# Patient Record
Sex: MALE | Race: WHITE | Employment: OTHER | URBAN - METROPOLITAN AREA
[De-identification: names, ages, dates, MRNs, and addresses within clinical notes are randomized per-mention and may not be internally consistent; named-entity substitution may affect disease eponyms.]

---

## 2019-01-21 ENCOUNTER — HOSPITAL ENCOUNTER (OUTPATIENT)
Age: 59
Discharge: HOME | End: 2019-01-23
Payer: COMMERCIAL

## 2019-01-21 ENCOUNTER — HOSPITAL ENCOUNTER (OUTPATIENT)
Dept: GENERAL RADIOLOGY | Age: 59
Discharge: HOME | End: 2019-01-23
Payer: COMMERCIAL

## 2019-01-21 ENCOUNTER — OFFICE VISIT (OUTPATIENT)
Dept: FAMILY MEDICINE CLINIC | Age: 59
End: 2019-01-21
Payer: COMMERCIAL

## 2019-01-21 VITALS
RESPIRATION RATE: 16 BRPM | OXYGEN SATURATION: 99 % | HEART RATE: 86 BPM | BODY MASS INDEX: 25.71 KG/M2 | SYSTOLIC BLOOD PRESSURE: 124 MMHG | HEIGHT: 73 IN | WEIGHT: 194 LBS | DIASTOLIC BLOOD PRESSURE: 78 MMHG

## 2019-01-21 DIAGNOSIS — D47.1 MYELOPROLIFERATIVE DISORDER (HCC): ICD-10-CM

## 2019-01-21 DIAGNOSIS — Z13.220 SCREENING FOR HYPERLIPIDEMIA: ICD-10-CM

## 2019-01-21 DIAGNOSIS — Z76.89 ENCOUNTER TO ESTABLISH CARE: Primary | ICD-10-CM

## 2019-01-21 DIAGNOSIS — Z13.228 SCREENING FOR METABOLIC DISORDER: ICD-10-CM

## 2019-01-21 DIAGNOSIS — M54.50 CHRONIC BILATERAL LOW BACK PAIN WITHOUT SCIATICA: Primary | ICD-10-CM

## 2019-01-21 DIAGNOSIS — G89.29 CHRONIC MIDLINE LOW BACK PAIN WITHOUT SCIATICA: ICD-10-CM

## 2019-01-21 DIAGNOSIS — M54.50 CHRONIC MIDLINE LOW BACK PAIN WITHOUT SCIATICA: ICD-10-CM

## 2019-01-21 DIAGNOSIS — G89.29 CHRONIC BILATERAL LOW BACK PAIN WITHOUT SCIATICA: Primary | ICD-10-CM

## 2019-01-21 PROBLEM — Z85.6 HISTORY OF LEUKEMIA: Status: ACTIVE | Noted: 2019-01-21

## 2019-01-21 PROBLEM — Z85.6 HISTORY OF LEUKEMIA: Status: RESOLVED | Noted: 2019-01-21 | Resolved: 2019-01-21

## 2019-01-21 PROCEDURE — 72114 X-RAY EXAM L-S SPINE BENDING: CPT

## 2019-01-21 PROCEDURE — 72072 X-RAY EXAM THORAC SPINE 3VWS: CPT

## 2019-01-21 PROCEDURE — 3017F COLORECTAL CA SCREEN DOC REV: CPT | Performed by: NURSE PRACTITIONER

## 2019-01-21 PROCEDURE — G8484 FLU IMMUNIZE NO ADMIN: HCPCS | Performed by: NURSE PRACTITIONER

## 2019-01-21 PROCEDURE — G8427 DOCREV CUR MEDS BY ELIG CLIN: HCPCS | Performed by: NURSE PRACTITIONER

## 2019-01-21 PROCEDURE — 4004F PT TOBACCO SCREEN RCVD TLK: CPT | Performed by: NURSE PRACTITIONER

## 2019-01-21 PROCEDURE — G8419 CALC BMI OUT NRM PARAM NOF/U: HCPCS | Performed by: NURSE PRACTITIONER

## 2019-01-21 PROCEDURE — 99205 OFFICE O/P NEW HI 60 MIN: CPT | Performed by: NURSE PRACTITIONER

## 2019-01-21 RX ORDER — DICLOFENAC SODIUM 75 MG/1
75 TABLET, DELAYED RELEASE ORAL 2 TIMES DAILY WITH MEALS
Qty: 60 TABLET | Refills: 2 | Status: SHIPPED | OUTPATIENT
Start: 2019-01-21 | End: 2019-03-14 | Stop reason: SINTOL

## 2019-01-21 RX ORDER — ALLOPURINOL 300 MG/1
300 TABLET ORAL DAILY
COMMUNITY
End: 2019-02-21 | Stop reason: SDUPTHER

## 2019-01-21 RX ORDER — HYDROXYUREA 500 MG/1
500 CAPSULE ORAL 2 TIMES DAILY
COMMUNITY
End: 2019-02-21 | Stop reason: DRUGHIGH

## 2019-01-21 ASSESSMENT — ENCOUNTER SYMPTOMS
ANAL BLEEDING: 0
COLOR CHANGE: 0
VOMITING: 0
RESPIRATORY NEGATIVE: 1
CONSTIPATION: 0
DIARRHEA: 0
WHEEZING: 0
BLOOD IN STOOL: 0
CHEST TIGHTNESS: 0
ALLERGIC/IMMUNOLOGIC NEGATIVE: 1
BACK PAIN: 1
COUGH: 0
ABDOMINAL PAIN: 0
EYES NEGATIVE: 1
SHORTNESS OF BREATH: 0
GASTROINTESTINAL NEGATIVE: 1

## 2019-01-23 ENCOUNTER — HOSPITAL ENCOUNTER (OUTPATIENT)
Age: 59
Discharge: HOME | End: 2019-01-23
Payer: COMMERCIAL

## 2019-01-23 DIAGNOSIS — Z13.228 SCREENING FOR METABOLIC DISORDER: ICD-10-CM

## 2019-01-23 DIAGNOSIS — Z13.220 SCREENING FOR HYPERLIPIDEMIA: ICD-10-CM

## 2019-01-23 DIAGNOSIS — D47.1 MYELOPROLIFERATIVE DISORDER (HCC): ICD-10-CM

## 2019-01-23 LAB
ABSOLUTE EOS #: 1.04 K/UL (ref 0–0.4)
ABSOLUTE IMMATURE GRANULOCYTE: 0 K/UL (ref 0–0.3)
ABSOLUTE LYMPH #: 4.16 K/UL (ref 1–4.8)
ABSOLUTE MONO #: 1.25 K/UL (ref 0.1–0.8)
ALBUMIN SERPL-MCNC: 4.4 G/DL (ref 3.5–5.2)
ALBUMIN/GLOBULIN RATIO: 1.4 (ref 1–2.5)
ALP BLD-CCNC: 79 U/L (ref 40–129)
ALT SERPL-CCNC: 8 U/L (ref 5–41)
ANION GAP SERPL CALCULATED.3IONS-SCNC: 16 MMOL/L (ref 9–17)
AST SERPL-CCNC: 14 U/L
BASOPHILS # BLD: 0 % (ref 0–2)
BASOPHILS ABSOLUTE: 0 K/UL (ref 0–0.2)
BILIRUB SERPL-MCNC: 0.25 MG/DL (ref 0.3–1.2)
BUN BLDV-MCNC: 13 MG/DL (ref 6–20)
BUN/CREAT BLD: ABNORMAL (ref 9–20)
CALCIUM SERPL-MCNC: 9.6 MG/DL (ref 8.6–10.4)
CHLORIDE BLD-SCNC: 106 MMOL/L (ref 98–107)
CHOLESTEROL/HDL RATIO: 4.6
CHOLESTEROL: 139 MG/DL
CO2: 21 MMOL/L (ref 20–31)
CREAT SERPL-MCNC: 0.83 MG/DL (ref 0.7–1.2)
DIFFERENTIAL TYPE: ABNORMAL
EOSINOPHILS RELATIVE PERCENT: 5 % (ref 1–4)
GFR AFRICAN AMERICAN: >60 ML/MIN
GFR NON-AFRICAN AMERICAN: >60 ML/MIN
GFR SERPL CREATININE-BSD FRML MDRD: ABNORMAL ML/MIN/{1.73_M2}
GFR SERPL CREATININE-BSD FRML MDRD: ABNORMAL ML/MIN/{1.73_M2}
GLUCOSE BLD-MCNC: 96 MG/DL (ref 70–99)
HCT VFR BLD CALC: 53.6 % (ref 40.7–50.3)
HDLC SERPL-MCNC: 30 MG/DL
HEMOGLOBIN: 17.8 G/DL (ref 13–17)
IMMATURE GRANULOCYTES: 0 %
LDL CHOLESTEROL: 82 MG/DL (ref 0–130)
LYMPHOCYTES # BLD: 20 % (ref 24–44)
MCH RBC QN AUTO: 30.5 PG (ref 25.2–33.5)
MCHC RBC AUTO-ENTMCNC: 33.2 G/DL (ref 28.4–34.8)
MCV RBC AUTO: 91.9 FL (ref 82.6–102.9)
MONOCYTES # BLD: 6 % (ref 1–7)
MORPHOLOGY: ABNORMAL
NRBC AUTOMATED: 0 PER 100 WBC
PDW BLD-RTO: 17.2 % (ref 11.8–14.4)
PLATELET # BLD: 732 K/UL (ref 138–453)
PLATELET ESTIMATE: ABNORMAL
PMV BLD AUTO: 9.3 FL (ref 8.1–13.5)
POTASSIUM SERPL-SCNC: 4.3 MMOL/L (ref 3.7–5.3)
RBC # BLD: 5.83 M/UL (ref 4.21–5.77)
RBC # BLD: ABNORMAL 10*6/UL
SEG NEUTROPHILS: 69 % (ref 36–66)
SEGMENTED NEUTROPHILS ABSOLUTE COUNT: 14.35 K/UL (ref 1.8–7.7)
SODIUM BLD-SCNC: 143 MMOL/L (ref 135–144)
TOTAL PROTEIN: 7.5 G/DL (ref 6.4–8.3)
TRIGL SERPL-MCNC: 133 MG/DL
TSH SERPL DL<=0.05 MIU/L-ACNC: 2.05 MIU/L (ref 0.3–5)
VLDLC SERPL CALC-MCNC: ABNORMAL MG/DL (ref 1–30)
WBC # BLD: 20.8 K/UL (ref 3.5–11.3)
WBC # BLD: ABNORMAL 10*3/UL

## 2019-01-23 PROCEDURE — 84443 ASSAY THYROID STIM HORMONE: CPT

## 2019-01-23 PROCEDURE — 36415 COLL VENOUS BLD VENIPUNCTURE: CPT

## 2019-01-23 PROCEDURE — 80061 LIPID PANEL: CPT

## 2019-01-23 PROCEDURE — 85025 COMPLETE CBC W/AUTO DIFF WBC: CPT

## 2019-01-23 PROCEDURE — 80053 COMPREHEN METABOLIC PANEL: CPT

## 2019-01-24 ENCOUNTER — HOSPITAL ENCOUNTER (OUTPATIENT)
Facility: MEDICAL CENTER | Age: 59
End: 2019-01-24
Payer: COMMERCIAL

## 2019-01-29 ENCOUNTER — INITIAL CONSULT (OUTPATIENT)
Dept: ONCOLOGY | Age: 59
End: 2019-01-29
Payer: COMMERCIAL

## 2019-01-29 ENCOUNTER — TELEPHONE (OUTPATIENT)
Dept: ONCOLOGY | Age: 59
End: 2019-01-29

## 2019-01-29 VITALS
TEMPERATURE: 97.7 F | SYSTOLIC BLOOD PRESSURE: 129 MMHG | WEIGHT: 201.9 LBS | DIASTOLIC BLOOD PRESSURE: 95 MMHG | HEART RATE: 86 BPM | HEIGHT: 73 IN | BODY MASS INDEX: 26.76 KG/M2

## 2019-01-29 DIAGNOSIS — D47.1 MYELOPROLIFERATIVE DISORDER (HCC): Primary | ICD-10-CM

## 2019-01-29 PROCEDURE — G8419 CALC BMI OUT NRM PARAM NOF/U: HCPCS | Performed by: INTERNAL MEDICINE

## 2019-01-29 PROCEDURE — G8484 FLU IMMUNIZE NO ADMIN: HCPCS | Performed by: INTERNAL MEDICINE

## 2019-01-29 PROCEDURE — 99245 OFF/OP CONSLTJ NEW/EST HI 55: CPT | Performed by: INTERNAL MEDICINE

## 2019-01-29 PROCEDURE — G8427 DOCREV CUR MEDS BY ELIG CLIN: HCPCS | Performed by: INTERNAL MEDICINE

## 2019-01-29 PROCEDURE — 99211 OFF/OP EST MAY X REQ PHY/QHP: CPT | Performed by: INTERNAL MEDICINE

## 2019-01-29 PROCEDURE — 3017F COLORECTAL CA SCREEN DOC REV: CPT | Performed by: INTERNAL MEDICINE

## 2019-01-29 RX ORDER — ALLOPURINOL 300 MG/1
300 TABLET ORAL DAILY
Qty: 90 TABLET | Refills: 2 | Status: SHIPPED | OUTPATIENT
Start: 2019-01-29 | End: 2019-02-21 | Stop reason: SDUPTHER

## 2019-01-29 RX ORDER — HYDROXYUREA 500 MG/1
500 CAPSULE ORAL 3 TIMES DAILY
Qty: 90 CAPSULE | Refills: 2 | Status: SHIPPED | OUTPATIENT
Start: 2019-01-29 | End: 2019-06-01 | Stop reason: SDUPTHER

## 2019-01-31 ENCOUNTER — TELEPHONE (OUTPATIENT)
Dept: FAMILY MEDICINE CLINIC | Age: 59
End: 2019-01-31

## 2019-01-31 DIAGNOSIS — M54.50 CHRONIC MIDLINE LOW BACK PAIN WITHOUT SCIATICA: Primary | ICD-10-CM

## 2019-01-31 DIAGNOSIS — G89.29 CHRONIC MIDLINE LOW BACK PAIN WITHOUT SCIATICA: Primary | ICD-10-CM

## 2019-02-01 DIAGNOSIS — G89.29 CHRONIC MIDLINE LOW BACK PAIN WITHOUT SCIATICA: Primary | ICD-10-CM

## 2019-02-01 DIAGNOSIS — M54.50 CHRONIC MIDLINE LOW BACK PAIN WITHOUT SCIATICA: Primary | ICD-10-CM

## 2019-02-01 RX ORDER — TRAMADOL HYDROCHLORIDE 50 MG/1
50 TABLET ORAL EVERY 6 HOURS PRN
Qty: 28 TABLET | Refills: 0 | Status: SHIPPED | OUTPATIENT
Start: 2019-02-01 | End: 2019-02-08

## 2019-02-06 ENCOUNTER — HOSPITAL ENCOUNTER (OUTPATIENT)
Dept: INFUSION THERAPY | Facility: MEDICAL CENTER | Age: 59
Discharge: HOME | End: 2019-02-06
Payer: COMMERCIAL

## 2019-02-06 VITALS
TEMPERATURE: 97.9 F | DIASTOLIC BLOOD PRESSURE: 70 MMHG | RESPIRATION RATE: 18 BRPM | SYSTOLIC BLOOD PRESSURE: 113 MMHG | HEART RATE: 70 BPM

## 2019-02-06 DIAGNOSIS — D47.1 MYELOPROLIFERATIVE DISORDER (HCC): ICD-10-CM

## 2019-02-06 PROCEDURE — 99195 PHLEBOTOMY: CPT

## 2019-02-06 ASSESSMENT — PAIN DESCRIPTION - ONSET: ONSET: ON-GOING

## 2019-02-06 ASSESSMENT — PAIN SCALES - GENERAL: PAINLEVEL_OUTOF10: 5

## 2019-02-06 ASSESSMENT — PAIN DESCRIPTION - LOCATION: LOCATION: HAND;FOOT

## 2019-02-06 ASSESSMENT — PAIN DESCRIPTION - FREQUENCY: FREQUENCY: CONTINUOUS

## 2019-02-06 ASSESSMENT — PAIN DESCRIPTION - PROGRESSION: CLINICAL_PROGRESSION: NOT CHANGED

## 2019-02-06 ASSESSMENT — PAIN DESCRIPTION - PAIN TYPE: TYPE: NEUROPATHIC PAIN;CHRONIC PAIN

## 2019-02-06 ASSESSMENT — PAIN DESCRIPTION - ORIENTATION: ORIENTATION: LEFT;RIGHT

## 2019-02-06 NOTE — PROGRESS NOTES
1435:  Pt arrives ambulatory accompanied by his daughter, Coral De Oliveira, for his therapeutic Phlebotomy. Orders reviewed. MD visit note orders from 1/29/19 reviewed. Explained procedure to the patient/pt's daughter and side effects to report right away and post procedure PO replacement fluids encouraged. Pt/pt's daughter verbalized understanding of this. Questions addressed and answered. VS obtained pre-phlebotomy @1435, immediate post-phlebotomy @1500, then every 15 minutes x2 as charted at 1515 and 1530.  0451-6314:  #16G Ultra Thin Walled Needle inserted using tourniquet after left inner forearm site prepped with sterile Chloraprep swabstick with steady blood return but then stopped after ~10 ml obtained. Needle pulled and pressure dressing applied. [id number]:  #16G Ultra Thin Walled Needle inserted using tourniquet without difficulty after left hand site prepped with sterile Chloraprep swabstick with immediate brisk blood return and 500 ml blood obtained steadily over 12 minutes. Needle pulled and pressure dressing applied. Pt tolerated well without complaints. Denied dizziness, lightheadedness, or other complaints. Pt drank 480 ml orange juice and 240 ml ice water and a bag of chips. Appointment calendar given to the patient. Repeat labs on 2/14/19 and RV on 2/21/19.

## 2019-02-14 ENCOUNTER — HOSPITAL ENCOUNTER (OUTPATIENT)
Facility: MEDICAL CENTER | Age: 59
Discharge: HOME | End: 2019-02-14
Payer: COMMERCIAL

## 2019-02-14 ENCOUNTER — HOSPITAL ENCOUNTER (OUTPATIENT)
Dept: PHYSICAL THERAPY | Facility: CLINIC | Age: 59
Setting detail: THERAPIES SERIES
Discharge: HOME | End: 2019-02-14
Payer: COMMERCIAL

## 2019-02-14 DIAGNOSIS — D47.1 MYELOPROLIFERATIVE DISORDER (HCC): ICD-10-CM

## 2019-02-14 LAB
ABSOLUTE EOS #: 0.7 K/UL (ref 0–0.4)
ABSOLUTE IMMATURE GRANULOCYTE: ABNORMAL K/UL (ref 0–0.3)
ABSOLUTE LYMPH #: 2.1 K/UL (ref 1–4.8)
ABSOLUTE MONO #: 0.8 K/UL (ref 0.2–0.8)
BASOPHILS # BLD: 1 % (ref 0–2)
BASOPHILS ABSOLUTE: 0.1 K/UL (ref 0–0.2)
DIFFERENTIAL TYPE: ABNORMAL
EOSINOPHILS RELATIVE PERCENT: 5 % (ref 1–4)
HCT VFR BLD CALC: 46.6 % (ref 41–53)
HEMOGLOBIN: 15.9 G/DL (ref 13.5–17.5)
IMMATURE GRANULOCYTES: ABNORMAL %
LYMPHOCYTES # BLD: 15 % (ref 24–44)
MCH RBC QN AUTO: 31.1 PG (ref 26–34)
MCHC RBC AUTO-ENTMCNC: 34 G/DL (ref 31–37)
MCV RBC AUTO: 91.3 FL (ref 80–100)
MONOCYTES # BLD: 6 % (ref 1–7)
NRBC AUTOMATED: ABNORMAL PER 100 WBC
PDW BLD-RTO: 18.4 % (ref 11.5–14.5)
PLATELET # BLD: 596 K/UL (ref 130–400)
PLATELET ESTIMATE: ABNORMAL
PMV BLD AUTO: 6.5 FL (ref 6–12)
RBC # BLD: 5.1 M/UL (ref 4.5–5.9)
RBC # BLD: ABNORMAL 10*6/UL
SEG NEUTROPHILS: 73 % (ref 36–66)
SEGMENTED NEUTROPHILS ABSOLUTE COUNT: 10.4 K/UL (ref 1.8–7.7)
URIC ACID: 4 MG/DL (ref 3.4–7)
WBC # BLD: 14 K/UL (ref 3.5–11)
WBC # BLD: ABNORMAL 10*3/UL

## 2019-02-14 PROCEDURE — 84550 ASSAY OF BLOOD/URIC ACID: CPT

## 2019-02-14 PROCEDURE — 36415 COLL VENOUS BLD VENIPUNCTURE: CPT

## 2019-02-14 PROCEDURE — 85025 COMPLETE CBC W/AUTO DIFF WBC: CPT

## 2019-02-14 PROCEDURE — 97162 PT EVAL MOD COMPLEX 30 MIN: CPT

## 2019-02-15 ENCOUNTER — HOSPITAL ENCOUNTER (OUTPATIENT)
Facility: MEDICAL CENTER | Age: 59
End: 2019-02-15
Payer: COMMERCIAL

## 2019-02-20 ENCOUNTER — HOSPITAL ENCOUNTER (OUTPATIENT)
Dept: PHYSICAL THERAPY | Facility: CLINIC | Age: 59
Setting detail: THERAPIES SERIES
Discharge: HOME | End: 2019-02-20
Payer: COMMERCIAL

## 2019-02-20 PROCEDURE — 97110 THERAPEUTIC EXERCISES: CPT

## 2019-02-21 ENCOUNTER — OFFICE VISIT (OUTPATIENT)
Dept: ONCOLOGY | Age: 59
End: 2019-02-21
Payer: COMMERCIAL

## 2019-02-21 ENCOUNTER — TELEPHONE (OUTPATIENT)
Dept: ONCOLOGY | Age: 59
End: 2019-02-21

## 2019-02-21 ENCOUNTER — HOSPITAL ENCOUNTER (OUTPATIENT)
Dept: PHYSICAL THERAPY | Facility: CLINIC | Age: 59
Setting detail: THERAPIES SERIES
Discharge: HOME | End: 2019-02-21
Payer: COMMERCIAL

## 2019-02-21 VITALS
TEMPERATURE: 97.7 F | DIASTOLIC BLOOD PRESSURE: 89 MMHG | WEIGHT: 203.5 LBS | SYSTOLIC BLOOD PRESSURE: 135 MMHG | HEART RATE: 77 BPM | BODY MASS INDEX: 26.85 KG/M2

## 2019-02-21 DIAGNOSIS — H53.8 BLURRED VISION: ICD-10-CM

## 2019-02-21 DIAGNOSIS — G89.29 CHRONIC INTRACTABLE HEADACHE, UNSPECIFIED HEADACHE TYPE: ICD-10-CM

## 2019-02-21 DIAGNOSIS — R51.9 CHRONIC INTRACTABLE HEADACHE, UNSPECIFIED HEADACHE TYPE: ICD-10-CM

## 2019-02-21 DIAGNOSIS — D47.1 MYELOPROLIFERATIVE DISORDER (HCC): Primary | ICD-10-CM

## 2019-02-21 PROCEDURE — G8427 DOCREV CUR MEDS BY ELIG CLIN: HCPCS | Performed by: INTERNAL MEDICINE

## 2019-02-21 PROCEDURE — 99212 OFFICE O/P EST SF 10 MIN: CPT | Performed by: INTERNAL MEDICINE

## 2019-02-21 PROCEDURE — G8484 FLU IMMUNIZE NO ADMIN: HCPCS | Performed by: INTERNAL MEDICINE

## 2019-02-21 PROCEDURE — G8419 CALC BMI OUT NRM PARAM NOF/U: HCPCS | Performed by: INTERNAL MEDICINE

## 2019-02-21 PROCEDURE — 97110 THERAPEUTIC EXERCISES: CPT

## 2019-02-21 PROCEDURE — 99214 OFFICE O/P EST MOD 30 MIN: CPT | Performed by: INTERNAL MEDICINE

## 2019-02-21 PROCEDURE — 3017F COLORECTAL CA SCREEN DOC REV: CPT | Performed by: INTERNAL MEDICINE

## 2019-02-21 PROCEDURE — 4004F PT TOBACCO SCREEN RCVD TLK: CPT | Performed by: INTERNAL MEDICINE

## 2019-02-21 RX ORDER — HYDROCODONE BITARTRATE AND ACETAMINOPHEN 5; 325 MG/1; MG/1
1 TABLET ORAL EVERY 6 HOURS PRN
Qty: 100 TABLET | Refills: 0 | Status: SHIPPED | OUTPATIENT
Start: 2019-02-21 | End: 2019-03-23

## 2019-02-21 RX ORDER — ALLOPURINOL 300 MG/1
300 TABLET ORAL DAILY
Qty: 90 TABLET | Refills: 5 | Status: SHIPPED | OUTPATIENT
Start: 2019-02-21 | End: 2019-04-12 | Stop reason: ALTCHOICE

## 2019-02-26 ENCOUNTER — TELEPHONE (OUTPATIENT)
Dept: ONCOLOGY | Age: 59
End: 2019-02-26

## 2019-02-27 ENCOUNTER — HOSPITAL ENCOUNTER (OUTPATIENT)
Dept: PHYSICAL THERAPY | Facility: CLINIC | Age: 59
Setting detail: THERAPIES SERIES
Discharge: HOME | End: 2019-02-27
Payer: COMMERCIAL

## 2019-02-27 PROCEDURE — 97110 THERAPEUTIC EXERCISES: CPT

## 2019-02-28 ENCOUNTER — HOSPITAL ENCOUNTER (OUTPATIENT)
Dept: MRI IMAGING | Age: 59
Discharge: HOME | End: 2019-03-02
Payer: COMMERCIAL

## 2019-02-28 DIAGNOSIS — H53.8 BLURRED VISION: ICD-10-CM

## 2019-02-28 DIAGNOSIS — D47.1 MYELOPROLIFERATIVE DISORDER (HCC): ICD-10-CM

## 2019-02-28 DIAGNOSIS — R51.9 CHRONIC INTRACTABLE HEADACHE, UNSPECIFIED HEADACHE TYPE: ICD-10-CM

## 2019-02-28 DIAGNOSIS — G89.29 CHRONIC INTRACTABLE HEADACHE, UNSPECIFIED HEADACHE TYPE: ICD-10-CM

## 2019-02-28 PROCEDURE — A9579 GAD-BASE MR CONTRAST NOS,1ML: HCPCS | Performed by: INTERNAL MEDICINE

## 2019-02-28 PROCEDURE — 70553 MRI BRAIN STEM W/O & W/DYE: CPT

## 2019-02-28 PROCEDURE — 6360000004 HC RX CONTRAST MEDICATION: Performed by: INTERNAL MEDICINE

## 2019-02-28 RX ADMIN — GADOTERIDOL 20 ML: 279.3 INJECTION, SOLUTION INTRAVENOUS at 08:29

## 2019-03-01 ENCOUNTER — HOSPITAL ENCOUNTER (OUTPATIENT)
Dept: PHYSICAL THERAPY | Facility: CLINIC | Age: 59
Setting detail: THERAPIES SERIES
Discharge: HOME | End: 2019-03-01
Payer: COMMERCIAL

## 2019-03-01 PROCEDURE — 97140 MANUAL THERAPY 1/> REGIONS: CPT

## 2019-03-01 PROCEDURE — 97110 THERAPEUTIC EXERCISES: CPT

## 2019-03-06 ENCOUNTER — HOSPITAL ENCOUNTER (OUTPATIENT)
Dept: PHYSICAL THERAPY | Facility: CLINIC | Age: 59
Setting detail: THERAPIES SERIES
Discharge: HOME | End: 2019-03-06
Payer: COMMERCIAL

## 2019-03-06 ENCOUNTER — OFFICE VISIT (OUTPATIENT)
Dept: NEUROLOGY | Age: 59
End: 2019-03-06
Payer: COMMERCIAL

## 2019-03-06 VITALS
HEIGHT: 73 IN | SYSTOLIC BLOOD PRESSURE: 135 MMHG | HEART RATE: 66 BPM | WEIGHT: 209 LBS | DIASTOLIC BLOOD PRESSURE: 86 MMHG | BODY MASS INDEX: 27.7 KG/M2

## 2019-03-06 DIAGNOSIS — R51.9 CHRONIC DAILY HEADACHE: Primary | ICD-10-CM

## 2019-03-06 DIAGNOSIS — R42 VERTIGO: ICD-10-CM

## 2019-03-06 PROCEDURE — G8419 CALC BMI OUT NRM PARAM NOF/U: HCPCS | Performed by: PSYCHIATRY & NEUROLOGY

## 2019-03-06 PROCEDURE — 3017F COLORECTAL CA SCREEN DOC REV: CPT | Performed by: PSYCHIATRY & NEUROLOGY

## 2019-03-06 PROCEDURE — 97110 THERAPEUTIC EXERCISES: CPT

## 2019-03-06 PROCEDURE — G8427 DOCREV CUR MEDS BY ELIG CLIN: HCPCS | Performed by: PSYCHIATRY & NEUROLOGY

## 2019-03-06 PROCEDURE — 97140 MANUAL THERAPY 1/> REGIONS: CPT

## 2019-03-06 PROCEDURE — 99244 OFF/OP CNSLTJ NEW/EST MOD 40: CPT | Performed by: PSYCHIATRY & NEUROLOGY

## 2019-03-06 PROCEDURE — G8484 FLU IMMUNIZE NO ADMIN: HCPCS | Performed by: PSYCHIATRY & NEUROLOGY

## 2019-03-06 RX ORDER — AMITRIPTYLINE HYDROCHLORIDE 25 MG/1
TABLET, FILM COATED ORAL
Qty: 120 TABLET | Refills: 1 | Status: ON HOLD | OUTPATIENT
Start: 2019-03-06 | End: 2019-08-12

## 2019-03-08 ENCOUNTER — HOSPITAL ENCOUNTER (OUTPATIENT)
Dept: PHYSICAL THERAPY | Facility: CLINIC | Age: 59
Setting detail: THERAPIES SERIES
Discharge: HOME | End: 2019-03-08
Payer: COMMERCIAL

## 2019-03-08 PROCEDURE — 97140 MANUAL THERAPY 1/> REGIONS: CPT

## 2019-03-08 PROCEDURE — 97110 THERAPEUTIC EXERCISES: CPT

## 2019-03-13 ENCOUNTER — HOSPITAL ENCOUNTER (OUTPATIENT)
Dept: PHYSICAL THERAPY | Facility: CLINIC | Age: 59
Setting detail: THERAPIES SERIES
Discharge: HOME | End: 2019-03-13
Payer: COMMERCIAL

## 2019-03-13 PROCEDURE — 97140 MANUAL THERAPY 1/> REGIONS: CPT

## 2019-03-14 ENCOUNTER — OFFICE VISIT (OUTPATIENT)
Dept: FAMILY MEDICINE CLINIC | Age: 59
End: 2019-03-14
Payer: COMMERCIAL

## 2019-03-14 VITALS
DIASTOLIC BLOOD PRESSURE: 80 MMHG | HEART RATE: 80 BPM | BODY MASS INDEX: 27.31 KG/M2 | WEIGHT: 207 LBS | SYSTOLIC BLOOD PRESSURE: 122 MMHG

## 2019-03-14 DIAGNOSIS — M54.42 CHRONIC LEFT-SIDED LOW BACK PAIN WITH LEFT-SIDED SCIATICA: Primary | ICD-10-CM

## 2019-03-14 DIAGNOSIS — K59.00 CONSTIPATION, UNSPECIFIED CONSTIPATION TYPE: ICD-10-CM

## 2019-03-14 DIAGNOSIS — F32.A DEPRESSION, UNSPECIFIED DEPRESSION TYPE: ICD-10-CM

## 2019-03-14 DIAGNOSIS — G47.00 INSOMNIA, UNSPECIFIED TYPE: ICD-10-CM

## 2019-03-14 DIAGNOSIS — G89.29 CHRONIC LEFT-SIDED LOW BACK PAIN WITH LEFT-SIDED SCIATICA: Primary | ICD-10-CM

## 2019-03-14 DIAGNOSIS — G62.9 NEUROPATHY: ICD-10-CM

## 2019-03-14 PROCEDURE — G8427 DOCREV CUR MEDS BY ELIG CLIN: HCPCS | Performed by: NURSE PRACTITIONER

## 2019-03-14 PROCEDURE — G8484 FLU IMMUNIZE NO ADMIN: HCPCS | Performed by: NURSE PRACTITIONER

## 2019-03-14 PROCEDURE — 96160 PT-FOCUSED HLTH RISK ASSMT: CPT | Performed by: NURSE PRACTITIONER

## 2019-03-14 PROCEDURE — G8419 CALC BMI OUT NRM PARAM NOF/U: HCPCS | Performed by: NURSE PRACTITIONER

## 2019-03-14 PROCEDURE — 3017F COLORECTAL CA SCREEN DOC REV: CPT | Performed by: NURSE PRACTITIONER

## 2019-03-14 PROCEDURE — 4004F PT TOBACCO SCREEN RCVD TLK: CPT | Performed by: NURSE PRACTITIONER

## 2019-03-14 PROCEDURE — 99215 OFFICE O/P EST HI 40 MIN: CPT | Performed by: NURSE PRACTITIONER

## 2019-03-14 RX ORDER — POLYETHYLENE GLYCOL 3350 17 G/17G
17 POWDER, FOR SOLUTION ORAL DAILY
Qty: 1530 G | Refills: 3 | Status: SHIPPED | OUTPATIENT
Start: 2019-03-14 | End: 2019-04-13

## 2019-03-14 RX ORDER — DOCUSATE SODIUM 100 MG/1
100 CAPSULE, LIQUID FILLED ORAL 2 TIMES DAILY PRN
Qty: 60 CAPSULE | Refills: 3 | Status: SHIPPED | OUTPATIENT
Start: 2019-03-14 | End: 2019-06-10 | Stop reason: SDUPTHER

## 2019-03-14 RX ORDER — GABAPENTIN 300 MG/1
CAPSULE ORAL
Qty: 90 CAPSULE | Refills: 0 | Status: SHIPPED | OUTPATIENT
Start: 2019-03-14 | End: 2019-04-12 | Stop reason: SDUPTHER

## 2019-03-14 ASSESSMENT — PATIENT HEALTH QUESTIONNAIRE - PHQ9
SUM OF ALL RESPONSES TO PHQ QUESTIONS 1-9: 14
SUM OF ALL RESPONSES TO PHQ9 QUESTIONS 1 & 2: 6
2. FEELING DOWN, DEPRESSED OR HOPELESS: 3
6. FEELING BAD ABOUT YOURSELF - OR THAT YOU ARE A FAILURE OR HAVE LET YOURSELF OR YOUR FAMILY DOWN: 0
7. TROUBLE CONCENTRATING ON THINGS, SUCH AS READING THE NEWSPAPER OR WATCHING TELEVISION: 1
9. THOUGHTS THAT YOU WOULD BE BETTER OFF DEAD, OR OF HURTING YOURSELF: 0
5. POOR APPETITE OR OVEREATING: 3
1. LITTLE INTEREST OR PLEASURE IN DOING THINGS: 3
SUM OF ALL RESPONSES TO PHQ QUESTIONS 1-9: 14
3. TROUBLE FALLING OR STAYING ASLEEP: 2
8. MOVING OR SPEAKING SO SLOWLY THAT OTHER PEOPLE COULD HAVE NOTICED. OR THE OPPOSITE, BEING SO FIGETY OR RESTLESS THAT YOU HAVE BEEN MOVING AROUND A LOT MORE THAN USUAL: 0
10. IF YOU CHECKED OFF ANY PROBLEMS, HOW DIFFICULT HAVE THESE PROBLEMS MADE IT FOR YOU TO DO YOUR WORK, TAKE CARE OF THINGS AT HOME, OR GET ALONG WITH OTHER PEOPLE: 1
4. FEELING TIRED OR HAVING LITTLE ENERGY: 2

## 2019-03-14 ASSESSMENT — ENCOUNTER SYMPTOMS
BACK PAIN: 1
ALLERGIC/IMMUNOLOGIC NEGATIVE: 1
RESPIRATORY NEGATIVE: 1
COLOR CHANGE: 0
GASTROINTESTINAL NEGATIVE: 1
EYES NEGATIVE: 1

## 2019-03-15 ENCOUNTER — HOSPITAL ENCOUNTER (OUTPATIENT)
Dept: PHYSICAL THERAPY | Facility: CLINIC | Age: 59
Setting detail: THERAPIES SERIES
Discharge: HOME | End: 2019-03-15
Payer: COMMERCIAL

## 2019-03-20 ENCOUNTER — HOSPITAL ENCOUNTER (OUTPATIENT)
Dept: PHYSICAL THERAPY | Facility: CLINIC | Age: 59
Setting detail: THERAPIES SERIES
Discharge: HOME | End: 2019-03-20
Payer: COMMERCIAL

## 2019-03-20 ENCOUNTER — TELEPHONE (OUTPATIENT)
Dept: FAMILY MEDICINE CLINIC | Age: 59
End: 2019-03-20

## 2019-03-20 PROCEDURE — 97140 MANUAL THERAPY 1/> REGIONS: CPT

## 2019-03-20 NOTE — TELEPHONE ENCOUNTER
Patients daughter is calling to say that patient wants to get a referral for Kevin Chicas kind of shrink that will prescribe medicine for him\" she says you told him you would refer him somewhere else

## 2019-03-21 ENCOUNTER — TELEPHONE (OUTPATIENT)
Dept: ONCOLOGY | Age: 59
End: 2019-03-21

## 2019-03-21 ENCOUNTER — HOSPITAL ENCOUNTER (OUTPATIENT)
Facility: MEDICAL CENTER | Age: 59
Discharge: HOME | End: 2019-03-21
Payer: COMMERCIAL

## 2019-03-21 ENCOUNTER — OFFICE VISIT (OUTPATIENT)
Dept: ONCOLOGY | Age: 59
End: 2019-03-21
Payer: COMMERCIAL

## 2019-03-21 VITALS
RESPIRATION RATE: 19 BRPM | TEMPERATURE: 98 F | BODY MASS INDEX: 27.57 KG/M2 | DIASTOLIC BLOOD PRESSURE: 92 MMHG | HEART RATE: 93 BPM | WEIGHT: 209 LBS | SYSTOLIC BLOOD PRESSURE: 125 MMHG

## 2019-03-21 DIAGNOSIS — G89.29 CHRONIC IDIOPATHIC ANAL PAIN: ICD-10-CM

## 2019-03-21 DIAGNOSIS — D47.1 MYELOPROLIFERATIVE DISORDER (HCC): ICD-10-CM

## 2019-03-21 DIAGNOSIS — D47.1 MYELOPROLIFERATIVE DISORDER (HCC): Primary | ICD-10-CM

## 2019-03-21 DIAGNOSIS — D47.1 CHRONIC MYELOSIS (HCC): ICD-10-CM

## 2019-03-21 DIAGNOSIS — K62.89 CHRONIC IDIOPATHIC ANAL PAIN: ICD-10-CM

## 2019-03-21 DIAGNOSIS — G47.00 INSOMNIA, UNSPECIFIED TYPE: ICD-10-CM

## 2019-03-21 DIAGNOSIS — F32.A DEPRESSION, UNSPECIFIED DEPRESSION TYPE: Primary | ICD-10-CM

## 2019-03-21 DIAGNOSIS — M54.42 ACUTE BACK PAIN WITH SCIATICA, LEFT: Primary | ICD-10-CM

## 2019-03-21 LAB
ABSOLUTE EOS #: 0.3 K/UL (ref 0–0.4)
ABSOLUTE IMMATURE GRANULOCYTE: ABNORMAL K/UL (ref 0–0.3)
ABSOLUTE LYMPH #: 2.4 K/UL (ref 1–4.8)
ABSOLUTE MONO #: 0.4 K/UL (ref 0.2–0.8)
BASOPHILS # BLD: 1 % (ref 0–2)
BASOPHILS ABSOLUTE: 0.1 K/UL (ref 0–0.2)
DIFFERENTIAL TYPE: ABNORMAL
EOSINOPHILS RELATIVE PERCENT: 3 % (ref 1–4)
HCT VFR BLD CALC: 47.1 % (ref 41–53)
HEMOGLOBIN: 16.2 G/DL (ref 13.5–17.5)
IMMATURE GRANULOCYTES: ABNORMAL %
LYMPHOCYTES # BLD: 20 % (ref 24–44)
MCH RBC QN AUTO: 31.9 PG (ref 26–34)
MCHC RBC AUTO-ENTMCNC: 34.4 G/DL (ref 31–37)
MCV RBC AUTO: 92.7 FL (ref 80–100)
MONOCYTES # BLD: 3 % (ref 1–7)
NRBC AUTOMATED: ABNORMAL PER 100 WBC
PDW BLD-RTO: 17.2 % (ref 11.5–14.5)
PLATELET # BLD: 558 K/UL (ref 130–400)
PLATELET ESTIMATE: ABNORMAL
PMV BLD AUTO: 6.6 FL (ref 6–12)
RBC # BLD: 5.08 M/UL (ref 4.5–5.9)
RBC # BLD: ABNORMAL 10*6/UL
SEG NEUTROPHILS: 73 % (ref 36–66)
SEGMENTED NEUTROPHILS ABSOLUTE COUNT: 8.8 K/UL (ref 1.8–7.7)
URIC ACID: 5.7 MG/DL (ref 3.4–7)
WBC # BLD: 12 K/UL (ref 3.5–11)
WBC # BLD: ABNORMAL 10*3/UL

## 2019-03-21 PROCEDURE — 4004F PT TOBACCO SCREEN RCVD TLK: CPT | Performed by: INTERNAL MEDICINE

## 2019-03-21 PROCEDURE — G8419 CALC BMI OUT NRM PARAM NOF/U: HCPCS | Performed by: INTERNAL MEDICINE

## 2019-03-21 PROCEDURE — 99211 OFF/OP EST MAY X REQ PHY/QHP: CPT

## 2019-03-21 PROCEDURE — 36415 COLL VENOUS BLD VENIPUNCTURE: CPT

## 2019-03-21 PROCEDURE — G8427 DOCREV CUR MEDS BY ELIG CLIN: HCPCS | Performed by: INTERNAL MEDICINE

## 2019-03-21 PROCEDURE — 85025 COMPLETE CBC W/AUTO DIFF WBC: CPT

## 2019-03-21 PROCEDURE — G8484 FLU IMMUNIZE NO ADMIN: HCPCS | Performed by: INTERNAL MEDICINE

## 2019-03-21 PROCEDURE — 99214 OFFICE O/P EST MOD 30 MIN: CPT | Performed by: INTERNAL MEDICINE

## 2019-03-21 PROCEDURE — 84550 ASSAY OF BLOOD/URIC ACID: CPT

## 2019-03-21 PROCEDURE — 3017F COLORECTAL CA SCREEN DOC REV: CPT | Performed by: INTERNAL MEDICINE

## 2019-03-21 NOTE — TELEPHONE ENCOUNTER
Referral placed for [facility] - please have patient check with his insurance first regarding who they cover

## 2019-03-22 ENCOUNTER — HOSPITAL ENCOUNTER (OUTPATIENT)
Dept: PHYSICAL THERAPY | Facility: CLINIC | Age: 59
Setting detail: THERAPIES SERIES
Discharge: HOME | End: 2019-03-22
Payer: COMMERCIAL

## 2019-03-22 PROCEDURE — 97110 THERAPEUTIC EXERCISES: CPT

## 2019-03-22 PROCEDURE — 97140 MANUAL THERAPY 1/> REGIONS: CPT

## 2019-04-03 ENCOUNTER — HOSPITAL ENCOUNTER (OUTPATIENT)
Dept: MRI IMAGING | Age: 59
Discharge: HOME | End: 2019-04-05
Payer: COMMERCIAL

## 2019-04-03 ENCOUNTER — TELEPHONE (OUTPATIENT)
Dept: FAMILY MEDICINE CLINIC | Age: 59
End: 2019-04-03

## 2019-04-03 ENCOUNTER — HOSPITAL ENCOUNTER (OUTPATIENT)
Age: 59
Discharge: HOME | End: 2019-04-03
Payer: COMMERCIAL

## 2019-04-03 ENCOUNTER — TELEPHONE (OUTPATIENT)
Dept: ONCOLOGY | Age: 59
End: 2019-04-03

## 2019-04-03 ENCOUNTER — HOSPITAL ENCOUNTER (OUTPATIENT)
Dept: PHYSICAL THERAPY | Facility: CLINIC | Age: 59
Setting detail: THERAPIES SERIES
Discharge: HOME | End: 2019-04-03
Payer: COMMERCIAL

## 2019-04-03 DIAGNOSIS — G89.29 CHRONIC IDIOPATHIC ANAL PAIN: ICD-10-CM

## 2019-04-03 DIAGNOSIS — G89.29 CHRONIC LEFT-SIDED LOW BACK PAIN WITH LEFT-SIDED SCIATICA: ICD-10-CM

## 2019-04-03 DIAGNOSIS — M54.42 ACUTE BACK PAIN WITH SCIATICA, LEFT: ICD-10-CM

## 2019-04-03 DIAGNOSIS — D47.1 CHRONIC MYELOSIS (HCC): ICD-10-CM

## 2019-04-03 DIAGNOSIS — K62.89 CHRONIC IDIOPATHIC ANAL PAIN: ICD-10-CM

## 2019-04-03 DIAGNOSIS — G62.9 NEUROPATHY: ICD-10-CM

## 2019-04-03 DIAGNOSIS — M54.42 CHRONIC LEFT-SIDED LOW BACK PAIN WITH LEFT-SIDED SCIATICA: ICD-10-CM

## 2019-04-03 LAB
ABSOLUTE EOS #: 0.12 K/UL (ref 0–0.44)
ABSOLUTE IMMATURE GRANULOCYTE: 0.04 K/UL (ref 0–0.3)
ABSOLUTE LYMPH #: 1.68 K/UL (ref 1.1–3.7)
ABSOLUTE MONO #: 0.65 K/UL (ref 0.1–1.2)
ALBUMIN SERPL-MCNC: 4.6 G/DL (ref 3.5–5.2)
ALBUMIN/GLOBULIN RATIO: 1.4 (ref 1–2.5)
ALP BLD-CCNC: 112 U/L (ref 40–129)
ALT SERPL-CCNC: 6 U/L (ref 5–41)
ANION GAP SERPL CALCULATED.3IONS-SCNC: 13 MMOL/L (ref 9–17)
AST SERPL-CCNC: 11 U/L
BASOPHILS # BLD: 2 % (ref 0–2)
BASOPHILS ABSOLUTE: 0.21 K/UL (ref 0–0.2)
BILIRUB SERPL-MCNC: 0.59 MG/DL (ref 0.3–1.2)
BUN BLDV-MCNC: 10 MG/DL (ref 6–20)
BUN/CREAT BLD: NORMAL (ref 9–20)
CALCIUM SERPL-MCNC: 9.3 MG/DL (ref 8.6–10.4)
CHLORIDE BLD-SCNC: 103 MMOL/L (ref 98–107)
CHOLESTEROL, FASTING: 147 MG/DL
CHOLESTEROL/HDL RATIO: 4.7
CO2: 26 MMOL/L (ref 20–31)
CREAT SERPL-MCNC: 0.79 MG/DL (ref 0.7–1.2)
DIFFERENTIAL TYPE: ABNORMAL
EOSINOPHILS RELATIVE PERCENT: 1 % (ref 1–4)
ESTIMATED AVERAGE GLUCOSE: 88 MG/DL
FOLATE: 7.5 NG/ML
GFR AFRICAN AMERICAN: >60 ML/MIN
GFR NON-AFRICAN AMERICAN: >60 ML/MIN
GFR SERPL CREATININE-BSD FRML MDRD: NORMAL ML/MIN/{1.73_M2}
GFR SERPL CREATININE-BSD FRML MDRD: NORMAL ML/MIN/{1.73_M2}
GLUCOSE BLD-MCNC: 93 MG/DL (ref 70–99)
HBA1C MFR BLD: 4.7 % (ref 4–6)
HCT VFR BLD CALC: 46 % (ref 40.7–50.3)
HDLC SERPL-MCNC: 31 MG/DL
HEMOGLOBIN: 15.9 G/DL (ref 13–17)
IMMATURE GRANULOCYTES: 0 %
LDL CHOLESTEROL: 97 MG/DL (ref 0–130)
LYMPHOCYTES # BLD: 19 % (ref 24–43)
MCH RBC QN AUTO: 32.6 PG (ref 25.2–33.5)
MCHC RBC AUTO-ENTMCNC: 34.6 G/DL (ref 28.4–34.8)
MCV RBC AUTO: 94.3 FL (ref 82.6–102.9)
MONOCYTES # BLD: 7 % (ref 3–12)
NRBC AUTOMATED: 0 PER 100 WBC
PDW BLD-RTO: 17.2 % (ref 11.8–14.4)
PLATELET # BLD: 220 K/UL (ref 138–453)
PLATELET ESTIMATE: ABNORMAL
PMV BLD AUTO: 9.9 FL (ref 8.1–13.5)
POTASSIUM SERPL-SCNC: 4.2 MMOL/L (ref 3.7–5.3)
RBC # BLD: 4.88 M/UL (ref 4.21–5.77)
RBC # BLD: ABNORMAL 10*6/UL
SEG NEUTROPHILS: 71 % (ref 36–65)
SEGMENTED NEUTROPHILS ABSOLUTE COUNT: 6.25 K/UL (ref 1.5–8.1)
SODIUM BLD-SCNC: 142 MMOL/L (ref 135–144)
T3 TOTAL: 97 NG/DL (ref 80–200)
THYROXINE, FREE: 0.89 NG/DL (ref 0.93–1.7)
TOTAL PROTEIN: 7.8 G/DL (ref 6.4–8.3)
TRIGLYCERIDE, FASTING: 95 MG/DL
TSH SERPL DL<=0.05 MIU/L-ACNC: 1.24 MIU/L (ref 0.3–5)
VITAMIN B-12: 754 PG/ML (ref 232–1245)
VITAMIN D 25-HYDROXY: 26.9 NG/ML (ref 30–100)
VLDLC SERPL CALC-MCNC: ABNORMAL MG/DL (ref 1–30)
WBC # BLD: 9 K/UL (ref 3.5–11.3)
WBC # BLD: ABNORMAL 10*3/UL

## 2019-04-03 PROCEDURE — 72158 MRI LUMBAR SPINE W/O & W/DYE: CPT

## 2019-04-03 PROCEDURE — 84480 ASSAY TRIIODOTHYRONINE (T3): CPT

## 2019-04-03 PROCEDURE — 82306 VITAMIN D 25 HYDROXY: CPT

## 2019-04-03 PROCEDURE — 97140 MANUAL THERAPY 1/> REGIONS: CPT

## 2019-04-03 PROCEDURE — 36415 COLL VENOUS BLD VENIPUNCTURE: CPT

## 2019-04-03 PROCEDURE — 82746 ASSAY OF FOLIC ACID SERUM: CPT

## 2019-04-03 PROCEDURE — 84425 ASSAY OF VITAMIN B-1: CPT

## 2019-04-03 PROCEDURE — 82607 VITAMIN B-12: CPT

## 2019-04-03 PROCEDURE — 80053 COMPREHEN METABOLIC PANEL: CPT

## 2019-04-03 PROCEDURE — 80061 LIPID PANEL: CPT

## 2019-04-03 PROCEDURE — 83036 HEMOGLOBIN GLYCOSYLATED A1C: CPT

## 2019-04-03 PROCEDURE — 85025 COMPLETE CBC W/AUTO DIFF WBC: CPT

## 2019-04-03 PROCEDURE — 84443 ASSAY THYROID STIM HORMONE: CPT

## 2019-04-03 PROCEDURE — 6360000004 HC RX CONTRAST MEDICATION: Performed by: NURSE PRACTITIONER

## 2019-04-03 PROCEDURE — A9579 GAD-BASE MR CONTRAST NOS,1ML: HCPCS | Performed by: NURSE PRACTITIONER

## 2019-04-03 PROCEDURE — 72157 MRI CHEST SPINE W/O & W/DYE: CPT

## 2019-04-03 PROCEDURE — 84439 ASSAY OF FREE THYROXINE: CPT

## 2019-04-03 RX ADMIN — GADOTERIDOL 20 ML: 279.3 INJECTION, SOLUTION INTRAVENOUS at 14:31

## 2019-04-03 NOTE — TELEPHONE ENCOUNTER
Daughter called left message he Dad needs a parking placard, reviewed chart. Made out letter for parking placard. Dr. Booker Carr needs to review and sign. Spoke with daughter stated letter has been made out, however MD needs to review and sign letter. MD is not in office until Thursday. She verified understanding.

## 2019-04-03 NOTE — DISCHARGE SUMMARY
[] [facility]) [facility] &  Therapy  [address]  [phone]  F: [phone] [x] [address]  [place] [address]   [address]  P: [phone]  F: [phone] [] [place]  [address]  P: [phone]  F: [phone] [] [address]  [facility]   [address]   [place]: [phone]  F: [phone]     Physical Therapy Discharge Note    Date: 4/3/2019      Patient: Jared Teran  : 1960  MRN: [id number]    Physician: Freya NICHOLS CNP  (Patient's PCP)                                  Insurance: [id number] [place] medicaid 30 visits  Diagnosis: chronic LBP without sciatica   Onset Date: 10/1/18                           Next Dr. Zoltan Cardenas:   Visit number: 10/12               Cancels/No Shows:           Date of initial visit: 19                Date of final visit: 4/3/19      Subjective:  Pain:  [x] Yes  [] No          Location: low back      Pain Rating: (0-10 scale) 5/10  Pain altered Tx:  [x] No  [] Yes  Action:   Comments: pt reports he had so much pain last week he couldn't walk; pt still with pain L lower back/ilium and that he has to be very careful with any movement or it can be aggravated. Pt with occasional L posterior thigh, not often present; pt feels manual therapy feels good but does not last; reports working on home exercise program but not consistently. Objective:  Test Measurements: FF: 45 degrees;   Hip rotation R ER 50/ IR 15; L ER 40; IR 10; lower abdominals 4-/5; spine extensors 4/5    Assessment: IMPROVED HIP FLEXIBILITY; LE symptoms rarely present                          [x] No change in patients pain level minimal change with spine flexibility in standing; pt did not tolerate dry needling well; overall, he tightens up, I feel he is anxious with this treatment so we weren't able to get as many needles along nerve pathways as we would have liked          STG: (to be met in 6 treatments)  1. ? Pain: below 4/10 baseline and below 8/10 max with less frequent,intense LE symptoms. -Met 1/10 baseline to 8/10 max  2. ? ROM: tolerate flexibility ex to HS, hip rotators, calves to decrease stress to lumbar spine-Met  3. ? Strength: tolerate basic stability ex program to address weakness of core, hips, spine-Met  4. ? Function: modify activities and posture to decrease stress to spine-Met  5. Independent with Home Exercise Program -ongoing, pt reports compliance at home, however still requires cuing for proper technique in clinic     LTG: (to be met in 12 treatments)   1. Pain below 3/10 and minimal to 0 LE symptoms: NOT MET 4/3/19  2. Improve strength to at least 4+/5 throughout for improved core strength to support spine: NOT MET 4/3/19  3. Have at least 65 degree HS flexibility and 15 degree hip IR to decrease tension/stress placed on lumbar spine:MET FOR L not quite met for R LE 4/3/19  4. Function improved at least 30% to have improved quality of life  5. Good spine/pelvis alignment: NOT FULLY MET 4/3/19     Patient goals: Funmilayo Fernandez if it helps\"      Treatment to Date:  [x] Therapeutic Exercise    [] Modalities:  [x] Therapeutic Activity    [] Ultrasound  [] Electrical Stimulation  [] Gait Training     [] Massage       [] Lumbar/Cervical Traction  [] Neuromuscular Re-education [] Cold/hotpack [] Iontophoresis: 4 mg/mL  [x] Instruction in Home Exercise Program                     Dexamethasone Sodium  [x] Manual Therapy             Phosphate 40-80 mAmin  [] Aquatic Therapy                   [] Vasocompression/    [] Other:             Game Ready    Discharge Status:     [] Pt recovered from conditions. Treatment goals were met. [] Pt received maximum benefit. No further therapy indicated at this time. [x] Pt to continue exercise/home instructions independently.     [] Therapy interrupted due to:    [] Pt has 2 or more no shows/cancels, is discontinued per our policy. [] Pt has completed prescribed number of treatment sessions. [x] Other: pending MRI results        Electronically signed by Elisa Bates PT on 4/3/2019 at 12:33 PM      If you have any questions or concerns, please don't hesitate to call.   Thank you for your referral.

## 2019-04-03 NOTE — FLOWSHEET NOTE
[] [facility]) [facility] &  Therapy  [address]  [phone]  F: [phone] [x] [address]  [address]   [address]  P: [phone]  F: [phone] [] [address]  Therapy  [address]  P: [phone]  F: [phone] [] [address]  [place]   [address]  [place]: [phone]  F: [phone]     Physical Therapy Daily Treatment Note     Date:  4/3/2019  Patient Name:  Christofer Shields    :  1960  MRN: [id number]  Physician: Enrique NICHOLS CNP  (Patient's PCP)     Insurance: [id number] [facility] medicaid 30 visits  Diagnosis: chronic LBP without sciatica   Onset Date: 10/1/18   Next Dr. Luc Tsai:   Visit number: 10/12  Cancels/No Shows: 1/0     Subjective:     Pain:  [x] Yes  [] No Location: low back Pain Rating: (0-10 scale) 5/10  Pain altered Tx:  [x] No  [] Yes  Action:   Comments: pt reports he had so much pain last week he couldn't walk; pt still with pain L lower back/ilium and that he has to be very careful with any movement or it can be aggravated. Pt with occasional L posterior thigh, not often     Objective: FF: 45 degrees; Hip rotation R ER 50/ IR 15; L ER 40; IR 10; lower abdominals 4-/5; spine extensors 4/5  Modalities:   Precautions:myeloproliferative disorder-leukemia; previous cervical fusion  Exercises:   Bolded completed today  Exercise Reps/ Time Weight/ Level Comments   Nustep 6' L1 Added          Nerve Glides      Sciatic Nerve Glide 10x     Ulnar Nerve Glide   These were completed seated though picture shows in supine.    Radial Nerve Glide     These were completed seated though picture shows in supine.             Seated      Shoulder Rolls 15x     Upper trap stretch 30\"x2 ea           Supine         LTR 10x5\" ea       HS stretch 30\"x2 ea     Butterfly Stretch 30\"x2     Piriformis Stretch 30\"x2     Upper trunk rotation 30\"x3  Arms Extended    SKTC 5\"x10 ea  Added 2/21   DKTC 30\"x2  Added 2/21   SLR 10x ea  Added 2/21   Bridges 10x  Added 2/21   PPT 10x3\"  Added 2/21   PPT with marches 5ea  Added 2/21         Sidelying      Clams 10x ea  Added 2/21                Standing      Door Way Stretch 30\"x2     Calf stretch 30x2 ea   At wall         Other: MFR to low back and manual stretching to legs to improve flexibility  MET: +LFF; ?stork; L long in sitting; L elevated pub; L anterior rotation; L slight long in supine; corrected L elevated pub; L downslip; L on L sacral torsion    Listening: forward R low  Not done today:Dry needling: consent obtained and skin prepared with alcohol; standard procedure followed; 7 total: 3 2\" B L3,L5; 2 3\" B superior cluneals with nTp elicited L side; pt with guarding through much of the treatment;   Per Christophe Underwood    3/20/19: Specific instructions for next treatment:  MASSAGE OR MFR IF patient is agreeable;  Dr agreed to DN. ,Manual Stretching, anything for flexibility,        Treatment Charges: Mins Units   []  Modalities: HP     []  Ther Exercise     [x]  Manual Therapy 25 2   []  Ther Activities     []  Aquatics     []  Vasocompression     []  Other     Total Treatment time 25 2       Assessment: [x] Progressing toward goals: IMPROVED HIP FLEXIBILITY; LE symptoms rarely present    [x] No change in patients pain level minimal change with spine flexibility in standing; pt did not tolerate dry needling well; overall, he tightens up, I feel he is anxious with this treatment so we weren't able to get as many needles along nerve pathways as we would have liked    [] Other:    STG: (to be met in 6 treatments)  1. ? Pain: below 4/10 baseline and below 8/10 max with less frequent,intense LE symptoms. -Met 1/10 baseline to 8/10 max  2. ? ROM: tolerate flexibility ex to HS, hip rotators, calves to decrease stress to lumbar spine-Met  3. ? Strength: tolerate basic stability ex program to address weakness of core, hips, spine-Met  4. ? Function: modify activities and posture to decrease stress to spine-Met  5. Independent with Home Exercise Program -ongoing, pt reports compliance at home, however still requires cuing for proper technique in clinic     LTG: (to be met in 12 treatments)   1. Pain below 3/10 and minimal to 0 LE symptoms: NOT MET 4/3/19  2. Improve strength to at least 4+/5 throughout for improved core strength to support spine: NOT MET 4/3/19  3. Have at least 65 degree HS flexibility and 15 degree hip IR to decrease tension/stress placed on lumbar spine:MET FOR L not quite met for R LE 4/3/19  4. Function improved at least 30% to have improved quality of life  5. Good spine/pelvis alignment: NOT FULLY MET 4/3/19     Patient goals: Elsworth Lieu if it helps\"        Pt. Education:  [] Yes  [] No  [x] Reviewed Prior HEP/Ed  Method of Education: [x] Verbal   [] Demo  [] Written   Comprehension of Education:  [x] Verbalizes understanding. [] Demonstrates understanding. [] Needs review. [] Demonstrates/verbalizes HEP/Ed previously given. Plan: [] Continue per plan of care.    [x] Other: place pt on hold, pending MRI      Time In: 11:05 AM          Time Out: 11:35 AM    Electronically signed by:  Bee Koenig PT,

## 2019-04-04 NOTE — TELEPHONE ENCOUNTER
md reviewed and signed letter. Daughter notified.   Will  tomorrow while dad is a [facility] for PT.

## 2019-04-05 ENCOUNTER — HOSPITAL ENCOUNTER (OUTPATIENT)
Dept: PHYSICAL THERAPY | Facility: CLINIC | Age: 59
Setting detail: THERAPIES SERIES
Discharge: HOME | End: 2019-04-05
Payer: COMMERCIAL

## 2019-04-05 NOTE — FLOWSHEET NOTE
[] [address]  [address]    [phone]  F: [phone]   [x] [address]  [facility]   [address]  P: [phone]  F: [phone]  [] [place]  [address]  P: [phone]  F: [phone]   [] [address]  [facility] [address]   [place]: [phone]  F: [phone]  [] [facility]  [address]  [place]: [phone]   F: [phone]     Physical Therapy Cancel/No Show note    Date: 2019  Patient: Brenda Guerra  : 1960  MRN: [id number]    Cancels/No Shows to date:     For today's appointment patient:    [x]  Cancelled    [] Rescheduled appointment    [] No-show     Reason given by patient:    []  Patient ill    []  Conflicting appointment    [] No transportation      [] Conflict with work    [] No reason given    [] Weather related    [x] Other: pending MRI results      Comments:        [x] Next appointment was confirmed. Spoke with pts daughter regarding furture PT appts vs discharge from PT. Per Daughter, pt needs to cont with PT in order to be seen by pain management, despite the MRI findings, per insurance. They would like pt to cont at least 1x/week until PT has been completed for 3 months.      Electronically signed by: Xavier Garza PTA

## 2019-04-07 LAB — VITAMIN B1 WHOLE BLOOD: 129 NMOL/L (ref 70–180)

## 2019-04-10 ENCOUNTER — HOSPITAL ENCOUNTER (OUTPATIENT)
Dept: PHYSICAL THERAPY | Facility: CLINIC | Age: 59
Setting detail: THERAPIES SERIES
Discharge: HOME | End: 2019-04-10
Payer: COMMERCIAL

## 2019-04-10 PROCEDURE — 97110 THERAPEUTIC EXERCISES: CPT

## 2019-04-10 PROCEDURE — 97140 MANUAL THERAPY 1/> REGIONS: CPT

## 2019-04-10 NOTE — FLOWSHEET NOTE
[] [facility]) [facility] &  Therapy  [address]  [phone]  F: [phone] [x] [address]  [place] [address]   [address]  P: [phone]  F: [phone] [] [address]  Therapy  [address]  P: [phone]  F: [phone] [] [address]  [facility]   [address]  [place]: [phone]  F: [phone]     Physical Therapy Daily Treatment Note     Date:  4/10/2019  Patient Name:  Yanick Guido    :  1960  MRN: [id number]  Physician: Hemal NICHOLS CNP  (Patient's PCP)     Insurance: [id number] [facility] medicaid 30 visits  Diagnosis: chronic LBP without sciatica   Onset Date: 10/1/18   Next Dr. Valencia Course:   Visit number:   Cancels/No Shows: 2/0     Subjective:     Pain:  [x] Yes  [] No Location: low back Pain Rating: (0-10 scale) 0/10  Pain altered Tx:  [x] No  [] Yes  Action:   Comments: pt reports several days of improved symptoms; pt to come to therapy weekly. Per MRI, mild neural foraminal narrowning. Objective: FF: 45 degrees; Hip rotation R ER 50/ IR 15; L ER 40; IR 10; lower abdominals 4-/5; spine extensors 4/5  Modalities:   Precautions:myeloproliferative disorder-leukemia; previous cervical fusion  Exercises:   Bolded completed today  Exercise Reps/ Time Weight/ Level Comments   Nustep 10' L3 Added          Nerve Glides      Sciatic Nerve Glide 10x     Ulnar Nerve Glide   These were completed seated though picture shows in supine.    Radial Nerve Glide     These were completed seated though picture shows in supine.             Seated      Shoulder Rolls 15x     Upper trap stretch 30\"x2 ea           Supine         LTR 10x5\" ea       HS stretch cr     Butterfly Stretch 30\"x2     Piriformis Stretch cr     Upper trunk rotation 30\"x3  Arms Extended    SKTC 5\"x10 ea  Added  DKTC 30\"x2  Added 2/21   SLR 10x ea  Added 2/21   Bridges 10x 5 sec Progressed 4/10/19   PPT 10x3\"  Added 2/21   PPT with marches 10  Up one at a time, lower together; progressed 4/10/19         Sidelying      Hip abduction 10 A Started 4/10/19   Clams 10x ea  Added 2/21          prone      Quad stretch CR x Started 4/10/19   Hip stretch into IR CR x Started 4/10/19         Standing      Door Way Stretch 30\"x2     Calf stretch 30x2 ea   At wall         Other: MFR to low back and manual stretching to legs to improve flexibility    Not today:MET: +LFF; ?stork; L long in sitting; L elevated pub; L anterior rotation; L slight long in supine; corrected L elevated pub; L downslip; L on L sacral torsion    Not today: Listening: forward R low  Not done today:Dry needling: consent obtained and skin prepared with alcohol; standard procedure followed; 7 total: 3 2\" B L3,L5; 2 3\" B superior cluneals with nTp elicited L side; pt with guarding through much of the treatment;   Per Derrek Mis    3/20/19: Specific instructions for next treatment: if pain remains reduced continue working on core and stability exercises, flexibility and conditioning.         Treatment Charges: Mins Units   []  Modalities: HP     [x]  Ther Exercise 20 1   [x]  Manual Therapy 15 1   []  Ther Activities     []  Aquatics     []  Vasocompression     []  Other     Total Treatment time 35 2       Assessment: [x] Progressing toward goals: overall pain is better managed and he is able to progress with exercises    [] No change   [] Other:    STG: (to be met in 6 treatments)  1. ? Pain: below 4/10 baseline and below 8/10 max with less frequent,intense LE symptoms. -Met 1/10 baseline to 8/10 max  2. ? ROM: tolerate flexibility ex to HS, hip rotators, calves to decrease stress to lumbar spine-Met  3. ? Strength: tolerate basic stability ex program to address weakness of core, hips, spine-Met  4. ? Function: modify activities and posture to decrease stress to spine-Met  5. Independent with Home Exercise Program -ongoing, pt reports compliance at home, however still requires cuing for proper technique in clinic     LTG: (to be met in 12 treatments)   1. Pain below 3/10 and minimal to 0 LE symptoms: NOT MET 4/3/19  2. Improve strength to at least 4+/5 throughout for improved core strength to support spine: NOT MET 4/3/19  3. Have at least 65 degree HS flexibility and 15 degree hip IR to decrease tension/stress placed on lumbar spine:MET FOR L not quite met for R LE 4/3/19  4. Function improved at least 30% to have improved quality of life  5. Good spine/pelvis alignment: NOT FULLY MET 4/3/19     Patient goals: Vanessa Boyd if it helps\"        Pt. Education:  [x] Yes  [] No  [x] Reviewed Prior HEP/Ed  Method of Education: [x] Verbal   [x] Demo exercise progression [] Written   Comprehension of Education:  [x] Verbalizes understanding. [x] Demonstrates understanding. [x] Needs review. [] Demonstrates/verbalizes HEP/Ed previously given. Plan: [x] Continue per plan of care. weekly   [] Other:       Time In: 12:05 PM          Time Out: 12:55 PM    Electronically signed by:  Bebo Lagunas, PT,

## 2019-04-12 ENCOUNTER — OFFICE VISIT (OUTPATIENT)
Dept: FAMILY MEDICINE CLINIC | Age: 59
End: 2019-04-12
Payer: COMMERCIAL

## 2019-04-12 ENCOUNTER — APPOINTMENT (OUTPATIENT)
Dept: PHYSICAL THERAPY | Facility: CLINIC | Age: 59
End: 2019-04-12
Payer: COMMERCIAL

## 2019-04-12 VITALS
BODY MASS INDEX: 26.25 KG/M2 | SYSTOLIC BLOOD PRESSURE: 110 MMHG | DIASTOLIC BLOOD PRESSURE: 70 MMHG | WEIGHT: 199 LBS | HEART RATE: 76 BPM | OXYGEN SATURATION: 98 % | RESPIRATION RATE: 18 BRPM

## 2019-04-12 DIAGNOSIS — Z09 FOLLOW UP: ICD-10-CM

## 2019-04-12 DIAGNOSIS — M47.26 OSTEOARTHRITIS OF SPINE WITH RADICULOPATHY, LUMBAR REGION: Primary | ICD-10-CM

## 2019-04-12 PROCEDURE — 4004F PT TOBACCO SCREEN RCVD TLK: CPT | Performed by: NURSE PRACTITIONER

## 2019-04-12 PROCEDURE — 3017F COLORECTAL CA SCREEN DOC REV: CPT | Performed by: NURSE PRACTITIONER

## 2019-04-12 PROCEDURE — 99214 OFFICE O/P EST MOD 30 MIN: CPT | Performed by: NURSE PRACTITIONER

## 2019-04-12 PROCEDURE — G8419 CALC BMI OUT NRM PARAM NOF/U: HCPCS | Performed by: NURSE PRACTITIONER

## 2019-04-12 PROCEDURE — G8427 DOCREV CUR MEDS BY ELIG CLIN: HCPCS | Performed by: NURSE PRACTITIONER

## 2019-04-12 RX ORDER — GABAPENTIN 300 MG/1
600 CAPSULE ORAL 3 TIMES DAILY
Qty: 180 CAPSULE | Refills: 5 | Status: SHIPPED | OUTPATIENT
Start: 2019-04-12 | End: 2019-09-26

## 2019-04-12 ASSESSMENT — ENCOUNTER SYMPTOMS
BACK PAIN: 1
COLOR CHANGE: 0
GASTROINTESTINAL NEGATIVE: 1
EYES NEGATIVE: 1
RESPIRATORY NEGATIVE: 1
ALLERGIC/IMMUNOLOGIC NEGATIVE: 1

## 2019-04-12 NOTE — PATIENT INSTRUCTIONS
Call in 4 weeks with update on symptoms     [address] [facility] [address], DO  [address], Buddy Sorensen  [address].  [address] [place] [address], [address] [place] [address], [address] [place] [address]  [phone]

## 2019-04-12 NOTE — PROGRESS NOTES
[facility]  [address] [facility]  Dept: [phone]    Visit Information    Have you changed or started any medications since your last visit including any over-the-counter medicines, vitamins, or herbal medicines? no   Are you having any side effects from any of your medications? -  no  Have you stopped taking any of your medications? Is so, why? -  no    Have you seen any other physician or provider since your last visit? Yes - Records Obtained  Have you had any other diagnostic tests since your last visit? Yes - Records Obtained  Have you been seen in the emergency room and/or had an admission to a hospital since we last saw you? No  Have you had your routine dental cleaning in the past 6 months? no    Have you activated your [employer] account? If not, what are your barriers? No:      Patient Care Team:  SIMONE Mcpherson CNP as PCP - General (Family Medicine)    Medical History Review  Past Medical, Family, and Social History reviewed and does contribute to the patient presenting condition    Health Maintenance   Topic Date Due    Hepatitis C screen  1960    Pneumococcal 0-64 years Vaccine (1 of 1 - PPSV23) 09/23/1967    HIV screen  09/23/1976    DTaP/Tdap/Td vaccine (1 - Tdap) 09/23/1980    Shingles Vaccine (1 of 2) 09/23/2011    Colon cancer screen colonoscopy  09/23/2011    Low dose CT lung screening  09/23/2016    Flu vaccine (Season Ended) 09/01/2019    Lipid screen  04/03/2024           Ranjan Lomas is a 63 y.o. male who presents today for his medical conditions/complaintsas noted below.       Ranjan Lomas is here today c/o Discuss Labs and Discuss Medications      Past Medical History:   Diagnosis Date    Cancer ([address]     myeloproliferative    Myeloproliferative disorder ([facility] [address]       Past Surgical History:   Procedure Laterality Date    APPENDECTOMY      CARPAL TUNNEL RELEASE      bilateral     CERVICAL FUSION      s/p trampoline accident    TONSILLECTOMY         Family History   Problem Relation Age of Onset   Jeffrey Sequeira Breast Cancer Mother         s/p surgical complications    Diabetes Mother     Heart Failure Father     Other Brother         \"bad back\"       Social History     Tobacco Use    Smoking status: Current Every Day Smoker     Packs/day: 1.00     Years: 40.00     Pack years: 40.00     Types: Cigarettes    Smokeless tobacco: Never Used    Tobacco comment: less than 1 ppd    Substance Use Topics    Alcohol use: No      Current Outpatient Medications   Medication Sig Dispense Refill    gabapentin (NEURONTIN) 300 MG capsule Take 2 capsules by mouth 3 times daily for 30 days. 180 capsule 5    docusate sodium (COLACE) 100 MG capsule Take 1 capsule by mouth 2 times daily as needed for Constipation 60 capsule 3    polyethylene glycol (GLYCOLAX) powder Take 17 g by mouth daily 1530 g 3    amitriptyline (ELAVIL) 25 MG tablet Take 1 tab (25 mg) at bedtime for 1 week, then 2 tabs (50 mg) for 1 week, then 3 tabs (75 mg) for 1 week, then 4 tabs (100 mg) thereafter 120 tablet 1    hydroxyurea (HYDREA) 500 MG chemo capsule Take 1 capsule by mouth 3 times daily 90 capsule 2    aspirin 81 MG tablet Take 81 mg by mouth daily       No current facility-administered medications for this visit.       No Known Allergies      HPI:     HPI    Presents today for follow-up MRI / chronic LBP   Results reviewed and discussed + arthritis , multilevel neural foraminal narrowing and board disc bulges   He notes there has been some improvement in his back pain   Tolerating gabapentin with some relief   Declines any side effects from medication   Still c/o intermittent L sided sciatica  Continues PT but hasn't noticed much relief  Feels more sore afterwards   Pain management told him 3 months of PT until he can see them historically   Has trailed Voltaren and Norco without relief   There are no red flag symptoms or concerning findings on MRI in relation to spinal cord compression or myeloproliferative disorder     Health Maintenance:      Subjective:     Review of Systems   Constitutional: Negative. Negative for appetite change, fatigue, fever and unexpected weight change. HENT: Negative. Eyes: Negative. Respiratory: Negative. Cardiovascular: Negative. Gastrointestinal: Negative. Endocrine: Negative. Genitourinary: Negative. Musculoskeletal: Positive for arthralgias, back pain and gait problem. Negative for joint swelling. Skin: Negative. Negative for color change, pallor, rash and wound. Allergic/Immunologic: Negative. Neurological: Positive for weakness and numbness. Hematological: Negative. Psychiatric/Behavioral: Negative. Objective:     Vitals:    04/12/19 0946   BP: 110/70   Pulse: 76   Resp: 18   SpO2: 98%       Body mass index is 26.25 kg/m². Physical Exam   Constitutional: He is oriented to person, place, and time. He appears well-developed and well-nourished. Non-toxic appearance. He does not have a sickly appearance. He does not appear ill. No distress. HENT:   Head: Normocephalic and atraumatic. Right Ear: External ear normal.   Left Ear: External ear normal.   Nose: Nose normal.   Mouth/Throat: Oropharynx is clear and moist.   Eyes: Pupils are equal, round, and reactive to light. Conjunctivae are normal. Right eye exhibits no discharge. Left eye exhibits no discharge. No scleral icterus. Neck: Normal range of motion. Neck supple. No tracheal deviation present. Cardiovascular: Normal rate, regular rhythm, normal heart sounds and intact distal pulses. Exam reveals no gallop and no friction rub. No murmur heard. Pulmonary/Chest: Effort normal and breath sounds normal. No accessory muscle usage or stridor. No tachypnea. No respiratory distress. He has no decreased breath sounds. He has no wheezes. He has no rhonchi. He has no rales. Abdominal: Soft.  Bowel sounds are normal. He exhibits no

## 2019-04-17 ENCOUNTER — HOSPITAL ENCOUNTER (OUTPATIENT)
Dept: PHYSICAL THERAPY | Facility: CLINIC | Age: 59
Setting detail: THERAPIES SERIES
Discharge: HOME | End: 2019-04-17
Payer: COMMERCIAL

## 2019-04-17 PROCEDURE — 97110 THERAPEUTIC EXERCISES: CPT

## 2019-04-17 PROCEDURE — 97140 MANUAL THERAPY 1/> REGIONS: CPT

## 2019-04-17 NOTE — PROGRESS NOTES
compliance at home, however still requires cuing for proper technique in clinic     LTG: (to be met in 12 treatments)   1. Pain below 3/10 and minimal to 0 LE symptoms: NOT MET 4/3/19  2. Improve strength to at least 4+/5 throughout for improved core strength to support spine: NOT MET 4/3/19  3. Have at least 65 degree HS flexibility and 15 degree hip IR to decrease tension/stress placed on lumbar spine:MET FOR L not quite met for R LE 4/3/19  4. Function improved at least 30% to have improved quality of life  5. Good spine/pelvis alignment: NOT FULLY MET 4/3/19     Patient goals: Smooth Girard if it helps\"        Treatment to Date:  [x] Therapeutic Exercise    [] Modalities:  [] Therapeutic Activity    [] Ultrasound  [] Electrical Stimulation  [] Gait Training     [] Massage       [] Lumbar/Cervical Traction  [] Neuromuscular Re-education [x] Cold/hotpack [] Iontophoresis: 4 mg/mL  [x] Instruction in Home Exercise Program                     Dexamethasone Sodium  [x] Manual Therapy             Phosphate 40-80 mAmin  [] Aquatic Therapy                   [] Vasocompression/   [] Other:  [x] Dry Needling                                           Game Ready        Patient Status:     [] Continue per initial plan of care. [x] Additional visits necessary. [] Other:     Requested Frequency/Duration: 1 times per week for 6 treatments. Electronically signed by Dory Pace PT on 4/17/2019 at 7:27 AM      If you have any questions or concerns, please don't hesitate to call. Thank you for your referral.    Physician Signature:________________________________Date:__________________  By signing above or cosigning this note, I have reviewed this plan of care and certify a need for medically necessary rehabilitation services.      *PLEASE SIGN ABOVE AND FAX BACK ALL PAGES*

## 2019-04-17 NOTE — FLOWSHEET NOTE
Stretch cr     Upper trunk rotation 30\"x3  Arms Extended    SKTC 5\"x10 ea  Added 2/21   DKTC 30\"x2  Added 2/21   SLR 12x ea  Progressed 4/17/19   Bridges 10x 5 sec Progressed 4/10/19   PPT 10x3\"  Added 2/21   PPT with double leg lift 12  Progressed 4/17/19         Sidelying      Hip abduction 12ea A Progressed 4/17//19   Clams 12x ea A Progressed 4/17/19         prone      Quad stretch CR x Started 4/10/19   Hip stretch into IR CR x Started 4/10/19   Hip stretch into ER CR x Started 4/17/19   Alt leg lift 10ea A Started 4/17/19   Alt arm lift 10ea A Started 4/17/19   Prone propping 1 min A Started 4/17/19         Standing      Door Way Stretch 30\"x2     Calf stretch 30x2 ea   At wall         Other: MFR to low back and manual stretching to legs to improve flexibility    Not today:MET: +LFF; ?stork; L long in sitting; L elevated pub; L anterior rotation; L slight long in supine; corrected L elevated pub; L downslip; L on L sacral torsion    Not today: Listening: forward R low  Not done today:Dry needling: consent obtained and skin prepared with alcohol; standard procedure followed; 7 total: 3 2\" B L3,L5; 2 3\" B superior cluneals with nTp elicited L side; pt with guarding through much of the treatment;   Per Robert Maxwell    3/20/19: Specific instructions for next treatment: if pain remains reduced continue working on core and stability exercises, flexibility and conditioning.         Treatment Charges: Mins Units   [x]  Modalities: HP 12 0   [x]  Ther Exercise 30 2   [x]  Manual Therapy 15 1   []  Ther Activities     []  Aquatics     []  Vasocompression     []  Other     Total Treatment time 45 3       Assessment: [x] Progressing toward goals: at times pain flares up and ex modified; weakness/decreased endurance noted with prone progression exercises;    [] No change   [] Other:    STG: (to be met in 6 treatments)  1. ? Pain: below 4/10 baseline and below 8/10 max with less frequent,intense LE symptoms. -Met 1/10 baseline to 8/10 max  2. ? ROM: tolerate flexibility ex to HS, hip rotators, calves to decrease stress to lumbar spine-Met  3. ? Strength: tolerate basic stability ex program to address weakness of core, hips, spine-Met  4. ? Function: modify activities and posture to decrease stress to spine-Met  5. Independent with Home Exercise Program -ongoing, pt reports compliance at home, however still requires cuing for proper technique in clinic     LTG: (to be met in 12 treatments)   1. Pain below 3/10 and minimal to 0 LE symptoms: NOT MET 4/3/19  2. Improve strength to at least 4+/5 throughout for improved core strength to support spine: NOT MET 4/3/19  3. Have at least 65 degree HS flexibility and 15 degree hip IR to decrease tension/stress placed on lumbar spine:MET FOR L not quite met for R LE 4/3/19  4. Function improved at least 30% to have improved quality of life  5. Good spine/pelvis alignment: NOT FULLY MET 4/3/19     Patient goals: Bly Handy if it helps\"        Pt. Education:  [x] Yes  [] No  [x] Reviewed Prior HEP/Ed  Method of Education: [x] Verbal   [x] Demo exercise progression [] Written   Comprehension of Education:  [x] Verbalizes understanding. [x] Demonstrates understanding. [x] Needs review. [] Demonstrates/verbalizes HEP/Ed previously given. Plan: [x] Continue per plan of care. weekly   [x] Other: send note to continue therapy       Time In: 7:00 aM          Time Out: 8:10 AM    Electronically signed by:  Karina Chand, PT,

## 2019-04-24 ENCOUNTER — HOSPITAL ENCOUNTER (OUTPATIENT)
Age: 59
Discharge: HOME | End: 2019-04-26
Payer: COMMERCIAL

## 2019-04-24 ENCOUNTER — TELEPHONE (OUTPATIENT)
Dept: NEUROLOGY | Age: 59
End: 2019-04-24

## 2019-04-24 ENCOUNTER — OFFICE VISIT (OUTPATIENT)
Dept: FAMILY MEDICINE CLINIC | Age: 59
End: 2019-04-24
Payer: COMMERCIAL

## 2019-04-24 ENCOUNTER — HOSPITAL ENCOUNTER (OUTPATIENT)
Dept: GENERAL RADIOLOGY | Age: 59
Discharge: HOME | End: 2019-04-26
Payer: COMMERCIAL

## 2019-04-24 VITALS
SYSTOLIC BLOOD PRESSURE: 112 MMHG | BODY MASS INDEX: 26.07 KG/M2 | TEMPERATURE: 98.2 F | RESPIRATION RATE: 18 BRPM | DIASTOLIC BLOOD PRESSURE: 80 MMHG | OXYGEN SATURATION: 98 % | WEIGHT: 197.6 LBS | HEART RATE: 89 BPM

## 2019-04-24 DIAGNOSIS — R05.9 COUGH: ICD-10-CM

## 2019-04-24 LAB
INFLUENZA A ANTIBODY: NORMAL
INFLUENZA B ANTIBODY: NORMAL
S PYO AG THROAT QL: NORMAL

## 2019-04-24 PROCEDURE — 71046 X-RAY EXAM CHEST 2 VIEWS: CPT

## 2019-04-24 PROCEDURE — 3017F COLORECTAL CA SCREEN DOC REV: CPT | Performed by: NURSE PRACTITIONER

## 2019-04-24 PROCEDURE — G8427 DOCREV CUR MEDS BY ELIG CLIN: HCPCS | Performed by: NURSE PRACTITIONER

## 2019-04-24 PROCEDURE — 87804 INFLUENZA ASSAY W/OPTIC: CPT | Performed by: NURSE PRACTITIONER

## 2019-04-24 PROCEDURE — 99214 OFFICE O/P EST MOD 30 MIN: CPT | Performed by: NURSE PRACTITIONER

## 2019-04-24 PROCEDURE — 4004F PT TOBACCO SCREEN RCVD TLK: CPT | Performed by: NURSE PRACTITIONER

## 2019-04-24 PROCEDURE — G8419 CALC BMI OUT NRM PARAM NOF/U: HCPCS | Performed by: NURSE PRACTITIONER

## 2019-04-24 PROCEDURE — 87880 STREP A ASSAY W/OPTIC: CPT | Performed by: NURSE PRACTITIONER

## 2019-04-24 RX ORDER — AZITHROMYCIN 250 MG/1
TABLET, FILM COATED ORAL
Qty: 1 PACKET | Refills: 0 | Status: SHIPPED | OUTPATIENT
Start: 2019-04-24 | End: 2019-05-03 | Stop reason: ALTCHOICE

## 2019-04-24 RX ORDER — PROMETHAZINE HYDROCHLORIDE AND CODEINE PHOSPHATE 6.25; 1 MG/5ML; MG/5ML
5 SYRUP ORAL 4 TIMES DAILY PRN
Qty: 118 ML | Refills: 0 | Status: SHIPPED | OUTPATIENT
Start: 2019-04-24 | End: 2019-05-08 | Stop reason: SDUPTHER

## 2019-04-24 ASSESSMENT — ENCOUNTER SYMPTOMS
SHORTNESS OF BREATH: 1
CHOKING: 0
DIARRHEA: 0
VOMITING: 0
NAUSEA: 0
SINUS PRESSURE: 1
FACIAL SWELLING: 0
HEMOPTYSIS: 0
COUGH: 1
STRIDOR: 0
WHEEZING: 0
GASTROINTESTINAL NEGATIVE: 1
EYES NEGATIVE: 1
CHEST TIGHTNESS: 0
SORE THROAT: 1
COLOR CHANGE: 0
RHINORRHEA: 1
ALLERGIC/IMMUNOLOGIC NEGATIVE: 1

## 2019-04-24 NOTE — TELEPHONE ENCOUNTER
Patient's daughter stepped into the office to ask for a hard copy of Danny's ENT referral. We informed her that we cannot provide the referral copy due to HIPAA compliance. She was visibly frustrated but appreciated that we offered to send the referral again to Dr. Jessi Fernandez. Will follow up with Dr. Jevon Richardson office.

## 2019-04-24 NOTE — PROGRESS NOTES
Current Discharge Medication List  
  
START taking these medications Dose & Instructions Dispensing Information Comments Morning Noon Evening Bedtime  
 ondansetron 4 mg disintegrating tablet Commonly known as:  ZOFRAN ODT Your last dose was: Your next dose is:    
   
   
 Dose:  4 mg Take 1 Tab by mouth every eight (8) hours as needed for Nausea. Quantity:  30 Tab Refills:  0  
     
   
   
   
  
 oxyCODONE IR 5 mg immediate release tablet Commonly known as:  Farideh Kaylen Your last dose was: Your next dose is:    
   
   
 Dose:  5-10 mg Take 1-2 Tabs by mouth every three (3) hours as needed. Max Daily Amount: 80 mg.  
 Quantity:  90 Tab Refills:  0 CONTINUE these medications which have CHANGED Dose & Instructions Dispensing Information Comments Morning Noon Evening Bedtime HYDROcodone bitartrate 30 mg ER tablet Commonly known as:  HYSINGLA ER What changed:  how much to take Your last dose was: Your next dose is:    
   
   
 Dose:  60 mg Take 2 Tabs by mouth daily. Max Daily Amount: 60 mg. *DO NOT CRUSH,CHEW, OR DISSOLVE TABLET* Quantity:  14 Tab Refills:  0 CONTINUE these medications which have NOT CHANGED Dose & Instructions Dispensing Information Comments Morning Noon Evening Bedtime  
 aspirin delayed-release 81 mg tablet Your last dose was: Your next dose is:    
   
   
 Dose:  81 mg Take 81 mg by mouth daily. Pt states she frequently does not take this medicine Refills:  0  
     
   
   
   
  
 atenolol 50 mg tablet Commonly known as:  TENORMIN Your last dose was: Your next dose is:    
   
   
 Dose:  50 mg Take 50 mg by mouth daily. Indications: VENTRICULAR RATE CONTROL IN ATRIAL FIBRILLATION Refills:  0  
     
   
   
   
  
 atorvastatin 40 mg tablet Commonly known as:  LIPITOR [facility]  [address] [facility]  Dept: [phone]    Visit Information    Have you changed or started any medications since your last visit including any over-the-counter medicines, vitamins, or herbal medicines? no   Are you having any side effects from any of your medications? -  no  Have you stopped taking any of your medications? Is so, why? -  no    Have you seen any other physician or provider since your last visit? No  Have you had any other diagnostic tests since your last visit? No  Have you been seen in the emergency room and/or had an admission to a hospital since we last saw you? No  Have you had your routine dental cleaning in the past 6 months? no    Have you activated your [employer] account? If not, what are your barriers? No:      Patient Care Team:  SIMONE De La Torre CNP as PCP - General (Family Medicine)    Medical History Review  Past Medical, Family, and Social History reviewed and does not contribute to the patient presenting condition    Health Maintenance   Topic Date Due    Hepatitis C screen  1960    Pneumococcal 0-64 years Vaccine (1 of 1 - PPSV23) 09/23/1967    HIV screen  09/23/1976    DTaP/Tdap/Td vaccine (1 - Tdap) 09/23/1980    Shingles Vaccine (1 of 2) 09/23/2011    Colon cancer screen colonoscopy  09/23/2011    Low dose CT lung screening  09/23/2016    Flu vaccine (Season Ended) 09/01/2019    Lipid screen  04/03/2024           Kassandra Marcelino is a 63 y.o. male who presents today for his medical conditions/complaintsas noted below.       Kassandra Marcelino is here today c/o Sinus Problem (chest ambrocio, cry cough, ha, runny nose, fatigue) and Lymphadenopathy      Past Medical History:   Diagnosis Date    Cancer ([address]     myeloproliferative    My[address]oprol[address] disorder ([facility] [address]       Past Surgical History:   Procedure Laterality Date    APPENDECTOMY      CARPAL TUNNEL RELEASE      bilateral     CERVICAL FUSION      s/p Your last dose was: Your next dose is:    
   
   
 Dose:  40 mg Take 40 mg by mouth daily. Refills:  0  
     
   
   
   
  
 b complex vitamins tablet Your last dose was: Your next dose is:    
   
   
 Dose:  1 Tab Take 1 Tab by mouth daily. Refills:  0  
     
   
   
   
  
 coenzyme q10 10 mg Cap Your last dose was: Your next dose is:    
   
   
 Dose:  1 Cap Take 1 Cap by mouth daily. Refills:  0  
     
   
   
   
  
 * COUMADIN PO Your last dose was: Your next dose is:    
   
   
 Dose:  6 mg Take 6 mg by mouth every Monday, Wednesday, Friday. Refills:  0  
     
   
   
   
  
 * warfarin 5 mg tablet Commonly known as:  COUMADIN Your last dose was: Your next dose is:    
   
   
 Dose:  5 mg Take 5 mg by mouth every Tuesday, Thursday, Saturday & Sunday. Refills:  0 DIGITEK 0.125 mg tablet Generic drug:  digoxin Your last dose was: Your next dose is:    
   
   
 Dose:  0.125 mg Take 0.125 mg by mouth daily. Refills:  0  
     
   
   
   
  
 dilTIAZem  mg XR capsule Commonly known as:  DILACOR XR Your last dose was: Your next dose is: Take  by mouth two (2) times a day. Refills:  0 EPINEPHrine 0.3 mg/0.3 mL injection Commonly known as:  Ezio Hill Your last dose was: Your next dose is:    
   
   
 Dose:  0.3 mg  
0.3 mg by IntraMUSCular route once as needed. Refills:  0  
     
   
   
   
  
 fenofibrate micronized 200 mg capsule Commonly known as:  LOFIBRA Your last dose was: Your next dose is: Take  by mouth nightly. Refills:  0  
     
   
   
   
  
 metFORMIN 500 mg tablet Commonly known as:  GLUCOPHAGE Your last dose was: Your next dose is:    
   
   
 Dose:  1000 mg Take 1,000 mg by mouth two (2) times daily (with meals). Refills:  0  
     
   
   
   
  
 mupirocin 2 % ointment Commonly known as:  TenVeeqo Your last dose was: Your next dose is:    
   
   
 Apply pea size amount inside edge of each nostril with Q tip. Squeeze nostrils together to spread medication. Use twice daily x5 days Quantity:  22 g Refills:  0  
     
   
   
   
  
 nitroglycerin 0.4 mg SL tablet Commonly known as:  NITROSTAT Your last dose was: Your next dose is:    
   
   
 by SubLINGual route every five (5) minutes as needed for Chest Pain. Refills:  0  
     
   
   
   
  
 * Notice: This list has 2 medication(s) that are the same as other medications prescribed for you. Read the directions carefully, and ask your doctor or other care provider to review them with you. STOP taking these medications HYDROcodone-acetaminophen  mg tablet Commonly known as:  NORCO  
   
  
 oxyCODONE-acetaminophen  mg per tablet Commonly known as:  PERCOCET 10 PERCOCET  mg per tablet Generic drug:  oxyCODONE-acetaminophen Where to Get Your Medications Information on where to get these meds will be given to you by the nurse or doctor. ! Ask your nurse or doctor about these medications HYDROcodone bitartrate 30 mg ER tablet  
 ondansetron 4 mg disintegrating tablet  
 oxyCODONE IR 5 mg immediate release tablet fever (101 deg. - 3 nights ago). Negative for appetite change, chills and weight loss. HENT: Positive for rhinorrhea (L sided ), sinus pressure (off and on ) and sore throat. Negative for congestion, ear discharge, ear pain, facial swelling and postnasal drip. Trouble swallowing: intermittent. Eyes: Negative. Respiratory: Positive for cough and shortness of breath. Negative for hemoptysis, choking, chest tightness, wheezing and stridor. Cardiovascular: Positive for chest pain (w/ coughing/deep breath). Gastrointestinal: Negative. Negative for diarrhea, nausea and vomiting. Endocrine: Negative. Genitourinary: Negative. Skin: Negative. Negative for color change, pallor, rash and wound. Allergic/Immunologic: Negative. Neurological: Negative. Negative for headaches. Hematological: Positive for adenopathy. Objective:     Vitals:    04/24/19 1111   BP: 112/80   Pulse: 89   Resp: 18   Temp: 98.2 °F (36.8 °C)   SpO2: 98%       Body mass index is 26.07 kg/m². Physical Exam   Constitutional: He is oriented to person, place, and time. He appears well-developed and well-nourished. No distress. HENT:   Head: Normocephalic and atraumatic. Right Ear: External ear normal.   Left Ear: External ear normal.   Nose: Nose normal.   Mouth/Throat: No uvula swelling. Posterior oropharyngeal erythema present. No oropharyngeal exudate, posterior oropharyngeal edema or tonsillar abscesses. No tonsillar exudate. Cerumen impaction    Eyes: Pupils are equal, round, and reactive to light. Conjunctivae are normal. Right eye exhibits no discharge. Left eye exhibits no discharge. No scleral icterus. Neck: Normal range of motion. Neck supple. No tracheal deviation present. Cardiovascular: Normal rate, regular rhythm, normal heart sounds and intact distal pulses. Exam reveals no gallop and no friction rub. No murmur heard. Pulmonary/Chest: Effort normal. No accessory muscle usage or stridor.  No tachypnea. No respiratory distress. He has decreased breath sounds. He has no wheezes. He has no rhonchi. He has rales (slight crackle) in the right upper field. Abdominal: Soft. Musculoskeletal: Normal range of motion. He exhibits no edema, tenderness or deformity. Lymphadenopathy:     He has cervical adenopathy. Neurological: He is alert and oriented to person, place, and time. He has normal strength. He displays no atrophy and no tremor. No cranial nerve deficit or sensory deficit. He displays no seizure activity. Coordination and gait normal. GCS eye subscore is 4. GCS verbal subscore is 5. GCS motor subscore is 6. Skin: Skin is warm, dry and intact. No rash noted. He is not diaphoretic. No erythema. No pallor. Psychiatric: He has a normal mood and affect. His speech is normal and behavior is normal. Judgment and thought content normal. Cognition and memory are normal.         Assessment:         1. Cough        Plan:     1. Cough    - POCT Influenza A/B  - XR CHEST STANDARD (2 VW); Future  - POCT rapid strep A  - azithromycin (ZITHROMAX) 250 MG tablet; 2 tablets now then 1 daily until gone. Dispense: 1 packet; Refill: 0  - promethazine-codeine (PHENERGAN WITH CODEINE) 6.25-10 MG/5ML syrup; Take 5 mLs by mouth 4 times daily as needed for Cough for up to 7 days. Dispense: 118 mL; Refill: 0    CAP? , stat chest x-ray , will call with results   Cough suppressant side effects discussed including no driving  Call if worsened or questions/concerns       Discussed use, benefit, and side effects of prescribed medications. All patient questions answered. Pt voiced understanding. Reviewed health maintenance. Instructed to continue current medications, diet and exercise. Patient agreedwith treatment plan. Follow up as directed.      Electronically signed by SIMONE De La Torre CNP on 4/24/2019

## 2019-05-02 ENCOUNTER — OFFICE VISIT (OUTPATIENT)
Dept: NEUROSURGERY | Age: 59
End: 2019-05-02
Payer: COMMERCIAL

## 2019-05-02 ENCOUNTER — TELEPHONE (OUTPATIENT)
Dept: ONCOLOGY | Age: 59
End: 2019-05-02

## 2019-05-02 VITALS
DIASTOLIC BLOOD PRESSURE: 69 MMHG | HEART RATE: 71 BPM | BODY MASS INDEX: 26.25 KG/M2 | SYSTOLIC BLOOD PRESSURE: 104 MMHG | WEIGHT: 199 LBS

## 2019-05-02 DIAGNOSIS — M54.16 LUMBAR RADICULOPATHY: Primary | ICD-10-CM

## 2019-05-02 DIAGNOSIS — M47.816 LUMBAR SPONDYLOSIS: ICD-10-CM

## 2019-05-02 PROCEDURE — G8419 CALC BMI OUT NRM PARAM NOF/U: HCPCS | Performed by: PHYSICIAN ASSISTANT

## 2019-05-02 PROCEDURE — 99242 OFF/OP CONSLTJ NEW/EST SF 20: CPT | Performed by: PHYSICIAN ASSISTANT

## 2019-05-02 PROCEDURE — G8427 DOCREV CUR MEDS BY ELIG CLIN: HCPCS | Performed by: PHYSICIAN ASSISTANT

## 2019-05-02 NOTE — PROGRESS NOTES
[address]  [address] [id number]  Dept: [phone]    Patient:  Namrata Shepherd  YOB: 1960  Date: 5/2/2019   PRIMARY CARE PHYSICIAN: SIMONE Khan CNP  REFERRED BY: Prakash Perez     Chief Complaint   Patient presents with    New Patient        HPI:     Namrata Shepherd is a 63 y.o. male on whom a neurosurgical consultation has been requested by SIMONE Khan CNP for worsening low back pain and neuropathic symptoms in the left lower extremity x 6 months. Patient reports episodic exacerbations of chronic low back pain for over 10 years. He has had been managed in the past with conservative measures. History of cervical spine surgery many years ago after injury on a trampoline. No previous lumbar spine surgery. He has history of myeloproliferative disorder and has been concerned that his back pain was somehow related to that. Current symptoms started about 6 months ago without inciting trauma or other event. Patient has tried multiple conservative treatment measures without success. Oral analgesics, muscle relaxers, topical heat and cold, and recent completion of 12 weeks of physical therapy minimally and temporarily effective. Pain is midline lumbar and radiates over the left buttock into posterior thigh and proximal calf. . Quality is aching, burning, and stabbing. Severity is 7-9/10. Exacerbation is with walking, standing, prolonged position, or other activity. Pain is improved minimally with oral analgesics and PT. Pain is associated with numbness and tingling. Patient denies bowel or bladder incontinence, weakness, saddle anesthesia, or gait instability. Patient has referral to pain management and an upcoming appointment for evaluation. He recently started taking gabapentin per his PCP.     History:     Past Medical History:   Diagnosis Date    Cancer ([address]     myeloproliferative    frequency and flank pain. Musculoskeletal: Negative for myalgias and joint swelling. Skin: Negative for rash and wound. Allergic/Immunologic: Negative for environmental allergies and food allergies. Hematological: Negative for adenopathy. Does not bruise/bleed easily. Psychiatric/Behavioral: Negative for self-injury. The patient is not nervous/anxious. Physical Exam:      /69 (Site: Right Upper Arm, Position: Sitting, Cuff Size: Medium Adult)   Pulse 71   Wt 199 lb (90.3 kg)   BMI 26.25 kg/m²   Estimated body mass index is 26.25 kg/m² as calculated from the following:    Height as of 3/6/19: 6' 1\" (1.854 m). Weight as of this encounter: 199 lb (90.3 kg). General:  Thomas Ansari is a 63y.o. year old male who appears his stated age. HEENT: Normocephalic atraumatic. Neck supple. Chest: Clear to auscultation bilaterally. Regular rate and rhythm. Abdomen: Soft nontender nondistended. Normoactive bowel sounds. Neurological Exam  Alert and oriented x 3. CN II-XII intact. Motor examination:   Strength in right upper extremity at 5/5 deltoid, triceps, biceps, wrist ext/flex, and . Strength in left upper extremity at 5/5 deltoid, triceps, biceps, wrist ext/flex, and . There is tenderness to palpation over lumbar spine and para spinal musculature. SLT + left  Strength testing of the left lower extremity causing muscle spasm and discomfort. Strength right lower extremity at 5/5 iliopsoas, quadriceps, hamstring, tibialis anterior, gastrocnemius, and EHL. Strength left lower extremity at 5/5 iliopsoas, quadriceps, hamstring, tibialis anterior, gastrocnemius, and EHL. Sensory: Grossly intact. Reflexes: +2/4 in bilateral upper extremities. +2/4 in bilateral patellar and achilles. Hoffmans negative, no clonus.    Gait: Antalgic stiff gait    Studies Review:     Reviewed MRI Type:  Film and Report    Images:    MRI lumbar 4/3/19  Narrative   EXAMINATION:   MRI OF THE LUMBAR TECHNIQUE:   Multiplanar multisequence MRI of the thoracic spine was performed without and   with the administration of intravenous contrast.       COMPARISON:   None       HISTORY:   ORDERING SYSTEM PROVIDED HISTORY: Acute back pain with sciatica, left   TECHNOLOGIST PROVIDED HISTORY:   Ordering Physician Provided Reason for Exam: Hx of chronic back pain with   left sided sciatica to left calf.  Occasional left arm numbness and left foot   numbness.   Hx of myelosis.  No hx of thoracic or lumbar surgery.       FINDINGS:   BONES/ALIGNMENT: Vertebral heights and alignment are normal.  There is focal   mild bone marrow edema in the left articular pillars at T5 and T6 which may   be related to facet arthropathy.  No other abnormal area of bone marrow   signal is identified.       SPINAL CORD: No abnormal cord signal is seen.       SOFT TISSUES:  No abnormal enhancement of the thoracic spine. No paraspinal   mass identified.       DEGENERATIVE CHANGES: There are small disc osteophyte complexes in the lower   thoracic spine which do not compress the spinal cord.  There is facet   arthropathy throughout the lower thoracic spine.           Impression   Lower thoracic facet arthropathy.           Assessment and Plan:     Darrius Claudio was seen today for new patient. Diagnoses and all orders for this visit:    Lumbar radiculopathy  -     Gaetano Troy MD, Physical Medicine and Rehabilitation, [place]    Lumbar spondylosis  -     Gaetano Troy MD, Physical Medicine and Rehabilitation, [place]      Plan:   Mr. Morales Luevano is a 63 y.o. male on whom a neurosurgical consultation has been requested for exacerbation of low back and left leg pain persistent over past 6 months despite application of conservative treatment measures, including physical therapy, since February. MRI lumbar and thoracic spine were completed 4/3/2019. MRI lumbar spine shows multilevel degenerative changes.   At L4-5 and L5-S1 there is a mild broad-based annular disc bulge resulting in mild bilateral neural foraminal narrowing. There is no central canal stenosis. MRI thoracic spine reveals small disc osteophyte complexes in the lower thoracic spine which do not compress the spinal cord. There is facet arthropathy throughout the lower thoracic spine. On physical exam patient is neurologically intact. Patient denies bowel or bladder incontinence, weakness, saddle anesthesia, or gait instability. Recommend continued conservative treatment measures including: oral analgesics, muscle relaxers, topical heat or cold application, physical therapy, and if indicated, pain management interventions. Will place referral to physical medicine and rehabilitation for evaluation and input regarding focused physical therapy that may benefit this patient. Patient has referral to pain management in place and an upcoming appointment with Dr Shannon Moncada. Diagnosis and plan discussed with the patient and all questions answered. Followup: Return in about 2 months (around 7/2/2019) for  Re asses s/p PMR, pain management intervention. Prescriptions Ordered:  No orders of the defined types were placed in this encounter. Orders Placed:  Orders Placed This Encounter   Procedures   Clarisa Gonzalez MD, Physical Medicine and Rehabilitation, [place]     Referral Priority:   Routine     Referral Type:   Eval and Treat     Referral Reason:   Specialty Services Required     Referred to Provider:   Jaymie Beach MD     Requested Specialty:   Physical Medicine and Rehab     Number of Visits Requested:   1       Electronically signed by GABY Canales on 6/10/2019 at 5:11 PM    Please note that this chart was generated using voice recognition Dragon dictation software. Although every effort was made to ensure the accuracy of this automated transcription, some errors in transcription may have occurred.

## 2019-05-03 ENCOUNTER — INITIAL CONSULT (OUTPATIENT)
Dept: PAIN MANAGEMENT | Age: 59
End: 2019-05-03
Payer: COMMERCIAL

## 2019-05-03 VITALS
HEIGHT: 73 IN | DIASTOLIC BLOOD PRESSURE: 78 MMHG | HEART RATE: 81 BPM | BODY MASS INDEX: 26.37 KG/M2 | SYSTOLIC BLOOD PRESSURE: 108 MMHG | WEIGHT: 199 LBS | OXYGEN SATURATION: 98 %

## 2019-05-03 DIAGNOSIS — Z98.1 HX OF FUSION OF CERVICAL SPINE: ICD-10-CM

## 2019-05-03 DIAGNOSIS — M47.816 SPONDYLOSIS OF LUMBAR REGION WITHOUT MYELOPATHY OR RADICULOPATHY: ICD-10-CM

## 2019-05-03 DIAGNOSIS — M54.16 LEFT LUMBAR RADICULITIS: Primary | ICD-10-CM

## 2019-05-03 DIAGNOSIS — R20.0 LEFT LEG NUMBNESS: ICD-10-CM

## 2019-05-03 DIAGNOSIS — R20.0 LEFT ARM NUMBNESS: ICD-10-CM

## 2019-05-03 PROCEDURE — 99244 OFF/OP CNSLTJ NEW/EST MOD 40: CPT | Performed by: ANESTHESIOLOGY

## 2019-05-03 PROCEDURE — G8427 DOCREV CUR MEDS BY ELIG CLIN: HCPCS | Performed by: ANESTHESIOLOGY

## 2019-05-03 PROCEDURE — G8419 CALC BMI OUT NRM PARAM NOF/U: HCPCS | Performed by: ANESTHESIOLOGY

## 2019-05-03 RX ORDER — BACLOFEN 10 MG/1
10 TABLET ORAL 3 TIMES DAILY
Qty: 90 TABLET | Refills: 0 | Status: SHIPPED | OUTPATIENT
Start: 2019-05-03 | End: 2019-06-02

## 2019-05-03 ASSESSMENT — ENCOUNTER SYMPTOMS
WHEEZING: 0
BLOOD IN STOOL: 0
SHORTNESS OF BREATH: 0
VOICE CHANGE: 0
STRIDOR: 0
SINUS PRESSURE: 0
DIARRHEA: 0
BACK PAIN: 1
COUGH: 1
CHEST TIGHTNESS: 0
ANAL BLEEDING: 0
APNEA: 0
RHINORRHEA: 1
SORE THROAT: 1
ABDOMINAL PAIN: 0
ALLERGIC/IMMUNOLOGIC NEGATIVE: 1
CHOKING: 0
FACIAL SWELLING: 0
SINUS PAIN: 0
EYES NEGATIVE: 1
RECTAL PAIN: 0
VOMITING: 0
TROUBLE SWALLOWING: 0
ABDOMINAL DISTENTION: 0
CONSTIPATION: 1
NAUSEA: 0

## 2019-05-03 NOTE — LETTER
[facility] [address]   [place] [address]  Phone: [phone]  Fax: [phone]    Keila Pepper MD        May 3, 2019     Tiff Clark, APRN - [facility]  [address] [facility],[address] [address]    Patient: Joel Mittal  MR Number: [id number]  YOB: 1960  Date of Visit: 5/3/2019    Dear Dr. Tiff Clark: Thank you for the request for consultation for Joel Mittal to me for the evaluation of low back and left leg pain. Below are the relevant portions of my assessment and plan of care. Assessment:       64-year-old man seen today for evaluation off severe low back and left leg pain    Past history significant for cervical spine injury when he fell from a trampoline and underwent cervical spine surgery    Patient reported history of chronic low back pain located in the lumbar area. He reports his pain as an aching nagging pain sensation. Back pain has been there for more than 10 years. Symptoms have slowly worsened over years. He states that he had a severe flareup of pain is aching throbbing sharp shooting back pain about 6 months ago without any apparent injury. This pain is located on the left side predominantly in the lumbosacral area with radiation down left leg over gluteal region and posterior thigh and into the calf with occasional extension into the foot. Associated symptoms include left leg numbness and tingling. Patient was referred for physical therapy, he attended 12 sessions of therapy without any significant improvement in symptoms. Had a MRI of thoracic and lumbar spine  Recently he was seen by the nurse practitioner at [facility] [place]  He was not recommended for any surgery and is referred to pain clinic for consideration of injections.     No previous lumbar spine injection history  No previous lumbar spine surgical history Currently patient's taking Neurontin 600 mg 3 times a day along with Elavil 75 mg  He is tried anti-inflammatory medications and Tylenol also    Pain is constant severe, average intensity between 6-9/10 depending upon the activity level, aggravates with standing and lifting and bending twisting turning, nothing seems to alleviate the pain, pain character is aching throbbing sharp shooting and burning. No previous EMG nerve testing for legs    Physical Therapy Progress Note     Date: 2019                                          Patient: Jaclyn Wells              : 1960                  MRN: [id number]                     Physician: Brien NICHOLS CNP                               Insurance: [facility] medicaid 30 visits  Diagnosis: chronic LBP without sciatica   Onset Date: 10/1/18                           Next Dr. Fernando Patient:   Visit number:                Cancels/No Shows: 2/0      EXAMINATION: MRI OF THE THORACIC SPINE WITHOUT AND WITH CONTRAST  4/3/2019 12:40 pm     Impression Lower thoracic facet arthropathy. EXAMINATION: MRI OF THE LUMBAR SPINE WITHOUT AND WITH CONTRAST  4/3/2019 12:40 pm     L1-L2: There is no significant disc protrusion, spinal canal stenosis or neural foraminal narrowing. L2-L3: There is no significant disc protrusion, spinal canal stenosis. Mild bilateral neural foraminal narrowing. Mild broad-based annular bulge. L3-L4: There is no significant disc protrusion, spinal canal stenosis. Mild bilateral neural foraminal narrowing. Mild broad-based annular bulge. Mild broad-based annular bulge. L4-L5: There is no significant disc protrusion, spinal canal stenosis. Mild bilateral neural foraminal narrowing. L5-S1: There is no significant disc protrusion, spinal canal stenosis. Mild bilateral neural foraminal narrowing. 1. Left lumbar radiculitis    2. Spondylosis of lumbar region without myelopathy or radiculopathy    3.  Hx of fusion of cervical spine 4. Left arm numbness    5. Left leg numbness            Plan:     MRI thoracic and lumbar spine is a 90  Reports are reviewed  Images were reviewed independently  Images were shown to the patient  Pertinent findings include mild disc bulges left-sided at L4-L5 and L5-S1 level  No significant nerve root impingement or displacement, mildly narrowing of the neural foramina    Clinical presentation is highly suggestive of lumbar disc disease and left lumbar radiculitis  His symptoms have severe and markedly disabling he has failed physical therapy and medication management  I do not think he will make any progress now from any further physical therapy  Imaging did not show any clear nerve root impingement  Therefore I would like to obtain a cervical spine MRI to rule out any cervical spine pathology responsible for the symptoms    Once cervical spine MRI and rule out any cervical spine pathology, I will advised for lumbar transforaminal left-sided L4 and L5 and S1 epidural steroid injection    We'll also order a back brace      Orders Placed This Encounter   Procedures    MRI Cervical Spine W WO Contrast     Standing Status:   Future     Standing Expiration Date:   5/3/2020    RI INJECT ANES/STEROID FORAMEN LUMBAR/SACRAL W IMG GUIDE ,1 LEVEL     Left L5 AND S1 AMISH     Standing Status:   Future     Standing Expiration Date:   8/3/2019     Orders Placed This Encounter   Medications    Elastic Bandages & Supports (LUMBAR BACK BRACE/SUPPORT PAD) MISC     Si Units by Does not apply route daily PNEUMATIC OFFLOADING  BACK BRACE     Dispense:  1 each     Refill:  0    baclofen (LIORESAL) 10 MG tablet     Sig: Take 1 tablet by mouth 3 times daily Slowly increase form 1 tab a day to a target dose of tid     Dispense:  90 tablet     Refill:  0     Consultation note sent to the referring physician    If you have questions, please do not hesitate to call me. I look forward to following Maykel Chavarria along with you.     Sincerely, Debbie Villagomez MD

## 2019-05-03 NOTE — PROGRESS NOTES
turning, nothing seems to alleviate the pain, pain character is aching throbbing sharp shooting and burning. No previous EMG nerve testing for legs      Pain score today  6  1. Location: back pain  2. Radiation: left leg  3. Character: nagging, numb, aching, throbbing, shooting, sharp, tender  5. Duration: years worse with in the past 6 months  6. Onset: years worse with in the past 6 months     7. Did an injury cause pain: no  8. Aggravating factors: different movements  9. Alleviating factors: nothing  10. Associated symptoms (numbness / tingling / weakness):  yes  -Where at: left leg and left foot  -Down into finger tips or toes (specify which finger or toes): none  -constant or intermitting:  intermiting  11. Red Flags: (weight loss / chills / loss of bladder or bowel control):none    Previous management history  1. Previous diagnostic workup: (Imaging/EMG)   CT, MRI, or Xray: MRI  What part of the body: back  [facility]  What year or specific date: 4/3/2019  EMG:  no  EXAMINATION: MRI OF THE THORACIC SPINE WITHOUT AND WITH CONTRAST  4/3/2019 12:40 pm     Impression Lower thoracic facet arthropathy. EXAMINATION: MRI OF THE LUMBAR SPINE WITHOUT AND WITH CONTRAST  4/3/2019 12:40 pm     L1-L2: There is no significant disc protrusion, spinal canal stenosis or neural foraminal narrowing. L2-L3: There is no significant disc protrusion, spinal canal stenosis. Mild bilateral neural foraminal narrowing. Mild broad-based annular bulge. L3-L4: There is no significant disc protrusion, spinal canal stenosis. Mild bilateral neural foraminal narrowing. Mild broad-based annular bulge. Mild broad-based annular bulge. L4-L5: There is no significant disc protrusion, spinal canal stenosis. Mild bilateral neural foraminal narrowing. L5-S1: There is no significant disc protrusion, spinal canal stenosis. Mild bilateral neural foraminal narrowing.      2. Previous non interventional treatments tried:  chiropractor or physical therapy: PT  What part of the body: back  What facility was it done [facility]: [facility]  How long ago was it last tried: 2019-2019  Did it work: Yes and No  Did they complete it:Yes  Physical Therapy Progress Note     Date: 2019                                          Patient: Hipolito Huston              : 1960                  MRN: [id number]                     Physician: Ilene NICHOLS CNP                               Insurance: [facility] medicaid 30 visits  Diagnosis: chronic LBP without sciatica   Onset Date: 10/1/18                           Next Dr. Rosario Matters:   Visit number:                Cancels/No Shows: 2/0     3. Previous Medications tried  NSAID's: yes  Neurontin: yes  Lyrica: no  Trycyclic antidepressant (Ellavil / Pamelor ): yes  Cymbalta: yes  Opioids (Ultram / Vicodin / Percocet / Morphine / Dilaudid / Oramorph/ Fentanyl etc.):norco   Last Pain medication taken (name of med and date): nothing    4. Previous Interventional pain procedures tried:  What kind of injection: na  Who did the injection: na  did the injection help: na  Last time injection was done:N/A    5. Previous surgeries for pain  What part of the body did they have the surgery: na  What physician did the surgery:na  What Facility did they have the surgery done: na  Date of Surgery:N/A    Social History:  Marital status:   Employment History:   Working  No  Full time Or Part time:   Disability  Yes   Legal Issues related to pain complaint: No     Pain Disability Index score : 43    Informant: patient and relative(s)    Review of Systems   Constitutional: Negative. HENT: Positive for postnasal drip, rhinorrhea and sore throat. Negative for congestion, dental problem, drooling, ear discharge, ear pain, facial swelling, hearing loss, mouth sores, nosebleeds, sinus pressure, sinus pain, sneezing, tinnitus, trouble swallowing and voice change.     Eyes: Negative. Respiratory: Positive for cough. Negative for apnea, choking, chest tightness, shortness of breath, wheezing and stridor. Cardiovascular: Negative. Gastrointestinal: Positive for constipation. Negative for abdominal distention, abdominal pain, anal bleeding, blood in stool, diarrhea, nausea, rectal pain and vomiting. Endocrine: Negative. Genitourinary: Positive for difficulty urinating and urgency. Negative for decreased urine volume, discharge, dysuria, enuresis, flank pain, frequency, genital sores, hematuria, penile pain, penile swelling, scrotal swelling and testicular pain. Musculoskeletal: Positive for back pain and myalgias. Negative for arthralgias, gait problem, joint swelling, neck pain and neck stiffness. Skin: Negative. Allergic/Immunologic: Negative. Neurological: Positive for dizziness, weakness, light-headedness, numbness and headaches. Negative for tremors, seizures, syncope, facial asymmetry and speech difficulty. Hematological: Negative for adenopathy. Bruises/bleeds easily. Psychiatric/Behavioral: Negative. Past Medical History:   Diagnosis Date    [facility]     myeloproliferative    [facility]      Past Surgical History:   Procedure Laterality Date    APPENDECTOMY      CARPAL TUNNEL RELEASE      bilateral     CERVICAL FUSION      s/p trampoline accident    TONSILLECTOMY       No Known Allergies  Current Outpatient Medications   Medication Sig Dispense Refill    Elastic Bandages & Supports (LUMBAR BACK BRACE/SUPPORT PAD) MISC 1 Units by Does not apply route daily PNEUMATIC OFFLOADING  BACK BRACE 1 each 0    baclofen (LIORESAL) 10 MG tablet Take 1 tablet by mouth 3 times daily Slowly increase form 1 tab a day to a target dose of tid 90 tablet 0    gabapentin (NEURONTIN) 300 MG capsule Take 2 capsules by mouth 3 times daily for 30 days.  180 capsule 5    amitriptyline (ELAVIL) 25 MG tablet Take 1 tab (25 mg) at bedtime for 1 week, then 2 tabs (50 mg) for 1 week, then 3 tabs (75 mg) for 1 week, then 4 tabs (100 mg) thereafter 120 tablet 1    hydroxyurea (HYDREA) 500 MG chemo capsule Take 1 capsule by mouth 3 times daily 90 capsule 2    aspirin 81 MG tablet Take 81 mg by mouth daily       No current facility-administered medications for this visit. Social History     Socioeconomic History    Marital status:       Spouse name: None    Number of children: None    Years of education: None    Highest education level: None   Occupational History    None   Social Needs    Financial resource strain: None    Food insecurity:     Worry: None     Inability: None    Transportation needs:     Medical: None     Non-medical: None   Tobacco Use    Smoking status: Current Every Day Smoker     Packs/day: 1.00     Years: 40.00     Pack years: 40.00     Types: Cigarettes    Smokeless tobacco: Never Used    Tobacco comment: less than 1 ppd    Substance and Sexual Activity    Alcohol use: No    Drug use: Yes     Comment: used to smoke occasional marijuana     Sexual activity: None   Lifestyle    Physical activity:     Days per week: None     Minutes per session: None    Stress: None   Relationships    Social connections:     Talks on phone: None     Gets together: None     Attends [employer] witness service: None     Active member of club or organization: None     Attends meetings of clubs or organizations: None     Relationship status: None    Intimate partner violence:     Fear of current or ex partner: None     Emotionally abused: None     Physically abused: None     Forced sexual activity: None   Other Topics Concern    None   Social History Narrative    None       Family History   Problem Relation Age of Onset    Breast Cancer Mother         s/p surgical complications    Diabetes Mother     Heart Failure Father     Other Brother         \"bad back\"         Objective:   Physical Exam   Constitutional: He is oriented to person, place, and time. He appears well-developed and well-nourished. No distress. HENT:   Head: Normocephalic and atraumatic. Eyes: Pupils are equal, round, and reactive to light. Conjunctivae and EOM are normal. Right eye exhibits no discharge. Left eye exhibits no discharge. Neck: Normal range of motion. Neck supple. Cardiovascular: Normal rate, regular rhythm and normal heart sounds. Pulmonary/Chest: Effort normal and breath sounds normal. No stridor. No respiratory distress. Musculoskeletal:        Lumbar back: He exhibits decreased range of motion, tenderness, pain and spasm. Left upper leg: He exhibits tenderness. Legs:  No apparent his spine deformity and inspection  Active range of motion is limited and associated with pain particularly in lumbar spine flexion  Gait is slow  Positive tenderness to palpation over lumbar paraspinal area  Facet loading maneuver is positive  Straight leg raise is positive on left side   Neurological: He is alert and oriented to person, place, and time. He has normal strength. He displays no atrophy, no tremor and normal reflexes. No cranial nerve deficit or sensory deficit. He exhibits normal muscle tone. He displays no seizure activity. Coordination and gait normal.   Reflex Scores:       Patellar reflexes are 2+ on the right side and 2+ on the left side. Skin: Skin is warm and dry. Psychiatric: He has a normal mood and affect. His behavior is normal. Thought content normal.   Nursing note and vitals reviewed. Assessment:        64-year-old man seen today for evaluation off severe low back and left leg pain    Past history significant for cervical spine injury when he fell from a trampoline and underwent cervical spine surgery    Patient reported history of chronic low back pain located in the lumbar area. He reports his pain as an aching nagging pain sensation. Back pain has been there for more than 10 years.   Symptoms have slowly worsened LUMBAR SPINE WITHOUT AND WITH CONTRAST  4/3/2019 12:40 pm     L1-L2: There is no significant disc protrusion, spinal canal stenosis or neural foraminal narrowing. L2-L3: There is no significant disc protrusion, spinal canal stenosis. Mild bilateral neural foraminal narrowing. Mild broad-based annular bulge. L3-L4: There is no significant disc protrusion, spinal canal stenosis. Mild bilateral neural foraminal narrowing. Mild broad-based annular bulge. Mild broad-based annular bulge. L4-L5: There is no significant disc protrusion, spinal canal stenosis. Mild bilateral neural foraminal narrowing. L5-S1: There is no significant disc protrusion, spinal canal stenosis. Mild bilateral neural foraminal narrowing. 1. Left lumbar radiculitis    2. Spondylosis of lumbar region without myelopathy or radiculopathy    3. Hx of fusion of cervical spine    4. Left arm numbness    5.  Left leg numbness            Plan:      MRI thoracic and lumbar spine is a 90  Reports are reviewed  Images were reviewed independently  Images were shown to the patient  Pertinent findings include mild disc bulges left-sided at L4-L5 and L5-S1 level  No significant nerve root impingement or displacement, mildly narrowing of the neural foramina    Clinical presentation is highly suggestive of lumbar disc disease and left lumbar radiculitis  His symptoms have severe and markedly disabling he has failed physical therapy and medication management  I do not think he will make any progress now from any further physical therapy  Imaging did not show any clear nerve root impingement  Therefore I would like to obtain a cervical spine MRI to rule out any cervical spine pathology responsible for the symptoms    Once cervical spine MRI and rule out any cervical spine pathology, I will advised for lumbar transforaminal left-sided L4 and L5 and S1 epidural steroid injection    We'll also order a back brace      Orders Placed This Encounter

## 2019-05-03 NOTE — COMMUNICATION BODY
Assessment:       67-year-old man seen today for evaluation off severe low back and left leg pain    Past history significant for cervical spine injury when he fell from a trampoline and underwent cervical spine surgery    Patient reported history of chronic low back pain located in the lumbar area. He reports his pain as an aching nagging pain sensation. Back pain has been there for more than 10 years. Symptoms have slowly worsened over years. He states that he had a severe flareup of pain is aching throbbing sharp shooting back pain about 6 months ago without any apparent injury. This pain is located on the left side predominantly in the lumbosacral area with radiation down left leg over gluteal region and posterior thigh and into the calf with occasional extension into the foot. Associated symptoms include left leg numbness and tingling. Patient was referred for physical therapy, he attended 12 sessions of therapy without any significant improvement in symptoms. Had a MRI of thoracic and lumbar spine  Recently he was seen by the nurse practitioner at neurosurgery clinic [place]  He was not recommended for any surgery and is referred to pain clinic for consideration of injections. No previous lumbar spine injection history  No previous lumbar spine surgical history  Currently patient's taking Neurontin 600 mg 3 times a day along with Elavil 75 mg  He is tried anti-inflammatory medications and Tylenol also    Pain is constant severe, average intensity between 6-9/10 depending upon the activity level, aggravates with standing and lifting and bending twisting turning, nothing seems to alleviate the pain, pain character is aching throbbing sharp shooting and burning.   No previous EMG nerve testing for legs    Physical Therapy Progress Note     Date: 2019                                          Patient: Hipolito Huston              : 1960                  MRN: [id number] Physician: Chela NICHOLS CNP                               Insurance: [facility] medicaid 30 visits  Diagnosis: chronic LBP without sciatica   Onset Date: 10/1/18                           Next Dr. Lozoya Linh:   Visit number: 12/12               Cancels/No Shows: 2/0      EXAMINATION: MRI OF THE THORACIC SPINE WITHOUT AND WITH CONTRAST  4/3/2019 12:40 pm     Impression Lower thoracic facet arthropathy. EXAMINATION: MRI OF THE LUMBAR SPINE WITHOUT AND WITH CONTRAST  4/3/2019 12:40 pm     L1-L2: There is no significant disc protrusion, spinal canal stenosis or neural foraminal narrowing. L2-L3: There is no significant disc protrusion, spinal canal stenosis. Mild bilateral neural foraminal narrowing. Mild broad-based annular bulge. L3-L4: There is no significant disc protrusion, spinal canal stenosis. Mild bilateral neural foraminal narrowing. Mild broad-based annular bulge. Mild broad-based annular bulge. L4-L5: There is no significant disc protrusion, spinal canal stenosis. Mild bilateral neural foraminal narrowing. L5-S1: There is no significant disc protrusion, spinal canal stenosis. Mild bilateral neural foraminal narrowing. 1. Left lumbar radiculitis    2. Spondylosis of lumbar region without myelopathy or radiculopathy    3. Hx of fusion of cervical spine    4. Left arm numbness    5.  Left leg numbness            Plan:     MRI thoracic and lumbar spine is a 90  Reports are reviewed  Images were reviewed independently  Images were shown to the patient  Pertinent findings include mild disc bulges left-sided at L4-L5 and L5-S1 level  No significant nerve root impingement or displacement, mildly narrowing of the neural foramina    Clinical presentation is highly suggestive of lumbar disc disease and left lumbar radiculitis  His symptoms have severe and markedly disabling he has failed physical therapy and medication management  I do not think he will make any progress now from any further physical therapy  Imaging did not show any clear nerve root impingement  Therefore I would like to obtain a cervical spine MRI to rule out any cervical spine pathology responsible for the symptoms    Once cervical spine MRI and rule out any cervical spine pathology, I will advised for lumbar transforaminal left-sided L4 and L5 and S1 epidural steroid injection    We'll also order a back brace      Orders Placed This Encounter   Procedures    MRI Cervical Spine W WO Contrast     Standing Status:   Future     Standing Expiration Date:   5/3/2020    CO INJECT ANES/STEROID FORAMEN LUMBAR/SACRAL W IMG GUIDE ,1 LEVEL     Left L5 AND S1 AMISH     Standing Status:   Future     Standing Expiration Date:   8/3/2019     Orders Placed This Encounter   Medications    Elastic Bandages & Supports (LUMBAR BACK BRACE/SUPPORT PAD) MISC     Si Units by Does not apply route daily PNEUMATIC OFFLOADING  BACK BRACE     Dispense:  1 each     Refill:  0    baclofen (LIORESAL) 10 MG tablet     Sig: Take 1 tablet by mouth 3 times daily Slowly increase form 1 tab a day to a target dose of tid     Dispense:  90 tablet     Refill:  0     Consultation note sent to the referring physician

## 2019-05-08 ENCOUNTER — OFFICE VISIT (OUTPATIENT)
Dept: FAMILY MEDICINE CLINIC | Age: 59
End: 2019-05-08
Payer: COMMERCIAL

## 2019-05-08 VITALS
BODY MASS INDEX: 26.65 KG/M2 | TEMPERATURE: 97.9 F | OXYGEN SATURATION: 99 % | DIASTOLIC BLOOD PRESSURE: 60 MMHG | RESPIRATION RATE: 18 BRPM | SYSTOLIC BLOOD PRESSURE: 92 MMHG | WEIGHT: 202 LBS | HEART RATE: 82 BPM

## 2019-05-08 DIAGNOSIS — J20.9 BRONCHITIS WITH BRONCHOSPASM: ICD-10-CM

## 2019-05-08 DIAGNOSIS — J01.90 ACUTE BACTERIAL SINUSITIS: Primary | ICD-10-CM

## 2019-05-08 DIAGNOSIS — B96.89 ACUTE BACTERIAL SINUSITIS: Primary | ICD-10-CM

## 2019-05-08 PROCEDURE — 3017F COLORECTAL CA SCREEN DOC REV: CPT | Performed by: NURSE PRACTITIONER

## 2019-05-08 PROCEDURE — 4004F PT TOBACCO SCREEN RCVD TLK: CPT | Performed by: NURSE PRACTITIONER

## 2019-05-08 PROCEDURE — G8427 DOCREV CUR MEDS BY ELIG CLIN: HCPCS | Performed by: NURSE PRACTITIONER

## 2019-05-08 PROCEDURE — G8419 CALC BMI OUT NRM PARAM NOF/U: HCPCS | Performed by: NURSE PRACTITIONER

## 2019-05-08 PROCEDURE — 99214 OFFICE O/P EST MOD 30 MIN: CPT | Performed by: NURSE PRACTITIONER

## 2019-05-08 RX ORDER — PROMETHAZINE HYDROCHLORIDE AND CODEINE PHOSPHATE 6.25; 1 MG/5ML; MG/5ML
5 SYRUP ORAL 4 TIMES DAILY PRN
Qty: 180 ML | Refills: 0 | Status: SHIPPED | OUTPATIENT
Start: 2019-05-08 | End: 2019-05-15

## 2019-05-08 RX ORDER — DOXYCYCLINE 100 MG/1
100 CAPSULE ORAL 2 TIMES DAILY WITH MEALS
Qty: 20 CAPSULE | Refills: 0 | Status: ON HOLD | OUTPATIENT
Start: 2019-05-08 | End: 2019-08-12

## 2019-05-08 RX ORDER — ALBUTEROL SULFATE 90 UG/1
2 AEROSOL, METERED RESPIRATORY (INHALATION) EVERY 4 HOURS PRN
Qty: 1 INHALER | Refills: 0 | Status: SHIPPED | OUTPATIENT
Start: 2019-05-08 | End: 2019-09-26

## 2019-05-08 RX ORDER — PREDNISONE 20 MG/1
40 TABLET ORAL DAILY
Qty: 10 TABLET | Refills: 0 | Status: SHIPPED | OUTPATIENT
Start: 2019-05-08 | End: 2019-05-13

## 2019-05-08 ASSESSMENT — ENCOUNTER SYMPTOMS
COLOR CHANGE: 0
SHORTNESS OF BREATH: 1
ALLERGIC/IMMUNOLOGIC NEGATIVE: 1
COUGH: 1
WHEEZING: 0
GASTROINTESTINAL NEGATIVE: 1
CHEST TIGHTNESS: 1
CHOKING: 0
VOMITING: 0
NAUSEA: 0
SINUS PAIN: 1
TROUBLE SWALLOWING: 0
SINUS PRESSURE: 1
STRIDOR: 0
SORE THROAT: 0
EYES NEGATIVE: 1
FACIAL SWELLING: 0
DIARRHEA: 0

## 2019-05-08 NOTE — PROGRESS NOTES
[facility]  [address] [facility]  Dept: [phone]    Visit Information    Have you changed or started any medications since your last visit including any over-the-counter medicines, vitamins, or herbal medicines? no   Are you having any side effects from any of your medications? -  no  Have you stopped taking any of your medications? Is so, why? -  no    Have you seen any other physician or provider since your last visit? No  Have you had any other diagnostic tests since your last visit? No  Have you been seen in the emergency room and/or had an admission to a hospital since we last saw you? No  Have you had your routine dental cleaning in the past 6 months? no    Have you activated your [employer] account? If not, what are your barriers? No:      Patient Care Team:  SIMONE Soni CNP as PCP - General (Family Medicine)    Medical History Review  Past Medical, Family, and Social History reviewed and does contribute to the patient presenting condition    Health Maintenance   Topic Date Due    Hepatitis C screen  1960    Pneumococcal 0-64 years Vaccine (1 of 1 - PPSV23) 09/23/1967    HIV screen  09/23/1976    DTaP/Tdap/Td vaccine (1 - Tdap) 09/23/1980    Shingles Vaccine (1 of 2) 09/23/2011    Colon cancer screen colonoscopy  09/23/2011    Low dose CT lung screening  09/23/2016    Flu vaccine (Season Ended) 09/01/2019    Lipid screen  04/03/2024           Demetrio Paulino is a 63 y.o. male who presents today for his medical conditions/complaintsas noted below.       Demetrio Paulino is here today c/o Sinus Problem      Past Medical History:   Diagnosis Date    Cancer ([address]     myeloproliferative    Myeloproliferative disorder [facility])       Past Surgical History:   Procedure Laterality Date    APPENDECTOMY      CARPAL TUNNEL RELEASE      bilateral     CERVICAL FUSION      s/p trampoline accident    TONSILLECTOMY         Family History   Problem Relation Age of Onset   Tarik Rosales Breast Cancer Mother         s/p surgical complications    Diabetes Mother     Heart Failure Father     Other Brother         \"bad back\"       Social History     Tobacco Use    Smoking status: Current Every Day Smoker     Packs/day: 1.00     Years: 40.00     Pack years: 40.00     Types: Cigarettes    Smokeless tobacco: Never Used    Tobacco comment: less than 1 ppd    Substance Use Topics    Alcohol use: No      Current Outpatient Medications   Medication Sig Dispense Refill    Elastic Bandages & Supports (LUMBAR BACK BRACE/SUPPORT PAD) MISC 1 Units by Does not apply route daily PNEUMATIC OFFLOADING  BACK BRACE 1 each 0    baclofen (LIORESAL) 10 MG tablet Take 1 tablet by mouth 3 times daily Slowly increase form 1 tab a day to a target dose of tid 90 tablet 0    gabapentin (NEURONTIN) 300 MG capsule Take 2 capsules by mouth 3 times daily for 30 days. 180 capsule 5    amitriptyline (ELAVIL) 25 MG tablet Take 1 tab (25 mg) at bedtime for 1 week, then 2 tabs (50 mg) for 1 week, then 3 tabs (75 mg) for 1 week, then 4 tabs (100 mg) thereafter 120 tablet 1    hydroxyurea (HYDREA) 500 MG chemo capsule Take 1 capsule by mouth 3 times daily 90 capsule 2    aspirin 81 MG tablet Take 81 mg by mouth daily       No current facility-administered medications for this visit. No Known Allergies      HPI:     Cough   This is a recurrent problem. The current episode started 1 to 4 weeks ago (3-4 weeks ). The problem has been unchanged. The cough is productive of sputum and productive of purulent sputum. Associated symptoms include chest pain (w/ coughing), headaches (off and on ), myalgias, nasal congestion and shortness of breath. Pertinent negatives include no chills, ear congestion, ear pain, fever (last fever 1 week ago was 101 deg. - never reoccurred), postnasal drip, rash, sore throat (RESOLVED) or wheezing. Nothing aggravates the symptoms.  Treatments tried: Zithromax, cough syrup      Patient feels unchanged from RX Zithromax 2 weeks ago  C/o cough with bronchospasm , difficulty sleeping @ night, cough syrup helped but he ran out   Short winded with activities , previous chest x-ray neg   Patient is current smoker with presumptive COPD based on chest x-ray     Health Maintenance:      Subjective:     Review of Systems   Constitutional: Positive for appetite change (dec.) and fatigue. Negative for chills and fever (last fever 1 week ago was 101 deg. - never reoccurred). HENT: Positive for congestion (green), sinus pressure (R sided) and sinus pain. Negative for ear discharge, ear pain, facial swelling, postnasal drip, sore throat (RESOLVED) and trouble swallowing. Eyes: Negative. Respiratory: Positive for cough (w/ broncho spasms), chest tightness and shortness of breath. Negative for choking, wheezing and stridor. Cardiovascular: Positive for chest pain (w/ coughing). Gastrointestinal: Negative. Negative for diarrhea, nausea and vomiting. Endocrine: Negative. Genitourinary: Negative. Musculoskeletal: Positive for myalgias. Skin: Negative. Negative for color change, pallor, rash and wound. Allergic/Immunologic: Negative. Neurological: Positive for headaches (off and on ). Objective:     Vitals:    05/08/19 0914   BP: 92/60   Pulse: 82   Resp: 18   Temp: 97.9 °F (36.6 °C)   SpO2: 99%       Body mass index is 26.65 kg/m². Physical Exam   Constitutional: He is oriented to person, place, and time. He appears well-developed and well-nourished. Non-toxic appearance. He appears ill. No distress. HENT:   Head: Normocephalic and atraumatic. Right Ear: External ear normal.   Left Ear: External ear normal.   Nose: Mucosal edema and rhinorrhea present. Right sinus exhibits no maxillary sinus tenderness and no frontal sinus tenderness. Left sinus exhibits no maxillary sinus tenderness and no frontal sinus tenderness.    Mouth/Throat: Oropharynx is clear and moist and mucous membranes are normal. No uvula swelling. No oropharyngeal exudate, posterior oropharyngeal edema, posterior oropharyngeal erythema or tonsillar abscesses. No tonsillar exudate. Cerumen bilaterally    Eyes: Pupils are equal, round, and reactive to light. Conjunctivae are normal. Right eye exhibits no discharge. Left eye exhibits no discharge. No scleral icterus. Neck: Normal range of motion. Neck supple. No tracheal deviation present. Cardiovascular: Normal rate, regular rhythm, normal heart sounds and intact distal pulses. Exam reveals no gallop and no friction rub. No murmur heard. Pulmonary/Chest: Effort normal. No accessory muscle usage or stridor. No tachypnea. No respiratory distress. He has decreased breath sounds. He has no wheezes. He has no rhonchi. He has no rales. Freq. Coughing spells w/ bronchospasm    Abdominal: Soft. Musculoskeletal: Normal range of motion. He exhibits no edema, tenderness or deformity. Lymphadenopathy:     He has no cervical adenopathy. Neurological: He is alert and oriented to person, place, and time. He has normal strength. He displays no atrophy and no tremor. He displays no seizure activity. Gait normal. GCS eye subscore is 4. GCS verbal subscore is 5. GCS motor subscore is 6. Skin: Skin is warm, dry and intact. No rash noted. He is not diaphoretic. No erythema. No pallor. Psychiatric: He has a normal mood and affect. His speech is normal and behavior is normal. Judgment and thought content normal. Cognition and memory are normal.         Assessment:         1. Acute bacterial sinusitis    2. Bronchitis with bronchospasm        Plan:     1. Acute bacterial sinusitis    - doxycycline monohydrate (MONODOX) 100 MG capsule; Take 1 capsule by mouth 2 times daily (with meals) Avoid calcium, mtv's and dairy 2 hours before and after  Dispense: 20 capsule; Refill: 0    2. Bronchitis with bronchospasm    - predniSONE (DELTASONE) 20 MG tablet;  Take 2 tablets by mouth daily for 5 days  Dispense: 10 tablet; Refill: 0  - albuterol sulfate  (90 Base) MCG/ACT inhaler; Inhale 2 puffs into the lungs every 4 hours as needed for Wheezing or Shortness of Breath  Dispense: 1 Inhaler; Refill: 0  - promethazine-codeine (PHENERGAN WITH CODEINE) 6.25-10 MG/5ML syrup; Take 5 mLs by mouth 4 times daily as needed for Cough for up to 7 days. Dispense: 180 mL; Refill: 0    Medication side effects discussed  No driving on cough syrup  Rescue inhaler PRN  Finish second abx course and steroids  Call if sx fail to resolve  There is some presumptive COPD based on smoking hx and hyperinflation on chest x-ray     Discussed use, benefit, and side effects of prescribed medications. All patient questions answered. Pt voiced understanding. Reviewed health maintenance. Instructed to continue current medications, diet and exercise. Patient agreedwith treatment plan. Follow up as directed.      Electronically signed by SIMONE Morrow CNP on 5/8/2019

## 2019-05-15 ENCOUNTER — APPOINTMENT (OUTPATIENT)
Dept: GENERAL RADIOLOGY | Age: 59
End: 2019-05-15
Attending: ANESTHESIOLOGY
Payer: COMMERCIAL

## 2019-05-15 ENCOUNTER — HOSPITAL ENCOUNTER (OUTPATIENT)
Age: 59
Setting detail: OUTPATIENT SURGERY
Discharge: HOME | End: 2019-05-15
Attending: ANESTHESIOLOGY | Admitting: ANESTHESIOLOGY
Payer: COMMERCIAL

## 2019-05-15 VITALS
RESPIRATION RATE: 17 BRPM | SYSTOLIC BLOOD PRESSURE: 125 MMHG | HEART RATE: 73 BPM | DIASTOLIC BLOOD PRESSURE: 87 MMHG | WEIGHT: 199 LBS | BODY MASS INDEX: 26.37 KG/M2 | TEMPERATURE: 97.5 F | OXYGEN SATURATION: 99 % | HEIGHT: 73 IN

## 2019-05-15 PROBLEM — M54.16 LEFT LUMBAR RADICULITIS: Chronic | Status: ACTIVE | Noted: 2019-05-03

## 2019-05-15 PROCEDURE — 7100000011 HC PHASE II RECOVERY - ADDTL 15 MIN: Performed by: ANESTHESIOLOGY

## 2019-05-15 PROCEDURE — 7100000010 HC PHASE II RECOVERY - FIRST 15 MIN: Performed by: ANESTHESIOLOGY

## 2019-05-15 PROCEDURE — 2709999900 HC NON-CHARGEABLE SUPPLY: Performed by: ANESTHESIOLOGY

## 2019-05-15 PROCEDURE — 6360000004 HC RX CONTRAST MEDICATION: Performed by: ANESTHESIOLOGY

## 2019-05-15 PROCEDURE — 64483 NJX AA&/STRD TFRM EPI L/S 1: CPT | Performed by: ANESTHESIOLOGY

## 2019-05-15 PROCEDURE — 2500000003 HC RX 250 WO HCPCS: Performed by: ANESTHESIOLOGY

## 2019-05-15 PROCEDURE — 3209999900 FLUORO FOR SURGICAL PROCEDURES

## 2019-05-15 PROCEDURE — 99152 MOD SED SAME PHYS/QHP 5/>YRS: CPT | Performed by: ANESTHESIOLOGY

## 2019-05-15 PROCEDURE — 64484 NJX AA&/STRD TFRM EPI L/S EA: CPT | Performed by: ANESTHESIOLOGY

## 2019-05-15 PROCEDURE — 3600000051 HC PAIN LEVEL 1 ADDL 15 MIN: Performed by: ANESTHESIOLOGY

## 2019-05-15 PROCEDURE — 6360000002 HC RX W HCPCS: Performed by: ANESTHESIOLOGY

## 2019-05-15 PROCEDURE — 3600000050 HC PAIN LEVEL 1 BASE: Performed by: ANESTHESIOLOGY

## 2019-05-15 RX ORDER — LIDOCAINE HYDROCHLORIDE 10 MG/ML
INJECTION, SOLUTION INFILTRATION; PERINEURAL PRN
Status: DISCONTINUED | OUTPATIENT
Start: 2019-05-15 | End: 2019-05-15 | Stop reason: ALTCHOICE

## 2019-05-15 RX ORDER — SODIUM CHLORIDE 0.9 % (FLUSH) 0.9 %
10 SYRINGE (ML) INJECTION PRN
Status: DISCONTINUED | OUTPATIENT
Start: 2019-05-15 | End: 2019-05-15 | Stop reason: SDUPTHER

## 2019-05-15 RX ORDER — SODIUM CHLORIDE 0.9 % (FLUSH) 0.9 %
10 SYRINGE (ML) INJECTION PRN
Status: DISCONTINUED | OUTPATIENT
Start: 2019-05-15 | End: 2019-05-15 | Stop reason: HOSPADM

## 2019-05-15 RX ORDER — FENTANYL CITRATE 50 UG/ML
INJECTION, SOLUTION INTRAMUSCULAR; INTRAVENOUS PRN
Status: DISCONTINUED | OUTPATIENT
Start: 2019-05-15 | End: 2019-05-15 | Stop reason: ALTCHOICE

## 2019-05-15 RX ORDER — MIDAZOLAM HYDROCHLORIDE 1 MG/ML
INJECTION INTRAMUSCULAR; INTRAVENOUS PRN
Status: DISCONTINUED | OUTPATIENT
Start: 2019-05-15 | End: 2019-05-15 | Stop reason: ALTCHOICE

## 2019-05-15 RX ORDER — SODIUM CHLORIDE 0.9 % (FLUSH) 0.9 %
10 SYRINGE (ML) INJECTION EVERY 12 HOURS SCHEDULED
Status: DISCONTINUED | OUTPATIENT
Start: 2019-05-15 | End: 2019-05-15 | Stop reason: SDUPTHER

## 2019-05-15 RX ORDER — DEXAMETHASONE SODIUM PHOSPHATE 10 MG/ML
INJECTION INTRAMUSCULAR; INTRAVENOUS PRN
Status: DISCONTINUED | OUTPATIENT
Start: 2019-05-15 | End: 2019-05-15 | Stop reason: ALTCHOICE

## 2019-05-15 RX ORDER — SODIUM CHLORIDE 0.9 % (FLUSH) 0.9 %
10 SYRINGE (ML) INJECTION EVERY 12 HOURS SCHEDULED
Status: DISCONTINUED | OUTPATIENT
Start: 2019-05-15 | End: 2019-05-15 | Stop reason: HOSPADM

## 2019-05-15 ASSESSMENT — PAIN SCALES - GENERAL
PAINLEVEL_OUTOF10: 0

## 2019-05-15 ASSESSMENT — PAIN DESCRIPTION - DESCRIPTORS: DESCRIPTORS: ACHING;SHARP

## 2019-05-15 ASSESSMENT — PAIN - FUNCTIONAL ASSESSMENT: PAIN_FUNCTIONAL_ASSESSMENT: 0-10

## 2019-05-15 NOTE — H&P
History and Physical Update    Pt Name: Kellie Dawkins  MRN: [id number]  YOB: 1960  Date of evaluation: 5/15/2019      [x] I have reviewed the Pain Management Note by Dr Ish Humphreys dated 5/3/19 in epic which meets the criteria for an Interval History and Physical note and is attached below. [x] I have examined  Kellie Dawkins  There are no changes to the patient who is scheduled for a Epidrual steroid injection left L5 S1 by Dr Ish Humphreys for spondylosis lumbar region w/o myelopathy or radiculopathy. The patient denies health changes, fever, chills, productive cough, SOB, chest pain, open sores or wounds. Vital signs: BP (!) 144/78   Pulse 75   Temp 98.1 °F (36.7 °C) (Oral)   Resp 16   SpO2 100%     Allergies:  Patient has no known allergies. Medications:    Prior to Admission medications    Medication Sig Start Date End Date Taking? Authorizing Provider   doxycycline monohydrate (MONODOX) 100 MG capsule Take 1 capsule by mouth 2 times daily (with meals) Avoid calcium, mtv's and dairy 2 hours before and after 5/8/19   SIMONE Garcia CNP   albuterol sulfate  (90 Base) MCG/ACT inhaler Inhale 2 puffs into the lungs every 4 hours as needed for Wheezing or Shortness of Breath 5/8/19   SIMONE Garcia CNP   promethazine-codeine (PHENERGAN WITH CODEINE) 6.25-10 MG/5ML syrup Take 5 mLs by mouth 4 times daily as needed for Cough for up to 7 days. 5/8/19 5/15/19  SIMONE Garcia CNP   Elastic Bandages & Supports (LUMBAR BACK BRACE/SUPPORT PAD) MISC 1 Units by Does not apply route daily PNEUMATIC OFFLOADING  BACK BRACE 5/3/19 5/2/20  Ish Humphreys MD   baclofen (LIORESAL) 10 MG tablet Take 1 tablet by mouth 3 times daily Slowly increase form 1 tab a day to a target dose of tid 5/3/19 6/2/19  Ish Humphreys MD   gabapentin (NEURONTIN) 300 MG capsule Take 2 capsules by mouth 3 times daily for 30 days.  4/12/19 5/12/19  SIMONE Garcia CNP   amitriptyline reported history of chronic low back pain located in the lumbar area. He reports his pain as an aching nagging pain sensation. Back pain has been there for more than 10 years. Symptoms have slowly worsened over years. He states that he had a severe flareup of pain is aching throbbing sharp shooting back pain about 6 months ago without any apparent injury. This pain is located on the left side predominantly in the lumbosacral area with radiation down left leg over gluteal region and posterior thigh and into the calf with occasional extension into the foot. Associated symptoms include left leg numbness and tingling. Patient was referred for physical therapy, he attended 12 sessions of therapy without any significant improvement in symptoms. Had a MRI of thoracic and lumbar spine  Recently he was seen by the nurse practitioner at neurosurgery clinic [place]  He was not recommended for any surgery and is referred to pain clinic for consideration of injections. No previous lumbar spine injection history  No previous lumbar spine surgical history  Currently patient's taking Neurontin 600 mg 3 times a day along with Elavil 75 mg  He is tried anti-inflammatory medications and Tylenol also     Pain is constant severe, average intensity between 6-9/10 depending upon the activity level, aggravates with standing and lifting and bending twisting turning, nothing seems to alleviate the pain, pain character is aching throbbing sharp shooting and burning. No previous EMG nerve testing for legs        Pain score today  6  1. Location: back pain  2. Radiation: left leg  3. Character: nagging, numb, aching, throbbing, shooting, sharp, tender  5. Duration: years worse with in the past 6 months  6. Onset: years worse with in the past 6 months      7. Did an injury cause pain: no  8. Aggravating factors: different movements  9. Alleviating factors: nothing  10.  Associated symptoms (numbness / tingling / weakness):  yes  -Where at: left leg and left foot  -Down into finger tips or toes (specify which finger or toes): none  -constant or intermitting:  intermiting  11. Red Flags: (weight loss / chills / loss of bladder or bowel control):none     Previous management history  1. Previous diagnostic workup: (Imaging/EMG)   CT, MRI, or Xray: MRI  What part of the body: back  What facility [facility] they [facility]  What year or specific date: 4/3/2019  EMG:  no  EXAMINATION: MRI OF THE THORACIC SPINE WITHOUT AND WITH CONTRAST  4/3/2019 12:40 pm     Impression Lower thoracic facet arthropathy. EXAMINATION: MRI OF THE LUMBAR SPINE WITHOUT AND WITH CONTRAST  4/3/2019 12:40 pm     L1-L2: There is no significant disc protrusion, spinal canal stenosis or neural foraminal narrowing. L2-L3: There is no significant disc protrusion, spinal canal stenosis. Mild bilateral neural foraminal narrowing. Mild broad-based annular bulge. L3-L4: There is no significant disc protrusion, spinal canal stenosis. Mild bilateral neural foraminal narrowing. Mild broad-based annular bulge. Mild broad-based annular bulge. L4-L5: There is no significant disc protrusion, spinal canal stenosis. Mild bilateral neural foraminal narrowing. L5-S1: There is no significant disc protrusion, spinal canal stenosis. Mild bilateral neural foraminal narrowing.       2. Previous non interventional treatments tried:  chiropractor or physical therapy: PT  What part of the body: back  What facility was it done at: [facility]  How long ago was it last tried: 2019-2019  Did it work: Yes and No  Did they complete it:Yes  Physical Therapy Progress Note     Date: 2019                                          Patient: Kassandra Marcelino              : 1960                  MRN: [id number]                     Physician: Patricia NICHOLS CNP                               [facility]  Diagnosis: chronic penile swelling, scrotal swelling and testicular pain. Musculoskeletal: Positive for back pain and myalgias. Negative for arthralgias, gait problem, joint swelling, neck pain and neck stiffness. Skin: Negative. Allergic/Immunologic: Negative. Neurological: Positive for dizziness, weakness, light-headedness, numbness and headaches. Negative for tremors, seizures, syncope, facial asymmetry and speech difficulty. Hematological: Negative for adenopathy. Bruises/bleeds easily. Psychiatric/Behavioral: Negative. Past Medical History        Past Medical History:   Diagnosis Date    Cancer ([facility] [address]       myeloproliferative    Myeloproliferative disorder ([facility] [address]           Past Surgical History         Past Surgical History:   Procedure Laterality Date    APPENDECTOMY        CARPAL TUNNEL RELEASE         bilateral     CERVICAL FUSION         s/p trampoline accident    TONSILLECTOMY             No Known Allergies  Current Facility-Administered Medications          Current Outpatient Medications   Medication Sig Dispense Refill    Elastic Bandages & Supports (LUMBAR BACK BRACE/SUPPORT PAD) MISC 1 Units by Does not apply route daily PNEUMATIC OFFLOADING  BACK BRACE 1 each 0    baclofen (LIORESAL) 10 MG tablet Take 1 tablet by mouth 3 times daily Slowly increase form 1 tab a day to a target dose of tid 90 tablet 0    gabapentin (NEURONTIN) 300 MG capsule Take 2 capsules by mouth 3 times daily for 30 days. 180 capsule 5    amitriptyline (ELAVIL) 25 MG tablet Take 1 tab (25 mg) at bedtime for 1 week, then 2 tabs (50 mg) for 1 week, then 3 tabs (75 mg) for 1 week, then 4 tabs (100 mg) thereafter 120 tablet 1    hydroxyurea (HYDREA) 500 MG chemo capsule Take 1 capsule by mouth 3 times daily 90 capsule 2    aspirin 81 MG tablet Take 81 mg by mouth daily          No current facility-administered medications for this visit.              Social History   Social History            Socioeconomic History    Marital status:        Spouse name: None    Number of children: None    Years of education: None    Highest education level: None   Occupational History    None   Social Needs    Financial resource strain: None    Food insecurity:       Worry: None       Inability: None    Transportation needs:       Medical: None       Non-medical: None   Tobacco Use    Smoking status: Current Every Day Smoker       Packs/day: 1.00       Years: 40.00       Pack years: 40.00       Types: Cigarettes    Smokeless tobacco: Never Used    Tobacco comment: less than 1 ppd    Substance and Sexual Activity    Alcohol use: No    Drug use: Yes       Comment: used to smoke occasional marijuana     Sexual activity: None   Lifestyle    Physical activity:       Days per week: None       Minutes per session: None    Stress: None   Relationships    Social connections:       Talks on phone: None       Gets together: None       Attends [employer] service: None       Active member of club or organization: None       Attends meetings of clubs or organizations: None       Relationship status: None    Intimate partner violence:       Fear of current or ex partner: None       Emotionally abused: None       Physically abused: None       Forced sexual activity: None   Other Topics Concern    None   Social History Narrative    None            Family History         Family History   Problem Relation Age of Onset    Breast Cancer Mother           s/p surgical complications    Diabetes Mother      Heart Failure Father      Other Brother           \"bad back\"               Objective:   Physical Exam   Constitutional: He is oriented to person, place, and time. He appears well-developed and well-nourished. No distress. HENT:   Head: Normocephalic and atraumatic. Eyes: Pupils are equal, round, and reactive to light. Conjunctivae and EOM are normal. Right eye exhibits no discharge. Left eye exhibits no discharge.    Neck: Normal range of into the calf with occasional extension into the foot. Associated symptoms include left leg numbness and tingling. Patient was referred for physical therapy, he attended 12 sessions of therapy without any significant improvement in symptoms. Had a MRI of thoracic and lumbar spine  Recently he was seen by the nurse practitioner at neurosurgery clinic [facility]  He was not recommended for any surgery and is referred to pain clinic for consideration of injections. No previous lumbar spine injection history  No previous lumbar spine surgical history  Currently patient's taking Neurontin 600 mg 3 times a day along with Elavil 75 mg  He is tried anti-inflammatory medications and Tylenol also     Pain is constant severe, average intensity between 6-9/10 depending upon the activity level, aggravates with standing and lifting and bending twisting turning, nothing seems to alleviate the pain, pain character is aching throbbing sharp shooting and burning. No previous EMG nerve testing for legs     Physical Therapy Progress Note     Date: 2019                                          Patient: May Delarosa              : 1960                  MRN: [id number]                     Physician: Bahman NICHOLS CNP                               Insurance: [facility] medicaid 30 visits  Diagnosis: chronic LBP without sciatica   Onset Date: 10/1/18                           Next Dr. aCrol Martinez:   Visit number:                Cancels/No Shows: 2/0      EXAMINATION: MRI OF THE THORACIC SPINE WITHOUT AND WITH CONTRAST  4/3/2019 12:40 pm     Impression Lower thoracic facet arthropathy. EXAMINATION: MRI OF THE LUMBAR SPINE WITHOUT AND WITH CONTRAST  4/3/2019 12:40 pm     L1-L2: There is no significant disc protrusion, spinal canal stenosis or neural foraminal narrowing. L2-L3: There is no significant disc protrusion, spinal canal stenosis. Mild bilateral neural foraminal narrowing.   Mild broad-based annular bulge. L3-L4: There is no significant disc protrusion, spinal canal stenosis. Mild bilateral neural foraminal narrowing. Mild broad-based annular bulge. Mild broad-based annular bulge. L4-L5: There is no significant disc protrusion, spinal canal stenosis. Mild bilateral neural foraminal narrowing. L5-S1: There is no significant disc protrusion, spinal canal stenosis. Mild bilateral neural foraminal narrowing. 1. Left lumbar radiculitis    2. Spondylosis of lumbar region without myelopathy or radiculopathy    3. Hx of fusion of cervical spine    4. Left arm numbness    5.  Left leg numbness                        Plan:   MRI thoracic and lumbar spine is a 90  Reports are reviewed  Images were reviewed independently  Images were shown to the patient  Pertinent findings include mild disc bulges left-sided at L4-L5 and L5-S1 level  No significant nerve root impingement or displacement, mildly narrowing of the neural foramina     Clinical presentation is highly suggestive of lumbar disc disease and left lumbar radiculitis  His symptoms have severe and markedly disabling he has failed physical therapy and medication management  I do not think he will make any progress now from any further physical therapy  Imaging did not show any clear nerve root impingement  Therefore I would like to obtain a cervical spine MRI to rule out any cervical spine pathology responsible for the symptoms     Once cervical spine MRI and rule out any cervical spine pathology, I will advised for lumbar transforaminal left-sided L4 and L5 and S1 epidural steroid injection     We'll also order a back brace              Orders Placed This Encounter   Procedures    MRI Cervical Spine W WO Contrast       Standing Status:   Future       Standing Expiration Date:   5/3/2020    NH INJECT ANES/STEROID FORAMEN LUMBAR/SACRAL W IMG GUIDE ,1 LEVEL       Left L5 AND S1 AMISH       Standing Status:   Future       Standing Expiration Date:   8/3/2019      Encounter Medications         Orders Placed This Encounter   Medications    Elastic Bandages & Supports (LUMBAR BACK BRACE/SUPPORT PAD) MISC       Si Units by Does not apply route daily PNEUMATIC OFFLOADING  BACK BRACE       Dispense:  1 each       Refill:  0    baclofen (LIORESAL) 10 MG tablet       Sig: Take 1 tablet by mouth 3 times daily Slowly increase form 1 tab a day to a target dose of tid       Dispense:  90 tablet       Refill:  0         Consultation note sent to the referring physician                      Reggie Key MD                ·   Initial consult on 5/3/2019   ·     ·   Revision History   ·     ·   Detailed Report   ·     Progress Notes Info     Author Note Status Last Update User   Reggie Key MD Signed Reggie Key MD   Last Update Date/Time: 5/3/2019 12:12 PM   Chart Review Routing History     Routing history could not be found for this note. This is because the note has never been routed or because communication record creation was suppressed.

## 2019-05-16 ENCOUNTER — OFFICE VISIT (OUTPATIENT)
Dept: ONCOLOGY | Age: 59
End: 2019-05-16
Payer: COMMERCIAL

## 2019-05-16 ENCOUNTER — TELEPHONE (OUTPATIENT)
Dept: PAIN MANAGEMENT | Age: 59
End: 2019-05-16

## 2019-05-16 ENCOUNTER — TELEPHONE (OUTPATIENT)
Dept: ONCOLOGY | Age: 59
End: 2019-05-16

## 2019-05-16 ENCOUNTER — HOSPITAL ENCOUNTER (OUTPATIENT)
Age: 59
Discharge: HOME | End: 2019-05-16
Payer: COMMERCIAL

## 2019-05-16 VITALS
RESPIRATION RATE: 18 BRPM | WEIGHT: 198 LBS | HEART RATE: 75 BPM | SYSTOLIC BLOOD PRESSURE: 133 MMHG | TEMPERATURE: 97.8 F | BODY MASS INDEX: 26.12 KG/M2 | DIASTOLIC BLOOD PRESSURE: 81 MMHG

## 2019-05-16 DIAGNOSIS — D47.1 MYELOPROLIFERATIVE DISORDER (HCC): Primary | ICD-10-CM

## 2019-05-16 LAB
ABSOLUTE EOS #: 0.28 K/UL (ref 0–0.44)
ABSOLUTE IMMATURE GRANULOCYTE: 0.14 K/UL (ref 0–0.3)
ABSOLUTE LYMPH #: 2.54 K/UL (ref 1.1–3.7)
ABSOLUTE MONO #: 0.99 K/UL (ref 0.1–1.2)
BASOPHILS # BLD: 1 % (ref 0–2)
BASOPHILS ABSOLUTE: 0.14 K/UL (ref 0–0.2)
DIFFERENTIAL TYPE: ABNORMAL
EOSINOPHILS RELATIVE PERCENT: 2 % (ref 1–4)
HCT VFR BLD CALC: 39.3 % (ref 40.7–50.3)
HEMOGLOBIN: 13.3 G/DL (ref 13–17)
IMMATURE GRANULOCYTES: 1 %
LYMPHOCYTES # BLD: 18 % (ref 24–43)
MCH RBC QN AUTO: 35.4 PG (ref 25.2–33.5)
MCHC RBC AUTO-ENTMCNC: 33.8 G/DL (ref 28–38)
MCV RBC AUTO: 104.5 FL (ref 82.6–102.9)
MONOCYTES # BLD: 7 % (ref 3–12)
MORPHOLOGY: ABNORMAL
MORPHOLOGY: ABNORMAL
NRBC AUTOMATED: ABNORMAL PER 100 WBC
PDW BLD-RTO: ABNORMAL % (ref 11.8–14.4)
PLATELET # BLD: 324 K/UL (ref 138–453)
PLATELET ESTIMATE: ABNORMAL
PMV BLD AUTO: 9.3 FL (ref 8.1–13.5)
RBC # BLD: 3.76 M/UL (ref 4.21–5.77)
RBC # BLD: ABNORMAL 10*6/UL
SEG NEUTROPHILS: 71 % (ref 36–65)
SEGMENTED NEUTROPHILS ABSOLUTE COUNT: 10.01 K/UL (ref 1.5–8.1)
URIC ACID: 6.2 MG/DL (ref 3.4–7)
WBC # BLD: 14.1 K/UL (ref 3.5–11.3)
WBC # BLD: ABNORMAL 10*3/UL

## 2019-05-16 PROCEDURE — G8419 CALC BMI OUT NRM PARAM NOF/U: HCPCS | Performed by: INTERNAL MEDICINE

## 2019-05-16 PROCEDURE — 99211 OFF/OP EST MAY X REQ PHY/QHP: CPT

## 2019-05-16 PROCEDURE — 99214 OFFICE O/P EST MOD 30 MIN: CPT | Performed by: INTERNAL MEDICINE

## 2019-05-16 PROCEDURE — 36415 COLL VENOUS BLD VENIPUNCTURE: CPT

## 2019-05-16 PROCEDURE — G8427 DOCREV CUR MEDS BY ELIG CLIN: HCPCS | Performed by: INTERNAL MEDICINE

## 2019-05-16 PROCEDURE — 99211 OFF/OP EST MAY X REQ PHY/QHP: CPT | Performed by: INTERNAL MEDICINE

## 2019-05-16 PROCEDURE — 3017F COLORECTAL CA SCREEN DOC REV: CPT | Performed by: INTERNAL MEDICINE

## 2019-05-16 PROCEDURE — 85025 COMPLETE CBC W/AUTO DIFF WBC: CPT

## 2019-05-16 PROCEDURE — 4004F PT TOBACCO SCREEN RCVD TLK: CPT | Performed by: INTERNAL MEDICINE

## 2019-05-16 PROCEDURE — 84550 ASSAY OF BLOOD/URIC ACID: CPT

## 2019-05-16 RX ORDER — GABAPENTIN 300 MG/1
300 CAPSULE ORAL 3 TIMES DAILY
COMMUNITY
End: 2019-06-14

## 2019-05-17 ENCOUNTER — HOSPITAL ENCOUNTER (OUTPATIENT)
Dept: MRI IMAGING | Age: 59
Discharge: HOME | End: 2019-05-19
Payer: COMMERCIAL

## 2019-05-17 DIAGNOSIS — Z98.1 HX OF FUSION OF CERVICAL SPINE: ICD-10-CM

## 2019-05-17 DIAGNOSIS — R20.0 LEFT LEG NUMBNESS: ICD-10-CM

## 2019-05-17 DIAGNOSIS — R20.0 LEFT ARM NUMBNESS: ICD-10-CM

## 2019-05-17 PROCEDURE — 6360000004 HC RX CONTRAST MEDICATION: Performed by: ANESTHESIOLOGY

## 2019-05-17 PROCEDURE — A9579 GAD-BASE MR CONTRAST NOS,1ML: HCPCS | Performed by: ANESTHESIOLOGY

## 2019-05-17 PROCEDURE — 72156 MRI NECK SPINE W/O & W/DYE: CPT

## 2019-05-17 RX ADMIN — GADOTERIDOL 19 ML: 279.3 INJECTION, SOLUTION INTRAVENOUS at 08:12

## 2019-05-23 ENCOUNTER — TELEPHONE (OUTPATIENT)
Dept: PAIN MANAGEMENT | Age: 59
End: 2019-05-23

## 2019-05-23 DIAGNOSIS — M48.02 CERVICAL SPINAL STENOSIS: Primary | ICD-10-CM

## 2019-05-23 NOTE — TELEPHONE ENCOUNTER
Pt called because he had MRI on 5/16 and would like results. Pt had procedure on 5/15 getting relief for only one day. 2nd inj was cancelled because of the nurses strike. He has been taking Baclofen Tid and it is not doing anything for the pain. Pain is changing from the whole lower back to focusing on the right side of the lower back and his neck. It seems more intense. Pt needs to know what can be done. Next appt is not until 6/13.    Please advice

## 2019-05-23 NOTE — TELEPHONE ENCOUNTER
MRI cervical spine have shown multiple level stenosis  I think patient can benefit from surgical evaluation  Referral for neurosurgery is ordered  We will await surgical evaluation before any further injection

## 2019-05-26 NOTE — PROGRESS NOTES
includes the following prescription(s): gabapentin, doxycycline monohydrate, albuterol sulfate hfa, lumbar back brace/support pad, baclofen, amitriptyline, hydroxyurea, aspirin, and gabapentin. ALLERGIES:  has No Known Allergies. FAMILY HISTORY: Negative for any hematological or oncological conditions. SOCIAL HISTORY:  reports that he has been smoking cigarettes. He has a 40.00 pack-year smoking history. He has never used smokeless tobacco. He reports that he has current or past drug history. Drug: Marijuana. He reports that he does not drink alcohol. REVIEW OF SYSTEMS:     · General: No weakness or fatigue. No unanticipated weight loss or decreased appetite. No fever or chills. · Eyes: No blurred vision, eye pain or double vision. · Ears: No hearing problems or drainage. No tinnitus. · Throat: No sore throat, problems with swallowing or dysphagia. · Respiratory: No cough, sputum or hemoptysis. No shortness of breath. No pleuritic chest pain. · Cardiovascular: No chest pain, orthopnea or PND. No lower extremity edema. No palpitation. · Gastrointestinal: No problems with swallowing. No abdominal pain or bloating. No nausea or vomiting. No diarrhea or constipation. No GI bleeding. · Genitourinary: No dysuria, hematuria, frequency or urgency. · Musculoskeletal: No muscle aches or pains. No limitation of movement. No back pain. No gait disturbance, No joint complaints. · Dermatologic: No skin rashes or pruritus. No skin lesions or discolorations. · Psychiatric: No depression, anxiety, or stress or signs of schizophrenia. No change in mood or affect. · Hematologic: No history of bleeding tendency. No bruises or ecchymosis. No history of clotting problems. · Infectious disease: No fever, chills or frequent infections. · Endocrine: No problems with obesity. No polydipsia or polyuria. No temperature intolerance. · Neurologic: + headaches No dizziness. No weakness.  + numbness of the extremities. No changes in balance, coordination,  memory, mentation, behavior. · Allergic/Immunologic: No nasal congestion or hives. No repeated infections. PHYSICAL EXAM:  The patient is not in acute distress. Vital signs: Blood pressure 133/81, pulse 75, temperature 97.8 °F (36.6 °C), temperature source Oral, resp. rate 18, weight 198 lb (89.8 kg). HEENT:  Eyes are normal. Ears, nose and throat are normal.  Neck: Supple. No lymph node enlargement. No thyroid enlargement. Trachea is centrally located. Chest:  Clear to auscultation. No wheezes or crepitations. Heart: Regular sinus rhythm. Abdomen: Soft, nontender. No hepatosplenomegaly. No masses. Extremities:  With no edema. Lymph Nodes:  No cervical, axillary or inguinal lymph node enlargement. Neurologic:  Conscious and oriented. No focal neurological deficits. Psychosocial: No depression, anxiety or stress. Skin: No rashes, bruises or ecchymoses. Review of Diagnostic data:   Lab Results   Component Value Date    WBC 14.1 (H) 05/16/2019    HGB 13.3 05/16/2019    HCT 39.3 (L) 05/16/2019    .5 (H) 05/16/2019     05/16/2019       Chemistry        Component Value Date/Time     04/03/2019 0831    K 4.2 04/03/2019 0831     04/03/2019 0831    CO2 26 04/03/2019 0831    BUN 10 04/03/2019 0831    CREATININE 0.79 04/03/2019 0831        Component Value Date/Time    CALCIUM 9.3 04/03/2019 0831    ALKPHOS 112 04/03/2019 0831    AST 11 04/03/2019 0831    ALT 6 04/03/2019 0831    BILITOT 0.59 04/03/2019 0831        IMPRESSION:   Myeloproliferative disorder, NOS, positive VINICIO 2 gene mutation. Negative for CML. Chronic headaches  Long-standing history of Episodes of blurred vision and occasional diplopia    PLAN: records and labs from outside facility were reviewed and explained to patient. I explained to the patient the nature of this problem with myeloproliferative disorder and management plan.  Based on Bone marrow and cytogenetics no CML was seen. Diagnosis is made with myeloproliferative disorder NOS. Due to his symptoms we did phlebotomy and we increased the hydroxyurea dose. Labs were reviewed and discussed. Labs showed significant improvement. He is still symptomatic. He has headache and numbness. I believe these are not related. He has chronic symptoms. Today he also complains of long-standing history of episodes of diplopia or blurred vision. Nothing at the present time. He will have follow up with neurology for further management. He has good control on his labs. Continue Hydrea. Will discontinue Allopurinol. RV 6 months with CBC every 3 months. I will see him in about 6 weeks with repeated lab tests. We will continue Hydrea  2 times daily. Continue Allopurinol. Continue ASA.

## 2019-06-05 RX ORDER — HYDROXYUREA 500 MG/1
500 CAPSULE ORAL 2 TIMES DAILY
Qty: 60 CAPSULE | Refills: 2 | Status: SHIPPED | OUTPATIENT
Start: 2019-06-05 | End: 2019-09-24 | Stop reason: SDUPTHER

## 2019-06-14 ENCOUNTER — OFFICE VISIT (OUTPATIENT)
Dept: PAIN MANAGEMENT | Age: 59
End: 2019-06-14
Payer: COMMERCIAL

## 2019-06-14 VITALS
BODY MASS INDEX: 25.58 KG/M2 | OXYGEN SATURATION: 99 % | WEIGHT: 193 LBS | HEIGHT: 73 IN | HEART RATE: 78 BPM | SYSTOLIC BLOOD PRESSURE: 162 MMHG | DIASTOLIC BLOOD PRESSURE: 92 MMHG

## 2019-06-14 DIAGNOSIS — R29.2 HYPERREFLEXIA: ICD-10-CM

## 2019-06-14 DIAGNOSIS — Z92.241 S/P EPIDURAL STEROID INJECTION: ICD-10-CM

## 2019-06-14 DIAGNOSIS — M51.36 DDD (DEGENERATIVE DISC DISEASE), LUMBAR: ICD-10-CM

## 2019-06-14 DIAGNOSIS — M79.605 LEFT LEG PAIN: ICD-10-CM

## 2019-06-14 DIAGNOSIS — R93.89 ABNORMAL MRI: ICD-10-CM

## 2019-06-14 DIAGNOSIS — M48.02 CERVICAL STENOSIS OF SPINE: Primary | ICD-10-CM

## 2019-06-14 DIAGNOSIS — R20.0 LEFT ARM NUMBNESS: ICD-10-CM

## 2019-06-14 PROCEDURE — G8419 CALC BMI OUT NRM PARAM NOF/U: HCPCS | Performed by: ANESTHESIOLOGY

## 2019-06-14 PROCEDURE — 99214 OFFICE O/P EST MOD 30 MIN: CPT | Performed by: ANESTHESIOLOGY

## 2019-06-14 PROCEDURE — 3017F COLORECTAL CA SCREEN DOC REV: CPT | Performed by: ANESTHESIOLOGY

## 2019-06-14 PROCEDURE — 4004F PT TOBACCO SCREEN RCVD TLK: CPT | Performed by: ANESTHESIOLOGY

## 2019-06-14 PROCEDURE — G8427 DOCREV CUR MEDS BY ELIG CLIN: HCPCS | Performed by: ANESTHESIOLOGY

## 2019-06-14 RX ORDER — TIZANIDINE 4 MG/1
4 TABLET ORAL NIGHTLY
Qty: 30 TABLET | Refills: 0 | Status: SHIPPED | OUTPATIENT
Start: 2019-06-14 | End: 2019-08-23 | Stop reason: SDUPTHER

## 2019-06-14 RX ORDER — MELOXICAM 15 MG/1
15 TABLET ORAL DAILY
Qty: 30 TABLET | Refills: 0 | Status: SHIPPED | OUTPATIENT
Start: 2019-06-14 | End: 2019-08-23 | Stop reason: SDUPTHER

## 2019-06-14 ASSESSMENT — ENCOUNTER SYMPTOMS
BACK PAIN: 1
RESPIRATORY NEGATIVE: 1

## 2019-06-14 NOTE — PROGRESS NOTES
Number of children: None    Years of education: None    Highest education level: None   Occupational History    None   Social Needs    Financial resource strain: None    Food insecurity:     Worry: None     Inability: None    Transportation needs:     Medical: None     Non-medical: None   Tobacco Use    Smoking status: Current Every Day Smoker     Packs/day: 1.00     Years: 40.00     Pack years: 40.00     Types: Cigarettes    Smokeless tobacco: Never Used    Tobacco comment: less than 1 ppd    Substance and Sexual Activity    Alcohol use: No    Drug use: Yes     Types: Marijuana    Sexual activity: None   Lifestyle    Physical activity:     Days per week: None     Minutes per session: None    Stress: None   Relationships    Social connections:     Talks on phone: None     Gets together: None     Attends Christian service: None     Active member of club or organization: None     Attends meetings of clubs or organizations: None     Relationship status: None    Intimate partner violence:     Fear of current or ex partner: None     Emotionally abused: None     Physically abused: None     Forced sexual activity: None   Other Topics Concern    None   Social History Narrative    None     Family History   Problem Relation Age of Onset    Breast Cancer Mother         s/p surgical complications    Diabetes Mother     Heart Failure Father     Other Brother         \"bad back\"     No Known Allergies  Patient has no known allergies.    Vitals:    06/14/19 1028   BP: (!) 162/92   Pulse: 78   SpO2: 99%     Current Outpatient Medications   Medication Sig Dispense Refill    tiZANidine (ZANAFLEX) 4 MG tablet Take 1 tablet by mouth nightly 30 tablet 0    meloxicam (MOBIC) 15 MG tablet Take 1 tablet by mouth daily 30 tablet 0    STOOL SOFTENER 100 MG capsule TAKE ONE CAPSULE BY MOUTH TWICE A DAY AS NEEDED FOR CONSTIPATION 60 capsule 5    hydroxyurea (HYDREA) 500 MG chemo capsule Take 1 capsule by mouth 2 times daily 60 capsule 2    doxycycline monohydrate (MONODOX) 100 MG capsule Take 1 capsule by mouth 2 times daily (with meals) Avoid calcium, mtv's and dairy 2 hours before and after 20 capsule 0    albuterol sulfate  (90 Base) MCG/ACT inhaler Inhale 2 puffs into the lungs every 4 hours as needed for Wheezing or Shortness of Breath 1 Inhaler 0    Elastic Bandages & Supports (LUMBAR BACK BRACE/SUPPORT PAD) MISC 1 Units by Does not apply route daily PNEUMATIC OFFLOADING  BACK BRACE 1 each 0    gabapentin (NEURONTIN) 300 MG capsule Take 2 capsules by mouth 3 times daily for 30 days. 180 capsule 5    amitriptyline (ELAVIL) 25 MG tablet Take 1 tab (25 mg) at bedtime for 1 week, then 2 tabs (50 mg) for 1 week, then 3 tabs (75 mg) for 1 week, then 4 tabs (100 mg) thereafter 120 tablet 1    aspirin 81 MG tablet Take 81 mg by mouth daily       No current facility-administered medications for this visit. Review of Systems   Constitutional: Positive for activity change. Negative for appetite change, chills, diaphoresis, fatigue, fever and unexpected weight change. Respiratory: Negative. Musculoskeletal: Positive for back pain, neck pain and neck stiffness. Negative for arthralgias, gait problem and joint swelling. Neurological: Negative. Objective:  General Appearance:  Well-appearing, in no acute distress, uncomfortable and in pain. Vital signs: (most recent): Blood pressure (!) 162/92, pulse 78, height 6' 1\" (1.854 m), weight 193 lb (87.5 kg), SpO2 99 %. Vital signs are normal.  No fever. HEENT: Normal HEENT exam.    Lungs:  Normal effort and normal respiratory rate. Breath sounds clear to auscultation. He is not in respiratory distress. Heart: Normal rate. Regular rhythm. Chest: Symmetric chest wall expansion. No chest wall tenderness. Abdomen: Abdomen is soft and non-distended. Extremities: Normal range of motion. There is no deformity.     Neurological: Patient is alert and

## 2019-06-17 ENCOUNTER — TELEPHONE (OUTPATIENT)
Dept: NEUROSURGERY | Age: 59
End: 2019-06-17

## 2019-06-17 NOTE — TELEPHONE ENCOUNTER
Pt calling about results of MRI cervical 5/17/19. Pt concerned results may be urgent after seeing pain management 6/14. He is scheduled for follow up with Dr Chantelle Moon, 7/3/19 (no openings until then currently). Does pt need a sooner appt, or ok to wait until 7/3?      Last visit with Leatha Rich, 5/2/19

## 2019-06-19 ENCOUNTER — OFFICE VISIT (OUTPATIENT)
Dept: NEUROSURGERY | Age: 59
End: 2019-06-19
Payer: COMMERCIAL

## 2019-06-19 VITALS
SYSTOLIC BLOOD PRESSURE: 158 MMHG | WEIGHT: 203 LBS | HEART RATE: 81 BPM | DIASTOLIC BLOOD PRESSURE: 103 MMHG | BODY MASS INDEX: 26.78 KG/M2

## 2019-06-19 DIAGNOSIS — M47.22 CERVICAL SPONDYLOSIS WITH RADICULOPATHY: ICD-10-CM

## 2019-06-19 DIAGNOSIS — M47.816 LUMBAR SPONDYLOSIS: Primary | ICD-10-CM

## 2019-06-19 PROCEDURE — 3017F COLORECTAL CA SCREEN DOC REV: CPT | Performed by: NEUROLOGICAL SURGERY

## 2019-06-19 PROCEDURE — G8427 DOCREV CUR MEDS BY ELIG CLIN: HCPCS | Performed by: NEUROLOGICAL SURGERY

## 2019-06-19 PROCEDURE — 99213 OFFICE O/P EST LOW 20 MIN: CPT | Performed by: NEUROLOGICAL SURGERY

## 2019-06-19 PROCEDURE — 4004F PT TOBACCO SCREEN RCVD TLK: CPT | Performed by: NEUROLOGICAL SURGERY

## 2019-06-19 PROCEDURE — G8419 CALC BMI OUT NRM PARAM NOF/U: HCPCS | Performed by: NEUROLOGICAL SURGERY

## 2019-06-19 NOTE — PROGRESS NOTES
[address],  [address]  [address] [place] [address]  Dept: [phone]    Patient:  Anita Chan  YOB: 1960  Date: 6/19/19    The patient is a 63 y.o. male who presents today for consult of the following problems:     Chief Complaint   Patient presents with    Numbness     sorness cervical stenosis    Neck Pain    Back Pain             HPI:     Anita Chan is a 63 y.o. male on whom neurosurgical consultation was requested by SIMONE Nguyen CNP for management of cervical stenosis with myelopathy. The patient was seen by pain management for axial lumbar symptoms and in the work-up did reveal a cervical stenosis. The patient has had progressive symptoms including dropping things trouble with buttoning writing feeding putting on jewelry. He notes significant difficulty with dexterity and usage of his hands right greater than left. Denies any radicular lower extremity symptoms. .      History:     Past Medical History:   Diagnosis Date    Cancer ([facility] [address]     myeloproliferative    Myeloproliferative disorder ([facility] [address]      Past Surgical History:   Procedure Laterality Date    APPENDECTOMY      CARPAL TUNNEL RELEASE      bilateral     CERVICAL FUSION      s/p trampoline accident    EPIDURAL STEROID INJECTION Left 5/15/2019    EPIDURAL STEROID INJECTION LEFT L5S1 performed by Dany Fan MD at [address]  05/15/2019    EPIDURAL STEROID INJECTION LEFT L5S1 (Left Back)    TONSILLECTOMY       Family History   Problem Relation Age of Onset   [place] Breast Cancer Mother         s/p surgical complications    Diabetes Mother     Heart Failure Father     Other Brother         \"bad back\"     Current Outpatient Medications on File Prior to Visit   Medication Sig Dispense Refill    tiZANidine (ZANAFLEX) 4 MG tablet Take 1 tablet by mouth nightly 30 tablet 0    STOOL SOFTENER 100 MG capsule TAKE ONE CAPSULE BY MOUTH TWICE A DAY AS NEEDED FOR CONSTIPATION 60 capsule 5    hydroxyurea (HYDREA) 500 MG chemo capsule Take 1 capsule by mouth 2 times daily 60 capsule 2    doxycycline monohydrate (MONODOX) 100 MG capsule Take 1 capsule by mouth 2 times daily (with meals) Avoid calcium, mtv's and dairy 2 hours before and after 20 capsule 0    albuterol sulfate  (90 Base) MCG/ACT inhaler Inhale 2 puffs into the lungs every 4 hours as needed for Wheezing or Shortness of Breath 1 Inhaler 0    Elastic Bandages & Supports (LUMBAR BACK BRACE/SUPPORT PAD) MISC 1 Units by Does not apply route daily PNEUMATIC OFFLOADING  BACK BRACE 1 each 0    amitriptyline (ELAVIL) 25 MG tablet Take 1 tab (25 mg) at bedtime for 1 week, then 2 tabs (50 mg) for 1 week, then 3 tabs (75 mg) for 1 week, then 4 tabs (100 mg) thereafter 120 tablet 1    aspirin 81 MG tablet Take 81 mg by mouth daily      meloxicam (MOBIC) 15 MG tablet Take 1 tablet by mouth daily 30 tablet 0    gabapentin (NEURONTIN) 300 MG capsule Take 2 capsules by mouth 3 times daily for 30 days. 180 capsule 5     No current facility-administered medications on file prior to visit. Social History     Tobacco Use    Smoking status: Current Every Day Smoker     Packs/day: 1.00     Years: 40.00     Pack years: 40.00     Types: Cigarettes    Smokeless tobacco: Never Used    Tobacco comment: less than 1 ppd    Substance Use Topics    Alcohol use: No    Drug use: Yes     Types: Marijuana       No Known Allergies    Review of Systems  Constitutional: Negative for activity change and appetite change. HENT: Negative for ear pain and facial swelling. Eyes: Negative for discharge and itching. Respiratory: Negative for choking and chest tightness. Cardiovascular: Negative for chest pain and leg swelling. Gastrointestinal: Negative for nausea and abdominal pain. Endocrine: Negative for cold intolerance and heat intolerance.    Genitourinary: Negative for frequency and flank pain. Musculoskeletal: Negative for myalgias and joint swelling. Skin: Negative for rash and wound. Allergic/Immunologic: Negative for environmental allergies and food allergies. Hematological: Negative for adenopathy. Does not bruise/bleed easily. Psychiatric/Behavioral: Negative for self-injury. The patient is not nervous/anxious. Physical Exam:      BP (!) 158/103 (Site: Right Upper Arm, Position: Sitting, Cuff Size: Medium Adult)   Pulse 81   Wt 203 lb (92.1 kg)   BMI 26.78 kg/m²   Estimated body mass index is 26.78 kg/m² as calculated from the following:    Height as of 6/14/19: 6' 1\" (1.854 m). Weight as of this encounter: 203 lb (92.1 kg). General:  Yumiko Rush is a 63y.o. year old male who appears his stated age. HEENT: Normocephalic atraumatic. Neck supple. Chest: regular rate; pulses equal  Abdomen: Soft nontender nondistended. Normoactive bowel sounds. Ext: DP and PT pulses 2+, good cap refill  Neuro    Mentation  Appropriate affect  Registration intact  Orientation intact  3 item recall intact  Judgement intact to situation    Cranial Nerves:   Pupils equal and reactive to light  Extraocular motion intact  Face and shrug symmetric  Tongue midline  No dysarthria  v1-3 sensation symmetric, masseter tone symmetric  Hearing symmetric and intact to finger rub    Sensation:   Intact    Motor  L deltoid 5/5; R deltoid 5/5  L biceps 5/5; R biceps 5/5  L triceps 5/5; R triceps 5/5  L wrist extension 5/5; R wrist extension 5/5  L intrinsics 5/5; R intrinsics 5/5     L iliopsoas 5/5 , R iliopsoas 5/5  L quadriceps 5/5; R quadriceps 5/5  L Dorsiflexion 5/5; R dorsiflexion 5/5  L Plantarflexion 5/5; R plantarflexion 5/5  L EHL 5/5; R EHL 5/5    Reflexes  Hyperreflexia 3 out of 4 bilateral biceps triceps brachioradialis and patellas.   Positive Hanks's bilateral    hoffmans L: neg  hoffmans R: neg  Clonus L: neg  Clonus R: neg  Babinski L: up  Babinski R; up    Studies Review:     MRI cervical spine showing significant stenosis at C3-4 with some disease at 4 5    Assessment and Plan:      1. Lumbar spondylosis    2. Cervical spondylosis with radiculopathy          Plan: Patient has significant central stenosis at C3-4 with cord distortion and signal change. Considering the progressive myelopathic symptoms I do believe that this warrants anterior decompression fusion at C3-4. I explained the patient that there is no role for treatment of the pain in the axial neck or back. This is meant to decompress his cord considering he is having neuropathic symptoms consistent with myelopathy. Risks include hoarseness dysphasia spinal cord injury nerve injury bleeding stroke. Patient comprehends the procedure risks and would like to move forward    Followup: No follow-ups on file. Prescriptions Ordered:  No orders of the defined types were placed in this encounter. Orders Placed:  No orders of the defined types were placed in this encounter. Electronically signed by True Valdez DO on 6/19/2019 at 10:24 AM    Please note that this chart was generated using voice recognition Dragon dictation software. Although every effort was made to ensure the accuracy of this automated transcription, some errors in transcription may have occurred.

## 2019-06-28 NOTE — SEDATION DOCUMENTATION
SEDATION NOTE:    ASA CLASSIFICATION  2  MP   CLASSIFICATION  3    · Moderate intravenous conscious sedation was supervised by Dr. Scott Martinez  . The patient was independently monitored by a Registered Nurse assigned to the Procedure Room  . Monitoring included automated blood pressure, continuous EKG, Capnography and continuous pulse oximetry. . The detailed Conscious Record is permanently stored in the [place] [id number].      The following is the conscious sedation record;  Start Time:  1156  End times:  1205  Duration:  9 minutes  MEDS GIVEN 2 MG VERSED  MCG FENTANYL

## 2019-07-09 ENCOUNTER — TELEPHONE (OUTPATIENT)
Dept: NEUROSURGERY | Age: 59
End: 2019-07-09

## 2019-07-09 DIAGNOSIS — M48.02 CERVICAL STENOSIS OF SPINAL CANAL: Primary | ICD-10-CM

## 2019-07-15 ENCOUNTER — TELEPHONE (OUTPATIENT)
Dept: NEUROSURGERY | Age: 59
End: 2019-07-15

## 2019-07-15 DIAGNOSIS — M48.02 CERVICAL STENOSIS OF SPINE: Primary | ICD-10-CM

## 2019-07-15 RX ORDER — LIDOCAINE 50 MG/G
1 PATCH TOPICAL DAILY
Qty: 30 PATCH | Refills: 0 | Status: ON HOLD | OUTPATIENT
Start: 2019-07-15 | End: 2019-08-12

## 2019-07-16 ENCOUNTER — TELEPHONE (OUTPATIENT)
Dept: FAMILY MEDICINE CLINIC | Age: 59
End: 2019-07-16

## 2019-07-16 ENCOUNTER — TELEPHONE (OUTPATIENT)
Dept: NEUROSURGERY | Age: 59
End: 2019-07-16

## 2019-07-16 NOTE — TELEPHONE ENCOUNTER
Pt's daughter called the office apologizing for [place] . She stated they could keep the 9/5/19 surgery date if still available. Pt is scheduled for 9/5/19 at 7:30 a.m.

## 2019-07-16 NOTE — TELEPHONE ENCOUNTER
Patient received a phone call from his neurosurgeon stating that they have to post pone his surgery until September. Patient asked to see another surgeon because he cant handle the neck pain, they are transferring his records to another surgeon. He is wanting to know if you can give him any pain medication to help him until surgery. He is being very rude and demanding on the phone because he is so \"pissed off\" that he cant get any pain medication because of the   \"Heroin addicts\" that are ruining this for him because he is \"not an addict\" . ......... Priscila Peaks

## 2019-07-18 ENCOUNTER — TELEPHONE (OUTPATIENT)
Dept: PAIN MANAGEMENT | Age: 59
End: 2019-07-18

## 2019-07-19 ENCOUNTER — TELEPHONE (OUTPATIENT)
Dept: ONCOLOGY | Age: 59
End: 2019-07-19

## 2019-08-02 ENCOUNTER — HOSPITAL ENCOUNTER (EMERGENCY)
Age: 59
Discharge: HOME | End: 2019-08-02
Attending: EMERGENCY MEDICINE
Payer: COMMERCIAL

## 2019-08-02 VITALS
WEIGHT: 210 LBS | OXYGEN SATURATION: 97 % | BODY MASS INDEX: 27.83 KG/M2 | HEART RATE: 77 BPM | RESPIRATION RATE: 13 BRPM | SYSTOLIC BLOOD PRESSURE: 117 MMHG | TEMPERATURE: 97.9 F | DIASTOLIC BLOOD PRESSURE: 84 MMHG | HEIGHT: 73 IN

## 2019-08-02 DIAGNOSIS — R41.0 CONFUSION: Primary | ICD-10-CM

## 2019-08-02 DIAGNOSIS — R53.1 GENERAL WEAKNESS: ICD-10-CM

## 2019-08-02 LAB
-: ABNORMAL
ABSOLUTE EOS #: 0.19 K/UL (ref 0–0.44)
ABSOLUTE IMMATURE GRANULOCYTE: 0.12 K/UL (ref 0–0.3)
ABSOLUTE LYMPH #: 3.23 K/UL (ref 1.1–3.7)
ABSOLUTE MONO #: 0.73 K/UL (ref 0.1–1.2)
ACETAMINOPHEN LEVEL: <10 UG/ML (ref 10–30)
ALBUMIN SERPL-MCNC: 4.6 G/DL (ref 3.5–5.2)
ALBUMIN/GLOBULIN RATIO: ABNORMAL (ref 1–2.5)
ALP BLD-CCNC: 75 U/L (ref 40–129)
ALT SERPL-CCNC: 8 U/L (ref 5–41)
AMORPHOUS: ABNORMAL
AMPHETAMINE SCREEN URINE: NEGATIVE
ANION GAP SERPL CALCULATED.3IONS-SCNC: 15 MMOL/L (ref 9–17)
AST SERPL-CCNC: 18 U/L
BACTERIA: ABNORMAL
BARBITURATE SCREEN URINE: NEGATIVE
BASOPHILS # BLD: 2 % (ref 0–2)
BASOPHILS ABSOLUTE: 0.2 K/UL (ref 0–0.2)
BENZODIAZEPINE SCREEN, URINE: NEGATIVE
BILIRUB SERPL-MCNC: 0.26 MG/DL (ref 0.3–1.2)
BILIRUBIN DIRECT: 0.09 MG/DL
BILIRUBIN URINE: NEGATIVE
BILIRUBIN, INDIRECT: 0.17 MG/DL (ref 0–1)
BUN BLDV-MCNC: 12 MG/DL (ref 6–20)
BUN/CREAT BLD: 14 (ref 9–20)
BUPRENORPHINE URINE: ABNORMAL
CALCIUM SERPL-MCNC: 9.1 MG/DL (ref 8.6–10.4)
CANNABINOID SCREEN URINE: POSITIVE
CASTS UA: ABNORMAL /LPF
CHLORIDE BLD-SCNC: 98 MMOL/L (ref 98–107)
CO2: 24 MMOL/L (ref 20–31)
COCAINE METABOLITE, URINE: NEGATIVE
COLOR: YELLOW
COMMENT UA: ABNORMAL
CREAT SERPL-MCNC: 0.83 MG/DL (ref 0.7–1.2)
CRYSTALS, UA: ABNORMAL /HPF
DIFFERENTIAL TYPE: ABNORMAL
EOSINOPHILS RELATIVE PERCENT: 2 % (ref 1–4)
EPITHELIAL CELLS UA: ABNORMAL /HPF (ref 0–5)
ETHANOL PERCENT: 0.25 %
ETHANOL: 254 MG/DL
GFR AFRICAN AMERICAN: >60 ML/MIN
GFR NON-AFRICAN AMERICAN: >60 ML/MIN
GFR SERPL CREATININE-BSD FRML MDRD: NORMAL ML/MIN/{1.73_M2}
GFR SERPL CREATININE-BSD FRML MDRD: NORMAL ML/MIN/{1.73_M2}
GLOBULIN: ABNORMAL G/DL (ref 1.5–3.8)
GLUCOSE BLD-MCNC: 96 MG/DL (ref 70–99)
GLUCOSE URINE: NEGATIVE
HCT VFR BLD CALC: 43.6 % (ref 40.7–50.3)
HEMOGLOBIN: 15.3 G/DL (ref 13–17)
IMMATURE GRANULOCYTES: 1 %
KETONES, URINE: NEGATIVE
LEUKOCYTE ESTERASE, URINE: NEGATIVE
LYMPHOCYTES # BLD: 33 % (ref 24–43)
MAGNESIUM: 2.3 MG/DL (ref 1.6–2.6)
MCH RBC QN AUTO: 35.5 PG (ref 25.2–33.5)
MCHC RBC AUTO-ENTMCNC: 35.1 G/DL (ref 28.4–34.8)
MCV RBC AUTO: 101.2 FL (ref 82.6–102.9)
MDMA URINE: ABNORMAL
METHADONE SCREEN, URINE: NEGATIVE
METHAMPHETAMINE, URINE: ABNORMAL
MONOCYTES # BLD: 8 % (ref 3–12)
MUCUS: ABNORMAL
NITRITE, URINE: NEGATIVE
NRBC AUTOMATED: 0 PER 100 WBC
OPIATES, URINE: NEGATIVE
OTHER OBSERVATIONS UA: ABNORMAL
OXYCODONE SCREEN URINE: NEGATIVE
PDW BLD-RTO: 14.6 % (ref 11.8–14.4)
PH UA: 5.5 (ref 5–8)
PHENCYCLIDINE, URINE: NEGATIVE
PLATELET # BLD: 220 K/UL (ref 138–453)
PLATELET ESTIMATE: ABNORMAL
PMV BLD AUTO: 9.3 FL (ref 8.1–13.5)
POTASSIUM SERPL-SCNC: 4.1 MMOL/L (ref 3.7–5.3)
PROPOXYPHENE, URINE: ABNORMAL
PROTEIN UA: NEGATIVE
RBC # BLD: 4.31 M/UL (ref 4.21–5.77)
RBC # BLD: ABNORMAL 10*6/UL
RBC UA: ABNORMAL /HPF (ref 0–2)
RENAL EPITHELIAL, UA: ABNORMAL /HPF
SALICYLATE LEVEL: <1 MG/DL (ref 3–10)
SEG NEUTROPHILS: 54 % (ref 36–65)
SEGMENTED NEUTROPHILS ABSOLUTE COUNT: 5.31 K/UL (ref 1.5–8.1)
SODIUM BLD-SCNC: 137 MMOL/L (ref 135–144)
SPECIFIC GRAVITY UA: 1.01 (ref 1–1.03)
TEST INFORMATION: ABNORMAL
TOTAL PROTEIN: 7.6 G/DL (ref 6.4–8.3)
TOXIC TRICYCLIC SC,BLOOD: NEGATIVE
TRICHOMONAS: ABNORMAL
TRICYCLIC ANTIDEPRESSANTS, UR: ABNORMAL
TURBIDITY: CLEAR
URINE HGB: ABNORMAL
UROBILINOGEN, URINE: NORMAL
WBC # BLD: 9.8 K/UL (ref 3.5–11.3)
WBC # BLD: ABNORMAL 10*3/UL
WBC UA: ABNORMAL /HPF (ref 0–5)
YEAST: ABNORMAL

## 2019-08-02 PROCEDURE — 6360000002 HC RX W HCPCS: Performed by: NURSE PRACTITIONER

## 2019-08-02 PROCEDURE — 99284 EMERGENCY DEPT VISIT MOD MDM: CPT

## 2019-08-02 PROCEDURE — 2580000003 HC RX 258: Performed by: NURSE PRACTITIONER

## 2019-08-02 PROCEDURE — 80076 HEPATIC FUNCTION PANEL: CPT

## 2019-08-02 PROCEDURE — 36415 COLL VENOUS BLD VENIPUNCTURE: CPT

## 2019-08-02 PROCEDURE — 81001 URINALYSIS AUTO W/SCOPE: CPT

## 2019-08-02 PROCEDURE — 85025 COMPLETE CBC W/AUTO DIFF WBC: CPT

## 2019-08-02 PROCEDURE — 80048 BASIC METABOLIC PNL TOTAL CA: CPT

## 2019-08-02 PROCEDURE — 80307 DRUG TEST PRSMV CHEM ANLYZR: CPT

## 2019-08-02 PROCEDURE — 6370000000 HC RX 637 (ALT 250 FOR IP): Performed by: NURSE PRACTITIONER

## 2019-08-02 PROCEDURE — G0480 DRUG TEST DEF 1-7 CLASSES: HCPCS

## 2019-08-02 PROCEDURE — 96365 THER/PROPH/DIAG IV INF INIT: CPT

## 2019-08-02 PROCEDURE — 83735 ASSAY OF MAGNESIUM: CPT

## 2019-08-02 RX ORDER — 0.9 % SODIUM CHLORIDE 0.9 %
1000 INTRAVENOUS SOLUTION INTRAVENOUS ONCE
Status: COMPLETED | OUTPATIENT
Start: 2019-08-02 | End: 2019-08-02

## 2019-08-02 RX ORDER — FOLIC ACID 1 MG/1
1 TABLET ORAL DAILY
Status: DISCONTINUED | OUTPATIENT
Start: 2019-08-02 | End: 2019-08-03 | Stop reason: HOSPADM

## 2019-08-02 RX ADMIN — FOLIC ACID 1 MG: 1 TABLET ORAL at 20:38

## 2019-08-02 RX ADMIN — SODIUM CHLORIDE 1000 ML: 9 INJECTION, SOLUTION INTRAVENOUS at 20:38

## 2019-08-02 RX ADMIN — THIAMINE HYDROCHLORIDE 100 MG: 100 INJECTION, SOLUTION INTRAMUSCULAR; INTRAVENOUS at 21:25

## 2019-08-03 ASSESSMENT — ENCOUNTER SYMPTOMS
COUGH: 0
SORE THROAT: 0
SINUS PRESSURE: 0
ABDOMINAL PAIN: 0
WHEEZING: 0
SHORTNESS OF BREATH: 0
VOMITING: 0
NAUSEA: 0
DIARRHEA: 0
COLOR CHANGE: 0
RHINORRHEA: 0
CONSTIPATION: 0

## 2019-08-05 ENCOUNTER — TELEPHONE (OUTPATIENT)
Dept: FAMILY MEDICINE CLINIC | Age: 59
End: 2019-08-05

## 2019-08-08 ENCOUNTER — HOSPITAL ENCOUNTER (OUTPATIENT)
Facility: MEDICAL CENTER | Age: 59
End: 2019-08-08
Payer: COMMERCIAL

## 2019-08-09 ENCOUNTER — TELEPHONE (OUTPATIENT)
Dept: FAMILY MEDICINE CLINIC | Age: 59
End: 2019-08-09

## 2019-08-11 ENCOUNTER — HOSPITAL ENCOUNTER (INPATIENT)
Age: 59
LOS: 1 days | Discharge: PSYCHIATRIC OR SUBSTANCE USE FACILITY | DRG: 812 | End: 2019-08-12
Attending: EMERGENCY MEDICINE | Admitting: INTERNAL MEDICINE
Payer: COMMERCIAL

## 2019-08-11 DIAGNOSIS — T50.902A INTENTIONAL DRUG OVERDOSE, INITIAL ENCOUNTER (HCC): Primary | ICD-10-CM

## 2019-08-11 PROBLEM — T50.901A POLYSUBSTANCE OVERDOSE: Status: ACTIVE | Noted: 2019-08-11

## 2019-08-11 LAB
ABSOLUTE EOS #: 0.12 K/UL (ref 0–0.44)
ABSOLUTE IMMATURE GRANULOCYTE: 0.13 K/UL (ref 0–0.3)
ABSOLUTE LYMPH #: 2.48 K/UL (ref 1.1–3.7)
ABSOLUTE MONO #: 0.77 K/UL (ref 0.1–1.2)
ACETAMINOPHEN LEVEL: <5 UG/ML (ref 10–30)
ALBUMIN SERPL-MCNC: 4.1 G/DL (ref 3.5–5.2)
ALBUMIN/GLOBULIN RATIO: 1.4 (ref 1–2.5)
ALP BLD-CCNC: 76 U/L (ref 40–129)
ALT SERPL-CCNC: 9 U/L (ref 5–41)
ANION GAP SERPL CALCULATED.3IONS-SCNC: 15 MMOL/L (ref 9–17)
AST SERPL-CCNC: 22 U/L
BASOPHILS # BLD: 2 % (ref 0–2)
BASOPHILS ABSOLUTE: 0.19 K/UL (ref 0–0.2)
BILIRUB SERPL-MCNC: 0.42 MG/DL (ref 0.3–1.2)
BUN BLDV-MCNC: 16 MG/DL (ref 6–20)
BUN/CREAT BLD: ABNORMAL (ref 9–20)
CALCIUM SERPL-MCNC: 8.5 MG/DL (ref 8.6–10.4)
CHLORIDE BLD-SCNC: 102 MMOL/L (ref 98–107)
CO2: 18 MMOL/L (ref 20–31)
CREAT SERPL-MCNC: 1.07 MG/DL (ref 0.7–1.2)
DIFFERENTIAL TYPE: ABNORMAL
EOSINOPHILS RELATIVE PERCENT: 1 % (ref 1–4)
ETHANOL PERCENT: 0.06 %
ETHANOL: 61 MG/DL
GFR AFRICAN AMERICAN: >60 ML/MIN
GFR NON-AFRICAN AMERICAN: >60 ML/MIN
GFR SERPL CREATININE-BSD FRML MDRD: ABNORMAL ML/MIN/{1.73_M2}
GFR SERPL CREATININE-BSD FRML MDRD: ABNORMAL ML/MIN/{1.73_M2}
GLUCOSE BLD-MCNC: 93 MG/DL (ref 70–99)
HCT VFR BLD CALC: 39.5 % (ref 40.7–50.3)
HEMOGLOBIN: 13.2 G/DL (ref 13–17)
IMMATURE GRANULOCYTES: 1 %
LYMPHOCYTES # BLD: 24 % (ref 24–43)
MCH RBC QN AUTO: 34.6 PG (ref 25.2–33.5)
MCHC RBC AUTO-ENTMCNC: 33.4 G/DL (ref 28.4–34.8)
MCV RBC AUTO: 103.7 FL (ref 82.6–102.9)
MONOCYTES # BLD: 7 % (ref 3–12)
NRBC AUTOMATED: 0 PER 100 WBC
PDW BLD-RTO: 14.9 % (ref 11.8–14.4)
PLATELET # BLD: 188 K/UL (ref 138–453)
PLATELET ESTIMATE: ABNORMAL
PMV BLD AUTO: 9.7 FL (ref 8.1–13.5)
POTASSIUM SERPL-SCNC: 4.3 MMOL/L (ref 3.7–5.3)
RBC # BLD: 3.81 M/UL (ref 4.21–5.77)
RBC # BLD: ABNORMAL 10*6/UL
SALICYLATE LEVEL: <1 MG/DL (ref 3–10)
SEG NEUTROPHILS: 65 % (ref 36–65)
SEGMENTED NEUTROPHILS ABSOLUTE COUNT: 6.8 K/UL (ref 1.5–8.1)
SODIUM BLD-SCNC: 135 MMOL/L (ref 135–144)
TOTAL PROTEIN: 7.1 G/DL (ref 6.4–8.3)
TOXIC TRICYCLIC SC,BLOOD: NEGATIVE
TROPONIN INTERP: NORMAL
TROPONIN INTERP: NORMAL
TROPONIN T: NORMAL NG/ML
TROPONIN T: NORMAL NG/ML
TROPONIN, HIGH SENSITIVITY: 12 NG/L (ref 0–22)
TROPONIN, HIGH SENSITIVITY: 14 NG/L (ref 0–22)
WBC # BLD: 10.5 K/UL (ref 3.5–11.3)
WBC # BLD: ABNORMAL 10*3/UL

## 2019-08-11 PROCEDURE — 2580000003 HC RX 258: Performed by: STUDENT IN AN ORGANIZED HEALTH CARE EDUCATION/TRAINING PROGRAM

## 2019-08-11 PROCEDURE — 96365 THER/PROPH/DIAG IV INF INIT: CPT

## 2019-08-11 PROCEDURE — 93005 ELECTROCARDIOGRAM TRACING: CPT | Performed by: STUDENT IN AN ORGANIZED HEALTH CARE EDUCATION/TRAINING PROGRAM

## 2019-08-11 PROCEDURE — G0378 HOSPITAL OBSERVATION PER HR: HCPCS

## 2019-08-11 PROCEDURE — 6360000002 HC RX W HCPCS: Performed by: STUDENT IN AN ORGANIZED HEALTH CARE EDUCATION/TRAINING PROGRAM

## 2019-08-11 PROCEDURE — G0480 DRUG TEST DEF 1-7 CLASSES: HCPCS

## 2019-08-11 PROCEDURE — 85025 COMPLETE CBC W/AUTO DIFF WBC: CPT

## 2019-08-11 PROCEDURE — 2580000003 HC RX 258: Performed by: INTERNAL MEDICINE

## 2019-08-11 PROCEDURE — 6370000000 HC RX 637 (ALT 250 FOR IP): Performed by: INTERNAL MEDICINE

## 2019-08-11 PROCEDURE — 80307 DRUG TEST PRSMV CHEM ANLYZR: CPT

## 2019-08-11 PROCEDURE — 84484 ASSAY OF TROPONIN QUANT: CPT

## 2019-08-11 PROCEDURE — 80053 COMPREHEN METABOLIC PANEL: CPT

## 2019-08-11 PROCEDURE — 99291 CRITICAL CARE FIRST HOUR: CPT

## 2019-08-11 RX ORDER — SODIUM CHLORIDE 0.9 % (FLUSH) 0.9 %
10 SYRINGE (ML) INJECTION PRN
Status: DISCONTINUED | OUTPATIENT
Start: 2019-08-11 | End: 2019-08-12 | Stop reason: HOSPADM

## 2019-08-11 RX ORDER — ALBUTEROL SULFATE 90 UG/1
2 AEROSOL, METERED RESPIRATORY (INHALATION) EVERY 4 HOURS PRN
Status: DISCONTINUED | OUTPATIENT
Start: 2019-08-11 | End: 2019-08-12 | Stop reason: HOSPADM

## 2019-08-11 RX ORDER — ONDANSETRON 2 MG/ML
4 INJECTION INTRAMUSCULAR; INTRAVENOUS EVERY 6 HOURS PRN
Status: DISCONTINUED | OUTPATIENT
Start: 2019-08-11 | End: 2019-08-12 | Stop reason: HOSPADM

## 2019-08-11 RX ORDER — MELOXICAM 7.5 MG/1
15 TABLET ORAL DAILY
Status: DISCONTINUED | OUTPATIENT
Start: 2019-08-11 | End: 2019-08-12 | Stop reason: HOSPADM

## 2019-08-11 RX ORDER — ASPIRIN 81 MG/1
81 TABLET, CHEWABLE ORAL DAILY
Status: DISCONTINUED | OUTPATIENT
Start: 2019-08-11 | End: 2019-08-12 | Stop reason: HOSPADM

## 2019-08-11 RX ORDER — MAGNESIUM SULFATE 1 G/100ML
1 INJECTION INTRAVENOUS ONCE
Status: COMPLETED | OUTPATIENT
Start: 2019-08-11 | End: 2019-08-11

## 2019-08-11 RX ORDER — DOCUSATE SODIUM 100 MG/1
100 CAPSULE, LIQUID FILLED ORAL DAILY
Status: DISCONTINUED | OUTPATIENT
Start: 2019-08-11 | End: 2019-08-12 | Stop reason: HOSPADM

## 2019-08-11 RX ORDER — LIDOCAINE 4 G/G
1 PATCH TOPICAL DAILY
Status: DISCONTINUED | OUTPATIENT
Start: 2019-08-11 | End: 2019-08-12 | Stop reason: HOSPADM

## 2019-08-11 RX ORDER — ACETAMINOPHEN 325 MG/1
650 TABLET ORAL EVERY 4 HOURS PRN
Status: DISCONTINUED | OUTPATIENT
Start: 2019-08-11 | End: 2019-08-12 | Stop reason: HOSPADM

## 2019-08-11 RX ORDER — HYDROXYUREA 500 MG/1
500 CAPSULE ORAL 2 TIMES DAILY
Status: DISCONTINUED | OUTPATIENT
Start: 2019-08-11 | End: 2019-08-12 | Stop reason: HOSPADM

## 2019-08-11 RX ORDER — NICOTINE 21 MG/24HR
1 PATCH, TRANSDERMAL 24 HOURS TRANSDERMAL DAILY PRN
Status: DISCONTINUED | OUTPATIENT
Start: 2019-08-11 | End: 2019-08-12 | Stop reason: HOSPADM

## 2019-08-11 RX ORDER — SODIUM CHLORIDE 0.9 % (FLUSH) 0.9 %
10 SYRINGE (ML) INJECTION EVERY 12 HOURS SCHEDULED
Status: DISCONTINUED | OUTPATIENT
Start: 2019-08-11 | End: 2019-08-12 | Stop reason: HOSPADM

## 2019-08-11 RX ORDER — 0.9 % SODIUM CHLORIDE 0.9 %
1000 INTRAVENOUS SOLUTION INTRAVENOUS ONCE
Status: COMPLETED | OUTPATIENT
Start: 2019-08-11 | End: 2019-08-11

## 2019-08-11 RX ADMIN — HYDROXYUREA 500 MG: 500 CAPSULE ORAL at 23:00

## 2019-08-11 RX ADMIN — DOCUSATE SODIUM 100 MG: 100 CAPSULE, LIQUID FILLED ORAL at 23:00

## 2019-08-11 RX ADMIN — MAGNESIUM SULFATE HEPTAHYDRATE 1 G: 1 INJECTION, SOLUTION INTRAVENOUS at 16:59

## 2019-08-11 RX ADMIN — MELOXICAM 15 MG: 7.5 TABLET ORAL at 23:06

## 2019-08-11 RX ADMIN — ASPIRIN 81 MG 81 MG: 81 TABLET ORAL at 23:00

## 2019-08-11 RX ADMIN — SODIUM CHLORIDE 1000 ML: 9 INJECTION, SOLUTION INTRAVENOUS at 18:07

## 2019-08-11 RX ADMIN — SODIUM CHLORIDE, PRESERVATIVE FREE 10 ML: 5 INJECTION INTRAVENOUS at 23:09

## 2019-08-11 ASSESSMENT — PAIN DESCRIPTION - DESCRIPTORS
DESCRIPTORS: THROBBING

## 2019-08-11 ASSESSMENT — PAIN DESCRIPTION - PAIN TYPE
TYPE: CHRONIC PAIN

## 2019-08-11 ASSESSMENT — PAIN DESCRIPTION - ONSET
ONSET: ON-GOING

## 2019-08-11 ASSESSMENT — PAIN DESCRIPTION - LOCATION
LOCATION: NECK

## 2019-08-11 ASSESSMENT — PAIN SCALES - GENERAL
PAINLEVEL_OUTOF10: 8
PAINLEVEL_OUTOF10: 0

## 2019-08-11 ASSESSMENT — PAIN DESCRIPTION - FREQUENCY
FREQUENCY: INTERMITTENT
FREQUENCY: INTERMITTENT
FREQUENCY: CONTINUOUS

## 2019-08-11 NOTE — ED PROVIDER NOTES
3/6/19  Yes Romero Shay MD   aspirin 81 MG tablet Take 81 mg by mouth daily   Yes Historical Provider, MD   lidocaine (LIDODERM) 5 % Place 1 patch onto the skin daily 12 hours on, 12 hours off. 7/15/19 8/14/19  Tae Gates DO   meloxicam (MOBIC) 15 MG tablet Take 1 tablet by mouth daily 6/14/19 7/14/19  Angelo Stockton MD   doxycycline monohydrate (MONODOX) 100 MG capsule Take 1 capsule by mouth 2 times daily (with meals) Avoid calcium, mtv's and dairy 2 hours before and after 5/8/19   Marylee Roe, APRN - CNP   Elastic Bandages & Supports (LUMBAR BACK BRACE/SUPPORT PAD) MISC 1 Units by Does not apply route daily PNEUMATIC OFFLOADING  BACK BRACE 5/3/19 5/2/20  Angelo Stockton MD       REVIEW OF SYSTEMS    (2-9 systems for level 4, 10 or more for level 5)      Review of Systems   Constitutional: Positive for fatigue. Negative for chills and fever. HENT: Negative for congestion, rhinorrhea and sore throat. Eyes: Negative for visual disturbance. Respiratory: Negative for cough and shortness of breath. Cardiovascular: Negative for chest pain. Gastrointestinal: Negative for abdominal pain, constipation, diarrhea, nausea and vomiting. Genitourinary: Negative for dysuria, frequency and hematuria. Musculoskeletal: Negative for back pain and neck pain. Skin: Negative for rash and wound. Neurological: Negative for weakness, numbness and headaches. Psychiatric/Behavioral: Positive for suicidal ideas. PHYSICAL EXAM   (up to 7 for level 4, 8 or more for level 5)      INITIAL VITALS:   /69   Pulse 54   Temp 97.3 °F (36.3 °C) (Oral)   Resp 17   Ht 6' 1\" (1.854 m)   Wt 203 lb 1.6 oz (92.1 kg)   SpO2 97%   BMI 26.80 kg/m²     Physical Exam   Constitutional: He is oriented to person, place, and time. He appears well-developed and well-nourished. Drowsy   HENT:   Head: Normocephalic and atraumatic. Eyes: Pupils are equal, round, and reactive to light.  EOM are normal.   Neck: aspirin chewable tablet 81 mg    hydroxyurea (HYDREA) chemo capsule 500 mg    lidocaine 4 % external patch 1 patch    meloxicam (MOBIC) tablet 15 mg    docusate sodium (COLACE) capsule 100 mg    sodium chloride flush 0.9 % injection 10 mL    sodium chloride flush 0.9 % injection 10 mL    magnesium hydroxide (MILK OF MAGNESIA) 400 MG/5ML suspension 30 mL    ondansetron (ZOFRAN) injection 4 mg    nicotine (NICODERM CQ) 21 MG/24HR 1 patch    enoxaparin (LOVENOX) injection 40 mg     Hold if concern for overdose on any type of anticoagulant medication.     acetaminophen (TYLENOL) tablet 650 mg     DO NOT ADMINISTER IF PATIENT OVERDOSED ON ACETAMINOPHEN/TYLENOL OR PRODUCTS THAT MAY CONTAIN ACETAMINOPHEN/TYLENOL    0.9 % sodium chloride bolus       DDX: Malingering, overdose, coingestion, alcohol intoxication    DIAGNOSTIC RESULTS / EMERGENCY DEPARTMENT COURSE / MDM     LABS:  Results for orders placed or performed during the hospital encounter of 08/11/19   CBC Auto Differential   Result Value Ref Range    WBC 10.5 3.5 - 11.3 k/uL    RBC 3.81 (L) 4.21 - 5.77 m/uL    Hemoglobin 13.2 13.0 - 17.0 g/dL    Hematocrit 39.5 (L) 40.7 - 50.3 %    .7 (H) 82.6 - 102.9 fL    MCH 34.6 (H) 25.2 - 33.5 pg    MCHC 33.4 28.4 - 34.8 g/dL    RDW 14.9 (H) 11.8 - 14.4 %    Platelets 490 667 - 690 k/uL    MPV 9.7 8.1 - 13.5 fL    NRBC Automated 0.0 0.0 per 100 WBC    Differential Type NOT REPORTED     Seg Neutrophils 65 36 - 65 %    Lymphocytes 24 24 - 43 %    Monocytes 7 3 - 12 %    Eosinophils % 1 1 - 4 %    Basophils 2 0 - 2 %    Immature Granulocytes 1 (H) 0 %    Segs Absolute 6.80 1.50 - 8.10 k/uL    Absolute Lymph # 2.48 1.10 - 3.70 k/uL    Absolute Mono # 0.77 0.10 - 1.20 k/uL    Absolute Eos # 0.12 0.00 - 0.44 k/uL    Basophils # 0.19 0.00 - 0.20 k/uL    Absolute Immature Granulocyte 0.13 0.00 - 0.30 k/uL    WBC Morphology NOT REPORTED     RBC Morphology ANISOCYTOSIS PRESENT     Platelet Estimate NOT REPORTED signs or Co-signs this chart in the absence of a cardiologist.    EMERGENCY DEPARTMENT COURSE:  Patient alert and oriented but drowsy. Blood pressure stable, bradycardic. Patient was given 1 g magnesium for QT prolongation. Discussed with poison control-recommended extended observation period, repeat EKG prior to clearance. Lab work obtained in the emergency department is grossly within normal limits, TCA, salicylate, and Tylenol levels within normal limits. Serial high-sensitivity troponins within normal limits. Ethanol mildly elevated. Blood counts, kidney function, and hepatic function within normal limits. Patient was monitored throughout emergency department stay, remained stable despite bradycardia. Patient was discussed with and admitted to hospitalist service for further work-up and management due to clonidine ingestion and concerns for development of worsening bradycardia and hypotension. PROCEDURES:  None    CONSULTS:  IP CONSULT TO INTERNAL MEDICINE  IP CONSULT TO SOCIAL WORK  IP CONSULT TO PSYCHIATRY    CRITICAL CARE:  None    FINAL IMPRESSION      1. Intentional drug overdose, initial encounter [facility])          [address] / [address] Admitted 08/11/2019 05:36:53 PM      PATIENT REFERRED TO:  Yee Najera, APRN - CNP  [address] [facility]            DISCHARGE MEDICATIONS:  Current Discharge Medication List          Myesha Chapin D.O.   Emergency Medicine Resident    (Please note that portions ofthis note were completed with a voice recognition program.  Efforts were made to edit the dictations but occasionally words are mis-transcribed.)      Myesha Chapin MD  08/12/19 2416

## 2019-08-11 NOTE — ED NOTES
REport called to floor, bed changed and awaiting cleaning. Fluids infusing. Pt given box lunch, resting comfortably in bed in NAD w/ rr even and unalbored. Sitter @ bedside. Will continue to monitor.      Ryan Perez RN  08/11/19 2872

## 2019-08-12 ENCOUNTER — HOSPITAL ENCOUNTER (INPATIENT)
Age: 59
LOS: 8 days | Discharge: HOME | DRG: 751 | End: 2019-08-20
Attending: PSYCHIATRY & NEUROLOGY | Admitting: PSYCHIATRY & NEUROLOGY
Payer: COMMERCIAL

## 2019-08-12 VITALS
BODY MASS INDEX: 26.92 KG/M2 | RESPIRATION RATE: 20 BRPM | HEART RATE: 56 BPM | TEMPERATURE: 97.9 F | WEIGHT: 203.1 LBS | HEIGHT: 73 IN | OXYGEN SATURATION: 99 % | DIASTOLIC BLOOD PRESSURE: 59 MMHG | SYSTOLIC BLOOD PRESSURE: 93 MMHG

## 2019-08-12 PROBLEM — F33.2 SEVERE RECURRENT MAJOR DEPRESSION WITHOUT PSYCHOTIC FEATURES (HCC): Status: ACTIVE | Noted: 2019-08-12

## 2019-08-12 PROBLEM — T50.902A INTENTIONAL DRUG OVERDOSE (HCC): Status: ACTIVE | Noted: 2019-08-11

## 2019-08-12 PROBLEM — F33.2 MAJOR DEPRESSIVE DISORDER, RECURRENT SEVERE WITHOUT PSYCHOTIC FEATURES (HCC): Status: ACTIVE | Noted: 2019-08-12

## 2019-08-12 PROBLEM — F10.10 ALCOHOL ABUSE: Status: ACTIVE | Noted: 2019-08-12

## 2019-08-12 LAB
ALBUMIN SERPL-MCNC: 3.5 G/DL (ref 3.5–5.2)
ALBUMIN/GLOBULIN RATIO: 1.4 (ref 1–2.5)
ALP BLD-CCNC: 78 U/L (ref 40–129)
ALT SERPL-CCNC: 8 U/L (ref 5–41)
AMPHETAMINE SCREEN URINE: NEGATIVE
ANION GAP SERPL CALCULATED.3IONS-SCNC: 11 MMOL/L (ref 9–17)
AST SERPL-CCNC: 19 U/L
BARBITURATE SCREEN URINE: NEGATIVE
BENZODIAZEPINE SCREEN, URINE: NEGATIVE
BILIRUB SERPL-MCNC: 0.71 MG/DL (ref 0.3–1.2)
BUN BLDV-MCNC: 14 MG/DL (ref 6–20)
BUN/CREAT BLD: ABNORMAL (ref 9–20)
BUPRENORPHINE URINE: ABNORMAL
CALCIUM SERPL-MCNC: 8.2 MG/DL (ref 8.6–10.4)
CANNABINOID SCREEN URINE: NEGATIVE
CHLORIDE BLD-SCNC: 103 MMOL/L (ref 98–107)
CO2: 23 MMOL/L (ref 20–31)
COCAINE METABOLITE, URINE: POSITIVE
CREAT SERPL-MCNC: 0.91 MG/DL (ref 0.7–1.2)
EKG ATRIAL RATE: 49 BPM
EKG ATRIAL RATE: 50 BPM
EKG P AXIS: 45 DEGREES
EKG P AXIS: 49 DEGREES
EKG P-R INTERVAL: 152 MS
EKG P-R INTERVAL: 152 MS
EKG Q-T INTERVAL: 536 MS
EKG Q-T INTERVAL: 542 MS
EKG QRS DURATION: 84 MS
EKG QRS DURATION: 86 MS
EKG QTC CALCULATION (BAZETT): 484 MS
EKG QTC CALCULATION (BAZETT): 494 MS
EKG R AXIS: 41 DEGREES
EKG R AXIS: 53 DEGREES
EKG T AXIS: 35 DEGREES
EKG T AXIS: 45 DEGREES
EKG VENTRICULAR RATE: 49 BPM
EKG VENTRICULAR RATE: 50 BPM
GFR AFRICAN AMERICAN: >60 ML/MIN
GFR NON-AFRICAN AMERICAN: >60 ML/MIN
GFR SERPL CREATININE-BSD FRML MDRD: ABNORMAL ML/MIN/{1.73_M2}
GFR SERPL CREATININE-BSD FRML MDRD: ABNORMAL ML/MIN/{1.73_M2}
GLUCOSE BLD-MCNC: 94 MG/DL (ref 70–99)
HCT VFR BLD CALC: 36.3 % (ref 40.7–50.3)
HEMOGLOBIN: 12.1 G/DL (ref 13–17)
MAGNESIUM: 2.4 MG/DL (ref 1.6–2.6)
MCH RBC QN AUTO: 34.8 PG (ref 25.2–33.5)
MCHC RBC AUTO-ENTMCNC: 33.3 G/DL (ref 28.4–34.8)
MCV RBC AUTO: 104.3 FL (ref 82.6–102.9)
MDMA URINE: ABNORMAL
METHADONE SCREEN, URINE: NEGATIVE
METHAMPHETAMINE, URINE: ABNORMAL
NRBC AUTOMATED: 0 PER 100 WBC
OPIATES, URINE: NEGATIVE
OXYCODONE SCREEN URINE: NEGATIVE
PDW BLD-RTO: 14.7 % (ref 11.8–14.4)
PHENCYCLIDINE, URINE: NEGATIVE
PLATELET # BLD: 156 K/UL (ref 138–453)
PMV BLD AUTO: 9.6 FL (ref 8.1–13.5)
POTASSIUM SERPL-SCNC: 3.9 MMOL/L (ref 3.7–5.3)
PROPOXYPHENE, URINE: ABNORMAL
RBC # BLD: 3.48 M/UL (ref 4.21–5.77)
SODIUM BLD-SCNC: 137 MMOL/L (ref 135–144)
TEST INFORMATION: ABNORMAL
TOTAL PROTEIN: 6 G/DL (ref 6.4–8.3)
TRICYCLIC ANTIDEPRESSANTS, UR: ABNORMAL
WBC # BLD: 8.1 K/UL (ref 3.5–11.3)

## 2019-08-12 PROCEDURE — 1240000000 HC EMOTIONAL WELLNESS R&B

## 2019-08-12 PROCEDURE — 80307 DRUG TEST PRSMV CHEM ANLYZR: CPT

## 2019-08-12 PROCEDURE — 1200000000 HC SEMI PRIVATE

## 2019-08-12 PROCEDURE — 99222 1ST HOSP IP/OBS MODERATE 55: CPT | Performed by: INTERNAL MEDICINE

## 2019-08-12 PROCEDURE — 93010 ELECTROCARDIOGRAM REPORT: CPT | Performed by: INTERNAL MEDICINE

## 2019-08-12 PROCEDURE — 80053 COMPREHEN METABOLIC PANEL: CPT

## 2019-08-12 PROCEDURE — 90792 PSYCH DIAG EVAL W/MED SRVCS: CPT | Performed by: PSYCHIATRY & NEUROLOGY

## 2019-08-12 PROCEDURE — G0378 HOSPITAL OBSERVATION PER HR: HCPCS

## 2019-08-12 PROCEDURE — 83735 ASSAY OF MAGNESIUM: CPT

## 2019-08-12 PROCEDURE — 36415 COLL VENOUS BLD VENIPUNCTURE: CPT

## 2019-08-12 PROCEDURE — 6360000002 HC RX W HCPCS: Performed by: INTERNAL MEDICINE

## 2019-08-12 PROCEDURE — 6370000000 HC RX 637 (ALT 250 FOR IP): Performed by: INTERNAL MEDICINE

## 2019-08-12 PROCEDURE — 2580000003 HC RX 258: Performed by: INTERNAL MEDICINE

## 2019-08-12 PROCEDURE — 93005 ELECTROCARDIOGRAM TRACING: CPT | Performed by: NURSE PRACTITIONER

## 2019-08-12 PROCEDURE — 85027 COMPLETE CBC AUTOMATED: CPT

## 2019-08-12 RX ORDER — SODIUM CHLORIDE 0.9 % (FLUSH) 0.9 %
10 SYRINGE (ML) INJECTION PRN
Status: CANCELLED | OUTPATIENT
Start: 2019-08-12

## 2019-08-12 RX ORDER — ACETAMINOPHEN 325 MG/1
650 TABLET ORAL EVERY 4 HOURS PRN
Status: CANCELLED | OUTPATIENT
Start: 2019-08-12

## 2019-08-12 RX ORDER — SODIUM CHLORIDE 0.9 % (FLUSH) 0.9 %
10 SYRINGE (ML) INJECTION EVERY 12 HOURS SCHEDULED
Status: CANCELLED | OUTPATIENT
Start: 2019-08-12

## 2019-08-12 RX ORDER — ACETAMINOPHEN 325 MG/1
650 TABLET ORAL EVERY 4 HOURS PRN
Status: DISCONTINUED | OUTPATIENT
Start: 2019-08-12 | End: 2019-08-13 | Stop reason: SDUPTHER

## 2019-08-12 RX ORDER — DOCUSATE SODIUM 100 MG/1
100 CAPSULE, LIQUID FILLED ORAL DAILY
Status: CANCELLED | OUTPATIENT
Start: 2019-08-13

## 2019-08-12 RX ORDER — ALBUTEROL SULFATE 90 UG/1
2 AEROSOL, METERED RESPIRATORY (INHALATION) EVERY 4 HOURS PRN
Status: CANCELLED | OUTPATIENT
Start: 2019-08-12

## 2019-08-12 RX ORDER — BENZTROPINE MESYLATE 1 MG/ML
2 INJECTION INTRAMUSCULAR; INTRAVENOUS 2 TIMES DAILY PRN
Status: DISCONTINUED | OUTPATIENT
Start: 2019-08-12 | End: 2019-08-20 | Stop reason: HOSPADM

## 2019-08-12 RX ORDER — TRAZODONE HYDROCHLORIDE 50 MG/1
50 TABLET ORAL NIGHTLY PRN
Status: CANCELLED | OUTPATIENT
Start: 2019-08-12

## 2019-08-12 RX ORDER — DULOXETIN HYDROCHLORIDE 60 MG/1
60 CAPSULE, DELAYED RELEASE ORAL 2 TIMES DAILY
COMMUNITY
End: 2020-01-10 | Stop reason: ALTCHOICE

## 2019-08-12 RX ORDER — HYDROXYUREA 500 MG/1
500 CAPSULE ORAL 2 TIMES DAILY
Status: CANCELLED | OUTPATIENT
Start: 2019-08-12

## 2019-08-12 RX ORDER — MELOXICAM 7.5 MG/1
15 TABLET ORAL DAILY
Status: CANCELLED | OUTPATIENT
Start: 2019-08-13

## 2019-08-12 RX ORDER — MAGNESIUM HYDROXIDE/ALUMINUM HYDROXICE/SIMETHICONE 120; 1200; 1200 MG/30ML; MG/30ML; MG/30ML
30 SUSPENSION ORAL EVERY 6 HOURS PRN
Status: DISCONTINUED | OUTPATIENT
Start: 2019-08-12 | End: 2019-08-20 | Stop reason: HOSPADM

## 2019-08-12 RX ORDER — PRAZOSIN HYDROCHLORIDE 2 MG/1
2 CAPSULE ORAL NIGHTLY
Status: ON HOLD | COMMUNITY
End: 2019-08-19 | Stop reason: HOSPADM

## 2019-08-12 RX ORDER — MAGNESIUM HYDROXIDE/ALUMINUM HYDROXICE/SIMETHICONE 120; 1200; 1200 MG/30ML; MG/30ML; MG/30ML
30 SUSPENSION ORAL EVERY 6 HOURS PRN
Status: CANCELLED | OUTPATIENT
Start: 2019-08-12

## 2019-08-12 RX ORDER — CHOLECALCIFEROL (VITAMIN D3) 125 MCG
5 CAPSULE ORAL NIGHTLY PRN
COMMUNITY
End: 2019-08-22 | Stop reason: ALTCHOICE

## 2019-08-12 RX ORDER — ASPIRIN 81 MG/1
81 TABLET, CHEWABLE ORAL DAILY
Status: CANCELLED | OUTPATIENT
Start: 2019-08-13

## 2019-08-12 RX ORDER — ONDANSETRON 2 MG/ML
4 INJECTION INTRAMUSCULAR; INTRAVENOUS EVERY 6 HOURS PRN
Status: CANCELLED | OUTPATIENT
Start: 2019-08-12

## 2019-08-12 RX ORDER — NICOTINE 21 MG/24HR
1 PATCH, TRANSDERMAL 24 HOURS TRANSDERMAL DAILY PRN
Status: CANCELLED | OUTPATIENT
Start: 2019-08-12

## 2019-08-12 RX ORDER — HYDROXYZINE HYDROCHLORIDE 25 MG/1
25 TABLET, FILM COATED ORAL 3 TIMES DAILY PRN
Status: ON HOLD | COMMUNITY
End: 2019-08-19 | Stop reason: SDUPTHER

## 2019-08-12 RX ORDER — TRAZODONE HYDROCHLORIDE 50 MG/1
50 TABLET ORAL NIGHTLY PRN
Status: DISCONTINUED | OUTPATIENT
Start: 2019-08-12 | End: 2019-08-13

## 2019-08-12 RX ORDER — ALPRAZOLAM 0.25 MG/1
0.25 TABLET ORAL DAILY PRN
Status: ON HOLD | COMMUNITY
End: 2019-08-13

## 2019-08-12 RX ORDER — BENZTROPINE MESYLATE 1 MG/ML
2 INJECTION INTRAMUSCULAR; INTRAVENOUS 2 TIMES DAILY PRN
Status: CANCELLED | OUTPATIENT
Start: 2019-08-12

## 2019-08-12 RX ORDER — LIDOCAINE 4 G/G
1 PATCH TOPICAL DAILY
Status: CANCELLED | OUTPATIENT
Start: 2019-08-13

## 2019-08-12 RX ADMIN — MELOXICAM 15 MG: 7.5 TABLET ORAL at 09:36

## 2019-08-12 RX ADMIN — SODIUM CHLORIDE, PRESERVATIVE FREE 10 ML: 5 INJECTION INTRAVENOUS at 09:39

## 2019-08-12 RX ADMIN — ACETAMINOPHEN 650 MG: 325 TABLET ORAL at 16:05

## 2019-08-12 RX ADMIN — ASPIRIN 81 MG 81 MG: 81 TABLET ORAL at 09:36

## 2019-08-12 RX ADMIN — ENOXAPARIN SODIUM 40 MG: 40 INJECTION SUBCUTANEOUS at 09:36

## 2019-08-12 RX ADMIN — HYDROXYUREA 500 MG: 500 CAPSULE ORAL at 09:37

## 2019-08-12 ASSESSMENT — ENCOUNTER SYMPTOMS
ABDOMINAL PAIN: 0
CONSTIPATION: 0
SHORTNESS OF BREATH: 0
RHINORRHEA: 0
VOMITING: 0
BACK PAIN: 0
NAUSEA: 0
COUGH: 0
SORE THROAT: 0
DIARRHEA: 0

## 2019-08-12 ASSESSMENT — SLEEP AND FATIGUE QUESTIONNAIRES
SLEEP PATTERN: DISTURBED/INTERRUPTED SLEEP;RESTLESSNESS
AVERAGE NUMBER OF SLEEP HOURS: 3
DO YOU HAVE DIFFICULTY SLEEPING: YES
DIFFICULTY STAYING ASLEEP: YES
DIFFICULTY FALLING ASLEEP: NO
DO YOU USE A SLEEP AID: NO
DIFFICULTY ARISING: NO
RESTFUL SLEEP: NO

## 2019-08-12 ASSESSMENT — PAIN SCALES - GENERAL
PAINLEVEL_OUTOF10: 4
PAINLEVEL_OUTOF10: 0
PAINLEVEL_OUTOF10: 6

## 2019-08-12 ASSESSMENT — PATIENT HEALTH QUESTIONNAIRE - PHQ9: SUM OF ALL RESPONSES TO PHQ QUESTIONS 1-9: 15

## 2019-08-12 ASSESSMENT — LIFESTYLE VARIABLES: HISTORY_ALCOHOL_USE: YES

## 2019-08-12 NOTE — CARE COORDINATION
Case Management Initial Discharge Plan  Annette Hahn,             Met with:patient to discuss discharge plans. Information verified: address, contacts, phone number, , insurance Yes  PCP: SIMONE Victoria CNP  Date of last visit: unsure     Insurance Provider: Joseph     Discharge Planning    Living Arrangements:  Alone   Support Systems:  Children, Family Members    Home has one stories  Few  stairs to climb to get into front door, no stairs to climb to reach second floor  Location of bedroom/bathroom in home , main     Patient able to perform ADL's:Independent    Current Services (outpatient & in home) sh[facility]lee   DME equipment: shaylee  DME provider: shaylee    Pharmacy: [facility]    Potential Assistance Purchasing Medications:  No  Does patient want to participate in local refill/ meds to beds program?  No    Potential Assistance Needed:  N/A    Patient agreeable to home care: No  [place] of choice provided:  n/a    Prior SNF/Rehab Placement and Facility: na  Agreeable to SNF/Rehab: No  [place] of choice provided: n/a   Evaluation: yes    Expected Discharge date:  19  Patient expects to be discharged to: Follow Up Appointment: Best Day/ Time:      Transportation provider: unsure   Transportation arrangements needed for discharge: Yes    Readmission Risk              Risk of Unplanned Readmission:        8             Does patient have a readmission risk score greater than 14?: No  If yes, follow-up appointment must be made within 7 days of discharge. Discharge Plan: Pt states he was living with daughter, but they had a falling out, he is not sure where he will go. Psych has seen and recommends psych admit, he states he thought he would go to alcohol rehab, I told him psych first, he states agreeable at this point. SW consult for alcohol rehab and housing issues.             Electronically signed by Antonino Hartman RN on 19 at 12:20 PM

## 2019-08-12 NOTE — H&P
40.00 pack-year smoking history. He has never used smokeless tobacco.  Alcohol:      reports that he drinks about 14.0 standard drinks of alcohol per week. Drug Use:  reports that he has current or past drug history. Drug: Marijuana. Family History:     Family History   Problem Relation Age of Onset   [place] Breast Cancer Mother         s/p surgical complications    Diabetes Mother     Heart Failure Father     Other Brother         \"bad back\"       Review of Systems:     Positive and Negative as described in HPI. CONSTITUTIONAL:  negative for fevers, chills, sweats, fatigue, weight loss  HEENT:  negative for vision, hearing changes, runny nose, throat pain  RESPIRATORY:  negative for shortness of breath, cough, congestion, wheezing. CARDIOVASCULAR:  negative for chest pain, palpitations  GASTROINTESTINAL:  negative for nausea, vomiting, diarrhea, constipation, change in bowel habits, abdominal pain   GENITOURINARY:  negative for difficulty of urination, burning with urination, frequency   INTEGUMENT:  negative for rash, skin lesions, easy bruising   HEMATOLOGIC/LYMPHATIC:  negative for swelling/edema   ALLERGIC/IMMUNOLOGIC:  negative for urticaria , itching  ENDOCRINE:  negative increase in drinking, increase in urination, hot or cold intolerance  MUSCULOSKELETAL:  negative joint pains, muscle aches, swelling of joints  NEUROLOGICAL:  negative for headaches, dizziness, lightheadedness, numbness, pain, tingling extremities  BEHAVIOR/PSYCH: Positive for depression, anxiety    Physical Exam:   BP (!) 93/59   Pulse 56   Temp 97.9 °F (36.6 °C) (Oral)   Resp 14   Ht 6' 1\" (1.854 m)   Wt 203 lb 1.6 oz (92.1 kg)   SpO2 99%   BMI 26.80 kg/m²   Temp (24hrs), Av.6 °F (36.4 °C), Min:97 °F (36.1 °C), Max:98.1 °F (36.7 °C)    No results for input(s): POCGLU in the last 72 hours.     Intake/Output Summary (Last 24 hours) at 2019 1230  Last data filed at 2019 0640  Gross per 24 hour   Intake 1510 ml NOT REPORTED     Seg Neutrophils 65 36 - 65 %    Lymphocytes 24 24 - 43 %    Monocytes 7 3 - 12 %    Eosinophils % 1 1 - 4 %    Basophils 2 0 - 2 %    Immature Granulocytes 1 (H) 0 %    Segs Absolute 6.80 1.50 - 8.10 k/uL    Absolute Lymph # 2.48 1.10 - 3.70 k/uL    Absolute Mono # 0.77 0.10 - 1.20 k/uL    Absolute Eos # 0.12 0.00 - 0.44 k/uL    Basophils # 0.19 0.00 - 0.20 k/uL    Absolute Immature Granulocyte 0.13 0.00 - 0.30 k/uL    WBC Morphology NOT REPORTED     RBC Morphology ANISOCYTOSIS PRESENT     Platelet Estimate NOT REPORTED    Troponin    Collection Time: 08/11/19  4:13 PM   Result Value Ref Range    Troponin, High Sensitivity 14 0 - 22 ng/L    Troponin T NOT REPORTED <0.03 ng/mL    Troponin Interp NOT REPORTED    TOX SCR, BLD, ED    Collection Time: 08/11/19  4:13 PM   Result Value Ref Range    Ethanol 61 (H) <10 mg/dL    Ethanol percent 0.061 (H) <8.006 %    Salicylate Lvl <1 (L) 3 - 10 mg/dL    Acetaminophen Level <5 (L) 10 - 30 ug/mL    Toxic Tricyclic Sc,Blood NEGATIVE NEGATIVE   Comprehensive Metabolic Panel    Collection Time: 08/11/19  4:13 PM   Result Value Ref Range    Glucose 93 70 - 99 mg/dL    BUN 16 6 - 20 mg/dL    CREATININE 1.07 0.70 - 1.20 mg/dL    Bun/Cre Ratio NOT REPORTED 9 - 20    Calcium 8.5 (L) 8.6 - 10.4 mg/dL    Sodium 135 135 - 144 mmol/L    Potassium 4.3 3.7 - 5.3 mmol/L    Chloride 102 98 - 107 mmol/L    CO2 18 (L) 20 - 31 mmol/L    Anion Gap 15 9 - 17 mmol/L    Alkaline Phosphatase 76 40 - 129 U/L    ALT 9 5 - 41 U/L    AST 22 <40 U/L    Total Bilirubin 0.42 0.3 - 1.2 mg/dL    Total Protein 7.1 6.4 - 8.3 g/dL    Alb 4.1 3.5 - 5.2 g/dL    Albumin/Globulin Ratio 1.4 1.0 - 2.5    GFR Non-African American >60 >60 mL/min    GFR African American >60 >60 mL/min    GFR Comment          GFR Staging NOT REPORTED    Troponin    Collection Time: 08/11/19  5:32 PM   Result Value Ref Range    Troponin, High Sensitivity 12 0 - 22 ng/L    Troponin T NOT REPORTED <0.03 ng/mL    Troponin Opiates, Urine NEGATIVE NEGATIVE    Phencyclidine, Urine NEGATIVE NEGATIVE    Propoxyphene, Urine NOT REPORTED NEGATIVE    Cannabinoid Scrn, Ur NEGATIVE NEGATIVE    Oxycodone Screen, Ur NEGATIVE NEGATIVE    Methamphetamine, Urine NOT REPORTED NEGATIVE    Tricyclic Antidepressants, Urine NOT REPORTED NEGATIVE    MDMA, Urine NOT REPORTED NEGATIVE    Buprenorphine Urine NOT REPORTED NEGATIVE    Test Information       Assay provides medical screening only. The absence of expected drug(s) and/or metabolite(s) may indicate diluted or adulterated urine, limitations of testing or timing of collection. Imaging/Diagnostics:  No results found. Assessment :      Hospital Problems           Last Modified POA    * (Principal) Intentional drug overdose ([facility] 8/12/2019 Yes    Myeloproliferative disorder ([facility] 8/12/2019 Yes    Cervical stenosis of spine 8/12/2019 Yes    Alcohol abuse 8/12/2019 Yes          Plan:     Patient status Admit as inpatient in the  Progressive Unit/Step down    1. Admit inpatient  2. Suicide precautions  3. Psychiatry evaluation  4. Alcohol cessation, monitor for signs and symptoms withdrawal  5. General diet  6. GI and DVT prophylaxis  7. Medically stable for discharge pending psychiatric recommendations, suspected he would benefit from inpatient treatment  8. Outpatient follow-up with neurosurgery and oncology as routinely scheduled    Consultations:   IP CONSULT TO INTERNAL MEDICINE  IP CONSULT TO SOCIAL WORK  IP CONSULT TO PSYCHIATRY  IP CONSULT TO SOCIAL WORK     Patient is admitted as inpatient status because of co-morbidities listed above, severity of signs and symptoms as outlined, requirement for current medical therapies and most importantly because of direct risk to patient if care not provided in a hospital setting.     Jamal Miner,   8/12/2019  12:30 PM    Copy sent to Dr. Emiliano Hagan, APRN - CNP

## 2019-08-12 NOTE — VIRTUAL HEALTH
Consult to Psychiatry  Consult performed by: Army Dave MD  Consult ordered by: Bubba Zapata DO  Reason for consult: intentional overdose  Assessment/Recommendations:   Assessment:  1. Dysthymia with recent adjustment disorder, s/p intentional OD on xanax, clonidine, and trazodone  2. Alcohol Dependence - recent relapse    Recommendations:  1. After patient is deemed medically stable by attending physician, he should be admitted to a behavioral health unit of his choice and/or which will accept his insurance;  2. He is cooperative with this plan now, but will need to be pink-slipped if becomes uncooperative;  3. Alcohol and benzodiazepine withdrawal protocol necessary. Thanks for your consult and full note to follow. Patient Location:  [place] [address]    Provider Location (City/State): [place]    This virtual visit was conducted via interactive/real-time audio/video. Department of Psychiatry  Consult Service  Attending Physician Psychiatric Assessment      Thank you very much for allowing us to participate in the care of this patient. Reason for Consult - as above    History obtained from:  patient, electronic medical record, nurse    HISTORY OF PRESENT ILLNESS:          The patient is a 63 y.o. male with significant past medical history of cervical stenosis, myelodysplastic disorder, who is admitted medically for treatment of intentional overdose on trazodone, clonidine, and xanax. He states his problems started years ago: his wife  of aneurysm in 2017. They had been alcoholics and had been sober for 5-6 yrs when she  and he became hopeless and started drinking again, got very sick, went to ER after a while to find out he had blood CA - treatable not curable - chemo pill bid to keep blood cell counts even keel - \"they help. \" He will have taken them one yr this Oct for meds - other health issue which is urgent is neck disks impinging on spinal cord and needs surgery in September, c/o debilitating pain. He was living in [facility] and moved to [place] w/ daughter and her son - Awilda Morocho said all would be fine if I didnt drink, but I did\" - relapsed 3 wks ago - daughter found \"the 43 proof cheap stuff\" and she kicked him out (called the police maybe Friday - his memory is poor - spent 2 days in a motel ), threw out all clothes, out the window and his chemo and psych meds - all he had was his med planner and he overdosed: \"Yes, I did take meds on purpose, thought about suicide, maybe I will wake up and maybe I wont. \" He has now learned his daughter got eviction notice against him (he was on the lease) and he knows that and he cant go back there. He is worried about his financial situation and now homelessness.      Current Outpatient Psychiatric Medications:  from [facility] - Xanax and clonidine, trazodone - had been helping until he relapsed    Medications:    Current Facility-Administered Medications: albuterol sulfate  (90 Base) MCG/ACT inhaler 2 puff, 2 puff, Inhalation, Q4H PRN  aspirin chewable tablet 81 mg, 81 mg, Oral, Daily  hydroxyurea (HYDREA) chemo capsule 500 mg, 500 mg, Oral, BID  lidocaine 4 % external patch 1 patch, 1 patch, Topical, Daily  meloxicam (MOBIC) tablet 15 mg, 15 mg, Oral, Daily  docusate sodium (COLACE) capsule 100 mg, 100 mg, Oral, Daily  sodium chloride flush 0.9 % injection 10 mL, 10 mL, Intravenous, 2 times per day  sodium chloride flush 0.9 % injection 10 mL, 10 mL, Intravenous, PRN  magnesium hydroxide (MILK OF MAGNESIA) 400 MG/5ML suspension 30 mL, 30 mL, Oral, Daily PRN  ondansetron (ZOFRAN) injection 4 mg, 4 mg, Intravenous, Q6H PRN  nicotine (NICODERM CQ) 21 MG/24HR 1 patch, 1 patch, Transdermal, Daily PRN  enoxaparin (LOVENOX) injection 40 mg, 40 mg, Subcutaneous, Daily  acetaminophen (TYLENOL) tablet 650 mg, 650 mg, Oral, Q4H PRN       PAST PSYCHIATRIC HISTORY: significant for years of alcohol use, with left-sided sciatica M54.42, G89.29    Insomnia G47.00    Osteoarthritis of spine with radiculopathy, lumbar region M47.26    Spondylosis of lumbar region without myelopathy or radiculopathy M47.816    Left lumbar radiculitis M54.16    Hx of fusion of cervical spine Z98.1    Cervical stenosis of spine M48.02    Left arm numbness R20.0    Polysubstance overdose T50.901A       PLAN As Above      Thank you very much for allowing us to participate in the care of this patient. Time spent > 60 min. Physicians Signature:  Electronically signed by Nathaly Peter MD on 8/12/2019 at 11:15 AM.    Dragon voice recognition software used in portions of this document.

## 2019-08-12 NOTE — PROGRESS NOTES
Smoking Cessation - topics covered   []  Health Risks  []  Benefits of Quitting   []  Smoking Cessation  []  Patient has no history of tobacco use  []  Patient is former smoker. []  No need for tobacco cessation education. []  Booklet given  []  Patient verbalizes understanding. []  Patient denies need for tobacco cessation education. []  Unable to meet with patient today. Will follow up as able. Admitting diagnosis precludes optimal reception of tobacco cessation education. Will defer at this time.   Eric Moya  9:29 AM

## 2019-08-13 LAB — ACETAMINOPHEN LEVEL: <5 UG/ML (ref 10–30)

## 2019-08-13 PROCEDURE — 80307 DRUG TEST PRSMV CHEM ANLYZR: CPT

## 2019-08-13 PROCEDURE — 6370000000 HC RX 637 (ALT 250 FOR IP): Performed by: NURSE PRACTITIONER

## 2019-08-13 PROCEDURE — 6370000000 HC RX 637 (ALT 250 FOR IP): Performed by: PSYCHIATRY & NEUROLOGY

## 2019-08-13 PROCEDURE — 36415 COLL VENOUS BLD VENIPUNCTURE: CPT

## 2019-08-13 PROCEDURE — 1240000000 HC EMOTIONAL WELLNESS R&B

## 2019-08-13 PROCEDURE — 6370000000 HC RX 637 (ALT 250 FOR IP): Performed by: INTERNAL MEDICINE

## 2019-08-13 PROCEDURE — 90792 PSYCH DIAG EVAL W/MED SRVCS: CPT | Performed by: NURSE PRACTITIONER

## 2019-08-13 RX ORDER — MELOXICAM 7.5 MG/1
15 TABLET ORAL DAILY
Status: DISCONTINUED | OUTPATIENT
Start: 2019-08-13 | End: 2019-08-20 | Stop reason: HOSPADM

## 2019-08-13 RX ORDER — ONDANSETRON 2 MG/ML
4 INJECTION INTRAMUSCULAR; INTRAVENOUS EVERY 6 HOURS PRN
Status: DISCONTINUED | OUTPATIENT
Start: 2019-08-13 | End: 2019-08-13

## 2019-08-13 RX ORDER — ALBUTEROL SULFATE 90 UG/1
2 AEROSOL, METERED RESPIRATORY (INHALATION) EVERY 4 HOURS PRN
Status: DISCONTINUED | OUTPATIENT
Start: 2019-08-13 | End: 2019-08-20 | Stop reason: HOSPADM

## 2019-08-13 RX ORDER — LIDOCAINE 4 G/G
1 PATCH TOPICAL DAILY
Status: DISCONTINUED | OUTPATIENT
Start: 2019-08-13 | End: 2019-08-13

## 2019-08-13 RX ORDER — HYDROXYZINE HYDROCHLORIDE 25 MG/1
25 TABLET, FILM COATED ORAL 3 TIMES DAILY PRN
Status: DISCONTINUED | OUTPATIENT
Start: 2019-08-13 | End: 2019-08-20 | Stop reason: HOSPADM

## 2019-08-13 RX ORDER — MELOXICAM 7.5 MG/1
15 TABLET ORAL DAILY
Status: DISCONTINUED | OUTPATIENT
Start: 2019-08-13 | End: 2019-08-13 | Stop reason: SDUPTHER

## 2019-08-13 RX ORDER — PRAZOSIN HYDROCHLORIDE 1 MG/1
2 CAPSULE ORAL NIGHTLY
Status: DISCONTINUED | OUTPATIENT
Start: 2019-08-13 | End: 2019-08-13

## 2019-08-13 RX ORDER — DOCUSATE SODIUM 100 MG/1
100 CAPSULE, LIQUID FILLED ORAL 2 TIMES DAILY
Status: DISCONTINUED | OUTPATIENT
Start: 2019-08-13 | End: 2019-08-13 | Stop reason: SDUPTHER

## 2019-08-13 RX ORDER — ASPIRIN 81 MG/1
81 TABLET, CHEWABLE ORAL DAILY
Status: DISCONTINUED | OUTPATIENT
Start: 2019-08-13 | End: 2019-08-20 | Stop reason: HOSPADM

## 2019-08-13 RX ORDER — NICOTINE 21 MG/24HR
1 PATCH, TRANSDERMAL 24 HOURS TRANSDERMAL DAILY PRN
Status: DISCONTINUED | OUTPATIENT
Start: 2019-08-13 | End: 2019-08-13

## 2019-08-13 RX ORDER — ACETAMINOPHEN 325 MG/1
650 TABLET ORAL EVERY 4 HOURS PRN
Status: DISCONTINUED | OUTPATIENT
Start: 2019-08-13 | End: 2019-08-20 | Stop reason: HOSPADM

## 2019-08-13 RX ORDER — ONDANSETRON 4 MG/1
4 TABLET, FILM COATED ORAL EVERY 4 HOURS PRN
Status: DISCONTINUED | OUTPATIENT
Start: 2019-08-13 | End: 2019-08-20 | Stop reason: HOSPADM

## 2019-08-13 RX ORDER — DULOXETIN HYDROCHLORIDE 60 MG/1
60 CAPSULE, DELAYED RELEASE ORAL 2 TIMES DAILY
Status: DISCONTINUED | OUTPATIENT
Start: 2019-08-13 | End: 2019-08-20 | Stop reason: HOSPADM

## 2019-08-13 RX ORDER — ALBUTEROL SULFATE 90 UG/1
2 AEROSOL, METERED RESPIRATORY (INHALATION) EVERY 6 HOURS PRN
Status: DISCONTINUED | OUTPATIENT
Start: 2019-08-13 | End: 2019-08-13

## 2019-08-13 RX ORDER — SODIUM CHLORIDE 0.9 % (FLUSH) 0.9 %
10 SYRINGE (ML) INJECTION EVERY 12 HOURS SCHEDULED
Status: DISCONTINUED | OUTPATIENT
Start: 2019-08-13 | End: 2019-08-13

## 2019-08-13 RX ORDER — HYDROXYUREA 500 MG/1
500 CAPSULE ORAL 2 TIMES DAILY
Status: DISCONTINUED | OUTPATIENT
Start: 2019-08-13 | End: 2019-08-20 | Stop reason: HOSPADM

## 2019-08-13 RX ORDER — GABAPENTIN 300 MG/1
600 CAPSULE ORAL 3 TIMES DAILY
Status: DISCONTINUED | OUTPATIENT
Start: 2019-08-13 | End: 2019-08-20 | Stop reason: HOSPADM

## 2019-08-13 RX ORDER — ASPIRIN 81 MG/1
81 TABLET, CHEWABLE ORAL DAILY
Status: DISCONTINUED | OUTPATIENT
Start: 2019-08-13 | End: 2019-08-13

## 2019-08-13 RX ORDER — ARIPIPRAZOLE 5 MG/1
5 TABLET ORAL DAILY
Status: DISCONTINUED | OUTPATIENT
Start: 2019-08-13 | End: 2019-08-16

## 2019-08-13 RX ORDER — DOCUSATE SODIUM 100 MG/1
100 CAPSULE, LIQUID FILLED ORAL 2 TIMES DAILY
Status: DISCONTINUED | OUTPATIENT
Start: 2019-08-13 | End: 2019-08-20 | Stop reason: HOSPADM

## 2019-08-13 RX ORDER — DOCUSATE SODIUM 100 MG/1
100 CAPSULE, LIQUID FILLED ORAL DAILY
Status: DISCONTINUED | OUTPATIENT
Start: 2019-08-13 | End: 2019-08-13

## 2019-08-13 RX ORDER — HYDROXYUREA 500 MG/1
500 CAPSULE ORAL 2 TIMES DAILY
Status: DISCONTINUED | OUTPATIENT
Start: 2019-08-13 | End: 2019-08-13

## 2019-08-13 RX ORDER — ACETAMINOPHEN 325 MG/1
650 TABLET ORAL EVERY 4 HOURS PRN
Status: DISCONTINUED | OUTPATIENT
Start: 2019-08-13 | End: 2019-08-13

## 2019-08-13 RX ORDER — ALBUTEROL SULFATE 90 UG/1
2 AEROSOL, METERED RESPIRATORY (INHALATION) EVERY 4 HOURS PRN
Status: DISCONTINUED | OUTPATIENT
Start: 2019-08-13 | End: 2019-08-13

## 2019-08-13 RX ORDER — SODIUM CHLORIDE 0.9 % (FLUSH) 0.9 %
10 SYRINGE (ML) INJECTION PRN
Status: DISCONTINUED | OUTPATIENT
Start: 2019-08-13 | End: 2019-08-13

## 2019-08-13 RX ORDER — MELOXICAM 7.5 MG/1
15 TABLET ORAL DAILY
Status: DISCONTINUED | OUTPATIENT
Start: 2019-08-13 | End: 2019-08-13

## 2019-08-13 RX ORDER — ONDANSETRON 2 MG/ML
4 INJECTION INTRAMUSCULAR; INTRAVENOUS EVERY 4 HOURS PRN
Status: DISCONTINUED | OUTPATIENT
Start: 2019-08-13 | End: 2019-08-20 | Stop reason: HOSPADM

## 2019-08-13 RX ADMIN — ASPIRIN 81 MG 81 MG: 81 TABLET ORAL at 12:34

## 2019-08-13 RX ADMIN — GABAPENTIN 600 MG: 300 CAPSULE ORAL at 09:53

## 2019-08-13 RX ADMIN — GABAPENTIN 600 MG: 300 CAPSULE ORAL at 15:01

## 2019-08-13 RX ADMIN — DOCUSATE SODIUM 100 MG: 100 CAPSULE, LIQUID FILLED ORAL at 09:53

## 2019-08-13 RX ADMIN — HYDROXYUREA 500 MG: 500 CAPSULE ORAL at 12:35

## 2019-08-13 RX ADMIN — NICOTINE POLACRILEX 2 MG: 2 GUM, CHEWING BUCCAL at 16:09

## 2019-08-13 RX ADMIN — HYDROXYZINE HYDROCHLORIDE 25 MG: 25 TABLET, FILM COATED ORAL at 16:09

## 2019-08-13 RX ADMIN — DOCUSATE SODIUM 100 MG: 100 CAPSULE, LIQUID FILLED ORAL at 21:27

## 2019-08-13 RX ADMIN — MELOXICAM 15 MG: 7.5 TABLET ORAL at 12:34

## 2019-08-13 RX ADMIN — Medication 1 MG: at 21:27

## 2019-08-13 RX ADMIN — ARIPIPRAZOLE 5 MG: 5 TABLET ORAL at 16:09

## 2019-08-13 RX ADMIN — ASPIRIN 81 MG 81 MG: 81 TABLET ORAL at 10:38

## 2019-08-13 RX ADMIN — NICOTINE POLACRILEX 2 MG: 2 GUM, CHEWING BUCCAL at 09:46

## 2019-08-13 RX ADMIN — DOCUSATE SODIUM 100 MG: 100 CAPSULE, LIQUID FILLED ORAL at 12:43

## 2019-08-13 RX ADMIN — VITAMIN D, TAB 1000IU (100/BT) 1000 UNITS: 25 TAB at 11:43

## 2019-08-13 RX ADMIN — ACETAMINOPHEN 650 MG: 325 TABLET, FILM COATED ORAL at 21:26

## 2019-08-13 RX ADMIN — MELOXICAM 15 MG: 7.5 TABLET ORAL at 10:38

## 2019-08-13 RX ADMIN — HYDROXYUREA 500 MG: 500 CAPSULE ORAL at 21:26

## 2019-08-13 RX ADMIN — DULOXETINE HYDROCHLORIDE 60 MG: 60 CAPSULE, DELAYED RELEASE ORAL at 09:53

## 2019-08-13 RX ADMIN — GABAPENTIN 600 MG: 300 CAPSULE ORAL at 21:27

## 2019-08-13 RX ADMIN — DULOXETINE HYDROCHLORIDE 60 MG: 60 CAPSULE, DELAYED RELEASE ORAL at 21:27

## 2019-08-13 ASSESSMENT — PAIN DESCRIPTION - LOCATION
LOCATION: NECK
LOCATION: NECK

## 2019-08-13 ASSESSMENT — PAIN DESCRIPTION - PAIN TYPE
TYPE: ACUTE PAIN
TYPE: ACUTE PAIN

## 2019-08-13 ASSESSMENT — PAIN SCALES - GENERAL
PAINLEVEL_OUTOF10: 3
PAINLEVEL_OUTOF10: 4
PAINLEVEL_OUTOF10: 6
PAINLEVEL_OUTOF10: 3
PAINLEVEL_OUTOF10: 4

## 2019-08-13 ASSESSMENT — LIFESTYLE VARIABLES: HISTORY_ALCOHOL_USE: YES

## 2019-08-13 ASSESSMENT — PAIN DESCRIPTION - DESCRIPTORS: DESCRIPTORS: ACHING;DISCOMFORT;HEADACHE

## 2019-08-13 ASSESSMENT — PAIN DESCRIPTION - FREQUENCY: FREQUENCY: INTERMITTENT

## 2019-08-13 ASSESSMENT — PAIN DESCRIPTION - DIRECTION: RADIATING_TOWARDS: HEAD

## 2019-08-13 ASSESSMENT — PAIN DESCRIPTION - ORIENTATION: ORIENTATION: POSTERIOR

## 2019-08-13 ASSESSMENT — PAIN - FUNCTIONAL ASSESSMENT: PAIN_FUNCTIONAL_ASSESSMENT: ACTIVITIES ARE NOT PREVENTED

## 2019-08-13 ASSESSMENT — PAIN DESCRIPTION - ONSET: ONSET: ON-GOING

## 2019-08-13 NOTE — H&P
nourished, conscious, alert. Does not appear to be distress or pain at this time. SKIN:  Warm, dry, no cyanosis or jaundice. HEAD:  Normocephalic, atraumatic, no swelling or tenderness. EYES:  Pupils equal, reactive to light, Conjunctiva is clear, EOMs intact cody. eyelids WNL. EARS:  No discharge, no marked hearing loss. NOSE:  No rhinorrhea, epistaxis or septal deformity. THROAT:  Not congested. No ulceration bleeding or discharge. NECK:  No stiffness, trachea central.  No palpable masses or L.N.      CHEST:  Symmetrical and equal on expansion. HEART:  Regular rate and rhythm. S1 > S2, No audible murmurs or gallops. LUNGS:  Equal on expansion, normal breath sounds. No adventitious sounds. ABDOMEN:  Obese. Soft on palpation. No localized tenderness. No guarding or rigidity. No palpable organomegaly. LYMPHATICS:  No palpable cervical Lymphadenopathy. LOCOMOTOR, BACK AND SPINE:  No tenderness or deformities. EXTREMITIES:  Symmetrical, no pretibial edema. Buds sign negative. No discoloration or ulcerations. NEUROLOGIC:  The patient is conscious, alert, oriented,Cranial nerve II-XII intact, taste and smell were not examined. No apparent focal sensory or motor deficits. Muscle strength equal Cody. No facial droop, tongue protrudes centrally, no slurring of the speech. PROVISIONAL DIAGNOSES:      Active Problems:    Major depressive disorder, recurrent severe without psychotic features ([address]    Severe recurrent major depression without psychotic features ([address]  Resolved Problems:    * No resolved hospital problems.  *      LUDIWN JIMENEZ, APRN - CNP on 8/13/2019 at 5:06 PM        DIAGNOSTIC RESULTS   Labs:  CBC:   Recent Labs     08/11/19  1613 08/12/19  0656   WBC 10.5 8.1   HGB 13.2 12.1*    156     BMP:    Recent Labs     08/11/19  1613 08/12/19  0656    137   K 4.3 3.9    103   CO2 18* 23   BUN 16 14   CREATININE 1.07 0.91

## 2019-08-13 NOTE — PLAN OF CARE
[facility]  Initial Interdisciplinary Treatment Plan NO      Original treatment plan Date & Time: 8/13/2019   0807    Admission Type:  Admission Type: Voluntary    Reason for admission:   Reason for Admission: suicide attempt by overdosing on xanax, clonidine and trazodone.  Patient recently relapsed on alcohol and was kicked out of daughter's house due to relapse    Estimated Length of Stay:  5-7days  Estimated Discharge Date: to be determined by physician    PATIENT STRENGTHS:  Patient Strengths:Strengths: Communication, Motivated  Patient Strengths and Limitations:Limitations: Difficult relationships / poor social skills, Inappropriate/potentially harmful leisure interests(problems with daughter)  Addictive Behavior: Addictive Behavior  In the past 3 months, have you felt or has someone told you that you have a problem with:  : Other(Comments)(drinking)  Do you have a history of Chemical Use?: No  Do you have a history of Alcohol Use?: Yes  Do you have a history of Street Drug Abuse?: Yes  Histroy of Prescripton Drug Abuse?: Yes(recent overdose)  Medical Problems:  Past Medical History:   Diagnosis Date    Cancer ([facility]     myeloproliferative    Myeloproliferative disorder ([facility]      Status EXAM:Status and Exam  Normal: No  Facial Expression: Flat  Affect: Appropriate  Level of Consciousness: Alert  Mood:Normal: No  Mood: Depressed, Anxious, Helpless  Motor Activity:Normal: Yes  Interview Behavior: Cooperative  Preception: [place] to Person, [place] to Time, [place] to Place, [place] to Situation  Attention:Normal: Yes  Thought Processes: Circumstantial  Thought Content:Normal: Yes  Hallucinations: None  Delusions: No  Memory:Normal: Yes  Insight and Judgment: No  Insight and Judgment: Poor Judgment, Poor Insight  Present Suicidal Ideation: No  Present Homicidal Ideation: No    EDUCATION:   Learner Progress Toward Treatment Goals: reviewed group plans and strategies for care    Method:group therapy,

## 2019-08-13 NOTE — BH NOTE
Psychiatric Admission Note         Rogerio He is a 63 y.o. male who was admitted from [place].  The patient was admitted on a voluntary basis. He was taken to [facility] after overdosing on Xanax because he needed trazodone. Patient was initially seen on telepsych for consultation. [facility] consultation note, the patient reported his \"problems started years ago. \"  The patient's wife recently passed away in January 2017. They were both alcoholics and had been sober for 5-6 years when she passed. At that time he did become hopeless and relapsed. He became very ill shortly thereafter. He was diagnosed with a form of cancer which was treatable but not curable. He has been in treatment for his cancer for approximately 1 year in October. He also has multiple cervical disks impinging on his spinal cord and required surgery in September leaving him in \"debilitating pain. \"  He was also recently kicked out of his living arrangement. He had been living with his daughter and her son. Approximately 3 weeks ago the patient again relapsed and was thrown out. At that time the patient stated he did overdose. By review of the consultation the patient reported \"yes I did take the medications on purpose. I thought about suicide. Maybe I will wake up or maybe I will not. \"  Patient also had additional worries regarding financial situations and his homelessness. Since arrival to our facility the patient has been calm and cooperative and pleasant. He has been medication compliant. He stated to nursing staff that after the suicide attempt \"I figured if I did not wake up it was okay. If I did wake up it was okay. \"  He expressed to nursing realizing that he should continue living for his children and grandchildren. He did report to nursing staff for poor appetite as well as poor sleep patterns. Today the patient is seen in the assessment room.   He is calm NEGATIVE    Barbiturate Screen, Ur NEGATIVE NEGATIVE    Benzodiazepine Screen, Urine NEGATIVE NEGATIVE    Cocaine Metabolite, Urine POSITIVE (A) NEGATIVE    Methadone Screen, Urine NEGATIVE NEGATIVE    Opiates, Urine NEGATIVE NEGATIVE    Phencyclidine, Urine NEGATIVE NEGATIVE    Propoxyphene, Urine NOT REPORTED NEGATIVE    Cannabinoid Scrn, Ur NEGATIVE NEGATIVE    Oxycodone Screen, Ur NEGATIVE NEGATIVE    Methamphetamine, Urine NOT REPORTED NEGATIVE    Tricyclic Antidepressants, Urine NOT REPORTED NEGATIVE    MDMA, Urine NOT REPORTED NEGATIVE    Buprenorphine Urine NOT REPORTED NEGATIVE    Test Information       Assay provides medical screening only. The absence of expected drug(s) and/or metabolite(s) may indicate diluted or adulterated urine, limitations of testing or timing of collection. Acetaminophen Level    Collection Time: 08/13/19 10:11 AM   Result Value Ref Range    Acetaminophen Level <5 (L) 10 - 30 ug/mL         Diagnostic Impression  Active Problems:    Major depressive disorder, recurrent severe without psychotic features ([address]    Severe recurrent major depression without psychotic features ([address]  Resolved Problems:    * No resolved hospital problems. *          Medications   DULoxetine  60 mg Oral BID    gabapentin  600 mg Oral TID    prazosin  2 mg Oral Nightly    vitamin D  1,000 Units Oral Daily    docusate sodium  100 mg Oral BID    meloxicam  15 mg Oral Daily    hydroxyurea  500 mg Oral BID    aspirin  81 mg Oral Daily     nicotine polacrilex, hydrOXYzine, melatonin ER, acetaminophen, magnesium hydroxide, ondansetron **OR** ondansetron, albuterol sulfate HFA, aluminum & magnesium hydroxide-simethicone, benztropine mesylate, traZODone    Treatment Plan:     Admit to inpatient psychiatric treatment   Supportive therapy with medication management. Reviewed risks and benefits as well as potential side effects with patient.   Discussed addition of Abilify for depressive symptoms and

## 2019-08-13 NOTE — CARE COORDINATION
BHI Biopsychosocial Assessment    Current Level of Psychosocial Functioning     Independent: xx  Dependent:    Minimal Assist:      Psychosocial High Risk Factors (check all that apply)    Unable to obtain meds:    Chronic illness/pain: x   Substance abuse: x  Lack of Family Support: x  Financial stress: x  Isolation: x  Inadequate Community Resources: x  Suicide attempt(s): x  Not taking medications:     Victim of crime:    Developmental Delay:    Unable to manage personal needs:    Age 72 or older:    Homeless: x  No transportation:    Readmission within 30 days:    Unemployment:    Traumatic Event: x    Psychiatric Advanced Directives: None reported    Family to Involve in Treatment: None reported    Sexual Orientation: DINA    Patient Strengths: Pt is linked to outpatient provider at [facility]    Patient Barriers: Alcohol use, family conflict    Opiate Education: Pt denies opiate use    CMHC/mental health history: Pt is linked with [place] [facility]    Plan of Care   medication management, group/individual therapies, family meetings, psycho -education, treatment team meetings to assist with stabilization    Initial Discharge Plan: Pt to stabilize with medication, inpatient treatment for alcohol use      Clinical Summary:      Pt is a 63year old  male who presented to the ED with attempted OD on prescribed medications. Pt states he was sober from alcohol use 6-7 months since moving in with daughter, she evicted him after she found him drinking . Pt states that he is medication compliant at this time, but states his sister flushed his meds down the toilet while also stating he overdosed on his meds. Pt is linked to [facility] for psychiatry, would like referred for counseling and case management. Pt states that he does have a form of legal income and receives SSI. Pt states that he does have a Hx of AoD use (last alcohol use 8/11/19).  Pt states that he does have abuse issues from his past. Pt states that he does have MI in his family. Pt states that he has [employer]. Pt states that he has graduated from . Pt states that he is currently not having AH, VH, SI and HI.  Pt states that he has not attempted suicide in the past.

## 2019-08-13 NOTE — BH NOTE
`[facility]  Admission Note     Admission Type:   Admission Type: Voluntary    Reason for admission:  Reason for Admission: suicide attempt by overdosing on xanax, clonidine and trazodone.  Patient recently relapsed on alcohol and was kicked out of daughter's house due to relapse    PATIENT STRENGTHS:  Strengths: Communication, Motivated    Patient Strengths and Limitations:  Limitations: Difficult relationships / poor social skills, Inappropriate/potentially harmful leisure interests(problems with daughter)    Addictive Behavior:   Addictive Behavior  In the past 3 months, have you felt or has someone told you that you have a problem with:  : Other(Comments)(drinking)  Do you have a history of Chemical Use?: No  Do you have a history of Alcohol Use?: Yes  Do you have a history of Street Drug Abuse?: Yes  Histroy of Prescripton Drug Abuse?: Yes(recent overdose)    Medical Problems:   Past Medical History:   Diagnosis Date    Cancer ([facility] [address]     myeloproliferative    Myeloproliferative disorder ([facility] [address]        Status EXAM:  Status and Exam  Normal: No  Facial Expression: Flat  Affect: Appropriate  Level of Consciousness: Alert  Mood:Normal: No  Mood: Depressed, Anxious, Helpless  Motor Activity:Normal: Yes  Interview Behavior: Cooperative  Preception: [place] to Person, Lylia Klippel to Time, [place] to Place, [place] to Situation  Attention:Normal: Yes  Thought Processes: Circumstantial  Thought Content:Normal: Yes  Hallucinations: None  Delusions: No  Memory:Normal: Yes  Insight and Judgment: No  Insight and Judgment: Poor Judgment, Poor Insight  Present Suicidal Ideation: No  Present Homicidal Ideation: No    Tobacco Screening:  Practical Counseling, on admission, rohit X, if applicable and completed (first 3 are required if patient doesn't refuse):            ( )  Recognizing danger situations (included triggers and roadblocks)                    ( )  Coping skills (new ways to manage stress, exercise,

## 2019-08-13 NOTE — PROGRESS NOTES
Nutrition Assessment (Low Risk)    Type and Reason for Visit: Positive Nutrition Screen(Pressure ulcer non-healing wound (on top of head_)    Nutrition Recommendations: Continue General diet as ordered. Nutrition Assessment:  Patient assessed for nutritional risk. Deemed to be at low risk at this time. Will continue to monitor for changes in status. Patient assessed for nutritional risk. Noted w/ scabs on top of head ( per patient had for many years) and marks on bilateral arms (not considered nutritionally compromised) - no wounds noted per MD note. Reviewed weight history and noted weight fluctuation from 193-209 lbs over 7 mo, no significant wt loss noted. Full nutrition assessment not needed at this time. Deemed to be at low risk at this time. Will continue to monitor for changes in nutritional status.       Malnutrition Assessment:  · Malnutrition Status: No malnutrition    Nutrition Risk Level   Risk Level: Low    Nutrition Diagnosis:   · Problem: No nutrition diagnosis at this time    Nutrition Intervention:  Food and/or Delivery: Continue current diet  Nutrition Education/Counseling/Coordination of Care:  Continued Inpatient Monitoring, Education Not Indicated      Carlos Bolton RD, LD  Office phone [phone]

## 2019-08-13 NOTE — BH NOTE
Writer contacted on call NP, Kyree Flroes. Daniel Buenrostro stated since patient had a recent overdose, she would hold medications until patient was seen by psychiatrist in the morning.

## 2019-08-14 ENCOUNTER — TELEPHONE (OUTPATIENT)
Dept: ONCOLOGY | Age: 59
End: 2019-08-14

## 2019-08-14 PROCEDURE — 6370000000 HC RX 637 (ALT 250 FOR IP): Performed by: NURSE PRACTITIONER

## 2019-08-14 PROCEDURE — 99232 SBSQ HOSP IP/OBS MODERATE 35: CPT | Performed by: NURSE PRACTITIONER

## 2019-08-14 PROCEDURE — 6370000000 HC RX 637 (ALT 250 FOR IP): Performed by: PSYCHIATRY & NEUROLOGY

## 2019-08-14 PROCEDURE — 99254 IP/OBS CNSLTJ NEW/EST MOD 60: CPT | Performed by: INTERNAL MEDICINE

## 2019-08-14 PROCEDURE — 1240000000 HC EMOTIONAL WELLNESS R&B

## 2019-08-14 RX ORDER — NICOTINE 21 MG/24HR
1 PATCH, TRANSDERMAL 24 HOURS TRANSDERMAL DAILY
Status: DISCONTINUED | OUTPATIENT
Start: 2019-08-14 | End: 2019-08-20 | Stop reason: HOSPADM

## 2019-08-14 RX ORDER — TAMSULOSIN HYDROCHLORIDE 0.4 MG/1
0.4 CAPSULE ORAL NIGHTLY
Status: DISCONTINUED | OUTPATIENT
Start: 2019-08-14 | End: 2019-08-20 | Stop reason: HOSPADM

## 2019-08-14 RX ADMIN — MELOXICAM 15 MG: 7.5 TABLET ORAL at 08:41

## 2019-08-14 RX ADMIN — DOCUSATE SODIUM 100 MG: 100 CAPSULE, LIQUID FILLED ORAL at 08:41

## 2019-08-14 RX ADMIN — NICOTINE POLACRILEX 2 MG: 2 GUM, CHEWING BUCCAL at 08:52

## 2019-08-14 RX ADMIN — GABAPENTIN 600 MG: 300 CAPSULE ORAL at 14:33

## 2019-08-14 RX ADMIN — ACETAMINOPHEN 650 MG: 325 TABLET, FILM COATED ORAL at 12:16

## 2019-08-14 RX ADMIN — DOCUSATE SODIUM 100 MG: 100 CAPSULE, LIQUID FILLED ORAL at 21:23

## 2019-08-14 RX ADMIN — ASPIRIN 81 MG 81 MG: 81 TABLET ORAL at 08:41

## 2019-08-14 RX ADMIN — HYDROXYUREA 500 MG: 500 CAPSULE ORAL at 21:23

## 2019-08-14 RX ADMIN — ARIPIPRAZOLE 5 MG: 5 TABLET ORAL at 08:41

## 2019-08-14 RX ADMIN — GABAPENTIN 600 MG: 300 CAPSULE ORAL at 21:23

## 2019-08-14 RX ADMIN — HYDROXYUREA 500 MG: 500 CAPSULE ORAL at 08:42

## 2019-08-14 RX ADMIN — DULOXETINE HYDROCHLORIDE 60 MG: 60 CAPSULE, DELAYED RELEASE ORAL at 21:23

## 2019-08-14 RX ADMIN — VITAMIN D, TAB 1000IU (100/BT) 1000 UNITS: 25 TAB at 08:41

## 2019-08-14 RX ADMIN — GABAPENTIN 600 MG: 300 CAPSULE ORAL at 08:41

## 2019-08-14 RX ADMIN — DULOXETINE HYDROCHLORIDE 60 MG: 60 CAPSULE, DELAYED RELEASE ORAL at 08:41

## 2019-08-14 RX ADMIN — HYDROXYZINE HYDROCHLORIDE 25 MG: 25 TABLET, FILM COATED ORAL at 12:16

## 2019-08-14 RX ADMIN — TAMSULOSIN HYDROCHLORIDE 0.4 MG: 0.4 CAPSULE ORAL at 21:22

## 2019-08-14 ASSESSMENT — PAIN SCALES - GENERAL
PAINLEVEL_OUTOF10: 2
PAINLEVEL_OUTOF10: 4
PAINLEVEL_OUTOF10: 0
PAINLEVEL_OUTOF10: 4

## 2019-08-14 ASSESSMENT — PAIN DESCRIPTION - PAIN TYPE: TYPE: ACUTE PAIN

## 2019-08-14 ASSESSMENT — PAIN DESCRIPTION - LOCATION: LOCATION: NECK

## 2019-08-14 NOTE — CARE COORDINATION
but is no longer welcome due to his alcohol use. Pt reports he has been living with her since January 2019 and was told by daughter he was not allowed to drink alcohol while living there. Pt reports he relapsed in July 2019, daughter became aware in August and kicked him out. Pt reports mental health history of depression and anxiety. Pt reports he attempted to intentionally overdose on various medications on 8/10/19. Pt reports being linked to [address] [place] [address] for [facility] services, and has been about 1 month since last appointment. Pt reports [employer] and income of SSI $851/month. Pt reports goals as, \"get sober again and be healthy\". PT reports he has blood cancer that he takes chemo pills for daily. Pt reports he has herniated disks in neck has surgery scheduled for 9/5/19. Principle DX upon Admission: F33.2 Major depressive disorder, recurrent severe without psychotic features. Per [facility] ED note, 8/11/19, \"Chief Complaint  Patient presents with  [facility]"    Pt presented: Ox4, Pt was cooperative and alert during assessment. Homicidal Ideation: denies    Suicidal Ideation: denies    DOC: alcohol. Pt reports first use at 13years old. Pt reports last of substance as 8/10/19. Route/ Amount/ Frequency: Pt reports for past month he has drank 1 pint of \"watered down\" vodka/ day. Withdrawal Symptoms: denies    UA/ ANDREWS Results: UA 8/12/19 Positive for Cocaine. Previous AOD/Dual Tx: DINA    Longest duration of sobriety: [id number], relapsed when wife suddenly passed away. Issues related to substance use: poor physical and mental health maintenance, strained relationships with 3 children, homelessness. Stage of Change: contemplation    LOC Recommended:  3.5 Inpatient followed by 3.1 Recovery Housing while engaging in 2.1 Intensive Outpatient. Relapse Prevention Plan: AOD and Dual TX levels of care were explained and discussed with pt. Pt reports he is most interested in recovery housing.

## 2019-08-14 NOTE — PLAN OF CARE
Problem: Altered Mood, Depressive Behavior:  Goal: Able to verbalize and/or display a decrease in depressive symptoms  Description  Able to verbalize and/or display a decrease in depressive symptoms  Outcome: Ongoing  1:1 with pt x ten minutes. Pt encouraged to attend unit programming and interact with peers and staff. Pt also encouraged to tend to hygiene and ADLs. Pt encouraged to discuss feelings with staff and feedback and reassurance provided. Problem: Altered Mood, Depressive Behavior:  Goal: Ability to disclose and discuss suicidal ideas will improve  Description  Ability to disclose and discuss suicidal ideas will improve  8/14/2019 1126 by Dalila Singer LPN  Outcome: Ongoing   Pt denies thoughts of self harm and is agreeable to seeking out staff should thoughts of self harm arise. Safe environment maintained. 15 minute checks for safety continued per unit policy. Will continue to monitor for safety and provide support and reassurance as needed.

## 2019-08-14 NOTE — CONSULTS
tablet 1 mg  1 mg Oral Nightly PRN Chio Sports, APRN - CNP   1 mg at 08/13/19 2127    vitamin D (CHOLECALCIFEROL) tablet 1,000 Units  1,000 Units Oral Daily Chio Sports, APRN - CNP   1,000 Units at 08/14/19 0841    acetaminophen (TYLENOL) tablet 650 mg  650 mg Oral Q4H PRN Hcio Sports, APRN - CNP   650 mg at 08/14/19 1216    magnesium hydroxide (MILK OF MAGNESIA) 400 MG/5ML suspension 30 mL  30 mL Oral Daily PRN Chio Sports, APRN - CNP        ondansetron [facility]) injection 4 mg  4 mg Intramuscular Q4H PRN Chio Sports, APRN - CNP        Or    ondansetron [facility]) tablet 4 mg  4 mg Oral Q4H PRN Chio Sports, APRN - CNP        docusate sodium (COLACE) capsule 100 mg  100 mg Oral BID Chio Sports, APRN - CNP   100 mg at 08/14/19 0841    meloxicam (MOBIC) tablet 15 mg  15 mg Oral Daily Chio Sports, APRN - CNP   15 mg at 08/14/19 0841    hydroxyurea (HYDREA) chemo capsule 500 mg  500 mg Oral BID Chio Sports, APRN - CNP   500 mg at 08/14/19 4235    aspirin chewable tablet 81 mg  81 mg Oral Daily Chio Sports, APRN - CNP   81 mg at 08/14/19 0841    albuterol sulfate  (90 Base) MCG/ACT inhaler 2 puff  2 puff Inhalation Q4H PRN Chio Sports, APRN - CNP        ARIPiprazole (ABILIFY) tablet 5 mg  5 mg Oral Daily Chio Sports, APRN - CNP   5 mg at 08/14/19 0841    aluminum & magnesium hydroxide-simethicone (MAALOX) 200-200-20 MG/5ML suspension 30 mL  30 mL Oral Q6H PRN Chio Sports, APRN - CNP        benztropine mesylate (COGENTIN) injection 2 mg  2 mg Intramuscular BID PRN Chio Sports, APRN - CNP           Allergies:  Patient has no known allergies. Social History:   reports that he has been smoking cigarettes. He started smoking about 40 years ago. He has a 40.00 pack-year smoking history. He has never used smokeless tobacco. He reports that he drinks about 14.0 standard drinks of alcohol per week. He reports that he has current or past drug history. Drug: Marijuana. nontender, nondistended, no masses or organomegaly   Neurological - alert, oriented, normal speech, no focal findings or movement disorder noted   Musculoskeletal - no joint tenderness, deformity or swelling   Extremities - peripheral pulses normal, no pedal edema, no clubbing or cyanosis   Skin - normal coloration and turgor, no rashes, no suspicious skin lesions noted ,      DATA:      Labs:     CBC:   Recent Labs     08/12/19  0656   WBC 8.1   HGB 12.1*   HCT 36.3*        BMP:   Recent Labs     08/12/19  0656      K 3.9   CO2 23   BUN 14   CREATININE 0.91   LABGLOM >60   GLUCOSE 94     PT/INR: No results for input(s): PROTIME, INR in the last 72 hours. APTT:No results for input(s): APTT in the last 72 hours. LIVER PROFILE:  Recent Labs     08/12/19  0656   AST 19   ALT 8   LABALBU 3.5       Ct Cervical Spine Wo Contrast    Result Date: 7/26/2019  STUDY: Cervical spine CT without contrast CLINICAL HISTORY: Fall, cervical neck pain, trauma, fracture COMPARISON:None TECHNIQUE: CT cervical spine was performed utilizing thin section CT imaging without contrast. Coronal and sagittal reformatted images were obtained and reviewed. Automated exposure control was utilized. FINDINGS: The cervical spine is visualized from skull base through T2.  There is degenerative disc disease most pronounced at C5-C6 with facet hypertrophic changes.  There is no prevertebral soft tissue swelling or vertebral body height loss.  The lateral masses of C1 are well aligned with C2.  Bilateral carotid calcifications are seen.  The visualized lung apices demonstrate biapical pleural thickening and scarring with emphysematous change.  Please note, CT is insensitive for the evaluation of the spinal canal and spinal canal contents. Please note, ligamentous injury is not well evaluated on a neutral position CT. If concern for ligamentous injury consider flex-ex radiographs or MRI.  IMPRESSION: 1. Multilevel degenerative disc disease

## 2019-08-14 NOTE — PROGRESS NOTES
SIMONE Montenegro CNP   81 mg at 08/14/19 0841    albuterol sulfate  (90 Base) MCG/ACT inhaler 2 puff  2 puff Inhalation Q4H PRN SIMONE Snider CNP        ARIPiprazole (ABILIFY) tablet 5 mg  5 mg Oral Daily SIMONE Snider CNP   5 mg at 08/14/19 0841    aluminum & magnesium hydroxide-simethicone (MAALOX) 200-200-20 MG/5ML suspension 30 mL  30 mL Oral Q6H PRN SIMONE Snider CNP        benztropine mesylate (COGENTIN) injection 2 mg  2 mg Intramuscular BID PRN SIMONE Snider CNP             nicotine  1 patch Transdermal Daily    tamsulosin  0.4 mg Oral Nightly    DULoxetine  60 mg Oral BID    gabapentin  600 mg Oral TID    vitamin D  1,000 Units Oral Daily    docusate sodium  100 mg Oral BID    meloxicam  15 mg Oral Daily    hydroxyurea  500 mg Oral BID    aspirin  81 mg Oral Daily    ARIPiprazole  5 mg Oral Daily       ASSESSMENT  Major depressive disorder, recurrent severe without psychotic features ([facility] [address]     Patient's Response to Treatment: slow    PLAN  · Continue inpatient psychiatric treatment  · Supportive therapy with medication management. Reviewed risks and benefits as well as potential side effects with patient. Will start Flomax 0.4 mg at at bedtime. · Therapeutic activities and groups  · Follow up at [facility] [place] [facility] after symptoms stabilized       Estimated length of stay is 5-7 days. Electronically signed by SIMONE Snider CNP on 8/14/2019 at 1:58 PM.    Dragon voice recognition software used in portions of this document.

## 2019-08-15 PROCEDURE — 1240000000 HC EMOTIONAL WELLNESS R&B

## 2019-08-15 PROCEDURE — 99232 SBSQ HOSP IP/OBS MODERATE 35: CPT | Performed by: PSYCHIATRY & NEUROLOGY

## 2019-08-15 PROCEDURE — 6370000000 HC RX 637 (ALT 250 FOR IP): Performed by: NURSE PRACTITIONER

## 2019-08-15 PROCEDURE — 6370000000 HC RX 637 (ALT 250 FOR IP): Performed by: PSYCHIATRY & NEUROLOGY

## 2019-08-15 PROCEDURE — 99222 1ST HOSP IP/OBS MODERATE 55: CPT | Performed by: INTERNAL MEDICINE

## 2019-08-15 RX ORDER — QUETIAPINE FUMARATE 100 MG/1
100 TABLET, FILM COATED ORAL NIGHTLY
Status: DISCONTINUED | OUTPATIENT
Start: 2019-08-15 | End: 2019-08-16

## 2019-08-15 RX ORDER — CHLORDIAZEPOXIDE HYDROCHLORIDE 10 MG/1
10 CAPSULE, GELATIN COATED ORAL EVERY 6 HOURS PRN
Status: DISCONTINUED | OUTPATIENT
Start: 2019-08-15 | End: 2019-08-20 | Stop reason: HOSPADM

## 2019-08-15 RX ADMIN — HYDROXYUREA 500 MG: 500 CAPSULE ORAL at 08:21

## 2019-08-15 RX ADMIN — MELOXICAM 15 MG: 7.5 TABLET ORAL at 08:21

## 2019-08-15 RX ADMIN — HYDROXYZINE HYDROCHLORIDE 25 MG: 25 TABLET, FILM COATED ORAL at 21:06

## 2019-08-15 RX ADMIN — DOCUSATE SODIUM 100 MG: 100 CAPSULE, LIQUID FILLED ORAL at 08:22

## 2019-08-15 RX ADMIN — GABAPENTIN 600 MG: 300 CAPSULE ORAL at 14:25

## 2019-08-15 RX ADMIN — DULOXETINE HYDROCHLORIDE 60 MG: 60 CAPSULE, DELAYED RELEASE ORAL at 08:21

## 2019-08-15 RX ADMIN — CHLORDIAZEPOXIDE HYDROCHLORIDE 10 MG: 10 CAPSULE ORAL at 19:48

## 2019-08-15 RX ADMIN — ASPIRIN 81 MG 81 MG: 81 TABLET ORAL at 08:22

## 2019-08-15 RX ADMIN — ACETAMINOPHEN 650 MG: 325 TABLET, FILM COATED ORAL at 07:09

## 2019-08-15 RX ADMIN — DULOXETINE HYDROCHLORIDE 60 MG: 60 CAPSULE, DELAYED RELEASE ORAL at 21:06

## 2019-08-15 RX ADMIN — QUETIAPINE FUMARATE 100 MG: 100 TABLET ORAL at 21:05

## 2019-08-15 RX ADMIN — GABAPENTIN 600 MG: 300 CAPSULE ORAL at 08:21

## 2019-08-15 RX ADMIN — VITAMIN D, TAB 1000IU (100/BT) 1000 UNITS: 25 TAB at 08:22

## 2019-08-15 RX ADMIN — ARIPIPRAZOLE 5 MG: 5 TABLET ORAL at 08:21

## 2019-08-15 RX ADMIN — GABAPENTIN 600 MG: 300 CAPSULE ORAL at 21:05

## 2019-08-15 RX ADMIN — TAMSULOSIN HYDROCHLORIDE 0.4 MG: 0.4 CAPSULE ORAL at 21:05

## 2019-08-15 RX ADMIN — HYDROXYUREA 500 MG: 500 CAPSULE ORAL at 21:05

## 2019-08-15 ASSESSMENT — PAIN DESCRIPTION - LOCATION: LOCATION: HEAD;NECK

## 2019-08-15 ASSESSMENT — PAIN DESCRIPTION - DESCRIPTORS: DESCRIPTORS: ACHING;DISCOMFORT;HEADACHE

## 2019-08-15 ASSESSMENT — PAIN DESCRIPTION - PAIN TYPE: TYPE: ACUTE PAIN

## 2019-08-15 ASSESSMENT — PAIN DESCRIPTION - FREQUENCY: FREQUENCY: INTERMITTENT

## 2019-08-15 ASSESSMENT — PAIN DESCRIPTION - ORIENTATION: ORIENTATION: POSTERIOR

## 2019-08-15 ASSESSMENT — PAIN SCALES - GENERAL
PAINLEVEL_OUTOF10: 5
PAINLEVEL_OUTOF10: 6

## 2019-08-15 ASSESSMENT — PAIN DESCRIPTION - DIRECTION: RADIATING_TOWARDS: HEAD

## 2019-08-15 ASSESSMENT — PAIN - FUNCTIONAL ASSESSMENT: PAIN_FUNCTIONAL_ASSESSMENT: PREVENTS OR INTERFERES SOME ACTIVE ACTIVITIES AND ADLS

## 2019-08-15 ASSESSMENT — PAIN DESCRIPTION - ONSET: ONSET: ON-GOING

## 2019-08-16 ENCOUNTER — APPOINTMENT (OUTPATIENT)
Dept: GENERAL RADIOLOGY | Age: 59
DRG: 751 | End: 2019-08-16
Attending: PSYCHIATRY & NEUROLOGY
Payer: COMMERCIAL

## 2019-08-16 PROCEDURE — 6370000000 HC RX 637 (ALT 250 FOR IP): Performed by: NURSE PRACTITIONER

## 2019-08-16 PROCEDURE — 1240000000 HC EMOTIONAL WELLNESS R&B

## 2019-08-16 PROCEDURE — 99232 SBSQ HOSP IP/OBS MODERATE 35: CPT | Performed by: PSYCHIATRY & NEUROLOGY

## 2019-08-16 PROCEDURE — 72040 X-RAY EXAM NECK SPINE 2-3 VW: CPT

## 2019-08-16 PROCEDURE — 6370000000 HC RX 637 (ALT 250 FOR IP): Performed by: PSYCHIATRY & NEUROLOGY

## 2019-08-16 RX ORDER — QUETIAPINE FUMARATE 200 MG/1
200 TABLET, FILM COATED ORAL NIGHTLY
Status: DISCONTINUED | OUTPATIENT
Start: 2019-08-16 | End: 2019-08-20 | Stop reason: HOSPADM

## 2019-08-16 RX ORDER — QUETIAPINE FUMARATE 25 MG/1
25 TABLET, FILM COATED ORAL DAILY
Status: DISCONTINUED | OUTPATIENT
Start: 2019-08-17 | End: 2019-08-17

## 2019-08-16 RX ADMIN — GABAPENTIN 600 MG: 300 CAPSULE ORAL at 14:05

## 2019-08-16 RX ADMIN — DULOXETINE HYDROCHLORIDE 60 MG: 60 CAPSULE, DELAYED RELEASE ORAL at 09:30

## 2019-08-16 RX ADMIN — DOCUSATE SODIUM 100 MG: 100 CAPSULE, LIQUID FILLED ORAL at 21:06

## 2019-08-16 RX ADMIN — ARIPIPRAZOLE 5 MG: 5 TABLET ORAL at 09:30

## 2019-08-16 RX ADMIN — QUETIAPINE FUMARATE 200 MG: 200 TABLET ORAL at 21:06

## 2019-08-16 RX ADMIN — GABAPENTIN 600 MG: 300 CAPSULE ORAL at 09:30

## 2019-08-16 RX ADMIN — CHLORDIAZEPOXIDE HYDROCHLORIDE 10 MG: 10 CAPSULE ORAL at 05:54

## 2019-08-16 RX ADMIN — ASPIRIN 81 MG 81 MG: 81 TABLET ORAL at 09:30

## 2019-08-16 RX ADMIN — DOCUSATE SODIUM 100 MG: 100 CAPSULE, LIQUID FILLED ORAL at 09:30

## 2019-08-16 RX ADMIN — HYDROXYZINE HYDROCHLORIDE 25 MG: 25 TABLET, FILM COATED ORAL at 09:38

## 2019-08-16 RX ADMIN — VITAMIN D, TAB 1000IU (100/BT) 1000 UNITS: 25 TAB at 09:30

## 2019-08-16 RX ADMIN — DULOXETINE HYDROCHLORIDE 60 MG: 60 CAPSULE, DELAYED RELEASE ORAL at 21:06

## 2019-08-16 RX ADMIN — GABAPENTIN 600 MG: 300 CAPSULE ORAL at 21:06

## 2019-08-16 RX ADMIN — HYDROXYZINE HYDROCHLORIDE 25 MG: 25 TABLET, FILM COATED ORAL at 19:47

## 2019-08-16 RX ADMIN — HYDROXYUREA 500 MG: 500 CAPSULE ORAL at 21:06

## 2019-08-16 RX ADMIN — HYDROXYUREA 500 MG: 500 CAPSULE ORAL at 09:30

## 2019-08-16 RX ADMIN — TAMSULOSIN HYDROCHLORIDE 0.4 MG: 0.4 CAPSULE ORAL at 21:06

## 2019-08-16 RX ADMIN — ACETAMINOPHEN 650 MG: 325 TABLET, FILM COATED ORAL at 19:47

## 2019-08-16 RX ADMIN — MELOXICAM 15 MG: 7.5 TABLET ORAL at 09:30

## 2019-08-16 ASSESSMENT — PAIN SCALES - GENERAL
PAINLEVEL_OUTOF10: 5
PAINLEVEL_OUTOF10: 6

## 2019-08-16 NOTE — PLAN OF CARE
Problem: Falls - Risk of:  Goal: Will remain free from falls  Description  Will remain free from falls  Outcome: Ongoing  Patient exhibits a steady gait and remains free from falls. Problem: Altered Mood, Depressive Behavior:  Goal: Able to verbalize and/or display a decrease in depressive symptoms  Description  Able to verbalize and/or display a decrease in depressive symptoms  Outcome: Ongoing  Patient reports decrease in depression in spite of learning that his cancer may have spread to his lymph nodes. Problem: Altered Mood, Depressive Behavior:  Goal: Ability to disclose and discuss suicidal ideas will improve  Description  Ability to disclose and discuss suicidal ideas will improve  Outcome: Ongoing  Patient currently denies SI, HI and hallucinations. Problem: Tobacco Use:  Goal: Inpatient tobacco use cessation counseling participation  Description  Inpatient tobacco use cessation counseling participation  Outcome: Ongoing  Patient given tobacco quitline number 70541930144 at this time, refusing to call at this time, states \" I just dont want to quit now\"- patient given information as to the dangers of long term tobacco use. Continue to reinforce the importance of tobacco cessation. Problem: Suicide risk  Goal: Provide patient with safe environment  Description  Provide patient with safe environment  Outcome: Ongoing  Safety plan reviewed with patient, agrees to approach staff when feeling upset. 15 minute and random checks maintained for safety. No violent or escalating behaviors noted during this shift. Patient is currently calm, controlled and medication-compliant. Social with staff and peers. Problem: Pain:  Goal: Pain level will decrease  Description  Pain level will decrease  Outcome: Ongoing  Patient denies pain at this time.

## 2019-08-16 NOTE — PROGRESS NOTES
CQ) 21 MG/24HR 1 patch, 1 patch, Transdermal, Daily  tamsulosin (FLOMAX) capsule 0.4 mg, 0.4 mg, Oral, Nightly  DULoxetine (CYMBALTA) extended release capsule 60 mg, 60 mg, Oral, BID  gabapentin (NEURONTIN) capsule 600 mg, 600 mg, Oral, TID  hydrOXYzine (ATARAX) tablet 25 mg, 25 mg, Oral, TID PRN  melatonin ER tablet 1 mg, 1 mg, Oral, Nightly PRN  vitamin D (CHOLECALCIFEROL) tablet 1,000 Units, 1,000 Units, Oral, Daily  acetaminophen (TYLENOL) tablet 650 mg, 650 mg, Oral, Q4H PRN  magnesium hydroxide (MILK OF MAGNESIA) 400 MG/5ML suspension 30 mL, 30 mL, Oral, Daily PRN  ondansetron (ZOFRAN) injection 4 mg, 4 mg, Intramuscular, Q4H PRN **OR** ondansetron (ZOFRAN) tablet 4 mg, 4 mg, Oral, Q4H PRN  docusate sodium (COLACE) capsule 100 mg, 100 mg, Oral, BID  meloxicam (MOBIC) tablet 15 mg, 15 mg, Oral, Daily  hydroxyurea (HYDREA) chemo capsule 500 mg, 500 mg, Oral, BID  aspirin chewable tablet 81 mg, 81 mg, Oral, Daily  albuterol sulfate  (90 Base) MCG/ACT inhaler 2 puff, 2 puff, Inhalation, Q4H PRN  aluminum & magnesium hydroxide-simethicone (MAALOX) 200-200-20 MG/5ML suspension 30 mL, 30 mL, Oral, Q6H PRN  benztropine mesylate (COGENTIN) injection 2 mg, 2 mg, Intramuscular, BID PRN    No results found for this or any previous visit (from the past 72 hour(s)). ASSESSMENT     Principal Problem:    Major depressive disorder, recurrent severe without psychotic features ([address]  Active Problems:    MPN (myeloproliferative neoplasm) (HCC)    Severe recurrent major depression without psychotic features ([address]    Anemia    Macrocytosis  Resolved Problems:    * No resolved hospital problems. *      PLAN    · Detoxification from possible alcohol withdrawal.  · We will stop Abilify and increase Seroquel. · Continue Cymbalta. · Continue unit milieu and group psychotherapy.     Electronically Signed by Filemon Ybarra MD , 8/16/2019 4:28 PM

## 2019-08-16 NOTE — PLAN OF CARE
Problem: Falls - Risk of:  Goal: Will remain free from falls  Description  Will remain free from falls  8/16/2019 1446 by LEI Martinez  Outcome: Ongoing   Pt remains free of falls and verbalizes understanding of individual fall risks. Pt wearing non skid footwear and encouraged to seek out staff for any assistance needed. Problem: Falls - Risk of:  Goal: Absence of physical injury  Description  Absence of physical injury  Outcome: Ongoing  He denies any history of recent or remote injury that might have caused or be related to these current symptoms. Problem: Altered Mood, Depressive Behavior:  Goal: Ability to disclose and discuss suicidal ideas will improve  Description  Ability to disclose and discuss suicidal ideas will improve  8/16/2019 1446 by LEI Martinez  Outcome: Ongoing   Pt denies thoughts of self harm and is agreeable to seeking out staff should thoughts of self harm arise. Safe environment maintained. 15 minute checks for safety continued per unit policy. Will continue to monitor for safety and provide support and reassurance as needed.

## 2019-08-16 NOTE — PROGRESS NOTES
capsule 0.4 mg, 0.4 mg, Oral, Nightly  DULoxetine (CYMBALTA) extended release capsule 60 mg, 60 mg, Oral, BID  gabapentin (NEURONTIN) capsule 600 mg, 600 mg, Oral, TID  hydrOXYzine (ATARAX) tablet 25 mg, 25 mg, Oral, TID PRN  melatonin ER tablet 1 mg, 1 mg, Oral, Nightly PRN  vitamin D (CHOLECALCIFEROL) tablet 1,000 Units, 1,000 Units, Oral, Daily  acetaminophen (TYLENOL) tablet 650 mg, 650 mg, Oral, Q4H PRN  magnesium hydroxide (MILK OF MAGNESIA) 400 MG/5ML suspension 30 mL, 30 mL, Oral, Daily PRN  ondansetron (ZOFRAN) injection 4 mg, 4 mg, Intramuscular, Q4H PRN **OR** ondansetron (ZOFRAN) tablet 4 mg, 4 mg, Oral, Q4H PRN  docusate sodium (COLACE) capsule 100 mg, 100 mg, Oral, BID  meloxicam (MOBIC) tablet 15 mg, 15 mg, Oral, Daily  hydroxyurea (HYDREA) chemo capsule 500 mg, 500 mg, Oral, BID  aspirin chewable tablet 81 mg, 81 mg, Oral, Daily  albuterol sulfate  (90 Base) MCG/ACT inhaler 2 puff, 2 puff, Inhalation, Q4H PRN  ARIPiprazole (ABILIFY) tablet 5 mg, 5 mg, Oral, Daily  aluminum & magnesium hydroxide-simethicone (MAALOX) 200-200-20 MG/5ML suspension 30 mL, 30 mL, Oral, Q6H PRN  benztropine mesylate (COGENTIN) injection 2 mg, 2 mg, Intramuscular, BID PRN    Recent Results (from the past 72 hour(s))   Acetaminophen Level    Collection Time: 08/13/19 10:11 AM   Result Value Ref Range    Acetaminophen Level <5 (L) 10 - 30 ug/mL       ASSESSMENT     Principal Problem:    Major depressive disorder, recurrent severe without psychotic features ([address]  Active Problems:    MPN (myeloproliferative neoplasm) (HCC)    Severe recurrent major depression without psychotic features ([address]    Anemia    Macrocytosis  Resolved Problems:    * No resolved hospital problems. *      PLAN    · Detoxification from possible alcohol withdrawal.  · Start Seroquel 100 mg at bedtime and consider stopping Abilify. · Continue Cymbalta. · Continue unit milieu and group psychotherapy.     Electronically Signed by Naldo Rdz, MD , 8/15/2019 10:51 PM

## 2019-08-17 PROCEDURE — 1240000000 HC EMOTIONAL WELLNESS R&B

## 2019-08-17 PROCEDURE — 6370000000 HC RX 637 (ALT 250 FOR IP): Performed by: NURSE PRACTITIONER

## 2019-08-17 PROCEDURE — 6370000000 HC RX 637 (ALT 250 FOR IP): Performed by: PSYCHIATRY & NEUROLOGY

## 2019-08-17 RX ORDER — QUETIAPINE FUMARATE 50 MG/1
50 TABLET, FILM COATED ORAL DAILY
Status: DISCONTINUED | OUTPATIENT
Start: 2019-08-18 | End: 2019-08-20 | Stop reason: HOSPADM

## 2019-08-17 RX ORDER — QUETIAPINE FUMARATE 25 MG/1
25 TABLET, FILM COATED ORAL DAILY PRN
Status: DISCONTINUED | OUTPATIENT
Start: 2019-08-17 | End: 2019-08-20 | Stop reason: HOSPADM

## 2019-08-17 RX ADMIN — DULOXETINE HYDROCHLORIDE 60 MG: 60 CAPSULE, DELAYED RELEASE ORAL at 08:32

## 2019-08-17 RX ADMIN — GABAPENTIN 600 MG: 300 CAPSULE ORAL at 08:32

## 2019-08-17 RX ADMIN — HYDROXYUREA 500 MG: 500 CAPSULE ORAL at 20:53

## 2019-08-17 RX ADMIN — MELOXICAM 15 MG: 7.5 TABLET ORAL at 08:32

## 2019-08-17 RX ADMIN — HYDROXYUREA 500 MG: 500 CAPSULE ORAL at 08:33

## 2019-08-17 RX ADMIN — HYDROXYZINE HYDROCHLORIDE 25 MG: 25 TABLET, FILM COATED ORAL at 14:24

## 2019-08-17 RX ADMIN — TAMSULOSIN HYDROCHLORIDE 0.4 MG: 0.4 CAPSULE ORAL at 20:53

## 2019-08-17 RX ADMIN — ACETAMINOPHEN 650 MG: 325 TABLET, FILM COATED ORAL at 14:24

## 2019-08-17 RX ADMIN — HYDROXYZINE HYDROCHLORIDE 25 MG: 25 TABLET, FILM COATED ORAL at 08:33

## 2019-08-17 RX ADMIN — DOCUSATE SODIUM 100 MG: 100 CAPSULE, LIQUID FILLED ORAL at 20:53

## 2019-08-17 RX ADMIN — DULOXETINE HYDROCHLORIDE 60 MG: 60 CAPSULE, DELAYED RELEASE ORAL at 20:53

## 2019-08-17 RX ADMIN — QUETIAPINE FUMARATE 200 MG: 200 TABLET ORAL at 20:53

## 2019-08-17 RX ADMIN — ASPIRIN 81 MG 81 MG: 81 TABLET ORAL at 08:33

## 2019-08-17 RX ADMIN — VITAMIN D, TAB 1000IU (100/BT) 1000 UNITS: 25 TAB at 08:33

## 2019-08-17 RX ADMIN — HYDROXYZINE HYDROCHLORIDE 25 MG: 25 TABLET, FILM COATED ORAL at 20:53

## 2019-08-17 RX ADMIN — DOCUSATE SODIUM 100 MG: 100 CAPSULE, LIQUID FILLED ORAL at 08:32

## 2019-08-17 RX ADMIN — ACETAMINOPHEN 650 MG: 325 TABLET, FILM COATED ORAL at 20:54

## 2019-08-17 RX ADMIN — GABAPENTIN 600 MG: 300 CAPSULE ORAL at 14:24

## 2019-08-17 RX ADMIN — QUETIAPINE FUMARATE 25 MG: 25 TABLET ORAL at 08:33

## 2019-08-17 RX ADMIN — GABAPENTIN 600 MG: 300 CAPSULE ORAL at 20:52

## 2019-08-17 ASSESSMENT — PAIN SCALES - GENERAL
PAINLEVEL_OUTOF10: 4
PAINLEVEL_OUTOF10: 4
PAINLEVEL_OUTOF10: 0
PAINLEVEL_OUTOF10: 3
PAINLEVEL_OUTOF10: 0

## 2019-08-17 NOTE — BH NOTE
patient refused to attend nursing group at 1600 after encouragement from staff.   1:1 talk time provided as alternative to group session

## 2019-08-18 PROCEDURE — 1240000000 HC EMOTIONAL WELLNESS R&B

## 2019-08-18 PROCEDURE — 6370000000 HC RX 637 (ALT 250 FOR IP): Performed by: NURSE PRACTITIONER

## 2019-08-18 PROCEDURE — 6370000000 HC RX 637 (ALT 250 FOR IP): Performed by: PSYCHIATRY & NEUROLOGY

## 2019-08-18 RX ADMIN — HYDROXYUREA 500 MG: 500 CAPSULE ORAL at 20:56

## 2019-08-18 RX ADMIN — GABAPENTIN 600 MG: 300 CAPSULE ORAL at 08:40

## 2019-08-18 RX ADMIN — DULOXETINE HYDROCHLORIDE 60 MG: 60 CAPSULE, DELAYED RELEASE ORAL at 20:56

## 2019-08-18 RX ADMIN — DOCUSATE SODIUM 100 MG: 100 CAPSULE, LIQUID FILLED ORAL at 20:55

## 2019-08-18 RX ADMIN — GABAPENTIN 600 MG: 300 CAPSULE ORAL at 14:30

## 2019-08-18 RX ADMIN — DULOXETINE HYDROCHLORIDE 60 MG: 60 CAPSULE, DELAYED RELEASE ORAL at 08:40

## 2019-08-18 RX ADMIN — HYDROXYUREA 500 MG: 500 CAPSULE ORAL at 08:40

## 2019-08-18 RX ADMIN — GABAPENTIN 600 MG: 300 CAPSULE ORAL at 20:55

## 2019-08-18 RX ADMIN — QUETIAPINE FUMARATE 25 MG: 25 TABLET ORAL at 14:53

## 2019-08-18 RX ADMIN — ASPIRIN 81 MG 81 MG: 81 TABLET ORAL at 08:40

## 2019-08-18 RX ADMIN — HYDROXYZINE HYDROCHLORIDE 25 MG: 25 TABLET, FILM COATED ORAL at 14:53

## 2019-08-18 RX ADMIN — HYDROXYZINE HYDROCHLORIDE 25 MG: 25 TABLET, FILM COATED ORAL at 08:46

## 2019-08-18 RX ADMIN — TAMSULOSIN HYDROCHLORIDE 0.4 MG: 0.4 CAPSULE ORAL at 20:56

## 2019-08-18 RX ADMIN — QUETIAPINE FUMARATE 50 MG: 50 TABLET ORAL at 08:40

## 2019-08-18 RX ADMIN — MELOXICAM 15 MG: 7.5 TABLET ORAL at 08:40

## 2019-08-18 RX ADMIN — HYDROXYZINE HYDROCHLORIDE 25 MG: 25 TABLET, FILM COATED ORAL at 20:55

## 2019-08-18 RX ADMIN — QUETIAPINE FUMARATE 200 MG: 200 TABLET ORAL at 20:55

## 2019-08-18 RX ADMIN — VITAMIN D, TAB 1000IU (100/BT) 1000 UNITS: 25 TAB at 08:40

## 2019-08-18 RX ADMIN — DOCUSATE SODIUM 100 MG: 100 CAPSULE, LIQUID FILLED ORAL at 08:40

## 2019-08-18 ASSESSMENT — PAIN SCALES - GENERAL: PAINLEVEL_OUTOF10: 5

## 2019-08-18 NOTE — PLAN OF CARE
Problem: Altered Mood, Depressive Behavior:  Goal: Able to verbalize and/or display a decrease in depressive symptoms  Description  Able to verbalize and/or display a decrease in depressive symptoms  Outcome: Ongoing   Patient denies thoughts of self harm. Denies hallucinations. Reports depression and anxiety and that it is improving. Social on unit. 15 minute checks maintained for safety.

## 2019-08-19 PROCEDURE — 6370000000 HC RX 637 (ALT 250 FOR IP): Performed by: NURSE PRACTITIONER

## 2019-08-19 PROCEDURE — 6370000000 HC RX 637 (ALT 250 FOR IP): Performed by: PSYCHIATRY & NEUROLOGY

## 2019-08-19 PROCEDURE — 1240000000 HC EMOTIONAL WELLNESS R&B

## 2019-08-19 PROCEDURE — 99232 SBSQ HOSP IP/OBS MODERATE 35: CPT | Performed by: PSYCHIATRY & NEUROLOGY

## 2019-08-19 RX ORDER — QUETIAPINE FUMARATE 200 MG/1
200 TABLET, FILM COATED ORAL NIGHTLY
Qty: 30 TABLET | Refills: 0 | Status: SHIPPED | OUTPATIENT
Start: 2019-08-19 | End: 2020-11-03

## 2019-08-19 RX ORDER — HYDROXYZINE HYDROCHLORIDE 25 MG/1
25 TABLET, FILM COATED ORAL 3 TIMES DAILY PRN
Qty: 30 TABLET | Refills: 0 | Status: ON HOLD | OUTPATIENT
Start: 2019-08-19 | End: 2019-09-05 | Stop reason: ALTCHOICE

## 2019-08-19 RX ORDER — QUETIAPINE FUMARATE 50 MG/1
50 TABLET, FILM COATED ORAL 2 TIMES DAILY
Qty: 60 TABLET | Refills: 0 | Status: SHIPPED | OUTPATIENT
Start: 2019-08-19 | End: 2020-02-26 | Stop reason: SDUPTHER

## 2019-08-19 RX ORDER — NICOTINE 21 MG/24HR
1 PATCH, TRANSDERMAL 24 HOURS TRANSDERMAL DAILY
Qty: 30 PATCH | Refills: 0 | Status: SHIPPED | OUTPATIENT
Start: 2019-08-20 | End: 2019-08-22

## 2019-08-19 RX ORDER — TAMSULOSIN HYDROCHLORIDE 0.4 MG/1
0.4 CAPSULE ORAL NIGHTLY
Qty: 30 CAPSULE | Refills: 0 | Status: SHIPPED | OUTPATIENT
Start: 2019-08-19 | End: 2020-11-03

## 2019-08-19 RX ADMIN — DULOXETINE HYDROCHLORIDE 60 MG: 60 CAPSULE, DELAYED RELEASE ORAL at 08:07

## 2019-08-19 RX ADMIN — HYDROXYZINE HYDROCHLORIDE 25 MG: 25 TABLET, FILM COATED ORAL at 20:54

## 2019-08-19 RX ADMIN — DOCUSATE SODIUM 100 MG: 100 CAPSULE, LIQUID FILLED ORAL at 08:07

## 2019-08-19 RX ADMIN — HYDROXYZINE HYDROCHLORIDE 25 MG: 25 TABLET, FILM COATED ORAL at 14:14

## 2019-08-19 RX ADMIN — GABAPENTIN 600 MG: 300 CAPSULE ORAL at 20:54

## 2019-08-19 RX ADMIN — QUETIAPINE FUMARATE 25 MG: 25 TABLET ORAL at 14:14

## 2019-08-19 RX ADMIN — ASPIRIN 81 MG 81 MG: 81 TABLET ORAL at 08:07

## 2019-08-19 RX ADMIN — ACETAMINOPHEN 650 MG: 325 TABLET, FILM COATED ORAL at 17:33

## 2019-08-19 RX ADMIN — HYDROXYUREA 500 MG: 500 CAPSULE ORAL at 09:30

## 2019-08-19 RX ADMIN — QUETIAPINE FUMARATE 200 MG: 200 TABLET ORAL at 20:54

## 2019-08-19 RX ADMIN — QUETIAPINE FUMARATE 50 MG: 50 TABLET ORAL at 08:07

## 2019-08-19 RX ADMIN — GABAPENTIN 600 MG: 300 CAPSULE ORAL at 14:14

## 2019-08-19 RX ADMIN — HYDROXYZINE HYDROCHLORIDE 25 MG: 25 TABLET, FILM COATED ORAL at 08:11

## 2019-08-19 RX ADMIN — TAMSULOSIN HYDROCHLORIDE 0.4 MG: 0.4 CAPSULE ORAL at 20:54

## 2019-08-19 RX ADMIN — DOCUSATE SODIUM 100 MG: 100 CAPSULE, LIQUID FILLED ORAL at 20:54

## 2019-08-19 RX ADMIN — MELOXICAM 15 MG: 7.5 TABLET ORAL at 08:07

## 2019-08-19 RX ADMIN — HYDROXYUREA 500 MG: 500 CAPSULE ORAL at 20:54

## 2019-08-19 RX ADMIN — DULOXETINE HYDROCHLORIDE 60 MG: 60 CAPSULE, DELAYED RELEASE ORAL at 20:54

## 2019-08-19 RX ADMIN — GABAPENTIN 600 MG: 300 CAPSULE ORAL at 08:07

## 2019-08-19 ASSESSMENT — PAIN SCALES - GENERAL
PAINLEVEL_OUTOF10: 5
PAINLEVEL_OUTOF10: 0
PAINLEVEL_OUTOF10: 3
PAINLEVEL_OUTOF10: 0

## 2019-08-19 NOTE — CARE COORDINATION
AOD Treatment & D/C planning:      PT reports he spoke with Patrick Siddiqi,  from [facility], who stated that he is accepted there and can come Tuesday 8/20/19. PT reports Patrick Siddiqi or other CM can pick him up at time of d/c. PT reports his pre-op appointment for neck surgery is 8/22/19 at 7:30am. Pt reports he will follow up with [employer] [facility] for psych meds. [address], [address] [place] [address]   Patrick Siddiqi  [phone]      This writer spoke with Patrick Siddiqi who stated,  -PT is accepted and can pick him up at [facility] 8/20/19.  -pt must have meds.

## 2019-08-20 VITALS
BODY MASS INDEX: 27.43 KG/M2 | HEART RATE: 85 BPM | SYSTOLIC BLOOD PRESSURE: 158 MMHG | DIASTOLIC BLOOD PRESSURE: 81 MMHG | RESPIRATION RATE: 14 BRPM | WEIGHT: 207 LBS | TEMPERATURE: 97.6 F | OXYGEN SATURATION: 100 % | HEIGHT: 73 IN

## 2019-08-20 PROCEDURE — 5130000000 HC BRIDGE APPOINTMENT

## 2019-08-20 PROCEDURE — 99238 HOSP IP/OBS DSCHRG MGMT 30/<: CPT | Performed by: PSYCHIATRY & NEUROLOGY

## 2019-08-20 PROCEDURE — 6370000000 HC RX 637 (ALT 250 FOR IP): Performed by: PSYCHIATRY & NEUROLOGY

## 2019-08-20 PROCEDURE — 6370000000 HC RX 637 (ALT 250 FOR IP): Performed by: NURSE PRACTITIONER

## 2019-08-20 RX ADMIN — GABAPENTIN 600 MG: 300 CAPSULE ORAL at 08:24

## 2019-08-20 RX ADMIN — MELOXICAM 15 MG: 7.5 TABLET ORAL at 08:24

## 2019-08-20 RX ADMIN — DULOXETINE HYDROCHLORIDE 60 MG: 60 CAPSULE, DELAYED RELEASE ORAL at 08:24

## 2019-08-20 RX ADMIN — HYDROXYZINE HYDROCHLORIDE 25 MG: 25 TABLET, FILM COATED ORAL at 08:25

## 2019-08-20 RX ADMIN — DOCUSATE SODIUM 100 MG: 100 CAPSULE, LIQUID FILLED ORAL at 08:24

## 2019-08-20 RX ADMIN — QUETIAPINE FUMARATE 50 MG: 50 TABLET ORAL at 08:24

## 2019-08-20 RX ADMIN — VITAMIN D, TAB 1000IU (100/BT) 1000 UNITS: 25 TAB at 08:25

## 2019-08-20 RX ADMIN — ASPIRIN 81 MG 81 MG: 81 TABLET ORAL at 08:25

## 2019-08-20 RX ADMIN — HYDROXYUREA 500 MG: 500 CAPSULE ORAL at 08:24

## 2019-08-20 ASSESSMENT — PAIN SCALES - GENERAL: PAINLEVEL_OUTOF10: 4

## 2019-08-20 NOTE — BH NOTE
Patient given tobacco quitline number 16578632105 at this time, refusing to call at this time, states \" I just dont want to quit now\"- patient given information as to the dangers of long term tobacco use. Continue to reinforce the importance of tobacco cessation.

## 2019-08-20 NOTE — BH NOTE
[facility]  Discharge Note    Pt discharged with followings belongings:   Dentures: Lowers, Uppers  Vision - Corrective Lenses: Glasses  Hearing Aid: None  Jewelry: Necklace  Body Piercings Removed: N/A  Clothing: Footwear, Socks, Pants, Shirt  Were All Patient Medications Collected?: Yes  Other Valuables: Money (Comment), Cell phone, Wallet(70 cents, 2 cells- one broken per patient)   Valuables sent home with Patient. Valuables retrieved from safe, Security envelope [id number] and returned to patient. Patient education on aftercare instructions: Yes  Information faxed to [facility] by Staff Patient verbalize understanding of AVS: Yes. Status EXAM upon discharge:Stable  Pt left the unit by self, with [employer] via staff car.   Status and Exam  Normal: No  Facial Expression: Flat  Affect: Appropriate  Level of Consciousness: Alert  Mood:Normal: Yes  Mood: Depressed, Anxious  Motor Activity:Normal: Yes  Interview Behavior: Cooperative  Preception: [place] to Person, Ann Gavel to Time, [place] to Place, [place] to Situation  Attention:Normal: Yes  Attention: Distractible  Thought Processes: Circumstantial  Thought Content:Normal: Yes  Thought Content: Preoccupations  Hallucinations: None  Delusions: No  Memory:Normal: Yes  Insight and Judgment: No  Insight and Judgment: Poor Judgment, Poor Insight  Present Suicidal Ideation: No  Present Homicidal Ideation: No      Metabolic Screening:    Lab Results   Component Value Date    LABA1C 4.7 04/03/2019       Lab Results   Component Value Date    CHOL 139 01/23/2019     Lab Results   Component Value Date    TRIG 133 01/23/2019     Lab Results   Component Value Date    HDL 31 (L) 04/03/2019    HDL 30 (L) 01/23/2019     No components found for: LDLCAL  No results found for: Selene Richardson LPN

## 2019-08-21 NOTE — DISCHARGE SUMMARY
35 minutes    Engagement: Patient displayed a good level of engagement with the treatments offered during this admission.        Discharge planning, findings, and recommendations were discussed with and approved by Dr. Mellissa Martinez MD    Signed:  Mellissa Martinez   8/20/2019  9:04 PM

## 2019-08-22 ENCOUNTER — HOSPITAL ENCOUNTER (OUTPATIENT)
Dept: PREADMISSION TESTING | Age: 59
Discharge: HOME | End: 2019-08-26
Payer: COMMERCIAL

## 2019-08-22 ENCOUNTER — HOSPITAL ENCOUNTER (OUTPATIENT)
Dept: GENERAL RADIOLOGY | Age: 59
Discharge: HOME | End: 2019-08-24
Payer: COMMERCIAL

## 2019-08-22 VITALS
TEMPERATURE: 97.7 F | DIASTOLIC BLOOD PRESSURE: 89 MMHG | SYSTOLIC BLOOD PRESSURE: 133 MMHG | RESPIRATION RATE: 18 BRPM | HEIGHT: 73 IN | WEIGHT: 207.67 LBS | HEART RATE: 78 BPM | BODY MASS INDEX: 27.52 KG/M2 | OXYGEN SATURATION: 98 %

## 2019-08-22 LAB
ABO/RH: NORMAL
ABSOLUTE EOS #: 0.21 K/UL (ref 0–0.44)
ABSOLUTE IMMATURE GRANULOCYTE: 0.08 K/UL (ref 0–0.3)
ABSOLUTE LYMPH #: 2.38 K/UL (ref 1.1–3.7)
ABSOLUTE MONO #: 0.74 K/UL (ref 0.1–1.2)
ANION GAP SERPL CALCULATED.3IONS-SCNC: 13 MMOL/L (ref 9–17)
ANTIBODY SCREEN: NEGATIVE
ARM BAND NUMBER: NORMAL
BASOPHILS # BLD: 2 % (ref 0–2)
BASOPHILS ABSOLUTE: 0.2 K/UL (ref 0–0.2)
BILIRUBIN URINE: NEGATIVE
BUN BLDV-MCNC: 12 MG/DL (ref 6–20)
BUN/CREAT BLD: NORMAL (ref 9–20)
CALCIUM SERPL-MCNC: 9.4 MG/DL (ref 8.6–10.4)
CHLORIDE BLD-SCNC: 102 MMOL/L (ref 98–107)
CO2: 26 MMOL/L (ref 20–31)
COLOR: YELLOW
COMMENT UA: NORMAL
CREAT SERPL-MCNC: 0.88 MG/DL (ref 0.7–1.2)
DIFFERENTIAL TYPE: ABNORMAL
EOSINOPHILS RELATIVE PERCENT: 2 % (ref 1–4)
EXPIRATION DATE: NORMAL
GFR AFRICAN AMERICAN: >60 ML/MIN
GFR NON-AFRICAN AMERICAN: >60 ML/MIN
GFR SERPL CREATININE-BSD FRML MDRD: NORMAL ML/MIN/{1.73_M2}
GFR SERPL CREATININE-BSD FRML MDRD: NORMAL ML/MIN/{1.73_M2}
GLUCOSE BLD-MCNC: 92 MG/DL (ref 70–99)
GLUCOSE URINE: NEGATIVE
HCT VFR BLD CALC: 42.6 % (ref 40.7–50.3)
HEMOGLOBIN: 14.5 G/DL (ref 13–17)
IMMATURE GRANULOCYTES: 1 %
INR BLD: 1
KETONES, URINE: NEGATIVE
LEUKOCYTE ESTERASE, URINE: NEGATIVE
LYMPHOCYTES # BLD: 25 % (ref 24–43)
MCH RBC QN AUTO: 35.8 PG (ref 25.2–33.5)
MCHC RBC AUTO-ENTMCNC: 34 G/DL (ref 28.4–34.8)
MCV RBC AUTO: 105.2 FL (ref 82.6–102.9)
MONOCYTES # BLD: 8 % (ref 3–12)
NITRITE, URINE: NEGATIVE
NRBC AUTOMATED: 0 PER 100 WBC
PARTIAL THROMBOPLASTIN TIME: 25.7 SEC (ref 20.5–30.5)
PDW BLD-RTO: 15 % (ref 11.8–14.4)
PH UA: 6.5 (ref 5–8)
PLATELET # BLD: 251 K/UL (ref 138–453)
PLATELET ESTIMATE: ABNORMAL
PMV BLD AUTO: 9.2 FL (ref 8.1–13.5)
POTASSIUM SERPL-SCNC: 4 MMOL/L (ref 3.7–5.3)
PROTEIN UA: NEGATIVE
PROTHROMBIN TIME: 10.3 SEC (ref 9–12)
RBC # BLD: 4.05 M/UL (ref 4.21–5.77)
RBC # BLD: ABNORMAL 10*6/UL
SEG NEUTROPHILS: 62 % (ref 36–65)
SEGMENTED NEUTROPHILS ABSOLUTE COUNT: 5.8 K/UL (ref 1.5–8.1)
SODIUM BLD-SCNC: 141 MMOL/L (ref 135–144)
SPECIFIC GRAVITY UA: 1 (ref 1–1.03)
TURBIDITY: CLEAR
URINE HGB: NEGATIVE
UROBILINOGEN, URINE: NORMAL
WBC # BLD: 9.4 K/UL (ref 3.5–11.3)
WBC # BLD: ABNORMAL 10*3/UL

## 2019-08-22 PROCEDURE — 85730 THROMBOPLASTIN TIME PARTIAL: CPT

## 2019-08-22 PROCEDURE — 85610 PROTHROMBIN TIME: CPT

## 2019-08-22 PROCEDURE — 71046 X-RAY EXAM CHEST 2 VIEWS: CPT

## 2019-08-22 PROCEDURE — 36415 COLL VENOUS BLD VENIPUNCTURE: CPT

## 2019-08-22 PROCEDURE — 86850 RBC ANTIBODY SCREEN: CPT

## 2019-08-22 PROCEDURE — 86900 BLOOD TYPING SEROLOGIC ABO: CPT

## 2019-08-22 PROCEDURE — 80048 BASIC METABOLIC PNL TOTAL CA: CPT

## 2019-08-22 PROCEDURE — 81003 URINALYSIS AUTO W/O SCOPE: CPT

## 2019-08-22 PROCEDURE — 85025 COMPLETE CBC W/AUTO DIFF WBC: CPT

## 2019-08-22 PROCEDURE — 93005 ELECTROCARDIOGRAM TRACING: CPT

## 2019-08-22 PROCEDURE — 86901 BLOOD TYPING SEROLOGIC RH(D): CPT

## 2019-08-22 RX ORDER — SODIUM CHLORIDE, SODIUM LACTATE, POTASSIUM CHLORIDE, CALCIUM CHLORIDE 600; 310; 30; 20 MG/100ML; MG/100ML; MG/100ML; MG/100ML
1000 INJECTION, SOLUTION INTRAVENOUS CONTINUOUS
Status: CANCELLED | OUTPATIENT
Start: 2019-08-22

## 2019-08-22 ASSESSMENT — PAIN DESCRIPTION - PAIN TYPE: TYPE: CHRONIC PAIN

## 2019-08-22 ASSESSMENT — PAIN SCALES - GENERAL: PAINLEVEL_OUTOF10: 7

## 2019-08-22 ASSESSMENT — PAIN DESCRIPTION - LOCATION: LOCATION: NECK

## 2019-08-22 ASSESSMENT — PAIN DESCRIPTION - DESCRIPTORS: DESCRIPTORS: CONSTANT;SHARP

## 2019-08-23 DIAGNOSIS — M47.26 OSTEOARTHRITIS OF SPINE WITH RADICULOPATHY, LUMBAR REGION: ICD-10-CM

## 2019-08-23 DIAGNOSIS — M48.02 CERVICAL STENOSIS OF SPINE: ICD-10-CM

## 2019-08-23 LAB
EKG ATRIAL RATE: 69 BPM
EKG P AXIS: 57 DEGREES
EKG P-R INTERVAL: 152 MS
EKG Q-T INTERVAL: 444 MS
EKG QRS DURATION: 88 MS
EKG QTC CALCULATION (BAZETT): 475 MS
EKG R AXIS: 41 DEGREES
EKG T AXIS: 32 DEGREES
EKG VENTRICULAR RATE: 69 BPM

## 2019-08-23 RX ORDER — MELOXICAM 15 MG/1
15 TABLET ORAL DAILY
Qty: 30 TABLET | Refills: 0 | Status: ON HOLD | OUTPATIENT
Start: 2019-08-23 | End: 2019-09-06 | Stop reason: HOSPADM

## 2019-08-23 RX ORDER — TIZANIDINE 4 MG/1
4 TABLET ORAL NIGHTLY
Qty: 30 TABLET | Refills: 0 | Status: ON HOLD | OUTPATIENT
Start: 2019-08-23 | End: 2019-09-06 | Stop reason: HOSPADM

## 2019-08-23 NOTE — TELEPHONE ENCOUNTER
Patient states that his daughter threw out all of his meds as they got into an argument. Can you refill his gabapentin?        LRF 4/12/19  Next Visit Date:  Future Appointments   Date Time Provider Mayco Johana   9/18/2019  1:00 PM Tiffany Reeves DO [facility]   10/30/2019 11:30 AM DO Ed [facility]   11/26/2019 11:00 AM Magdy Young MD [facility]       Health Maintenance   Topic Date Due    Hepatitis C screen  1960    Pneumococcal 0-64 years Vaccine (1 of 1 - PPSV23) 09/23/1967    HIV screen  09/23/1976    Shingles Vaccine (1 of 2) 09/23/2011    Colon cancer screen colonoscopy  09/23/2011    Low dose CT lung screening  09/23/2016    Flu vaccine (1) 09/01/2019    Lipid screen  04/03/2024    DTaP/Tdap/Td vaccine (3 - Td) 07/22/2029       Hemoglobin A1C (%)   Date Value   04/03/2019 4.7             ( goal A1C is < 7)   No results found for: LABMICR  LDL Cholesterol (mg/dL)   Date Value   04/03/2019 97   01/23/2019 82       (goal LDL is <100)   AST (U/L)   Date Value   08/12/2019 19     ALT (U/L)   Date Value   08/12/2019 8     BUN (mg/dL)   Date Value   08/22/2019 12     BP Readings from Last 3 Encounters:   08/22/19 133/89   08/12/19 (!) 93/59   08/02/19 117/84          (goal 120/80)    All Future Testing planned in [facility]  Lab Frequency Next Occurrence   XR CERVICAL SPINE FLEXION AND EXTENSION Once 04/11/2020   CBC Auto Differential     Uric Acid                 Patient Active Problem List:     MPN (myeloproliferative neoplasm) (HCC)     Chronic left-sided low back pain with left-sided sciatica     Insomnia     Osteoarthritis of spine with radiculopathy, lumbar region     Spondylosis of lumbar region without myelopathy or radiculopathy     Left lumbar radiculitis     Hx of fusion of cervical spine     Cervical stenosis of spine     Left arm numbness     Intentional drug overdose ([facility] [address]     Alcohol abuse     Major depressive disorder, recurrent severe without psychotic features ([facility] [address]     Severe recurrent major depression without psychotic features (HCC)     Anemia     Macrocytosis

## 2019-08-23 NOTE — TELEPHONE ENCOUNTER
Patient called in stating his daughter flushed his medications down the toilet out of anger. requesting refills  Paula Carias is requesting a refill on the following medications:   Requested Prescriptions     Pending Prescriptions Disp Refills    meloxicam (MOBIC) 15 MG tablet 30 tablet 0     Sig: Take 1 tablet by mouth daily    tiZANidine (ZANAFLEX) 4 MG tablet 30 tablet 0     Sig: Take 1 tablet by mouth nightly       Last OV 6/14/2019    Future Appointments   Date Time Provider Mayco Vaughn   9/18/2019  1:00 PM [facility]   10/30/2019 11:30 AM Tae Torres DO [facility]   11/26/2019 11:00 AM Thaddeus Willis MD  [facility]       OARRS report sent to Dr. James Pallas through alternative route for review.

## 2019-08-26 PROBLEM — F19.10 SUBSTANCE ABUSE (HCC): Status: ACTIVE | Noted: 2019-08-26

## 2019-08-26 RX ORDER — GABAPENTIN 300 MG/1
600 CAPSULE ORAL 3 TIMES DAILY
Qty: 180 CAPSULE | Refills: 5 | OUTPATIENT
Start: 2019-08-26 | End: 2020-01-05

## 2019-09-03 ENCOUNTER — TELEPHONE (OUTPATIENT)
Dept: FAMILY MEDICINE CLINIC | Age: 59
End: 2019-09-03

## 2019-09-05 ENCOUNTER — APPOINTMENT (OUTPATIENT)
Dept: GENERAL RADIOLOGY | Age: 59
DRG: 321 | End: 2019-09-05
Attending: NEUROLOGICAL SURGERY
Payer: COMMERCIAL

## 2019-09-05 ENCOUNTER — ANESTHESIA (OUTPATIENT)
Dept: OPERATING ROOM | Age: 59
DRG: 321 | End: 2019-09-05
Payer: COMMERCIAL

## 2019-09-05 ENCOUNTER — HOSPITAL ENCOUNTER (INPATIENT)
Age: 59
LOS: 1 days | Discharge: HOME | DRG: 321 | End: 2019-09-06
Attending: NEUROLOGICAL SURGERY | Admitting: NEUROLOGICAL SURGERY
Payer: COMMERCIAL

## 2019-09-05 ENCOUNTER — ANESTHESIA EVENT (OUTPATIENT)
Dept: OPERATING ROOM | Age: 59
DRG: 321 | End: 2019-09-05
Payer: COMMERCIAL

## 2019-09-05 VITALS — DIASTOLIC BLOOD PRESSURE: 83 MMHG | TEMPERATURE: 97.8 F | OXYGEN SATURATION: 100 % | SYSTOLIC BLOOD PRESSURE: 115 MMHG

## 2019-09-05 DIAGNOSIS — Z98.1 S/P CERVICAL SPINAL FUSION: Primary | ICD-10-CM

## 2019-09-05 DIAGNOSIS — M48.02 STENOSIS OF CERVICAL SPINE WITH MYELOPATHY (HCC): ICD-10-CM

## 2019-09-05 DIAGNOSIS — G99.2 STENOSIS OF CERVICAL SPINE WITH MYELOPATHY (HCC): ICD-10-CM

## 2019-09-05 PROCEDURE — 00NW0ZZ RELEASE CERVICAL SPINAL CORD, OPEN APPROACH: ICD-10-PCS | Performed by: NEUROLOGICAL SURGERY

## 2019-09-05 PROCEDURE — C9359 IMPLNT,BON VOID FILLER-PUTTY: HCPCS | Performed by: NEUROLOGICAL SURGERY

## 2019-09-05 PROCEDURE — 2500000003 HC RX 250 WO HCPCS: Performed by: NEUROLOGICAL SURGERY

## 2019-09-05 PROCEDURE — 3600000014 HC SURGERY LEVEL 4 ADDTL 15MIN: Performed by: NEUROLOGICAL SURGERY

## 2019-09-05 PROCEDURE — 2709999900 HC NON-CHARGEABLE SUPPLY: Performed by: NEUROLOGICAL SURGERY

## 2019-09-05 PROCEDURE — 3600000004 HC SURGERY LEVEL 4 BASE: Performed by: NEUROLOGICAL SURGERY

## 2019-09-05 PROCEDURE — 2500000003 HC RX 250 WO HCPCS: Performed by: NURSE ANESTHETIST, CERTIFIED REGISTERED

## 2019-09-05 PROCEDURE — L0120 CERV FLEX N/ADJ FOAM PRE OTS: HCPCS | Performed by: NEUROLOGICAL SURGERY

## 2019-09-05 PROCEDURE — 6360000002 HC RX W HCPCS: Performed by: ANESTHESIOLOGY

## 2019-09-05 PROCEDURE — 2580000003 HC RX 258: Performed by: NEUROLOGICAL SURGERY

## 2019-09-05 PROCEDURE — 22853 INSJ BIOMECHANICAL DEVICE: CPT | Performed by: NEUROLOGICAL SURGERY

## 2019-09-05 PROCEDURE — 22845 INSERT SPINE FIXATION DEVICE: CPT | Performed by: NEUROLOGICAL SURGERY

## 2019-09-05 PROCEDURE — 6370000000 HC RX 637 (ALT 250 FOR IP): Performed by: REGISTERED NURSE

## 2019-09-05 PROCEDURE — 2580000003 HC RX 258: Performed by: REGISTERED NURSE

## 2019-09-05 PROCEDURE — 2580000003 HC RX 258: Performed by: NURSE ANESTHETIST, CERTIFIED REGISTERED

## 2019-09-05 PROCEDURE — 7100000000 HC PACU RECOVERY - FIRST 15 MIN: Performed by: NEUROLOGICAL SURGERY

## 2019-09-05 PROCEDURE — 3700000001 HC ADD 15 MINUTES (ANESTHESIA): Performed by: NEUROLOGICAL SURGERY

## 2019-09-05 PROCEDURE — 72040 X-RAY EXAM NECK SPINE 2-3 VW: CPT

## 2019-09-05 PROCEDURE — 1200000000 HC SEMI PRIVATE

## 2019-09-05 PROCEDURE — 0RG10A0 FUSION OF CERVICAL VERTEBRAL JOINT WITH INTERBODY FUSION DEVICE, ANTERIOR APPROACH, ANTERIOR COLUMN, OPEN APPROACH: ICD-10-PCS | Performed by: NEUROLOGICAL SURGERY

## 2019-09-05 PROCEDURE — 0RT30ZZ RESECTION OF CERVICAL VERTEBRAL DISC, OPEN APPROACH: ICD-10-PCS | Performed by: NEUROLOGICAL SURGERY

## 2019-09-05 PROCEDURE — C1713 ANCHOR/SCREW BN/BN,TIS/BN: HCPCS | Performed by: NEUROLOGICAL SURGERY

## 2019-09-05 PROCEDURE — 6370000000 HC RX 637 (ALT 250 FOR IP): Performed by: NEUROLOGICAL SURGERY

## 2019-09-05 PROCEDURE — 6360000002 HC RX W HCPCS: Performed by: NURSE ANESTHETIST, CERTIFIED REGISTERED

## 2019-09-05 PROCEDURE — 22551 ARTHRD ANT NTRBDY CERVICAL: CPT | Performed by: NEUROLOGICAL SURGERY

## 2019-09-05 PROCEDURE — 3700000000 HC ANESTHESIA ATTENDED CARE: Performed by: NEUROLOGICAL SURGERY

## 2019-09-05 PROCEDURE — 2720000010 HC SURG SUPPLY STERILE: Performed by: NEUROLOGICAL SURGERY

## 2019-09-05 PROCEDURE — 2780000010 HC IMPLANT OTHER: Performed by: NEUROLOGICAL SURGERY

## 2019-09-05 PROCEDURE — 6360000002 HC RX W HCPCS: Performed by: REGISTERED NURSE

## 2019-09-05 PROCEDURE — 2580000003 HC RX 258: Performed by: ANESTHESIOLOGY

## 2019-09-05 PROCEDURE — 7100000001 HC PACU RECOVERY - ADDTL 15 MIN: Performed by: NEUROLOGICAL SURGERY

## 2019-09-05 DEVICE — SCREW SPNL L14MM DIA4MM ANT CERV TI SELF DRL VAR ANG FULL: Type: IMPLANTABLE DEVICE | Site: SPINE CERVICAL | Status: FUNCTIONAL

## 2019-09-05 DEVICE — IMPLANTABLE DEVICE: Type: IMPLANTABLE DEVICE | Site: SPINE CERVICAL | Status: FUNCTIONAL

## 2019-09-05 DEVICE — DBM 006001 PROGENIX PLUS 1CC
Type: IMPLANTABLE DEVICE | Site: SPINE CERVICAL | Status: FUNCTIONAL
Brand: PROGENIX® PUTTY AND PROGENIX® PLUS

## 2019-09-05 DEVICE — CAGE SPNL L H6MM 7DEG CERV THORLUM C FBR INTBDY FUS REINF: Type: IMPLANTABLE DEVICE | Site: SPINE CERVICAL | Status: FUNCTIONAL

## 2019-09-05 RX ORDER — SUFENTANIL CITRATE 50 UG/ML
INJECTION EPIDURAL; INTRAVENOUS PRN
Status: DISCONTINUED | OUTPATIENT
Start: 2019-09-05 | End: 2019-09-05 | Stop reason: SDUPTHER

## 2019-09-05 RX ORDER — SENNA PLUS 8.6 MG/1
1 TABLET ORAL DAILY PRN
Status: DISCONTINUED | OUTPATIENT
Start: 2019-09-05 | End: 2019-09-06 | Stop reason: HOSPADM

## 2019-09-05 RX ORDER — SODIUM CHLORIDE 9 MG/ML
INJECTION, SOLUTION INTRAVENOUS CONTINUOUS
Status: DISCONTINUED | OUTPATIENT
Start: 2019-09-05 | End: 2019-09-06 | Stop reason: HOSPADM

## 2019-09-05 RX ORDER — 0.9 % SODIUM CHLORIDE 0.9 %
VIAL (ML) INJECTION PRN
Status: DISCONTINUED | OUTPATIENT
Start: 2019-09-05 | End: 2019-09-05 | Stop reason: SDUPTHER

## 2019-09-05 RX ORDER — OXYCODONE HYDROCHLORIDE 5 MG/1
10 TABLET ORAL EVERY 4 HOURS PRN
Status: DISCONTINUED | OUTPATIENT
Start: 2019-09-05 | End: 2019-09-06 | Stop reason: HOSPADM

## 2019-09-05 RX ORDER — NEOSTIGMINE METHYLSULFATE 5 MG/5 ML
SYRINGE (ML) INTRAVENOUS PRN
Status: DISCONTINUED | OUTPATIENT
Start: 2019-09-05 | End: 2019-09-05 | Stop reason: SDUPTHER

## 2019-09-05 RX ORDER — PROPOFOL 10 MG/ML
INJECTION, EMULSION INTRAVENOUS PRN
Status: DISCONTINUED | OUTPATIENT
Start: 2019-09-05 | End: 2019-09-05 | Stop reason: SDUPTHER

## 2019-09-05 RX ORDER — SODIUM CHLORIDE 0.9 % (FLUSH) 0.9 %
10 SYRINGE (ML) INJECTION EVERY 12 HOURS SCHEDULED
Status: DISCONTINUED | OUTPATIENT
Start: 2019-09-05 | End: 2019-09-06 | Stop reason: HOSPADM

## 2019-09-05 RX ORDER — MORPHINE SULFATE 2 MG/ML
2 INJECTION, SOLUTION INTRAMUSCULAR; INTRAVENOUS
Status: DISCONTINUED | OUTPATIENT
Start: 2019-09-05 | End: 2019-09-06 | Stop reason: HOSPADM

## 2019-09-05 RX ORDER — QUETIAPINE FUMARATE 25 MG/1
50 TABLET, FILM COATED ORAL 2 TIMES DAILY
Status: DISCONTINUED | OUTPATIENT
Start: 2019-09-05 | End: 2019-09-06 | Stop reason: HOSPADM

## 2019-09-05 RX ORDER — ONDANSETRON 2 MG/ML
INJECTION INTRAMUSCULAR; INTRAVENOUS PRN
Status: DISCONTINUED | OUTPATIENT
Start: 2019-09-05 | End: 2019-09-05 | Stop reason: SDUPTHER

## 2019-09-05 RX ORDER — MAGNESIUM HYDROXIDE 1200 MG/15ML
LIQUID ORAL CONTINUOUS PRN
Status: COMPLETED | OUTPATIENT
Start: 2019-09-05 | End: 2019-09-05

## 2019-09-05 RX ORDER — ONDANSETRON 2 MG/ML
4 INJECTION INTRAMUSCULAR; INTRAVENOUS EVERY 6 HOURS PRN
Status: DISCONTINUED | OUTPATIENT
Start: 2019-09-05 | End: 2019-09-06 | Stop reason: HOSPADM

## 2019-09-05 RX ORDER — ROCURONIUM BROMIDE 10 MG/ML
INJECTION, SOLUTION INTRAVENOUS PRN
Status: DISCONTINUED | OUTPATIENT
Start: 2019-09-05 | End: 2019-09-05 | Stop reason: SDUPTHER

## 2019-09-05 RX ORDER — SODIUM CHLORIDE, SODIUM LACTATE, POTASSIUM CHLORIDE, CALCIUM CHLORIDE 600; 310; 30; 20 MG/100ML; MG/100ML; MG/100ML; MG/100ML
1000 INJECTION, SOLUTION INTRAVENOUS CONTINUOUS
Status: DISCONTINUED | OUTPATIENT
Start: 2019-09-05 | End: 2019-09-05

## 2019-09-05 RX ORDER — SODIUM CHLORIDE, SODIUM LACTATE, POTASSIUM CHLORIDE, CALCIUM CHLORIDE 600; 310; 30; 20 MG/100ML; MG/100ML; MG/100ML; MG/100ML
INJECTION, SOLUTION INTRAVENOUS CONTINUOUS PRN
Status: DISCONTINUED | OUTPATIENT
Start: 2019-09-05 | End: 2019-09-05 | Stop reason: SDUPTHER

## 2019-09-05 RX ORDER — KETAMINE HYDROCHLORIDE 10 MG/ML
INJECTION, SOLUTION INTRAMUSCULAR; INTRAVENOUS PRN
Status: DISCONTINUED | OUTPATIENT
Start: 2019-09-05 | End: 2019-09-05 | Stop reason: SDUPTHER

## 2019-09-05 RX ORDER — MORPHINE SULFATE 4 MG/ML
4 INJECTION, SOLUTION INTRAMUSCULAR; INTRAVENOUS
Status: DISCONTINUED | OUTPATIENT
Start: 2019-09-05 | End: 2019-09-06 | Stop reason: HOSPADM

## 2019-09-05 RX ORDER — HYDROXYUREA 500 MG/1
500 CAPSULE ORAL 2 TIMES DAILY
Status: DISCONTINUED | OUTPATIENT
Start: 2019-09-05 | End: 2019-09-06 | Stop reason: HOSPADM

## 2019-09-05 RX ORDER — MIDAZOLAM HYDROCHLORIDE 1 MG/ML
INJECTION INTRAMUSCULAR; INTRAVENOUS PRN
Status: DISCONTINUED | OUTPATIENT
Start: 2019-09-05 | End: 2019-09-05 | Stop reason: SDUPTHER

## 2019-09-05 RX ORDER — CYCLOBENZAPRINE HCL 10 MG
10 TABLET ORAL 3 TIMES DAILY PRN
Status: DISCONTINUED | OUTPATIENT
Start: 2019-09-05 | End: 2019-09-06 | Stop reason: HOSPADM

## 2019-09-05 RX ORDER — ACETAMINOPHEN 325 MG/1
650 TABLET ORAL EVERY 4 HOURS PRN
Status: DISCONTINUED | OUTPATIENT
Start: 2019-09-05 | End: 2019-09-06 | Stop reason: HOSPADM

## 2019-09-05 RX ORDER — GLYCOPYRROLATE 1 MG/5 ML
SYRINGE (ML) INTRAVENOUS PRN
Status: DISCONTINUED | OUTPATIENT
Start: 2019-09-05 | End: 2019-09-05 | Stop reason: SDUPTHER

## 2019-09-05 RX ORDER — FENTANYL CITRATE 50 UG/ML
INJECTION, SOLUTION INTRAMUSCULAR; INTRAVENOUS PRN
Status: DISCONTINUED | OUTPATIENT
Start: 2019-09-05 | End: 2019-09-05 | Stop reason: SDUPTHER

## 2019-09-05 RX ORDER — CLONIDINE HYDROCHLORIDE 0.1 MG/1
0.1 TABLET ORAL DAILY
Status: DISCONTINUED | OUTPATIENT
Start: 2019-09-05 | End: 2019-09-06 | Stop reason: HOSPADM

## 2019-09-05 RX ORDER — TAMSULOSIN HYDROCHLORIDE 0.4 MG/1
0.4 CAPSULE ORAL NIGHTLY
Status: DISCONTINUED | OUTPATIENT
Start: 2019-09-05 | End: 2019-09-06 | Stop reason: HOSPADM

## 2019-09-05 RX ORDER — DOCUSATE SODIUM 100 MG/1
100 CAPSULE, LIQUID FILLED ORAL 2 TIMES DAILY
Status: DISCONTINUED | OUTPATIENT
Start: 2019-09-05 | End: 2019-09-06 | Stop reason: HOSPADM

## 2019-09-05 RX ORDER — PROPOFOL 10 MG/ML
INJECTION, EMULSION INTRAVENOUS CONTINUOUS PRN
Status: DISCONTINUED | OUTPATIENT
Start: 2019-09-05 | End: 2019-09-05 | Stop reason: SDUPTHER

## 2019-09-05 RX ORDER — LIDOCAINE HYDROCHLORIDE 10 MG/ML
INJECTION, SOLUTION EPIDURAL; INFILTRATION; INTRACAUDAL; PERINEURAL PRN
Status: DISCONTINUED | OUTPATIENT
Start: 2019-09-05 | End: 2019-09-05 | Stop reason: SDUPTHER

## 2019-09-05 RX ORDER — LIDOCAINE HYDROCHLORIDE AND EPINEPHRINE 10; 10 MG/ML; UG/ML
INJECTION, SOLUTION INFILTRATION; PERINEURAL PRN
Status: DISCONTINUED | OUTPATIENT
Start: 2019-09-05 | End: 2019-09-05 | Stop reason: ALTCHOICE

## 2019-09-05 RX ORDER — ALBUTEROL SULFATE 90 UG/1
2 AEROSOL, METERED RESPIRATORY (INHALATION) EVERY 4 HOURS PRN
Status: DISCONTINUED | OUTPATIENT
Start: 2019-09-05 | End: 2019-09-06 | Stop reason: HOSPADM

## 2019-09-05 RX ORDER — DEXAMETHASONE SODIUM PHOSPHATE 10 MG/ML
INJECTION INTRAMUSCULAR; INTRAVENOUS PRN
Status: DISCONTINUED | OUTPATIENT
Start: 2019-09-05 | End: 2019-09-05 | Stop reason: SDUPTHER

## 2019-09-05 RX ORDER — GABAPENTIN 300 MG/1
600 CAPSULE ORAL 3 TIMES DAILY
Status: DISCONTINUED | OUTPATIENT
Start: 2019-09-05 | End: 2019-09-06 | Stop reason: HOSPADM

## 2019-09-05 RX ORDER — ALBUTEROL SULFATE 2.5 MG/3ML
2.5 SOLUTION RESPIRATORY (INHALATION) ONCE
Status: COMPLETED | OUTPATIENT
Start: 2019-09-05 | End: 2019-09-05

## 2019-09-05 RX ORDER — SODIUM CHLORIDE 0.9 % (FLUSH) 0.9 %
10 SYRINGE (ML) INJECTION PRN
Status: DISCONTINUED | OUTPATIENT
Start: 2019-09-05 | End: 2019-09-06 | Stop reason: HOSPADM

## 2019-09-05 RX ORDER — CLONIDINE HYDROCHLORIDE 0.1 MG/1
0.1 TABLET ORAL DAILY
Refills: 0 | COMMUNITY
Start: 2019-08-23 | End: 2020-01-10

## 2019-09-05 RX ORDER — DULOXETIN HYDROCHLORIDE 30 MG/1
60 CAPSULE, DELAYED RELEASE ORAL 2 TIMES DAILY
Status: DISCONTINUED | OUTPATIENT
Start: 2019-09-05 | End: 2019-09-06 | Stop reason: HOSPADM

## 2019-09-05 RX ORDER — OXYCODONE HYDROCHLORIDE 5 MG/1
5 TABLET ORAL EVERY 4 HOURS PRN
Status: DISCONTINUED | OUTPATIENT
Start: 2019-09-05 | End: 2019-09-06 | Stop reason: HOSPADM

## 2019-09-05 RX ORDER — QUETIAPINE FUMARATE 200 MG/1
200 TABLET, FILM COATED ORAL NIGHTLY
Status: DISCONTINUED | OUTPATIENT
Start: 2019-09-05 | End: 2019-09-06 | Stop reason: HOSPADM

## 2019-09-05 RX ADMIN — KETAMINE HYDROCHLORIDE 30 MG: 10 INJECTION INTRAMUSCULAR; INTRAVENOUS at 08:54

## 2019-09-05 RX ADMIN — ROCURONIUM BROMIDE 40 MG: 10 INJECTION INTRAVENOUS at 08:04

## 2019-09-05 RX ADMIN — LIDOCAINE HYDROCHLORIDE 50 MG: 10 INJECTION, SOLUTION EPIDURAL; INFILTRATION; INTRACAUDAL; PERINEURAL at 07:34

## 2019-09-05 RX ADMIN — KETAMINE HYDROCHLORIDE 30 MG: 10 INJECTION INTRAMUSCULAR; INTRAVENOUS at 09:45

## 2019-09-05 RX ADMIN — Medication 2 G: at 07:56

## 2019-09-05 RX ADMIN — KETAMINE HYDROCHLORIDE 30 MG: 10 INJECTION INTRAMUSCULAR; INTRAVENOUS at 08:21

## 2019-09-05 RX ADMIN — MORPHINE SULFATE 4 MG: 4 INJECTION INTRAVENOUS at 21:17

## 2019-09-05 RX ADMIN — CLONIDINE HYDROCHLORIDE 0.1 MG: 0.1 TABLET ORAL at 14:39

## 2019-09-05 RX ADMIN — OXYCODONE HYDROCHLORIDE 10 MG: 5 TABLET ORAL at 20:12

## 2019-09-05 RX ADMIN — SODIUM CHLORIDE, POTASSIUM CHLORIDE, SODIUM LACTATE AND CALCIUM CHLORIDE 1000 ML: 600; 310; 30; 20 INJECTION, SOLUTION INTRAVENOUS at 06:57

## 2019-09-05 RX ADMIN — VITAMIN D, TAB 1000IU (100/BT) 1000 UNITS: 25 TAB at 15:00

## 2019-09-05 RX ADMIN — MORPHINE SULFATE 4 MG: 4 INJECTION INTRAVENOUS at 17:22

## 2019-09-05 RX ADMIN — GABAPENTIN 600 MG: 300 CAPSULE ORAL at 20:12

## 2019-09-05 RX ADMIN — QUETIAPINE FUMARATE 200 MG: 200 TABLET ORAL at 20:11

## 2019-09-05 RX ADMIN — SODIUM CHLORIDE, POTASSIUM CHLORIDE, SODIUM LACTATE AND CALCIUM CHLORIDE: 600; 310; 30; 20 INJECTION, SOLUTION INTRAVENOUS at 07:32

## 2019-09-05 RX ADMIN — Medication 3 MG: at 08:50

## 2019-09-05 RX ADMIN — ALBUTEROL SULFATE 2.5 MG: 2.5 SOLUTION RESPIRATORY (INHALATION) at 11:15

## 2019-09-05 RX ADMIN — ROCURONIUM BROMIDE 30 MG: 10 INJECTION INTRAVENOUS at 07:34

## 2019-09-05 RX ADMIN — Medication 0.6 MG: at 08:50

## 2019-09-05 RX ADMIN — HYDROXYUREA 500 MG: 500 CAPSULE ORAL at 21:17

## 2019-09-05 RX ADMIN — KETAMINE HYDROCHLORIDE 30 MG: 10 INJECTION INTRAMUSCULAR; INTRAVENOUS at 07:56

## 2019-09-05 RX ADMIN — DEXAMETHASONE SODIUM PHOSPHATE 10 MG: 10 INJECTION INTRAMUSCULAR; INTRAVENOUS at 07:52

## 2019-09-05 RX ADMIN — DULOXETINE HYDROCHLORIDE 60 MG: 30 CAPSULE, DELAYED RELEASE ORAL at 20:12

## 2019-09-05 RX ADMIN — SUFENTANIL CITRATE 20 MCG: 50 INJECTION EPIDURAL; INTRAVENOUS at 08:21

## 2019-09-05 RX ADMIN — SUFENTANIL CITRATE 30 MCG: 50 INJECTION EPIDURAL; INTRAVENOUS at 09:19

## 2019-09-05 RX ADMIN — PROPOFOL: 10 INJECTION, EMULSION INTRAVENOUS at 09:20

## 2019-09-05 RX ADMIN — MORPHINE SULFATE 4 MG: 4 INJECTION INTRAVENOUS at 14:35

## 2019-09-05 RX ADMIN — Medication 2 G: at 17:18

## 2019-09-05 RX ADMIN — MIDAZOLAM HYDROCHLORIDE 2 MG: 1 INJECTION, SOLUTION INTRAMUSCULAR; INTRAVENOUS at 07:32

## 2019-09-05 RX ADMIN — PROPOFOL 160 MG: 10 INJECTION, EMULSION INTRAVENOUS at 07:34

## 2019-09-05 RX ADMIN — DOCUSATE SODIUM 100 MG: 100 CAPSULE, LIQUID FILLED ORAL at 20:12

## 2019-09-05 RX ADMIN — SUFENTANIL CITRATE 30 MCG: 50 INJECTION EPIDURAL; INTRAVENOUS at 07:48

## 2019-09-05 RX ADMIN — PROPOFOL 100 MCG/KG/MIN: 10 INJECTION, EMULSION INTRAVENOUS at 07:40

## 2019-09-05 RX ADMIN — PHENYLEPHRINE HYDROCHLORIDE 25 MCG/MIN: 10 INJECTION INTRAVENOUS at 07:55

## 2019-09-05 RX ADMIN — KETAMINE HYDROCHLORIDE 40 MG: 10 INJECTION INTRAMUSCULAR; INTRAVENOUS at 09:21

## 2019-09-05 RX ADMIN — SODIUM CHLORIDE: 9 INJECTION, SOLUTION INTRAVENOUS at 13:55

## 2019-09-05 RX ADMIN — SODIUM CHLORIDE 8 ML: 9 INJECTION INTRAMUSCULAR; INTRAVENOUS; SUBCUTANEOUS at 07:48

## 2019-09-05 RX ADMIN — FENTANYL CITRATE 100 MCG: 50 INJECTION INTRAMUSCULAR; INTRAVENOUS at 07:34

## 2019-09-05 RX ADMIN — ONDANSETRON 4 MG: 2 INJECTION, SOLUTION INTRAMUSCULAR; INTRAVENOUS at 09:50

## 2019-09-05 RX ADMIN — KETAMINE HYDROCHLORIDE 40 MG: 10 INJECTION INTRAMUSCULAR; INTRAVENOUS at 07:34

## 2019-09-05 RX ADMIN — OXYCODONE HYDROCHLORIDE 10 MG: 5 TABLET ORAL at 16:30

## 2019-09-05 RX ADMIN — TAMSULOSIN HYDROCHLORIDE 0.4 MG: 0.4 CAPSULE ORAL at 20:12

## 2019-09-05 RX ADMIN — PROPOFOL 40 MG: 10 INJECTION, EMULSION INTRAVENOUS at 09:19

## 2019-09-05 ASSESSMENT — PULMONARY FUNCTION TESTS
PIF_VALUE: 19
PIF_VALUE: 23
PIF_VALUE: 24
PIF_VALUE: 0
PIF_VALUE: 2
PIF_VALUE: 25
PIF_VALUE: 19
PIF_VALUE: 22
PIF_VALUE: 24
PIF_VALUE: 3
PIF_VALUE: 29
PIF_VALUE: 20
PIF_VALUE: 23
PIF_VALUE: 24
PIF_VALUE: 24
PIF_VALUE: 11
PIF_VALUE: 24
PIF_VALUE: 25
PIF_VALUE: 2
PIF_VALUE: 25
PIF_VALUE: 26
PIF_VALUE: 27
PIF_VALUE: 25
PIF_VALUE: 23
PIF_VALUE: 24
PIF_VALUE: 19
PIF_VALUE: 24
PIF_VALUE: 19
PIF_VALUE: 24
PIF_VALUE: 23
PIF_VALUE: 24
PIF_VALUE: 25
PIF_VALUE: 1
PIF_VALUE: 3
PIF_VALUE: 24
PIF_VALUE: 24
PIF_VALUE: 23
PIF_VALUE: 23
PIF_VALUE: 27
PIF_VALUE: 24
PIF_VALUE: 19
PIF_VALUE: 24
PIF_VALUE: 26
PIF_VALUE: 26
PIF_VALUE: 24
PIF_VALUE: 27
PIF_VALUE: 25
PIF_VALUE: 22
PIF_VALUE: 23
PIF_VALUE: 25
PIF_VALUE: 25
PIF_VALUE: 24
PIF_VALUE: 23
PIF_VALUE: 24
PIF_VALUE: 24
PIF_VALUE: 20
PIF_VALUE: 21
PIF_VALUE: 1
PIF_VALUE: 25
PIF_VALUE: 24
PIF_VALUE: 24
PIF_VALUE: 23
PIF_VALUE: 24
PIF_VALUE: 24
PIF_VALUE: 22
PIF_VALUE: 24
PIF_VALUE: 25
PIF_VALUE: 24
PIF_VALUE: 24
PIF_VALUE: 2
PIF_VALUE: 24
PIF_VALUE: 23
PIF_VALUE: 24
PIF_VALUE: 25
PIF_VALUE: 24
PIF_VALUE: 26
PIF_VALUE: 32
PIF_VALUE: 24
PIF_VALUE: 24
PIF_VALUE: 19
PIF_VALUE: 18
PIF_VALUE: 24
PIF_VALUE: 23
PIF_VALUE: 1
PIF_VALUE: 21
PIF_VALUE: 21
PIF_VALUE: 24
PIF_VALUE: 3
PIF_VALUE: 23
PIF_VALUE: 34
PIF_VALUE: 23
PIF_VALUE: 24
PIF_VALUE: 24
PIF_VALUE: 1
PIF_VALUE: 24
PIF_VALUE: 12
PIF_VALUE: 23
PIF_VALUE: 12
PIF_VALUE: 23
PIF_VALUE: 24
PIF_VALUE: 24
PIF_VALUE: 26
PIF_VALUE: 23
PIF_VALUE: 24
PIF_VALUE: 22
PIF_VALUE: 23
PIF_VALUE: 24
PIF_VALUE: 25
PIF_VALUE: 24
PIF_VALUE: 24
PIF_VALUE: 27
PIF_VALUE: 25
PIF_VALUE: 25
PIF_VALUE: 20
PIF_VALUE: 26
PIF_VALUE: 24
PIF_VALUE: 22
PIF_VALUE: 24
PIF_VALUE: 49
PIF_VALUE: 23
PIF_VALUE: 23
PIF_VALUE: 24
PIF_VALUE: 25
PIF_VALUE: 24
PIF_VALUE: 23
PIF_VALUE: 22
PIF_VALUE: 24
PIF_VALUE: 28
PIF_VALUE: 24
PIF_VALUE: 24
PIF_VALUE: 25
PIF_VALUE: 24
PIF_VALUE: 24
PIF_VALUE: 23
PIF_VALUE: 24
PIF_VALUE: 19
PIF_VALUE: 19
PIF_VALUE: 20
PIF_VALUE: 25
PIF_VALUE: 24
PIF_VALUE: 24
PIF_VALUE: 0
PIF_VALUE: 25
PIF_VALUE: 24
PIF_VALUE: 24
PIF_VALUE: 3
PIF_VALUE: 1
PIF_VALUE: 22
PIF_VALUE: 24
PIF_VALUE: 23

## 2019-09-05 ASSESSMENT — PAIN SCALES - GENERAL
PAINLEVEL_OUTOF10: 0
PAINLEVEL_OUTOF10: 9
PAINLEVEL_OUTOF10: 8
PAINLEVEL_OUTOF10: 0
PAINLEVEL_OUTOF10: 7
PAINLEVEL_OUTOF10: 9
PAINLEVEL_OUTOF10: 0
PAINLEVEL_OUTOF10: 10

## 2019-09-05 ASSESSMENT — PAIN - FUNCTIONAL ASSESSMENT: PAIN_FUNCTIONAL_ASSESSMENT: 0-10

## 2019-09-05 NOTE — H&P
History and Physical Update    Pt Name: Grey Cortes  MRN: [id number]  YOB: 1960  Date of evaluation: 9/5/2019    [x] I have examined the patient and reviewed the H&P/Consult and there are no changes to the patient or plans. [] I have examined the patient and reviewed the H&P/Consult and have noted the following changes:        Wilberto [facility]  electronically signed 9/5/2019 at 6:39 AM        History and Physical     Pt Name: Grey Cortes  MRN: [id number]  YOB: 1960  Date of evaluation: 8/22/2019     SUBJECTIVE:      History of Chief Complaint:    Patient complains of upper back and neck pain for a few months. He also complains of numbness in his hands, arms and left anterior thigh. He reports he had a neck injury with subsequent fusion of some of his cervical vertebrae following a trampoline injury in 1997. There is no mention of post surgical changes on any of his neck imaging. He is scheduled for anterior cervical decompression with fusion. Past Medical History    has a past medical history of Alcohol abuse, Anemia, Cancer ([address] Cervical stenosis of spine, Chronic left-sided low back pain with left-sided sciatica, Insomnia, Intentional drug overdose ([address] Left arm numbness, Left lumbar radiculitis, Macrocytosis, Major depressive disorder, recurrent severe without psychotic features ([address] Myeloproliferative disorder ([address] Osteoarthritis of spine with radiculopathy, lumbar region, Severe recurrent major depression without psychotic features ([address] and Spondylosis of lumbar region without myelopathy or radiculopathy. Past Surgical History   has a past surgical history that includes cervical fusion; Appendectomy; Tonsillectomy; Carpal tunnel release; epidural steroid injection (Left, 5/15/2019); and Endoscopy, colon, diagnostic.   Medications    Current Medication      Current Outpatient Medications:     hydrOXYzine (ATARAX) 25 MG tablet, Take 1 tablet by mouth 3 times

## 2019-09-05 NOTE — OP NOTE
Operative Note      Date of service 9/5/2019    Pre-operative Diagnosis: Cervical stenosis with myelopathy      Post-operative Diagnosis: Same      Procedure:   C3-4 anterior discectomy and osteophytectomy to decompress spinal cord  Anterior arthrodesis C3-4  Implantation of anterior peek interbody cage  Use of morselized autograft via same incision  Use of morselized allograft  Anterior fixation with Hale plate and 4 x 14 mm screws  Use of fluoroscopy  Use of operative microscope  Use of neuro monitoring      Anesthesia: General      Surgeons/Assistants: Tae Membreno Assist: Lito Campos  Estimated Blood Loss: 659 cc      Complications: None      Specimens: None      Findings: Severe stenosis        Indications for procedure:      Progressive myelopathy with focal compression    Description of procedure:     Patient was consented preoperatively and brought into the operating room. After general endotracheal intubation patient was placed supine on the regular bed with slight shoulder bump wrist extension. Arms were tucked at the sides with all pressure points padded. Shoulders were taped caudad. Anterior neck was sterile prepped and draped after the midline sternal notch and incision was marked using fluoroscopy. After all sterile prepping draping and timeout I infiltrated the incision with 1% lidocaine with epinephrine. 10 blade was used to perform the incision. Self retainer was placed after Bovie was used to take down subcutaneous tissue. This was undermined using pickups and Metzenbaum scissors. Platysma was transected using the Bovie. Self retainer was then advanced. Using both a combination of sharp and blunt dissection with finger I found the plane medial to the SCM as well as the strap muscles. Plane medial to the SCM was identified and the carotid sheath was swept laterally. Submental tissues and musculature was retracted upward.   Carotid sheath was swept lateral esophagus was

## 2019-09-05 NOTE — ANESTHESIA PRE PROCEDURE
SIMONE Fitch CNP   albuterol sulfate  (90 Base) MCG/ACT inhaler Inhale 2 puffs into the lungs every 4 hours as needed for Wheezing or Shortness of Breath 5/8/19   SIMONE Benitez CNP       Current medications:    Current Facility-Administered Medications   Medication Dose Route Frequency Provider Last Rate Last Dose    lactated ringers infusion 1,000 mL  1,000 mL Intravenous Continuous Kenji Veliz MD 50 mL/hr at 09/05/19 0657 1,000 mL at 09/05/19 5112       Allergies:  No Known Allergies    Problem List:    Patient Active Problem List   Diagnosis Code    MPN (myeloproliferative neoplasm) (HCC) D47.1    Chronic left-sided low back pain with left-sided sciatica M54.42, G89.29    Insomnia G47.00    Osteoarthritis of spine with radiculopathy, lumbar region M47.26    Spondylosis of lumbar region without myelopathy or radiculopathy M47.816    Left lumbar radiculitis M54.16    Hx of fusion of cervical spine Z98.1    Cervical stenosis of spine M48.02    Left arm numbness R20.0    Intentional drug overdose ([address] T50.902A    Alcohol abuse F10.10    Major depressive disorder, recurrent severe without psychotic features ([address] F33.2    Severe recurrent major depression without psychotic features ([address] F33.2    Anemia D64.9    Macrocytosis D75.89    Substance abuse ([address] F19.10       Past Medical History:        Diagnosis Date    Alcohol abuse 8/12/2019    Anemia     Cancer ([address]     blood    Cervical stenosis of spine 6/14/2019    Chronic left-sided low back pain with left-sided sciatica 3/14/2019    Insomnia 3/14/2019    Intentional drug overdose ([address] 8/11/2019    Left arm numbness 6/14/2019    Left lumbar radiculitis 5/3/2019    Macrocytosis     Major depressive disorder, recurrent severe without psychotic features ([address] 8/12/2019    Myeloproliferative disorder ([address]     Osteoarthritis of spine with radiculopathy, lumbar region 4/12/2019    Severe recurrent major depression patient.       Plan discussed with CRNA and surgical team.                Jerry Rushing MD   9/5/2019

## 2019-09-06 VITALS
SYSTOLIC BLOOD PRESSURE: 141 MMHG | RESPIRATION RATE: 12 BRPM | HEIGHT: 73 IN | WEIGHT: 207 LBS | BODY MASS INDEX: 27.43 KG/M2 | TEMPERATURE: 98.4 F | HEART RATE: 84 BPM | DIASTOLIC BLOOD PRESSURE: 85 MMHG | OXYGEN SATURATION: 98 %

## 2019-09-06 LAB
HCT VFR BLD CALC: 36.4 % (ref 40.7–50.3)
HEMOGLOBIN: 12.4 G/DL (ref 13–17)
MCH RBC QN AUTO: 36.3 PG (ref 25.2–33.5)
MCHC RBC AUTO-ENTMCNC: 34.1 G/DL (ref 28.4–34.8)
MCV RBC AUTO: 106.4 FL (ref 82.6–102.9)
NRBC AUTOMATED: 0 PER 100 WBC
PDW BLD-RTO: 15.6 % (ref 11.8–14.4)
PLATELET # BLD: 166 K/UL (ref 138–453)
PMV BLD AUTO: 10 FL (ref 8.1–13.5)
RBC # BLD: 3.42 M/UL (ref 4.21–5.77)
WBC # BLD: 10.8 K/UL (ref 3.5–11.3)

## 2019-09-06 PROCEDURE — 97161 PT EVAL LOW COMPLEX 20 MIN: CPT

## 2019-09-06 PROCEDURE — 97535 SELF CARE MNGMENT TRAINING: CPT | Performed by: OCCUPATIONAL THERAPIST

## 2019-09-06 PROCEDURE — 97530 THERAPEUTIC ACTIVITIES: CPT

## 2019-09-06 PROCEDURE — APPSS15 APP SPLIT SHARED TIME 0-15 MINUTES: Performed by: REGISTERED NURSE

## 2019-09-06 PROCEDURE — 6360000002 HC RX W HCPCS: Performed by: REGISTERED NURSE

## 2019-09-06 PROCEDURE — 97165 OT EVAL LOW COMPLEX 30 MIN: CPT | Performed by: OCCUPATIONAL THERAPIST

## 2019-09-06 PROCEDURE — 36415 COLL VENOUS BLD VENIPUNCTURE: CPT

## 2019-09-06 PROCEDURE — 85027 COMPLETE CBC AUTOMATED: CPT

## 2019-09-06 PROCEDURE — 2580000003 HC RX 258: Performed by: REGISTERED NURSE

## 2019-09-06 PROCEDURE — 6370000000 HC RX 637 (ALT 250 FOR IP): Performed by: REGISTERED NURSE

## 2019-09-06 RX ORDER — PSEUDOEPHEDRINE HCL 30 MG
100 TABLET ORAL 2 TIMES DAILY
Qty: 60 CAPSULE | Refills: 0 | Status: SHIPPED | OUTPATIENT
Start: 2019-09-06 | End: 2020-11-03

## 2019-09-06 RX ORDER — OXYCODONE HYDROCHLORIDE 5 MG/1
TABLET ORAL
Qty: 56 TABLET | Refills: 0 | Status: SHIPPED | OUTPATIENT
Start: 2019-09-06 | End: 2019-09-13

## 2019-09-06 RX ORDER — CYCLOBENZAPRINE HCL 10 MG
10 TABLET ORAL 3 TIMES DAILY PRN
Qty: 30 TABLET | Refills: 0 | Status: SHIPPED | OUTPATIENT
Start: 2019-09-06 | End: 2019-09-16

## 2019-09-06 RX ADMIN — Medication 10 ML: at 08:07

## 2019-09-06 RX ADMIN — VITAMIN D, TAB 1000IU (100/BT) 1000 UNITS: 25 TAB at 08:07

## 2019-09-06 RX ADMIN — MORPHINE SULFATE 2 MG: 2 INJECTION, SOLUTION INTRAMUSCULAR; INTRAVENOUS at 08:37

## 2019-09-06 RX ADMIN — CLONIDINE HYDROCHLORIDE 0.1 MG: 0.1 TABLET ORAL at 08:07

## 2019-09-06 RX ADMIN — GABAPENTIN 600 MG: 300 CAPSULE ORAL at 08:08

## 2019-09-06 RX ADMIN — Medication 2 G: at 08:08

## 2019-09-06 RX ADMIN — DOCUSATE SODIUM 100 MG: 100 CAPSULE, LIQUID FILLED ORAL at 08:07

## 2019-09-06 RX ADMIN — ENOXAPARIN SODIUM 40 MG: 40 INJECTION SUBCUTANEOUS at 08:08

## 2019-09-06 RX ADMIN — HYDROXYUREA 500 MG: 500 CAPSULE ORAL at 08:08

## 2019-09-06 RX ADMIN — Medication 2 G: at 01:03

## 2019-09-06 RX ADMIN — QUETIAPINE FUMARATE 50 MG: 25 TABLET ORAL at 08:08

## 2019-09-06 RX ADMIN — DULOXETINE HYDROCHLORIDE 60 MG: 30 CAPSULE, DELAYED RELEASE ORAL at 08:07

## 2019-09-06 RX ADMIN — OXYCODONE HYDROCHLORIDE 10 MG: 5 TABLET ORAL at 11:53

## 2019-09-06 ASSESSMENT — PAIN SCALES - GENERAL
PAINLEVEL_OUTOF10: 5
PAINLEVEL_OUTOF10: 7
PAINLEVEL_OUTOF10: 5
PAINLEVEL_OUTOF10: 4

## 2019-09-06 ASSESSMENT — PAIN DESCRIPTION - LOCATION
LOCATION: NECK
LOCATION: NECK

## 2019-09-06 ASSESSMENT — PAIN DESCRIPTION - DESCRIPTORS: DESCRIPTORS: DISCOMFORT

## 2019-09-06 ASSESSMENT — PAIN DESCRIPTION - PAIN TYPE
TYPE: SURGICAL PAIN
TYPE: SURGICAL PAIN

## 2019-09-06 NOTE — PROGRESS NOTES
Smoking Cessation - topics covered   []  Health Risks  []  Benefits of Quitting   []  Smoking Cessation  []  Patient has no history of tobacco use  []  Patient is former smoker. []  No need for tobacco cessation education. []  Booklet given  [x]  Patient verbalizes understanding. [x]  Patient denies need for tobacco cessation education. []  Unable to meet with patient today. Will follow up as able.   KAREN LANDEROS  1:48 PM

## 2019-09-06 NOTE — PROGRESS NOTES
Physical Therapy    Facility/Department: [facility] NEUR[facility]  Initial Assessment    NAME: Annette Hahn  : 1960  MRN: [id number]    Date of Service: 2019  S/P C3-4 anterior discectomy and osteophytectomy (19)  Discharge Recommendations:    No further therapy required at discharge. PT Equipment Recommendations  Equipment Needed: No    Assessment   Assessment: Pt ambulated 300ft w/ no AD, ascend/ descend 9 stairs CGA/ SBA grossly with all mobility. Assist provided for safety. Pt demonstrates independence with functional mobility and verbalizes no concerns in regards to functional mobility upon discharge. D/C skilled PT this date. Prognosis: Fair  Decision Making: Low Complexity  PT Education: Goals;PT Role;Plan of Care  REQUIRES PT FOLLOW UP: No  Activity Tolerance  Activity Tolerance: Patient Tolerated treatment well       Patient Diagnosis(es): The primary encounter diagnosis was S/P cervical spinal fusion. 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 has a past medical history of Alcohol abuse, Anemia, Cancer ([address] Cervical stenosis of spine, Chronic left-sided low back pain with left-sided sciatica, Insomnia, Intentional drug overdose ([address] Left arm numbness, Left lumbar radiculitis, Macrocytosis, Major depressive disorder, recurrent severe without psychotic features ([address] Myeloproliferative disorder ([address] Osteoarthritis of spine with radiculopathy, lumbar region, Severe recurrent major depression without psychotic features ([address] and Spondylosis of lumbar region without myelopathy or radiculopathy. has a past surgical history that includes cervical fusion; Appendectomy; Tonsillectomy; Carpal tunnel release; epidural steroid injection (Left, 5/15/2019); Endoscopy, colon, diagnostic; and cervical fusion (2019).     Restrictions  Restrictions/Precautions  Restrictions/Precautions: Surgical Protocols, General Precautions  Required Joint Mobility  Spine: WFL  ROM RLE: WFL  ROM LLE: WFL  ROM RUE: See OT assessment- PT/ OT coeval  ROM LUE: See OT assessment- PT/ OT coeval   Strength RLE  Strength RLE: WFL  Strength LLE  Strength LLE: WFL  Strength RUE  Comment: See OT assessment- PT/ OT coeval  Strength LUE  Comment: See OT assessment- PT/ OT coeval   Motor Control  Gross Motor?: WFL  Sensation  Overall Sensation Status: WFL  Bed mobility  Comment: Pt seated EOB upon writer's arrival, retired in bedside chair upon writer's exit   Transfers  Sit to Stand: Stand by assistance  Stand to sit: Stand by assistance  Comment: SBA provided for safety   Ambulation  Ambulation?: Yes  Ambulation 1  Surface: level tile  Device: No Device  Assistance: Stand by assistance  Gait Deviations: Slow Christy  Distance: 300ft  Comments: No LOB noted throughout. Pt reports slight increased in neck pain with ambulation. Stairs/Curb  Stairs?: Yes  Stairs  # Steps : 9  Stairs Height: 6\"  Rails: Right ascending  Device: No Device  Assistance: Contact guard assistance(CGA provided for safety )     Balance  Posture: Good  Sitting - Static: Good  Sitting - Dynamic: Good  Standing - Static: Good;-  Standing - Dynamic: Good;-  Comments: Standing balance assessed w/ no AD        Plan   Plan  Plan Comment: D/C skilled PT  Safety Devices  Type of devices: Nurse notified, Left in bed, Call light within reach, Gait belt(Left seated EOB.  RN ok'd and aware)  Restraints  Initially in place: No             AM-PAC Score     AM-PAC Inpatient Mobility without Stair Climbing Raw Score : 20 (09/06/19 1112)  AM-PAC Inpatient without Stair Climbing T-Scale Score : 60.57 (09/06/19 1112)  Mobility Inpatient CMS 0-100% Score: 0 (09/06/19 1112)  Mobility Inpatient without Stair CMS G-Code Modifier : CH (09/06/19 1112)         Therapy Time   Individual Concurrent Group Co-treatment   Time In 1004         Time Out 1023         Minutes 19         Timed Code Treatment Minutes: 8 Minutes Tomas Palomares, PT

## 2019-09-09 ENCOUNTER — TELEPHONE (OUTPATIENT)
Dept: FAMILY MEDICINE CLINIC | Age: 59
End: 2019-09-09

## 2019-09-12 ENCOUNTER — TELEPHONE (OUTPATIENT)
Dept: NEUROLOGY | Age: 59
End: 2019-09-12

## 2019-09-13 NOTE — TELEPHONE ENCOUNTER
Patient called back wondering about his request informed him that we had attempted to contact him this morning to come in this afternoon. I told him that I would send a message to see if you would be able to see him next week?

## 2019-09-16 NOTE — TELEPHONE ENCOUNTER
Attempted to call to schedule appt  Phone number on file is currently a non-working number as well as daughters number which is on his HIPAA. Will attempt to reach out to patient again.

## 2019-09-18 ENCOUNTER — OFFICE VISIT (OUTPATIENT)
Dept: NEUROSURGERY | Age: 59
End: 2019-09-18

## 2019-09-18 VITALS
RESPIRATION RATE: 16 BRPM | BODY MASS INDEX: 25.28 KG/M2 | WEIGHT: 191.6 LBS | SYSTOLIC BLOOD PRESSURE: 125 MMHG | HEART RATE: 96 BPM | DIASTOLIC BLOOD PRESSURE: 82 MMHG

## 2019-09-18 DIAGNOSIS — M48.02 STENOSIS OF CERVICAL SPINE WITH MYELOPATHY (HCC): Primary | ICD-10-CM

## 2019-09-18 DIAGNOSIS — G99.2 STENOSIS OF CERVICAL SPINE WITH MYELOPATHY (HCC): Primary | ICD-10-CM

## 2019-09-18 DIAGNOSIS — R13.10 DYSPHAGIA, UNSPECIFIED TYPE: ICD-10-CM

## 2019-09-18 PROCEDURE — 99024 POSTOP FOLLOW-UP VISIT: CPT | Performed by: NEUROLOGICAL SURGERY

## 2019-09-18 RX ORDER — CYCLOBENZAPRINE HCL 10 MG
10 TABLET ORAL 3 TIMES DAILY PRN
Qty: 21 TABLET | Refills: 0 | Status: SHIPPED | OUTPATIENT
Start: 2019-09-18 | End: 2019-09-25

## 2019-09-18 RX ORDER — OXYCODONE HYDROCHLORIDE 5 MG/1
5 TABLET ORAL EVERY 6 HOURS PRN
Qty: 56 TABLET | Refills: 0 | Status: SHIPPED | OUTPATIENT
Start: 2019-09-18 | End: 2019-09-25

## 2019-09-18 NOTE — PROGRESS NOTES
Significant neck pain on right side  hoaresness  Poor PO intake & difficulty swallowing and states hasnt really eaten in couple days  Resolved UE numbness. 5/5 strength.  Sensation intact    S/p acdf C3/4  Swallow study  6wk followup wth xrays  Collar when up   Refill medication

## 2019-09-24 DIAGNOSIS — D47.1 MYELOPROLIFERATIVE DISORDER (HCC): Primary | ICD-10-CM

## 2019-09-24 RX ORDER — HYDROXYUREA 500 MG/1
CAPSULE ORAL
Qty: 60 CAPSULE | Refills: 3 | Status: SHIPPED | OUTPATIENT
Start: 2019-09-24 | End: 2020-01-13 | Stop reason: SDUPTHER

## 2019-09-26 ENCOUNTER — OFFICE VISIT (OUTPATIENT)
Dept: FAMILY MEDICINE CLINIC | Age: 59
End: 2019-09-26
Payer: COMMERCIAL

## 2019-09-26 VITALS
DIASTOLIC BLOOD PRESSURE: 80 MMHG | WEIGHT: 195 LBS | HEART RATE: 94 BPM | OXYGEN SATURATION: 95 % | SYSTOLIC BLOOD PRESSURE: 120 MMHG | BODY MASS INDEX: 25.73 KG/M2 | TEMPERATURE: 98 F

## 2019-09-26 DIAGNOSIS — J40 BRONCHITIS: Primary | ICD-10-CM

## 2019-09-26 PROCEDURE — G8427 DOCREV CUR MEDS BY ELIG CLIN: HCPCS | Performed by: NURSE PRACTITIONER

## 2019-09-26 PROCEDURE — 1111F DSCHRG MED/CURRENT MED MERGE: CPT | Performed by: NURSE PRACTITIONER

## 2019-09-26 PROCEDURE — 99214 OFFICE O/P EST MOD 30 MIN: CPT | Performed by: NURSE PRACTITIONER

## 2019-09-26 PROCEDURE — 3017F COLORECTAL CA SCREEN DOC REV: CPT | Performed by: NURSE PRACTITIONER

## 2019-09-26 PROCEDURE — G8419 CALC BMI OUT NRM PARAM NOF/U: HCPCS | Performed by: NURSE PRACTITIONER

## 2019-09-26 PROCEDURE — 4004F PT TOBACCO SCREEN RCVD TLK: CPT | Performed by: NURSE PRACTITIONER

## 2019-09-26 RX ORDER — ALBUTEROL SULFATE 90 UG/1
2 AEROSOL, METERED RESPIRATORY (INHALATION) EVERY 4 HOURS PRN
Qty: 1 INHALER | Refills: 0 | Status: SHIPPED | OUTPATIENT
Start: 2019-09-26 | End: 2019-11-11 | Stop reason: SDUPTHER

## 2019-09-26 RX ORDER — GUAIFENESIN 600 MG/1
600 TABLET, EXTENDED RELEASE ORAL 2 TIMES DAILY
Qty: 30 TABLET | Refills: 0 | Status: SHIPPED | OUTPATIENT
Start: 2019-09-26 | End: 2019-11-11 | Stop reason: SDUPTHER

## 2019-09-26 RX ORDER — BENZONATATE 100 MG/1
100 CAPSULE ORAL 3 TIMES DAILY PRN
Qty: 30 CAPSULE | Refills: 0 | Status: SHIPPED | OUTPATIENT
Start: 2019-09-26 | End: 2019-11-11 | Stop reason: SDUPTHER

## 2019-09-26 RX ORDER — PREDNISONE 20 MG/1
40 TABLET ORAL DAILY
Qty: 10 TABLET | Refills: 0 | Status: SHIPPED | OUTPATIENT
Start: 2019-09-26 | End: 2019-10-01

## 2019-09-26 ASSESSMENT — ENCOUNTER SYMPTOMS
GASTROINTESTINAL NEGATIVE: 1
SORE THROAT: 1
SHORTNESS OF BREATH: 1
CHOKING: 0
STRIDOR: 0
VOMITING: 0
SINUS PAIN: 0
NAUSEA: 0
ALLERGIC/IMMUNOLOGIC NEGATIVE: 1
CHEST TIGHTNESS: 1
WHEEZING: 0
COLOR CHANGE: 0
RHINORRHEA: 0
DIARRHEA: 0
TROUBLE SWALLOWING: 1
EYES NEGATIVE: 1
COUGH: 1
SINUS PRESSURE: 0

## 2019-09-26 NOTE — PROGRESS NOTES
[facility]  [address] [facility]  Dept: [phone]          Phi Hood is a 63 y.o. male who presents today for his medical conditions/complaintsas noted below.       Phi Hood is here today c/o Check-Up (bronchitis x2 day )      Past Medical History:   Diagnosis Date    Alcohol abuse 8/12/2019    Anemia     Cancer ([address]     blood    Cervical stenosis of spine 6/14/2019    Chronic left-sided low back pain with left-sided sciatica 3/14/2019    Insomnia 3/14/2019    Intentional drug overdose ([address] 8/11/2019    Left arm numbness 6/14/2019    Left lumbar radiculitis 5/3/2019    Macrocytosis     Major depressive disorder, recurrent severe without psychotic features ([address] 8/12/2019    Myeloproliferative disorder ([address]     Osteoarthritis of spine with radiculopathy, lumbar region 4/12/2019    Severe recurrent major depression without psychotic features ([address] 8/12/2019    Spondylosis of lumbar region without myelopathy or radiculopathy 5/3/2019      Past Surgical History:   Procedure Laterality Date    APPENDECTOMY      CARPAL TUNNEL RELEASE      bilateral     CERVICAL FUSION      s/p trampoline accident    CERVICAL FUSION  09/05/2019     ANTERIOR CERVICAL DECOMPRESSION FUSION C3-4     CERVICAL FUSION N/A 9/5/2019    ANTERIOR CERVICAL DECOMPRESSION FUSION C3-4 performed by Noel Loo DO at [facility], COLON, DIAGNOSTIC      EPIDURAL STEROID INJECTION Left 5/15/2019    EPIDURAL STEROID INJECTION LEFT L5S1 performed by Kevin June MD at [place] [address]         Family History   Problem Relation Age of Onset    Breast Cancer Mother         s/p surgical complications    Diabetes Mother     Heart Failure Father     Other Brother         \"bad back\"       Social History     Tobacco Use    Smoking status: Current Every Day Smoker     Packs/day: 1.00     Years: 40.00     Pack years: 40.00     Types: Cigarettes     Start include no chest pain (only w/ coughing), congestion (burning in nose ), diarrhea, dysuria, ear pain (Fullness sensation in L ear ), headaches, nausea, rash, rhinorrhea, sinus pain, vomiting or wheezing. Treatments tried: DayQuil  The treatment provided no relief. Health Maintenance:      Subjective:     Review of Systems   Constitutional: Positive for appetite change, chills and diaphoresis. Negative for fever (NOT MEASURED). HENT: Positive for sore throat and trouble swallowing. Negative for congestion (burning in nose ), ear discharge, ear pain (Fullness sensation in L ear ), rhinorrhea, sinus pressure and sinus pain. Eyes: Negative. Respiratory: Positive for cough (productive), chest tightness and shortness of breath (exertion). Negative for choking, wheezing and stridor. Cardiovascular: Negative. Negative for chest pain (only w/ coughing). Gastrointestinal: Negative. Negative for diarrhea, nausea and vomiting. Endocrine: Negative. Genitourinary: Negative. Negative for dysuria. Musculoskeletal: Negative. Skin: Negative. Negative for color change, pallor, rash and wound. Allergic/Immunologic: Negative. Neurological: Negative. Negative for headaches. Objective:     Vitals:    09/26/19 1145   BP: 120/80   Pulse: 94   Temp: 98 °F (36.7 °C)   SpO2: 95%       Body mass index is 25.73 kg/m². Physical Exam   Constitutional: He is oriented to person, place, and time. He appears well-developed and well-nourished. Non-toxic appearance. He does not have a sickly appearance. No distress. HENT:   Head: Normocephalic and atraumatic. Right Ear: External ear and ear canal normal. Tympanic membrane is not injected and not erythematous. Left Ear: External ear and ear canal normal. Tympanic membrane is not injected and not erythematous. Nose: Nose normal.   Mouth/Throat: Oropharynx is clear and moist. No uvula swelling.  No oropharyngeal exudate, posterior oropharyngeal edema, posterior oropharyngeal erythema (mild) or tonsillar abscesses. No tonsillar exudate. Cerumen bilateral canals   Post nasal drip noted   Eyes: Pupils are equal, round, and reactive to light. Conjunctivae are normal. Right eye exhibits no discharge. Left eye exhibits no discharge. No scleral icterus. Neck: Normal range of motion. Neck supple. No tracheal deviation present. Cardiovascular: Normal rate, regular rhythm, normal heart sounds and intact distal pulses. Exam reveals no gallop and no friction rub. No murmur heard. Pulmonary/Chest: Effort normal. No accessory muscle usage or stridor. No tachypnea. No respiratory distress. He has decreased breath sounds. He has wheezes (improved s/p cough) in the left upper field and the left lower field. He has no rhonchi. He has no rales. Abdominal: Soft. Musculoskeletal: Normal range of motion. He exhibits no edema, tenderness or deformity. Neurological: He is alert and oriented to person, place, and time. He has normal strength. Gait normal. GCS eye subscore is 4. GCS verbal subscore is 5. GCS motor subscore is 6. Skin: Skin is warm, dry and intact. No rash noted. He is not diaphoretic. No erythema. No pallor. Psychiatric: He has a normal mood and affect. His speech is normal and behavior is normal. Judgment and thought content normal. Cognition and memory are normal.         Assessment:         1. Bronchitis        Plan:     1. Bronchitis    - predniSONE (DELTASONE) 20 MG tablet; Take 2 tablets by mouth daily for 5 days  Dispense: 10 tablet; Refill: 0  - albuterol sulfate  (90 Base) MCG/ACT inhaler; Inhale 2 puffs into the lungs every 4 hours as needed for Wheezing  Dispense: 1 Inhaler; Refill: 0  - benzonatate (TESSALON) 100 MG capsule; Take 1 capsule by mouth 3 times daily as needed for Cough  Dispense: 30 capsule; Refill: 0  - guaiFENesin (MUCINEX) 600 MG extended release tablet;  Take 1 tablet by mouth 2 times daily for 15 days Dispense: 30 tablet; Refill: 0    Medications as prescribed  Side effects discussed  Bronchitis typically viral  Advised call if worsened or fails to resolve     Discussed use, benefit, and side effects of prescribed medications. All patient questions answered. Pt voiced understanding. Reviewed health maintenance. Instructed to continue current medications, diet and exercise. Patient agreedwith treatment plan. Follow up as directed.      Electronically signed by SIMONE Love CNP on 9/26/2019

## 2019-09-30 NOTE — DISCHARGE SUMMARY
discharge, Caridad Worthy was tolerating a No diet orders on file, having bowel movements,ambulating on He own accord and had adequate analgesia on oral pain medications, and had no signs of symptoms of complications. He is medically stable to be discharged home. PHYSICAL EXAMINATION        Discharge Vitals:  height is 6' 1\" (1.854 m) and weight is 207 lb (93.9 kg). His oral temperature is 98.4 °F (36.9 °C). His blood pressure is 141/85 (abnormal) and his pulse is 84. His respiration is 12 and oxygen saturation is 98%. PE:   AOx3   Motor   L deltoid 5/5; R deltoid 5/5  L biceps 5/5; R biceps 5/5  L triceps 5/5; R triceps 5/5  L intrinsics 5/5; R intrinsics 5/5      L iliopsoas 5/5 , R iliopsoas 5/5  L quadriceps 5/5; R quadriceps 5/5  L Dorsiflexion 5/5; R dorsiflexion 5/5  L Plantarflexion 5/5; R plantarflexion 5/5    Sensation intact    Incision anterior cervical site open to air        LABS     No results for input(s): WBC, HGB, HCT, PLT, NA, K, CL, CO2, BUN, CREATININE in the last 72 hours. DISCHARGE INSTRUCTIONS     Discharge Medications:        Medication List      CHANGE how you take these medications    * STOOL SOFTENER 100 MG capsule  Generic drug:  docusate sodium  TAKE ONE CAPSULE BY MOUTH TWICE A DAY AS NEEDED FOR CONSTIPATION  What changed:  Another medication with the same name was added. Make sure you understand how and when to take each. * docusate 100 MG Caps  Commonly known as:  COLACE, DULCOLAX  Take 100 mg by mouth 2 times daily  What changed: You were already taking a medication with the same name, and this prescription was added. Make sure you understand how and when to take each. * This list has 2 medication(s) that are the same as other medications prescribed for you. Read the directions carefully, and ask your doctor or other care provider to review them with you.             CONTINUE taking these medications    aspirin 81 MG tablet  Take 1 tablet by mouth daily cloNIDine 0.1 MG tablet  Commonly known as:  CATAPRES     DULoxetine 60 MG extended release capsule  Commonly known as:  CYMBALTA     * QUEtiapine 200 MG tablet  Commonly known as:  SEROQUEL  Take 1 tablet by mouth nightly     * QUEtiapine 50 MG tablet  Commonly known as:  SEROQUEL  Take 1 tablet by mouth 2 times daily     tamsulosin 0.4 MG capsule  Commonly known as:  FLOMAX  Take 1 capsule by mouth nightly         * This list has 2 medication(s) that are the same as other medications prescribed for you. Read the directions carefully, and ask your doctor or other care provider to review them with you. STOP taking these medications    hydrOXYzine 25 MG tablet  Commonly known as:  ATARAX     meloxicam 15 MG tablet  Commonly known as:  MOBIC     tiZANidine 4 MG tablet  Commonly known as:  Leah Mckennak your doctor about these medications    cyclobenzaprine 10 MG tablet  Commonly known as:  FLEXERIL  Take 1 tablet by mouth 3 times daily as needed for Muscle spasms  Ask about: Should I take this medication?     oxyCODONE 5 MG immediate release tablet  Commonly known as:  [place]  May take 1 tablet by mouth every 6 hours as needed for Pain. May also take 2 tablets every 6 hours as needed for Pain. Do all this for 7 days.   Ask about: Should I take this medication?     vitamin D 1000 units Caps           Where to Get Your Medications      These medications were sent to [facility] [address] [facility], [address] [place] [address], [facility] [address]    Phone:  [phone]   · aspirin 81 MG tablet  · docusate 100 MG Caps     You can get these medications from any pharmacy    Bring a paper prescription for each of these medications  · cyclobenzaprine 10 MG tablet  · oxyCODONE 5 MG immediate release tablet       Diet: No diet orders on file diet as tolerated  Activity: Provided in AVS  Wound Care: Daily and as needed  Follow-up:  in the [facility] as shown in AVS   Time Spent for discharge: 30 minutes    ARTURO SALEH  9/30/2019, 10:01 AM

## 2019-10-08 DIAGNOSIS — G99.2 STENOSIS OF CERVICAL SPINE WITH MYELOPATHY (HCC): Primary | ICD-10-CM

## 2019-10-08 DIAGNOSIS — M48.02 STENOSIS OF CERVICAL SPINE WITH MYELOPATHY (HCC): ICD-10-CM

## 2019-10-08 DIAGNOSIS — M48.02 STENOSIS OF CERVICAL SPINE WITH MYELOPATHY (HCC): Primary | ICD-10-CM

## 2019-10-08 DIAGNOSIS — G99.2 STENOSIS OF CERVICAL SPINE WITH MYELOPATHY (HCC): ICD-10-CM

## 2019-10-08 RX ORDER — CYCLOBENZAPRINE HCL 10 MG
10 TABLET ORAL 3 TIMES DAILY PRN
Qty: 21 TABLET | Refills: 0 | OUTPATIENT
Start: 2019-10-08 | End: 2019-10-15

## 2019-10-08 RX ORDER — OXYCODONE HYDROCHLORIDE 5 MG/1
5 TABLET ORAL EVERY 6 HOURS PRN
Qty: 56 TABLET | Refills: 0 | OUTPATIENT
Start: 2019-10-08 | End: 2019-10-15

## 2019-10-08 RX ORDER — CYCLOBENZAPRINE HCL 10 MG
10 TABLET ORAL 3 TIMES DAILY PRN
Qty: 21 TABLET | Refills: 0 | Status: SHIPPED | OUTPATIENT
Start: 2019-10-08 | End: 2019-10-18

## 2019-10-08 RX ORDER — OXYCODONE HYDROCHLORIDE 5 MG/1
5 TABLET ORAL EVERY 8 HOURS PRN
Qty: 21 TABLET | Refills: 0 | Status: SHIPPED | OUTPATIENT
Start: 2019-10-08 | End: 2019-11-11 | Stop reason: SDUPTHER

## 2019-10-09 ENCOUNTER — HOSPITAL ENCOUNTER (OUTPATIENT)
Dept: GENERAL RADIOLOGY | Age: 59
Discharge: HOME | End: 2019-10-11
Payer: COMMERCIAL

## 2019-10-09 DIAGNOSIS — R13.10 DYSPHAGIA, UNSPECIFIED TYPE: ICD-10-CM

## 2019-10-09 PROCEDURE — 74230 X-RAY XM SWLNG FUNCJ C+: CPT

## 2019-10-09 PROCEDURE — 92611 MOTION FLUOROSCOPY/SWALLOW: CPT

## 2019-10-25 ENCOUNTER — HOSPITAL ENCOUNTER (OUTPATIENT)
Dept: GENERAL RADIOLOGY | Age: 59
Discharge: HOME | End: 2019-10-27
Payer: COMMERCIAL

## 2019-10-25 ENCOUNTER — HOSPITAL ENCOUNTER (OUTPATIENT)
Age: 59
Discharge: HOME | End: 2019-10-27
Payer: COMMERCIAL

## 2019-10-25 DIAGNOSIS — M48.02 STENOSIS OF CERVICAL SPINE WITH MYELOPATHY (HCC): ICD-10-CM

## 2019-10-25 DIAGNOSIS — G99.2 STENOSIS OF CERVICAL SPINE WITH MYELOPATHY (HCC): ICD-10-CM

## 2019-10-25 PROCEDURE — 72040 X-RAY EXAM NECK SPINE 2-3 VW: CPT

## 2019-10-30 ENCOUNTER — OFFICE VISIT (OUTPATIENT)
Dept: NEUROSURGERY | Age: 59
End: 2019-10-30

## 2019-10-30 VITALS — DIASTOLIC BLOOD PRESSURE: 81 MMHG | RESPIRATION RATE: 16 BRPM | SYSTOLIC BLOOD PRESSURE: 119 MMHG | HEART RATE: 93 BPM

## 2019-10-30 DIAGNOSIS — M47.22 CERVICAL SPONDYLOSIS WITH RADICULOPATHY: ICD-10-CM

## 2019-10-30 DIAGNOSIS — M48.02 STENOSIS OF CERVICAL SPINE WITH MYELOPATHY (HCC): Primary | ICD-10-CM

## 2019-10-30 DIAGNOSIS — G99.2 STENOSIS OF CERVICAL SPINE WITH MYELOPATHY (HCC): Primary | ICD-10-CM

## 2019-10-30 PROCEDURE — 99024 POSTOP FOLLOW-UP VISIT: CPT | Performed by: NEUROLOGICAL SURGERY

## 2019-10-30 RX ORDER — CYCLOBENZAPRINE HCL 10 MG
10 TABLET ORAL 3 TIMES DAILY PRN
Qty: 21 TABLET | Refills: 0 | Status: SHIPPED | OUTPATIENT
Start: 2019-10-30 | End: 2019-11-06

## 2019-10-30 RX ORDER — OXYCODONE HYDROCHLORIDE AND ACETAMINOPHEN 5; 325 MG/1; MG/1
1 TABLET ORAL 2 TIMES DAILY PRN
Qty: 14 TABLET | Refills: 0 | Status: SHIPPED | OUTPATIENT
Start: 2019-10-30 | End: 2019-11-06

## 2019-11-08 ENCOUNTER — TELEPHONE (OUTPATIENT)
Dept: FAMILY MEDICINE CLINIC | Age: 59
End: 2019-11-08

## 2019-11-11 DIAGNOSIS — M48.02 STENOSIS OF CERVICAL SPINE WITH MYELOPATHY (HCC): ICD-10-CM

## 2019-11-11 DIAGNOSIS — G99.2 STENOSIS OF CERVICAL SPINE WITH MYELOPATHY (HCC): ICD-10-CM

## 2019-11-11 RX ORDER — OXYCODONE HYDROCHLORIDE 5 MG/1
5 TABLET ORAL DAILY PRN
Qty: 7 TABLET | Refills: 0 | Status: SHIPPED | OUTPATIENT
Start: 2019-11-11 | End: 2019-11-18

## 2019-11-19 ENCOUNTER — HOSPITAL ENCOUNTER (EMERGENCY)
Age: 59
Discharge: HOME | End: 2019-11-20
Attending: EMERGENCY MEDICINE
Payer: COMMERCIAL

## 2019-11-19 VITALS
HEART RATE: 99 BPM | TEMPERATURE: 98 F | WEIGHT: 200 LBS | RESPIRATION RATE: 16 BRPM | OXYGEN SATURATION: 96 % | DIASTOLIC BLOOD PRESSURE: 96 MMHG | BODY MASS INDEX: 27.09 KG/M2 | SYSTOLIC BLOOD PRESSURE: 127 MMHG | HEIGHT: 72 IN

## 2019-11-19 DIAGNOSIS — F10.920 ACUTE ALCOHOLIC INTOXICATION WITHOUT COMPLICATION (HCC): Primary | ICD-10-CM

## 2019-11-19 LAB
ABSOLUTE EOS #: 0.27 K/UL (ref 0–0.44)
ABSOLUTE IMMATURE GRANULOCYTE: 0.17 K/UL (ref 0–0.3)
ABSOLUTE LYMPH #: 2.02 K/UL (ref 1.1–3.7)
ABSOLUTE MONO #: 0.67 K/UL (ref 0.1–1.2)
ALBUMIN SERPL-MCNC: 4.4 G/DL (ref 3.5–5.2)
ALBUMIN/GLOBULIN RATIO: 1.3 (ref 1–2.5)
ALP BLD-CCNC: 87 U/L (ref 40–129)
ALT SERPL-CCNC: 39 U/L (ref 5–41)
ANION GAP SERPL CALCULATED.3IONS-SCNC: 15 MMOL/L (ref 9–17)
AST SERPL-CCNC: 44 U/L
BASOPHILS # BLD: 2 % (ref 0–2)
BASOPHILS ABSOLUTE: 0.23 K/UL (ref 0–0.2)
BILIRUB SERPL-MCNC: 0.2 MG/DL (ref 0.3–1.2)
BUN BLDV-MCNC: 5 MG/DL (ref 6–20)
BUN/CREAT BLD: ABNORMAL (ref 9–20)
CALCIUM SERPL-MCNC: 9 MG/DL (ref 8.6–10.4)
CHLORIDE BLD-SCNC: 101 MMOL/L (ref 98–107)
CO2: 23 MMOL/L (ref 20–31)
CREAT SERPL-MCNC: 0.64 MG/DL (ref 0.7–1.2)
DIFFERENTIAL TYPE: ABNORMAL
EOSINOPHILS RELATIVE PERCENT: 2 % (ref 1–4)
ETHANOL PERCENT: 0.28 %
ETHANOL: 278 MG/DL
GFR AFRICAN AMERICAN: >60 ML/MIN
GFR NON-AFRICAN AMERICAN: >60 ML/MIN
GFR SERPL CREATININE-BSD FRML MDRD: ABNORMAL ML/MIN/{1.73_M2}
GFR SERPL CREATININE-BSD FRML MDRD: ABNORMAL ML/MIN/{1.73_M2}
GLUCOSE BLD-MCNC: 92 MG/DL (ref 70–99)
HCT VFR BLD CALC: 46.9 % (ref 40.7–50.3)
HEMOGLOBIN: 16.3 G/DL (ref 13–17)
IMMATURE GRANULOCYTES: 2 %
LYMPHOCYTES # BLD: 18 % (ref 24–43)
MCH RBC QN AUTO: 35.5 PG (ref 25.2–33.5)
MCHC RBC AUTO-ENTMCNC: 34.8 G/DL (ref 28.4–34.8)
MCV RBC AUTO: 102.2 FL (ref 82.6–102.9)
MONOCYTES # BLD: 6 % (ref 3–12)
NRBC AUTOMATED: 0 PER 100 WBC
PDW BLD-RTO: 13 % (ref 11.8–14.4)
PLATELET # BLD: 231 K/UL (ref 138–453)
PLATELET ESTIMATE: ABNORMAL
PMV BLD AUTO: 9.7 FL (ref 8.1–13.5)
POTASSIUM SERPL-SCNC: 4.2 MMOL/L (ref 3.7–5.3)
RBC # BLD: 4.59 M/UL (ref 4.21–5.77)
RBC # BLD: ABNORMAL 10*6/UL
SEG NEUTROPHILS: 70 % (ref 36–65)
SEGMENTED NEUTROPHILS ABSOLUTE COUNT: 8.05 K/UL (ref 1.5–8.1)
SODIUM BLD-SCNC: 139 MMOL/L (ref 135–144)
TOTAL PROTEIN: 7.7 G/DL (ref 6.4–8.3)
WBC # BLD: 11.4 K/UL (ref 3.5–11.3)
WBC # BLD: ABNORMAL 10*3/UL

## 2019-11-19 PROCEDURE — 6360000002 HC RX W HCPCS: Performed by: STUDENT IN AN ORGANIZED HEALTH CARE EDUCATION/TRAINING PROGRAM

## 2019-11-19 PROCEDURE — 99284 EMERGENCY DEPT VISIT MOD MDM: CPT

## 2019-11-19 PROCEDURE — 85025 COMPLETE CBC W/AUTO DIFF WBC: CPT

## 2019-11-19 PROCEDURE — 96374 THER/PROPH/DIAG INJ IV PUSH: CPT

## 2019-11-19 PROCEDURE — 80053 COMPREHEN METABOLIC PANEL: CPT

## 2019-11-19 PROCEDURE — G0480 DRUG TEST DEF 1-7 CLASSES: HCPCS

## 2019-11-19 PROCEDURE — 96375 TX/PRO/DX INJ NEW DRUG ADDON: CPT

## 2019-11-19 RX ORDER — ONDANSETRON 2 MG/ML
4 INJECTION INTRAMUSCULAR; INTRAVENOUS ONCE
Status: COMPLETED | OUTPATIENT
Start: 2019-11-19 | End: 2019-11-19

## 2019-11-19 RX ORDER — KETOROLAC TROMETHAMINE 30 MG/ML
30 INJECTION, SOLUTION INTRAMUSCULAR; INTRAVENOUS ONCE
Status: COMPLETED | OUTPATIENT
Start: 2019-11-19 | End: 2019-11-19

## 2019-11-19 RX ADMIN — KETOROLAC TROMETHAMINE 30 MG: 30 INJECTION, SOLUTION INTRAMUSCULAR at 17:59

## 2019-11-19 RX ADMIN — ONDANSETRON 4 MG: 2 INJECTION INTRAMUSCULAR; INTRAVENOUS at 17:59

## 2019-11-19 ASSESSMENT — ENCOUNTER SYMPTOMS
SHORTNESS OF BREATH: 0
COUGH: 0
NAUSEA: 1
ABDOMINAL PAIN: 0
DIARRHEA: 0
VOMITING: 0
SORE THROAT: 0
PHOTOPHOBIA: 0

## 2019-11-19 ASSESSMENT — PAIN SCALES - GENERAL: PAINLEVEL_OUTOF10: 4

## 2019-11-20 ENCOUNTER — TELEPHONE (OUTPATIENT)
Dept: FAMILY MEDICINE CLINIC | Age: 59
End: 2019-11-20

## 2019-11-22 ENCOUNTER — HOSPITAL ENCOUNTER (OUTPATIENT)
Facility: MEDICAL CENTER | Age: 59
End: 2019-11-22
Payer: COMMERCIAL

## 2019-11-26 ENCOUNTER — OFFICE VISIT (OUTPATIENT)
Dept: ONCOLOGY | Age: 59
End: 2019-11-26
Payer: COMMERCIAL

## 2019-11-26 ENCOUNTER — HOSPITAL ENCOUNTER (OUTPATIENT)
Facility: MEDICAL CENTER | Age: 59
Discharge: HOME | End: 2019-11-26
Payer: COMMERCIAL

## 2019-11-26 ENCOUNTER — TELEPHONE (OUTPATIENT)
Dept: ONCOLOGY | Age: 59
End: 2019-11-26

## 2019-11-26 VITALS
HEART RATE: 81 BPM | TEMPERATURE: 98.9 F | BODY MASS INDEX: 26.45 KG/M2 | WEIGHT: 195 LBS | RESPIRATION RATE: 15 BRPM | DIASTOLIC BLOOD PRESSURE: 95 MMHG | SYSTOLIC BLOOD PRESSURE: 146 MMHG

## 2019-11-26 DIAGNOSIS — D75.89 MACROCYTOSIS: ICD-10-CM

## 2019-11-26 DIAGNOSIS — F10.10 ALCOHOL ABUSE: ICD-10-CM

## 2019-11-26 DIAGNOSIS — D47.1 MYELOPROLIFERATIVE DISORDER (HCC): Primary | ICD-10-CM

## 2019-11-26 DIAGNOSIS — D47.1 MYELOPROLIFERATIVE DISORDER (HCC): ICD-10-CM

## 2019-11-26 LAB
ABSOLUTE EOS #: 0.11 K/UL (ref 0–0.44)
ABSOLUTE IMMATURE GRANULOCYTE: 0.06 K/UL (ref 0–0.3)
ABSOLUTE LYMPH #: 2.06 K/UL (ref 1.1–3.7)
ABSOLUTE MONO #: 0.68 K/UL (ref 0.1–1.2)
BASOPHILS # BLD: 2 % (ref 0–2)
BASOPHILS ABSOLUTE: 0.15 K/UL (ref 0–0.2)
DIFFERENTIAL TYPE: ABNORMAL
EOSINOPHILS RELATIVE PERCENT: 2 % (ref 1–4)
HCT VFR BLD CALC: 47.1 % (ref 40.7–50.3)
HEMOGLOBIN: 15.4 G/DL (ref 13–17)
IMMATURE GRANULOCYTES: 1 %
LYMPHOCYTES # BLD: 28 % (ref 24–43)
MCH RBC QN AUTO: 34.6 PG (ref 25.2–33.5)
MCHC RBC AUTO-ENTMCNC: 32.7 G/DL (ref 28.4–34.8)
MCV RBC AUTO: 105.8 FL (ref 82.6–102.9)
MONOCYTES # BLD: 9 % (ref 3–12)
NRBC AUTOMATED: 0 PER 100 WBC
PDW BLD-RTO: 12.9 % (ref 11.8–14.4)
PLATELET # BLD: 242 K/UL (ref 138–453)
PLATELET ESTIMATE: ABNORMAL
PMV BLD AUTO: 9.6 FL (ref 8.1–13.5)
RBC # BLD: 4.45 M/UL (ref 4.21–5.77)
RBC # BLD: ABNORMAL 10*6/UL
SEG NEUTROPHILS: 58 % (ref 36–65)
SEGMENTED NEUTROPHILS ABSOLUTE COUNT: 4.24 K/UL (ref 1.5–8.1)
WBC # BLD: 7.3 K/UL (ref 3.5–11.3)
WBC # BLD: ABNORMAL 10*3/UL

## 2019-11-26 PROCEDURE — G8482 FLU IMMUNIZE ORDER/ADMIN: HCPCS | Performed by: INTERNAL MEDICINE

## 2019-11-26 PROCEDURE — 4004F PT TOBACCO SCREEN RCVD TLK: CPT | Performed by: INTERNAL MEDICINE

## 2019-11-26 PROCEDURE — G8427 DOCREV CUR MEDS BY ELIG CLIN: HCPCS | Performed by: INTERNAL MEDICINE

## 2019-11-26 PROCEDURE — 99211 OFF/OP EST MAY X REQ PHY/QHP: CPT

## 2019-11-26 PROCEDURE — 3017F COLORECTAL CA SCREEN DOC REV: CPT | Performed by: INTERNAL MEDICINE

## 2019-11-26 PROCEDURE — 36415 COLL VENOUS BLD VENIPUNCTURE: CPT

## 2019-11-26 PROCEDURE — 85025 COMPLETE CBC W/AUTO DIFF WBC: CPT

## 2019-11-26 PROCEDURE — 99214 OFFICE O/P EST MOD 30 MIN: CPT | Performed by: INTERNAL MEDICINE

## 2019-11-26 PROCEDURE — G8419 CALC BMI OUT NRM PARAM NOF/U: HCPCS | Performed by: INTERNAL MEDICINE

## 2019-12-10 ENCOUNTER — HOSPITAL ENCOUNTER (EMERGENCY)
Age: 59
Discharge: HOME | End: 2019-12-10
Attending: EMERGENCY MEDICINE
Payer: COMMERCIAL

## 2019-12-10 ENCOUNTER — APPOINTMENT (OUTPATIENT)
Dept: GENERAL RADIOLOGY | Age: 59
End: 2019-12-10
Payer: COMMERCIAL

## 2019-12-10 VITALS
TEMPERATURE: 97.8 F | HEART RATE: 81 BPM | SYSTOLIC BLOOD PRESSURE: 151 MMHG | RESPIRATION RATE: 16 BRPM | OXYGEN SATURATION: 98 % | DIASTOLIC BLOOD PRESSURE: 95 MMHG

## 2019-12-10 DIAGNOSIS — R07.81 RIB PAIN ON LEFT SIDE: Primary | ICD-10-CM

## 2019-12-10 PROCEDURE — 6370000000 HC RX 637 (ALT 250 FOR IP): Performed by: EMERGENCY MEDICINE

## 2019-12-10 PROCEDURE — 99283 EMERGENCY DEPT VISIT LOW MDM: CPT

## 2019-12-10 PROCEDURE — 71046 X-RAY EXAM CHEST 2 VIEWS: CPT

## 2019-12-10 PROCEDURE — 94664 DEMO&/EVAL PT USE INHALER: CPT

## 2019-12-10 RX ORDER — LIDOCAINE 4 G/G
1 PATCH TOPICAL DAILY
Qty: 6 PATCH | Refills: 0 | Status: SHIPPED | OUTPATIENT
Start: 2019-12-10 | End: 2019-12-16

## 2019-12-10 RX ORDER — IBUPROFEN 800 MG/1
800 TABLET ORAL EVERY 8 HOURS PRN
Qty: 20 TABLET | Refills: 0 | Status: SHIPPED | OUTPATIENT
Start: 2019-12-10 | End: 2020-01-10 | Stop reason: ALTCHOICE

## 2019-12-10 RX ORDER — IBUPROFEN 800 MG/1
800 TABLET ORAL ONCE
Status: COMPLETED | OUTPATIENT
Start: 2019-12-10 | End: 2019-12-10

## 2019-12-10 RX ORDER — ACETAMINOPHEN 325 MG/1
650 TABLET ORAL EVERY 6 HOURS PRN
Qty: 30 TABLET | Refills: 0 | Status: SHIPPED | OUTPATIENT
Start: 2019-12-10 | End: 2020-01-10 | Stop reason: ALTCHOICE

## 2019-12-10 RX ADMIN — IBUPROFEN 800 MG: 800 TABLET, FILM COATED ORAL at 13:34

## 2019-12-10 ASSESSMENT — ENCOUNTER SYMPTOMS
EYE PAIN: 0
COUGH: 0
RHINORRHEA: 0
EYE DISCHARGE: 0
VOMITING: 0
WHEEZING: 0
EYE ITCHING: 0
NAUSEA: 0
BACK PAIN: 0
SHORTNESS OF BREATH: 0
SORE THROAT: 0

## 2019-12-10 ASSESSMENT — PAIN DESCRIPTION - DESCRIPTORS: DESCRIPTORS: ACHING;CONSTANT

## 2019-12-10 ASSESSMENT — PAIN DESCRIPTION - ORIENTATION: ORIENTATION: LEFT

## 2019-12-10 ASSESSMENT — PAIN SCALES - GENERAL
PAINLEVEL_OUTOF10: 6
PAINLEVEL_OUTOF10: 9

## 2019-12-10 ASSESSMENT — PAIN DESCRIPTION - LOCATION: LOCATION: RIB CAGE

## 2019-12-10 ASSESSMENT — PAIN DESCRIPTION - FREQUENCY: FREQUENCY: CONTINUOUS

## 2019-12-10 ASSESSMENT — PAIN DESCRIPTION - PAIN TYPE: TYPE: ACUTE PAIN

## 2019-12-10 ASSESSMENT — PAIN DESCRIPTION - PROGRESSION: CLINICAL_PROGRESSION: NOT CHANGED

## 2019-12-18 ENCOUNTER — TELEPHONE (OUTPATIENT)
Dept: FAMILY MEDICINE CLINIC | Age: 59
End: 2019-12-18

## 2020-01-10 ENCOUNTER — OFFICE VISIT (OUTPATIENT)
Dept: FAMILY MEDICINE CLINIC | Age: 60
End: 2020-01-10
Payer: COMMERCIAL

## 2020-01-10 VITALS
WEIGHT: 199 LBS | HEART RATE: 85 BPM | SYSTOLIC BLOOD PRESSURE: 122 MMHG | BODY MASS INDEX: 26.99 KG/M2 | DIASTOLIC BLOOD PRESSURE: 91 MMHG | OXYGEN SATURATION: 98 %

## 2020-01-10 PROCEDURE — 4004F PT TOBACCO SCREEN RCVD TLK: CPT | Performed by: NURSE PRACTITIONER

## 2020-01-10 PROCEDURE — G8427 DOCREV CUR MEDS BY ELIG CLIN: HCPCS | Performed by: NURSE PRACTITIONER

## 2020-01-10 PROCEDURE — 3017F COLORECTAL CA SCREEN DOC REV: CPT | Performed by: NURSE PRACTITIONER

## 2020-01-10 PROCEDURE — 99213 OFFICE O/P EST LOW 20 MIN: CPT | Performed by: NURSE PRACTITIONER

## 2020-01-10 PROCEDURE — G8419 CALC BMI OUT NRM PARAM NOF/U: HCPCS | Performed by: NURSE PRACTITIONER

## 2020-01-10 PROCEDURE — G8482 FLU IMMUNIZE ORDER/ADMIN: HCPCS | Performed by: NURSE PRACTITIONER

## 2020-01-10 RX ORDER — ADHESIVE BANDAGE 3/4"
BANDAGE TOPICAL
Qty: 1 EACH | Refills: 0 | Status: SHIPPED | OUTPATIENT
Start: 2020-01-10 | End: 2020-11-03

## 2020-01-10 RX ORDER — IBUPROFEN 800 MG/1
800 TABLET ORAL EVERY 8 HOURS PRN
Qty: 90 TABLET | Refills: 2 | Status: SHIPPED | OUTPATIENT
Start: 2020-01-10 | End: 2020-07-09

## 2020-01-10 ASSESSMENT — ENCOUNTER SYMPTOMS
ALLERGIC/IMMUNOLOGIC NEGATIVE: 1
EYES NEGATIVE: 1
CHOKING: 0
STRIDOR: 0
RESPIRATORY NEGATIVE: 1
CHEST TIGHTNESS: 0
COUGH: 0
GASTROINTESTINAL NEGATIVE: 1
SHORTNESS OF BREATH: 0
COLOR CHANGE: 0

## 2020-01-10 NOTE — PATIENT INSTRUCTIONS
easy to get the recommended amount of fruits and vegetables each day. ? Try yogurt topped with fruit and nuts for a snack or healthy dessert. ? Add lettuce, tomato, cucumber, and onion to sandwiches. ? Combine a ready-made pizza crust with low-fat mozzarella cheese and lots of vegetable toppings. Try using tomatoes, squash, spinach, broccoli, carrots, cauliflower, and onions. ? Have a variety of cut-up vegetables with a low-fat dip as an appetizer instead of chips and dip. ? Sprinkle sunflower seeds or chopped almonds over salads. Or try adding chopped walnuts or almonds to cooked vegetables. ? Try some vegetarian meals using beans and peas. Add garbanzo or kidney beans to salads. Make burritos and tacos with mashed carey beans or black beans. Where can you learn more? Go to https://Media Redefined[employer]ke[employer]. org and sign in to your [employer] account. Enter E390 in the Gotta'go Personal Care Device box to learn more about \"DASH Diet: Care Instructions. \"     If you do not have an account, please click on the \"Sign Up Now\" link. Current as of: April 9, 2019  Content Version: 12.3  © 3216-3182 Healthwise, Incorporated. Care instructions adapted under license by [facility] ([facility]). If you have questions about a medical condition or this instruction, always ask your healthcare professional. [place] [address] any warranty or liability for your use of this information.

## 2020-01-10 NOTE — PROGRESS NOTES
rales.   Abdominal:      Palpations: Abdomen is soft. Musculoskeletal: Normal range of motion. General: No tenderness or deformity. Skin:     General: Skin is warm and dry. Coloration: Skin is not pale. Findings: No erythema or rash. Neurological:      Mental Status: He is alert and oriented to person, place, and time. GCS: GCS eye subscore is 4. GCS verbal subscore is 5. GCS motor subscore is 6. Psychiatric:         Speech: Speech normal.         Behavior: Behavior normal.         Thought Content: Thought content normal.         Judgment: Judgment normal.           Assessment:         1. Elevated blood pressure reading        Plan:     1. Elevated blood pressure reading    - Blood Pressure Monitoring (BLOOD PRESSURE CUFF) MISC; 1 blood pressure cuff per insurance coverage  Dispense: 1 each; Refill: 0    Mildly elevated diastolic in office  Otherwise examination benign  Advised to start monitoring BP routinely at home  Advised to decrease pepsi intake  Lifestyle modifications discussed  Follow-up in 6 weeks to review log and cuff  Reinforced no current imminent danger     Weight reduction - can decrease blood pressure by 5-20 points per 10 kg of weight loss   DASH Diet - consume diet rich in fruits, vegetables, and low-fat dairy products with a reduced content of saturated and total fat  Dietary sodium restriction - Reduce dietary sodium intake to no more than 100meq/day (2.4 g sodium)  Physical activity - Engage in regular aerobic physical activity routinely (150min/wk or 30 minutes most days) such as brisk walking, swimming, running, cycling  Moderate consumption of alcohol - limit to more than 2 drinks/day in most men and 1 drink/day in women   Smoking cessation     Credit - UpToDate     Discussed use, benefit, and side effects of prescribed medications. All patient questions answered. Pt voiced understanding. Reviewed health maintenance. Instructed to continue current medications,

## 2020-01-13 NOTE — TELEPHONE ENCOUNTER
MESSAGE FROM FIDEL, REQUESTING REFILL ON HYDREA   LAST FILLED SEPT 2019  LAST MD VISIT 11-26-19  SENT TO MD

## 2020-01-15 RX ORDER — HYDROXYUREA 500 MG/1
CAPSULE ORAL
Qty: 60 CAPSULE | Refills: 3 | Status: SHIPPED | OUTPATIENT
Start: 2020-01-15 | End: 2021-01-19 | Stop reason: SDUPTHER

## 2020-02-26 ENCOUNTER — CARE COORDINATION (OUTPATIENT)
Dept: FAMILY MEDICINE CLINIC | Age: 60
End: 2020-02-26

## 2020-02-26 ENCOUNTER — HOSPITAL ENCOUNTER (OUTPATIENT)
Facility: MEDICAL CENTER | Age: 60
End: 2020-02-26
Payer: COMMERCIAL

## 2020-02-26 SDOH — SOCIAL STABILITY: SOCIAL NETWORK: HOW OFTEN DO YOU ATTEND CHURCH OR RELIGIOUS SERVICES?: 1 TO 4 TIMES PER YEAR

## 2020-02-26 SDOH — HEALTH STABILITY: PHYSICAL HEALTH: ON AVERAGE, HOW MANY MINUTES DO YOU ENGAGE IN EXERCISE AT THIS LEVEL?: 10 MIN

## 2020-02-26 SDOH — ECONOMIC STABILITY: FOOD INSECURITY: WITHIN THE PAST 12 MONTHS, YOU WORRIED THAT YOUR FOOD WOULD RUN OUT BEFORE YOU GOT MONEY TO BUY MORE.: NEVER TRUE

## 2020-02-26 SDOH — SOCIAL STABILITY: SOCIAL NETWORK
IN A TYPICAL WEEK, HOW MANY TIMES DO YOU TALK ON THE PHONE WITH FAMILY, FRIENDS, OR NEIGHBORS?: MORE THAN THREE TIMES A WEEK

## 2020-02-26 SDOH — ECONOMIC STABILITY: TRANSPORTATION INSECURITY
IN THE PAST 12 MONTHS, HAS LACK OF TRANSPORTATION KEPT YOU FROM MEETINGS, WORK, OR FROM GETTING THINGS NEEDED FOR DAILY LIVING?: NO

## 2020-02-26 SDOH — HEALTH STABILITY: MENTAL HEALTH
STRESS IS WHEN SOMEONE FEELS TENSE, NERVOUS, ANXIOUS, OR CAN'T SLEEP AT NIGHT BECAUSE THEIR MIND IS TROUBLED. HOW STRESSED ARE YOU?: TO SOME EXTENT

## 2020-02-26 SDOH — ECONOMIC STABILITY: INCOME INSECURITY: HOW HARD IS IT FOR YOU TO PAY FOR THE VERY BASICS LIKE FOOD, HOUSING, MEDICAL CARE, AND HEATING?: NOT VERY HARD

## 2020-02-26 SDOH — ECONOMIC STABILITY: FOOD INSECURITY: WITHIN THE PAST 12 MONTHS, THE FOOD YOU BOUGHT JUST DIDN'T LAST AND YOU DIDN'T HAVE MONEY TO GET MORE.: NEVER TRUE

## 2020-02-26 SDOH — HEALTH STABILITY: PHYSICAL HEALTH: ON AVERAGE, HOW MANY DAYS PER WEEK DO YOU ENGAGE IN MODERATE TO STRENUOUS EXERCISE (LIKE A BRISK WALK)?: 3 DAYS

## 2020-02-26 SDOH — SOCIAL STABILITY: SOCIAL NETWORK: ARE YOU MARRIED, WIDOWED, DIVORCED, SEPARATED, NEVER MARRIED, OR LIVING WITH A PARTNER?: WIDOWED

## 2020-02-26 SDOH — SOCIAL STABILITY: SOCIAL NETWORK
DO YOU BELONG TO ANY CLUBS OR ORGANIZATIONS SUCH AS CHURCH GROUPS UNIONS, FRATERNAL OR ATHLETIC GROUPS, OR SCHOOL GROUPS?: YES

## 2020-02-26 SDOH — SOCIAL STABILITY: SOCIAL NETWORK: HOW OFTEN DO YOU GET TOGETHER WITH FRIENDS OR RELATIVES?: MORE THAN THREE TIMES A WEEK

## 2020-02-26 SDOH — ECONOMIC STABILITY: TRANSPORTATION INSECURITY
IN THE PAST 12 MONTHS, HAS THE LACK OF TRANSPORTATION KEPT YOU FROM MEDICAL APPOINTMENTS OR FROM GETTING MEDICATIONS?: NO

## 2020-02-26 SDOH — SOCIAL STABILITY: SOCIAL NETWORK: HOW OFTEN DO YOU ATTENT MEETINGS OF THE CLUB OR ORGANIZATION YOU BELONG TO?: 1 TO 4 TIMES PER YEAR

## 2020-03-06 ENCOUNTER — TELEPHONE (OUTPATIENT)
Dept: ONCOLOGY | Age: 60
End: 2020-03-06

## 2020-03-06 NOTE — PROGRESS NOTES
Anesthesia Focused Assessment    STOP-BANG Sleep Apnea Questionnaire    SNORE loudly (heard through closed doors)? No  TIRED, fatigued, sleepy during daytime? No  OBSERVED stopping breathing during sleep? No  High blood PRESSURE being treated? No    BMI over 35? No  AGE over 48? Yes  NECK circumference over 16\"? No  GENDER (male)? Yes             Total 2  High risk 5-8  Intermediate risk 3-4  Low risk 0-2    Obstructive Sleep Apnea: no  If YES, machine used: no     Type 1 DM:   no  T2DM:  no    Coronary Artery Disease:  no  Hypertension:  no    Active smoker:  1 ppd for about 40 years  Drinks Alcohol:  2 weeks sober    Dentition: Full upper and lower dentures    Defib / AICD / Pacemaker: no      Renal Failure/dialysis:  no    Patient was evaluated in PAT & anesthesia guidelines were applied. NPO guidelines, medication instructions and scheduled arrival time were reviewed with patient.     Hx of anesthesia complications:  no  Family hx of anesthesia complications:  no                                                                                                                     Anesthesia contacted:   no  Medical or cardiac clearance ordered: hossein JACKSON, IRENE-C  8/22/19  1:39 PM
(4) walks frequently

## 2020-03-06 NOTE — TELEPHONE ENCOUNTER
RECEIVED PHONE MESSAGE FROM FIDEL STATING, \" I NEED HELP WITH MY MED AND BLOOD TEST\"  ATTEMPT TO RETURN CALL AND RECEIVED . [phone]. EXPLAINED PHONES ARE ON UNTIL 1600 AND WILL ATTEMPT TO CONTACT HIM AGAIN.

## 2020-03-13 ENCOUNTER — CARE COORDINATION (OUTPATIENT)
Dept: FAMILY MEDICINE CLINIC | Age: 60
End: 2020-03-13

## 2020-03-13 NOTE — CARE COORDINATION
Ambulatory Care Coordination Note  3/13/2020  CM Risk Score: 12  Charlson 10 Year Mortality Risk Score: 4%     ACC: Lulu Dumont, RN    Summary Note: ACM spoke briefly with Mayito, other than chronic back pain, no new concerns, continues with oncology follow up and treatments. Declines to schedule OV appointment with PCP at this time due to current virus concerns, and current health issues. BP control, has not picked up BP cuff as of yet. ACM Plan:    ACM will follow up with Ivone Morrissey next week for updates with well-being and chronic back pain. Oncology treatments, recent BP concerns. Care Coordination Interventions    Program Enrollment:  Complex Care  Referral from Primary Care Provider:  No  Suggested Interventions and Community Resources  Zone Management Tools:   (Comment: No chronic conditions. )  Other Services or Interventions:  Oncology navigator         Goals Addressed                 This Visit's Progress     Conditions and Symptoms   Improving     I will schedule office visits, as directed by my provider. I will keep my appointment or reschedule if I have to cancel. I will notify my provider of any barriers to my plan of care. I will notify my provider of any symptoms that indicate a worsening of my condition. Barriers: overwhelmed by complexity of regimen  Plan for overcoming my barriers: Willing to work with ACM and PCP for BP control, and well-being. Confidence: 7/10  Anticipated Goal Completion Date: 6/2020              Prior to Admission medications    Medication Sig Start Date End Date Taking?  Authorizing Provider   hydroxyurea (HYDREA) 500 MG chemo capsule take 1 capsule by mouth twice a day 1/15/20   Mian Franks MD   ibuprofen (ADVIL;MOTRIN) 800 MG tablet Take 1 tablet by mouth every 8 hours as needed for Pain 1/10/20   SIMONE Hawkins - CNP   Blood Pressure Monitoring (BLOOD PRESSURE CUFF) MISC 1 blood pressure cuff per insurance coverage 1/10/20   Andressa LYMAN SIMONE Zhou CNP   albuterol sulfate  (90 Base) MCG/ACT inhaler inhale 2 puffs by mouth and INTO THE LUNGS every 4 hours if needed for wheezing 11/11/19   SIMONE Obregon CNP   docusate sodium (COLACE, DULCOLAX) 100 MG CAPS Take 100 mg by mouth 2 times daily 9/6/19   SIMONE Loredo CNP   QUEtiapine (SEROQUEL) 200 MG tablet Take 1 tablet by mouth nightly  Patient taking differently: Take 400 mg by mouth nightly  8/19/19   Gaurav Hastings MD   tamsulosin (FLOMAX) 0.4 MG capsule Take 1 capsule by mouth nightly 8/19/19   Gaurav Hastings MD       No future appointments.    and   General Assessment    Do you have any symptoms that are causing concern?:  Yes  Progression since Onset:  Unchanged  Reported Symptoms:  Pain (Comment: Chronic Back Pain)

## 2020-03-27 ENCOUNTER — CARE COORDINATION (OUTPATIENT)
Dept: FAMILY MEDICINE CLINIC | Age: 60
End: 2020-03-27

## 2020-04-03 ENCOUNTER — CARE COORDINATION (OUTPATIENT)
Dept: FAMILY MEDICINE CLINIC | Age: 60
End: 2020-04-03

## 2020-04-03 NOTE — CARE COORDINATION
- CNP   docusate sodium (COLACE, DULCOLAX) 100 MG CAPS Take 100 mg by mouth 2 times daily 9/6/19   SIMONE Miller - CNP   QUEtiapine (SEROQUEL) 200 MG tablet Take 1 tablet by mouth nightly  Patient taking differently: Take 400 mg by mouth nightly  8/19/19   Virginia Michelle MD   tamsulosin (FLOMAX) 0.4 MG capsule Take 1 capsule by mouth nightly 8/19/19   Virginia Michelle MD       No future appointments.    and   General Assessment    Do you have any symptoms that are causing concern?:  No

## 2020-04-17 ENCOUNTER — CARE COORDINATION (OUTPATIENT)
Dept: FAMILY MEDICINE CLINIC | Age: 60
End: 2020-04-17

## 2020-05-01 ENCOUNTER — CARE COORDINATION (OUTPATIENT)
Dept: FAMILY MEDICINE CLINIC | Age: 60
End: 2020-05-01

## 2020-07-09 NOTE — TELEPHONE ENCOUNTER
Letter sent to patient requesting appointment. Next Visit Date:  No future appointments.     Health Maintenance   Topic Date Due    Hepatitis C screen  1960    HIV screen  09/23/1976    Shingles Vaccine (1 of 2) 09/23/2011    Colon cancer screen colonoscopy  09/23/2011    Low dose CT lung screening  09/23/2016    Flu vaccine (1) 09/01/2020    Lipid screen  04/03/2024    DTaP/Tdap/Td vaccine (3 - Td) 07/22/2029    Pneumococcal 0-64 years Vaccine  Completed    Hepatitis A vaccine  Aged Out    Hepatitis B vaccine  Aged Out    Hib vaccine  Aged Out    Meningococcal (ACWY) vaccine  Aged Out       Hemoglobin A1C (%)   Date Value   04/03/2019 4.7             ( goal A1C is < 7)   No results found for: LABMICR  LDL Cholesterol (mg/dL)   Date Value   04/03/2019 97   01/23/2019 82       (goal LDL is <100)   AST (U/L)   Date Value   11/19/2019 44 (H)     ALT (U/L)   Date Value   11/19/2019 39     BUN (mg/dL)   Date Value   11/19/2019 5 (L)     BP Readings from Last 3 Encounters:   01/10/20 (!) 122/91   12/10/19 (!) 151/95   11/26/19 (!) 146/95          (goal 120/80)    All Future Testing planned in [facility]  Lab Frequency Next Occurrence   XR CERVICAL SPINE FLEXION AND EXTENSION Once 07/09/2020   XR CERVICAL SPINE (2-3 VIEWS) Once 10/30/2020   CBC Auto Differential q 4 weeks    Uric Acid q 4 weeks                Patient Active Problem List:     MPN (myeloproliferative neoplasm) ([address]     Chronic left-sided low back pain with left-sided sciatica     Insomnia     Osteoarthritis of spine with radiculopathy, lumbar region     Spondylosis of lumbar region without myelopathy or radiculopathy     Left lumbar radiculitis     Hx of fusion of cervical spine     Cervical stenosis of spine     Left arm numbness     Intentional drug overdose ([address]     Alcohol abuse     Major depressive disorder, recurrent severe without psychotic features ([address]     Severe recurrent major depression without psychotic features ([address] Anemia     Macrocytosis     Substance abuse ([facility]     Stenosis of cervical spine with myelopathy ([facility] [facility]

## 2020-07-09 NOTE — LETTER
[facility]  [address] [facility]  [address] [facility]  [address]    7/9/2020    [address] [facility] [address] [facility] [address]        Dear Parvez Correa :    In addition to helping you feel better when you are sick, we are interested in preventing illness and injury in the first place. In the spirit of maintaining your good health, your record indicates that you are due for an appointment. Please call [phone] to schedule. I look forward to seeing you soon.     Sincerely,           Kirstie Gutierrez CNP/mb

## 2020-07-10 RX ORDER — IBUPROFEN 800 MG/1
TABLET ORAL
Qty: 90 TABLET | Refills: 0 | Status: SHIPPED | OUTPATIENT
Start: 2020-07-10 | End: 2020-11-03

## 2020-09-01 ENCOUNTER — OFFICE VISIT (OUTPATIENT)
Dept: ONCOLOGY | Age: 60
End: 2020-09-01
Payer: COMMERCIAL

## 2020-09-01 ENCOUNTER — HOSPITAL ENCOUNTER (OUTPATIENT)
Facility: MEDICAL CENTER | Age: 60
Discharge: HOME | End: 2020-09-01
Payer: COMMERCIAL

## 2020-09-01 ENCOUNTER — TELEPHONE (OUTPATIENT)
Dept: ONCOLOGY | Age: 60
End: 2020-09-01

## 2020-09-01 VITALS
SYSTOLIC BLOOD PRESSURE: 146 MMHG | DIASTOLIC BLOOD PRESSURE: 90 MMHG | TEMPERATURE: 97.4 F | WEIGHT: 194.7 LBS | HEART RATE: 90 BPM | BODY MASS INDEX: 26.41 KG/M2

## 2020-09-01 DIAGNOSIS — D47.1 MYELOPROLIFERATIVE DISORDER (HCC): ICD-10-CM

## 2020-09-01 LAB
ABSOLUTE EOS #: 0.09 K/UL (ref 0–0.44)
ABSOLUTE IMMATURE GRANULOCYTE: 0.11 K/UL (ref 0–0.3)
ABSOLUTE LYMPH #: 2.75 K/UL (ref 1.1–3.7)
ABSOLUTE MONO #: 0.83 K/UL (ref 0.1–1.2)
BASOPHILS # BLD: 2 % (ref 0–2)
BASOPHILS ABSOLUTE: 0.14 K/UL (ref 0–0.2)
DIFFERENTIAL TYPE: ABNORMAL
EOSINOPHILS RELATIVE PERCENT: 1 % (ref 1–4)
HCT VFR BLD CALC: 38.5 % (ref 40.7–50.3)
HEMOGLOBIN: 13.1 G/DL (ref 13–17)
IMMATURE GRANULOCYTES: 1 %
LYMPHOCYTES # BLD: 31 % (ref 24–43)
MCH RBC QN AUTO: 36.6 PG (ref 25.2–33.5)
MCHC RBC AUTO-ENTMCNC: 34 G/DL (ref 28.4–34.8)
MCV RBC AUTO: 107.5 FL (ref 82.6–102.9)
MONOCYTES # BLD: 9 % (ref 3–12)
NRBC AUTOMATED: 0 PER 100 WBC
PDW BLD-RTO: 17.7 % (ref 11.8–14.4)
PLATELET # BLD: 126 K/UL (ref 138–453)
PLATELET ESTIMATE: ABNORMAL
PMV BLD AUTO: 9.9 FL (ref 8.1–13.5)
RBC # BLD: 3.58 M/UL (ref 4.21–5.77)
RBC # BLD: ABNORMAL 10*6/UL
SEG NEUTROPHILS: 56 % (ref 36–65)
SEGMENTED NEUTROPHILS ABSOLUTE COUNT: 4.99 K/UL (ref 1.5–8.1)
WBC # BLD: 8.9 K/UL (ref 3.5–11.3)
WBC # BLD: ABNORMAL 10*3/UL

## 2020-09-01 PROCEDURE — G8419 CALC BMI OUT NRM PARAM NOF/U: HCPCS | Performed by: INTERNAL MEDICINE

## 2020-09-01 PROCEDURE — 99212 OFFICE O/P EST SF 10 MIN: CPT

## 2020-09-01 PROCEDURE — 3017F COLORECTAL CA SCREEN DOC REV: CPT | Performed by: INTERNAL MEDICINE

## 2020-09-01 PROCEDURE — G8427 DOCREV CUR MEDS BY ELIG CLIN: HCPCS | Performed by: INTERNAL MEDICINE

## 2020-09-01 PROCEDURE — 4004F PT TOBACCO SCREEN RCVD TLK: CPT | Performed by: INTERNAL MEDICINE

## 2020-09-01 PROCEDURE — 36415 COLL VENOUS BLD VENIPUNCTURE: CPT

## 2020-09-01 PROCEDURE — 99215 OFFICE O/P EST HI 40 MIN: CPT | Performed by: INTERNAL MEDICINE

## 2020-09-01 PROCEDURE — 99211 OFF/OP EST MAY X REQ PHY/QHP: CPT | Performed by: INTERNAL MEDICINE

## 2020-09-01 PROCEDURE — 85025 COMPLETE CBC W/AUTO DIFF WBC: CPT

## 2020-09-01 NOTE — PROGRESS NOTES
trauma. PAST MEDICAL HISTORY: has a past medical history of Alcohol abuse, Anemia, Cancer ([address] Cervical stenosis of spine, Chronic left-sided low back pain with left-sided sciatica, Insomnia, Intentional drug overdose ([address] Left arm numbness, Left lumbar radiculitis, Macrocytosis, Major depressive disorder, recurrent severe without psychotic features ([address] Myeloproliferative disorder ([address] Osteoarthritis of spine with radiculopathy, lumbar region, Severe recurrent major depression without psychotic features ([address] and Spondylosis of lumbar region without myelopathy or radiculopathy. PAST SURGICAL HISTORY: has a past surgical history that includes cervical fusion; Appendectomy; Tonsillectomy; Carpal tunnel release; epidural steroid injection (Left, 5/15/2019); Endoscopy, colon, diagnostic; cervical fusion (09/05/2019); and cervical fusion (N/A, 9/5/2019). CURRENT MEDICATIONS:  has a current medication list which includes the following prescription(s): hydroxyurea, ibuprofen, blood pressure cuff, albuterol sulfate hfa, docusate, quetiapine, and tamsulosin. ALLERGIES:  has No Known Allergies. FAMILY HISTORY: Negative for any hematological or oncological conditions. SOCIAL HISTORY:  reports that he has been smoking cigarettes. He started smoking about 41 years ago. He has a 40.00 pack-year smoking history. He has never used smokeless tobacco. He reports previous alcohol use of about 14.0 standard drinks of alcohol per week. He reports previous drug use. Drug: Marijuana. REVIEW OF SYSTEMS:     · General: No weakness or fatigue. No unanticipated weight loss or decreased appetite. No fever or chills. · Eyes: No blurred vision, eye pain or double vision. · Ears: No hearing problems or drainage. No tinnitus. · Throat: No sore throat, problems with swallowing or dysphagia. · Respiratory: Positive for cough. No sputum or hemoptysis. No shortness of breath. Positive for pleuritic chest pain. · Cardiovascular: No chest pain, orthopnea or PND. No lower extremity edema. No palpitation. · Gastrointestinal: No problems with swallowing. No abdominal pain or bloating. No nausea or vomiting. No diarrhea or constipation. No GI bleeding. · Genitourinary: No dysuria, hematuria, frequency or urgency. · Musculoskeletal: No muscle aches or pains. No limitation of movement. No back pain. No gait disturbance, No joint complaints. · Dermatologic: No skin rashes or pruritus. Skin lesion on the dorsum of the left hand   · psychiatric: No depression, anxiety, or stress or signs of schizophrenia. No change in mood or affect. · Hematologic: No history of bleeding tendency. No bruises or ecchymosis. No history of clotting problems. · Infectious disease: No fever, chills or frequent infections. · Endocrine: No problems with obesity. No polydipsia or polyuria. No temperature intolerance. · Neurologic: + headaches No dizziness. No weakness. + numbness of the extremities. No changes in balance, coordination,  memory, mentation, behavior. · Allergic/Immunologic: No nasal congestion or hives. No repeated infections. PHYSICAL EXAM:  The patient is not in acute distress. Vital signs: Blood pressure (!) 146/90, pulse 90, temperature 97.4 °F (36.3 °C), temperature source Oral, weight 194 lb 11.2 oz (88.3 kg). HEENT:  Eyes are normal. Ears, nose and throat are normal.  Neck: Supple. No lymph node enlargement. No thyroid enlargement. Trachea is centrally located. Chest:  Clear to auscultation. No wheezes or crepitations. Heart: Regular sinus rhythm. Abdomen: Soft, nontender. No hepatosplenomegaly. No masses. Extremities:  With no edema. Lymph Nodes:  No cervical, axillary or inguinal lymph node enlargement. Neurologic:  Conscious and oriented. No focal neurological deficits. Psychosocial: No depression, anxiety or stress. Skin: No rashes, bruises or ecchymoses.   Skin lesion of the dorsum of the

## 2020-09-01 NOTE — TELEPHONE ENCOUNTER
Laura Gutiérrez MD VISIT  DR Moses Cheema IN TO SEE PATIENT  ORDERS RECEIVED  CT SOON  RV 6 MONTHS   CBC EVERY 3 MONTHS  REFER TO DERMATOLOGY FOR LEFT ARM SKIN LESION  CONTINUE HYDREA BID  CT CHEST W/CONTRAST SCHEDULED BHANU/XIN FOR 09/08/20 @12:30PM  ARRIVE BY 12PM [address]  CBC 12/1,3/2  MD VISIT 03/02/21 @4PM  DERMATOLOGY OFFICE CLOSED FOR THE DAY, WILL CALL TOMORROW  AVS PRINTED AND GIVEN TO PATIENT WITH INSTRUCTIONS  PATIENT DISCHARGED AMBULATORY

## 2020-09-02 ENCOUNTER — TELEPHONE (OUTPATIENT)
Dept: ONCOLOGY | Age: 60
End: 2020-09-02

## 2020-11-03 ENCOUNTER — HOSPITAL ENCOUNTER (OUTPATIENT)
Age: 60
Setting detail: SPECIMEN
Discharge: HOME | End: 2020-11-03
Payer: COMMERCIAL

## 2020-11-03 ENCOUNTER — OFFICE VISIT (OUTPATIENT)
Dept: DERMATOLOGY | Age: 60
End: 2020-11-03
Payer: COMMERCIAL

## 2020-11-03 VITALS
SYSTOLIC BLOOD PRESSURE: 116 MMHG | HEART RATE: 87 BPM | WEIGHT: 211 LBS | BODY MASS INDEX: 27.96 KG/M2 | HEIGHT: 73 IN | DIASTOLIC BLOOD PRESSURE: 75 MMHG | OXYGEN SATURATION: 94 % | TEMPERATURE: 98.2 F

## 2020-11-03 PROCEDURE — G8427 DOCREV CUR MEDS BY ELIG CLIN: HCPCS | Performed by: DERMATOLOGY

## 2020-11-03 PROCEDURE — 11103 TANGNTL BX SKIN EA SEP/ADDL: CPT | Performed by: DERMATOLOGY

## 2020-11-03 PROCEDURE — 3017F COLORECTAL CA SCREEN DOC REV: CPT | Performed by: DERMATOLOGY

## 2020-11-03 PROCEDURE — G8419 CALC BMI OUT NRM PARAM NOF/U: HCPCS | Performed by: DERMATOLOGY

## 2020-11-03 PROCEDURE — 99202 OFFICE O/P NEW SF 15 MIN: CPT | Performed by: DERMATOLOGY

## 2020-11-03 PROCEDURE — 4004F PT TOBACCO SCREEN RCVD TLK: CPT | Performed by: DERMATOLOGY

## 2020-11-03 PROCEDURE — 11102 TANGNTL BX SKIN SINGLE LES: CPT | Performed by: DERMATOLOGY

## 2020-11-03 PROCEDURE — G8484 FLU IMMUNIZE NO ADMIN: HCPCS | Performed by: DERMATOLOGY

## 2020-11-03 RX ORDER — LIDOCAINE HYDROCHLORIDE AND EPINEPHRINE 10; 10 MG/ML; UG/ML
2 INJECTION, SOLUTION INFILTRATION; PERINEURAL ONCE
Status: COMPLETED | OUTPATIENT
Start: 2020-11-03 | End: 2020-11-03

## 2020-11-03 RX ADMIN — LIDOCAINE HYDROCHLORIDE AND EPINEPHRINE 2 ML: 10; 10 INJECTION, SOLUTION INFILTRATION; PERINEURAL at 15:23

## 2020-11-03 NOTE — PATIENT INSTRUCTIONS
BIOPSY WOUND CARE    A biopsy is where a small piece of skin tissue is removed and examined by a pathologist.  When a biopsy is done, there is a small wound site that requires proper care to prevent infection and scarring. Some biopsies require sutures and their removal.    How to Care for Biopsy Wound    A.  Leave band-aid or dressing on for 24 hours. B. Wash two times a day with soap and water. C.  Let the wound air dry, then apply Vaseline ointment and cover with a Band-Aid       unless otherwise instructed by your provider. D. If there is slight discomfort, you may give acetaminophen or ibuprofen. When To Call the Doctor    Call the Dermatology Clinic or your doctor if any of the following occur:    A. Redness and swelling  B. Tenderness and warm to touch  C.  Drainage from wound  D. Fever    Biopsy Results    Biopsy results are usually available in 1-2 weeks. We provide biopsy results in letters for begin results or we will call for any concerning results. If you have not heard from our staff please call the office within 2 weeks. Please call our office with any concerns at [phone].

## 2020-11-03 NOTE — PROGRESS NOTES
Dermatology Patient Note  [facility] [address]  [address]  Dept: [phone]  Dept Fax: [phone]: 11/3/2020   REFERRING PROVIDER: Camilo Gilmore MD      Filemon Cuadra is a 62 y.o. male  who presents today in the office for:    New Patient (skin lesion on lt hand and a few on the scalp that are concerning. Just wants to make sure they are ok. )      HISTORY OF PRESENT ILLNESS:  62 y.o. male with myeloproliferative disorder presenting for lesions  Location: left hand, scalp, ear   Duration: months (hand and ear), years (scalp)  Symptoms: the one on the ear is painful, the one on hand bleeds  Course: growing  Exacerbating factors: had a laceration to his ear and has had pain there since then, cannot lie on that side  Prior treatments: none      CURRENT MEDICATIONS:   Current Outpatient Medications   Medication Sig Dispense Refill    hydroxyurea (HYDREA) 500 MG chemo capsule take 1 capsule by mouth twice a day 60 capsule 3     No current facility-administered medications for this visit. ALLERGIES:   No Known Allergies    SOCIAL HISTORY:  Social History     Tobacco Use    Smoking status: Current Every Day Smoker     Packs/day: 1.00     Years: 40.00     Pack years: 40.00     Types: Cigarettes     Start date: 9/23/1978    Smokeless tobacco: Never Used   Substance Use Topics    Alcohol use: Not Currently     Alcohol/week: 14.0 standard drinks     Types: 14 Cans of beer per week     Comment: heavy alcohol user/ states drinks a pint of vodka occassionaly       REVIEW OF SYSTEMS:  Review of Systems  Skin: Denies any new changing, growing orbleeding lesions or rashes except as described in the HPI   Constitutional: Denies fevers, chills, and malaise.     PHYSICAL EXAM:   /75 (Site: Right Upper Arm, Position: Sitting, Cuff Size: Medium Adult)   Pulse 87   Temp 98.2 °F (36.8 °C)   Ht 6' 1\" (1.854 m)   Wt 211 lb (95.7 kg)   SpO2 94%   BMI 27.84 kg/m²     General Exam:  General Appearance: No acute distress, Well nourished     Neuro: Alert and oriented to person, place and time  Psych: Normal affect   Lymph Node: Not performed    Cutaneous Exam: Performed as documented in clinic note below. Waist-up skin, which includes the head/face,neck, both arms, chest, back, abdomen, digits and/or nails, was examined. Pertinent Physical Exam Findings:  Physical Exam  Crusted tan to brown stuck-on papules on face, scalp, trunk  Left wrist with erythematous crusted plaque  Frontal scalp with exophytic linear brown plaque  Posterior scalp with irregular brown macule  Right ear with erythema and scale, with slight anatomic distortion of antihelix    Photo surveillance performed: Yes    Medical Necessity of Exam Performed:   Distribution of patient concerns    Additional Diagnostic Testing performed during exam: Not performed ,  Not performed    ASSESSMENT:   Diagnosis Orders   1. Neoplasm of uncertain behavior of skin  Surgical Pathology    KS TANGENTIAL BIOPSY SKIN SINGLE LESION    KS TANGENTIAL BIOPSY SKIN EA SEP/ADDITIONAL LESION   2. Seborrheic keratoses         Plan of Action is as Follows:  Assessment   1. Neoplasm of uncertain behavior of skin x 4  Left wrist: r/o SCC  Frontal scalp: nevus sebaceous vs. ISK vs. Other  Vertex scalp: nevus r/o atypia vs. SK  Right antihelix: CNH vs. AK vs. SCC vs. Other  Shave Biopsy x 4: The procedure and its risks were explained including but not limited to pain, bleeding, infection, permanent scar, permanent pigment alteration and need for an additional procedure. Consent to proceed with the procedure was obtained from the patient or the parent. After cleaning with alcohol the lesions were anesthetized with 1% lidocaine with epinephrine and removed with a dermablade. Hemostasis was achieved with aluminum chloride and Vaseline and a bandage were applied.  - Surgical Pathology;  Future  - KS TANGENTIAL BIOPSY SKIN SINGLE LESION  - NM TANGENTIAL BIOPSY SKIN EA SEP/ADDITIONAL LESION    2. Seborrheic keratoses  reassurance and education    RTC pending path            Patient Instructions   BIOPSY WOUND CARE    A biopsy is where a small piece of skin tissue is removed and examined by a pathologist.  When a biopsy is done, there is a small wound site that requires proper care to prevent infection and scarring. Some biopsies require sutures and their removal.    How to Care for Biopsy Wound    A.  Leave band-aid or dressing on for 24 hours. B. Wash two times a day with soap and water. C.  Let the wound air dry, then apply Vaseline ointment and cover with a Band-Aid       unless otherwise instructed by your provider. D. If there is slight discomfort, you may give acetaminophen or ibuprofen. When To Call the Doctor    Call the Dermatology Clinic or your doctor if any of the following occur:    A. Redness and swelling  B. Tenderness and warm to touch  C.  Drainage from wound  D. Fever    Biopsy Results    Biopsy results are usually available in 1-2 weeks. We provide biopsy results in letters for begin results or we will call for any concerning results. If you have not heard from our staff please call the office within 2 weeks. Please call our office with any concerns at [phone]. Follow-up: No follow-ups on file. This note was created with the assistance of a speech-recognition program.  Although the intention is to generate a document that actually reflects the content of the visit, no guarantees can be provided that every mistake has been identified and corrected byediting.     Electronically signed by Sidney Fields MD on 11/3/20 at 11:21 AM EST

## 2020-11-09 LAB — DERMATOLOGY PATHOLOGY REPORT: NORMAL

## 2020-11-10 ENCOUNTER — TELEPHONE (OUTPATIENT)
Dept: DERMATOLOGY | Age: 60
End: 2020-11-10

## 2020-11-20 ENCOUNTER — HOSPITAL ENCOUNTER (OUTPATIENT)
Facility: MEDICAL CENTER | Age: 60
End: 2020-11-20
Payer: COMMERCIAL

## 2021-01-19 DIAGNOSIS — D47.1 MYELOPROLIFERATIVE DISORDER (HCC): ICD-10-CM

## 2021-01-19 DIAGNOSIS — D75.89 MACROCYTOSIS: Primary | ICD-10-CM

## 2021-01-19 NOTE — TELEPHONE ENCOUNTER
RECEIVED PHONE REQUEST FROM [facility] FOR HYDREA REFILL    LAST WRITTEN #[phone] + 3 01/15/2020. UPCOMING MD EXAM 03/02/21    NOTE AT LAST MD EXAM PLAN TO CONTINUE AT SAME DOSE.     PEND TO MD

## 2021-01-21 RX ORDER — HYDROXYUREA 500 MG/1
CAPSULE ORAL
Qty: 60 CAPSULE | Refills: 3 | Status: SHIPPED | OUTPATIENT
Start: 2021-01-21 | End: 2021-06-23

## 2021-03-02 ENCOUNTER — TELEPHONE (OUTPATIENT)
Dept: ONCOLOGY | Age: 61
End: 2021-03-02

## 2021-03-02 ENCOUNTER — OFFICE VISIT (OUTPATIENT)
Dept: ONCOLOGY | Age: 61
End: 2021-03-02
Payer: MEDICARE

## 2021-03-02 ENCOUNTER — HOSPITAL ENCOUNTER (OUTPATIENT)
Facility: MEDICAL CENTER | Age: 61
Discharge: HOME | End: 2021-03-02
Payer: MEDICARE

## 2021-03-02 VITALS
SYSTOLIC BLOOD PRESSURE: 155 MMHG | HEART RATE: 91 BPM | TEMPERATURE: 97.6 F | WEIGHT: 219.4 LBS | DIASTOLIC BLOOD PRESSURE: 102 MMHG | BODY MASS INDEX: 28.95 KG/M2 | RESPIRATION RATE: 20 BRPM

## 2021-03-02 DIAGNOSIS — D47.1 MYELOPROLIFERATIVE DISORDER (HCC): ICD-10-CM

## 2021-03-02 DIAGNOSIS — D47.1 MPN (MYELOPROLIFERATIVE NEOPLASM) (HCC): Primary | ICD-10-CM

## 2021-03-02 LAB
ABSOLUTE EOS #: 0.18 K/UL (ref 0–0.44)
ABSOLUTE IMMATURE GRANULOCYTE: 0.25 K/UL (ref 0–0.3)
ABSOLUTE LYMPH #: 1.64 K/UL (ref 1.1–3.7)
ABSOLUTE MONO #: 1.11 K/UL (ref 0.1–1.2)
BASOPHILS # BLD: 2 % (ref 0–2)
BASOPHILS ABSOLUTE: 0.18 K/UL (ref 0–0.2)
DIFFERENTIAL TYPE: ABNORMAL
EOSINOPHILS RELATIVE PERCENT: 2 % (ref 1–4)
HCT VFR BLD CALC: 48.3 % (ref 40.7–50.3)
HEMOGLOBIN: 15.9 G/DL (ref 13–17)
IMMATURE GRANULOCYTES: 3 %
LYMPHOCYTES # BLD: 20 % (ref 24–43)
MCH RBC QN AUTO: 31 PG (ref 25.2–33.5)
MCHC RBC AUTO-ENTMCNC: 32.9 G/DL (ref 28.4–34.8)
MCV RBC AUTO: 94.2 FL (ref 82.6–102.9)
MONOCYTES # BLD: 13 % (ref 3–12)
NRBC AUTOMATED: 0 PER 100 WBC
PDW BLD-RTO: 17.8 % (ref 11.8–14.4)
PLATELET # BLD: 305 K/UL (ref 138–453)
PLATELET ESTIMATE: ABNORMAL
PMV BLD AUTO: 10 FL (ref 8.1–13.5)
RBC # BLD: 5.13 M/UL (ref 4.21–5.77)
RBC # BLD: ABNORMAL 10*6/UL
SEG NEUTROPHILS: 60 % (ref 36–65)
SEGMENTED NEUTROPHILS ABSOLUTE COUNT: 5.04 K/UL (ref 1.5–8.1)
WBC # BLD: 8.4 K/UL (ref 3.5–11.3)
WBC # BLD: ABNORMAL 10*3/UL

## 2021-03-02 PROCEDURE — 4004F PT TOBACCO SCREEN RCVD TLK: CPT | Performed by: INTERNAL MEDICINE

## 2021-03-02 PROCEDURE — 99211 OFF/OP EST MAY X REQ PHY/QHP: CPT | Performed by: INTERNAL MEDICINE

## 2021-03-02 PROCEDURE — G8419 CALC BMI OUT NRM PARAM NOF/U: HCPCS | Performed by: INTERNAL MEDICINE

## 2021-03-02 PROCEDURE — G8484 FLU IMMUNIZE NO ADMIN: HCPCS | Performed by: INTERNAL MEDICINE

## 2021-03-02 PROCEDURE — 85025 COMPLETE CBC W/AUTO DIFF WBC: CPT

## 2021-03-02 PROCEDURE — 99214 OFFICE O/P EST MOD 30 MIN: CPT | Performed by: INTERNAL MEDICINE

## 2021-03-02 PROCEDURE — G8427 DOCREV CUR MEDS BY ELIG CLIN: HCPCS | Performed by: INTERNAL MEDICINE

## 2021-03-02 PROCEDURE — 3017F COLORECTAL CA SCREEN DOC REV: CPT | Performed by: INTERNAL MEDICINE

## 2021-03-02 PROCEDURE — 36415 COLL VENOUS BLD VENIPUNCTURE: CPT

## 2021-03-02 RX ORDER — QUETIAPINE FUMARATE 400 MG/1
400 TABLET, FILM COATED ORAL NIGHTLY
Status: ON HOLD | COMMUNITY
Start: 2021-02-19 | End: 2022-07-25 | Stop reason: HOSPADM

## 2021-03-02 RX ORDER — QUETIAPINE FUMARATE 100 MG/1
100 TABLET, FILM COATED ORAL 3 TIMES DAILY PRN
Status: ON HOLD | COMMUNITY
Start: 2021-02-19 | End: 2021-07-17 | Stop reason: HOSPADM

## 2021-03-02 NOTE — TELEPHONE ENCOUNTER
[facility] ARRIVES AMBULATORY FOR MD VISIT  DR Meredith Beaulieu IN TO SEE PATIRICHIE  ORDERS RECEIVED  RV 6 MONTHS  CBC EVERY 3 MONTHS  CONTINUE HYDREA BID  LABS CDP 6/3/21 & 9/2/21  MD VISIT 9/2/21 @11:15AM  AVS PRINTED AND GIVEN TO PATIENT WITH INSTRUCTIONS  PATIENT DISCHARGED AMBULATORY

## 2021-03-13 NOTE — PROGRESS NOTES
_           Chief Complaint   Patient presents with    Follow-up    Discuss Labs    Other     throat issues / voice charges     Shortness of Breath     DIAGNOSIS:       Myeloproliferative disorder, NOS, positive VINICIO 2 gene mutation. Negative for CML. CURRENT THERAPY:         Hydroxyurea 500 mg twice daily. Allopurinol, discontinued  Aspirin  Therapeutic phlebotomy as needed    BRIEF CASE HISTORY:      Mr. Yoanna Hanley is a very pleasant 62 y.o. male with history of recently diagnosed myeloproliferative disorder is referred for further management. He has Chronic back pain. Old injury in the cervical spine after trampoline fall. Surgery C-spine long time ago  The patient is having frequent headaches and left sided numbness. No chest pains  In October 2018 he was [facility] in [facility] and had elevated white blood cells and red blood cells and platelets  Diagnosis myeloproliferative disorder with positive JAK2 gene mutation. Bone marrow showed no specific type and cytogenetics showed no CML  He was started on Hydroxyurea twice daily and allopurinol. Treatment tolerated well so far with no side effects. INTERIM HISTORY:   The patient seen for follow-up myeloproliferative disorder. Due to his symptoms of headaches and numbness we decided to do a therapeutic phlebotomy. He did well after that with controlled labs. He continued on hydroxyurea. tolerated well no side effects. Overall he feels better. He continues to have mild neuropathy.     PAST MEDICAL HISTORY: has a past medical history of Alcohol abuse, Anemia, Cancer ([address] Cervical stenosis of spine, Chronic left-sided low back pain with left-sided sciatica, Insomnia, Intentional drug overdose ([address] Left arm numbness, Left lumbar radiculitis, Macrocytosis, Major depressive disorder, recurrent severe without psychotic features ([address] My[address]oprol[address]ative disorder [address] [address] Osteoarthritis of spine with radiculopathy, lumbar region, Severe recurrent major depression without psychotic features ([address] and Spondylosis of lumbar region without myelopathy or radiculopathy. PAST SURGICAL HISTORY: has a past surgical history that includes cervical fusion; Appendectomy; Tonsillectomy; Carpal tunnel release; epidural steroid injection (Left, 5/15/2019); Endoscopy, colon, diagnostic; cervical fusion (09/05/2019); and cervical fusion (N/A, 9/5/2019). CURRENT MEDICATIONS:  has a current medication list which includes the following prescription(s): quetiapine, quetiapine, and hydroxyurea. ALLERGIES:  has No Known Allergies. FAMILY HISTORY: Negative for any hematological or oncological conditions. SOCIAL HISTORY:  reports that he has been smoking cigarettes. He started smoking about 42 years ago. He has a 40.00 pack-year smoking history. He has never used smokeless tobacco. He reports previous alcohol use of about 14.0 standard drinks of alcohol per week. He reports previous drug use. Drug: Marijuana. REVIEW OF SYSTEMS:     · General: No weakness or fatigue. No unanticipated weight loss or decreased appetite. No fever or chills. · Eyes: No blurred vision, eye pain or double vision. · Ears: No hearing problems or drainage. No tinnitus. · Throat: No sore throat, problems with swallowing or dysphagia. · Respiratory: Positive for cough. No sputum or hemoptysis. No shortness of breath. Positive for pleuritic chest pain. · Cardiovascular: No chest pain, orthopnea or PND. No lower extremity edema. No palpitation. · Gastrointestinal: No problems with swallowing. No abdominal pain or bloating. No nausea or vomiting. No diarrhea or constipation. No GI bleeding. · Genitourinary: No dysuria, hematuria, frequency or urgency. · Musculoskeletal: No muscle aches or pains. No limitation of movement. No back pain.  No gait disturbance, No joint complaints. · Dermatologic: No skin rashes or pruritus. Skin lesion on the dorsum of the left hand   · psychiatric: No depression, anxiety, or stress or signs of schizophrenia. No change in mood or affect. · Hematologic: No history of bleeding tendency. No bruises or ecchymosis. No history of clotting problems. · Infectious disease: No fever, chills or frequent infections. · Endocrine: No problems with obesity. No polydipsia or polyuria. No temperature intolerance. · Neurologic: + headaches No dizziness. No weakness. + numbness of the extremities. No changes in balance, coordination,  memory, mentation, behavior. · Allergic/Immunologic: No nasal congestion or hives. No repeated infections. PHYSICAL EXAM:  The patient is not in acute distress. Vital signs: Blood pressure (!) 155/102, pulse 91, temperature 97.6 °F (36.4 °C), temperature source Temporal, resp. rate 20, weight 219 lb 6.4 oz (99.5 kg). HEENT:  Eyes are normal. Ears, nose and throat are normal.  Neck: Supple. No lymph node enlargement. No thyroid enlargement. Trachea is centrally located. Chest:  Clear to auscultation. No wheezes or crepitations. Heart: Regular sinus rhythm. Abdomen: Soft, nontender. No hepatosplenomegaly. No masses. Extremities:  With no edema. Lymph Nodes:  No cervical, axillary or inguinal lymph node enlargement. Neurologic:  Conscious and oriented. No focal neurological deficits. Psychosocial: No depression, anxiety or stress. Skin: No rashes, bruises or ecchymoses. Skin lesion of the dorsum of the left hand suspicious for possible skin cancer.       Review of Diagnostic data:   Lab Results   Component Value Date    WBC 8.4 03/02/2021    HGB 15.9 03/02/2021    HCT 48.3 03/02/2021    MCV 94.2 03/02/2021     03/02/2021       Chemistry        Component Value Date/Time     11/19/2019 1717    K 4.2 11/19/2019 1717     11/19/2019 1717    CO2 23 11/19/2019 1717    BUN 5 (L) 11/19/2019 1717    CREATININE 0.64 (L) 11/19/2019 1717        Component Value Date/Time    CALCIUM 9.0 11/19/2019 1717    ALKPHOS 87 11/19/2019 1717    AST 44 (H) 11/19/2019 1717    ALT 39 11/19/2019 1717    BILITOT 0.20 (L) 11/19/2019 1717        IMPRESSION:   Myeloproliferative disorder, NOS, positive VINICIO 2 gene mutation. Negative for CML. Chronic headaches  Long-standing history of Episodes of cough and chest pain. Left hand skin lesion. PLAN: records and labs from outside facility were reviewed and explained to patient. I explained to the patient the nature of this problem with myeloproliferative disorder and management plan. Based on Bone marrow and cytogenetics no CML was seen. Diagnosis is made with myeloproliferative disorder NOS. Labs are stable. We will continue hydroxyurea at the same dose. Currently 500 mg twice daily. Repeated labs today showed normal blood counts with macrocytosis secondary to hydroxyurea. We will refer him to dermatology for left hand skin lesion. Possible skin cancer which needs to be excised. Due to the patient's history of smoking and his right chest pain and cough we will do CT scan. RV 6 months with CBC every 3 months.

## 2021-03-16 ENCOUNTER — TELEPHONE (OUTPATIENT)
Dept: DERMATOLOGY | Age: 61
End: 2021-03-16

## 2021-03-16 DIAGNOSIS — D09.9 SQUAMOUS CELL CARCINOMA IN SITU: Primary | ICD-10-CM

## 2021-03-16 NOTE — TELEPHONE ENCOUNTER
Called pt to try and R/S procedure that was cancelled back in December, pt states he does not want to R/S, he feels that the procedure is not necessary. I reminded him of his follow up and he agreed that if Dr. Jose Eduardo Metzger looked at the spot on his hand and wants to R/S the procedure, we will R/S it the day of the appt.    Future Appointments   Date Time Provider Mayco Vaughn   5/3/2021 10:30 AM Awanda Fothergill, MD  derm [facility]   6/3/2021  8:15 AM SCHEDULE, [facility]   9/2/2021 11:15 AM Trisha Silva MD [facility]

## 2021-03-17 RX ORDER — FLUOROURACIL 50 MG/G
CREAM TOPICAL
Qty: 40 G | Refills: 0 | Status: SHIPPED | OUTPATIENT
Start: 2021-03-17 | End: 2021-06-02

## 2021-03-17 NOTE — TELEPHONE ENCOUNTER
Spoke with patient. He states he sometimes has issues with transportation, so he'd rather try the cream for 6 weeks than deal with transportation for procedure. Verified Luis Grant is correct.  Please send topical.

## 2021-03-17 NOTE — TELEPHONE ENCOUNTER
Please inform patient that the skin cancer was not completely removed and it extended to all margins on the biopsy. I strongly recommend, and it is standard of care to pursue curative treatment. If he does not want to pursue another procedure, he can apply efudex for 6 weeks twice daily. It will be red and irritated during this time.

## 2021-05-14 ENCOUNTER — TELEPHONE (OUTPATIENT)
Dept: FAMILY MEDICINE CLINIC | Age: 61
End: 2021-05-14

## 2021-05-17 ENCOUNTER — TELEPHONE (OUTPATIENT)
Dept: ONCOLOGY | Age: 61
End: 2021-05-17

## 2021-05-17 DIAGNOSIS — R49.0 HOARSENESS OF VOICE: ICD-10-CM

## 2021-05-17 DIAGNOSIS — J31.2 CHRONIC SORE THROAT: Primary | ICD-10-CM

## 2021-05-17 NOTE — TELEPHONE ENCOUNTER
He should be evaluated for his sore throat but if this is an acute problem I would recommend walk-in clinic since we are not currently seeing sick calls in the office. He needs to f/u with his Hematologist for the other problem.

## 2021-05-17 NOTE — TELEPHONE ENCOUNTER
Pt advised, he stated this has been going on over a month and he was asking for a referral to a ENT when he first called and left the message.

## 2021-05-26 ENCOUNTER — HOSPITAL ENCOUNTER (OUTPATIENT)
Facility: MEDICAL CENTER | Age: 61
End: 2021-05-26
Payer: MEDICAID

## 2021-06-02 ENCOUNTER — HOSPITAL ENCOUNTER (INPATIENT)
Age: 61
LOS: 2 days | Discharge: HOME | DRG: 641 | End: 2021-06-04
Attending: EMERGENCY MEDICINE | Admitting: INTERNAL MEDICINE
Payer: MEDICARE

## 2021-06-02 ENCOUNTER — APPOINTMENT (OUTPATIENT)
Dept: ULTRASOUND IMAGING | Age: 61
DRG: 641 | End: 2021-06-02
Payer: MEDICARE

## 2021-06-02 ENCOUNTER — APPOINTMENT (OUTPATIENT)
Dept: GENERAL RADIOLOGY | Age: 61
DRG: 641 | End: 2021-06-02
Payer: MEDICARE

## 2021-06-02 ENCOUNTER — APPOINTMENT (OUTPATIENT)
Dept: CT IMAGING | Age: 61
DRG: 641 | End: 2021-06-02
Payer: MEDICARE

## 2021-06-02 DIAGNOSIS — R53.1 GENERAL WEAKNESS: Primary | ICD-10-CM

## 2021-06-02 DIAGNOSIS — E87.1 HYPONATREMIA: ICD-10-CM

## 2021-06-02 DIAGNOSIS — F10.939 ALCOHOL WITHDRAWAL SYNDROME WITH COMPLICATION (HCC): ICD-10-CM

## 2021-06-02 PROBLEM — F10.20 UNCOMPLICATED ALCOHOL DEPENDENCE (HCC): Status: ACTIVE | Noted: 2021-06-02

## 2021-06-02 LAB
-: NORMAL
ABSOLUTE EOS #: 0.16 K/UL (ref 0–0.4)
ABSOLUTE IMMATURE GRANULOCYTE: 0.31 K/UL (ref 0–0.3)
ABSOLUTE LYMPH #: 1.09 K/UL (ref 1–4.8)
ABSOLUTE MONO #: 1.55 K/UL (ref 0.1–0.8)
ACETAMINOPHEN LEVEL: <5 UG/ML (ref 10–30)
ALBUMIN SERPL-MCNC: 4.5 G/DL (ref 3.5–5.2)
ALBUMIN/GLOBULIN RATIO: 1.5 (ref 1–2.5)
ALP BLD-CCNC: 143 U/L (ref 40–129)
ALT SERPL-CCNC: 104 U/L (ref 5–41)
AMORPHOUS: NORMAL
ANION GAP SERPL CALCULATED.3IONS-SCNC: 13 MMOL/L (ref 9–17)
ANION GAP SERPL CALCULATED.3IONS-SCNC: 15 MMOL/L (ref 9–17)
AST SERPL-CCNC: 98 U/L
BACTERIA: NORMAL
BASOPHILS # BLD: 1 % (ref 0–2)
BASOPHILS ABSOLUTE: 0.16 K/UL (ref 0–0.2)
BILIRUB SERPL-MCNC: 1.26 MG/DL (ref 0.3–1.2)
BILIRUBIN DIRECT: 0.44 MG/DL
BILIRUBIN URINE: NEGATIVE
BILIRUBIN, INDIRECT: 0.82 MG/DL (ref 0–1)
BNP INTERPRETATION: ABNORMAL
BUN BLDV-MCNC: 10 MG/DL (ref 6–20)
BUN BLDV-MCNC: 7 MG/DL (ref 6–20)
BUN/CREAT BLD: ABNORMAL (ref 9–20)
BUN/CREAT BLD: ABNORMAL (ref 9–20)
CALCIUM SERPL-MCNC: 8.2 MG/DL (ref 8.6–10.4)
CALCIUM SERPL-MCNC: 8.8 MG/DL (ref 8.6–10.4)
CASTS UA: NORMAL /LPF (ref 0–8)
CHLORIDE BLD-SCNC: 85 MMOL/L (ref 98–107)
CHLORIDE BLD-SCNC: 92 MMOL/L (ref 98–107)
CO2: 21 MMOL/L (ref 20–31)
CO2: 22 MMOL/L (ref 20–31)
COLOR: ABNORMAL
COMMENT UA: ABNORMAL
CREAT SERPL-MCNC: 0.52 MG/DL (ref 0.7–1.2)
CREAT SERPL-MCNC: 0.6 MG/DL (ref 0.7–1.2)
CRYSTALS, UA: NORMAL /HPF
DIFFERENTIAL TYPE: ABNORMAL
EOSINOPHILS RELATIVE PERCENT: 1 % (ref 1–4)
EPITHELIAL CELLS UA: NORMAL /HPF (ref 0–5)
ETHANOL PERCENT: <0.01 %
ETHANOL: <10 MG/DL
GFR AFRICAN AMERICAN: >60 ML/MIN
GFR AFRICAN AMERICAN: >60 ML/MIN
GFR NON-AFRICAN AMERICAN: >60 ML/MIN
GFR NON-AFRICAN AMERICAN: >60 ML/MIN
GFR SERPL CREATININE-BSD FRML MDRD: ABNORMAL ML/MIN/{1.73_M2}
GLOBULIN: ABNORMAL G/DL (ref 1.5–3.8)
GLUCOSE BLD-MCNC: 116 MG/DL (ref 70–99)
GLUCOSE BLD-MCNC: 118 MG/DL (ref 70–99)
GLUCOSE URINE: NEGATIVE
HCT VFR BLD CALC: 41.7 % (ref 40.7–50.3)
HEMOGLOBIN: 14.9 G/DL (ref 13–17)
IMMATURE GRANULOCYTES: 2 %
INR BLD: 1.1
KETONES, URINE: ABNORMAL
LEUKOCYTE ESTERASE, URINE: NEGATIVE
LIPASE: 23 U/L (ref 13–60)
LYMPHOCYTES # BLD: 7 % (ref 24–44)
MAGNESIUM: 2.3 MG/DL (ref 1.6–2.6)
MCH RBC QN AUTO: 30.9 PG (ref 25.2–33.5)
MCHC RBC AUTO-ENTMCNC: 35.7 G/DL (ref 28.4–34.8)
MCV RBC AUTO: 86.5 FL (ref 82.6–102.9)
MONOCYTES # BLD: 10 % (ref 1–7)
MORPHOLOGY: NORMAL
MUCUS: NORMAL
MYOGLOBIN: 71 NG/ML (ref 28–72)
NITRITE, URINE: NEGATIVE
NRBC AUTOMATED: 0 PER 100 WBC
OSMOLALITY URINE: 243 MOSM/KG (ref 80–1300)
OTHER OBSERVATIONS UA: NORMAL
PARTIAL THROMBOPLASTIN TIME: 22.8 SEC (ref 20.5–30.5)
PDW BLD-RTO: 12.5 % (ref 11.8–14.4)
PH UA: 6.5 (ref 5–8)
PHOSPHORUS: 2.9 MG/DL (ref 2.5–4.5)
PLATELET # BLD: 261 K/UL (ref 138–453)
PLATELET ESTIMATE: ABNORMAL
PMV BLD AUTO: 9.8 FL (ref 8.1–13.5)
POTASSIUM SERPL-SCNC: 4.1 MMOL/L (ref 3.7–5.3)
POTASSIUM SERPL-SCNC: 4.4 MMOL/L (ref 3.7–5.3)
PRO-BNP: 360 PG/ML
PROTEIN UA: NEGATIVE
PROTHROMBIN TIME: 12 SEC (ref 9.1–12.3)
RBC # BLD: 4.82 M/UL (ref 4.21–5.77)
RBC # BLD: ABNORMAL 10*6/UL
RBC UA: NORMAL /HPF (ref 0–4)
RENAL EPITHELIAL, UA: NORMAL /HPF
SALICYLATE LEVEL: <1 MG/DL (ref 3–10)
SARS-COV-2, RAPID: NOT DETECTED
SEG NEUTROPHILS: 79 % (ref 36–66)
SEGMENTED NEUTROPHILS ABSOLUTE COUNT: 12.23 K/UL (ref 1.8–7.7)
SERUM OSMOLALITY: 259 MOSM/KG (ref 275–295)
SODIUM BLD-SCNC: 122 MMOL/L (ref 135–144)
SODIUM BLD-SCNC: 126 MMOL/L (ref 135–144)
SODIUM,UR: 22 MMOL/L
SPECIFIC GRAVITY UA: 1.01 (ref 1–1.03)
SPECIMEN DESCRIPTION: NORMAL
TOTAL CK: 157 U/L (ref 39–308)
TOTAL PROTEIN: 7.6 G/DL (ref 6.4–8.3)
TOXIC TRICYCLIC SC,BLOOD: NEGATIVE
TRICHOMONAS: NORMAL
TROPONIN INTERP: NORMAL
TROPONIN INTERP: NORMAL
TROPONIN T: NORMAL NG/ML
TROPONIN T: NORMAL NG/ML
TROPONIN, HIGH SENSITIVITY: 19 NG/L (ref 0–22)
TROPONIN, HIGH SENSITIVITY: 20 NG/L (ref 0–22)
TURBIDITY: CLEAR
URINE HGB: ABNORMAL
UROBILINOGEN, URINE: NORMAL
WBC # BLD: 15.5 K/UL (ref 3.5–11.3)
WBC # BLD: ABNORMAL 10*3/UL
WBC UA: NORMAL /HPF (ref 0–5)
YEAST: NORMAL

## 2021-06-02 PROCEDURE — 96376 TX/PRO/DX INJ SAME DRUG ADON: CPT

## 2021-06-02 PROCEDURE — 85730 THROMBOPLASTIN TIME PARTIAL: CPT

## 2021-06-02 PROCEDURE — 6370000000 HC RX 637 (ALT 250 FOR IP): Performed by: STUDENT IN AN ORGANIZED HEALTH CARE EDUCATION/TRAINING PROGRAM

## 2021-06-02 PROCEDURE — 84300 ASSAY OF URINE SODIUM: CPT

## 2021-06-02 PROCEDURE — 93005 ELECTROCARDIOGRAM TRACING: CPT | Performed by: EMERGENCY MEDICINE

## 2021-06-02 PROCEDURE — 82550 ASSAY OF CK (CPK): CPT

## 2021-06-02 PROCEDURE — 80143 DRUG ASSAY ACETAMINOPHEN: CPT

## 2021-06-02 PROCEDURE — 83880 ASSAY OF NATRIURETIC PEPTIDE: CPT

## 2021-06-02 PROCEDURE — 72125 CT NECK SPINE W/O DYE: CPT

## 2021-06-02 PROCEDURE — 83874 ASSAY OF MYOGLOBIN: CPT

## 2021-06-02 PROCEDURE — 84484 ASSAY OF TROPONIN QUANT: CPT

## 2021-06-02 PROCEDURE — 99223 1ST HOSP IP/OBS HIGH 75: CPT | Performed by: INTERNAL MEDICINE

## 2021-06-02 PROCEDURE — 6360000002 HC RX W HCPCS: Performed by: EMERGENCY MEDICINE

## 2021-06-02 PROCEDURE — 83735 ASSAY OF MAGNESIUM: CPT

## 2021-06-02 PROCEDURE — 80076 HEPATIC FUNCTION PANEL: CPT

## 2021-06-02 PROCEDURE — 99285 EMERGENCY DEPT VISIT HI MDM: CPT

## 2021-06-02 PROCEDURE — 96365 THER/PROPH/DIAG IV INF INIT: CPT

## 2021-06-02 PROCEDURE — 85025 COMPLETE CBC W/AUTO DIFF WBC: CPT

## 2021-06-02 PROCEDURE — 70450 CT HEAD/BRAIN W/O DYE: CPT

## 2021-06-02 PROCEDURE — 6360000002 HC RX W HCPCS: Performed by: STUDENT IN AN ORGANIZED HEALTH CARE EDUCATION/TRAINING PROGRAM

## 2021-06-02 PROCEDURE — 83935 ASSAY OF URINE OSMOLALITY: CPT

## 2021-06-02 PROCEDURE — 80307 DRUG TEST PRSMV CHEM ANLYZR: CPT

## 2021-06-02 PROCEDURE — 80048 BASIC METABOLIC PNL TOTAL CA: CPT

## 2021-06-02 PROCEDURE — 85610 PROTHROMBIN TIME: CPT

## 2021-06-02 PROCEDURE — 2580000003 HC RX 258: Performed by: STUDENT IN AN ORGANIZED HEALTH CARE EDUCATION/TRAINING PROGRAM

## 2021-06-02 PROCEDURE — 2060000000 HC ICU INTERMEDIATE R&B

## 2021-06-02 PROCEDURE — 83690 ASSAY OF LIPASE: CPT

## 2021-06-02 PROCEDURE — 76705 ECHO EXAM OF ABDOMEN: CPT

## 2021-06-02 PROCEDURE — 83930 ASSAY OF BLOOD OSMOLALITY: CPT

## 2021-06-02 PROCEDURE — 84100 ASSAY OF PHOSPHORUS: CPT

## 2021-06-02 PROCEDURE — 80179 DRUG ASSAY SALICYLATE: CPT

## 2021-06-02 PROCEDURE — 87635 SARS-COV-2 COVID-19 AMP PRB: CPT

## 2021-06-02 PROCEDURE — G0480 DRUG TEST DEF 1-7 CLASSES: HCPCS

## 2021-06-02 PROCEDURE — 71045 X-RAY EXAM CHEST 1 VIEW: CPT

## 2021-06-02 PROCEDURE — 96375 TX/PRO/DX INJ NEW DRUG ADDON: CPT

## 2021-06-02 PROCEDURE — 81001 URINALYSIS AUTO W/SCOPE: CPT

## 2021-06-02 PROCEDURE — 2580000003 HC RX 258: Performed by: EMERGENCY MEDICINE

## 2021-06-02 RX ORDER — SODIUM CHLORIDE 0.9 % (FLUSH) 0.9 %
5-40 SYRINGE (ML) INJECTION PRN
Status: DISCONTINUED | OUTPATIENT
Start: 2021-06-02 | End: 2021-06-04 | Stop reason: HOSPADM

## 2021-06-02 RX ORDER — ACETAMINOPHEN 650 MG/1
650 SUPPOSITORY RECTAL EVERY 6 HOURS PRN
Status: DISCONTINUED | OUTPATIENT
Start: 2021-06-02 | End: 2021-06-04 | Stop reason: HOSPADM

## 2021-06-02 RX ORDER — ACETAMINOPHEN 325 MG/1
650 TABLET ORAL EVERY 6 HOURS PRN
Status: DISCONTINUED | OUTPATIENT
Start: 2021-06-02 | End: 2021-06-04 | Stop reason: HOSPADM

## 2021-06-02 RX ORDER — SODIUM CHLORIDE 9 MG/ML
INJECTION, SOLUTION INTRAVENOUS CONTINUOUS
Status: DISCONTINUED | OUTPATIENT
Start: 2021-06-02 | End: 2021-06-02

## 2021-06-02 RX ORDER — QUETIAPINE FUMARATE 100 MG/1
100 TABLET, FILM COATED ORAL NIGHTLY
Status: DISCONTINUED | OUTPATIENT
Start: 2021-06-02 | End: 2021-06-03

## 2021-06-02 RX ORDER — LORAZEPAM 0.5 MG/1
1 TABLET ORAL
Status: DISCONTINUED | OUTPATIENT
Start: 2021-06-02 | End: 2021-06-04 | Stop reason: HOSPADM

## 2021-06-02 RX ORDER — POLYETHYLENE GLYCOL 3350 17 G/17G
17 POWDER, FOR SOLUTION ORAL DAILY PRN
Status: DISCONTINUED | OUTPATIENT
Start: 2021-06-02 | End: 2021-06-04 | Stop reason: HOSPADM

## 2021-06-02 RX ORDER — SODIUM CHLORIDE 9 MG/ML
25 INJECTION, SOLUTION INTRAVENOUS PRN
Status: DISCONTINUED | OUTPATIENT
Start: 2021-06-02 | End: 2021-06-04 | Stop reason: HOSPADM

## 2021-06-02 RX ORDER — NICOTINE 21 MG/24HR
1 PATCH, TRANSDERMAL 24 HOURS TRANSDERMAL EVERY 24 HOURS
COMMUNITY
End: 2021-06-23

## 2021-06-02 RX ORDER — 0.9 % SODIUM CHLORIDE 0.9 %
500 INTRAVENOUS SOLUTION INTRAVENOUS ONCE
Status: DISCONTINUED | OUTPATIENT
Start: 2021-06-02 | End: 2021-06-02

## 2021-06-02 RX ORDER — CHLORDIAZEPOXIDE HYDROCHLORIDE 5 MG/1
5 CAPSULE, GELATIN COATED ORAL 3 TIMES DAILY
Status: DISCONTINUED | OUTPATIENT
Start: 2021-06-02 | End: 2021-06-03

## 2021-06-02 RX ORDER — 0.9 % SODIUM CHLORIDE 0.9 %
500 INTRAVENOUS SOLUTION INTRAVENOUS ONCE
Status: COMPLETED | OUTPATIENT
Start: 2021-06-02 | End: 2021-06-02

## 2021-06-02 RX ORDER — ONDANSETRON 2 MG/ML
4 INJECTION INTRAMUSCULAR; INTRAVENOUS EVERY 6 HOURS PRN
Status: DISCONTINUED | OUTPATIENT
Start: 2021-06-02 | End: 2021-06-04 | Stop reason: HOSPADM

## 2021-06-02 RX ORDER — LORAZEPAM 2 MG/ML
1 INJECTION INTRAMUSCULAR ONCE
Status: COMPLETED | OUTPATIENT
Start: 2021-06-02 | End: 2021-06-02

## 2021-06-02 RX ORDER — MORPHINE SULFATE 4 MG/ML
4 INJECTION, SOLUTION INTRAMUSCULAR; INTRAVENOUS ONCE
Status: DISCONTINUED | OUTPATIENT
Start: 2021-06-02 | End: 2021-06-02

## 2021-06-02 RX ORDER — HYDROXYUREA 500 MG/1
500 CAPSULE ORAL 2 TIMES DAILY
Status: DISCONTINUED | OUTPATIENT
Start: 2021-06-02 | End: 2021-06-04 | Stop reason: HOSPADM

## 2021-06-02 RX ORDER — HYDROXYZINE 50 MG/1
50 TABLET, FILM COATED ORAL EVERY 6 HOURS PRN
COMMUNITY
Start: 2021-04-13 | End: 2022-04-21

## 2021-06-02 RX ORDER — LORAZEPAM 2 MG/ML
1 INJECTION INTRAMUSCULAR
Status: DISCONTINUED | OUTPATIENT
Start: 2021-06-02 | End: 2021-06-04 | Stop reason: HOSPADM

## 2021-06-02 RX ORDER — HYDROXYZINE HYDROCHLORIDE 25 MG/1
50 TABLET, FILM COATED ORAL 3 TIMES DAILY PRN
Status: DISCONTINUED | OUTPATIENT
Start: 2021-06-02 | End: 2021-06-04 | Stop reason: HOSPADM

## 2021-06-02 RX ORDER — LORAZEPAM 2 MG/ML
4 INJECTION INTRAMUSCULAR
Status: DISCONTINUED | OUTPATIENT
Start: 2021-06-02 | End: 2021-06-04 | Stop reason: HOSPADM

## 2021-06-02 RX ORDER — SODIUM CHLORIDE 0.9 % (FLUSH) 0.9 %
5-40 SYRINGE (ML) INJECTION EVERY 12 HOURS SCHEDULED
Status: DISCONTINUED | OUTPATIENT
Start: 2021-06-02 | End: 2021-06-04 | Stop reason: HOSPADM

## 2021-06-02 RX ORDER — MAGNESIUM SULFATE IN WATER 40 MG/ML
2000 INJECTION, SOLUTION INTRAVENOUS ONCE
Status: COMPLETED | OUTPATIENT
Start: 2021-06-02 | End: 2021-06-02

## 2021-06-02 RX ORDER — LORAZEPAM 2 MG/ML
3 INJECTION INTRAMUSCULAR
Status: DISCONTINUED | OUTPATIENT
Start: 2021-06-02 | End: 2021-06-04 | Stop reason: HOSPADM

## 2021-06-02 RX ORDER — LORAZEPAM 0.5 MG/1
2 TABLET ORAL
Status: DISCONTINUED | OUTPATIENT
Start: 2021-06-02 | End: 2021-06-04 | Stop reason: HOSPADM

## 2021-06-02 RX ORDER — ONDANSETRON 4 MG/1
4 TABLET, ORALLY DISINTEGRATING ORAL EVERY 8 HOURS PRN
Status: DISCONTINUED | OUTPATIENT
Start: 2021-06-02 | End: 2021-06-04 | Stop reason: HOSPADM

## 2021-06-02 RX ORDER — NICOTINE 21 MG/24HR
1 PATCH, TRANSDERMAL 24 HOURS TRANSDERMAL DAILY
Status: DISCONTINUED | OUTPATIENT
Start: 2021-06-02 | End: 2021-06-04 | Stop reason: HOSPADM

## 2021-06-02 RX ORDER — LORAZEPAM 2 MG/ML
2 INJECTION INTRAMUSCULAR
Status: DISCONTINUED | OUTPATIENT
Start: 2021-06-02 | End: 2021-06-04 | Stop reason: HOSPADM

## 2021-06-02 RX ORDER — LORAZEPAM 2 MG/1
4 TABLET ORAL
Status: DISCONTINUED | OUTPATIENT
Start: 2021-06-02 | End: 2021-06-04 | Stop reason: HOSPADM

## 2021-06-02 RX ADMIN — LORAZEPAM 1 MG: 2 INJECTION INTRAMUSCULAR; INTRAVENOUS at 11:57

## 2021-06-02 RX ADMIN — LORAZEPAM 1 MG: 2 INJECTION INTRAMUSCULAR; INTRAVENOUS at 12:59

## 2021-06-02 RX ADMIN — SODIUM CHLORIDE 500 ML: 0.9 INJECTION, SOLUTION INTRAVENOUS at 12:42

## 2021-06-02 RX ADMIN — SODIUM CHLORIDE, PRESERVATIVE FREE 10 ML: 5 INJECTION INTRAVENOUS at 22:21

## 2021-06-02 RX ADMIN — CHLORDIAZEPOXIDE HYDROCHLORIDE 5 MG: 5 CAPSULE ORAL at 21:47

## 2021-06-02 RX ADMIN — SODIUM CHLORIDE 500 ML: 9 INJECTION, SOLUTION INTRAVENOUS at 16:52

## 2021-06-02 RX ADMIN — ENOXAPARIN SODIUM 40 MG: 40 INJECTION, SOLUTION INTRAVENOUS; SUBCUTANEOUS at 18:12

## 2021-06-02 RX ADMIN — MAGNESIUM SULFATE 2000 MG: 2 INJECTION INTRAVENOUS at 11:56

## 2021-06-02 RX ADMIN — ONDANSETRON 4 MG: 2 INJECTION INTRAMUSCULAR; INTRAVENOUS at 21:54

## 2021-06-02 RX ADMIN — HYDROXYUREA 500 MG: 500 CAPSULE ORAL at 21:47

## 2021-06-02 RX ADMIN — SODIUM CHLORIDE, PRESERVATIVE FREE 10 ML: 5 INJECTION INTRAVENOUS at 22:20

## 2021-06-02 RX ADMIN — ACETAMINOPHEN 650 MG: 325 TABLET ORAL at 21:53

## 2021-06-02 RX ADMIN — LORAZEPAM 2 MG: 0.5 TABLET ORAL at 23:07

## 2021-06-02 RX ADMIN — QUETIAPINE FUMARATE 100 MG: 100 TABLET ORAL at 21:46

## 2021-06-02 ASSESSMENT — PAIN SCALES - GENERAL
PAINLEVEL_OUTOF10: 0
PAINLEVEL_OUTOF10: 4

## 2021-06-02 ASSESSMENT — ENCOUNTER SYMPTOMS
ABDOMINAL PAIN: 0
VOMITING: 0
SHORTNESS OF BREATH: 0
NAUSEA: 0
DIARRHEA: 0

## 2021-06-02 NOTE — H&P
Attending Supervising Physicians Attestation Statement  I have seen and examined Rozina Davalos and the fox elements of all parts of the encounter have been performed by me. I agree with the assessment, plan and orders as documented by the Advanced Practice Provider. I discussed the findings and plans with the resident physician and agree as documented in their note . See note of admitting resident for more details. In addition to the pertinent positives and negatives as stated within HPI and the review of systems as documented in the notes, all other systems were reviewed when able to and are reported negative. Additional Comments:   62 M with hx alcohol abuse presented with weakness, poor po intake  Sent from ENT office  Labs demonstrating sodium 122    Principal Problem:    Hyponatremia  Active Problems:    MPN (myeloproliferative neoplasm) (HCC)    Chronic left-sided low back pain with left-sided sciatica    Cervical stenosis of spine    Alcohol abuse    Substance abuse ([address]    Uncomplicated alcohol dependence ([address]  Resolved Problems:    * No resolved hospital problems.  *    - monitor sodium  - monitor for withdrawals  - ciwa/librium if needed  - continue home meds     Electronically signed by Vicente Ponce MD on 6/2/2021 at 6:33 PM

## 2021-06-02 NOTE — ED NOTES
Report given to SENDY Smalls and Janice Wells RN. All questions answered.       Joey Avery RN  06/02/21 [id number]

## 2021-06-02 NOTE — ED NOTES
Pt arrived to ED alert and oriented x4. Pt c/o weakness since this AM.  Pt reports that he woke up this AM and felt more weak than usual, states that one side is not weaker than the other. Pt reports that he went to his ENT appointment and they told him they were concerned for a stroke. Pt is unable to tell writer why the ENT staff stated that. Pt denies pain, denies additional complaints. Pt denies having been around anyone suspected to have COVID-19 or anyone that has been sick, denies recent travel outside the [facility] or [address]. RR even and unlabored. NAD noted. Will continue with plan of care.      Usha Morejon, RN  06/02/21 1114

## 2021-06-02 NOTE — ED PROVIDER NOTES
[facility]  ED  EMERGENCY DEPARTMENT ENCOUNTER      Pt Name: Xiomara Montero  MRN: [id number]  [place] 1960  Date of evaluation: 6/2/21  PCP:  SIMONE Bobo CNP    CHIEF COMPLAINT:   Chief Complaint   Patient presents with    Fatigue     Pt reports waking up feeling weak, states he went to his ENT appointment and they were concerned for stroke. HISTORY OF PRESENT ILLNESS   Xiomara Montero is a 62 y.o. malewhopresents with history of myeloproliferative disorder with also significant alcohol abuse including now drinking at least 12 beers a day if not more. Patient awoke and ever since awakening has had some generalized weakness. Patient does have some general shakiness and is not sure if this could be early withdrawal but remembers having at least 2 beers this morning already. Denies chest pain but occasional shortness of breath with cough productive of whitish sputum but no blood. Denies abdominal pain or vomiting/diarrhea. On exam patient with no focal findings and has simple generalized mild weakness. No facial droop. Symptoms are moderate and here for evaluation from ENT office        REVIEW OF SYSTEMS       Review of Systems   Constitutional: Negative for chills and fever. Respiratory: Negative for shortness of breath. Cardiovascular: Negative for chest pain. Gastrointestinal: Negative for abdominal pain, diarrhea, nausea and vomiting. Genitourinary: Negative for dysuria. Musculoskeletal: Negative for neck pain. Skin: Negative for rash. Neurological: Positive for weakness (Denies any focal weakness but has some mild generalized weakness). All other systems reviewed and are negative. 10 essential systems negative except as stated above and in the HPI.     PAST MEDICAL HISTORY   PMH:  has a past medical history of Alcohol abuse, Anemia, Cancer ([address] Cervical stenosis of spine, Chronic left-sided low back pain with left-sided sciatica, Insomnia, Intentional drug overdose ([address] Left arm numbness, Left lumbar radiculitis, Macrocytosis, Major depressive disorder, recurrent severe without psychotic features ([address] Myeloproliferative disorder ([address] Osteoarthritis of spine with radiculopathy, lumbar region, Severe recurrent major depression without psychotic features ([address] and Spondylosis of lumbar region without myelopathy or radiculopathy. Surgical History:  has a past surgical history that includes cervical fusion; Appendectomy; Tonsillectomy; Carpal tunnel release; epidural steroid injection (Left, 5/15/2019); Endoscopy, colon, diagnostic; cervical fusion (09/05/2019); and cervical fusion (N/A, 9/5/2019). Social History:  reports that he has been smoking cigarettes. He started smoking about 42 years ago. He has a 40.00 pack-year smoking history. He has never used smokeless tobacco. He reports previous alcohol use of about 14.0 standard drinks of alcohol per week. He reports previous drug use. Drug: Marijuana. Family History: Noncontributory at this time  Psychiatric History: Noncontributory at this time    Allergies:has No Known Allergies. PHYSICAL EXAM     INITIAL VITALS: BP: (!) 156/95  Pulse: 87  Resp: 18  Temp: 96.6 °F (35.9 °C) SpO2: 94 %    Physical Exam  HENT:      Head: Normocephalic and atraumatic. Right Ear: Tympanic membrane normal.      Left Ear: Tympanic membrane normal.      Nose: Congestion and rhinorrhea present. Eyes:      General: Lids are normal.      Conjunctiva/sclera: Conjunctivae normal.      Pupils: Pupils are equal, round, and reactive to light. Neck:      Vascular: No JVD. Trachea: Trachea normal.   Cardiovascular:      Rate and Rhythm: Regular rhythm. Pulses: Normal pulses. Heart sounds: Normal heart sounds, S1 normal and S2 normal. No murmur heard. No friction rub. No gallop. Pulmonary:      Effort: Pulmonary effort is normal. No respiratory distress.       Breath sounds: Normal breath sounds. No stridor. No wheezing or rales. Abdominal:      General: Bowel sounds are normal. There is no distension. Palpations: Abdomen is soft. There is no mass or pulsatile mass. Tenderness: There is no abdominal tenderness. There is no guarding. Comments: No pulsatile masses palpated   Musculoskeletal:      Cervical back: Normal range of motion and neck supple. No rigidity. Comments: There is no calf pain or swelling and strong DP/PT pulses are palpated and equal bilaterally. Skin:     General: Skin is warm and dry. Findings: No rash. Neurological:      Mental Status: He is alert. Comments: Patient is alert and oriented x3 with normal mental status however slight shakiness and generalized distribution. Some concern for possible withdrawal.   Patient has 5/5 strength in bilateral upper and lower extremities and is symmetric with him stating it just seems to \"not last\" in regards to his strength. Face is symmetric with no droop and cranial nerves II through XII intact with no focal deficits. Tongue is midline. DTRs are 2+ and equal bilaterally. Sensation light touch intact bilaterally as well. Babinski's downgoing bilaterally.      Psychiatric:         Speech: Speech normal.           EMERGENCY DEPARTMENT COURSE:     LABS: Labs reviewed by myself show:   Labs Reviewed   CBC WITH AUTO DIFFERENTIAL - Abnormal; Notable for the following components:       Result Value    WBC 15.5 (*)     MCHC 35.7 (*)     Immature Granulocytes 2 (*)     Seg Neutrophils 79 (*)     Lymphocytes 7 (*)     Monocytes 10 (*)     Absolute Immature Granulocyte 0.31 (*)     Segs Absolute 12.23 (*)     Absolute Mono # 1.55 (*)     All other components within normal limits   BASIC METABOLIC PANEL - Abnormal; Notable for the following components:    Glucose 116 (*)     CREATININE 0.60 (*)     Sodium 122 (*)     Chloride 85 (*)     All other components within normal limits   BRAIN NATRIURETIC PEPTIDE - Abnormal; Notable for the following components:    Pro- (*)     All other components within normal limits   HEPATIC FUNCTION PANEL - Abnormal; Notable for the following components:    Alkaline Phosphatase 143 (*)      (*)     AST 98 (*)     Total Bilirubin 1.26 (*)     Bilirubin, Direct 0.44 (*)     All other components within normal limits   TOX SCR, BLD, ED - Abnormal; Notable for the following components:    Acetaminophen Level <5 (*)     Salicylate Lvl <1 (*)     All other components within normal limits   COVID-19, RAPID   PROTIME-INR   APTT   LIPASE   MAGNESIUM   PHOSPHORUS   CK   MYOGLOBIN, SERUM   TROPONIN   TROPONIN   URINE RT REFLEX TO CULTURE   POC BLOOD GAS AND CHEMISTRY          EKG: All EKG's are interpreted by the Emergency Department Physician who either signs or Co-signs this chart in the absence of a cardiologist.    EKG Interpretation    Interpreted by emergency department physician    Rhythm: normal sinus   Rate: normal at 86 bpm  Axis: normal  Conduction: Mildly prolonged QTC at 493 ms  ST Segments: no acute change  T Waves: no acute change  Q Waves: no acute change    Clinical Impression:  nonspecific EKG. RADIOLOGY:  Thefollowingimages were read and interpretated by the radiologist, see reports below, unless otherwise noted in the MDM or here:  CT HEAD WO CONTRAST   Final Result   No acute intracranial abnormality. CT CERVICAL SPINE WO CONTRAST   Final Result   No acute abnormality of the cervical spine. XR CHEST PORTABLE   Final Result   No acute process. CONSULTS:  IP CONSULT TO HOSPITALIST    MDM:    Patient with possible mild withdrawal and will give small dose of Ativan as well as work-up with CT head as well as CT C-spine given myeloproliferative disorder and generalized weakness and previous C-spine stenosis. Also obtain cardiac work-up and metabolic work-up.    Given patient's intermittently productive cough and generalized weakness will obtain Covid swab as well. Likely admission after  Patient found to have mildly prolonged QTC and will be given 2 g magnesium sulfate as well. Patient had CT head negative as well as CT C-spine negative and chest x-ray negative. Laboratory significant for new hyponatremia at 122. Unfortunate, patient also in early DTs requiring Ativan here. Will page medicine for admission for hyponatremia complicating withdrawal symptoms. Medicine is called back and accepted the patient and agree with admission. They wish me to place admit orders but not further orders that they will take care of within the next half hour. CRITICAL CARE: There was a high probability of clinically significant/life threatening deterioration in this patient's condition which required my urgent intervention. Total critical care time was 30 minutes. This excludes any time for separately reportable procedures. FINAL IMPRESSION:     1. General weakness    2. Hyponatremia    3. Alcohol withdrawal syndrome with complication ([facility]          DISPOSITION:  DISPOSITION Decision To Admit 06/02/2021 01:18:03 PM        PATIENT REFERRED TO:  No follow-up provider specified. DISCHARGE MEDICATIONS:  New Prescriptions    No medications on file           (Please note that portions of this note were completed with [employer]. Efforts were made to edit the dictations but occasionally words are mis-transcribed.  Whenever words are used in this note in any gender, they shall be construed as though they were used in thegenderappropriate tothe circumstances; and whenever words are used in this note in the singular or plural form, they shall be construed as though they were used in the form appropriate to the circumstances.)    MD Nick Jaimes  Attending Emergency Medicine Physician         Joselo Faustin MD  06/02/21 [facility] MD Grazyna  06/02/21 2323

## 2021-06-02 NOTE — H&P
[facility]     Department of Internal Medicine - Staff Internal Medicine Teaching Service          ADMISSION NOTE/HISTORY AND PHYSICAL EXAMINATION   Date: 6/2/2021  Patient Name: Ludivina Paez  Date of admission: 6/2/2021 11:21 AM  YOB: 1960  PCP: Chari Peabody, APRN - CNP  History Obtained From:  patient    CHIEF COMPLAINT     Chief complaint: generalized weakness  weakness    HISTORY OF PRESENTING ILLNESS     The patient is a pleasant 62 y.o. male presents with past medical history of myeloproliferative neoplasm, alcohol abuse, substance abuse, major depressive disorder. Patient with above past medical history presented with generalized weakness. According to patient he has been drinking 12 to 15 cans of beer per day. Last drink was this morning. According to patient he has not been eating well and has not eaten anything from the last 8 days and has been drinking only. Patient denies any chest pain, shortness of breath, altered bowel movement,  symptoms,    Patient last withdrawal was couple of years ago.   Patient denies any willingness to quit alcohol  In the ed patient was found to be  Having  Sodium 122 , chloride 85,  BUN/creatinine unremarkable  Alk phos 143, , AST 98  Bilirubin 1.26  Urine osmolality 243, urine sodium 22  Acetaminophen less than 5, salicylate level less than 1    Chest x-ray not showing any acute process, CT head without contrast no acute intracranial abnormality, CT cervical spine no acute abnormality    Review of Systems:  General ROS: Completed and except as mentioned above were negative   HEENT ROS: Completed and except as mentioned above were negative   Allergy and Immunology ROS:  Completed and except as mentioned above were negative  Hematological and Lymphatic ROS:  Completed and except as mentioned above were negative  Respiratory ROS:  Completed and except as mentioned above were negative  Cardiovascular ROS:  Completed Admission medications    Medication Sig Start Date End Date Taking? Authorizing Provider   nicotine (NICODERM CQ) 21 MG/24HR Place 1 patch onto the skin every 24 hours   Yes Historical Provider, MD   hydrOXYzine (ATARAX) 50 MG tablet  21   Historical Provider, MD   QUEtiapine (SEROQUEL) 100 MG tablet Take 100 mg by mouth 3 times daily as needed 21   Historical Provider, MD   QUEtiapine (SEROQUEL) 400 MG tablet Take 400 mg by mouth nightly 21   Historical Provider, MD   hydroxyurea (HYDREA) 500 MG chemo capsule take 1 capsule by mouth twice a day 21   Zeynep Ierne MD       SOCIAL HISTORY     Tobacco: yes  Alcohol: yes  Illicits: none      FAMILY HISTORY     Family History   Problem Relation Age of Onset    Breast Cancer Mother         s/p surgical complications    Diabetes Mother     Heart Failure Father     Other Brother         \"bad back\"       PHYSICAL EXAM     Vitals: BP (!) 148/108   Pulse 84   Temp 96.6 °F (35.9 °C) (Oral)   Resp 18   Ht 6' 1\" (1.854 m)   Wt 220 lb (99.8 kg)   SpO2 97%   BMI 29.03 kg/m²   Tmax: Temp (24hrs), Av.6 °F (35.9 °C), Min:96.6 °F (35.9 °C), Max:96.6 °F (35.9 °C)    Last Body weight:   Wt Readings from Last 3 Encounters:   21 220 lb (99.8 kg)   21 219 lb 6.4 oz (99.5 kg)   20 211 lb (95.7 kg)     Body Mass Index : Body mass index is 29.03 kg/m². PHYSICAL EXAMINATION:  Constitutional: This is a well developed, well nourished, 25-29.9 - Overweight 62y.o. year old male who is alert, oriented, cooperative and in no apparent distress. Head:normocephalic and atraumatic. EENT:  PERRLA. No conjunctival injections. Septum was midline, mucosa was without erythema, exudates or cobblestoning. No thrush was noted. Neck: Supple without thyromegaly. No elevated JVP. Trachea was midline. Respiratory: Chest was symmetrical without dullness to percussion. Breath sounds bilaterally were clear to auscultation.  There were no wheezes, rhonchi or rales. There is no intercostal retraction or use of accessory muscles. No egophony noted. Cardiovascular: Regular without murmur, clicks, gallops or rubs. Abdomen: Slightly rounded and soft without organomegaly. No rebound, rigidity or guarding was appreciated. Lymphatic: No lymphadenopathy. Musculoskeletal: Normal curvature of the spine. No gross muscle weakness. Extremities:  No lower extremity edema, ulcerations, tenderness, varicosities or erythema. Muscle size, tone and strength are normal.  No involuntary movements are noted. Skin:  Warm and dry. Good color, turgor and pigmentation. No lesions or scars.   No cyanosis or clubbing  Neurological/Psychiatric: The patient's general behavior, level of consciousness, thought content and emotional status is normal.          INVESTIGATIONS     Laboratory Testing:     Recent Results (from the past 24 hour(s))   CBC Auto Differential    Collection Time: 06/02/21 11:52 AM   Result Value Ref Range    WBC 15.5 (H) 3.5 - 11.3 k/uL    RBC 4.82 4.21 - 5.77 m/uL    Hemoglobin 14.9 13.0 - 17.0 g/dL    Hematocrit 41.7 40.7 - 50.3 %    MCV 86.5 82.6 - 102.9 fL    MCH 30.9 25.2 - 33.5 pg    MCHC 35.7 (H) 28.4 - 34.8 g/dL    RDW 12.5 11.8 - 14.4 %    Platelets 440 834 - 831 k/uL    MPV 9.8 8.1 - 13.5 fL    NRBC Automated 0.0 0.0 per 100 WBC    Differential Type NOT REPORTED     WBC Morphology NOT REPORTED     RBC Morphology NOT REPORTED     Platelet Estimate NOT REPORTED     Immature Granulocytes 2 (H) 0 %    Seg Neutrophils 79 (H) 36 - 66 %    Lymphocytes 7 (L) 24 - 44 %    Monocytes 10 (H) 1 - 7 %    Eosinophils % 1 1 - 4 %    Basophils 1 0 - 2 %    Absolute Immature Granulocyte 0.31 (H) 0.00 - 0.30 k/uL    Segs Absolute 12.23 (H) 1.8 - 7.7 k/uL    Absolute Lymph # 1.09 1.0 - 4.8 k/uL    Absolute Mono # 1.55 (H) 0.1 - 0.8 k/uL    Absolute Eos # 0.16 0.0 - 0.4 k/uL    Basophils Absolute 0.16 0.0 - 0.2 k/uL    Morphology Normal    Basic Metabolic Panel    Collection Time: 06/02/21 11:52 AM   Result Value Ref Range    Glucose 116 (H) 70 - 99 mg/dL    BUN 7 6 - 20 mg/dL    CREATININE 0.60 (L) 0.70 - 1.20 mg/dL    Bun/Cre Ratio NOT REPORTED 9 - 20    Calcium 8.8 8.6 - 10.4 mg/dL    Sodium 122 (L) 135 - 144 mmol/L    Potassium 4.4 3.7 - 5.3 mmol/L    Chloride 85 (L) 98 - 107 mmol/L    CO2 22 20 - 31 mmol/L    Anion Gap 15 9 - 17 mmol/L    GFR Non-African American >60 >60 mL/min    GFR African American >60 >60 mL/min    GFR Comment          GFR Staging NOT REPORTED    Protime-INR    Collection Time: 06/02/21 11:52 AM   Result Value Ref Range    Protime 12.0 9.1 - 12.3 sec    INR 1.1    APTT    Collection Time: 06/02/21 11:52 AM   Result Value Ref Range    PTT 22.8 20.5 - 30.5 sec   Brain Natriuretic Peptide    Collection Time: 06/02/21 11:52 AM   Result Value Ref Range    Pro- (H) <300 pg/mL    BNP Interpretation Pro-BNP Reference Range:    Lipase    Collection Time: 06/02/21 11:52 AM   Result Value Ref Range    Lipase 23 13 - 60 U/L   Hepatic Function Panel    Collection Time: 06/02/21 11:52 AM   Result Value Ref Range    Albumin 4.5 3.5 - 5.2 g/dL    Alkaline Phosphatase 143 (H) 40 - 129 U/L     (H) 5 - 41 U/L    AST 98 (H) <40 U/L    Total Bilirubin 1.26 (H) 0.3 - 1.2 mg/dL    Bilirubin, Direct 0.44 (H) <0.31 mg/dL    Bilirubin, Indirect 0.82 0.00 - 1.00 mg/dL    Total Protein 7.6 6.4 - 8.3 g/dL    Globulin NOT REPORTED 1.5 - 3.8 g/dL    Albumin/Globulin Ratio 1.5 1.0 - 2.5   Magnesium    Collection Time: 06/02/21 11:52 AM   Result Value Ref Range    Magnesium 2.3 1.6 - 2.6 mg/dL   Phosphorus    Collection Time: 06/02/21 11:52 AM   Result Value Ref Range    Phosphorus 2.9 2.5 - 4.5 mg/dL   CK    Collection Time: 06/02/21 11:52 AM   Result Value Ref Range    Total  39 - 308 U/L   Myoglobin, Serum    Collection Time: 06/02/21 11:52 AM   Result Value Ref Range    Myoglobin 71 28 - 72 ng/mL   TOX SCR, BLD, ED    Collection Time: 06/02/21 11:52 AM   Result Value Ref Range    Acetaminophen Level <5 (L) 10 - 30 ug/mL    Ethanol <10 <10 mg/dL    Ethanol percent <8.457 <2.548 %    Salicylate Lvl <1 (L) 3 - 10 mg/dL    Toxic Tricyclic Sc,Blood NEGATIVE NEGATIVE   Troponin    Collection Time: 06/02/21 11:52 AM   Result Value Ref Range    Troponin, High Sensitivity 20 0 - 22 ng/L    Troponin T NOT REPORTED <0.03 ng/mL    Troponin Interp NOT REPORTED    COVID-19, Rapid    Collection Time: 06/02/21 12:01 PM    Specimen: Nasopharyngeal Swab   Result Value Ref Range    Specimen Description . NASOPHARYNGEAL SWAB     SARS-CoV-2, Rapid Not Detected Not Detected   Troponin    Collection Time: 06/02/21  1:04 PM   Result Value Ref Range    Troponin, High Sensitivity 19 0 - 22 ng/L    Troponin T NOT REPORTED <0.03 ng/mL    Troponin Interp NOT REPORTED    Urinalysis Reflex to Culture    Collection Time: 06/02/21  3:24 PM    Specimen: Urine, clean catch   Result Value Ref Range    Color, UA ORANGE (A) YELLOW    Turbidity UA CLEAR CLEAR    Glucose, Ur NEGATIVE NEGATIVE    Bilirubin Urine NEGATIVE NEGATIVE    Ketones, Urine TRACE (A) NEGATIVE    Specific Gravity, UA 1.010 1.005 - 1.030    Urine Hgb TRACE (A) NEGATIVE    pH, UA 6.5 5.0 - 8.0    Protein, UA NEGATIVE NEGATIVE    Urobilinogen, Urine Normal Normal    Nitrite, Urine NEGATIVE NEGATIVE    Leukocyte Esterase, Urine NEGATIVE NEGATIVE    Urinalysis Comments NOT REPORTED    Osmolality, Urine    Collection Time: 06/02/21  3:24 PM   Result Value Ref Range    Osmolality, Ur 243 80 - 1300 mOsm/kg   Sodium, urine, random    Collection Time: 06/02/21  3:24 PM   Result Value Ref Range    Sodium,Ur 22 mmol/L   Microscopic Urinalysis    Collection Time: 06/02/21  3:24 PM   Result Value Ref Range    -          WBC, UA None 0 - 5 /HPF    RBC, UA 2 TO 5 0 - 4 /HPF    Casts UA NOT REPORTED 0 - 8 /LPF    Crystals, UA NOT REPORTED None /HPF    Epithelial Cells UA 0 TO 2 0 - 5 /HPF    Renal Epithelial, UA NOT REPORTED 0 /HPF    Bacteria, UA NOT REPORTED None    Mucus, UA NOT REPORTED None    Trichomonas, UA NOT REPORTED None    Amorphous, UA NOT REPORTED None    Other Observations UA NOT REPORTED NOT REQ. Yeast, UA NOT REPORTED None   OSMOLALITY    Collection Time: 06/02/21  4:39 PM   Result Value Ref Range    Serum Osmolality 259 (L) 275 - 295 mOsm/kg       Imaging:   CT HEAD WO CONTRAST    Result Date: 6/2/2021  No acute intracranial abnormality. CT CERVICAL SPINE WO CONTRAST    Result Date: 6/2/2021  No acute abnormality of the cervical spine. XR CHEST PORTABLE    Result Date: 6/2/2021  No acute process. 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 Will hold IV fluid as of now. Encourage oral intake  Patient on Librium 5 mg 3 times daily and CIWA protocol  Thiamine and folate      Transaminitis likely due to alcohol abuse: Follow ultrasound liver. Leukocytosis likely  secondary to stress: Follow WBC  No need of antibiotic as of now, afebrile, hemodynamically stable       Victor M 2+ myeloproliferative neoplasm unspecified  Patient on hydroxyurea  Hemoglobin, platelets normal.    Mild leukocytosis with 15.5 unlikely due to hydroxyurea    Patient counseled regarding alcohol withdrawal.  Denies any willingness to quit. GI ppx: None    PT/OT/SW on board  Discharge Planning: On board    Mariely Oliver MD  Internal Medicine Resident, PGY-1  [address], [place]  6/2/2021, 7:19 PM

## 2021-06-02 NOTE — ED TRIAGE NOTES
Pt presents to ED c/o generalized weakness x 1 morning. Pt was @ ENT appointment and they sent him to ED to be assessed. Pt drinks 12-15 beers a day, states he has not eaten in 8 days due to loss of appetite. Pt denies n/v/d, c/p, SOB, LOC or any other sx. Pt is A&OX4, placed on full monitor, changed into gown, EKG done and IV established.

## 2021-06-02 NOTE — ED NOTES
The following labs labeled with pt sticker and tubed:     [] Lydia Batch   [] on ice   [] Blue   [] Green/yellow  [] Green/black [] on ice  [] Pink  [] Red  [] Yellow     [] COVID-19 swab    [] Rapid     [x] Urine Sample  [] Pelvic Cultures    [] Blood Cultures            Jaxon Burgos RN  06/02/21 1439

## 2021-06-02 NOTE — PROGRESS NOTES
Senior note          This is a 62 y.o. male admitted 6/2/2021 for Hyponatremia [E87.1]. See H&P of admitting/intern resident for more details. Came with generalized weakness could be from hyponatremia/alcohol abuse/severe myelopathy from cervical stenosis. Past medical history significant for alcohol abuse disorder, myeloproliferative disorder maintained on hydroxyurea. In ED was found to have sodium of 122. BMP:   Recent Labs     06/02/21  1152   *   K 4.4   CL 85*   CO2 22   BUN 7   CREATININE 0.60*   GLUCOSE 116*     CBC: )  Recent Labs     06/02/21  1152   WBC 15.5*   HGB 14.9   HCT 41.7             Assessment    Principal Problem:    Hyponatremia  Active Problems:    MPN (myeloproliferative neoplasm) (HCC)    Chronic left-sided low back pain with left-sided sciatica    Cervical stenosis of spine    Alcohol abuse    Substance abuse ([address]    Uncomplicated alcohol dependence (HCC)  Resolved Problems:    * No resolved hospital problems. *        Plan     -Alcohol abuse disorder. [facility] protocol. Monitor for withdrawals. Replace and monitor electrolytes. -Hypovolemic hyponatremia. Likely from beer [place] from poor oral solute intake. Fluid restriction. Every 6 sodium check. Correct 6 to 8 mEq in 24-hour.  -Transaminitis. Likely from alcohol abuse disorder though not typicall of AST: ALT ratio 2:1. Will get liver ultrasound given that his bilirubin and alkaline phosphatase is increased.       You Harrington MD            Department of Internal Medicine  [facility]         6/2/2021, 6:53 PM

## 2021-06-02 NOTE — ED NOTES
The following labs labeled with pt sticker and tubed:     [x] Lavender   [] on ice   [x] Blue   [x] Green/yellow  [x] Green/black [] on ice  [] Pink  [] Red  [x] Yellow     [] COVID-19 swab    [] Rapid     [] Urine Sample  [] Pelvic Cultures    [] Blood Cultures            Tsering Ozuna RN  06/02/21 1359

## 2021-06-03 ENCOUNTER — APPOINTMENT (OUTPATIENT)
Dept: MRI IMAGING | Age: 61
DRG: 641 | End: 2021-06-03
Payer: MEDICARE

## 2021-06-03 LAB
ABSOLUTE EOS #: 0 K/UL (ref 0–0.4)
ABSOLUTE IMMATURE GRANULOCYTE: 0 K/UL (ref 0–0.3)
ABSOLUTE LYMPH #: 1.6 K/UL (ref 1–4.8)
ABSOLUTE MONO #: 1.45 K/UL (ref 0.1–0.8)
ANION GAP SERPL CALCULATED.3IONS-SCNC: 14 MMOL/L (ref 9–17)
ANION GAP SERPL CALCULATED.3IONS-SCNC: 8 MMOL/L (ref 9–17)
BASOPHILS # BLD: 0 % (ref 0–2)
BASOPHILS ABSOLUTE: 0 K/UL (ref 0–0.2)
BUN BLDV-MCNC: 11 MG/DL (ref 6–20)
BUN BLDV-MCNC: 11 MG/DL (ref 6–20)
BUN/CREAT BLD: ABNORMAL (ref 9–20)
BUN/CREAT BLD: ABNORMAL (ref 9–20)
CALCIUM IONIZED: 1.07 MMOL/L (ref 1.13–1.33)
CALCIUM SERPL-MCNC: 7.8 MG/DL (ref 8.6–10.4)
CALCIUM SERPL-MCNC: 8.1 MG/DL (ref 8.6–10.4)
CHLORIDE BLD-SCNC: 92 MMOL/L (ref 98–107)
CHLORIDE BLD-SCNC: 94 MMOL/L (ref 98–107)
CO2: 22 MMOL/L (ref 20–31)
CO2: 24 MMOL/L (ref 20–31)
CREAT SERPL-MCNC: 0.6 MG/DL (ref 0.7–1.2)
CREAT SERPL-MCNC: 0.61 MG/DL (ref 0.7–1.2)
DIFFERENTIAL TYPE: ABNORMAL
EKG ATRIAL RATE: 86 BPM
EKG P AXIS: 44 DEGREES
EKG P-R INTERVAL: 166 MS
EKG Q-T INTERVAL: 412 MS
EKG QRS DURATION: 90 MS
EKG QTC CALCULATION (BAZETT): 493 MS
EKG R AXIS: 43 DEGREES
EKG T AXIS: 52 DEGREES
EKG VENTRICULAR RATE: 86 BPM
EOSINOPHILS RELATIVE PERCENT: 0 % (ref 1–4)
GFR AFRICAN AMERICAN: >60 ML/MIN
GFR AFRICAN AMERICAN: >60 ML/MIN
GFR NON-AFRICAN AMERICAN: >60 ML/MIN
GFR NON-AFRICAN AMERICAN: >60 ML/MIN
GFR SERPL CREATININE-BSD FRML MDRD: ABNORMAL ML/MIN/{1.73_M2}
GLUCOSE BLD-MCNC: 100 MG/DL (ref 70–99)
GLUCOSE BLD-MCNC: 104 MG/DL (ref 70–99)
HCT VFR BLD CALC: 42.4 % (ref 40.7–50.3)
HEMOGLOBIN: 14 G/DL (ref 13–17)
IMMATURE GRANULOCYTES: 0 %
LYMPHOCYTES # BLD: 11 % (ref 24–44)
MCH RBC QN AUTO: 30.4 PG (ref 25.2–33.5)
MCHC RBC AUTO-ENTMCNC: 33 G/DL (ref 28.4–34.8)
MCV RBC AUTO: 92 FL (ref 82.6–102.9)
MONOCYTES # BLD: 10 % (ref 1–7)
MORPHOLOGY: NORMAL
NRBC AUTOMATED: 0 PER 100 WBC
PDW BLD-RTO: 12.8 % (ref 11.8–14.4)
PLATELET # BLD: 239 K/UL (ref 138–453)
PLATELET ESTIMATE: ABNORMAL
PMV BLD AUTO: 10 FL (ref 8.1–13.5)
POTASSIUM SERPL-SCNC: 4.2 MMOL/L (ref 3.7–5.3)
POTASSIUM SERPL-SCNC: 4.2 MMOL/L (ref 3.7–5.3)
RBC # BLD: 4.61 M/UL (ref 4.21–5.77)
RBC # BLD: ABNORMAL 10*6/UL
SEG NEUTROPHILS: 79 % (ref 36–66)
SEGMENTED NEUTROPHILS ABSOLUTE COUNT: 11.45 K/UL (ref 1.8–7.7)
SODIUM BLD-SCNC: 124 MMOL/L (ref 135–144)
SODIUM BLD-SCNC: 125 MMOL/L (ref 135–144)
SODIUM BLD-SCNC: 130 MMOL/L (ref 135–144)
WBC # BLD: 14.5 K/UL (ref 3.5–11.3)
WBC # BLD: ABNORMAL 10*3/UL

## 2021-06-03 PROCEDURE — 6360000002 HC RX W HCPCS: Performed by: STUDENT IN AN ORGANIZED HEALTH CARE EDUCATION/TRAINING PROGRAM

## 2021-06-03 PROCEDURE — 99232 SBSQ HOSP IP/OBS MODERATE 35: CPT | Performed by: INTERNAL MEDICINE

## 2021-06-03 PROCEDURE — 2580000003 HC RX 258: Performed by: STUDENT IN AN ORGANIZED HEALTH CARE EDUCATION/TRAINING PROGRAM

## 2021-06-03 PROCEDURE — 2060000000 HC ICU INTERMEDIATE R&B

## 2021-06-03 PROCEDURE — 93010 ELECTROCARDIOGRAM REPORT: CPT | Performed by: INTERNAL MEDICINE

## 2021-06-03 PROCEDURE — 84295 ASSAY OF SERUM SODIUM: CPT

## 2021-06-03 PROCEDURE — 97535 SELF CARE MNGMENT TRAINING: CPT

## 2021-06-03 PROCEDURE — 85025 COMPLETE CBC W/AUTO DIFF WBC: CPT

## 2021-06-03 PROCEDURE — 6370000000 HC RX 637 (ALT 250 FOR IP): Performed by: STUDENT IN AN ORGANIZED HEALTH CARE EDUCATION/TRAINING PROGRAM

## 2021-06-03 PROCEDURE — 80048 BASIC METABOLIC PNL TOTAL CA: CPT

## 2021-06-03 PROCEDURE — 70551 MRI BRAIN STEM W/O DYE: CPT

## 2021-06-03 PROCEDURE — 2500000003 HC RX 250 WO HCPCS: Performed by: STUDENT IN AN ORGANIZED HEALTH CARE EDUCATION/TRAINING PROGRAM

## 2021-06-03 PROCEDURE — 97166 OT EVAL MOD COMPLEX 45 MIN: CPT

## 2021-06-03 PROCEDURE — 82330 ASSAY OF CALCIUM: CPT

## 2021-06-03 PROCEDURE — 36415 COLL VENOUS BLD VENIPUNCTURE: CPT

## 2021-06-03 PROCEDURE — 6370000000 HC RX 637 (ALT 250 FOR IP): Performed by: INTERNAL MEDICINE

## 2021-06-03 RX ORDER — CALCIUM GLUCONATE 20 MG/ML
1000 INJECTION, SOLUTION INTRAVENOUS ONCE
Status: COMPLETED | OUTPATIENT
Start: 2021-06-03 | End: 2021-06-03

## 2021-06-03 RX ORDER — FOLIC ACID 1 MG/1
1 TABLET ORAL DAILY
Status: DISCONTINUED | OUTPATIENT
Start: 2021-06-03 | End: 2021-06-04 | Stop reason: HOSPADM

## 2021-06-03 RX ORDER — THIAMINE MONONITRATE (VIT B1) 100 MG
100 TABLET ORAL DAILY
Qty: 30 TABLET | Refills: 0 | Status: SHIPPED | OUTPATIENT
Start: 2021-06-04 | End: 2021-06-23

## 2021-06-03 RX ORDER — FOLIC ACID 1 MG/1
1 TABLET ORAL DAILY
Qty: 30 TABLET | Refills: 3 | Status: SHIPPED | OUTPATIENT
Start: 2021-06-04 | End: 2021-06-23

## 2021-06-03 RX ORDER — M-VIT,TX,IRON,MINS/CALC/FOLIC 27MG-0.4MG
1 TABLET ORAL DAILY
Qty: 30 TABLET | Refills: 1 | Status: SHIPPED | OUTPATIENT
Start: 2021-06-03 | End: 2022-06-03

## 2021-06-03 RX ORDER — QUETIAPINE FUMARATE 100 MG/1
100 TABLET, FILM COATED ORAL 3 TIMES DAILY PRN
Status: DISCONTINUED | OUTPATIENT
Start: 2021-06-03 | End: 2021-06-04 | Stop reason: HOSPADM

## 2021-06-03 RX ORDER — QUETIAPINE FUMARATE 200 MG/1
400 TABLET, FILM COATED ORAL NIGHTLY
Status: DISCONTINUED | OUTPATIENT
Start: 2021-06-03 | End: 2021-06-04 | Stop reason: HOSPADM

## 2021-06-03 RX ORDER — GAUZE BANDAGE 2" X 2"
100 BANDAGE TOPICAL DAILY
Status: DISCONTINUED | OUTPATIENT
Start: 2021-06-03 | End: 2021-06-04 | Stop reason: HOSPADM

## 2021-06-03 RX ADMIN — LORAZEPAM 1 MG: 0.5 TABLET ORAL at 09:53

## 2021-06-03 RX ADMIN — SODIUM CHLORIDE, PRESERVATIVE FREE 10 ML: 5 INJECTION INTRAVENOUS at 09:52

## 2021-06-03 RX ADMIN — QUETIAPINE FUMARATE 400 MG: 200 TABLET ORAL at 20:31

## 2021-06-03 RX ADMIN — SODIUM CHLORIDE, PRESERVATIVE FREE 10 ML: 5 INJECTION INTRAVENOUS at 09:55

## 2021-06-03 RX ADMIN — SODIUM CHLORIDE, PRESERVATIVE FREE 10 ML: 5 INJECTION INTRAVENOUS at 21:55

## 2021-06-03 RX ADMIN — FOLIC ACID 1 MG: 1 TABLET ORAL at 12:33

## 2021-06-03 RX ADMIN — CALCIUM GLUCONATE 1000 MG: 20 INJECTION, SOLUTION INTRAVENOUS at 13:15

## 2021-06-03 RX ADMIN — Medication 100 MG: at 12:33

## 2021-06-03 RX ADMIN — CHLORDIAZEPOXIDE HYDROCHLORIDE 5 MG: 5 CAPSULE ORAL at 09:52

## 2021-06-03 RX ADMIN — ENOXAPARIN SODIUM 40 MG: 40 INJECTION, SOLUTION INTRAVENOUS; SUBCUTANEOUS at 09:52

## 2021-06-03 RX ADMIN — HYDROXYUREA 500 MG: 500 CAPSULE ORAL at 10:19

## 2021-06-03 RX ADMIN — LORAZEPAM 1 MG: 2 INJECTION INTRAMUSCULAR; INTRAVENOUS at 08:40

## 2021-06-03 RX ADMIN — HYDROXYUREA 500 MG: 500 CAPSULE ORAL at 20:31

## 2021-06-03 ASSESSMENT — PAIN SCALES - GENERAL
PAINLEVEL_OUTOF10: 7
PAINLEVEL_OUTOF10: 0
PAINLEVEL_OUTOF10: 5

## 2021-06-03 ASSESSMENT — PAIN DESCRIPTION - LOCATION
LOCATION: THROAT
LOCATION: BACK;THROAT

## 2021-06-03 ASSESSMENT — PAIN DESCRIPTION - PAIN TYPE
TYPE: ACUTE PAIN
TYPE: ACUTE PAIN

## 2021-06-03 ASSESSMENT — ENCOUNTER SYMPTOMS: TACHYPNEA: 1

## 2021-06-03 NOTE — PROGRESS NOTES
Comprehensive Nutrition Assessment    Type and Reason for Visit:  Initial, Positive Nutrition Screen (Weight Loss, Poor Intakes/Appetite)    Nutrition Recommendations/Plan: Continue current diet with beer at all meals. Encourage/monitor PO intakes as tolerated. Monitor need for ONS (like Ensure) with meals. Will monitor for plan of care. Nutrition Assessment:  Pt admitted with c/o generalized weakness and hyponatremia (Na 122 mmol/L). Pt reports daily alcohol use and has been drinking 12-15 beer per day. Pt also reports h/o poor PO intakes/appetite and states he has not had anything to eat x past 8-10 days. Pt reports previously having a fair appetite. PMHx includes: myeloproliferative disorder. Spoke with RN about order for beer - called Dietary Manager to ensure beer sent as ordered. Noted pt drank 100% of beer but did not eat much of meal.  Pt reports UBW of 205 lb - reports recently gaining some weight - current weight of 219 lb noted. Discussed with RN. Labs reviewed: Na 130 mmol/L, Ca 7.8 mg/dL. Meds reviewed: Folic Acid, Thiamine, Ativan. Malnutrition Assessment:  Malnutrition Status:   At risk for malnutrition    Context:  Acute Illness     Findings of the 6 clinical characteristics of malnutrition:  Energy Intake:  7 - 50% or less of estimated energy requirements for 5 or more days - Minimal/no PO Intakes x 8-10 days; reports h/o of fair PO intakes  Weight Loss:  No significant weight loss     Body Fat Loss:  No significant body fat loss   Muscle Mass Loss:  No significant muscle mass loss  Fluid Accumulation:  No significant fluid accumulation   Strength:  Not Performed    Estimated Daily Nutrient Needs:  Energy (kcal):  20-22 kcal/kg = 9763-9626 kcals/day; Weight Used for Energy Requirements:  Admission (99.5kg)     Protein (g):  1.2-1.4 gm/kg = 100-120 gm pro/day; Weight Used for Protein Requirements:  Ideal        Fluid (ml/day):  1759-0202 mL/day or per MD; Method Used for Fluid Requirements:  ml/Kg      Nutrition Related Findings:  Labs/Meds reviewed. H/o poor intakes/appetite. Wounds:  None       Current Nutrition Therapies:    ADULT DIET; Regular; 4 carb choices (60 gm/meal)  Adult Oral Nutrition Supplement; Other Oral Supplement; Beer x 3 per day    Anthropometric Measures:  · Height: 6' 2\" (188 cm)  · Current Body Weight: 219 lb 5.7 oz (99.5 kg)   · Admission Body Weight: 219 lb 5.7 oz (99.5 kg)    · Usual Body Weight: 219 lb 6.4 oz (99.5 kg) (3/2/21 bedscale per chart review -  lb per pt but recently gained some weight.)     · Ideal Body Weight: 190 lbs; % Ideal Body Weight 115.5 %   · BMI: 28.2  · BMI Categories: Overweight (BMI 25.0-29. 9)       Nutrition Diagnosis:   · Inadequate oral intake related to  (decreased appetite; h/o alcohol abuse) as evidenced by poor intake prior to admission, intake 0-25%    Nutrition Interventions:   Food and/or Nutrient Delivery:  Continue Current Diet, Continue Oral Nutrition Supplement (Provide additional oral supplements with meals as/if needed.)  Nutrition Education/Counseling:  No recommendation at this time   Coordination of Nutrition Care:  Continue to monitor while inpatient    Goals:  Oral intakes to meet % of estimated nutrition needs.        Nutrition Monitoring and Evaluation:   Food/Nutrient Intake Outcomes:  Food and Nutrient Intake, Supplement Intake  Physical Signs/Symptoms Outcomes:  Biochemical Data, GI Status, Fluid Status or Edema, Nutrition Focused Physical Findings, Skin, Weight     Electronically signed by Tomas Frank RD, LD on 6/3/21 at 3:42 PM EDT    Contact: [phone]

## 2021-06-03 NOTE — PROGRESS NOTES
Occupational Therapy   Occupational Therapy Initial Assessment  Date: 6/3/2021   Patient Name: Evelyn Christy  MRN: [id number]     : 1960     Chief Complaint   Patient presents with    Fatigue     Pt reports waking up feeling weak, states he went to his ENT appointment and they were concerned for stroke. Date of Service: 6/3/2021    Discharge Recommendations:    Further therapy recommended at discharge. OT Equipment Recommendations  Equipment Needed: Yes  Mobility Devices: Caren Shannon; ADL Assistive Devices  Walker: Rolling  ADL Assistive Devices: Long-handled Shoe Horn;Shower Chair with back    Assessment   Performance deficits / Impairments: Decreased functional mobility ; Decreased ADL status; Decreased fine motor control;Decreased high-level IADLs;Decreased safe awareness;Decreased balance  Assessment: pt exhibits above deficits, impacting pt ability to safely and independently complete ADL's and functional mobility. pt would benefit from continued acute OT and OT after discharge. Treatment Diagnosis: Hyponatremia  Prognosis: Good  Decision Making: Medium Complexity  Patient Education: pt ed on OT role, POC, purpose of eval, hand placement during transfers, and walker navigation. Good return. REQUIRES OT FOLLOW UP: Yes  Activity Tolerance  Activity Tolerance: Patient Tolerated treatment well  Safety Devices  Safety Devices in place: Yes  Type of devices: Left in bed;Bed alarm in place;Call light within reach;Gait belt  Restraints  Initially in place: No           Patient Diagnosis(es): The primary encounter diagnosis was General weakness. Diagnoses of Hyponatremia and Alcohol withdrawal syndrome with complication [facility]) were also pertinent to this visit.      has a past medical history of Alcohol abuse, Anemia, Cancer ([facility] [address] Cervical stenosis of spine, Chronic left-sided low back pain with left-sided sciatica, Insomnia, Intentional drug overdose ([facility] [address] Left arm numbness, Left lumbar radiculitis, Macrocytosis, Major depressive disorder, recurrent severe without psychotic features ([address] Myeloproliferative disorder ([address] Osteoarthritis of spine with radiculopathy, lumbar region, Severe recurrent major depression without psychotic features ([address] and Spondylosis of lumbar region without myelopathy or radiculopathy. has a past surgical history that includes cervical fusion; Appendectomy; Tonsillectomy; Carpal tunnel release; epidural steroid injection (Left, 5/15/2019); Endoscopy, colon, diagnostic; cervical fusion (09/05/2019); and cervical fusion (N/A, 9/5/2019). Treatment Diagnosis: Hyponatremia      Restrictions  Restrictions/Precautions  Restrictions/Precautions: Fall Risk (Up with Assist)  Required Braces or Orthoses?: No    Subjective   General  Patient assessed for rehabilitation services?: Yes  Family / Caregiver Present: No  Diagnosis: Hyponatremia  General Comment  Comments: RN ok'd pt for therapy. pt agreeable/pleasant throughout session. Patient Currently in Pain: Yes  Pain Assessment  Pain Assessment: 0-10  Pain Level: 7  Pain Type: Acute pain  Pain Location: Back; Throat  Non-Pharmaceutical Pain Intervention(s): Ambulation/Increased Activity; Distraction; Therapeutic presence  Response to Pain Intervention: Patient Satisfied    Social/Functional History  Social/Functional History  Lives With: Alone  Type of Home: Apartment  Home Layout: Multi-level (4th floor)  Home Access: Level entry, Elevator (pt reports laundry on 1st floor)  Bathroom Shower/Tub: Tub/Shower unit  Bathroom Toilet: Standard  Bathroom Equipment: Grab bars in shower, Grab bars around toilet  Home Equipment:  (pt reports no DME usage at baseline)  Receives Help From: Neighbor (pt reports elderly neighbor that can provide light assist PRN)  ADL Assistance: [facility]: Independent  Homemaking Responsibilities: Yes  Meal Prep Responsibility: Primary  Laundry Responsibility: Primary  Cleaning Responsibility: Primary  Ambulation Assistance: Independent  Transfer Assistance: Independent  Active : No  Patient's  Info: pt reports taking cab or shuttle bus  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 Comment: w/RW. pt unsteady throughout functional mobility and required 3-4 VC to keep walker close to body and avoid obstacles. pt experienced no LOB or buckling  Toilet Transfers  Toilet - Technique: Ambulating  Equipment Used: Grab bars  Toilet Transfer: Modified independent  Toilet Transfers Comments: w/ RW  ADL  Feeding: Modified independent ;Setup; Increased time to complete  Grooming: Modified independent ;Setup  UE Bathing: Stand by assistance  LE Bathing: Minimal assistance  UE Dressing: Stand by assistance  LE Dressing: Minimal assistance (OT facilitated pt doffing/donning shoe with SBA and increased time, EOB.)  Toileting: Minimal assistance (OT facilitated pt toilet transfer with CGA, on standard toilet.)  Tone RUE  RUE Tone: Normotonic  Tone LUE  LUE Tone: Normotonic  Coordination  Movements Are Fluid And Coordinated: No  Coordination and Movement description: Decreased accuracy; Fine motor impairments; Intention tremors;Gross motor impairments;Right UE;Left UE  Quality of Movement Other  Comment: pt presented with intention tremors, pt reports tremors present at baseline.      Bed mobility  Supine to Sit: Supervision  Sit to Supine: Supervision  Scooting: Stand by assistance  Comment: w/ elevated HOB, use of bedrail, and increased time  Transfers  Sit to stand: Minimal assistance  Stand to sit: Contact guard assistance  Transfer Comments: w/RW. Cognition  Overall Cognitive Status: Exceptions  Following Commands: Follows multistep commands with repitition  Safety Judgement: Decreased awareness of need for assistance  Insights: Decreased awareness of deficits  Initiation: Requires cues for some  Sequencing: Requires cues for some  Cognition Comment: pt presents with somewhat slow processing, requiring repetition for multi step commands and min VC for initation/sequencing. pt somewhat impulsive, standing before writer was ready. Sensation  Overall Sensation Status: WFL        LUE AROM (degrees)  LUE AROM : WFL  Left Hand AROM (degrees)  Left Hand AROM: WFL  RUE AROM (degrees)  RUE AROM : WFL  Right Hand AROM (degrees)  Right Hand AROM: WFL  LUE Strength  Gross LUE Strength: WFL  L Hand General: 5/5  RUE Strength  Gross RUE Strength: WFL  R Hand General: 5/5    Plan   Plan  Times per week: 2-3x/wk    AM-PAC Score  AM-[facility] Inpatient Daily Activity Raw Score: 20 (06/03/21 1417)  AM-PAC Inpatient ADL T-Scale Score : 42.03 (06/03/21 1417)  ADL Inpatient CMS 0-100% Score: 38.32 (06/03/21 1417)  ADL Inpatient CMS G-Code Modifier : [place] (06/03/21 1417)    Goals  Short term goals  Time Frame for Short term goals: By discharge, pt will:  Short term goal 1: Complete UB ADL's with mod I. Short term goal 2: Complete LB ADL's with SBA and A/E PRN. Short term goal 3: Complete functional transfers/functional mobility with SBA and LRD. Short term goal 4: Demo good safety awareness during functional transfers/functional mobility with no VC. Short term goal 5: Complete ~15 min of dynamic standing balance with SBA to support performance of standing occupations.        Therapy Time   Individual Concurrent Group Co-treatment   Time In 1323         Time Out 1350         Minutes 27         Timed Code Treatment Minutes: [address]/OT

## 2021-06-03 NOTE — ED NOTES
The following labs labeled with pt sticker and tubed:     [] Lavender   [] on ice   [] Blue   [x] Green/yellow  [] Green/black [] on ice  [] Pink  [] Red  [] Yellow     [] COVID-19 swab    [] Rapid     [] Urine Sample  [] Pelvic Cultures    [] Blood Cultures            Moisés eBckman RN  06/02/21 3198

## 2021-06-03 NOTE — PLAN OF CARE
Nutrition Problem #1: Inadequate oral intake  Intervention: Food and/or Nutrient Delivery: Continue Current Diet, Continue Oral Nutrition Supplement (Provide additional oral supplements with meals as/if needed.)  Nutritional Goals: Oral intakes to meet % of estimated nutrition needs.

## 2021-06-03 NOTE — PLAN OF CARE
Problem: Pain:  Goal: Pain level will decrease  Description: Pain level will decrease  6/3/2021 1647 by Vinnie Faustin RN  Outcome: Ongoing  6/3/2021 1646 by Vinnie Faustin RN  Outcome: Ongoing  Goal: Control of acute pain  Description: Control of acute pain  6/3/2021 1647 by Vinnie Faustin RN  Outcome: Ongoing  6/3/2021 1646 by Vinnie Faustin RN  Outcome: Ongoing  Goal: Control of chronic pain  Description: Control of chronic pain  6/3/2021 1647 by Vinnie Faustin RN  Outcome: Ongoing  6/3/2021 1646 by Vinnie Faustin RN  Outcome: Ongoing     Problem: Falls - Risk of:  Goal: Will remain free from falls  Description: Will remain free from falls  6/3/2021 1647 by Vinnie Faustin RN  Outcome: Ongoing  6/3/2021 1646 by Vinnie Faustin RN  Outcome: Ongoing  Goal: Absence of physical injury  Description: Absence of physical injury  6/3/2021 1647 by Vinnie Faustin RN  Outcome: Ongoing  6/3/2021 1646 by Vinnie Faustin RN  Outcome: Ongoing     Problem: Nutrition  Goal: Optimal nutrition therapy  6/3/2021 1647 by Vinnie Faustin RN  Outcome: Ongoing  6/3/2021 1646 by Vinnie Faustin RN  Outcome: Ongoing  6/3/2021 1544 by Richie Boast, RD, LD  Outcome: Ongoing  Note: Nutrition Problem #1: Inadequate oral intake  Intervention: Food and/or Nutrient Delivery: Continue Current Diet, Continue Oral Nutrition Supplement (Provide additional oral supplements with meals as/if needed.)  Nutritional Goals: Oral intakes to meet % of estimated nutrition needs.      Problem: Discharge Planning:  Goal: Discharged to appropriate level of care  Description: Discharged to appropriate level of care  Outcome: Ongoing     Problem: Fluid Volume - Deficit:  Goal: Absence of fluid volume deficit signs and symptoms  Description: Absence of fluid volume deficit signs and symptoms  Outcome: Ongoing     Problem: Nutrition Deficit:  Goal: Ability to achieve adequate nutritional intake will improve  Description: Ability to achieve adequate nutritional intake will improve Outcome: Ongoing     Problem: Sleep Pattern Disturbance:  Goal: Appears well-rested  Description: Appears well-rested  Outcome: Ongoing     Problem: Violence - Risk of, Self/Other-Directed:  Goal: Knowledge of developmental care interventions  Description: Absence of violence  Outcome: Ongoing

## 2021-06-03 NOTE — ED NOTES
Pt sitting upright in bed. States he has recently started feeling nauseous and has been dry heaving. Tremors noted in both hands. Pt appears uneasy, asking to go outside to smoke cigarette. Pt reports headache, rates pain 4/10. Pt states he has no appetite. NAD. Call light within reach. Bed locked in lowest position. Will continue to assess.      Katie Montalvo RN  06/02/21 5470

## 2021-06-03 NOTE — PROGRESS NOTES
[facility]  Internal Medicine Teaching Residency Program  Inpatient Daily Progress Note  ______________________________________________________________________________    Patient: Quinn Andino  YOB: 1960   PEREZ:[id number]    Acct: [de-identified]     Room: 18/18  Admit date: 6/2/2021  Today's date: 06/03/21  Number of days in the hospital: 1    SUBJECTIVE   Admitting Diagnosis: Hyponatremia  CC: generalized weakness  Pt examined at bedside. Chart & results reviewed. No acute event overnight  Sodium trending 122 >126> 124  Hemodynamically stable, pulse 82, 137/95, afebrile saturating at room air. Received 4 mg of Ativan and 100 mg Seroquel nightly    ROS:  Constitutional:  negative for chills, fevers, sweats  Respiratory:  negative for cough, dyspnea on exertion, hemoptysis, shortness of breath, wheezing  Cardiovascular:  negative for chest pain, chest pressure/discomfort, lower extremity edema, palpitations  Gastrointestinal:  negative for abdominal pain, constipation, diarrhea, nausea, vomiting  Neurological:  negative for dizziness, headache  BRIEF HISTORY     The patient is a pleasant 62 y.o. male presents with past medical history of myeloproliferative neoplasm, alcohol abuse, substance abuse, major depressive disorder.     Patient with above past medical history presented with generalized weakness. According to patient he has been drinking 12 to 15 cans of beer per day. Last drink was this morning. According to patient he has not been eating well and has not eaten anything from the last 8 days and has been drinking only. Patient denies any chest pain, shortness of breath, altered bowel movement,  symptoms,     Patient last withdrawal was couple of years ago.   Patient denies any willingness to quit alcohol  In the ed patient was found to be  Having  Sodium 122 , chloride 85,  BUN/creatinine unremarkable  Alk phos 143, , AST 98  Bilirubin 1.26 Urine osmolality 243, urine sodium 22  Acetaminophen less than 5, salicylate level less than 1     Chest x-ray not showing any acute process, CT head without contrast no acute intracranial abnormality, CT cervical spine no acute abnormality       OBJECTIVE     Vital Signs:  BP (!) 137/95   Pulse 82   Temp 96.6 °F (35.9 °C) (Oral)   Resp 19   Ht 6' 1\" (1.854 m)   Wt 220 lb (99.8 kg)   SpO2 94%   BMI 29.03 kg/m²     Temp (24hrs), Av.6 °F (35.9 °C), Min:96.6 °F (35.9 °C), Max:96.6 °F (35.9 °C)    In: 400   Out: 790 [Urine:790]    Physical Exam:  Constitutional: This is a well developed, well nourished, 25-29.9 - Overweight 62y.o. year old male who is alert, oriented, cooperative and in no apparent distress. Head:normocephalic and atraumatic. EENT:  PERRLA. No conjunctival injections. Septum was midline, mucosa was without erythema, exudates or cobblestoning. No thrush was noted. Neck: Supple without thyromegaly. No elevated JVP. Trachea was midline. Respiratory: Chest was symmetrical without dullness to percussion. Breath sounds bilaterally were clear to auscultation. There were no wheezes, rhonchi or rales. There is no intercostal retraction or use of accessory muscles. No egophony noted. Cardiovascular: Regular without murmur, clicks, gallops or rubs. Abdomen: Slightly rounded and soft without organomegaly. No rebound, rigidity or guarding was appreciated. Lymphatic: No lymphadenopathy. Musculoskeletal: Normal curvature of the spine. No gross muscle weakness. Extremities:  No lower extremity edema, ulcerations, tenderness, varicosities or erythema. Muscle size, tone and strength are normal.  No involuntary movements are noted. Skin:  Warm and dry. Good color, turgor and pigmentation. No lesions or scars.   No cyanosis or clubbing  Neurological/Psychiatric: The patient's general behavior, level of consciousness, thought content and emotional status is normal.        Medications: MICROBIOLOGY: No results found for: CULTURE    Imaging:    CT HEAD WO CONTRAST    Result Date: 6/2/2021  No acute intracranial abnormality. CT CERVICAL SPINE WO CONTRAST    Result Date: 6/2/2021  No acute abnormality of the cervical spine. US LIVER    Result Date: 6/2/2021  1. Hepatic steatosis. 2. Hepatomegaly. XR CHEST PORTABLE    Result Date: 6/2/2021  No acute process. 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 Will hold IV fluid as of now. Encourage oral intake  Patient on Librium 5 mg 3 times daily and CIWA protocol  Thiamine and folate        Transaminitis likely due to alcohol abuse:  Ultrasound of liver shows hepatic steatosis and hepatomegaly       Leukocytosis likely  secondary to stress:  WBC trending 15.5 >14.5  No need of antibiotic as of now, afebrile, hemodynamically stable         Victor M 2+ myeloproliferative neoplasm unspecified  Patient on hydroxyurea  Hemoglobin, platelets normal.    Mild leukocytosis with 15.5 unlikely due to hydroxyurea     Patient counseled regarding alcohol withdrawal.  Denies any willingness to quit.     GI ppx: None     PT/OT/SW on board  Discharge Planning: On board    Carlita Calabrese MD  Internal Medicine Resident, PGY-1  [address], [place]  6/3/2021, 6:26 AM

## 2021-06-03 NOTE — CARE COORDINATION
Met with pt after receiving a social work consult. Pt states that he has been drinking 12-18, 12 oz beers daily. He states that he has been in treatment in the past but doesn't remember where. Pt states that he has been through DT's before. Discussed treatment options and pt states that he doesn't want to go into treatment. He did state that he wishes he could drink in moderation. He also stated that the doctor prescribed him meds to help him stop drinking but he wasn't ready to start taking it. Provided pt a list of alcohol treatment options in case he changes his mind.

## 2021-06-04 VITALS
OXYGEN SATURATION: 98 % | SYSTOLIC BLOOD PRESSURE: 117 MMHG | DIASTOLIC BLOOD PRESSURE: 63 MMHG | HEART RATE: 85 BPM | TEMPERATURE: 98.3 F | WEIGHT: 219.36 LBS | HEIGHT: 74 IN | RESPIRATION RATE: 18 BRPM | BODY MASS INDEX: 28.15 KG/M2

## 2021-06-04 LAB
ABSOLUTE EOS #: 0.17 K/UL (ref 0–0.44)
ABSOLUTE IMMATURE GRANULOCYTE: 0.16 K/UL (ref 0–0.3)
ABSOLUTE LYMPH #: 1.36 K/UL (ref 1.1–3.7)
ABSOLUTE MONO #: 1.47 K/UL (ref 0.1–1.2)
ANION GAP SERPL CALCULATED.3IONS-SCNC: 15 MMOL/L (ref 9–17)
BASOPHILS # BLD: 2 % (ref 0–2)
BASOPHILS ABSOLUTE: 0.22 K/UL (ref 0–0.2)
BUN BLDV-MCNC: 12 MG/DL (ref 6–20)
BUN/CREAT BLD: ABNORMAL (ref 9–20)
CALCIUM SERPL-MCNC: 8.2 MG/DL (ref 8.6–10.4)
CHLORIDE BLD-SCNC: 95 MMOL/L (ref 98–107)
CO2: 22 MMOL/L (ref 20–31)
CREAT SERPL-MCNC: 0.61 MG/DL (ref 0.7–1.2)
DIFFERENTIAL TYPE: ABNORMAL
EOSINOPHILS RELATIVE PERCENT: 2 % (ref 1–4)
GFR AFRICAN AMERICAN: >60 ML/MIN
GFR NON-AFRICAN AMERICAN: >60 ML/MIN
GFR SERPL CREATININE-BSD FRML MDRD: ABNORMAL ML/MIN/{1.73_M2}
GFR SERPL CREATININE-BSD FRML MDRD: ABNORMAL ML/MIN/{1.73_M2}
GLUCOSE BLD-MCNC: 87 MG/DL (ref 70–99)
HCT VFR BLD CALC: 38.7 % (ref 40.7–50.3)
HEMOGLOBIN: 13.2 G/DL (ref 13–17)
IMMATURE GRANULOCYTES: 2 %
LYMPHOCYTES # BLD: 14 % (ref 24–43)
MCH RBC QN AUTO: 30.2 PG (ref 25.2–33.5)
MCHC RBC AUTO-ENTMCNC: 34.1 G/DL (ref 28.4–34.8)
MCV RBC AUTO: 88.6 FL (ref 82.6–102.9)
MONOCYTES # BLD: 15 % (ref 3–12)
NRBC AUTOMATED: 0 PER 100 WBC
PDW BLD-RTO: 12.6 % (ref 11.8–14.4)
PLATELET # BLD: 225 K/UL (ref 138–453)
PLATELET ESTIMATE: ABNORMAL
PMV BLD AUTO: 10.5 FL (ref 8.1–13.5)
POTASSIUM SERPL-SCNC: 3.8 MMOL/L (ref 3.7–5.3)
RBC # BLD: 4.37 M/UL (ref 4.21–5.77)
RBC # BLD: ABNORMAL 10*6/UL
SEG NEUTROPHILS: 65 % (ref 36–65)
SEGMENTED NEUTROPHILS ABSOLUTE COUNT: 6.62 K/UL (ref 1.5–8.1)
SODIUM BLD-SCNC: 128 MMOL/L (ref 135–144)
SODIUM BLD-SCNC: 132 MMOL/L (ref 135–144)
WBC # BLD: 10 K/UL (ref 3.5–11.3)
WBC # BLD: ABNORMAL 10*3/UL

## 2021-06-04 PROCEDURE — 6360000002 HC RX W HCPCS: Performed by: STUDENT IN AN ORGANIZED HEALTH CARE EDUCATION/TRAINING PROGRAM

## 2021-06-04 PROCEDURE — 6370000000 HC RX 637 (ALT 250 FOR IP): Performed by: INTERNAL MEDICINE

## 2021-06-04 PROCEDURE — 80048 BASIC METABOLIC PNL TOTAL CA: CPT

## 2021-06-04 PROCEDURE — 85025 COMPLETE CBC W/AUTO DIFF WBC: CPT

## 2021-06-04 PROCEDURE — 99239 HOSP IP/OBS DSCHRG MGMT >30: CPT | Performed by: INTERNAL MEDICINE

## 2021-06-04 PROCEDURE — 36415 COLL VENOUS BLD VENIPUNCTURE: CPT

## 2021-06-04 RX ADMIN — LORAZEPAM 4 MG: 2 INJECTION INTRAMUSCULAR; INTRAVENOUS at 01:17

## 2021-06-04 RX ADMIN — QUETIAPINE FUMARATE 100 MG: 100 TABLET ORAL at 00:59

## 2021-06-04 RX ADMIN — LORAZEPAM 4 MG: 2 INJECTION INTRAMUSCULAR; INTRAVENOUS at 06:54

## 2021-06-04 ASSESSMENT — PAIN SCALES - GENERAL
PAINLEVEL_OUTOF10: 0
PAINLEVEL_OUTOF10: 0

## 2021-06-04 NOTE — PROGRESS NOTES
[facility]  Internal Medicine Teaching Residency Program  Inpatient Daily Progress Note  ______________________________________________________________________________    Patient: Annetta Valentino  YOB: 1960   NIT:[id number]    Acct: [de-identified]     Room: [address] [place] [address]  Admit date: 6/2/2021  Today's date: 06/04/21  Number of days in the hospital: 2    SUBJECTIVE   Admitting Diagnosis: Hyponatremia  CC: generalized weakness      Patient seen and examined. No acute issues overnight. Hemodynamically and vitally stable. Labs reviewed. Sodium 132. Alert oriented x3. Not in DTs or withdrawal.      ROS:  Constitutional:  negative for chills, fevers, sweats  Respiratory:  negative for cough, dyspnea on exertion, hemoptysis, shortness of breath, wheezing  Cardiovascular:  negative for chest pain, chest pressure/discomfort, lower extremity edema, palpitations  Gastrointestinal:  negative for abdominal pain, constipation, diarrhea, nausea, vomiting  Neurological:  negative for dizziness, headache  BRIEF HISTORY     The patient is a pleasant 62 y.o. male presents with past medical history of myeloproliferative neoplasm, alcohol abuse, substance abuse, major depressive disorder.     Patient with above past medical history presented with generalized weakness. According to patient he has been drinking 12 to 15 cans of beer per day. Last drink was this morning. According to patient he has not been eating well and has not eaten anything from the last 8 days and has been drinking only. Patient denies any chest pain, shortness of breath, altered bowel movement,  symptoms,     Patient last withdrawal was couple of years ago.   Patient denies any willingness to quit alcohol  In the ed patient was found to be  Having  Sodium 122 , chloride 85,  BUN/creatinine unremarkable  Alk phos 143, , AST 98  Bilirubin 1.26  Urine osmolality 243, urine sodium 22  Acetaminophen CHOL 139 01/23/2019    HDL 31 (L) 04/03/2019    TRIG 133 01/23/2019     LIVER PROFILE:   Recent Labs     06/02/21  1152   AST 98*   *   BILIDIR 0.44*   BILITOT 1.26*   ALKPHOS 143*      MICROBIOLOGY: No results found for: CULTURE    Imaging:    CT HEAD WO CONTRAST    Result Date: 6/2/2021  No acute intracranial abnormality. CT CERVICAL SPINE WO CONTRAST    Result Date: 6/2/2021  No acute abnormality of the cervical spine. US LIVER    Result Date: 6/2/2021  1. Hepatic steatosis. 2. Hepatomegaly. XR CHEST PORTABLE    Result Date: 6/2/2021  No acute process. ASSESSMENT & PLAN     ASSESSMENT / PLAN:     Hypotonic euvolemic hyponatremia secondary to beer portal nicholas:  Urine osmolality 243, urine sodium 22, serum osmolality 259  Sodium every 6 hour, not to correct more than 6-8 in 24-hour. Will hold IV fluid as of now. Encourage oral intake  On beer in hospital.  Not in DTs. Thiamine and folate        Transaminitis likely due to alcohol abuse:  Ultrasound of liver shows hepatic steatosis and hepatomegaly       Leukocytosis. Resolved         Victor M 2+ myeloproliferative neoplasm unspecified  Patient on hydroxyurea  Hemoglobin, platelets normal.    Mild leukocytosis with 15.5 unlikely due to hydroxyurea     Patient counseled regarding alcohol withdrawal.  Denies any willingness to quit.     GI ppx: None     PT/OT/SW on board  Discharge Planning:  Discussed with the patient patient wants to go home, denied any  for rehab placement. Currently not in DTs. Alert oriented x3. Will discharge the patient home. Laurence Álvarez MD  Internal Medicine Resident, PGY-2  [facility];  [place], [place]  6/4/2021, 8:37 AM

## 2021-06-04 NOTE — PROGRESS NOTES
The patient was A&O x4 this morning. I discussed discharge paperwork with him. He didn't have any questions. He was adamant about going down to the waiting room to sit in a wheel chair and wait for his ride. I asked him to stay in his room, however, he didn't want to do that. I notified the information desk of the situation and left my name and what floor I work on and asked them to call me when his ride arrives so I can go down and assist him to the car.

## 2021-06-10 ENCOUNTER — TELEPHONE (OUTPATIENT)
Dept: FAMILY MEDICINE CLINIC | Age: 61
End: 2021-06-10

## 2021-06-10 NOTE — TELEPHONE ENCOUNTER
Please schedule hospital follow-up when one is available. In person would be more beneficial but if he can absolutely not come here it can be [facility] hospital f/u.

## 2021-06-10 NOTE — TELEPHONE ENCOUNTER
----- Message from Joesph Whelan sent at 6/10/2021 11:49 AM EDT -----  Subject: Appointment Request    Reason for Call: Routine Hospital Follow Up    QUESTIONS  Type of Appointment? Established Patient  Reason for appointment request? Available appointments did not meet   patient need  Additional Information for Provider? Patient needs a follow up visit for a   hospital visit, needs a virtual visit as he does not have transportation. requesting an available appointment be changed to a virtual visit. Please   call to confirm with him if available.  ---------------------------------------------------------------------------  --------------  CALL BACK INFO  What is the best way for the office to contact you? OK to leave message on   voicemail  Preferred Call Back Phone Number? [phone]  ---------------------------------------------------------------------------  --------------  SCRIPT ANSWERS  Relationship to Patient? Self  Appointment reason? Well Care/Follow Ups  Select a Well Care/Follow Ups appointment reason? Adult Hospital Follow Up   [Inpatient Discharge, [address]  (Patient requests to see provider urgently. )? No  (Has the patient been discharged from the hospital within 2 business days   AND does not have a Telephone Encounter  Follow Up From [address] [place] [address]   documented in [address]?)? No  Do you have any questions for your primary care provider that need to be   answered prior to your appointment? (Use RN Triage if question pertains to   anything on the red flag list)? No  (Patient needs follow up visit after hospital discharge) Book first   available appointment within 7 days OF DISCHARGE, if no appt, proceed to   book the next available time slot within 14 days OF DISCHARGE AND Send   Message to Provider. [address] Follow Up appointment cannot be booked   beyond 14 Days and should result in a Message to Provider. ?  Yes   Have you been diagnosed with, awaiting test results for, or told that you   are

## 2021-06-12 NOTE — DISCHARGE SUMMARY
[address]     Department of Internal Medicine - Staff Internal Medicine Teaching Service    INPATIENT DISCHARGE SUMMARY      Patient Identification:  Eduardo Baldwin is a 62 y.o. male. :  1960  MRN: [id number]     Acct: [de-identified]   PCP: SIMONE Hanks - CNP  Admit Date:  2021  Discharge date and time: 2021  8:37 AM   Attending Provider: No att. providers found                                     [address] Problem Lists:  Principal Problem:    Hyponatremia  Active Problems:    MPN (myeloproliferative neoplasm) ([facility] [address]    Chronic left-sided low back pain with left-sided sciatica    Cervical stenosis of spine    Alcohol abuse    Substance abuse ([facility] [address]    Uncomplicated alcohol dependence ([facility] [address]  Resolved Problems:    * No resolved hospital problems. [facility] STAY     Brief Inpatient course:   Eduardo Baldwin is a 62 y.o. male presents with past medical history of myeloproliferative neoplasm, alcohol abuse, substance abuse, major depressive disorder.     Patient with above past medical history presented with generalized weakness. According to patient he has been drinking 12 to 15 cans of beer per day. Last drink was this morning. According to patient he has not been eating well and has not eaten anything from the last 8 days and has been drinking only.  Patient denies any chest pain, shortness of breath, altered bowel movement,  symptoms,     Patient last withdrawal was couple of years ago. James Hendricks denies any willingness to quit alcohol  In the ed patient was found to be  Having  Sodium 122 , chloride 85,  BUN/creatinine unremarkable  Alk phos 143, , AST 98  Bilirubin 1.26  Urine osmolality 243, urine sodium 22  Acetaminophen less than 5, salicylate level less than 1     Chest x-ray not showing any acute process, CT head without contrast no acute intracranial abnormality, CT cervical spine no acute abnormality     MRI negative ,sodium improved . Patient improved and was discharged home    Procedures/ Significant Interventions:    none    Consults:     Consults:     Final Specialist Recommendations/Findings:   IP CONSULT TO HOSPITALIST  IP CONSULT TO SOCIAL WORK      Any Hospital Acquired Infections: none    Discharge Functional Status:  stable    DISCHARGE PLAN     Disposition: home    Patient Instructions:   Discharge Medication List as of 6/4/2021  7:58 AM      START taking these medications    Details   folic acid (FOLVITE) 1 MG tablet Take 1 tablet by mouth daily, Disp-30 tablet, R-3Normal      thiamine mononitrate (THIAMINE) 100 MG tablet Take 1 tablet by mouth daily, Disp-30 tablet, R-0Normal      Multiple Vitamins-Minerals (THERAPEUTIC MULTIVITAMIN-MINERALS) tablet Take 1 tablet by mouth daily, Disp-30 tablet, R-1Normal         CONTINUE these medications which have NOT CHANGED    Details   nicotine (NICODERM CQ) 21 MG/24HR Place 1 patch onto the skin every 24 hoursHistorical Med      hydrOXYzine (ATARAX) 50 MG tablet Historical Med      !! QUEtiapine (SEROQUEL) 100 MG tablet Take 100 mg by mouth 3 times daily as neededHistorical Med      !! QUEtiapine (SEROQUEL) 400 MG tablet Take 400 mg by mouth nightlyHistorical Med      hydroxyurea (HYDREA) 500 MG chemo capsule take 1 capsule by mouth twice a day, Disp-60 capsule, R-3Normal       !! - Potential duplicate medications found. Please discuss with provider. STOP taking these medications       fluorouracil (EFUDEX) 5 % cream Comments:   Reason for Stopping:               Activity: up as tolerated    Diet: regular    Follow-up:    No follow-up provider specified. Patient Instructions:   Start taking thiamine, folic acid and multivitamins. Strict alcohol abstinence. Follow up with PCP for post hospitalization follow up.             Note that over 30 minutes was spent in preparing discharge papers, discussing discharge with patient, medication review, etc.      Francesca

## 2021-06-23 ENCOUNTER — APPOINTMENT (OUTPATIENT)
Dept: GENERAL RADIOLOGY | Age: 61
DRG: 641 | End: 2021-06-23
Payer: MEDICARE

## 2021-06-23 ENCOUNTER — HOSPITAL ENCOUNTER (INPATIENT)
Age: 61
LOS: 1 days | Discharge: HOME | DRG: 641 | End: 2021-06-24
Attending: STUDENT IN AN ORGANIZED HEALTH CARE EDUCATION/TRAINING PROGRAM | Admitting: INTERNAL MEDICINE
Payer: MEDICARE

## 2021-06-23 ENCOUNTER — APPOINTMENT (OUTPATIENT)
Dept: CT IMAGING | Age: 61
DRG: 641 | End: 2021-06-23
Payer: MEDICARE

## 2021-06-23 DIAGNOSIS — F10.10 ALCOHOL ABUSE: ICD-10-CM

## 2021-06-23 DIAGNOSIS — E03.9 ACQUIRED HYPOTHYROIDISM: ICD-10-CM

## 2021-06-23 DIAGNOSIS — E87.1 HYPONATREMIA: Primary | ICD-10-CM

## 2021-06-23 LAB
-: ABNORMAL
ABSOLUTE EOS #: 0.08 K/UL (ref 0–0.4)
ABSOLUTE IMMATURE GRANULOCYTE: ABNORMAL K/UL (ref 0–0.3)
ABSOLUTE LYMPH #: 0.78 K/UL (ref 1–4.8)
ABSOLUTE MONO #: 0.78 K/UL (ref 0.1–1.3)
ALBUMIN SERPL-MCNC: 4.2 G/DL (ref 3.5–5.2)
ALBUMIN/GLOBULIN RATIO: ABNORMAL (ref 1–2.5)
ALP BLD-CCNC: 135 U/L (ref 40–129)
ALT SERPL-CCNC: 40 U/L (ref 5–41)
AMMONIA: 23 UMOL/L (ref 16–60)
AMORPHOUS: ABNORMAL
AMPHETAMINE SCREEN URINE: NEGATIVE
ANION GAP SERPL CALCULATED.3IONS-SCNC: 19 MMOL/L (ref 9–17)
AST SERPL-CCNC: 69 U/L
BACTERIA: ABNORMAL
BARBITURATE SCREEN URINE: NEGATIVE
BASOPHILS # BLD: 1 % (ref 0–2)
BASOPHILS ABSOLUTE: 0.08 K/UL (ref 0–0.2)
BENZODIAZEPINE SCREEN, URINE: NEGATIVE
BILIRUB SERPL-MCNC: 0.64 MG/DL (ref 0.3–1.2)
BILIRUBIN URINE: NEGATIVE
BUN BLDV-MCNC: 5 MG/DL (ref 6–20)
BUN/CREAT BLD: ABNORMAL (ref 9–20)
BUPRENORPHINE URINE: ABNORMAL
CALCIUM SERPL-MCNC: 8.4 MG/DL (ref 8.6–10.4)
CANNABINOID SCREEN URINE: POSITIVE
CASTS UA: ABNORMAL /LPF
CHLORIDE BLD-SCNC: 90 MMOL/L (ref 98–107)
CO2: 17 MMOL/L (ref 20–31)
COCAINE METABOLITE, URINE: NEGATIVE
COLOR: ABNORMAL
COMMENT UA: ABNORMAL
CREAT SERPL-MCNC: 0.61 MG/DL (ref 0.7–1.2)
CRYSTALS, UA: ABNORMAL /HPF
DIFFERENTIAL TYPE: ABNORMAL
EOSINOPHILS RELATIVE PERCENT: 1 % (ref 0–4)
EPITHELIAL CELLS UA: ABNORMAL /HPF
ETHANOL PERCENT: 0.06 %
ETHANOL: 64 MG/DL
GFR AFRICAN AMERICAN: >60 ML/MIN
GFR NON-AFRICAN AMERICAN: >60 ML/MIN
GFR SERPL CREATININE-BSD FRML MDRD: ABNORMAL ML/MIN/{1.73_M2}
GFR SERPL CREATININE-BSD FRML MDRD: ABNORMAL ML/MIN/{1.73_M2}
GLUCOSE BLD-MCNC: 113 MG/DL (ref 70–99)
GLUCOSE URINE: NEGATIVE
HCT VFR BLD CALC: 41.1 % (ref 41–53)
HEMOGLOBIN: 14.3 G/DL (ref 13.5–17.5)
IMMATURE GRANULOCYTES: ABNORMAL %
KETONES, URINE: NEGATIVE
LEUKOCYTE ESTERASE, URINE: NEGATIVE
LYMPHOCYTES # BLD: 10 % (ref 24–44)
MCH RBC QN AUTO: 31.5 PG (ref 26–34)
MCHC RBC AUTO-ENTMCNC: 34.7 G/DL (ref 31–37)
MCV RBC AUTO: 90.8 FL (ref 80–100)
MDMA URINE: ABNORMAL
METHADONE SCREEN, URINE: POSITIVE
METHAMPHETAMINE, URINE: ABNORMAL
MONOCYTES # BLD: 10 % (ref 1–7)
MORPHOLOGY: ABNORMAL
MORPHOLOGY: ABNORMAL
MUCUS: ABNORMAL
NITRITE, URINE: NEGATIVE
NRBC AUTOMATED: ABNORMAL PER 100 WBC
OPIATES, URINE: NEGATIVE
OSMOLALITY URINE: 203 MOSM/KG (ref 80–1300)
OTHER OBSERVATIONS UA: ABNORMAL
OXYCODONE SCREEN URINE: NEGATIVE
PDW BLD-RTO: 16.3 % (ref 11.5–14.9)
PH UA: 6 (ref 5–8)
PHENCYCLIDINE, URINE: NEGATIVE
PLATELET # BLD: 157 K/UL (ref 150–450)
PLATELET ESTIMATE: ABNORMAL
PMV BLD AUTO: 8.5 FL (ref 6–12)
POTASSIUM SERPL-SCNC: 4.3 MMOL/L (ref 3.7–5.3)
PROPOXYPHENE, URINE: ABNORMAL
PROTEIN UA: NEGATIVE
RBC # BLD: 4.53 M/UL (ref 4.5–5.9)
RBC # BLD: ABNORMAL 10*6/UL
RBC UA: ABNORMAL /HPF
RENAL EPITHELIAL, UA: ABNORMAL /HPF
SEG NEUTROPHILS: 78 % (ref 36–66)
SEGMENTED NEUTROPHILS ABSOLUTE COUNT: 6.08 K/UL (ref 1.3–9.1)
SERUM OSMOLALITY: 273 MOSM/KG (ref 275–295)
SODIUM BLD-SCNC: 126 MMOL/L (ref 135–144)
SODIUM BLD-SCNC: 130 MMOL/L (ref 135–144)
SODIUM,UR: <20 MMOL/L
SPECIFIC GRAVITY UA: 1.02 (ref 1–1.03)
TEST INFORMATION: ABNORMAL
TOTAL PROTEIN: 7.4 G/DL (ref 6.4–8.3)
TRICHOMONAS: ABNORMAL
TRICYCLIC ANTIDEPRESSANTS, UR: ABNORMAL
TURBIDITY: CLEAR
URINE HGB: ABNORMAL
UROBILINOGEN, URINE: NORMAL
WBC # BLD: 7.8 K/UL (ref 3.5–11)
WBC # BLD: ABNORMAL 10*3/UL
WBC UA: ABNORMAL /HPF
YEAST: ABNORMAL

## 2021-06-23 PROCEDURE — 80307 DRUG TEST PRSMV CHEM ANLYZR: CPT

## 2021-06-23 PROCEDURE — 80053 COMPREHEN METABOLIC PANEL: CPT

## 2021-06-23 PROCEDURE — 82140 ASSAY OF AMMONIA: CPT

## 2021-06-23 PROCEDURE — 85025 COMPLETE CBC W/AUTO DIFF WBC: CPT

## 2021-06-23 PROCEDURE — 1200000000 HC SEMI PRIVATE

## 2021-06-23 PROCEDURE — 83935 ASSAY OF URINE OSMOLALITY: CPT

## 2021-06-23 PROCEDURE — 84295 ASSAY OF SERUM SODIUM: CPT

## 2021-06-23 PROCEDURE — 81001 URINALYSIS AUTO W/SCOPE: CPT

## 2021-06-23 PROCEDURE — 96372 THER/PROPH/DIAG INJ SC/IM: CPT

## 2021-06-23 PROCEDURE — G0480 DRUG TEST DEF 1-7 CLASSES: HCPCS

## 2021-06-23 PROCEDURE — 6360000004 HC RX CONTRAST MEDICATION: Performed by: STUDENT IN AN ORGANIZED HEALTH CARE EDUCATION/TRAINING PROGRAM

## 2021-06-23 PROCEDURE — G0378 HOSPITAL OBSERVATION PER HR: HCPCS

## 2021-06-23 PROCEDURE — 84300 ASSAY OF URINE SODIUM: CPT

## 2021-06-23 PROCEDURE — 83930 ASSAY OF BLOOD OSMOLALITY: CPT

## 2021-06-23 PROCEDURE — 6370000000 HC RX 637 (ALT 250 FOR IP): Performed by: STUDENT IN AN ORGANIZED HEALTH CARE EDUCATION/TRAINING PROGRAM

## 2021-06-23 PROCEDURE — 2580000003 HC RX 258: Performed by: STUDENT IN AN ORGANIZED HEALTH CARE EDUCATION/TRAINING PROGRAM

## 2021-06-23 PROCEDURE — 6360000002 HC RX W HCPCS: Performed by: STUDENT IN AN ORGANIZED HEALTH CARE EDUCATION/TRAINING PROGRAM

## 2021-06-23 PROCEDURE — 99223 1ST HOSP IP/OBS HIGH 75: CPT | Performed by: INTERNAL MEDICINE

## 2021-06-23 PROCEDURE — 99284 EMERGENCY DEPT VISIT MOD MDM: CPT

## 2021-06-23 PROCEDURE — 36415 COLL VENOUS BLD VENIPUNCTURE: CPT

## 2021-06-23 PROCEDURE — 70491 CT SOFT TISSUE NECK W/DYE: CPT

## 2021-06-23 RX ORDER — PANTOPRAZOLE SODIUM 40 MG/1
40 TABLET, DELAYED RELEASE ORAL
Status: DISCONTINUED | OUTPATIENT
Start: 2021-06-24 | End: 2021-06-24 | Stop reason: HOSPADM

## 2021-06-23 RX ORDER — SERTRALINE HYDROCHLORIDE 100 MG/1
100 TABLET, FILM COATED ORAL DAILY
Status: ON HOLD | COMMUNITY
End: 2021-06-24

## 2021-06-23 RX ORDER — SERTRALINE HYDROCHLORIDE 100 MG/1
100 TABLET, FILM COATED ORAL DAILY
Status: DISCONTINUED | OUTPATIENT
Start: 2021-06-23 | End: 2021-06-23

## 2021-06-23 RX ORDER — LORAZEPAM 2 MG/ML
2 INJECTION INTRAMUSCULAR
Status: DISCONTINUED | OUTPATIENT
Start: 2021-06-23 | End: 2021-06-24 | Stop reason: HOSPADM

## 2021-06-23 RX ORDER — LORAZEPAM 1 MG/1
3 TABLET ORAL
Status: DISCONTINUED | OUTPATIENT
Start: 2021-06-23 | End: 2021-06-24 | Stop reason: HOSPADM

## 2021-06-23 RX ORDER — 0.9 % SODIUM CHLORIDE 0.9 %
80 INTRAVENOUS SOLUTION INTRAVENOUS ONCE
Status: COMPLETED | OUTPATIENT
Start: 2021-06-23 | End: 2021-06-23

## 2021-06-23 RX ORDER — FOLIC ACID 1 MG/1
1 TABLET ORAL DAILY
Status: DISCONTINUED | OUTPATIENT
Start: 2021-06-23 | End: 2021-06-24 | Stop reason: HOSPADM

## 2021-06-23 RX ORDER — GAUZE BANDAGE 2" X 2"
100 BANDAGE TOPICAL DAILY
Status: DISCONTINUED | OUTPATIENT
Start: 2021-06-23 | End: 2021-06-24 | Stop reason: HOSPADM

## 2021-06-23 RX ORDER — ONDANSETRON 4 MG/1
4 TABLET, ORALLY DISINTEGRATING ORAL EVERY 8 HOURS PRN
Status: DISCONTINUED | OUTPATIENT
Start: 2021-06-23 | End: 2021-06-24 | Stop reason: HOSPADM

## 2021-06-23 RX ORDER — NICOTINE 21 MG/24HR
1 PATCH, TRANSDERMAL 24 HOURS TRANSDERMAL EVERY 24 HOURS
Status: DISCONTINUED | OUTPATIENT
Start: 2021-06-23 | End: 2021-06-24 | Stop reason: HOSPADM

## 2021-06-23 RX ORDER — LORAZEPAM 1 MG/1
4 TABLET ORAL
Status: DISCONTINUED | OUTPATIENT
Start: 2021-06-23 | End: 2021-06-24 | Stop reason: HOSPADM

## 2021-06-23 RX ORDER — LORAZEPAM 2 MG/ML
3 INJECTION INTRAMUSCULAR
Status: DISCONTINUED | OUTPATIENT
Start: 2021-06-23 | End: 2021-06-24 | Stop reason: HOSPADM

## 2021-06-23 RX ORDER — LORAZEPAM 2 MG/ML
4 INJECTION INTRAMUSCULAR
Status: DISCONTINUED | OUTPATIENT
Start: 2021-06-23 | End: 2021-06-24 | Stop reason: HOSPADM

## 2021-06-23 RX ORDER — LORAZEPAM 1 MG/1
2 TABLET ORAL
Status: DISCONTINUED | OUTPATIENT
Start: 2021-06-23 | End: 2021-06-24 | Stop reason: HOSPADM

## 2021-06-23 RX ORDER — QUETIAPINE FUMARATE 100 MG/1
100 TABLET, FILM COATED ORAL
Status: DISCONTINUED | OUTPATIENT
Start: 2021-06-24 | End: 2021-06-24 | Stop reason: HOSPADM

## 2021-06-23 RX ORDER — SODIUM CHLORIDE 9 MG/ML
25 INJECTION, SOLUTION INTRAVENOUS PRN
Status: DISCONTINUED | OUTPATIENT
Start: 2021-06-23 | End: 2021-06-24 | Stop reason: HOSPADM

## 2021-06-23 RX ORDER — LORAZEPAM 2 MG/ML
1 INJECTION INTRAMUSCULAR
Status: DISCONTINUED | OUTPATIENT
Start: 2021-06-23 | End: 2021-06-24 | Stop reason: HOSPADM

## 2021-06-23 RX ORDER — M-VIT,TX,IRON,MINS/CALC/FOLIC 27MG-0.4MG
1 TABLET ORAL DAILY
Status: DISCONTINUED | OUTPATIENT
Start: 2021-06-23 | End: 2021-06-24 | Stop reason: HOSPADM

## 2021-06-23 RX ORDER — SODIUM CHLORIDE 9 MG/ML
1000 INJECTION, SOLUTION INTRAVENOUS CONTINUOUS
Status: DISCONTINUED | OUTPATIENT
Start: 2021-06-23 | End: 2021-06-24 | Stop reason: HOSPADM

## 2021-06-23 RX ORDER — NALTREXONE HYDROCHLORIDE 50 MG/1
50 TABLET, FILM COATED ORAL DAILY
COMMUNITY

## 2021-06-23 RX ORDER — OMEPRAZOLE 40 MG/1
40 CAPSULE, DELAYED RELEASE ORAL
COMMUNITY

## 2021-06-23 RX ORDER — QUETIAPINE FUMARATE 100 MG/1
100 TABLET, FILM COATED ORAL 3 TIMES DAILY
Status: DISCONTINUED | OUTPATIENT
Start: 2021-06-23 | End: 2021-06-23

## 2021-06-23 RX ORDER — SODIUM CHLORIDE 0.9 % (FLUSH) 0.9 %
5-40 SYRINGE (ML) INJECTION EVERY 12 HOURS SCHEDULED
Status: DISCONTINUED | OUTPATIENT
Start: 2021-06-23 | End: 2021-06-24 | Stop reason: HOSPADM

## 2021-06-23 RX ORDER — QUETIAPINE FUMARATE 200 MG/1
400 TABLET, FILM COATED ORAL NIGHTLY
Status: DISCONTINUED | OUTPATIENT
Start: 2021-06-23 | End: 2021-06-24 | Stop reason: HOSPADM

## 2021-06-23 RX ORDER — LORAZEPAM 1 MG/1
1 TABLET ORAL
Status: DISCONTINUED | OUTPATIENT
Start: 2021-06-23 | End: 2021-06-24 | Stop reason: HOSPADM

## 2021-06-23 RX ORDER — SODIUM CHLORIDE 0.9 % (FLUSH) 0.9 %
10 SYRINGE (ML) INJECTION PRN
Status: DISCONTINUED | OUTPATIENT
Start: 2021-06-23 | End: 2021-06-24 | Stop reason: HOSPADM

## 2021-06-23 RX ORDER — POLYETHYLENE GLYCOL 3350 17 G/17G
17 POWDER, FOR SOLUTION ORAL DAILY PRN
Status: DISCONTINUED | OUTPATIENT
Start: 2021-06-23 | End: 2021-06-24 | Stop reason: HOSPADM

## 2021-06-23 RX ORDER — ONDANSETRON 2 MG/ML
4 INJECTION INTRAMUSCULAR; INTRAVENOUS EVERY 6 HOURS PRN
Status: DISCONTINUED | OUTPATIENT
Start: 2021-06-23 | End: 2021-06-24 | Stop reason: HOSPADM

## 2021-06-23 RX ORDER — SODIUM CHLORIDE 0.9 % (FLUSH) 0.9 %
5-40 SYRINGE (ML) INJECTION PRN
Status: DISCONTINUED | OUTPATIENT
Start: 2021-06-23 | End: 2021-06-24 | Stop reason: HOSPADM

## 2021-06-23 RX ORDER — PRAZOSIN HYDROCHLORIDE 1 MG/1
1 CAPSULE ORAL 2 TIMES DAILY
COMMUNITY

## 2021-06-23 RX ORDER — PRAZOSIN HYDROCHLORIDE 1 MG/1
1 CAPSULE ORAL 2 TIMES DAILY
Status: DISCONTINUED | OUTPATIENT
Start: 2021-06-23 | End: 2021-06-24 | Stop reason: HOSPADM

## 2021-06-23 RX ADMIN — ENOXAPARIN SODIUM 40 MG: 40 INJECTION SUBCUTANEOUS at 17:43

## 2021-06-23 RX ADMIN — LORAZEPAM 1 MG: 1 TABLET ORAL at 17:54

## 2021-06-23 RX ADMIN — THIAMINE HCL TAB 100 MG 100 MG: 100 TAB at 17:41

## 2021-06-23 RX ADMIN — SODIUM CHLORIDE 1000 ML: 9 INJECTION, SOLUTION INTRAVENOUS at 14:35

## 2021-06-23 RX ADMIN — IOPAMIDOL 75 ML: 755 INJECTION, SOLUTION INTRAVENOUS at 12:23

## 2021-06-23 RX ADMIN — FOLIC ACID 1 MG: 1 TABLET ORAL at 17:41

## 2021-06-23 RX ADMIN — QUETIAPINE FUMARATE 100 MG: 100 TABLET ORAL at 17:41

## 2021-06-23 RX ADMIN — SODIUM CHLORIDE 80 ML: 9 INJECTION, SOLUTION INTRAVENOUS at 12:23

## 2021-06-23 RX ADMIN — MULTIPLE VITAMINS W/ MINERALS TAB 1 TABLET: TAB at 17:41

## 2021-06-23 RX ADMIN — SODIUM CHLORIDE, PRESERVATIVE FREE 10 ML: 5 INJECTION INTRAVENOUS at 12:23

## 2021-06-23 ASSESSMENT — ENCOUNTER SYMPTOMS
ABDOMINAL DISTENTION: 1
CONSTIPATION: 0
ABDOMINAL PAIN: 0
COLOR CHANGE: 0
SORE THROAT: 1
VOMITING: 0
EYE PAIN: 0
BACK PAIN: 0
RHINORRHEA: 0
DIARRHEA: 0
COUGH: 0
FACIAL SWELLING: 0
NAUSEA: 0
EYE REDNESS: 0
SHORTNESS OF BREATH: 0

## 2021-06-23 ASSESSMENT — PAIN SCALES - GENERAL: PAINLEVEL_OUTOF10: 0

## 2021-06-23 NOTE — PROGRESS NOTES
Medication History completed:    New medications: sertraline, prazosin, omeprazole, naltrexone    Medications discontinued: thiamine, nicotine patches, hydroxyurea, folic acid    Changes to dosing:   Hydroxyzine changed to 50 mg every 6 hours as needed    Stated allergies: NKDA    Other pertinent information: Medications confirmed with [employer].      Thank you,  Rola Momin, PharmD, BCPS  [phone]

## 2021-06-23 NOTE — H&P
[address]      [address] [facility]     HISTORY AND PHYSICAL EXAMINATION            Date:   6/24/2021  Patient name:  Macie Euceda  Date of admission:  6/23/2021 10:49 AM  MRN:   [id number]  Account:  [de-identified]  YOB: 1960  PCP:    SIMONE Gamble CNP  Room:   [facility]  Code Status:    Full Code    Chief Complaint:     Chief Complaint   Patient presents with    Alcohol Intoxication       History Obtained From:     patient    History of Present Illness: The patient is a 62 y.o. Non-/non  male with a past medical history of chronic alcoholism (15 cans of beer per day for the past 5-6 years), myeloproliferative disorder with JAK2 mutation and depression who is presenting with sore throat, lack of appetite and dizziness. Patient states he has not had any food or water for the past 8 days and has only been drinking 15 cans of beer per day. Also dizzier than usual and he is having a hard time getting words out. He does state he is unsteady when walking, and is complaining of a tremor in bilateral upper extremities. Denies any nausea, vomiting, chest pain, shortness of breath, abdominal pain, changes in urinary or bowel habits, numbness, tingling. Patient is alert and oriented x4. Was recently admitted at [facility] at the beginning of this month for hyponatremia. Patient states he is interested in quitting alcohol, has taken Naltrexone in the past.     In the ED, Ethanol level 64, UDS positive for Cannabinoids and Methadone, Sodium 126, Chloride 90, Alk Phos 135, ALT 40 and AST 69. Patient will be admitted for management of Hyponatremia.      Past Medical History:     Past Medical History:   Diagnosis Date    Alcohol abuse 8/12/2019    Anemia     Cancer ([address]     blood    Cervical stenosis of spine 6/14/2019    Chronic left-sided low back pain with left-sided sciatica 3/14/2019    Insomnia 3/14/2019    Intentional drug overdose ([facility] 8/11/2019    Left arm numbness 6/14/2019    Left lumbar radiculitis 5/3/2019    Macrocytosis     Major depressive disorder, recurrent severe without psychotic features ([facility] 8/12/2019    Myeloproliferative disorder ([facility]     Osteoarthritis of spine with radiculopathy, lumbar region 4/12/2019    Severe recurrent major depression without psychotic features ([facility] 8/12/2019    Spondylosis of lumbar region without myelopathy or radiculopathy 5/3/2019        Past SurgicalHistory:     Past Surgical History:   Procedure Laterality Date    APPENDECTOMY      CARPAL TUNNEL RELEASE      bilateral     CERVICAL FUSION      s/p trampoline accident   [address]  09/05/2019     ANTERIOR CERVICAL DECOMPRESSION FUSION C3-4     CERVICAL FUSION N/A 9/5/2019    ANTERIOR CERVICAL DECOMPRESSION FUSION C3-4 performed by Felisha Zhou DO at [facility], COLON, DIAGNOSTIC      EPIDURAL STEROID INJECTION Left 5/15/2019    EPIDURAL STEROID INJECTION LEFT L5S1 performed by Marc Beckman MD at [address] [facility]          Medications Prior to Admission:        Prior to Admission medications    Medication Sig Start Date End Date Taking?  Authorizing Provider   naltrexone (DEPADE) 50 MG tablet Take 50 mg by mouth daily   Yes Historical Provider, MD   prazosin (MINIPRESS) 1 MG capsule Take 1 mg by mouth 2 times daily   Yes Historical Provider, MD   sertraline (ZOLOFT) 100 MG tablet Take 100 mg by mouth daily   Yes Historical Provider, MD   omeprazole (PRILOSEC) 40 MG delayed release capsule Take 40 mg by mouth Daily with supper   Yes Historical Provider, MD   Multiple Vitamins-Minerals (THERAPEUTIC MULTIVITAMIN-MINERALS) tablet Take 1 tablet by mouth daily 6/3/21 6/3/22 Yes Lianne Ryan MD   hydrOXYzine (ATARAX) 50 MG tablet Take 50 mg by mouth every 6 hours as needed  4/13/21  Yes Historical Provider, MD   QUEtiapine (SEROQUEL) 100 MG tablet Take 100 mg by mouth 3 times daily as needed 21  Yes Historical Provider, MD   QUEtiapine (SEROQUEL) 400 MG tablet Take 400 mg by mouth nightly 21  Yes Historical Provider, MD        Allergies:     Patient has no known allergies. Social History:     Tobacco:    reports that he has been smoking cigarettes. He started smoking about 42 years ago. He has a 40.00 pack-year smoking history. He has never used smokeless tobacco.  Alcohol:      reports current alcohol use of about 14.0 standard drinks of alcohol per week. Drug Use:  reports previous drug use. Drug: Marijuana. Family History:     Family History   Problem Relation Age of Onset   [facility] Breast Cancer Mother         s/p surgical complications    Diabetes Mother     Heart Failure Father     Other Brother         \"bad back\"       Review of Systems:     Positive and Negative as described in HPI. Review of Systems   Constitutional: Positive for appetite change and fatigue. Negative for fever and unexpected weight change. HENT: Negative for congestion. Eyes: Negative for visual disturbance. Respiratory: Negative for shortness of breath. Cardiovascular: Negative for chest pain. Gastrointestinal: Positive for abdominal distention. Negative for abdominal pain, constipation, diarrhea, nausea and vomiting. Endocrine: Negative for polyuria. Genitourinary: Negative for difficulty urinating. Skin: Negative for rash. Neurological: Positive for dizziness, tremors and weakness. Negative for numbness. All other systems reviewed and are negative. Physical Exam:   /80   Pulse 86   Temp 97.6 °F (36.4 °C) (Oral)   Resp 18   Ht 6' 1\" (1.854 m)   Wt 211 lb 13.8 oz (96.1 kg)   SpO2 96%   BMI 27.95 kg/m²   Temp (24hrs), Av.9 °F (36.6 °C), Min:97.6 °F (36.4 °C), Max:98.2 °F (36.8 °C)    No results for input(s): POCGLU in the last 72 hours.     Intake/Output Summary (Last 24 hours) at 6/24/2021 0923  Last data filed at 6/24/2021 0557  Gross per 24 hour   Intake 564 ml   Output --   Net 564 ml       Physical Exam  Vitals reviewed. Constitutional:       General: He is awake. He is not in acute distress. Appearance: He is overweight. He is not ill-appearing. HENT:      Head: Normocephalic. Eyes:      General: Scleral icterus present. Comments: Pupils dilated, reactive to light   Cardiovascular:      Rate and Rhythm: Regular rhythm. Tachycardia present. Heart sounds: Normal heart sounds. Pulmonary:      Effort: Pulmonary effort is normal.      Breath sounds: Normal breath sounds and air entry. Abdominal:      General: Bowel sounds are decreased. There is distension. Palpations: Abdomen is soft. Tenderness: There is no abdominal tenderness. Musculoskeletal:      Cervical back: Neck supple. Right lower leg: No edema. Left lower leg: No edema. Skin:     Findings: No rash. Neurological:      Mental Status: He is easily aroused. He is lethargic. Cranial Nerves: Cranial nerves are intact. Sensory: Sensation is intact. Coordination: Finger-Nose-Finger Test abnormal.      Gait: Gait abnormal.      Comments: Abnormal 3 word recall, recalled 1 word   Psychiatric:         Behavior: Behavior is cooperative.        Investigations:     Laboratory Testing:  Recent Results (from the past 24 hour(s))   CBC Auto Differential    Collection Time: 06/23/21 11:31 AM   Result Value Ref Range    WBC 7.8 3.5 - 11.0 k/uL    RBC 4.53 4.5 - 5.9 m/uL    Hemoglobin 14.3 13.5 - 17.5 g/dL    Hematocrit 41.1 41 - 53 %    MCV 90.8 80 - 100 fL    MCH 31.5 26 - 34 pg    MCHC 34.7 31 - 37 g/dL    RDW 16.3 (H) 11.5 - 14.9 %    Platelets 339 020 - 576 k/uL    MPV 8.5 6.0 - 12.0 fL    NRBC Automated NOT REPORTED per 100 WBC    Differential Type NOT REPORTED     Immature Granulocytes NOT REPORTED 0 %    Absolute Immature Granulocyte NOT REPORTED 0.00 - 0.30 k/uL WBC Morphology NOT REPORTED     RBC Morphology NOT REPORTED     Platelet Estimate NOT REPORTED     Seg Neutrophils 78 (H) 36 - 66 %    Lymphocytes 10 (L) 24 - 44 %    Monocytes 10 (H) 1 - 7 %    Eosinophils % 1 0 - 4 %    Basophils 1 0 - 2 %    Segs Absolute 6.08 1.3 - 9.1 k/uL    Absolute Lymph # 0.78 (L) 1.0 - 4.8 k/uL    Absolute Mono # 0.78 0.1 - 1.3 k/uL    Absolute Eos # 0.08 0.0 - 0.4 k/uL    Basophils Absolute 0.08 0.0 - 0.2 k/uL    Morphology ANISOCYTOSIS PRESENT     Morphology FEW GIANT PLATELETS    Comprehensive Metabolic Panel w/ Reflex to MG    Collection Time: 06/23/21 11:31 AM   Result Value Ref Range    Glucose 113 (H) 70 - 99 mg/dL    BUN 5 (L) 6 - 20 mg/dL    CREATININE 0.61 (L) 0.70 - 1.20 mg/dL    Bun/Cre Ratio NOT REPORTED 9 - 20    Calcium 8.4 (L) 8.6 - 10.4 mg/dL    Sodium 126 (L) 135 - 144 mmol/L    Potassium 4.3 3.7 - 5.3 mmol/L    Chloride 90 (L) 98 - 107 mmol/L    CO2 17 (L) 20 - 31 mmol/L    Anion Gap 19 (H) 9 - 17 mmol/L    Alkaline Phosphatase 135 (H) 40 - 129 U/L    ALT 40 5 - 41 U/L    AST 69 (H) <40 U/L    Total Bilirubin 0.64 0.3 - 1.2 mg/dL    Total Protein 7.4 6.4 - 8.3 g/dL    Albumin 4.2 3.5 - 5.2 g/dL    Albumin/Globulin Ratio NOT REPORTED 1.0 - 2.5    GFR Non-African American >60 >60 mL/min    GFR African American >60 >60 mL/min    GFR Comment          GFR Staging NOT REPORTED    Ethanol Alcohol    Collection Time: 06/23/21 11:31 AM   Result Value Ref Range    Ethanol 64 (H) <10 mg/dL    Ethanol percent 0.064 %   Urinalysis, reflex to microscopic    Collection Time: 06/23/21  1:03 PM   Result Value Ref Range    Color, UA DARK YELLOW (A) YELLOW    Turbidity UA CLEAR CLEAR    Glucose, Ur NEGATIVE NEGATIVE    Bilirubin Urine NEGATIVE NEGATIVE    Ketones, Urine NEGATIVE NEGATIVE    Specific [place], UA 1.016 1.000 - 1.030    Urine Hgb TRACE (A) NEGATIVE    pH, UA 6.0 5.0 - 8.0    Protein, UA NEGATIVE NEGATIVE    Urobilinogen, Urine Normal Normal    Nitrite, Urine NEGATIVE NEGATIVE Sodium,Ur <20 mmol/L   SODIUM    Collection Time: 06/23/21 11:28 PM   Result Value Ref Range    Sodium 130 (L) 135 - 144 mmol/L   Basic Metabolic Panel w/ Reflex to MG    Collection Time: 06/24/21  6:23 AM   Result Value Ref Range    Glucose 103 (H) 70 - 99 mg/dL    BUN 7 6 - 20 mg/dL    CREATININE 0.59 (L) 0.70 - 1.20 mg/dL    Bun/Cre Ratio NOT REPORTED 9 - 20    Calcium 8.1 (L) 8.6 - 10.4 mg/dL    Sodium 132 (L) 135 - 144 mmol/L    Potassium 4.1 3.7 - 5.3 mmol/L    Chloride 98 98 - 107 mmol/L    CO2 23 20 - 31 mmol/L    Anion Gap 11 9 - 17 mmol/L    GFR Non-African American >60 >60 mL/min    GFR African American >60 >60 mL/min    GFR Comment          GFR Staging NOT REPORTED    CBC auto differential    Collection Time: 06/24/21  6:23 AM   Result Value Ref Range    WBC 7.7 3.5 - 11.0 k/uL    RBC 4.13 (L) 4.5 - 5.9 m/uL    Hemoglobin 12.7 (L) 13.5 - 17.5 g/dL    Hematocrit 37.2 (L) 41 - 53 %    MCV 89.9 80 - 100 fL    MCH 30.7 26 - 34 pg    MCHC 34.1 31 - 37 g/dL    RDW 16.0 (H) 11.5 - 14.9 %    Platelets 251 (L) 111 - 450 k/uL    MPV 9.1 6.0 - 12.0 fL    NRBC Automated NOT REPORTED per 100 WBC    Differential Type NOT REPORTED     Seg Neutrophils 71 (H) 36 - 66 %    Lymphocytes 13 (L) 24 - 44 %    Monocytes 13 (H) 1 - 7 %    Eosinophils % 2 0 - 4 %    Basophils 1 0 - 2 %    Immature Granulocytes NOT REPORTED 0 %    Segs Absolute 5.60 1.3 - 9.1 k/uL    Absolute Lymph # 1.00 1.0 - 4.8 k/uL    Absolute Mono # 1.00 0.1 - 1.3 k/uL    Absolute Eos # 0.10 0.0 - 0.4 k/uL    Basophils Absolute 0.10 0.0 - 0.2 k/uL    Absolute Immature Granulocyte NOT REPORTED 0.00 - 0.30 k/uL    WBC Morphology NOT REPORTED     RBC Morphology NOT REPORTED     Platelet Estimate NOT REPORTED        Imaging/Diagnostics:  CT HEAD WO CONTRAST    Result Date: 6/2/2021  EXAMINATION: CT OF THE HEAD WITHOUT CONTRAST  6/2/2021 12:51 pm TECHNIQUE: CT of the head was performed without the administration of intravenous contrast. Dose modulation, iterative reconstruction, and/or weight based adjustment of the mA/kV was utilized to reduce the radiation dose to as low as reasonably achievable. COMPARISON: None. HISTORY: ORDERING SYSTEM PROVIDED HISTORY: Cough and generalized weakness with occasional confusion TECHNOLOGIST PROVIDED HISTORY: Cough and generalized weakness with occasional confusion Decision Support Exception - unselect if not a suspected or confirmed emergency medical condition->Emergency Medical Condition (MA) Reason for Exam: cough and generalized weakness with occasional confusion FINDINGS: BRAIN/VENTRICLES: There is no acute intracranial hemorrhage, mass effect or midline shift. No abnormal extra-axial fluid collection. The gray-white differentiation is maintained without evidence of an acute infarct. There is no evidence of hydrocephalus. ORBITS: The visualized portion of the orbits demonstrate no acute abnormality. SINUSES: Mucosal thickening and mucous retention cyst in the right maxillary sinus. The mastoids are clear SOFT TISSUES/SKULL:  No acute abnormality of the visualized skull or soft tissues. No acute intracranial abnormality. CT SOFT TISSUE NECK W CONTRAST    Result Date: 6/23/2021  EXAMINATION: CT OF THE NECK SOFT TISSUE WITH CONTRAST  6/23/2021 TECHNIQUE: CT of the neck was performed with the administration of intravenous contrast. Multiplanar reformatted images are provided for review. Dose modulation, iterative reconstruction, and/or weight based adjustment of the mA/kV was utilized to reduce the radiation dose to as low as reasonably achievable.  COMPARISON: 06/02/2021 HISTORY: ORDERING SYSTEM PROVIDED HISTORY: sore throat TECHNOLOGIST PROVIDED HISTORY: sore throat Decision Support Exception - unselect if not a suspected or confirmed emergency medical condition->Emergency Medical Condition (MA) Reason for Exam: sore throat, pt states he has a hard time talking sometimes, pt is going thru detox of alcohol Acuity: Acute Type of Exam: Initial FINDINGS: PHARYNX/LARYNX:  The parapharyngeal fat spaces are preserved. Collateral vessels are noted along the left side of the pharynx. The base of the tongue is unremarkable. The epiglottis is normal in appearance. No pharyngeal or laryngeal mass is identified. SALIVARY GLANDS/THYROID:  The thyroid gland is normal in appearance. The submandibular and parotid glands are unremarkable. LYMPH NODES:  No cervical or supraclavicular adenopathy. SOFT TISSUES:  Vascular calcifications are noted in the proximal left subclavian artery with an estimated 50% stenosis. There are also vascular calcifications noted in the proximal internal carotid arteries bilaterally. No appreciable soft tissue swelling is identified. BRAIN/ORBITS/SINUSES:  Limited images through the cerebral and cerebellar parenchyma are unremarkable. The orbits are normal in appearance. There is scattered paranasal sinus disease with greatest involvement in the right maxillary sinus where there are associated mucous retention cysts. The mastoid air cells are clear. LUNG APICES/SUPERIOR MEDIASTINUM:  There is centrilobular emphysema. Limited images through the lung apices are otherwise unremarkable. No superior mediastinal adenopathy. BONES:  The patient is edentulous. Multifocal degenerative changes are noted in the spine. There is sequela of an anterior C3-C4 fusion. No evidence of an acute fracture. 1. No findings to account for the patient's sore throat. 2. No cervical lymphadenopathy. 3. Atherosclerotic disease, most pronounced in the proximal left subclavian artery and bilateral carotid bulb/proximal internal carotid arteries. 4. Centrilobular emphysema.      CT CERVICAL SPINE WO CONTRAST    Result Date: 6/2/2021  EXAMINATION: CT OF THE CERVICAL SPINE WITHOUT CONTRAST 6/2/2021 12:51 pm TECHNIQUE: CT of the cervical spine was performed without the administration of intravenous contrast. Multiplanar reformatted images are provided for review. Dose modulation, iterative reconstruction, and/or weight based adjustment of the mA/kV was utilized to reduce the radiation dose to as low as reasonably achievable. COMPARISON: 10/25/2019 HISTORY: ORDERING SYSTEM PROVIDED HISTORY: Generalized weakness and confusion TECHNOLOGIST PROVIDED HISTORY: Generalized weakness and confusion Decision Support Exception - unselect if not a suspected or confirmed emergency medical condition->Emergency Medical Condition (MA) Reason for Exam: generalized weakness and confusion FINDINGS: BONES/ALIGNMENT: There is no acute fracture or traumatic malalignment. Status post C3-4 anterior fusion. No hardware complications. DEGENERATIVE CHANGES: No significant degenerative changes. SOFT TISSUES: There is no prevertebral soft tissue swelling. No acute abnormality of the cervical spine. US LIVER    Result Date: 6/2/2021  EXAMINATION: RIGHT UPPER QUADRANT ULTRASOUND 6/2/2021 7:50 pm COMPARISON: None. HISTORY: ORDERING SYSTEM PROVIDED HISTORY: cirrhosis ? TECHNOLOGIST PROVIDED HISTORY: cirrhosis ? myeloproliferative neoplasm, alcohol abuse, substance abuse, pain left upper quadrant, decreased appetite FINDINGS: LIVER:  The liver demonstrates diffuse abnormal increased, heterogeneous echogenicity without discrete lesion demonstrated. The liver appears enlarged, 21 cm long dimension. No evidence of intrahepatic biliary ductal dilatation. Normal appearing hepatopetal vascular flow documented at the main pulmonary vein. BILIARY SYSTEM:  Gallbladder is unremarkable without evidence of pericholecystic fluid, wall thickening or stones. Negative sonographic Daugherty's sign. Common bile duct is within normal limits measuring 4 mm. RIGHT KIDNEY: The right kidney is grossly unremarkable without evidence of hydronephrosis. Right kidney long dimension 11.9 cm. PANCREAS:  Visualized portions of the pancreas are unremarkable.  OTHER: No evidence of right upper quadrant BONES/SOFT TISSUES: The bone marrow signal intensity appears normal. The soft tissues demonstrate no acute abnormality. Chronic microvascular disease without acute intracranial abnormality. Chronic sinusitis primarily involving the right maxillary sinus. Assessment :      Primary Problem  Hyponatremia    Active Hospital Problems    Diagnosis Date Noted    Hyponatremia [E87.1] 06/02/2021    Alcohol abuse [F10.10] 08/12/2019    Major depressive disorder, recurrent severe without psychotic features ([facility] [F33.2] 08/12/2019    MPN (myeloproliferative neoplasm) ([facility] [D47.1]        Plan:     Patient status Admit as inpatient in the  Med/Surge    Hyponatremia 2/2 Beer Pot[place]ia  - Sodium 126  - Check sodium every 6 hours  - Fluid restriction to 1200 mL  - Serum Osmolality, Urine Osm, Urine Sodium  - Sodium should not be corrected more than 4-6 in a 24 hours period    Chronic Alcoholism  - Hemoglobin within normal limits, normal MCV  - Thiamine, Folate, Multivitamin  - Will get Ammonia  - CIWA protocol in place    Depression  - Zoloft 100 mg daily  - Seroquel 400 mg nightly  - Seroquel 100 mg daily    DVT Prophylaxis: SC Lovenox 40 mg daily    Consultations:   IP CONSULT TO INTERNAL MEDICINE  IP CONSULT TO SOCIAL WORK  IP CONSULT TO SOCIAL WORK    Patient is admitted as inpatient status because of co-morbiditieslisted above, severity of signs and symptoms as outlined, requirement for current medical therapies and most importantly because of direct risk to patient if care not provided in a hospital setting. Desean Dong MD  6/24/2021  9:23 AM    Copy sent to SIMONE Christiansen - CNP      Attending Physician Statement  I have discussed the care of Maria Dolores Powell with the resident team. I have examined the patient myself and taken ros and hpi , including pertinent history and exam findings,  with the resident. I have reviewed the key elements of all parts of the encounter with the resident. I agree with the assessment, plan and orders as documented by the resident. Principal Problem:    Hyponatremia  Active Problems:    MPN (myeloproliferative neoplasm) (HCC)    Alcohol abuse    Major depressive disorder, recurrent severe without psychotic features ([facility]  Resolved Problems:    * No resolved hospital problems. *    Admitted with alcohol intoxication, severe hyponatremia  Correcting at acceptable rate  Has been drinking 15drinks per day, and no other PO intake last 8 days  Check TSH, phos, Mag, and replace as needed.        Electronically signed by Julio Cesar Cole MD

## 2021-06-23 NOTE — Clinical Note
Patient Class: Inpatient [101]   REQUIRED: Diagnosis: Hyponatremia [359794]   Estimated Length of Stay: Estimated stay of more than 2 midnights   Admitting Provider: Mary Fenton [[id number]]

## 2021-06-23 NOTE — ED NOTES
Patient arrived to the ED today seeking alcohol detox. Patient states he drinks about 15 beers a day. Patient states \"I just woke up today and made up my mind to get help. \" Patient reports daily alcohol use for the last two years. Patient denies suicidal and homicidal ideations.

## 2021-06-24 VITALS
OXYGEN SATURATION: 96 % | BODY MASS INDEX: 28.08 KG/M2 | DIASTOLIC BLOOD PRESSURE: 89 MMHG | HEIGHT: 73 IN | SYSTOLIC BLOOD PRESSURE: 142 MMHG | WEIGHT: 211.86 LBS | RESPIRATION RATE: 18 BRPM | TEMPERATURE: 97.9 F | HEART RATE: 82 BPM

## 2021-06-24 LAB
ABSOLUTE EOS #: 0.1 K/UL (ref 0–0.4)
ABSOLUTE IMMATURE GRANULOCYTE: ABNORMAL K/UL (ref 0–0.3)
ABSOLUTE LYMPH #: 1 K/UL (ref 1–4.8)
ABSOLUTE MONO #: 1 K/UL (ref 0.1–1.3)
ANION GAP SERPL CALCULATED.3IONS-SCNC: 11 MMOL/L (ref 9–17)
BASOPHILS # BLD: 1 % (ref 0–2)
BASOPHILS ABSOLUTE: 0.1 K/UL (ref 0–0.2)
BUN BLDV-MCNC: 7 MG/DL (ref 6–20)
BUN/CREAT BLD: ABNORMAL (ref 9–20)
CALCIUM SERPL-MCNC: 8.1 MG/DL (ref 8.6–10.4)
CHLORIDE BLD-SCNC: 98 MMOL/L (ref 98–107)
CO2: 23 MMOL/L (ref 20–31)
CORTISOL COLLECTION INFO: NORMAL
CORTISOL: 10.7 UG/DL (ref 2.7–18.4)
CREAT SERPL-MCNC: 0.59 MG/DL (ref 0.7–1.2)
DIFFERENTIAL TYPE: ABNORMAL
EOSINOPHILS RELATIVE PERCENT: 2 % (ref 0–4)
GFR AFRICAN AMERICAN: >60 ML/MIN
GFR NON-AFRICAN AMERICAN: >60 ML/MIN
GFR SERPL CREATININE-BSD FRML MDRD: ABNORMAL ML/MIN/{1.73_M2}
GFR SERPL CREATININE-BSD FRML MDRD: ABNORMAL ML/MIN/{1.73_M2}
GLUCOSE BLD-MCNC: 103 MG/DL (ref 70–99)
HCT VFR BLD CALC: 37.2 % (ref 41–53)
HEMOGLOBIN: 12.7 G/DL (ref 13.5–17.5)
IMMATURE GRANULOCYTES: ABNORMAL %
LYMPHOCYTES # BLD: 13 % (ref 24–44)
MAGNESIUM: 2.5 MG/DL (ref 1.6–2.6)
MCH RBC QN AUTO: 30.7 PG (ref 26–34)
MCHC RBC AUTO-ENTMCNC: 34.1 G/DL (ref 31–37)
MCV RBC AUTO: 89.9 FL (ref 80–100)
MONOCYTES # BLD: 13 % (ref 1–7)
NRBC AUTOMATED: ABNORMAL PER 100 WBC
PDW BLD-RTO: 16 % (ref 11.5–14.9)
PHOSPHORUS: 3.1 MG/DL (ref 2.5–4.5)
PLATELET # BLD: 131 K/UL (ref 150–450)
PLATELET ESTIMATE: ABNORMAL
PMV BLD AUTO: 9.1 FL (ref 6–12)
POTASSIUM SERPL-SCNC: 4.1 MMOL/L (ref 3.7–5.3)
RBC # BLD: 4.13 M/UL (ref 4.5–5.9)
RBC # BLD: ABNORMAL 10*6/UL
SEG NEUTROPHILS: 71 % (ref 36–66)
SEGMENTED NEUTROPHILS ABSOLUTE COUNT: 5.6 K/UL (ref 1.3–9.1)
SODIUM BLD-SCNC: 131 MMOL/L (ref 135–144)
SODIUM BLD-SCNC: 132 MMOL/L (ref 135–144)
SODIUM BLD-SCNC: 132 MMOL/L (ref 135–144)
THYROXINE, FREE: 0.55 NG/DL (ref 0.93–1.7)
TSH SERPL DL<=0.05 MIU/L-ACNC: 6.53 MIU/L (ref 0.3–5)
WBC # BLD: 7.7 K/UL (ref 3.5–11)
WBC # BLD: ABNORMAL 10*3/UL

## 2021-06-24 PROCEDURE — 84100 ASSAY OF PHOSPHORUS: CPT

## 2021-06-24 PROCEDURE — 2580000003 HC RX 258: Performed by: STUDENT IN AN ORGANIZED HEALTH CARE EDUCATION/TRAINING PROGRAM

## 2021-06-24 PROCEDURE — 82533 TOTAL CORTISOL: CPT

## 2021-06-24 PROCEDURE — 84439 ASSAY OF FREE THYROXINE: CPT

## 2021-06-24 PROCEDURE — 6370000000 HC RX 637 (ALT 250 FOR IP): Performed by: STUDENT IN AN ORGANIZED HEALTH CARE EDUCATION/TRAINING PROGRAM

## 2021-06-24 PROCEDURE — G0378 HOSPITAL OBSERVATION PER HR: HCPCS

## 2021-06-24 PROCEDURE — 97165 OT EVAL LOW COMPLEX 30 MIN: CPT

## 2021-06-24 PROCEDURE — 97530 THERAPEUTIC ACTIVITIES: CPT

## 2021-06-24 PROCEDURE — 83735 ASSAY OF MAGNESIUM: CPT

## 2021-06-24 PROCEDURE — 97161 PT EVAL LOW COMPLEX 20 MIN: CPT

## 2021-06-24 PROCEDURE — 84295 ASSAY OF SERUM SODIUM: CPT

## 2021-06-24 PROCEDURE — 96372 THER/PROPH/DIAG INJ SC/IM: CPT

## 2021-06-24 PROCEDURE — 85025 COMPLETE CBC W/AUTO DIFF WBC: CPT

## 2021-06-24 PROCEDURE — 6370000000 HC RX 637 (ALT 250 FOR IP): Performed by: NURSE PRACTITIONER

## 2021-06-24 PROCEDURE — 80048 BASIC METABOLIC PNL TOTAL CA: CPT

## 2021-06-24 PROCEDURE — 84443 ASSAY THYROID STIM HORMONE: CPT

## 2021-06-24 PROCEDURE — 36415 COLL VENOUS BLD VENIPUNCTURE: CPT

## 2021-06-24 PROCEDURE — 6360000002 HC RX W HCPCS: Performed by: STUDENT IN AN ORGANIZED HEALTH CARE EDUCATION/TRAINING PROGRAM

## 2021-06-24 RX ORDER — FOLIC ACID 1 MG/1
1 TABLET ORAL DAILY
Qty: 30 TABLET | Refills: 3 | Status: SHIPPED | OUTPATIENT
Start: 2021-06-24

## 2021-06-24 RX ORDER — THIAMINE MONONITRATE (VIT B1) 100 MG
100 TABLET ORAL DAILY
Qty: 30 TABLET | Refills: 0 | Status: SHIPPED | OUTPATIENT
Start: 2021-06-24 | End: 2022-04-21

## 2021-06-24 RX ORDER — LEVOTHYROXINE SODIUM 0.05 MG/1
50 TABLET ORAL DAILY
Qty: 30 TABLET | Refills: 1 | Status: SHIPPED | OUTPATIENT
Start: 2021-06-24

## 2021-06-24 RX ORDER — NICOTINE 21 MG/24HR
1 PATCH, TRANSDERMAL 24 HOURS TRANSDERMAL EVERY 24 HOURS
Qty: 30 PATCH | Refills: 1 | Status: SHIPPED | OUTPATIENT
Start: 2021-06-24 | End: 2022-07-25

## 2021-06-24 RX ADMIN — QUETIAPINE FUMARATE 100 MG: 100 TABLET ORAL at 11:57

## 2021-06-24 RX ADMIN — FOLIC ACID 1 MG: 1 TABLET ORAL at 09:31

## 2021-06-24 RX ADMIN — THIAMINE HCL TAB 100 MG 100 MG: 100 TAB at 09:31

## 2021-06-24 RX ADMIN — QUETIAPINE FUMARATE 100 MG: 100 TABLET ORAL at 16:30

## 2021-06-24 RX ADMIN — PANTOPRAZOLE SODIUM 40 MG: 40 TABLET, DELAYED RELEASE ORAL at 05:57

## 2021-06-24 RX ADMIN — SODIUM CHLORIDE, PRESERVATIVE FREE 5 ML: 5 INJECTION INTRAVENOUS at 00:41

## 2021-06-24 RX ADMIN — ENOXAPARIN SODIUM 40 MG: 40 INJECTION SUBCUTANEOUS at 09:31

## 2021-06-24 RX ADMIN — BENZOCAINE AND MENTHOL 1 LOZENGE: 15; 3.6 LOZENGE ORAL at 09:33

## 2021-06-24 RX ADMIN — QUETIAPINE FUMARATE 400 MG: 200 TABLET ORAL at 00:40

## 2021-06-24 RX ADMIN — PRAZOSIN HYDROCHLORIDE 1 MG: 1 CAPSULE ORAL at 09:33

## 2021-06-24 RX ADMIN — SODIUM CHLORIDE 1000 ML: 9 INJECTION, SOLUTION INTRAVENOUS at 00:40

## 2021-06-24 RX ADMIN — LORAZEPAM 1 MG: 1 TABLET ORAL at 13:59

## 2021-06-24 RX ADMIN — BENZOCAINE AND MENTHOL 1 LOZENGE: 15; 3.6 LOZENGE ORAL at 14:01

## 2021-06-24 RX ADMIN — MULTIPLE VITAMINS W/ MINERALS TAB 1 TABLET: TAB at 09:31

## 2021-06-24 RX ADMIN — PRAZOSIN HYDROCHLORIDE 1 MG: 1 CAPSULE ORAL at 00:40

## 2021-06-24 RX ADMIN — LORAZEPAM 1 MG: 1 TABLET ORAL at 09:31

## 2021-06-24 ASSESSMENT — ENCOUNTER SYMPTOMS
DIARRHEA: 0
ABDOMINAL PAIN: 0
CONSTIPATION: 0
SHORTNESS OF BREATH: 0

## 2021-06-24 NOTE — CARE COORDINATION
Discharge instructions reviewed with pt, follow-ups ,lab draws and new medications also to follow -up with alcohol rehab.

## 2021-06-24 NOTE — PROGRESS NOTES
[address] [facility].    PROGRESS NOTE             6/24/2021    9:22 AM    Name:   Maria Dolores Powell  MRN:     [id number]     Acct:      [de-identified]   Room:   2068/2068-01  IP Day:  1  Admit Date:  6/23/2021 10:49 AM    PCP:  SIMONE Camarillo CNP  Code Status:  Full Code    Subjective:     C/C:   Chief Complaint   Patient presents with    Alcohol Intoxication     Interval History Status: improved. Patient seen and examined at bedside  No acute events overnight   1 dose of 1 mg ativan required overnight per CIWA protocol  Sodium trend: 126 -> 130 -> 132 this AM  On 1200 mL fluid restriction  Vitals within normal limits  Has no complaints today    Brief History:     The patient is a 62 y.o. Non-/non  male with a past medical history of chronic alcoholism (15 cans of beer per day for the past 5-6 years), myeloproliferative disorder with JAK2 mutation and depression who is presenting with sore throat, lack of appetite and dizziness. Patient states he has not had any food or water for the past 8 days and has only been drinking 15 cans of beer per day. Also dizzier than usual and he is having a hard time getting words out. He does state he is unsteady when walking, and is complaining of a tremor in bilateral upper extremities. Denies any nausea, vomiting, chest pain, shortness of breath, abdominal pain, changes in urinary or bowel habits, numbness, tingling. Patient is alert and oriented x4. Was recently admitted at [facility] at the beginning of this month for hyponatremia. Patient states he is interested in quitting alcohol, has taken Naltrexone in the past.      In the ED, Ethanol level 64, UDS positive for Cannabinoids and Methadone, Sodium 126, Chloride 90, Alk Phos 135, ALT 40 and AST 69. Patient will be admitted for management of Hyponatremia.      Review of Systems:     Review of Systems   Constitutional: Negative for chills and fever. HENT: Negative for congestion. Eyes: Negative for visual disturbance. Respiratory: Negative for shortness of breath. Gastrointestinal: Negative for abdominal pain, constipation and diarrhea. Endocrine: Negative for polyuria. Genitourinary: Negative for difficulty urinating. Skin: Negative for rash. Neurological: Positive for weakness. All other systems reviewed and are negative. Medications: Allergies:  No Known Allergies    Current Meds:   Scheduled Meds:    folic acid  1 mg Oral Daily    therapeutic multivitamin-minerals  1 tablet Oral Daily    prazosin  1 mg Oral BID    QUEtiapine  400 mg Oral Nightly    vitamin B-1  100 mg Oral Daily    nicotine  1 patch Transdermal Q24H    sodium chloride flush  5-40 mL Intravenous 2 times per day    enoxaparin  40 mg Subcutaneous Daily    pantoprazole  40 mg Oral QAM AC    QUEtiapine  100 mg Oral TID WC     Continuous Infusions:    sodium chloride 1,000 mL (06/24/21 0040)    sodium chloride       PRN Meds: benzocaine-menthol, sodium chloride flush, sodium chloride flush, sodium chloride, ondansetron **OR** ondansetron, polyethylene glycol, LORazepam **OR** LORazepam **OR** LORazepam **OR** LORazepam **OR** LORazepam **OR** LORazepam **OR** LORazepam **OR** LORazepam    Data:     Past Medical History:   has a past medical history of Alcohol abuse, Anemia, Cancer ([address] Cervical stenosis of spine, Chronic left-sided low back pain with left-sided sciatica, Insomnia, Intentional drug overdose ([address] Left arm numbness, Left lumbar radiculitis, Macrocytosis, Major depressive disorder, recurrent severe without psychotic features ([address] Myeloproliferative disorder ([address] Osteoarthritis of spine with radiculopathy, lumbar region, Severe recurrent major depression without psychotic features ([address] and Spondylosis of lumbar region without myelopathy or radiculopathy. Social History:   reports that he has been smoking cigarettes.  He started smoking about 42 years ago. He has a 40.00 pack-year smoking history. He has never used smokeless tobacco. He reports current alcohol use of about 14.0 standard drinks of alcohol per week. He reports previous drug use. Drug: Marijuana. Family History:   Family History   Problem Relation Age of Onset    Breast Cancer Mother         s/p surgical complications    Diabetes Mother     Heart Failure Father     Other Brother         \"bad back\"       Vitals:  /80   Pulse 86   Temp 97.6 °F (36.4 °C) (Oral)   Resp 18   Ht 6' 1\" (1.854 m)   Wt 211 lb 13.8 oz (96.1 kg)   SpO2 96%   BMI 27.95 kg/m²   Temp (24hrs), Av.9 °F (36.6 °C), Min:97.6 °F (36.4 °C), Max:98.2 °F (36.8 °C)    No results for input(s): POCGLU in the last 72 hours. I/O(24Hr):     Intake/Output Summary (Last 24 hours) at 2021 2512  Last data filed at 2021 0557  Gross per 24 hour   Intake 564 ml   Output --   Net 564 ml       Labs:    CBC with Differential:    Lab Results   Component Value Date    WBC 7.7 2021    RBC 4.13 2021    HGB 12.7 2021    HCT 37.2 2021     2021    MCV 89.9 2021    MCH 30.7 2021    MCHC 34.1 2021    RDW 16.0 2021    LYMPHOPCT 13 2021    MONOPCT 13 2021    BASOPCT 1 2021    MONOSABS 1.00 2021    LYMPHSABS 1.00 2021    EOSABS 0.10 2021    BASOSABS 0.10 2021    DIFFTYPE NOT REPORTED 2021     BMP:    Lab Results   Component Value Date     2021    K 4.1 2021    CL 98 2021    CO2 23 2021    BUN 7 2021    LABALBU 4.2 2021    CREATININE 0.59 2021    CALCIUM 8.1 2021    GFRAA >60 2021    LABGLOM >60 2021    GLUCOSE 103 2021       No results found for: SPECIAL  No results found for: CULTURE      Radiology:    CT HEAD WO CONTRAST    Result Date: 2021  EXAMINATION: CT OF THE HEAD WITHOUT CONTRAST  2021 12:51 pm TECHNIQUE: CT of the head was performed without the administration of intravenous contrast. Dose modulation, iterative reconstruction, and/or weight based adjustment of the mA/kV was utilized to reduce the radiation dose to as low as reasonably achievable. COMPARISON: None. HISTORY: ORDERING SYSTEM PROVIDED HISTORY: Cough and generalized weakness with occasional confusion TECHNOLOGIST PROVIDED HISTORY: Cough and generalized weakness with occasional confusion Decision Support Exception - unselect if not a suspected or confirmed emergency medical condition->Emergency Medical Condition (MA) Reason for Exam: cough and generalized weakness with occasional confusion FINDINGS: BRAIN/VENTRICLES: There is no acute intracranial hemorrhage, mass effect or midline shift. No abnormal extra-axial fluid collection. The gray-white differentiation is maintained without evidence of an acute infarct. There is no evidence of hydrocephalus. ORBITS: The visualized portion of the orbits demonstrate no acute abnormality. SINUSES: Mucosal thickening and mucous retention cyst in the right maxillary sinus. The mastoids are clear SOFT TISSUES/SKULL:  No acute abnormality of the visualized skull or soft tissues. No acute intracranial abnormality. CT SOFT TISSUE NECK W CONTRAST    Result Date: 6/23/2021  EXAMINATION: CT OF THE NECK SOFT TISSUE WITH CONTRAST  6/23/2021 TECHNIQUE: CT of the neck was performed with the administration of intravenous contrast. Multiplanar reformatted images are provided for review. Dose modulation, iterative reconstruction, and/or weight based adjustment of the mA/kV was utilized to reduce the radiation dose to as low as reasonably achievable.  COMPARISON: 06/02/2021 HISTORY: ORDERING SYSTEM PROVIDED HISTORY: sore throat TECHNOLOGIST PROVIDED HISTORY: sore throat Decision Support Exception - unselect if not a suspected or confirmed emergency medical condition->Emergency Medical Condition (MA) Reason for Exam: sore throat, pt states he has a hard time talking sometimes, pt is going thru detox of alcohol Acuity: Acute Type of Exam: Initial FINDINGS: PHARYNX/LARYNX:  The parapharyngeal fat spaces are preserved. Collateral vessels are noted along the left side of the pharynx. The base of the tongue is unremarkable. The epiglottis is normal in appearance. No pharyngeal or laryngeal mass is identified. SALIVARY GLANDS/THYROID:  The thyroid gland is normal in appearance. The submandibular and parotid glands are unremarkable. LYMPH NODES:  No cervical or supraclavicular adenopathy. SOFT TISSUES:  Vascular calcifications are noted in the proximal left subclavian artery with an estimated 50% stenosis. There are also vascular calcifications noted in the proximal internal carotid arteries bilaterally. No appreciable soft tissue swelling is identified. BRAIN/ORBITS/SINUSES:  Limited images through the cerebral and cerebellar parenchyma are unremarkable. The orbits are normal in appearance. There is scattered paranasal sinus disease with greatest involvement in the right maxillary sinus where there are associated mucous retention cysts. The mastoid air cells are clear. LUNG APICES/SUPERIOR MEDIASTINUM:  There is centrilobular emphysema. Limited images through the lung apices are otherwise unremarkable. No superior mediastinal adenopathy. BONES:  The patient is edentulous. Multifocal degenerative changes are noted in the spine. There is sequela of an anterior C3-C4 fusion. No evidence of an acute fracture. 1. No findings to account for the patient's sore throat. 2. No cervical lymphadenopathy. 3. Atherosclerotic disease, most pronounced in the proximal left subclavian artery and bilateral carotid bulb/proximal internal carotid arteries. 4. Centrilobular emphysema.      CT CERVICAL SPINE WO CONTRAST    Result Date: 6/2/2021  EXAMINATION: CT OF THE CERVICAL SPINE WITHOUT CONTRAST 6/2/2021 12:51 pm TECHNIQUE: CT of the cervical spine was performed without the administration of intravenous contrast. Multiplanar reformatted images are provided for review. Dose modulation, iterative reconstruction, and/or weight based adjustment of the mA/kV was utilized to reduce the radiation dose to as low as reasonably achievable. COMPARISON: 10/25/2019 HISTORY: ORDERING SYSTEM PROVIDED HISTORY: Generalized weakness and confusion TECHNOLOGIST PROVIDED HISTORY: Generalized weakness and confusion Decision Support Exception - unselect if not a suspected or confirmed emergency medical condition->Emergency Medical Condition (MA) Reason for Exam: generalized weakness and confusion FINDINGS: BONES/ALIGNMENT: There is no acute fracture or traumatic malalignment. Status post C3-4 anterior fusion. No hardware complications. DEGENERATIVE CHANGES: No significant degenerative changes. SOFT TISSUES: There is no prevertebral soft tissue swelling. No acute abnormality of the cervical spine. US LIVER    Result Date: 6/2/2021  EXAMINATION: RIGHT UPPER QUADRANT ULTRASOUND 6/2/2021 7:50 pm COMPARISON: None. HISTORY: ORDERING SYSTEM PROVIDED HISTORY: cirrhosis ? TECHNOLOGIST PROVIDED HISTORY: cirrhosis ? myeloproliferative neoplasm, alcohol abuse, substance abuse, pain left upper quadrant, decreased appetite FINDINGS: LIVER:  The liver demonstrates diffuse abnormal increased, heterogeneous echogenicity without discrete lesion demonstrated. The liver appears enlarged, 21 cm long dimension. No evidence of intrahepatic biliary ductal dilatation. Normal appearing hepatopetal vascular flow documented at the main pulmonary vein. BILIARY SYSTEM:  Gallbladder is unremarkable without evidence of pericholecystic fluid, wall thickening or stones. Negative sonographic Daugherty's sign. Common bile duct is within normal limits measuring 4 mm. RIGHT KIDNEY: The right kidney is grossly unremarkable without evidence of hydronephrosis. Right kidney long dimension 11.9 cm. PANCREAS:  Visualized portions of the pancreas are unremarkable. OTHER: No evidence of right upper quadrant ascites. 1. Hepatic steatosis. 2. Hepatomegaly. XR CHEST PORTABLE    Result Date: 6/2/2021  EXAMINATION: ONE XRAY VIEW OF THE CHEST 6/2/2021 12:41 pm COMPARISON: 12/10/2019 HISTORY: ORDERING SYSTEM PROVIDED HISTORY: Cough and generalized weakness TECHNOLOGIST PROVIDED HISTORY: Cough and generalized weakness Reason for Exam: portable, upright Acuity: Unknown FINDINGS: The lungs are without acute focal process. Unchanged linear opacity right lung base suggesting scarring or atelectasis. There is no effusion or pneumothorax. The cardiomediastinal silhouette is stable. The osseous structures are stable. No acute process. MRI BRAIN WO CONTRAST    Result Date: 6/3/2021  EXAMINATION: MRI OF THE BRAIN WITHOUT CONTRAST  6/3/2021 3:50 pm TECHNIQUE: Multiplanar multisequence MRI of the brain was performed without the administration of intravenous contrast. COMPARISON: CT brain performed 06/02/2021. HISTORY: ORDERING SYSTEM PROVIDED HISTORY: R/O Stroke TECHNOLOGIST PROVIDED HISTORY: R/O Stroke Reason for Exam: r/o stroke FINDINGS: INTRACRANIAL STRUCTURES/VENTRICLES: The sellar and suprasellar structures, optic chiasm, corpus callosum, pineal gland, tectum, and midline brainstem structures are unremarkable. The craniocervical junction is unremarkable. There is no acute hemorrhage, mass effect, or midline shift. There is satisfactory overall gray-white matter differentiation. There is chronic microvascular disease. The ventricular structures are symmetric and unremarkable. The infratentorial structures including the cerebellopontine angles and internal auditory canals are unremarkable. There is no abnormal restricted diffusion. There is no abnormal blooming artifact on susceptibility weighted imaging. ORBITS: The visualized portion of the orbits demonstrate no acute abnormality.  SINUSES: The mastoid air cells are normally aerated. There is chronic sinusitis primarily involving the right maxillary sinus. BONES/SOFT TISSUES: The bone marrow signal intensity appears normal. The soft tissues demonstrate no acute abnormality. Chronic microvascular disease without acute intracranial abnormality. Chronic sinusitis primarily involving the right maxillary sinus. Physical Examination:        Physical Exam  Vitals reviewed. Constitutional:       General: He is awake. He is not in acute distress. Appearance: He is overweight. He is not ill-appearing. HENT:      Head: Normocephalic. Eyes:      General: Scleral icterus present. Cardiovascular:      Rate and Rhythm: Regular rhythm. Normal rate present. Heart sounds: Normal heart sounds. Pulmonary:      Effort: Pulmonary effort is normal.      Breath sounds: Normal breath sounds and air entry. Abdominal:      General: Bowel sounds are decreased. There is distension. Palpations: Abdomen is soft. Tenderness: There is no abdominal tenderness. Musculoskeletal:      Cervical back: Neck supple. Right lower leg: No edema. Left lower leg: No edema. Skin:     Findings: No rash. Neurological:      Mental Status: He is easily aroused. He is lethargic. Cranial Nerves: Cranial nerves are intact. Sensory: Sensation is intact. Psychiatric:         Behavior: Behavior is cooperative.      Assessment:        Primary Problem  Hyponatremia    Active Hospital Problems    Diagnosis Date Noted    Hyponatremia [E87.1] 06/02/2021    Alcohol abuse [F10.10] 08/12/2019    Major depressive disorder, recurrent severe without psychotic features ([address] [F33.2] 08/12/2019    MPN (myeloproliferative neoplasm) (HCC) [D47.1]        Plan:        Hyponatremia 2/2 Beer Potomania  - Sodium 126 - >130 -> 132  - Check sodium every 6 hours  - Continue Fluid restriction to 1200 mL  - Serum Osmolality 273, Urine Osm 203, Urine Sodium <20  - Sodium should not be corrected more than 4-6 in a 24 hours period     Chronic Alcoholism  - Hemoglobin within normal limits, normal MCV  - Thiamine, Folate, Multivitamin  - Ammonia normal   - [facility] protocol in place     Depression  - Seroquel 400 mg nightly  - Seroquel 100 mg daily     DVT Prophylaxis: SC Lovenox 40 mg daily    Chaitanya Whitley MD  6/24/2021  9:22 AM

## 2021-06-24 NOTE — PLAN OF CARE
Problem: Falls - Risk of:  Goal: Will remain free from falls  Description: Will remain free from falls  6/24/2021 1744 by Melly Becker RN  Outcome: Completed  6/24/2021 1654 by Melly Becker RN  Outcome: Ongoing  6/24/2021 0711 by Sina Fisher RN  Outcome: Ongoing  Goal: Absence of physical injury  Description: Absence of physical injury  6/24/2021 1744 by Melly Becker RN  Outcome: Completed  6/24/2021 1654 by Melly Becker RN  Outcome: Ongoing  6/24/2021 0711 by Sina Fisher RN  Outcome: Ongoing     Problem: Nutrition  Goal: Optimal nutrition therapy  6/24/2021 1744 by Melly Becker RN  Outcome: Completed  6/24/2021 1654 by Melly Becker RN  Outcome: Ongoing     Problem: Discharge Planning:  Goal: Discharged to appropriate level of care  Description: Discharged to appropriate level of care  6/24/2021 1744 by Melly Becker RN  Outcome: Completed  6/24/2021 1654 by Melly Becker RN  Outcome: Ongoing     Problem: Fluid Volume - Deficit:  Goal: Absence of fluid volume deficit signs and symptoms  Description: Absence of fluid volume deficit signs and symptoms  6/24/2021 1744 by Melly Becker RN  Outcome: Completed  6/24/2021 1654 by Melly Becker RN  Outcome: Ongoing     Problem: Nutrition Deficit:  Goal: Ability to achieve adequate nutritional intake will improve  Description: Ability to achieve adequate nutritional intake will improve  6/24/2021 1744 by Melly Becker RN  Outcome: Completed  6/24/2021 1654 by Melly Becker RN  Outcome: Ongoing     Problem: Sleep Pattern Disturbance:  Goal: Appears well-rested  Description: Appears well-rested  6/24/2021 1744 by Melly Becker RN  Outcome: Completed  6/24/2021 1654 by Melly Becker RN  Outcome: Ongoing     Problem: Violence - Risk of, Self/Other-Directed:  Goal: Knowledge of developmental care interventions  Description: Absence of violence  6/24/2021 1744 by Melly Becker RN  Outcome: Completed  6/24/2021 1654 by Mercedez Joya, RN  Outcome: Ongoing

## 2021-06-24 NOTE — PLAN OF CARE
Problem: Falls - Risk of:  Goal: Will remain free from falls  Description: Will remain free from falls  6/24/2021 1654 by Key Hernández RN  Outcome: Ongoing  6/24/2021 0711 by Indra Coles RN  Outcome: Ongoing  Goal: Absence of physical injury  Description: Absence of physical injury  6/24/2021 1654 by Key Hernández RN  Outcome: Ongoing  6/24/2021 0711 by Indra Coles RN  Outcome: Ongoing     Problem: Nutrition  Goal: Optimal nutrition therapy  Outcome: Ongoing     Problem: Discharge Planning:  Goal: Discharged to appropriate level of care  Description: Discharged to appropriate level of care  Outcome: Ongoing     Problem: Fluid Volume - Deficit:  Goal: Absence of fluid volume deficit signs and symptoms  Description: Absence of fluid volume deficit signs and symptoms  Outcome: Ongoing     Problem: Nutrition Deficit:  Goal: Ability to achieve adequate nutritional intake will improve  Description: Ability to achieve adequate nutritional intake will improve  Outcome: Ongoing     Problem: Sleep Pattern Disturbance:  Goal: Appears well-rested  Description: Appears well-rested  Outcome: Ongoing     Problem: Violence - Risk of, Self/Other-Directed:  Goal: Knowledge of developmental care interventions  Description: Absence of violence  Outcome: Ongoing

## 2021-06-24 NOTE — PROGRESS NOTES
[address] [facility]    Occupational Therapy Evaluation  Date: 21  Patient Name: Luis Lopes       Room: 7157/9920-15  MRN: [id number]  Account: [de-identified]   : 1960  (62 y.o.) Gender: male     Discharge Recommendations: The patient's needs are being met with no further Occupational Therapy recommended at discharge. Equipment Needed: No    Referring Practitioner: Dr. Bao Hayden  Diagnosis: Hyponatremia; ETOH withdrawal    Past Medical History:  has a past medical history of Alcohol abuse, Anemia, Cancer ([address] Cervical stenosis of spine, Chronic left-sided low back pain with left-sided sciatica, Insomnia, Intentional drug overdose ([address] Left arm numbness, Left lumbar radiculitis, Macrocytosis, Major depressive disorder, recurrent severe without psychotic features ([address] Myeloproliferative disorder ([address] Osteoarthritis of spine with radiculopathy, lumbar region, Severe recurrent major depression without psychotic features ([address] and Spondylosis of lumbar region without myelopathy or radiculopathy. Past Surgical History:   has a past surgical history that includes cervical fusion; Appendectomy; Tonsillectomy; Carpal tunnel release; epidural steroid injection (Left, 5/15/2019); Endoscopy, colon, diagnostic; cervical fusion (2019); and cervical fusion (N/A, 2019).     Restrictions  Restrictions/Precautions: Fall Risk, General Precautions  Implants present? : Metal implants (Cervical fusion)  Required Braces or Orthoses?: No     Vitals  Temp: 97.9 °F (36.6 °C)  Pulse: 82  Resp: 18  BP: (!) 142/89  Height: 6' 1\" (185.4 cm)  Weight: 211 lb 13.8 oz (96.1 kg)  BMI (Calculated): 28  Oxygen Therapy  SpO2: 96 %  O2 Device: None (Room air)  Level of Consciousness: Alert (0)    Subjective  Subjective: \"I have done so much damage since August.  I have a lot of healing to do\"  Comments: Pt voices that he is ready to get sober again and that he has resources/support group to A him,  Overall Orientation Status: Within Functional Limits  Vision  Vision: Impaired  Vision Exceptions: Wears glasses for reading  Hearing  Hearing: Within functional limits  Social/Functional History  Lives With: Alone  Type of Home: Apartment (4th floor)  Home Layout: One level  Home Access: Level entry, Elevator  Bathroom Shower/Tub: Tub/Shower unit  Bathroom Toilet: Standard  Bathroom Equipment: Grab bars in shower  Bathroom Accessibility: Accessible  Home Equipment:  (NO DME)  ADL Assistance: [address]: Independent  Homemaking Responsibilities: Yes  Ambulation Assistance: Independent  Transfer Assistance: Independent  Active : No  Patient's  Info: pt reports taking cab or shuttle bus  Occupation: On disability  Additional Comments: Pt reports that he has friends that can A him. Objective  Vision - Basic Assessment  Patient Visual Report: No visual complaint reported. Cognition  Overall Cognitive Status: WFL   Perception  Overall Perceptual Status: WFL  Sensation  Overall Sensation Status: WFL   ADL  Feeding: Independent  Grooming: Setup  UE Bathing: Setup  LE Bathing: Stand by assistance, Contact guard assistance  UE Dressing: Setup  LE Dressing: Stand by assistance, Contact guard assistance  Toileting: Stand by assistance, Contact guard assistance  Additional Comments: Pt don/doffed socks at EOB with minimal difficulty 2* tremors/shakiness in B hands. SBA/CGA for safety in standing 2* general weakness/shakiness. Other areas based on pt report, observation, and clinical reasoning.     UE Function  LUE Strength  Gross LUE Strength: WFL  L Hand General: 4+/5     LUE Tone: Normotonic     LUE AROM (degrees)  LUE AROM : WFL     Left Hand AROM (degrees)  Left Hand AROM: WFL     RUE Strength  Gross RUE Strength: WFL  R Hand General: 4+/5      RUE Tone: Normotonic     RUE AROM (degrees)  RUE AROM : WFL     Right Hand AROM (degrees)  Right Hand AROM: WFL    Fine Motor 1235  Time Out: 1256  Minutes: 21    Electronically signed by Sanya Fox on 6/24/21 at 2:22 PM EDT

## 2021-06-24 NOTE — CARE COORDINATION
CASE MANAGEMENT NOTE:    Admission Date:  6/23/2021 Rozina Davalos is a 62 y.o.  male    Admitted for : Hyponatremia [E87.1]    Met with:  Patient    PCP:  Dr. Kimberly Diez:  [facility] Medicare      Is patient alert and oriented at time of discussion:  Yes    Current Residence/ Living Arrangements:  independently at home             Current Services PTA:  No    Does patient go to outpatient dialysis: No  If yes, location and chair time:     Is patient agreeable to VNS: No    Freedom of choice provided:  No    List of [address] provided: No    VNS chosen:  No    DME:  none    Home Oxygen: No    Nebulizer: No    CPAP/BIPAP: No    Supplier: N/A    Potential Assistance Needed: No    SNF needed: No    Freedom of choice and list provided: No    Pharmacy:  [facility]       Does Patient want to use MEDS to BEDS? No    Is patient currently receiving oral anticoagulation therapy? No    Is the Patient an [facility] [place] with Readmission Risk Score greater than 14%? Yes  If yes, pt needs a follow up appointment made within 7 days. Family Members/Caregivers that pt would like involved in their care:    Yes    If yes, list name here:  Nora Barraza    Transportation Provider:  Family             Discharge Plan:  6/24/21 [facility] Medicare PT is from home in a one story apt he uses no dme and denies need for vns pt would like information on ETOH rehab lsw informed will continue to follow for needs will follow .//tv                  Electronically signed by:  Irma Elizabeth RN on 6/24/2021 at 2:16 PM

## 2021-06-24 NOTE — FLOWSHEET NOTE
Writer received [employer] to provide support to patient; patient talked about the death of his wife in 2017; patient also talked about his addiction and being sober for six years prior to his wife's death; patient talked about his relationships with his three children and siblings; patient discussed his spiritual beliefs and declined prayer; listening presence and support;     06/24/21 1535   Encounter Summary   Services provided to: Patient   Referral/Consult From: Rounding   Continue Visiting   (6/24/21)   Complexity of Encounter Moderate   Length of Encounter 15 minutes   Spiritual Assessment Completed Yes   Spiritual/Confucianist   Type Spiritual support   Assessment Approachable; Hopeful;Coping;Helplessness;Calm   Intervention Active listening;Explored feelings, thoughts, concerns;Sustaining presence/ Ministry of presence; Discussed illness/injury and it's impact   Outcome Comfort;Expressed gratitude;Engaged in conversation;Expressed feelings/needs/concerns;Coping; Hopeful;Receptive

## 2021-06-24 NOTE — PROGRESS NOTES
Pt has been complaining of his throat. Stating it feels dry & like something is stuck. Stephanei Postal NP notified at this time.

## 2021-06-24 NOTE — PROGRESS NOTES
SW spoke to pt regarding drug and alcohol treatment programs. Gave pt list of resources. Pt said he has achieved 6 years of sobriety in the past. Pt said he will call [facility] near his home and go to [facility].

## 2021-06-24 NOTE — PROGRESS NOTES
Subjective  General  Patient assessed for rehabilitation services?: Yes  Family / Caregiver Present: No  Follows Commands: Within Functional Limits  Subjective  Subjective: pt pleasant and cooperative. pt reports \" I need to do some healing\"  Pain Screening  Patient Currently in Pain: Denies  Vital Signs  Patient Currently in Pain: Denies       Orientation  Orientation  Overall Orientation Status: Within Normal Limits  Social/Functional History  Social/Functional History  Lives With: Alone  Type of Home: Apartment (4th floor)  Home Layout: One level  Home Access: Level entry, Elevator  Bathroom Shower/Tub: Tub/Shower unit  Bathroom Toilet: Standard  Bathroom Equipment: Grab bars in shower  Bathroom Accessibility: Accessible  Home Equipment:  (NO DME)  ADL Assistance: Independent  Homemaking Assistance: Independent  Homemaking Responsibilities: Yes  Ambulation Assistance: Independent  Transfer Assistance: Independent  Active : No  Patient's  Info: pt reports taking cab or shuttle bus  Occupation: On disability  Additional Comments: Pt reports that he has friends that will support and assist him after discharge       Objective          AROM RLE (degrees)  RLE AROM: WNL  AROM LLE (degrees)  LLE AROM : WNL  Strength RLE  Strength RLE: WFL  Comment: grossly 4/5  Strength LLE  Strength LLE: WFL  Comment: grossly 4/5        Bed mobility  Supine to Sit: Independent  Sit to Supine:  (pt remained sitting EOB with alarm on)  Scooting: Independent  Transfers  Sit to Stand: Stand by assistance  Stand to sit: Stand by assistance  Comment: pt stands with wide ZHAO  Ambulation  Ambulation?: Yes  Ambulation 1  Device: No Device  Assistance: Stand by assistance;Contact guard assistance  Gait Deviations: Increased ZHAO; Slow Christy  Distance: 100ft  Comments: no LOB     Balance  Sitting - Static: Good  Sitting - Dynamic: Good  Standing - Static: Fair  Standing - Dynamic: Fair (SBA/CGA)  Comments: significant sway with

## 2021-06-24 NOTE — PLAN OF CARE
Nutrition Problem #1: Inadequate energy intake  Intervention: Food and/or Nutrient Delivery: Continue Current Diet, Start Oral Nutrition Supplement  Nutritional Goals: PO intake % of estimated needs

## 2021-06-24 NOTE — PROGRESS NOTES
Comprehensive Nutrition Assessment    Type and Reason for Visit:  Initial, Consult (Poor appetite/intake)    Nutrition Recommendations/Plan: Will continue current diet. Magic Cup 3 x D. Nutrition Assessment:  Pt admitted with hyponatremia. (6/23)- pt stated he had no food or water the last 8 days. Pt states his intake has been low, only a few bites at meals; states food is hard to swallow and feels like it is getting stuck. Pt states he has not had an appetite for approximately 8 weeks. Pt agreed to [facility]. Will continue to monitor. Malnutrition Assessment:  Malnutrition Status: At risk for malnutrition (Comment)    Context:  Acute Illness     Findings of the 6 clinical characteristics of malnutrition:  Energy Intake:  1 - 75% or less of estimated energy requirements for 7 or more days  Weight Loss:  Unable to assess (stated wts)     Body Fat Loss:  Unable to assess     Muscle Mass Loss:  Unable to assess    Fluid Accumulation:  No significant fluid accumulation     Strength:  Normal  strength    Estimated Daily Nutrient Needs:  Energy (kcal):  1133-4189 kcals/d using 20-22 kcals/kg; Weight Used for Energy Requirements:  Current     Protein (g):  100 g protein using 1.2 g/kg; Weight Used for Protein Requirements:  Ideal          Nutrition Related Findings:  No edema. Labs: Na-132, Cr- 0.59, Glu- 103, Calcium- 8.1. Other labs and meds reviewed. Med hx: alcohol abuse. Wounds:  None       Current Nutrition Therapies:    ADULT DIET; Regular; 1200 ml  Adult Oral Nutrition Supplement; Frozen Oral Supplement    Anthropometric Measures:  · Height: 6' 1\" (185.4 cm)  · Current Body Weight: 211 lb 13.8 oz (96.1 kg)   · Admission Body Weight: 211 lb 13.8 oz (96.1 kg)    · Usual Body Weight: 220 lb (99.8 kg) (Pt stated wt prior 195#)     · Ideal Body Weight: 184 lbs; % Ideal Body Weight 115.1 %   · BMI: 28  · BMI Categories: Overweight (BMI 25.0-29. 9)       Nutrition Diagnosis:   · Inadequate energy intake related to  (poor appetite and sore throat) as evidenced by intake 0-25%, poor intake prior to admission    Nutrition Interventions:   Food and/or Nutrient Delivery:  Continue Current Diet, Start Oral Nutrition Supplement  Nutrition Education/Counseling:  No recommendation at this time   Coordination of Nutrition Care:  Continue to monitor while inpatient    Goals:  PO intake % of estimated needs       Nutrition Monitoring and Evaluation:   Behavioral-Environmental Outcomes:  None Identified   Food/Nutrient Intake Outcomes:  Food and Nutrient Intake, Supplement Intake  Physical Signs/Symptoms Outcomes:  Biochemical Data, Fluid Status or Edema, Nutrition Focused Physical Findings, Weight, Skin, Chewing or Swallowing     Discharge Planning:     Too soon to determine     Electronically signed by Jersey Christianson on 6/24/21 at 4:01 PM EDT

## 2021-06-25 ENCOUNTER — TELEPHONE (OUTPATIENT)
Dept: FAMILY MEDICINE CLINIC | Age: 61
End: 2021-06-25

## 2021-06-25 ENCOUNTER — CARE COORDINATION (OUTPATIENT)
Dept: CASE MANAGEMENT | Age: 61
End: 2021-06-25

## 2021-06-25 NOTE — TELEPHONE ENCOUNTER
[facility] nurse called.    Requesting skilled nurse, med mgmt and edu  PTcould benefit from this     Patient has hospital follow up appt with you 7/8/21    [address]  Fax# [phone] - or [phone]

## 2021-06-25 NOTE — CARE COORDINATION
Irma 45 Transitions Initial Follow Up Call    Call within 2 business days of discharge: Yes    Patient: Anuradha Dee Patient : 1960   MRN: [id number]  Reason for Admission:   Discharge Date: 21 RARS: Readmission Risk Score: 30      Last Discharge [address] [id number]       Complaint Diagnosis Description Type Department Provider    21 Alcohol Intoxication Hyponatremia . .. ED to Hosp-Admission (Discharged) (ADMITTED) Irais Lowery MD; Ismael Gallego. .. Spoke with: [address]ien: [facility]    Non-face-to-face services provided:  Scheduled appointment with PCP-patient has hospital follow up appointment on 21  Obtained and reviewed discharge summary and/or continuity of care documents  Communication with home health agencies or other community services the patient is currently using-writer cotnacted pcp's office obtain orders for VNS, writer contacted [facility] confirmed they can accept patient  Assessment and support for treatment adherence and medication management-reviewed discharge instrucitons and medications, 1111F order completed. reviewed covid precautions and vaccination, patient had vaccisntion, reviewed and updated healthcare decision maker. explained role of CTN, provided contact information   Writer spoke to patient, he is still feeling very weak, having hard time getting around, not eating, no appetite, reviewed medications, patient waiting for new medications to be delivered from [facility] but could use some additional education on his new medication, stated his memory isn't that good. Patient agreed to vns, writer contacted [facility] spoke to [facility], agreed to take patient, writer contacted pcp office, requested order for vns, skilled nurse, medication management and education, PT and dietician, will continue to follow//JU  Transitions of Care Initial Call    Was this an external facility discharge?  No Discharge Facility: [facility]    Challenges to be reviewed by the provider   Additional needs identified to be addressed with provider: Yes  home health care-needs vns             Method of communication with provider : phone      Advance Care Planning:   Does patient have an Advance Directive:  decision maker updated. Was this a readmission? No  Patient stated reason for admission:   Patients top risk factors for readmission: functional cognitive ability, functional physical ability, ineffective coping, lack of knowledge about disease, medication management and support system    Care Transition Nurse (CTN) contacted the patient by telephone to perform post hospital discharge assessment. Verified name and  with patient as identifiers. Provided introduction to self, and explanation of the CTN role. CTN reviewed discharge instructions, medical action plan and red flags with patient who verbalized understanding. Patient given an opportunity to ask questions and does not have any further questions or concerns at this time. Were discharge instructions available to patient? Yes. Reviewed appropriate site of care based on symptoms and resources available to patient including: PCP, Specialist, Urgent care clinics and When to call 911. The patient agrees to contact the PCP office for questions related to their healthcare. Medication reconciliation was performed with patient, who verbalizes understanding of administration of home medications. Advised obtaining a 90-day supply of all daily and as-needed medications. Covid Risk Education     Educated patient about risk for severe COVID-19 due to risk factors according to CDC guidelines. CTN reviewed discharge instructions, medical action plan and red flag symptoms with the patient who verbalized understanding. Discussed COVID vaccination status: Yes. Education provided on COVID-19 vaccination as appropriate. Discussed exposure protocols and quarantine with CDC Guidelines.  Patient was given an opportunity to verbalize any questions and concerns and agrees to contact CTN or health care provider for questions related to their healthcare. Reviewed and educated patient on any new and changed medications related to discharge diagnosis. Was patient discharged with a pulse oximeter? No Discussed and confirmed pulse oximeter discharge instructions and when to notify provider or seek emergency care. CTN provided contact information. Plan for follow-up call in 3-5 days based on severity of symptoms and risk factors.         Care Transitions 24 Hour Call    Do you have all of your prescriptions and are they filled?: Yes  Do you have support at home?: Alone  Are you an active caregiver in your home?: No  Care Transitions Interventions         Follow Up  Future Appointments   Date Time Provider Mayco Vaughn   7/8/2021  2:40 PM SIMONE Kelly - CNP [id number]   9/2/2021 11:15 AM Aide Rene MD [facility] Zack Arreguin RN

## 2021-06-26 NOTE — DISCHARGE SUMMARY
[address]    Discharge Summary     Patient ID: Tiffanie Hernandes  :  1960   MRN: [id number]     ACCOUNT:  [de-identified]   Patient's PCP: SIMONE Solares CNP  Admit Date: 2021   Discharge Date: 2021   Length of Stay: 1  Code Status:  Prior  Admitting Physician: Julio Cesar Cole MD  Discharge Physician: Joyce Cox MD     Active Discharge Diagnoses:       Primary Problem  Hyponatremia      [place] Problems    Diagnosis Date Noted    Hyponatremia [E87.1] 2021    Alcohol abuse [F10.10] 2019    Major depressive disorder, recurrent severe without psychotic features [facility] [F33.2] 2019    MPN (myeloproliferative neoplasm) ([facility] [D47.1]        Admission Condition:  fair     Discharged Condition: good    Hospital Stay:       Hospital Course:  Tiffanie Hernandes is a 62 y.o. male who was admitted for the management of   Hyponatremia , presented to ER with Alcohol Intoxication    Osmolality studied were done which showed patient to be in Hyponatremia secondary to [facility] [place] and poor intake. Patient was placed on a fluid restriction to 1200 mL. Sodium increased from 126 to 132 in a 24 hour period. Patient was on CIWA protocol during admission. Was started on Thiamine, Folate and multivitamin. On day of discharge vital signs are within normal limits and patient is feeling better, also eating and drinking which he had not done for 9 days prior to admission.        Significant therapeutic interventions: Fluid Restriction    Significant Diagnostic Studies:   Labs / Micro:  CBC:   Lab Results   Component Value Date    WBC 7.7 2021    RBC 4.13 2021    HGB 12.7 2021    HCT 37.2 2021    MCV 89.9 2021    MCH 30.7 2021    MCHC 34.1 2021    RDW 16.0 2021     2021     BMP:    Lab Results   Component Value Date    GLUCOSE 103 06/24/2021     06/24/2021    K 4.1 06/24/2021    CL 98 06/24/2021    CO2 23 06/24/2021    ANIONGAP 11 06/24/2021    BUN 7 06/24/2021    CREATININE 0.59 06/24/2021    BUNCRER NOT REPORTED 06/24/2021    CALCIUM 8.1 06/24/2021    LABGLOM >60 06/24/2021    GFRAA >60 06/24/2021    GFR      06/24/2021    GFR NOT REPORTED 06/24/2021       Radiology:  CT HEAD WO CONTRAST    Result Date: 6/2/2021  EXAMINATION: CT OF THE HEAD WITHOUT CONTRAST  6/2/2021 12:51 pm TECHNIQUE: CT of the head was performed without the administration of intravenous contrast. Dose modulation, iterative reconstruction, and/or weight based adjustment of the mA/kV was utilized to reduce the radiation dose to as low as reasonably achievable. COMPARISON: None. HISTORY: ORDERING SYSTEM PROVIDED HISTORY: Cough and generalized weakness with occasional confusion TECHNOLOGIST PROVIDED HISTORY: Cough and generalized weakness with occasional confusion Decision Support Exception - unselect if not a suspected or confirmed emergency medical condition->Emergency Medical Condition (MA) Reason for Exam: cough and generalized weakness with occasional confusion FINDINGS: BRAIN/VENTRICLES: There is no acute intracranial hemorrhage, mass effect or midline shift. No abnormal extra-axial fluid collection. The gray-white differentiation is maintained without evidence of an acute infarct. There is no evidence of hydrocephalus. ORBITS: The visualized portion of the orbits demonstrate no acute abnormality. SINUSES: Mucosal thickening and mucous retention cyst in the right maxillary sinus. The mastoids are clear SOFT TISSUES/SKULL:  No acute abnormality of the visualized skull or soft tissues. No acute intracranial abnormality.      CT SOFT TISSUE NECK W CONTRAST    Result Date: 6/23/2021  EXAMINATION: CT OF THE NECK SOFT TISSUE WITH CONTRAST  6/23/2021 TECHNIQUE: CT of the neck was performed with the administration of intravenous contrast. Multiplanar reformatted images are provided for review. Dose modulation, iterative reconstruction, and/or weight based adjustment of the mA/kV was utilized to reduce the radiation dose to as low as reasonably achievable. COMPARISON: 06/02/2021 HISTORY: ORDERING SYSTEM PROVIDED HISTORY: sore throat TECHNOLOGIST PROVIDED HISTORY: sore throat Decision Support Exception - unselect if not a suspected or confirmed emergency medical condition->Emergency Medical Condition (MA) Reason for Exam: sore throat, pt states he has a hard time talking sometimes, pt is going thru detox of alcohol Acuity: Acute Type of Exam: Initial FINDINGS: PHARYNX/LARYNX:  The parapharyngeal fat spaces are preserved. Collateral vessels are noted along the left side of the pharynx. The base of the tongue is unremarkable. The epiglottis is normal in appearance. No pharyngeal or laryngeal mass is identified. SALIVARY GLANDS/THYROID:  The thyroid gland is normal in appearance. The submandibular and parotid glands are unremarkable. LYMPH NODES:  No cervical or supraclavicular adenopathy. SOFT TISSUES:  Vascular calcifications are noted in the proximal left subclavian artery with an estimated 50% stenosis. There are also vascular calcifications noted in the proximal internal carotid arteries bilaterally. No appreciable soft tissue swelling is identified. BRAIN/ORBITS/SINUSES:  Limited images through the cerebral and cerebellar parenchyma are unremarkable. The orbits are normal in appearance. There is scattered paranasal sinus disease with greatest involvement in the right maxillary sinus where there are associated mucous retention cysts. The mastoid air cells are clear. LUNG APICES/SUPERIOR MEDIASTINUM:  There is centrilobular emphysema. Limited images through the lung apices are otherwise unremarkable. No superior mediastinal adenopathy. BONES:  The patient is edentulous. Multifocal degenerative changes are noted in the spine.   There is sequela of an anterior C3-C4 fusion. No evidence of an acute fracture. 1. No findings to account for the patient's sore throat. 2. No cervical lymphadenopathy. 3. Atherosclerotic disease, most pronounced in the proximal left subclavian artery and bilateral carotid bulb/proximal internal carotid arteries. 4. Centrilobular emphysema. CT CERVICAL SPINE WO CONTRAST    Result Date: 6/2/2021  EXAMINATION: CT OF THE CERVICAL SPINE WITHOUT CONTRAST 6/2/2021 12:51 pm TECHNIQUE: CT of the cervical spine was performed without the administration of intravenous contrast. Multiplanar reformatted images are provided for review. Dose modulation, iterative reconstruction, and/or weight based adjustment of the mA/kV was utilized to reduce the radiation dose to as low as reasonably achievable. COMPARISON: 10/25/2019 HISTORY: ORDERING SYSTEM PROVIDED HISTORY: Generalized weakness and confusion TECHNOLOGIST PROVIDED HISTORY: Generalized weakness and confusion Decision Support Exception - unselect if not a suspected or confirmed emergency medical condition->Emergency Medical Condition (MA) Reason for Exam: generalized weakness and confusion FINDINGS: BONES/ALIGNMENT: There is no acute fracture or traumatic malalignment. Status post C3-4 anterior fusion. No hardware complications. DEGENERATIVE CHANGES: No significant degenerative changes. SOFT TISSUES: There is no prevertebral soft tissue swelling. No acute abnormality of the cervical spine. US LIVER    Result Date: 6/2/2021  EXAMINATION: RIGHT UPPER QUADRANT ULTRASOUND 6/2/2021 7:50 pm COMPARISON: None. HISTORY: ORDERING SYSTEM PROVIDED HISTORY: cirrhosis ? TECHNOLOGIST PROVIDED HISTORY: cirrhosis ? myeloproliferative neoplasm, alcohol abuse, substance abuse, pain left upper quadrant, decreased appetite FINDINGS: LIVER:  The liver demonstrates diffuse abnormal increased, heterogeneous echogenicity without discrete lesion demonstrated. The liver appears enlarged, 21 cm long dimension.   No evidence of intrahepatic biliary ductal dilatation. Normal appearing hepatopetal vascular flow documented at the main pulmonary vein. BILIARY SYSTEM:  Gallbladder is unremarkable without evidence of pericholecystic fluid, wall thickening or stones. Negative sonographic Daugherty's sign. Common bile duct is within normal limits measuring 4 mm. RIGHT KIDNEY: The right kidney is grossly unremarkable without evidence of hydronephrosis. Right kidney long dimension 11.9 cm. PANCREAS:  Visualized portions of the pancreas are unremarkable. OTHER: No evidence of right upper quadrant ascites. 1. Hepatic steatosis. 2. Hepatomegaly. XR CHEST PORTABLE    Result Date: 6/2/2021  EXAMINATION: ONE XRAY VIEW OF THE CHEST 6/2/2021 12:41 pm COMPARISON: 12/10/2019 HISTORY: ORDERING SYSTEM PROVIDED HISTORY: Cough and generalized weakness TECHNOLOGIST PROVIDED HISTORY: Cough and generalized weakness Reason for Exam: portable, upright Acuity: Unknown FINDINGS: The lungs are without acute focal process. Unchanged linear opacity right lung base suggesting scarring or atelectasis. There is no effusion or pneumothorax. The cardiomediastinal silhouette is stable. The osseous structures are stable. No acute process. MRI BRAIN WO CONTRAST    Result Date: 6/3/2021  EXAMINATION: MRI OF THE BRAIN WITHOUT CONTRAST  6/3/2021 3:50 pm TECHNIQUE: Multiplanar multisequence MRI of the brain was performed without the administration of intravenous contrast. COMPARISON: CT brain performed 06/02/2021. HISTORY: ORDERING SYSTEM PROVIDED HISTORY: R/O Stroke TECHNOLOGIST PROVIDED HISTORY: R/O Stroke Reason for Exam: r/o stroke FINDINGS: INTRACRANIAL STRUCTURES/VENTRICLES: The sellar and suprasellar structures, optic chiasm, corpus callosum, pineal gland, tectum, and midline brainstem structures are unremarkable. The craniocervical junction is unremarkable. There is no acute hemorrhage, mass effect, or midline shift.   There is satisfactory overall gray-white matter differentiation. There is chronic microvascular disease. The ventricular structures are symmetric and unremarkable. The infratentorial structures including the cerebellopontine angles and internal auditory canals are unremarkable. There is no abnormal restricted diffusion. There is no abnormal blooming artifact on susceptibility weighted imaging. ORBITS: The visualized portion of the orbits demonstrate no acute abnormality. SINUSES: The mastoid air cells are normally aerated. There is chronic sinusitis primarily involving the right maxillary sinus. BONES/SOFT TISSUES: The bone marrow signal intensity appears normal. The soft tissues demonstrate no acute abnormality. Chronic microvascular disease without acute intracranial abnormality. Chronic sinusitis primarily involving the right maxillary sinus. Consultations:    Consults:     Final Specialist Recommendations/Findings:   IP CONSULT TO INTERNAL MEDICINE  IP CONSULT TO SOCIAL WORK  IP CONSULT TO SOCIAL WORK      The patient was seen and examined on day of discharge and this discharge summary is in conjunction with any daily progress note from day of discharge.     Discharge plan:       Disposition: Home    Physician Follow Up:     Randolph Al, SIMONE - CNP  [address] [address]  [phone]    Schedule an appointment as soon as possible for a visit in 1 week         Requiring Further Evaluation/Follow Up POST HOSPITALIZATION/Incidental Findings:     Diet: regular diet    Activity: As tolerated    Instructions to Patient:   Cut down on drinking alcohol  Eat and drink food regularly   Follow up with PCP  Follow up with Rehab center for alcohol cessation    Discharge Medications:      Medication List      START taking these medications    levothyroxine 50 MCG tablet  Commonly known as: SYNTHROID  Take 1 tablet by mouth daily Take in the morning on an empty stomach 1 hour before any food or other medications        CONTINUE taking these medications    folic acid 1 MG tablet  Commonly known as: FOLVITE  Take 1 tablet by mouth daily     hydrOXYzine 50 MG tablet  Commonly known as: ATARAX     naltrexone 50 MG tablet  Commonly known as: DEPADE     nicotine 21 MG/24HR  Commonly known as: NICODERM CQ  Place 1 patch onto the skin every 24 hours     omeprazole 40 MG delayed release capsule  Commonly known as: PRILOSEC     prazosin 1 MG capsule  Commonly known as: MINIPRESS     * QUEtiapine 100 MG tablet  Commonly known as: SEROQUEL     * QUEtiapine 400 MG tablet  Commonly known as: SEROQUEL     therapeutic multivitamin-minerals tablet  Take 1 tablet by mouth daily     vitamin B-1 100 MG tablet  Commonly known as: THIAMINE  Take 1 tablet by mouth daily         * This list has 2 medication(s) that are the same as other medications prescribed for you. Read the directions carefully, and ask your doctor or other care provider to review them with you. Where to Get Your Medications      These medications were sent to [address] [place] [id number]    Phone: [phone]   · folic acid 1 MG tablet  · levothyroxine 50 MCG tablet  · nicotine 21 MG/24HR  · vitamin B-1 100 MG tablet         Electronically signed by   Randy Riddle MD  6/26/2021  4:14 AM      Thank you SIMONE Carrington - CNP for the opportunity to be involved in this patient's care.

## 2021-07-14 ENCOUNTER — HOSPITAL ENCOUNTER (INPATIENT)
Age: 61
LOS: 3 days | Discharge: HOME | DRG: 897 | End: 2021-07-17
Attending: EMERGENCY MEDICINE | Admitting: INTERNAL MEDICINE
Payer: MEDICARE

## 2021-07-14 ENCOUNTER — APPOINTMENT (OUTPATIENT)
Dept: CT IMAGING | Age: 61
DRG: 897 | End: 2021-07-14
Payer: MEDICARE

## 2021-07-14 DIAGNOSIS — I49.3 FREQUENT PVCS: ICD-10-CM

## 2021-07-14 DIAGNOSIS — E87.1 HYPONATREMIA: ICD-10-CM

## 2021-07-14 DIAGNOSIS — R77.8 ELEVATED TROPONIN: Primary | ICD-10-CM

## 2021-07-14 DIAGNOSIS — E86.0 DEHYDRATION: ICD-10-CM

## 2021-07-14 PROBLEM — F10.129 ALCOHOL ABUSE WITH INTOXICATION (HCC): Status: ACTIVE | Noted: 2021-07-14

## 2021-07-14 LAB
-: NORMAL
ABSOLUTE BANDS #: 0.09 K/UL (ref 0–1)
ABSOLUTE EOS #: 0.09 K/UL (ref 0–0.4)
ABSOLUTE IMMATURE GRANULOCYTE: ABNORMAL K/UL (ref 0–0.3)
ABSOLUTE LYMPH #: 1.87 K/UL (ref 1–4.8)
ABSOLUTE MONO #: 0.85 K/UL (ref 0.1–1.3)
ALBUMIN SERPL-MCNC: 4 G/DL (ref 3.5–5.2)
ALBUMIN/GLOBULIN RATIO: ABNORMAL (ref 1–2.5)
ALP BLD-CCNC: 138 U/L (ref 40–129)
ALT SERPL-CCNC: 48 U/L (ref 5–41)
AMMONIA: 26 UMOL/L (ref 16–60)
ANION GAP SERPL CALCULATED.3IONS-SCNC: 18 MMOL/L (ref 9–17)
AST SERPL-CCNC: 95 U/L
BANDS: 1 % (ref 0–10)
BASOPHILS # BLD: 0 % (ref 0–2)
BASOPHILS ABSOLUTE: 0 K/UL (ref 0–0.2)
BILIRUB SERPL-MCNC: 0.68 MG/DL (ref 0.3–1.2)
BUN BLDV-MCNC: 4 MG/DL (ref 6–20)
BUN/CREAT BLD: ABNORMAL (ref 9–20)
CALCIUM SERPL-MCNC: 8.5 MG/DL (ref 8.6–10.4)
CHLORIDE BLD-SCNC: 97 MMOL/L (ref 98–107)
CO2: 16 MMOL/L (ref 20–31)
CREAT SERPL-MCNC: 0.56 MG/DL (ref 0.7–1.2)
DIFFERENTIAL TYPE: ABNORMAL
EOSINOPHILS RELATIVE PERCENT: 1 % (ref 0–4)
ETHANOL PERCENT: 0.15 %
ETHANOL: 152 MG/DL
GFR AFRICAN AMERICAN: >60 ML/MIN
GFR NON-AFRICAN AMERICAN: >60 ML/MIN
GFR SERPL CREATININE-BSD FRML MDRD: ABNORMAL ML/MIN/{1.73_M2}
GFR SERPL CREATININE-BSD FRML MDRD: ABNORMAL ML/MIN/{1.73_M2}
GLUCOSE BLD-MCNC: 99 MG/DL (ref 70–99)
HCT VFR BLD CALC: 42.8 % (ref 41–53)
HEMOGLOBIN: 14.7 G/DL (ref 13.5–17.5)
IMMATURE GRANULOCYTES: ABNORMAL %
INR BLD: 1.1
LIPASE: 26 U/L (ref 13–60)
LYMPHOCYTES # BLD: 22 % (ref 24–44)
MAGNESIUM: 2.4 MG/DL (ref 1.6–2.6)
MCH RBC QN AUTO: 31.7 PG (ref 26–34)
MCHC RBC AUTO-ENTMCNC: 34.3 G/DL (ref 31–37)
MCV RBC AUTO: 92.6 FL (ref 80–100)
MONOCYTES # BLD: 10 % (ref 1–7)
MORPHOLOGY: NORMAL
NRBC AUTOMATED: ABNORMAL PER 100 WBC
PDW BLD-RTO: 18.2 % (ref 11.5–14.9)
PLATELET # BLD: 211 K/UL (ref 150–450)
PLATELET ESTIMATE: ABNORMAL
PMV BLD AUTO: 7.9 FL (ref 6–12)
POTASSIUM SERPL-SCNC: 4.2 MMOL/L (ref 3.7–5.3)
PROTHROMBIN TIME: 14.5 SEC (ref 11.8–14.6)
RBC # BLD: 4.63 M/UL (ref 4.5–5.9)
RBC # BLD: ABNORMAL 10*6/UL
REASON FOR REJECTION: NORMAL
SEG NEUTROPHILS: 66 % (ref 36–66)
SEGMENTED NEUTROPHILS ABSOLUTE COUNT: 5.6 K/UL (ref 1.3–9.1)
SODIUM BLD-SCNC: 131 MMOL/L (ref 135–144)
TOTAL PROTEIN: 7.3 G/DL (ref 6.4–8.3)
TROPONIN INTERP: ABNORMAL
TROPONIN INTERP: ABNORMAL
TROPONIN T: ABNORMAL NG/ML
TROPONIN T: ABNORMAL NG/ML
TROPONIN, HIGH SENSITIVITY: 37 NG/L (ref 0–22)
TROPONIN, HIGH SENSITIVITY: 40 NG/L (ref 0–22)
WBC # BLD: 8.5 K/UL (ref 3.5–11)
WBC # BLD: ABNORMAL 10*3/UL
ZZ NTE CLEAN UP: ORDERED TEST: NORMAL
ZZ NTE WITH NAME CLEAN UP: SPECIMEN SOURCE: NORMAL

## 2021-07-14 PROCEDURE — 93005 ELECTROCARDIOGRAM TRACING: CPT | Performed by: EMERGENCY MEDICINE

## 2021-07-14 PROCEDURE — 85025 COMPLETE CBC W/AUTO DIFF WBC: CPT

## 2021-07-14 PROCEDURE — 83735 ASSAY OF MAGNESIUM: CPT

## 2021-07-14 PROCEDURE — 2580000003 HC RX 258: Performed by: STUDENT IN AN ORGANIZED HEALTH CARE EDUCATION/TRAINING PROGRAM

## 2021-07-14 PROCEDURE — 2580000003 HC RX 258: Performed by: EMERGENCY MEDICINE

## 2021-07-14 PROCEDURE — 85610 PROTHROMBIN TIME: CPT

## 2021-07-14 PROCEDURE — 80053 COMPREHEN METABOLIC PANEL: CPT

## 2021-07-14 PROCEDURE — 2060000000 HC ICU INTERMEDIATE R&B

## 2021-07-14 PROCEDURE — G0480 DRUG TEST DEF 1-7 CLASSES: HCPCS

## 2021-07-14 PROCEDURE — 36415 COLL VENOUS BLD VENIPUNCTURE: CPT

## 2021-07-14 PROCEDURE — 99283 EMERGENCY DEPT VISIT LOW MDM: CPT

## 2021-07-14 PROCEDURE — 6370000000 HC RX 637 (ALT 250 FOR IP): Performed by: STUDENT IN AN ORGANIZED HEALTH CARE EDUCATION/TRAINING PROGRAM

## 2021-07-14 PROCEDURE — 70450 CT HEAD/BRAIN W/O DYE: CPT

## 2021-07-14 PROCEDURE — 96365 THER/PROPH/DIAG IV INF INIT: CPT

## 2021-07-14 PROCEDURE — 6370000000 HC RX 637 (ALT 250 FOR IP): Performed by: EMERGENCY MEDICINE

## 2021-07-14 PROCEDURE — 84484 ASSAY OF TROPONIN QUANT: CPT

## 2021-07-14 PROCEDURE — 6360000002 HC RX W HCPCS: Performed by: EMERGENCY MEDICINE

## 2021-07-14 PROCEDURE — 96361 HYDRATE IV INFUSION ADD-ON: CPT

## 2021-07-14 PROCEDURE — 6360000002 HC RX W HCPCS: Performed by: STUDENT IN AN ORGANIZED HEALTH CARE EDUCATION/TRAINING PROGRAM

## 2021-07-14 PROCEDURE — 82140 ASSAY OF AMMONIA: CPT

## 2021-07-14 PROCEDURE — 83690 ASSAY OF LIPASE: CPT

## 2021-07-14 RX ORDER — GAUZE BANDAGE 2" X 2"
100 BANDAGE TOPICAL DAILY
Status: DISCONTINUED | OUTPATIENT
Start: 2021-07-14 | End: 2021-07-17 | Stop reason: HOSPADM

## 2021-07-14 RX ORDER — NICOTINE 21 MG/24HR
1 PATCH, TRANSDERMAL 24 HOURS TRANSDERMAL EVERY 24 HOURS
Status: DISCONTINUED | OUTPATIENT
Start: 2021-07-14 | End: 2021-07-17 | Stop reason: HOSPADM

## 2021-07-14 RX ORDER — NALTREXONE HYDROCHLORIDE 50 MG/1
50 TABLET, FILM COATED ORAL DAILY
Status: DISCONTINUED | OUTPATIENT
Start: 2021-07-14 | End: 2021-07-17 | Stop reason: HOSPADM

## 2021-07-14 RX ORDER — LORAZEPAM 1 MG/1
3 TABLET ORAL
Status: DISCONTINUED | OUTPATIENT
Start: 2021-07-14 | End: 2021-07-17 | Stop reason: HOSPADM

## 2021-07-14 RX ORDER — FOLIC ACID 1 MG/1
1 TABLET ORAL DAILY
Status: DISCONTINUED | OUTPATIENT
Start: 2021-07-14 | End: 2021-07-17 | Stop reason: HOSPADM

## 2021-07-14 RX ORDER — LORAZEPAM 1 MG/1
1 TABLET ORAL
Status: DISCONTINUED | OUTPATIENT
Start: 2021-07-14 | End: 2021-07-17 | Stop reason: HOSPADM

## 2021-07-14 RX ORDER — ASPIRIN 325 MG
325 TABLET ORAL ONCE
Status: COMPLETED | OUTPATIENT
Start: 2021-07-14 | End: 2021-07-14

## 2021-07-14 RX ORDER — LORAZEPAM 2 MG/ML
3 INJECTION INTRAMUSCULAR
Status: DISCONTINUED | OUTPATIENT
Start: 2021-07-14 | End: 2021-07-17 | Stop reason: HOSPADM

## 2021-07-14 RX ORDER — POLYETHYLENE GLYCOL 3350 17 G/17G
17 POWDER, FOR SOLUTION ORAL DAILY PRN
Status: DISCONTINUED | OUTPATIENT
Start: 2021-07-14 | End: 2021-07-17 | Stop reason: HOSPADM

## 2021-07-14 RX ORDER — LORAZEPAM 1 MG/1
4 TABLET ORAL
Status: DISCONTINUED | OUTPATIENT
Start: 2021-07-14 | End: 2021-07-17 | Stop reason: HOSPADM

## 2021-07-14 RX ORDER — SODIUM CHLORIDE 0.9 % (FLUSH) 0.9 %
5-40 SYRINGE (ML) INJECTION PRN
Status: DISCONTINUED | OUTPATIENT
Start: 2021-07-14 | End: 2021-07-17 | Stop reason: HOSPADM

## 2021-07-14 RX ORDER — SODIUM CHLORIDE 9 MG/ML
INJECTION, SOLUTION INTRAVENOUS CONTINUOUS
Status: DISCONTINUED | OUTPATIENT
Start: 2021-07-14 | End: 2021-07-17 | Stop reason: HOSPADM

## 2021-07-14 RX ORDER — QUETIAPINE FUMARATE 200 MG/1
400 TABLET, FILM COATED ORAL NIGHTLY
Status: DISCONTINUED | OUTPATIENT
Start: 2021-07-14 | End: 2021-07-17 | Stop reason: HOSPADM

## 2021-07-14 RX ORDER — POTASSIUM CHLORIDE 7.45 MG/ML
10 INJECTION INTRAVENOUS PRN
Status: DISCONTINUED | OUTPATIENT
Start: 2021-07-14 | End: 2021-07-17 | Stop reason: HOSPADM

## 2021-07-14 RX ORDER — SERTRALINE HYDROCHLORIDE 100 MG/1
100 TABLET, FILM COATED ORAL DAILY
Status: ON HOLD | COMMUNITY
End: 2021-07-15

## 2021-07-14 RX ORDER — LORAZEPAM 2 MG/ML
2 INJECTION INTRAMUSCULAR
Status: DISCONTINUED | OUTPATIENT
Start: 2021-07-14 | End: 2021-07-17 | Stop reason: HOSPADM

## 2021-07-14 RX ORDER — SODIUM CHLORIDE 9 MG/ML
INJECTION, SOLUTION INTRAVENOUS CONTINUOUS
Status: DISCONTINUED | OUTPATIENT
Start: 2021-07-14 | End: 2021-07-16

## 2021-07-14 RX ORDER — ACETAMINOPHEN 650 MG/1
650 SUPPOSITORY RECTAL EVERY 6 HOURS PRN
Status: DISCONTINUED | OUTPATIENT
Start: 2021-07-14 | End: 2021-07-17 | Stop reason: HOSPADM

## 2021-07-14 RX ORDER — HYDROXYUREA 500 MG/1
500 CAPSULE ORAL 2 TIMES DAILY
COMMUNITY
End: 2022-04-21 | Stop reason: SDUPTHER

## 2021-07-14 RX ORDER — SODIUM CHLORIDE 0.9 % (FLUSH) 0.9 %
5-40 SYRINGE (ML) INJECTION EVERY 12 HOURS SCHEDULED
Status: DISCONTINUED | OUTPATIENT
Start: 2021-07-14 | End: 2021-07-17 | Stop reason: HOSPADM

## 2021-07-14 RX ORDER — POTASSIUM CHLORIDE 20 MEQ/1
40 TABLET, EXTENDED RELEASE ORAL PRN
Status: DISCONTINUED | OUTPATIENT
Start: 2021-07-14 | End: 2021-07-17 | Stop reason: HOSPADM

## 2021-07-14 RX ORDER — LEVOTHYROXINE SODIUM 0.05 MG/1
50 TABLET ORAL DAILY
Status: DISCONTINUED | OUTPATIENT
Start: 2021-07-14 | End: 2021-07-17 | Stop reason: HOSPADM

## 2021-07-14 RX ORDER — LORAZEPAM 2 MG/ML
4 INJECTION INTRAMUSCULAR
Status: DISCONTINUED | OUTPATIENT
Start: 2021-07-14 | End: 2021-07-17 | Stop reason: HOSPADM

## 2021-07-14 RX ORDER — ONDANSETRON 4 MG/1
4 TABLET, ORALLY DISINTEGRATING ORAL EVERY 8 HOURS PRN
Status: DISCONTINUED | OUTPATIENT
Start: 2021-07-14 | End: 2021-07-17 | Stop reason: HOSPADM

## 2021-07-14 RX ORDER — M-VIT,TX,IRON,MINS/CALC/FOLIC 27MG-0.4MG
1 TABLET ORAL DAILY
Status: DISCONTINUED | OUTPATIENT
Start: 2021-07-14 | End: 2021-07-17 | Stop reason: HOSPADM

## 2021-07-14 RX ORDER — LORAZEPAM 1 MG/1
2 TABLET ORAL
Status: DISCONTINUED | OUTPATIENT
Start: 2021-07-14 | End: 2021-07-17 | Stop reason: HOSPADM

## 2021-07-14 RX ORDER — LORAZEPAM 2 MG/ML
1 INJECTION INTRAMUSCULAR
Status: DISCONTINUED | OUTPATIENT
Start: 2021-07-14 | End: 2021-07-17 | Stop reason: HOSPADM

## 2021-07-14 RX ORDER — ONDANSETRON 2 MG/ML
4 INJECTION INTRAMUSCULAR; INTRAVENOUS EVERY 6 HOURS PRN
Status: DISCONTINUED | OUTPATIENT
Start: 2021-07-14 | End: 2021-07-17 | Stop reason: HOSPADM

## 2021-07-14 RX ORDER — ACETAMINOPHEN 325 MG/1
650 TABLET ORAL EVERY 6 HOURS PRN
Status: DISCONTINUED | OUTPATIENT
Start: 2021-07-14 | End: 2021-07-17 | Stop reason: HOSPADM

## 2021-07-14 RX ORDER — SODIUM CHLORIDE 9 MG/ML
25 INJECTION, SOLUTION INTRAVENOUS PRN
Status: DISCONTINUED | OUTPATIENT
Start: 2021-07-14 | End: 2021-07-17 | Stop reason: HOSPADM

## 2021-07-14 RX ADMIN — THIAMINE HCL TAB 100 MG 100 MG: 100 TAB at 18:33

## 2021-07-14 RX ADMIN — Medication 1 TABLET: at 19:05

## 2021-07-14 RX ADMIN — ASPIRIN 325 MG ORAL TABLET 325 MG: 325 PILL ORAL at 12:44

## 2021-07-14 RX ADMIN — SODIUM CHLORIDE: 9 INJECTION, SOLUTION INTRAVENOUS at 11:38

## 2021-07-14 RX ADMIN — LORAZEPAM 1 MG: 1 TABLET ORAL at 21:10

## 2021-07-14 RX ADMIN — ENOXAPARIN SODIUM 40 MG: 40 INJECTION SUBCUTANEOUS at 18:32

## 2021-07-14 RX ADMIN — THIAMINE HYDROCHLORIDE 100 MG: 100 INJECTION, SOLUTION INTRAMUSCULAR; INTRAVENOUS at 11:38

## 2021-07-14 RX ADMIN — FOLIC ACID 1 MG: 1 TABLET ORAL at 18:32

## 2021-07-14 RX ADMIN — SODIUM CHLORIDE: 9 INJECTION, SOLUTION INTRAVENOUS at 17:19

## 2021-07-14 RX ADMIN — LORAZEPAM 1 MG: 1 TABLET ORAL at 15:05

## 2021-07-14 RX ADMIN — NALTREXONE HYDROCHLORIDE 50 MG: 50 TABLET, FILM COATED ORAL at 19:04

## 2021-07-14 RX ADMIN — SODIUM CHLORIDE: 9 INJECTION, SOLUTION INTRAVENOUS at 23:08

## 2021-07-14 RX ADMIN — QUETIAPINE FUMARATE 400 MG: 200 TABLET ORAL at 23:07

## 2021-07-14 ASSESSMENT — ENCOUNTER SYMPTOMS
CONSTIPATION: 0
ABDOMINAL DISTENTION: 1
NAUSEA: 0
NAUSEA: 1
VOMITING: 1
WHEEZING: 0
BLOOD IN STOOL: 0
DIARRHEA: 0
COUGH: 0
VOMITING: 0
SHORTNESS OF BREATH: 1
SHORTNESS OF BREATH: 0
VISUAL CHANGE: 0
SORE THROAT: 1
ABDOMINAL PAIN: 0

## 2021-07-14 ASSESSMENT — PAIN SCALES - GENERAL
PAINLEVEL_OUTOF10: 0
PAINLEVEL_OUTOF10: 0

## 2021-07-14 NOTE — PROGRESS NOTES
Medication History completed:    New medications: sertraline, hydroxyurea    Medications discontinued: none    Changes to dosing: none    Stated allergies: NKDA    Other pertinent information: Medications confirmed with Sukumar Sheridan.      Thank you,  Alexandria Mcdonald, PharmD, BCPS  [phone]

## 2021-07-14 NOTE — H&P
level of 0.152    Other relevant studies include:  · CT head without contrast: No acute intracranial abnormalities are noted. I really  · EKG: Sinus rhythm with frequent Premature ventricular complexes    In the ED, patient received a milliliters per hour, 100 mg, aspirin 325 mg tablet. Decision was made to admit the patient to progressive unit to to treat his alcohol intoxication and place him on the CIWA protocol. Past Medical History:     Past Medical History:   Diagnosis Date    Alcohol abuse 8/12/2019    Anemia     Cancer ([address]     blood    Cervical stenosis of spine 6/14/2019    Chronic left-sided low back pain with left-sided sciatica 3/14/2019    Insomnia 3/14/2019    Intentional drug overdose ([address] 8/11/2019    Left arm numbness 6/14/2019    Left lumbar radiculitis 5/3/2019    Macrocytosis     Major depressive disorder, recurrent severe without psychotic features ([address] 8/12/2019    Myeloproliferative disorder ([address]     Osteoarthritis of spine with radiculopathy, lumbar region 4/12/2019    Severe recurrent major depression without psychotic features ([address] 8/12/2019    Spondylosis of lumbar region without myelopathy or radiculopathy 5/3/2019        Past SurgicalHistory:     Past Surgical History:   Procedure Laterality Date    APPENDECTOMY      CARPAL TUNNEL RELEASE      bilateral     CERVICAL FUSION      s/p trampoline accident   [address] [place] [address]  09/05/2019     ANTERIOR CERVICAL DECOMPRESSION FUSION C3-4     CERVICAL FUSION N/A 9/5/2019    ANTERIOR CERVICAL DECOMPRESSION FUSION C3-4 performed by [employer] Calderón DO at [address], DIAGNOSTIC      EPIDURAL STEROID INJECTION Left 5/15/2019    EPIDURAL STEROID INJECTION LEFT L5S1 performed by Jeanine Carrillo MD at [address] [facility]          Medications Prior to Admission:     Prior to Admission medications    Medication Sig Start Date End Date Taking?  Authorizing Provider   vitamin B-1 (THIAMINE) 100 MG tablet Take 1 tablet by mouth daily 6/24/21   Everette Davis MD   nicotine (NICODERM CQ) 21 MG/24HR Place 1 patch onto the skin every 24 hours 6/24/21   Everette Davis MD   folic acid (FOLVITE) 1 MG tablet Take 1 tablet by mouth daily 6/24/21   Everette Davis MD   levothyroxine (SYNTHROID) 50 MCG tablet Take 1 tablet by mouth daily Take in the morning on an empty stomach 1 hour before any food or other medications 6/24/21   Everette Davis MD   naltrexone (DEPADE) 50 MG tablet Take 50 mg by mouth daily    Historical Provider, MD   prazosin (MINIPRESS) 1 MG capsule Take 1 mg by mouth 2 times daily    Historical Provider, MD   omeprazole (PRILOSEC) 40 MG delayed release capsule Take 40 mg by mouth Daily with supper  Patient not taking: Reported on 6/25/2021    Historical Provider, MD   Multiple Vitamins-Minerals (THERAPEUTIC MULTIVITAMIN-MINERALS) tablet Take 1 tablet by mouth daily  Patient not taking: Reported on 6/25/2021 6/3/21 6/3/22  Catherine Hanks MD   hydrOXYzine (ATARAX) 50 MG tablet Take 50 mg by mouth every 6 hours as needed  4/13/21   Historical Provider, MD   QUEtiapine (SEROQUEL) 100 MG tablet Take 100 mg by mouth 3 times daily as needed 2/19/21   Historical Provider, MD   QUEtiapine (SEROQUEL) 400 MG tablet Take 400 mg by mouth nightly 2/19/21   Historical Provider, MD        Allergies:     Patient has no known allergies. Social History:     Tobacco:    reports that he has been smoking cigarettes. He started smoking about 42 years ago. He has a 64.50 pack-year smoking history. He has never used smokeless tobacco.  Alcohol:      reports current alcohol use of about 14.0 standard drinks of alcohol per week. Drug Use:  reports previous drug use. Drug: Marijuana.     Family History:     Family History   Problem Relation Age of Onset   [place] Sicard Breast Cancer Mother         s/p surgical complications    Diabetes Mother     Heart Failure Father     Other Brother         \"bad back\"       Review of Systems: Positive and Negative as described in HPI. Review of Systems   Constitutional: Positive for appetite change (no appetite) and fatigue. Negative for chills and fever. HENT: Positive for sore throat. Respiratory: Positive for shortness of breath (mild). Negative for cough and wheezing. Cardiovascular: Negative for chest pain, palpitations and leg swelling. Gastrointestinal: Positive for abdominal distention, nausea and vomiting (dry heaving). Negative for abdominal pain, constipation and diarrhea. Genitourinary: Negative for difficulty urinating and hematuria. Neurological: Positive for dizziness, weakness and light-headedness. Psychiatric/Behavioral: Negative for confusion and suicidal ideas. The patient is not nervous/anxious. Physical Exam:   /77   Pulse 92   Temp 97.7 °F (36.5 °C) (Oral)   Resp 16   Ht 6' 1\" (1.854 m)   Wt 210 lb (95.3 kg)   SpO2 98%   BMI 27.71 kg/m²   Temp (24hrs), Av.7 °F (36.5 °C), Min:97.7 °F (36.5 °C), Max:97.7 °F (36.5 °C)    No results for input(s): POCGLU in the last 72 hours. Intake/Output Summary (Last 24 hours) at 2021 1402  Last data filed at 2021 1208  Gross per 24 hour   Intake 100 ml   Output --   Net 100 ml       Physical Exam  Constitutional:       Appearance: Normal appearance. He is obese. Eyes:      General:         Right eye: No discharge. Left eye: No discharge. Extraocular Movements: Extraocular movements intact. Conjunctiva/sclera: Conjunctivae normal.   Cardiovascular:      Rate and Rhythm: Normal rate and regular rhythm. Pulses: Normal pulses. Heart sounds: Normal heart sounds. No murmur heard. Pulmonary:      Effort: Pulmonary effort is normal. No respiratory distress. Breath sounds: Normal breath sounds. No wheezing or rales. Abdominal:      General: Abdomen is protuberant. Bowel sounds are normal.      Palpations: Abdomen is soft. Tenderness:  There is no abdominal tenderness. There is no guarding or rebound. Musculoskeletal:      Right lower leg: No edema. Left lower leg: No edema. Skin:     General: Skin is warm. Neurological:      General: No focal deficit present. Mental Status: He is alert and oriented to person, place, and time. Motor: Tremor present. Psychiatric:         Attention and Perception: Attention normal.         Mood and Affect: Mood normal.         Behavior: Behavior normal.         Investigations:     Laboratory Testing:  Recent Results (from the past 24 hour(s))   Ammonia    Collection Time: 07/14/21 11:30 AM   Result Value Ref Range    Ammonia 26 16 - 60 umol/L   CBC Auto Differential    Collection Time: 07/14/21 11:30 AM   Result Value Ref Range    WBC 8.5 3.5 - 11.0 k/uL    RBC 4.63 4.5 - 5.9 m/uL    Hemoglobin 14.7 13.5 - 17.5 g/dL    Hematocrit 42.8 41 - 53 %    MCV 92.6 80 - 100 fL    MCH 31.7 26 - 34 pg    MCHC 34.3 31 - 37 g/dL    RDW 18.2 (H) 11.5 - 14.9 %    Platelets 870 885 - 983 k/uL    MPV 7.9 6.0 - 12.0 fL    NRBC Automated NOT REPORTED per 100 WBC    Differential Type NOT REPORTED     Immature Granulocytes NOT REPORTED 0 %    Absolute Immature Granulocyte NOT REPORTED 0.00 - 0.30 k/uL    WBC Morphology NOT REPORTED     RBC Morphology NOT REPORTED     Platelet Estimate NOT REPORTED     Seg Neutrophils 66 36 - 66 %    Lymphocytes 22 (L) 24 - 44 %    Monocytes 10 (H) 1 - 7 %    Eosinophils % 1 0 - 4 %    Basophils 0 0 - 2 %    Bands 1 0 - 10 %    Segs Absolute 5.60 1.3 - 9.1 k/uL    Absolute Lymph # 1.87 1.0 - 4.8 k/uL    Absolute Mono # 0.85 0.1 - 1.3 k/uL    Absolute Eos # 0.09 0.0 - 0.4 k/uL    Basophils Absolute 0.00 0.0 - 0.2 k/uL    Absolute Bands # 0.09 0.0 - 1.0 k/uL    Morphology Normal    SPECIMEN REJECTION    Collection Time: 07/14/21 11:30 AM   Result Value Ref Range    Specimen Source . BLOOD     Ordered Test ALCB CP LIP MG TROPI PT     Reason for Rejection Unable to perform testing: Specimen hemolyzed.      - NOT REPORTED    Ethanol    Collection Time: 07/14/21 12:00 PM   Result Value Ref Range    Ethanol 152 (H) <10 mg/dL    Ethanol percent 0.152 %   Comprehensive Metabolic Panel    Collection Time: 07/14/21 12:00 PM   Result Value Ref Range    Glucose 99 70 - 99 mg/dL    BUN 4 (L) 6 - 20 mg/dL    CREATININE 0.56 (L) 0.70 - 1.20 mg/dL    Bun/Cre Ratio NOT REPORTED 9 - 20    Calcium 8.5 (L) 8.6 - 10.4 mg/dL    Sodium 131 (L) 135 - 144 mmol/L    Potassium 4.2 3.7 - 5.3 mmol/L    Chloride 97 (L) 98 - 107 mmol/L    CO2 16 (L) 20 - 31 mmol/L    Anion Gap 18 (H) 9 - 17 mmol/L    Alkaline Phosphatase 138 (H) 40 - 129 U/L    ALT 48 (H) 5 - 41 U/L    AST 95 (H) <40 U/L    Total Bilirubin 0.68 0.3 - 1.2 mg/dL    Total Protein 7.3 6.4 - 8.3 g/dL    Albumin 4.0 3.5 - 5.2 g/dL    Albumin/Globulin Ratio NOT REPORTED 1.0 - 2.5    GFR Non-African American >60 >60 mL/min    GFR African American >60 >60 mL/min    GFR Comment          GFR Staging NOT REPORTED    Lipase    Collection Time: 07/14/21 12:00 PM   Result Value Ref Range    Lipase 26 13 - 60 U/L   Magnesium    Collection Time: 07/14/21 12:00 PM   Result Value Ref Range    Magnesium 2.4 1.6 - 2.6 mg/dL   Protime-INR    Collection Time: 07/14/21 12:00 PM   Result Value Ref Range    Protime 14.5 11.8 - 14.6 sec    INR 1.1    Troponin    Collection Time: 07/14/21 12:00 PM   Result Value Ref Range    Troponin, High Sensitivity 40 (H) 0 - 22 ng/L    Troponin T NOT REPORTED <0.03 ng/mL    Troponin Interp NOT REPORTED    EKG 12 Lead    Collection Time: 07/14/21 12:24 PM   Result Value Ref Range    Ventricular Rate 86 BPM    Atrial Rate 86 BPM    P-R Interval 164 ms    QRS Duration 84 ms    Q-T Interval 400 ms    QTc Calculation (Bazett) 478 ms    P Axis 42 degrees    R Axis 48 degrees    T Axis 53 degrees       Imaging/Diagnostics:  CT HEAD WO CONTRAST    Result Date: 7/14/2021  EXAMINATION: CT OF THE HEAD WITHOUT CONTRAST  7/14/2021 8:54 am TECHNIQUE: CT of the head was performed without the administration of intravenous contrast. Dose modulation, iterative reconstruction, and/or weight based adjustment of the mA/kV was utilized to reduce the radiation dose to as low as reasonably achievable. COMPARISON: 06/2/2021 HISTORY: ORDERING SYSTEM PROVIDED HISTORY: dizziness TECHNOLOGIST PROVIDED HISTORY: dizziness Decision Support Exception - unselect if not a suspected or confirmed emergency medical condition->Emergency Medical Condition (MA) Reason for Exam: dizziness Acuity: Unknown Type of Exam: Unknown Additional signs and symptoms: pt states dizziness and lightheadedness FINDINGS: BRAIN/VENTRICLES: The cerebral hemispheres, brainstem, and cerebellum have a normal appearance for the patient's age. The falx is midline. The ventricles and peripheral sulci are mildly dilated. There is decreased attenuation in the periventricular white matter. There is no sign of a space occupying lesion, infarction, or hemorrhage. Orbits: Portion of the orbits demonstrate no acute abnormality. SINUSES: . The  imaged portions of the paranasal sinuses are clear. The mastoids and the middle ear chambers are clear. SOFT TISSUES/SKULL:  No acute abnormality of the visualized skull or soft tissues. Vascular calcifications are seen compatible with atherosclerotic disease. Mild central and cortical cerebral atrophy. Mild chronic deep white matter ischemic changes No acute intracranial abnormalities are noted. CT SOFT TISSUE NECK W CONTRAST    Result Date: 6/23/2021  EXAMINATION: CT OF THE NECK SOFT TISSUE WITH CONTRAST  6/23/2021 TECHNIQUE: CT of the neck was performed with the administration of intravenous contrast. Multiplanar reformatted images are provided for review. Dose modulation, iterative reconstruction, and/or weight based adjustment of the mA/kV was utilized to reduce the radiation dose to as low as reasonably achievable.  COMPARISON: 06/02/2021 HISTORY: ORDERING SYSTEM PROVIDED HISTORY: sore throat TECHNOLOGIST PROVIDED HISTORY: sore throat Decision Support Exception - unselect if not a suspected or confirmed emergency medical condition->Emergency Medical Condition (MA) Reason for Exam: sore throat, pt states he has a hard time talking sometimes, pt is going thru detox of alcohol Acuity: Acute Type of Exam: Initial FINDINGS: PHARYNX/LARYNX:  The parapharyngeal fat spaces are preserved. Collateral vessels are noted along the left side of the pharynx. The base of the tongue is unremarkable. The epiglottis is normal in appearance. No pharyngeal or laryngeal mass is identified. SALIVARY GLANDS/THYROID:  The thyroid gland is normal in appearance. The submandibular and parotid glands are unremarkable. LYMPH NODES:  No cervical or supraclavicular adenopathy. SOFT TISSUES:  Vascular calcifications are noted in the proximal left subclavian artery with an estimated 50% stenosis. There are also vascular calcifications noted in the proximal internal carotid arteries bilaterally. No appreciable soft tissue swelling is identified. BRAIN/ORBITS/SINUSES:  Limited images through the cerebral and cerebellar parenchyma are unremarkable. The orbits are normal in appearance. There is scattered paranasal sinus disease with greatest involvement in the right maxillary sinus where there are associated mucous retention cysts. The mastoid air cells are clear. LUNG APICES/SUPERIOR MEDIASTINUM:  There is centrilobular emphysema. Limited images through the lung apices are otherwise unremarkable. No superior mediastinal adenopathy. BONES:  The patient is edentulous. Multifocal degenerative changes are noted in the spine. There is sequela of an anterior C3-C4 fusion. No evidence of an acute fracture. 1. No findings to account for the patient's sore throat. 2. No cervical lymphadenopathy.  3. Atherosclerotic disease, most pronounced in the proximal left subclavian artery and bilateral carotid bulb/proximal internal carotid arteries. 4. Centrilobular emphysema.        Assessment :      Primary Problem  <principal problem not specified>    Active Hospital Problems    Diagnosis Date Noted    Alcohol abuse with intoxication [facility]) [F10.129] 07/14/2021       Plan:     Patient status Admit as inpatient in the  Progressive Unit/Step down    Acute Alcohol Withdrawal 2/2 Intoxication  [facility] protocol for alcohol withdrawal  Fluids 031 mL/h  Folic Acid 1 mg once daily  Vitamin B-12 1000 mcg once daily  Thiamine 100 mg once daily  Potassium magnesium replacement protocol  Naltrexone 50 mg once daily oral    Hypothyroidism  Synthroid 50 mcg oral once daily  TSH w/o reflex    Depression  Seroquel 400 mg nightly    Consultations:   IP CONSULT TO INTERNAL MEDICINE  IP CONSULT TO SOCIAL WORK  Consult PT/OT    Full code  Diet: Adult diet regular  DVT prophylaxis: Lovenox 40 mg subcutaneous    Mila Rouse MD  7/14/2021  2:02 PM    Copy sent to Dr. Cary Steward, APRN - CNP

## 2021-07-14 NOTE — ED PROVIDER NOTES
EMERGENCY DEPARTMENT ENCOUNTER    Pt Name: Milana Hyman  MRN: [id number]  [place] 1960  Date of evaluation: 7/14/21  CHIEF COMPLAINT       Chief Complaint   Patient presents with    Dizziness     Dizziness et lightheadedness    Fatigue    Alcohol Problem     HISTORY OF PRESENT ILLNESS     Dizziness  Quality:  Lightheadedness  Severity:  Moderate  Onset quality:  Gradual  Timing:  Constant  Progression:  Worsening  Chronicity:  Recurrent (low sodium, heavy drinker)  Context comment:  Drinks 15 beers per day  Relieved by:  Nothing  Worsened by:  Nothing  Ineffective treatments:  None tried  Associated symptoms: no blood in stool, no diarrhea, no headaches, no nausea, no palpitations, no shortness of breath, no syncope, no vision changes and no vomiting            REVIEW OF SYSTEMS     Review of Systems   Respiratory: Negative for shortness of breath. Cardiovascular: Negative for palpitations and syncope. Gastrointestinal: Negative for blood in stool, diarrhea, nausea and vomiting. Neurological: Positive for dizziness. Negative for headaches. All other systems reviewed and are negative.     PASTMEDICAL HISTORY     Past Medical History:   Diagnosis Date    Alcohol abuse 8/12/2019    Anemia     Cancer ([address]     blood    Cervical stenosis of spine 6/14/2019    Chronic left-sided low back pain with left-sided sciatica 3/14/2019    Insomnia 3/14/2019    Intentional drug overdose ([address] 8/11/2019    Left arm numbness 6/14/2019    Left lumbar radiculitis 5/3/2019    Macrocytosis     Major depressive disorder, recurrent severe without psychotic features ([address] 8/12/2019    Myeloproliferative disorder ([address]     Osteoarthritis of spine with radiculopathy, lumbar region 4/12/2019    Severe recurrent major depression without psychotic features ([address] 8/12/2019    Spondylosis of lumbar region without myelopathy or radiculopathy 5/3/2019     Past Problem List  Patient Active Problem List   Diagnosis Code    MPN (myeloproliferative neoplasm) ([facility] D47.1    Chronic left-sided low back pain with left-sided sciatica M54.42, G89.29    Insomnia G47.00    Osteoarthritis of spine with radiculopathy, lumbar region M47.26    Spondylosis of lumbar region without myelopathy or radiculopathy M47.816    Left lumbar radiculitis M54.16    Hx of fusion of cervical spine Z98.1    Cervical stenosis of spine M48.02    Left arm numbness R20.0    Intentional drug overdose ([facility] T50.902A    Alcohol abuse F10.10    Major depressive disorder, recurrent severe without psychotic features ([facility] F33.2    Severe recurrent major depression without psychotic features ([facility]) F33.2    Anemia D64.9    Macrocytosis D75.89    Substance abuse ([facility]) F19.10    Stenosis of cervical spine with myelopathy ([facility]) M48.02, G99.2    Hyponatremia S40.2    Uncomplicated alcohol dependence ([facility]) F10.20     SURGICAL HISTORY       Past Surgical History:   Procedure Laterality Date    APPENDECTOMY      CARPAL TUNNEL RELEASE      bilateral     CERVICAL FUSION      s/p trampoline accident    CERVICAL FUSION  09/05/2019     ANTERIOR CERVICAL DECOMPRESSION FUSION C3-4     CERVICAL FUSION N/A 9/5/2019    ANTERIOR CERVICAL DECOMPRESSION FUSION C3-4 performed by Florencio Mcgill DO at [facility], COLON, DIAGNOSTIC      EPIDURAL STEROID INJECTION Left 5/15/2019    EPIDURAL STEROID INJECTION LEFT L5S1 performed by Dorys Boswell MD at [address] [facility]       Previous Medications    FOLIC ACID (FOLVITE) 1 MG TABLET    Take 1 tablet by mouth daily    HYDROXYZINE (ATARAX) 50 MG TABLET    Take 50 mg by mouth every 6 hours as needed     LEVOTHYROXINE (SYNTHROID) 50 MCG TABLET    Take 1 tablet by mouth daily Take in the morning on an empty stomach 1 hour before any food or other medications    MULTIPLE VITAMINS-MINERALS (THERAPEUTIC MULTIVITAMIN-MINERALS) TABLET    Take 1 tablet by mouth daily    NALTREXONE Conjunctiva/sclera: Conjunctivae normal.      Pupils: Pupils are equal, round, and reactive to light. Neck:      Trachea: No tracheal deviation. Cardiovascular:      Rate and Rhythm: Normal rate and regular rhythm. Pulses: Normal pulses. Heart sounds: Normal heart sounds. Pulmonary:      Effort: Pulmonary effort is normal. No respiratory distress. Breath sounds: Normal breath sounds. No stridor. No wheezing or rales. Abdominal:      Palpations: Abdomen is soft. Tenderness: There is no abdominal tenderness. There is no guarding or rebound. Musculoskeletal:         General: No tenderness or deformity. Normal range of motion. Cervical back: Normal range of motion and neck supple. Skin:     General: Skin is warm and dry. Capillary Refill: Capillary refill takes less than 2 seconds. Findings: No erythema or rash. Neurological:      General: No focal deficit present. Mental Status: He is alert and oriented to person, place, and time. Cranial Nerves: No cranial nerve deficit. Coordination: Coordination normal.   Psychiatric:         Mood and Affect: Mood normal.         Behavior: Behavior normal.         Thought Content: Thought content normal.         Judgment: Judgment normal.         MEDICAL DECISION MAKING:       ED Course as of Jul 14 1319   Wed Jul 14, 2021   1311 Given asa  DW Dr Salas Holding for admit    [WM]   1311 Iv NS  Iv thiamine    [WM]      ED Course User Index  [WM] MD KASIE Valentino FP residents for admit    Procedures    DIAGNOSTIC RESULTS   EKG:All EKG's are interpreted by the Emergency Department Physician who either signs or Co-signs this chart in the absence of a cardiologist.  Lonia Kenning with PVCs, normal rate and intervals, no acute ischemic changes      RADIOLOGY:All plain film, CT, MRI, and formal ultrasound images (except ED bedside ultrasound) are read by the radiologist, see reports below, unless otherwisenoted in MDM or here.   CT HEAD WO CONTRAST   Final Result   Mild central and cortical cerebral atrophy. Mild chronic deep white matter ischemic changes      No acute intracranial abnormalities are noted. LABS: All lab results were reviewed by myself, and all abnormals are listed below. Labs Reviewed   CBC WITH AUTO DIFFERENTIAL - Abnormal; Notable for the following components:       Result Value    RDW 18.2 (*)     Lymphocytes 22 (*)     Monocytes 10 (*)     All other components within normal limits   ETHANOL - Abnormal; Notable for the following components:    Ethanol 152 (*)     All other components within normal limits   COMPREHENSIVE METABOLIC PANEL - Abnormal; Notable for the following components:    BUN 4 (*)     CREATININE 0.56 (*)     Calcium 8.5 (*)     Sodium 131 (*)     Chloride 97 (*)     CO2 16 (*)     Anion Gap 18 (*)     Alkaline Phosphatase 138 (*)     ALT 48 (*)     AST 95 (*)     All other components within normal limits   TROPONIN - Abnormal; Notable for the following components:    Troponin, High Sensitivity 40 (*)     All other components within normal limits   AMMONIA   SPECIMEN REJECTION   LIPASE   MAGNESIUM   PROTIME-INR   TROPONIN       EMERGENCY DEPARTMENTCOURSE:         Vitals:    Vitals:    07/14/21 1039   BP: 122/77   Pulse: 92   Resp: 16   Temp: 97.7 °F (36.5 °C)   TempSrc: Oral   SpO2: 98%   Weight: 210 lb (95.3 kg)   Height: 6' 1\" (1.854 m)       The patient was given the following medications while in the emergency department:  Orders Placed This Encounter   Medications    0.9 % sodium chloride infusion    thiamine (B-1) 100 mg in sodium chloride 0.9 % 100 mL IVPB    aspirin tablet 325 mg     CONSULTS:  IP CONSULT TO INTERNAL MEDICINE    FINAL IMPRESSION      1. Elevated troponin    2. Dehydration    3. Frequent PVCs    4.  Hyponatremia          DISPOSITION/PLAN   Aaliyah Ibarra MD  Attending Emergency Physician                   Freida Mendieta MD  07/14/21 [id number]

## 2021-07-14 NOTE — ED NOTES
Mode of arrival (squad #, walk in, police, etc) : walk in         Chief complaint(s): Lightheadedness/fatigue        Arrival Note (brief scenario, treatment PTA, etc). : Pt reporting lightheadedness/fatigue x2-3 weeks recently seen for similar issue. Pt reporting 10-15 beer/day with last drink this am. Pt wishing to stop drinking reporting previous detox from etoh and only symptom hallucinations. Pt A&Ox4, NAD, respirations even and unlabored with no increased WOB or SOB. C= \"Have you ever felt that you should Cut down on your drinking? \"  No  A= \"Have people Annoyed you by criticizing your drinking? \"  No  G= \"Have you ever felt bad or Guilty about your drinking? \"  No  E= \"Have you ever had a drink as an Eye-opener first thing in the morning to steady your nerves or to help a hangover? \"  No      Deferred []      Reason for deferring: N/A    *If yes to two or more: probable alcohol abuse. Rachael Almendarez RN  07/14/21 1955

## 2021-07-15 PROBLEM — F32.A DEPRESSION: Status: ACTIVE | Noted: 2021-07-15

## 2021-07-15 PROBLEM — E03.9 HYPOTHYROIDISM: Status: ACTIVE | Noted: 2021-07-15

## 2021-07-15 LAB
EKG ATRIAL RATE: 86 BPM
EKG P AXIS: 42 DEGREES
EKG P-R INTERVAL: 164 MS
EKG Q-T INTERVAL: 400 MS
EKG QRS DURATION: 84 MS
EKG QTC CALCULATION (BAZETT): 478 MS
EKG R AXIS: 48 DEGREES
EKG T AXIS: 53 DEGREES
EKG VENTRICULAR RATE: 86 BPM
MAGNESIUM: 2.3 MG/DL (ref 1.6–2.6)
SODIUM BLD-SCNC: 135 MMOL/L (ref 135–144)

## 2021-07-15 PROCEDURE — 83735 ASSAY OF MAGNESIUM: CPT

## 2021-07-15 PROCEDURE — 2580000003 HC RX 258: Performed by: STUDENT IN AN ORGANIZED HEALTH CARE EDUCATION/TRAINING PROGRAM

## 2021-07-15 PROCEDURE — 6370000000 HC RX 637 (ALT 250 FOR IP)

## 2021-07-15 PROCEDURE — 6370000000 HC RX 637 (ALT 250 FOR IP): Performed by: STUDENT IN AN ORGANIZED HEALTH CARE EDUCATION/TRAINING PROGRAM

## 2021-07-15 PROCEDURE — 2580000003 HC RX 258: Performed by: EMERGENCY MEDICINE

## 2021-07-15 PROCEDURE — 97161 PT EVAL LOW COMPLEX 20 MIN: CPT

## 2021-07-15 PROCEDURE — 84295 ASSAY OF SERUM SODIUM: CPT

## 2021-07-15 PROCEDURE — 99223 1ST HOSP IP/OBS HIGH 75: CPT | Performed by: INTERNAL MEDICINE

## 2021-07-15 PROCEDURE — 97165 OT EVAL LOW COMPLEX 30 MIN: CPT

## 2021-07-15 PROCEDURE — 2060000000 HC ICU INTERMEDIATE R&B

## 2021-07-15 PROCEDURE — 36415 COLL VENOUS BLD VENIPUNCTURE: CPT

## 2021-07-15 PROCEDURE — 6360000002 HC RX W HCPCS: Performed by: STUDENT IN AN ORGANIZED HEALTH CARE EDUCATION/TRAINING PROGRAM

## 2021-07-15 RX ORDER — HYDROXYZINE HYDROCHLORIDE 25 MG/1
50 TABLET, FILM COATED ORAL EVERY 6 HOURS PRN
Status: DISCONTINUED | OUTPATIENT
Start: 2021-07-15 | End: 2021-07-17 | Stop reason: HOSPADM

## 2021-07-15 RX ORDER — HYDROXYUREA 500 MG/1
500 CAPSULE ORAL 2 TIMES DAILY
Status: DISCONTINUED | OUTPATIENT
Start: 2021-07-15 | End: 2021-07-17 | Stop reason: HOSPADM

## 2021-07-15 RX ORDER — NICOTINE 21 MG/24HR
1 PATCH, TRANSDERMAL 24 HOURS TRANSDERMAL ONCE
Status: COMPLETED | OUTPATIENT
Start: 2021-07-15 | End: 2021-07-16

## 2021-07-15 RX ORDER — CHLORDIAZEPOXIDE HYDROCHLORIDE 25 MG/1
25 CAPSULE, GELATIN COATED ORAL 3 TIMES DAILY
Status: DISCONTINUED | OUTPATIENT
Start: 2021-07-15 | End: 2021-07-17 | Stop reason: HOSPADM

## 2021-07-15 RX ORDER — LISINOPRIL 10 MG/1
10 TABLET ORAL DAILY
Status: DISCONTINUED | OUTPATIENT
Start: 2021-07-15 | End: 2021-07-17 | Stop reason: HOSPADM

## 2021-07-15 RX ADMIN — ENOXAPARIN SODIUM 40 MG: 40 INJECTION SUBCUTANEOUS at 08:20

## 2021-07-15 RX ADMIN — LORAZEPAM 2 MG: 1 TABLET ORAL at 07:52

## 2021-07-15 RX ADMIN — LORAZEPAM 2 MG: 2 INJECTION INTRAMUSCULAR; INTRAVENOUS at 16:18

## 2021-07-15 RX ADMIN — LISINOPRIL 10 MG: 10 TABLET ORAL at 11:56

## 2021-07-15 RX ADMIN — NALTREXONE HYDROCHLORIDE 50 MG: 50 TABLET, FILM COATED ORAL at 11:56

## 2021-07-15 RX ADMIN — LORAZEPAM 1 MG: 1 TABLET ORAL at 05:08

## 2021-07-15 RX ADMIN — FOLIC ACID 1 MG: 1 TABLET ORAL at 08:17

## 2021-07-15 RX ADMIN — SODIUM CHLORIDE: 9 INJECTION, SOLUTION INTRAVENOUS at 10:38

## 2021-07-15 RX ADMIN — HYDROXYUREA 500 MG: 500 CAPSULE ORAL at 20:28

## 2021-07-15 RX ADMIN — QUETIAPINE FUMARATE 400 MG: 200 TABLET ORAL at 20:26

## 2021-07-15 RX ADMIN — LORAZEPAM 1 MG: 1 TABLET ORAL at 22:26

## 2021-07-15 RX ADMIN — THIAMINE HCL TAB 100 MG 100 MG: 100 TAB at 08:17

## 2021-07-15 RX ADMIN — CHLORDIAZEPOXIDE HYDROCHLORIDE 25 MG: 25 CAPSULE ORAL at 20:26

## 2021-07-15 RX ADMIN — Medication 1 TABLET: at 08:17

## 2021-07-15 RX ADMIN — LORAZEPAM 1 MG: 1 TABLET ORAL at 23:51

## 2021-07-15 RX ADMIN — ACETAMINOPHEN 650 MG: 325 TABLET ORAL at 05:17

## 2021-07-15 RX ADMIN — LEVOTHYROXINE SODIUM 50 MCG: 0.05 TABLET ORAL at 08:17

## 2021-07-15 RX ADMIN — LORAZEPAM 2 MG: 1 TABLET ORAL at 15:09

## 2021-07-15 RX ADMIN — SODIUM CHLORIDE, PRESERVATIVE FREE 10 ML: 5 INJECTION INTRAVENOUS at 20:27

## 2021-07-15 RX ADMIN — LORAZEPAM 2 MG: 1 TABLET ORAL at 17:29

## 2021-07-15 RX ADMIN — HYDROXYUREA 500 MG: 500 CAPSULE ORAL at 10:37

## 2021-07-15 ASSESSMENT — PAIN SCALES - GENERAL: PAINLEVEL_OUTOF10: 3

## 2021-07-15 ASSESSMENT — ENCOUNTER SYMPTOMS
DIARRHEA: 0
SORE THROAT: 1
WHEEZING: 0
ABDOMINAL PAIN: 0
RHINORRHEA: 0
VOMITING: 1
NAUSEA: 1
ABDOMINAL DISTENTION: 1
COUGH: 0
SHORTNESS OF BREATH: 1
CONSTIPATION: 0

## 2021-07-15 NOTE — PROGRESS NOTES
Discussed home meds and chemo capsules with resident. Discussed hypertension but pt is also anxious. Will continue to monitor.

## 2021-07-15 NOTE — FLOWSHEET NOTE
07/15/21 1454   Encounter Summary   Services provided to: Patient   Referral/Consult From: [facility]   (7/15/21V)   Volunteer Visit Yes   Complexity of Encounter Low   Length of Encounter 15 minutes   Spiritual/Adventist   Type Spiritual support   Intervention Communion;Prayer   Sacraments   Communion Patient received communion

## 2021-07-15 NOTE — PROGRESS NOTES
Physical Therapy    Facility/Department: [facility] PROGRESSIVE CARE  Initial Assessment    NAME: Luciano Cantu  : 1960  MRN: [id number]    Date of Service: 7/15/2021    Discharge Recommendations: The patient's needs are being met with no further Physical Therapy recommended at discharge. PT Equipment Recommendations  Equipment Needed: No    Assessment   Body structures, Functions, Activity limitations: Decreased functional mobility ; Decreased ADL status; Decreased balance;Decreased safe awareness;Decreased endurance;Decreased strength  Assessment: Pt most limited by balance this date. Pt would benefit from continued PT while here to improve his steadiness in gait prior to d/c. Treatment Diagnosis: Impaired functional mobility 2* alcohol abuse  Specific instructions for Next Treatment: progress balance, gait, trial device as needed, HEP  Prognosis: Good  Decision Making: Low Complexity  History: Patient states he has been feeling dizziness and fatigue for the last few weeks. He states he has come to the emergency department for fear of unsteadiness and falling. Patient states he has not fallen recently. Endorses a chronic alcoholic drinking 30-55 beers a day. He has experienced withdrawal symptoms and also endorses being admitted to the hospital about 2 months ago for similar symptoms and low sodium. He has not had an appetite and has had no food intake for 8 days. Patient endorses dry heaves in the morning. Patient smokes a pack a day and has been doing so since he was 13years old. Patient has a history of blood cancer diagnosed in 2019 and is currently on chemotherapy. Exam: ROM, MMT, bed mobility, transfers, amb, balance  Clinical Presentation: Pt alert, cooperative, but appears depressed with flat affect.   Barriers to Learning: safety awareness  REQUIRES PT FOLLOW UP: Yes  Activity Tolerance  Activity Tolerance: Patient Tolerated treatment well       Patient Diagnosis(es): The primary encounter diagnosis was Elevated troponin. Diagnoses of Dehydration, Frequent PVCs, and Hyponatremia were also pertinent to this visit. has a past medical history of Alcohol abuse, Anemia, Cancer ([address] Cervical stenosis of spine, Chronic left-sided low back pain with left-sided sciatica, Insomnia, Intentional drug overdose ([address] Left arm numbness, Left lumbar radiculitis, Macrocytosis, Major depressive disorder, recurrent severe without psychotic features ([address] Myeloproliferative disorder ([address] Osteoarthritis of spine with radiculopathy, lumbar region, Severe recurrent major depression without psychotic features ([address] and Spondylosis of lumbar region without myelopathy or radiculopathy. has a past surgical history that includes cervical fusion; Appendectomy; Tonsillectomy; Carpal tunnel release; epidural steroid injection (Left, 5/15/2019); Endoscopy, colon, diagnostic; cervical fusion (09/05/2019); and cervical fusion (N/A, 9/5/2019). Restrictions  Restrictions/Precautions  Restrictions/Precautions: General Precautions, Seizure, Fall Risk (fell 3 weeks ago on uneven sidewalk)  Required Braces or Orthoses?: No  Implants present? : Metal implants (cervical fusion)  Position Activity Restriction  Other position/activity restrictions: PT OT eval and treat  Vision/Hearing  Vision: Impaired  Vision Exceptions: Wears glasses for reading  Hearing: Within functional limits     Subjective  General  Chart Reviewed: Yes  Patient assessed for rehabilitation services?: Yes  Additional Pertinent Hx: CA, falls, alcoholism  Family / Caregiver Present: No  Referring Practitioner: Ron Beltran MD  Referral Date : 07/14/21  Diagnosis: alcohol abuse with intoxication  Follows Commands: Within Functional Limits  Subjective  Subjective: Pt sitting edge of bed. SENDY Whittington. Pt is agreeable to PT OT.    Pain Screening  Patient Currently in Pain: Denies  Vital Signs  Patient Currently in Pain: Denies  Oxygen Therapy  O2 Device: None (Room air)  Patient Observation  Observations: mild unsteadiness/shakiness, purple/red discoloration/marks on B forearms, flat affects, reports not eating, appears depressed       Orientation  Orientation  Overall Orientation Status: Within Normal Limits  Social/Functional History  Social/Functional History  Lives With: Alone  Type of Home: Apartment (4th level)  Home Layout: One level  Home Access: Level entry, Elevator  Bathroom Shower/Tub: Tub/Shower unit, Curtain  Bathroom Toilet: Standard  Bathroom Equipment: Grab bars in shower  Bathroom Accessibility: Accessible  Home Equipment:  (no DME)  ADL Assistance: Independent  Homemaking Assistance: Independent (laundry on first floor)  Homemaking Responsibilities: Yes  Ambulation Assistance: Independent (no DME)  Transfer Assistance: Independent  Active : No  Patient's  Info: sober living group or taxi  Occupation: On disability  IADL Comments: sleeps in flat bed  Additional Comments: No recent PT OT. Has brother, sister and daughter in [facility] - no one in town. Cognition        Objective          AROM RLE (degrees)  RLE AROM: WNL  AROM LLE (degrees)  LLE AROM : WNL  AROM RUE (degrees)  RUE General AROM: See OT  AROM LUE (degrees)  LUE General AROM: See OT  Strength RLE  Strength RLE: WNL  Comment: Grossly 4/5  Strength LLE  Strength LLE: WNL  Comment: Grossly 4/5  Strength RUE  Comment: See OT  Strength LUE  Comment: See OT     Sensation  Overall Sensation Status: WFL (pt denies)  Bed mobility  Rolling to Left: Independent  Supine to Sit: Independent  Sit to Supine: Independent  Scooting: Independent  Comment: Flat bed, no cues needed. Transfers  Sit to Stand: Stand by assistance  Stand to sit: Stand by assistance  Comment: No device, pt declines up to a chair. No difficulty with standing.   Ambulation  Ambulation?: Yes  Ambulation 1  Surface: level tile  Device: No Device  Assistance: Contact guard assistance  Quality of Gait: increased lateral sway, no LOB, mild unsteadiness, normal john  Distance: 72'  Comments: minimal signs of fatigue, HR/O2 sats WNL. Pt asking about amb outside to smoke  - not safe at this time to amb by himself outside. Stairs/Curb  Stairs?: No     Balance  Posture: Good  Sitting - Static: Good  Sitting - Dynamic: Good;-  Standing - Static: Good;-  Standing - Dynamic: Fair;+  Comments: No device for standing balance, fall risk. Plan   Plan  Times per week: 5-6x/week  Specific instructions for Next Treatment: progress balance, gait, trial device as needed, HEP  Current Treatment Recommendations: Strengthening, Balance Training, Functional Mobility Training, Transfer Training, Endurance Training, Gait Training, Equipment Evaluation, Education, & procurement, Patient/Caregiver Education & Training, Safety Education & Training, Positioning, Home Exercise Program  Safety Devices  Type of devices: All fall risk precautions in place, Call light within reach, Gait belt, Patient at risk for falls, Nurse notified, Left in bed (gait belt removed from room. discussed with SENDY lizarraga)    G-Code       OutComes Score                                                  AM-PAC Score     AM-PAC Inpatient Mobility without Stair Climbing Raw Score : 17 (07/15/21 1341)  AM-PAC Inpatient without Stair Climbing T-Scale Score : 48.47 (07/15/21 1341)  Mobility Inpatient CMS 0-100% Score: 32.72 (07/15/21 1341)  Mobility Inpatient without Stair CMS G-Code Modifier : CJ (07/15/21 1341)       Goals  Short term goals  Time Frame for Short term goals: 3-4 days  Short term goal 1: pt to demo IND transfers. Short term goal 2: Pt to amb 150' IND. Short term goal 3: Pt to demo good technique for HEP for BLE strengthening and balance. Short term goal 4: pt to improve standing balance to GOOD. Short term goal 5: pt to perform 5 minutes on nustep without change in symptoms. Patient Goals   Patient goals :  To go home       Therapy Time Individual Concurrent Group Co-treatment   Time In [address]         Time Out 1358         Minutes [address], [place]

## 2021-07-15 NOTE — PROGRESS NOTES
Telesitter initiated. This RN has reviewed and agrees with the charting done this shift by Scotty Hammond RN.

## 2021-07-15 NOTE — PROGRESS NOTES
[address] [facility]    Occupational Therapy Evaluation  Date: 7/15/21  Patient Name: Luciano Pepe       Room: 0570/8113-11  MRN: [id number]  Account: [de-identified]   : 1960  (62 y.o.) Gender: male     Discharge Recommendations: The patient's needs are being met with no further Occupational Therapy recommended at discharge. Equipment Needed:  (TBD)    Referring Practitioner: Aruna Beal MD  Diagnosis: Alcohol abuse with intoxication       Treatment Diagnosis: Impaired self care status  Past Medical History:  has a past medical history of Alcohol abuse, Anemia, Cancer ([address] Cervical stenosis of spine, Chronic left-sided low back pain with left-sided sciatica, Insomnia, Intentional drug overdose ([address] Left arm numbness, Left lumbar radiculitis, Macrocytosis, Major depressive disorder, recurrent severe without psychotic features ([address] Myeloproliferative disorder ([address] Osteoarthritis of spine with radiculopathy, lumbar region, Severe recurrent major depression without psychotic features ([address] and Spondylosis of lumbar region without myelopathy or radiculopathy. Past Surgical History:   has a past surgical history that includes cervical fusion; Appendectomy; Tonsillectomy; Carpal tunnel release; epidural steroid injection (Left, 5/15/2019); Endoscopy, colon, diagnostic; cervical fusion (2019); and cervical fusion (N/A, 2019).     Restrictions  Restrictions/Precautions: General Precautions, Seizure, Fall Risk (fell 3 weeks ago on uneven sidewalk)  Implants present? : Metal implants (cervical fusion)  Other position/activity restrictions: PT OT eval and treat  Required Braces or Orthoses?: No     Vitals  Temp: 98.1 °F (36.7 °C)  Pulse: 79  Resp: 20  BP: (!) 159/94  Height: 6' 1\" (185.4 cm)  Weight: 210 lb (95.3 kg)  BMI (Calculated): 27.8  Oxygen Therapy  SpO2: 96 %  Pulse Oximeter Device Mode: Intermittent  Pulse Oximeter Device Location: Finger  O2 Device: None (Room air)  Level of Consciousness: Alert (0)    Subjective  Subjective: Pt sitting edge of bed upon arrival. Pt was agreeable to OT/PT eval.   Comments: Ok per [employer] for OT/PT eval.   Overall Orientation Status: Within Functional Limits  Vision  Vision: Impaired  Vision Exceptions: Wears glasses for reading  Hearing  Hearing: Within functional limits  Social/Functional History  Lives With: Alone  Type of Home: Apartment (4th level)  Home Layout: One level  Home Access: Level entry, Elevator  Bathroom Shower/Tub: Tub/Shower unit, Curtain  Bathroom Toilet: Standard  Bathroom Equipment: Grab bars in shower  Bathroom Accessibility: Accessible  Home Equipment:  (no DME)  ADL Assistance: [address]: Independent (laundry on first floor)  Homemaking Responsibilities: Yes  Ambulation Assistance: Independent (no DME)  Transfer Assistance: Independent  Active : No  Patient's  Info: sober living group or taxi  Occupation: On disability  IADL Comments: sleeps in flat bed  Additional Comments: No recent PT OT. Has brother, sister and daughter in [facility] - no one in town. Objective          Sensation  Overall Sensation Status: WFL (pt denies)   ADL  Feeding: Modified independent   Grooming: Setup  UE Bathing: Stand by assistance  LE Bathing: Stand by assistance  UE Dressing: Stand by assistance  LE Dressing: Stand by assistance  Toileting: Stand by assistance  Additional Comments: ADL scores based on clinical reasoning and skilled observation unless otherwise noted. Pt doffed/donned bilateral socks while sitting EOB with increased time. Pt demonstrated slight loss of sitting balance while completing task with ability to correct self stabilizing with use of UE.  Pt currently limited due to dizziness, decreased activity tolerance, strength, and balance impacting safety and independence with self care tasks    UE Function           LUE Strength  Gross LUE Strength: WFL  L Hand General: 4/5  LUE Strength Comment: Grossly 4/5     LUE Tone: Normotonic     LUE AROM (degrees)  LUE AROM : WFL     Left Hand AROM (degrees)  Left Hand AROM: WFL  RUE Strength  Gross RUE Strength: WFL  R Hand General: 4/5  RUE Strength Comment: Grossly 4/5      RUE Tone: Normotonic     RUE AROM (degrees)  RUE AROM : WFL     Right Hand AROM (degrees)  Right Hand AROM: WFL    Fine Motor Skills  Coordination  Movements Are Fluid And Coordinated: No  Coordination and Movement description: Tremors, Left UE, Right UE, Decreased speed                           Mobility  Supine to Sit: Independent  Sit to Supine: Independent       Balance  Sitting Balance: Stand by assistance  Standing Balance: Contact guard assistance  Standing Balance  Time: 2-3 minutes  Activity: Functional mobility  Comment: with no device  Functional Mobility  Functional - Mobility Device: No device  Activity: Other  Assist Level: Contact guard assistance  Functional Mobility Comments: Pt demonstrated unsteadiness during functional mobility. Bed mobility  Rolling to Left: Independent  Supine to Sit: Independent  Sit to Supine: Independent  Scooting: Independent  Comment: Pt completed bed mobility with bed flat     Transfers  Sit to stand: Stand by assistance  Stand to sit: Stand by assistance  Functional Activity Tolerance  Functional Activity Tolerance:  Tolerates 30 min exercise with multiple rests   Assessment  Performance deficits / Impairments: Decreased ADL status, Decreased functional mobility , Decreased strength, Decreased safe awareness, Decreased endurance, Decreased balance, Decreased high-level IADLs  Treatment Diagnosis: Impaired self care status  Prognosis: Good  Decision Making: Low Complexity  REQUIRES OT FOLLOW UP: Yes  Discharge Recommendations: Home with assist PRN  Activity Tolerance: Patient limited by fatigue         Functional Outcome Measures  AM-PAC Daily Activity Inpatient   How much help for putting on and taking off regular lower body clothing?: A Little  How much help for Bathing?: A Little  How much help for Toileting?: A Little  How much help for putting on and taking off regular upper body clothing?: A Little  How much help for taking care of personal grooming?: A Little  How much help for eating meals?: None  AM-[facility] Inpatient Daily Activity Raw Score: 19  AM-PAC Inpatient ADL T-Scale Score : 40.22  ADL Inpatient CMS 0-100% Score: 42.8  ADL Inpatient CMS G-Code Modifier : CK       Goals  Short term goals  Time Frame for Short term goals: By 1-2 visits  Short term goal 1: Pt will complete BADLs with independence and good safety  Short term goal 2: Pt will complete functional transfers/mobility during self care tasks with independence and good safety  Short term goal 3: Pt will tolerate standing 5+ minutes during functional activity of choice with good safety  Short term goal 4: Pt will verbalize/demonstrate good understanding of home safety/fall prevention to increase safety and independence with self care tasks  Short term goal 5: Pt will participate in 15+ minutes of therapeutic exercises/functional activities to increase safety and independence with self care and mobility    Plan  Safety Devices  Safety Devices in place: Yes  Type of devices: Call light within reach, Gait belt, Patient at risk for falls, Left in bed, Nurse notified (sitting EOB at end of session)     Plan  Times per week: 1-2 treatments  Current Treatment Recommendations: Self-Care / ADL, Strengthening, Balance Training, Functional Mobility Training, Endurance Training, Safety Education & Training, Patient/Caregiver Education & Training, Equipment Evaluation, Education, & procurement       Equipment Recommendations  Equipment Needed:  (TBD)  OT Individual Minutes  Time In: 3699  Time Out: 2068  Minutes: 19    Electronically signed by Kori Webber OT on 7/15/21 at 2:39 PM EDT

## 2021-07-15 NOTE — PLAN OF CARE
Problem: Falls - Risk of:  Goal: Will remain free from falls  Description: Will remain free from falls  7/15/2021 1531 by Sanjay Singh RN  Outcome: Ongoing  Note: The patient remained free from falls this shift, call light within reach, bed in locked and lowest position. Patient gait is still unsteady. Side rails up x2. Continue to monitor closely. Problem: Infection:  Goal: Will remain free from infection  Description: Will remain free from infection  7/15/2021 1531 by Sanjay Singh RN  Outcome: Ongoing  Note: Pt shows no signs or symptoms of infection at this time. VS stable, alert and oriented x4. Hand hygiene utilized upon entry and exit. Will continue to monitor. Problem: Daily Care:  Goal: Daily care needs are met  Description: Daily care needs are met  7/15/2021 1531 by Sanjay Singh RN  Outcome: Ongoing  Note: Patient able to verbalize needs when needed, call light and table within reach. Will continue to monitor. Problem: Pain:  Goal: Patient's pain/discomfort is manageable  Description: Patient's pain/discomfort is manageable  7/15/2021 1531 by Sanjay Singh RN  Outcome: Ongoing  Note: Patient able to verbalize needs when needed, Will continue to monitor. Problem: Skin Integrity:  Goal: Skin integrity will stabilize  Description: Skin integrity will stabilize  7/15/2021 1531 by Sanjay Singh RN  Outcome: Ongoing  Note: Skin assessment complete, patient able to turn and reposition self and self regulate bed. Area kept free from moisture, no new breakdown this shift, will continue to monitor.

## 2021-07-15 NOTE — PROGRESS NOTES
[address]    PROGRESS NOTE             7/15/2021    8:39 AM    Name:   Gwendolyn Vo  MRN:     [id number]     Acct:      [de-identified]   Room:   2094/2094-01   Day:  1  Admit Date:  7/14/2021 10:45 AM    PCP:  SIMONE Munguia CNP  Code Status:  Full Code    Subjective:     C/C:   Chief Complaint   Patient presents with    Dizziness     Dizziness et lightheadedness    Fatigue    Alcohol Problem     Interval History Status: improved. No acute events overnight. Patient was seen at bedside alert and oriented. Patient still doesn't have an appetite. Patient is ambulating well and is using the restroom normally. Otherwise patient status is stable. Brief History:     The patient is a 62 y.o. Non-/non  male who presents with Dizziness (Dizziness et lightheadedness), Fatigue, and Alcohol Problem and he is admitted to the hospital for the management of Alcohol Withdrawal.     Patient states he has been feeling dizziness and fatigue for the last few weeks. He states he has come to the emergency department for fear of unsteadiness and falling. Patient states he has not fallen recently. Endorses a chronic alcoholic drinking 53-52 beers a day. He has experienced withdrawal symptoms and also endorses being admitted to the hospital about 2 months ago for similar symptoms and low sodium. He has not had an appetite and has had no food intake for 8 days. Patient endorses dry heaves in the morning. Patient smokes a pack a day and has been doing so since he was 13years old. Patient has a history of myeloproliferative disorder diagnosed in 2019 and is currently on chemotherapy. He sees Dr. Laly Salinas every six months.     Patient is currently on disability and used to work as a . He lives by himself.     In the ED, patient's vitals were within normal limits.   Relevant labs include:  · Sodium: 131 · Troponin: 40 => 37  · Alkaline phosphatase: 138   · AST: 95   · ALT: 48  · Ethanol level of 0.152     Other relevant studies include:  · CT head without contrast: No acute intracranial abnormalities are noted. I really  · EKG: Sinus rhythm with frequent Premature ventricular complexes     In the ED, patient received a milliliters per hour, 100 mg, aspirin 325 mg tablet. Decision was made to admit the patient to progressive unit to to treat his alcohol intoxication and place him on the CIWA protocol. Review of Systems:     Review of Systems   Constitutional: Positive for appetite change (no appetite) and fatigue. Negative for chills and fever. HENT: Positive for sore throat. Negative for rhinorrhea. Respiratory: Positive for shortness of breath (mild). Negative for cough and wheezing. Cardiovascular: Negative for chest pain, palpitations and leg swelling. Gastrointestinal: Positive for abdominal distention, nausea and vomiting (dry heaving). Negative for abdominal pain, constipation and diarrhea. Genitourinary: Negative for difficulty urinating and hematuria. Neurological: Positive for dizziness, tremors, weakness, light-headedness and headaches. Psychiatric/Behavioral: Negative for confusion and suicidal ideas. The patient is not nervous/anxious. Medications:      Allergies:  No Known Allergies    Current Meds:   Scheduled Meds:    nicotine  1 patch Transdermal Once    folic acid  1 mg Oral Daily    levothyroxine  50 mcg Oral Daily    therapeutic multivitamin-minerals  1 tablet Oral Daily    naltrexone  50 mg Oral Daily    nicotine  1 patch Transdermal Q24H    QUEtiapine  400 mg Oral Nightly    vitamin B-1  100 mg Oral Daily    sodium chloride flush  5-40 mL Intravenous 2 times per day    enoxaparin  40 mg Subcutaneous Daily     Continuous Infusions:    sodium chloride 100 mL/hr at 07/14/21 2308    sodium chloride      sodium chloride Stopped (07/14/21 2307)     PRN Meds: sodium chloride flush, sodium chloride, ondansetron **OR** ondansetron, polyethylene glycol, acetaminophen **OR** acetaminophen, potassium chloride **OR** potassium alternative oral replacement **OR** potassium chloride, magnesium sulfate, LORazepam **OR** LORazepam **OR** LORazepam **OR** LORazepam **OR** LORazepam **OR** LORazepam **OR** LORazepam **OR** LORazepam    Data:     Past Medical History:   has a past medical history of Alcohol abuse, Anemia, Cancer ([facility] [facility] Cervical stenosis of spine, Chronic left-sided low back pain with left-sided sciatica, Insomnia, Intentional drug overdose ([facility] Left arm numbness, Left lumbar radiculitis, Macrocytosis, Major depressive disorder, recurrent severe without psychotic features ([facility] [facility] Myeloproliferative disorder ([facility] [address] Osteoarthritis of spine with radiculopathy, lumbar region, Severe recurrent major depression without psychotic features ([facility] [address] [facility] and Spondylosis of lumbar region without myelopathy or radiculopathy. Social History:   reports that he has been smoking cigarettes. He started smoking about 42 years ago. He has a 64.50 pack-year smoking history. He has never used smokeless tobacco. He reports current alcohol use of about 14.0 standard drinks of alcohol per week. He reports previous drug use. Drug: Marijuana. Family History:   Family History   Problem Relation Age of Onset   Melinda See Breast Cancer Mother         s/p surgical complications    Diabetes Mother     Heart Failure Father     Other Brother         \"bad back\"       Vitals:  BP (!) 163/93   Pulse 86   Temp 97.7 °F (36.5 °C) (Oral)   Resp 20   Ht 6' 1\" (1.854 m)   Wt 210 lb (95.3 kg)   SpO2 98%   BMI 27.71 kg/m²   Temp (24hrs), Av.7 °F (36.5 °C), Min:97.5 °F (36.4 °C), Max:97.9 °F (36.6 °C)    No results for input(s): POCGLU in the last 72 hours. I/O(24Hr):     Intake/Output Summary (Last 24 hours) at 7/15/2021 0839  Last data filed at 7/15/2021 0806  Gross per 24 hour   Intake 1129.33 ml Output 1000 ml   Net 129.33 ml       Labs:  [unfilled]    No results found for: SPECIAL  No results found for: CULTURE    [unfilled]    Radiology:    CT HEAD WO CONTRAST    Result Date: 7/14/2021  EXAMINATION: CT OF THE HEAD WITHOUT CONTRAST  7/14/2021 8:54 am TECHNIQUE: CT of the head was performed without the administration of intravenous contrast. Dose modulation, iterative reconstruction, and/or weight based adjustment of the mA/kV was utilized to reduce the radiation dose to as low as reasonably achievable. COMPARISON: 06/2/2021 HISTORY: ORDERING SYSTEM PROVIDED HISTORY: dizziness TECHNOLOGIST PROVIDED HISTORY: dizziness Decision Support Exception - unselect if not a suspected or confirmed emergency medical condition->Emergency Medical Condition (MA) Reason for Exam: dizziness Acuity: Unknown Type of Exam: Unknown Additional signs and symptoms: pt states dizziness and lightheadedness FINDINGS: BRAIN/VENTRICLES: The cerebral hemispheres, brainstem, and cerebellum have a normal appearance for the patient's age. The falx is midline. The ventricles and peripheral sulci are mildly dilated. There is decreased attenuation in the periventricular white matter. There is no sign of a space occupying lesion, infarction, or hemorrhage. Orbits: Portion of the orbits demonstrate no acute abnormality. SINUSES: . The  imaged portions of the paranasal sinuses are clear. The mastoids and the middle ear chambers are clear. SOFT TISSUES/SKULL:  No acute abnormality of the visualized skull or soft tissues. Vascular calcifications are seen compatible with atherosclerotic disease. Mild central and cortical cerebral atrophy. Mild chronic deep white matter ischemic changes No acute intracranial abnormalities are noted.      CT SOFT TISSUE NECK W CONTRAST    Result Date: 6/23/2021  EXAMINATION: CT OF THE NECK SOFT TISSUE WITH CONTRAST  6/23/2021 TECHNIQUE: CT of the neck was performed with the administration of intravenous contrast. Multiplanar reformatted images are provided for review. Dose modulation, iterative reconstruction, and/or weight based adjustment of the mA/kV was utilized to reduce the radiation dose to as low as reasonably achievable. COMPARISON: 06/02/2021 HISTORY: ORDERING SYSTEM PROVIDED HISTORY: sore throat TECHNOLOGIST PROVIDED HISTORY: sore throat Decision Support Exception - unselect if not a suspected or confirmed emergency medical condition->Emergency Medical Condition (MA) Reason for Exam: sore throat, pt states he has a hard time talking sometimes, pt is going thru detox of alcohol Acuity: Acute Type of Exam: Initial FINDINGS: PHARYNX/LARYNX:  The parapharyngeal fat spaces are preserved. Collateral vessels are noted along the left side of the pharynx. The base of the tongue is unremarkable. The epiglottis is normal in appearance. No pharyngeal or laryngeal mass is identified. SALIVARY GLANDS/THYROID:  The thyroid gland is normal in appearance. The submandibular and parotid glands are unremarkable. LYMPH NODES:  No cervical or supraclavicular adenopathy. SOFT TISSUES:  Vascular calcifications are noted in the proximal left subclavian artery with an estimated 50% stenosis. There are also vascular calcifications noted in the proximal internal carotid arteries bilaterally. No appreciable soft tissue swelling is identified. BRAIN/ORBITS/SINUSES:  Limited images through the cerebral and cerebellar parenchyma are unremarkable. The orbits are normal in appearance. There is scattered paranasal sinus disease with greatest involvement in the right maxillary sinus where there are associated mucous retention cysts. The mastoid air cells are clear. LUNG APICES/SUPERIOR MEDIASTINUM:  There is centrilobular emphysema. Limited images through the lung apices are otherwise unremarkable. No superior mediastinal adenopathy. BONES:  The patient is edentulous. Multifocal degenerative changes are noted in the spine. There is sequela of an anterior C3-C4 fusion. No evidence of an acute fracture. 1. No findings to account for the patient's sore throat. 2. No cervical lymphadenopathy. 3. Atherosclerotic disease, most pronounced in the proximal left subclavian artery and bilateral carotid bulb/proximal internal carotid arteries. 4. Centrilobular emphysema. Physical Examination:        Physical Exam  Constitutional:       Appearance: Normal appearance. He is obese. Eyes:      General:         Right eye: No discharge. Left eye: No discharge. Extraocular Movements: Extraocular movements intact. Conjunctiva/sclera: Conjunctivae normal.   Cardiovascular:      Rate and Rhythm: Normal rate and regular rhythm. Pulses: Normal pulses. Heart sounds: Normal heart sounds. No murmur heard. Pulmonary:      Effort: Pulmonary effort is normal. No respiratory distress. Breath sounds: Normal breath sounds. No wheezing or rales. Abdominal:      General: Abdomen is protuberant. Bowel sounds are normal.      Palpations: Abdomen is soft. Tenderness: There is no abdominal tenderness. There is no guarding or rebound. Musculoskeletal:      Right lower leg: No edema. Left lower leg: No edema. Skin:     General: Skin is warm. Neurological:      General: No focal deficit present. Mental Status: He is alert and oriented to person, place, and time. Motor: Tremor present.    Psychiatric:         Attention and Perception: Attention normal.         Mood and Affect: Mood normal.         Behavior: Behavior normal.           Assessment:        Primary Problem  Alcohol abuse with intoxication [facility])    Active Hospital Problems    Diagnosis Date Noted    Hypothyroidism [E03.9] 07/15/2021    Depression [F32.9] 07/15/2021    [facility] [F10.129] 07/14/2021       Plan:        Acute Alcohol Withdrawal 2/2 Intoxication  [facility] protocol for alcohol withdrawal  Fluids 100 mL/h  Folic Acid 1 mg once daily  Vitamin B-12 1000 mcg once daily  Thiamine 100 mg once daily  Potassium magnesium replacement protocol  Naltrexone 50 mg once daily oral     Hypothyroidism  Synthroid 50 mcg oral once daily  TSH w/o reflex     Depression  Seroquel 400 mg nightly    Smoking Cessation  Nicotine patch    Myeloproliferative Disorder  Hydroxyurea 500 mg Capsule     Consultations:   IP CONSULT TO INTERNAL MEDICINE  IP CONSULT TO SOCIAL WORK  Consult PT/OT     Full code  Diet: Adult diet regular  DVT prophylaxis: Lovenox 40 mg subcutaneous    Denise Burrell MD  7/15/2021  8:39 AM       I have discussed the care of Alyssa Ventura , including pertinent history and exam findings,    today with the resident. I have seen and examined the patient and the key elements of all parts of the encounter have been performed by me . I agree with the assessment, plan and orders as documented by the resident. Principal Problem:    Alcohol abuse with intoxication ([address]  Active Problems:    Insomnia    Hx of fusion of cervical spine    Alcohol abuse    Major depressive disorder, recurrent severe without psychotic features ([address]    Hyponatremia    Hypothyroidism    Depression  Resolved Problems:    * No resolved hospital problems. *        Overall  course ;                                   are improving over time.         Patient admitted yesterday with dizziness,  Extreme weakness  Chronic alcoholic  History of depression, PTSD, mention that he is compliant with his medication  Hyponatremia  Very poor appetite, low oral intake  Patient want to go to alcohol D addiction center to quit drinking  On CIWA protocol  Will reduce fluids intake  Patient is on thiamine, folic acid, multivitamin            Electronically signed by Brianna Mae MD

## 2021-07-15 NOTE — CARE COORDINATION
CASE MANAGEMENT NOTE:    Admission Date:  7/14/2021 Jerrica Bowden is a 62 y.o.  male    Admitted for : [facility] [F10.450]    Met with:  Patient    PCP:  Darci Rain                                Insurance:  [facility] medicare      Is patient alert and oriented at time of discussion:  Yes    Current Residence/ Living Arrangements:  independently at home             Current Services PTA:  Yes,  Taking chemo medication,  Follows at [address] fro Myeloproliferative Discorder    Does patient go to outpatient dialysis: No  If yes, location and chair time:     Is patient agreeable to VNS: No    Freedom of choice provided:  NA    List of [address] provided: NA    VNS chosen:  No    DME:  none    Home Oxygen: No    Nebulizer: No    CPAP/BIPAP: No    Supplier: N/A    Potential Assistance Needed: Yes, wants to go to ARU to get stronger, says he was there before, Pt not recommending this,  May need alcohol rehab instead, notified Samantha Fletcher of this. SNF needed: possible alcohol rehab    Bureau of choice and list provided: NA    Pharmacy:  [facility]       Does Patient want to use MEDS to BEDS? No    Is patient currently receiving oral anticoagulation therapy? No    Is the Patient an [facility] with Readmission Risk Score greater than 14%? Yes  If yes, pt needs a follow up appointment made within 7 days. Family Members/Caregivers that pt would like involved in their care:    Yes    If yes, list name here:  Daughter Sawyer Nice:  Family             Discharge Plan:  7/15/21 - [facility] completed - Patient is from home alone,follows at [address] for myeloproliferative disorder. Takes chemo medication, sees Dr Rosalind Tamayo, Drinks approx 12-15 beers daily, DME: none, Denies need for VNS, interested in [address] [place] [address] notified. Wants to go to ARU, however needs a different ruy of Rehab for for behavorial health issues.  katie header pt risk 31%,  LSW following. .//pf.                      Electronically signed by: Kwesi Garvin RN on 7/15/2021 at 2:57 PM

## 2021-07-15 NOTE — PROGRESS NOTES
RN entered room to reassess patients CIWA scale, patient was up in room without socks or gown on. Patient was unsteady and fell backwards hitting his back to the patient belongings cabinet before sliding down to the floor. Fall witness by this RN, patient did not hit head and denies pain. Mentation at baseline for his withdraw symptoms. IV removed during fall, two lacerations on left upper arm. Vitals taken, patient assisted back into bed, bed alarm on, fall sign in place, fall risk band on patient. RN notified residents, plan to initiate librium.  Electronically signed by Ronni Diamond RN on 7/15/2021 at 6:43 PM

## 2021-07-15 NOTE — PROGRESS NOTES
Comprehensive Nutrition Assessment    Type and Reason for Visit:  Initial, Positive Nutrition Screen (Difficulty swallowing, decreased intake)    Nutrition Recommendations/Plan: Will continue to provide Regular diet and provide Ensure High Protein supplements twice daily    Nutrition Assessment:  Pt admitted due to ETOH Abuse. He states he has not eaten in 8 days. He refused breakfast but states he is willing to try Ensure supplements. Malnutrition Assessment:  Malnutrition Status: At risk for malnutrition (Comment)    Context:  Chronic Illness     Findings of the 6 clinical characteristics of malnutrition:  Energy Intake:   (no po intake x 8 days)  Weight Loss:  Unable to assess (stated wts)     Body Fat Loss:  No significant body fat loss     Muscle Mass Loss:  No significant muscle mass loss    Fluid Accumulation:  No significant fluid accumulation     Strength:  Not Performed    Estimated Daily Nutrient Needs:  Energy (kcal): [place] x 1.2= 2200 kcal; Weight Used for Energy Requirements:  Admission     Protein (g):  1.3g/kg= 1105 g; Weight Used for Protein Requirements:  Ideal          Nutrition Related Findings:  no edema, Na 131, Meds: Thiamine/Folic Acid, PMH: ETOH, Cancer      Wounds:  None       Current Nutrition Therapies:    ADULT DIET; Regular  Adult Oral Nutrition Supplement; Low Calorie/High Protein Oral Supplement    Anthropometric Measures:  · Height: 6' 1\" (185.4 cm)  · Current Body Weight: 210 lb (95.3 kg)   · Admission Body Weight: 210 lb (95.3 kg)    · Usual Body Weight:  (194-219#)     · Ideal Body Weight: 184 lbs; BMI: 27.7  · BMI Categories: Overweight (BMI 25.0-29. 9)       Nutrition Diagnosis:   · Inadequate oral intake related to  (current medical condition) as evidenced by intake 0-25%, poor intake prior to admission    Nutrition Interventions:   Food and/or Nutrient Delivery:  Continue Current Diet, Start Oral Nutrition Supplement  Nutrition Education/Counseling:  Education completed (discussed supplements)   Coordination of Nutrition Care:  Continue to monitor while inpatient    Goals:  po intake greater than 50%       Nutrition Monitoring and Evaluation:   Food/Nutrient Intake Outcomes:  Food and Nutrient Intake, Supplement Intake  Physical Signs/Symptoms Outcomes:  Biochemical Data, GI Status, Skin, Weight, Fluid Status or Edema     Discharge Planning:    Continue current diet     Electronically signed by Keegan Darling RD, LD on 7/15/21 at 2:02 PM EDT    Contact: [phone]

## 2021-07-16 ENCOUNTER — APPOINTMENT (OUTPATIENT)
Dept: GENERAL RADIOLOGY | Age: 61
DRG: 897 | End: 2021-07-16
Payer: MEDICARE

## 2021-07-16 PROBLEM — I10 HIGH BLOOD PRESSURE: Status: ACTIVE | Noted: 2021-07-16

## 2021-07-16 LAB
ABSOLUTE EOS #: 0.1 K/UL (ref 0–0.4)
ABSOLUTE IMMATURE GRANULOCYTE: ABNORMAL K/UL (ref 0–0.3)
ABSOLUTE LYMPH #: 1.3 K/UL (ref 1–4.8)
ABSOLUTE MONO #: 0.8 K/UL (ref 0.1–1.3)
ANION GAP SERPL CALCULATED.3IONS-SCNC: 9 MMOL/L (ref 9–17)
BASOPHILS # BLD: 1 % (ref 0–2)
BASOPHILS ABSOLUTE: 0 K/UL (ref 0–0.2)
BUN BLDV-MCNC: 4 MG/DL (ref 6–20)
BUN/CREAT BLD: ABNORMAL (ref 9–20)
CALCIUM SERPL-MCNC: 8.3 MG/DL (ref 8.6–10.4)
CHLORIDE BLD-SCNC: 104 MMOL/L (ref 98–107)
CO2: 22 MMOL/L (ref 20–31)
CREAT SERPL-MCNC: 0.51 MG/DL (ref 0.7–1.2)
DIFFERENTIAL TYPE: ABNORMAL
EOSINOPHILS RELATIVE PERCENT: 2 % (ref 0–4)
GFR AFRICAN AMERICAN: >60 ML/MIN
GFR NON-AFRICAN AMERICAN: >60 ML/MIN
GFR SERPL CREATININE-BSD FRML MDRD: ABNORMAL ML/MIN/{1.73_M2}
GFR SERPL CREATININE-BSD FRML MDRD: ABNORMAL ML/MIN/{1.73_M2}
GLUCOSE BLD-MCNC: 97 MG/DL (ref 70–99)
HCT VFR BLD CALC: 36.4 % (ref 41–53)
HEMOGLOBIN: 12.3 G/DL (ref 13.5–17.5)
IMMATURE GRANULOCYTES: ABNORMAL %
LYMPHOCYTES # BLD: 24 % (ref 24–44)
MCH RBC QN AUTO: 31.7 PG (ref 26–34)
MCHC RBC AUTO-ENTMCNC: 33.8 G/DL (ref 31–37)
MCV RBC AUTO: 93.7 FL (ref 80–100)
MONOCYTES # BLD: 14 % (ref 1–7)
NRBC AUTOMATED: ABNORMAL PER 100 WBC
PDW BLD-RTO: 18 % (ref 11.5–14.9)
PLATELET # BLD: 161 K/UL (ref 150–450)
PLATELET ESTIMATE: ABNORMAL
PMV BLD AUTO: 7.6 FL (ref 6–12)
POTASSIUM SERPL-SCNC: 3.8 MMOL/L (ref 3.7–5.3)
RBC # BLD: 3.88 M/UL (ref 4.5–5.9)
RBC # BLD: ABNORMAL 10*6/UL
SEG NEUTROPHILS: 59 % (ref 36–66)
SEGMENTED NEUTROPHILS ABSOLUTE COUNT: 3.1 K/UL (ref 1.3–9.1)
SODIUM BLD-SCNC: 135 MMOL/L (ref 135–144)
WBC # BLD: 5.3 K/UL (ref 3.5–11)
WBC # BLD: ABNORMAL 10*3/UL

## 2021-07-16 PROCEDURE — 6360000002 HC RX W HCPCS: Performed by: STUDENT IN AN ORGANIZED HEALTH CARE EDUCATION/TRAINING PROGRAM

## 2021-07-16 PROCEDURE — 97110 THERAPEUTIC EXERCISES: CPT

## 2021-07-16 PROCEDURE — 36415 COLL VENOUS BLD VENIPUNCTURE: CPT

## 2021-07-16 PROCEDURE — 99232 SBSQ HOSP IP/OBS MODERATE 35: CPT | Performed by: INTERNAL MEDICINE

## 2021-07-16 PROCEDURE — 85025 COMPLETE CBC W/AUTO DIFF WBC: CPT

## 2021-07-16 PROCEDURE — 71045 X-RAY EXAM CHEST 1 VIEW: CPT

## 2021-07-16 PROCEDURE — 80048 BASIC METABOLIC PNL TOTAL CA: CPT

## 2021-07-16 PROCEDURE — 97530 THERAPEUTIC ACTIVITIES: CPT

## 2021-07-16 PROCEDURE — 6370000000 HC RX 637 (ALT 250 FOR IP): Performed by: STUDENT IN AN ORGANIZED HEALTH CARE EDUCATION/TRAINING PROGRAM

## 2021-07-16 PROCEDURE — 97116 GAIT TRAINING THERAPY: CPT

## 2021-07-16 PROCEDURE — 2580000003 HC RX 258: Performed by: STUDENT IN AN ORGANIZED HEALTH CARE EDUCATION/TRAINING PROGRAM

## 2021-07-16 PROCEDURE — 2060000000 HC ICU INTERMEDIATE R&B

## 2021-07-16 PROCEDURE — 6370000000 HC RX 637 (ALT 250 FOR IP)

## 2021-07-16 RX ADMIN — CHLORDIAZEPOXIDE HYDROCHLORIDE 25 MG: 25 CAPSULE ORAL at 14:08

## 2021-07-16 RX ADMIN — LORAZEPAM 2 MG: 1 TABLET ORAL at 12:27

## 2021-07-16 RX ADMIN — CHLORDIAZEPOXIDE HYDROCHLORIDE 25 MG: 25 CAPSULE ORAL at 08:47

## 2021-07-16 RX ADMIN — SODIUM CHLORIDE: 9 INJECTION, SOLUTION INTRAVENOUS at 01:50

## 2021-07-16 RX ADMIN — FOLIC ACID 1 MG: 1 TABLET ORAL at 08:48

## 2021-07-16 RX ADMIN — Medication 1 TABLET: at 08:47

## 2021-07-16 RX ADMIN — HYDROXYUREA 500 MG: 500 CAPSULE ORAL at 21:05

## 2021-07-16 RX ADMIN — QUETIAPINE FUMARATE 400 MG: 200 TABLET ORAL at 21:05

## 2021-07-16 RX ADMIN — THIAMINE HCL TAB 100 MG 100 MG: 100 TAB at 08:48

## 2021-07-16 RX ADMIN — LORAZEPAM 2 MG: 1 TABLET ORAL at 11:03

## 2021-07-16 RX ADMIN — ENOXAPARIN SODIUM 40 MG: 40 INJECTION SUBCUTANEOUS at 08:47

## 2021-07-16 RX ADMIN — HYDROXYUREA 500 MG: 500 CAPSULE ORAL at 08:47

## 2021-07-16 RX ADMIN — LEVOTHYROXINE SODIUM 50 MCG: 0.05 TABLET ORAL at 08:48

## 2021-07-16 RX ADMIN — NALTREXONE HYDROCHLORIDE 50 MG: 50 TABLET, FILM COATED ORAL at 12:24

## 2021-07-16 RX ADMIN — LORAZEPAM 1 MG: 1 TABLET ORAL at 02:05

## 2021-07-16 RX ADMIN — LISINOPRIL 10 MG: 10 TABLET ORAL at 08:48

## 2021-07-16 RX ADMIN — CHLORDIAZEPOXIDE HYDROCHLORIDE 25 MG: 25 CAPSULE ORAL at 21:05

## 2021-07-16 RX ADMIN — SODIUM CHLORIDE, PRESERVATIVE FREE 10 ML: 5 INJECTION INTRAVENOUS at 21:13

## 2021-07-16 ASSESSMENT — ENCOUNTER SYMPTOMS
DIARRHEA: 0
VOMITING: 0
ABDOMINAL PAIN: 0
NAUSEA: 1
CHEST TIGHTNESS: 0
ABDOMINAL DISTENTION: 0
CONSTIPATION: 0
COUGH: 0
SHORTNESS OF BREATH: 0

## 2021-07-16 ASSESSMENT — PAIN SCALES - GENERAL: PAINLEVEL_OUTOF10: 0

## 2021-07-16 NOTE — PROGRESS NOTES
within normal limits.  Relevant labs include:  · Sodium: 131   · Troponin: 40 => 37  · Alkaline phosphatase: 138   · AST: 95   · ALT: 48  · Ethanol level of 0.152     Other relevant studies include:  · CT head without contrast: No acute intracranial abnormalities are noted.  I really  · EKG: Sinus rhythm with frequent Premature ventricular complexes     In the ED, patient received a milliliters per hour, 100 mg, aspirin 325 mg tablet. Decision was made to admit the patient to progressive unit to to treat his alcohol intoxication and place him on the [facility] protocol. Review of Systems:     Review of Systems   Constitutional: Negative for fatigue and fever. HENT: Negative. Respiratory: Negative for cough, chest tightness and shortness of breath. Cardiovascular: Negative for chest pain, palpitations and leg swelling. Gastrointestinal: Positive for nausea. Negative for abdominal distention, abdominal pain, constipation, diarrhea and vomiting. Genitourinary: Negative for dysuria, flank pain and frequency. Skin: Negative. Neurological: Negative for dizziness, weakness, numbness and headaches. Medications:      Allergies:  No Known Allergies    Current Meds:   Scheduled Meds:    nicotine  1 patch Transdermal Once    hydroxyurea  500 mg Oral BID    lisinopril  10 mg Oral Daily    chlordiazePOXIDE  25 mg Oral TID    folic acid  1 mg Oral Daily    levothyroxine  50 mcg Oral Daily    therapeutic multivitamin-minerals  1 tablet Oral Daily    naltrexone  50 mg Oral Daily    nicotine  1 patch Transdermal Q24H    QUEtiapine  400 mg Oral Nightly    vitamin B-1  100 mg Oral Daily    sodium chloride flush  5-40 mL Intravenous 2 times per day    enoxaparin  40 mg Subcutaneous Daily     Continuous Infusions:    sodium chloride 50 mL/hr at 07/15/21 1530    sodium chloride      sodium chloride 75 mL/hr at 07/16/21 0150     PRN Meds: hydrOXYzine, sodium chloride flush, sodium chloride, ondansetron **OR** ondansetron, polyethylene glycol, acetaminophen **OR** acetaminophen, potassium chloride **OR** potassium alternative oral replacement **OR** potassium chloride, magnesium sulfate, LORazepam **OR** LORazepam **OR** LORazepam **OR** LORazepam **OR** LORazepam **OR** LORazepam **OR** LORazepam **OR** LORazepam    Data:     Past Medical History:   has a past medical history of Alcohol abuse, Anemia, Cancer ([facility] [address] Cervical stenosis of spine, Chronic left-sided low back pain with left-sided sciatica, Insomnia, Intentional drug overdose ([facility] [address] Left arm numbness, Left lumbar radiculitis, Macrocytosis, Major depressive disorder, recurrent severe without psychotic features ([facility] [address] Myeloproliferative disorder ([facility] [address] Osteoarthritis of spine with radiculopathy, lumbar region, Severe recurrent major depression without psychotic features ([facility] [address] and Spondylosis of lumbar region without myelopathy or radiculopathy. Social History:   reports that he has been smoking cigarettes. He started smoking about 42 years ago. He has a 64.50 pack-year smoking history. He has never used smokeless tobacco. He reports current alcohol use of about 14.0 standard drinks of alcohol per week. He reports previous drug use. Drug: Marijuana. Family History:   Family History   Problem Relation Age of Onset    Breast Cancer Mother         s/p surgical complications    Diabetes Mother     Heart Failure Father     Other Brother         \"bad back\"       Vitals:  /80   Pulse 83   Temp 98.1 °F (36.7 °C) (Oral)   Resp 16   Ht 6' 1\" (1.854 m)   Wt 210 lb (95.3 kg)   SpO2 97%   BMI 27.71 kg/m²   Temp (24hrs), Av.8 °F (36.6 °C), Min:97.6 °F (36.4 °C), Max:98.1 °F (36.7 °C)    No results for input(s): POCGLU in the last 72 hours. I/O(24Hr):     Intake/Output Summary (Last 24 hours) at 2021 0814  Last data filed at 2021 0543  Gross per 24 hour   Intake 2109.67 ml   Output 1200 ml   Net 909.67 ml Labs:  [unfilled]    No results found for: SPECIAL  No results found for: CULTURE    [unfilled]    Radiology:    CT HEAD WO CONTRAST    Result Date: 7/14/2021  EXAMINATION: CT OF THE HEAD WITHOUT CONTRAST  7/14/2021 8:54 am TECHNIQUE: CT of the head was performed without the administration of intravenous contrast. Dose modulation, iterative reconstruction, and/or weight based adjustment of the mA/kV was utilized to reduce the radiation dose to as low as reasonably achievable. COMPARISON: 06/2/2021 HISTORY: ORDERING SYSTEM PROVIDED HISTORY: dizziness TECHNOLOGIST PROVIDED HISTORY: dizziness Decision Support Exception - unselect if not a suspected or confirmed emergency medical condition->Emergency Medical Condition (MA) Reason for Exam: dizziness Acuity: Unknown Type of Exam: Unknown Additional signs and symptoms: pt states dizziness and lightheadedness FINDINGS: BRAIN/VENTRICLES: The cerebral hemispheres, brainstem, and cerebellum have a normal appearance for the patient's age. The falx is midline. The ventricles and peripheral sulci are mildly dilated. There is decreased attenuation in the periventricular white matter. There is no sign of a space occupying lesion, infarction, or hemorrhage. Orbits: Portion of the orbits demonstrate no acute abnormality. SINUSES: . The  imaged portions of the paranasal sinuses are clear. The mastoids and the middle ear chambers are clear. SOFT TISSUES/SKULL:  No acute abnormality of the visualized skull or soft tissues. Vascular calcifications are seen compatible with atherosclerotic disease. Mild central and cortical cerebral atrophy. Mild chronic deep white matter ischemic changes No acute intracranial abnormalities are noted.      CT SOFT TISSUE NECK W CONTRAST    Result Date: 6/23/2021  EXAMINATION: CT OF THE NECK SOFT TISSUE WITH CONTRAST  6/23/2021 TECHNIQUE: CT of the neck was performed with the administration of intravenous contrast. Multiplanar reformatted images Plan:        Acute Alcohol Withdrawal 2/2 Intoxication  CIWA protocol for alcohol withdrawal  Fluids 50 mL/h  Folic Acid 1 mg once daily  Vitamin B-12 1000 mcg once daily  Thiamine 100 mg once daily  Potassium magnesium replacement protocol  Naltrexone 50 mg once daily oral       Hypertension  Decrease IV fluids to 50 ml/hr  Start lisinopril 10mg      Hypothyroidism  Synthroid 50 mcg oral once daily  TSH w/o reflex     Depression  Seroquel 400 mg nightly     Smoking Cessation  Nicotine patch     Myeloproliferative Disorder  Hydroxyurea 500 mg Capsule    Maureen Jones MD  7/16/2021  8:14 AM       I have discussed the care of Slade Benito , including pertinent history and exam findings,    today with the resident. I have seen and examined the patient and the key elements of all parts of the encounter have been performed by me . I agree with the assessment, plan and orders as documented by the resident. Principal Problem:    Alcohol abuse with intoxication ([address]  Active Problems:    Insomnia    Hx of fusion of cervical spine    Alcohol abuse    Major depressive disorder, recurrent severe without psychotic features ([address]    Hyponatremia    Hypothyroidism    Depression    High blood pressure  Resolved Problems:    * No resolved hospital problems. *        Overall  course ;                                   show no change over time.         Patient extremely unsteady  Dizzy  Had a fall yesterday  Requiring Ativan from CIWA protocol,  Patient not safe to return home, requesting placement to PT  Chronic smoker complaining of shortness of breath, ordering x-ray chest next          Electronically signed by Etienne Major MD

## 2021-07-16 NOTE — CARE COORDINATION
LUIGI spoke with [place] in admissions for the ARU. She reported that this patient does not qualify for admission to the ARU. The patient may require Alcohol and Drug treatment but per therapy he is only unsteady at this point due to his withdrawal from alcohol. Patient is still in the midst of DCt's at this time.

## 2021-07-16 NOTE — PLAN OF CARE
Problem: Falls - Risk of:  Goal: Will remain free from falls  Description: Will remain free from falls  Outcome: Ongoing  Goal: Absence of physical injury  Description: Absence of physical injury  Outcome: Ongoing     Problem: Infection:  Goal: Will remain free from infection  Description: Will remain free from infection  Outcome: Ongoing     Problem: Safety:  Goal: Free from accidental physical injury  Description: Free from accidental physical injury  Outcome: Ongoing  Goal: Free from intentional harm  Description: Free from intentional harm  Outcome: Ongoing     Problem: Daily Care:  Goal: Daily care needs are met  Description: Daily care needs are met  Outcome: Ongoing     Problem: Pain:  Goal: Patient's pain/discomfort is manageable  Description: Patient's pain/discomfort is manageable  Outcome: Ongoing     Problem: Skin Integrity:  Goal: Skin integrity will stabilize  Description: Skin integrity will stabilize  Outcome: Ongoing     Problem: Discharge Planning:  Goal: Patients continuum of care needs are met  Description: Patients continuum of care needs are met  Outcome: Ongoing     Problem: Nutrition  Goal: Optimal nutrition therapy  Outcome: Ongoing     Problem: Musculor/Skeletal Functional Status  Goal: Highest potential functional level  Outcome: Ongoing  Goal: Absence of falls  Outcome: Ongoing

## 2021-07-16 NOTE — PLAN OF CARE
Problem: Falls - Risk of:  Goal: Will remain free from falls  Description: Will remain free from falls  7/16/2021 1609 by Buck Hassan RN  Outcome: Ongoing  Note: The patient remained free from falls this shift, call light within reach, bed in locked and lowest position. Side rails up x2. Continue to monitor closely. Problem: Infection:  Goal: Will remain free from infection  Description: Will remain free from infection  7/16/2021 1609 by Buck Hassan RN  Outcome: Ongoing  Note: Pt shows no signs or symptoms of infection at this time. VS stable, alert and oriented x4. Hand hygiene utilized upon entry and exit. Will continue to monitor. Problem: Safety:  Goal: Free from accidental physical injury  Description: Free from accidental physical injury  7/16/2021 1609 by Buck Hassan RN  Outcome: Ongoing  Note: . Problem: Daily Care:  Goal: Daily care needs are met  Description: Daily care needs are met  7/16/2021 1609 by Buck Hassan RN  Outcome: Ongoing  Note: Patient able to verbalize needs when needed, telesitter in room for patient. Will continue to monitor. Problem: Skin Integrity:  Goal: Skin integrity will stabilize  Description: Skin integrity will stabilize  7/16/2021 1609 by Buck Hassan RN  Outcome: Ongoing  Note: Skin assessment complete, patient able to self regulate. Area kept free from moisture, no new breakdown this shift, will continue to monitor.

## 2021-07-16 NOTE — DISCHARGE INSTR - COC
Continuity of Care Form    Patient Name: Maxim Valladares   :  1960  MRN:  [id number]    Admit date:  2021  Discharge date:  ***    Code Status Order: Full Code   Advance Directives:      Admitting Physician:  Amelia Avina MD  PCP: SIMONE Barron CNP    Discharging Nurse: [facility] Unit/Room#: 2094/2094-01  Discharging Unit Phone Number: ***    Emergency Contact:   Extended Emergency Contact Information  Primary Emergency Contact: Ozzy Marroquin  Address: N/A  Home Phone: [phone]  Mobile Phone: [phone]  Relation: Brother/Sister  Preferred language: English   needed?  No  Secondary Emergency Contact: Ana Moctezuma  Home Phone: [phone]  Relation: Child    Past Surgical History:  Past Surgical History:   Procedure Laterality Date    APPENDECTOMY      CARPAL TUNNEL RELEASE      bilateral     CERVICAL FUSION      s/p trampoline accident    CERVICAL FUSION  2019     ANTERIOR CERVICAL DECOMPRESSION FUSION C3-4     CERVICAL FUSION N/A 2019    ANTERIOR CERVICAL DECOMPRESSION FUSION C3-4 performed by Jen Hutchison DO at [address] [place] [address], DIAGNOSTIC      EPIDURAL STEROID INJECTION Left 5/15/2019    EPIDURAL STEROID INJECTION LEFT L5S1 performed by Tam Cortes MD at [address] [place] [address]         Immunization History:   Immunization History   Administered Date(s) Administered    Hepatitis A Adult (Havrix, Vaqta) 2019    Influenza, Quadv, IM, PF (6 mo and older Fluzone, Flulaval, Fluarix, and 3 yrs and older Afluria) 2019    Pneumococcal Polysaccharide (Tkslcdkdt90) 2020    Tdap (Boostrix, Adacel) 10/02/2018, 2019       Active Problems:  Patient Active Problem List   Diagnosis Code    MPN (myeloproliferative neoplasm) ([facility] [address] D47.1    Chronic left-sided low back pain with left-sided sciatica M54.42, G89.29    Insomnia G47.00    Osteoarthritis of spine with radiculopathy, lumbar region M47.26    Spondylosis of lumbar region without myelopathy or radiculopathy M47.816    Left lumbar radiculitis M54.16    Hx of fusion of cervical spine Z98.1    Cervical stenosis of spine M48.02    Left arm numbness R20.0    Intentional drug overdose (HCC) T50.902A    Alcohol abuse F10.10    Major depressive disorder, recurrent severe without psychotic features (HCC) F33.2    Severe recurrent major depression without psychotic features (HCC) F33.2    Anemia D64.9    Macrocytosis D75.89    Substance abuse ([facility]) F19.10    Stenosis of cervical spine with myelopathy ([facility] [address] M48.02, G99.2    Hyponatremia G61.1    Uncomplicated alcohol dependence ([facility]) F10.20    Alcohol abuse with intoxication ([facility] [address] F10.129    Hypothyroidism E03.9    Depression F32.9    High blood pressure I10       Isolation/Infection:   Isolation            No Isolation          Patient Infection Status       None to display            Nurse Assessment:  Last Vital Signs: /80   Pulse 83   Temp 98.1 °F (36.7 °C) (Oral)   Resp 16   Ht 6' 1\" (1.854 m)   Wt 210 lb (95.3 kg)   SpO2 97%   BMI 27.71 kg/m²     Last documented pain score (0-10 scale): Pain Level: 3  Last Weight:   Wt Readings from Last 1 Encounters:   21 210 lb (95.3 kg)     Mental Status:  {IP PT MENTAL STATUS:50004}    IV Access:  { CHAVEZ IV ACCESS:969961353}    Nursing Mobility/ADLs:  Walking   {CHP DME QTK}  Transfer  {CHP DME FNAD:204981647}  Bathing  {CHP DME XGGW:386878242}  Dressing  {CHP DME WPXZ:893152633}  Toileting  {CHP DME JTVK:278239486}  Feeding  {CHP DME PIPP:767941301}  Med Admin  {CHP DME XAEW:565397795}  Med Delivery   { CHAVEZ MED Delivery:435738071}    Wound Care Documentation and Therapy:        Elimination:  Continence:    Bowel: {YES / RW:59180}  Bladder: {YES / BF:53198}  Urinary Catheter: {Urinary Catheter:308930940}   Colostomy/Ileostomy/Ileal Conduit: {YES / HT:86143}       Date of Last BM: ***    Intake/Output Summary (Last 24 hours) at 2021 0859  Last data filed at 7/16/2021 0817  Gross per 24 hour   Intake 2109.67 ml   Output 1450 ml   Net 659.67 ml     I/O last 3 completed shifts: In: 2570.7 [I.V.:2570.7]  Out: 1200 [Urine:1200]    Safety Concerns:     [address] Safety Concerns:973323900}    Impairments/Disabilities:      [address] Impairments/Disabilities:262476565}    Nutrition Therapy:  Current Nutrition Therapy:   [address] CHAVEZ Diet List:868071868}    Routes of Feeding: {CHP DME Other Feedings:282175049}  Liquids: {Slp liquid thickness:21120}  Daily Fluid Restriction: {CHP DME Yes amt example:041386290}  Last Modified Barium Swallow with Video (Video Swallowing Test): {Done Not Done OTAE:907998654}    Treatments at the Time of Hospital Discharge:   Respiratory Treatments: ***  Oxygen Therapy:  {Therapy; copd oxygen:13788}  Ventilator:    { CC Vent KYGQ:002092840}    Rehab Therapies: Physical Therapy and Occupational Therapy  Weight Bearing Status/Restrictions: Other Medical Equipment (for information only, NOT a DME order):     Other Treatments: skilled nursing assessment per protocol medication education     Patient's personal belongings (please select all that are sent with patient):  {[facility] DME Belongings:216450291}    RN SIGNATURE:  {Esignature:098895607}    CASE MANAGEMENT/SOCIAL WORK SECTION    Inpatient Status Date: 7/14/21    Readmission Risk Assessment Score:  Readmission Risk              Risk of Unplanned Readmission:  34           Discharging to Facility/ [address] (if applicable)   Name:  Address:  Dialysis Schedule:  Phone:  Fax:    / signature: Electronically signed by Chaitanya Moon RN on 7/16/21 at 8:59 AM EDT    PHYSICIAN SECTION    Prognosis: {Prognosis:9609113778}    Condition at Discharge: [address] Patient Condition:801435231}    Rehab Potential (if transferring to Rehab): {Prognosis:9961710514}    Recommended Labs or Other Treatments After Discharge: ***    Physician Certification: I certify the above information and transfer of [place]  is necessary for the continuing treatment of the diagnosis listed and that he requires {Admit to Appropriate Level of Care:99653} for {GREATER/LESS:420391976} 30 days.      Update Admission H&P: {CHP DME Changes in TXJRI:648518322}    PHYSICIAN SIGNATURE:  Electronically signed by Amanda Arredondo MD on 7/17/21 at 11:18 AM EDT

## 2021-07-16 NOTE — PROGRESS NOTES
Physical Therapy  Facility/Department: [facility] PROGRESSIVE CARE  Daily Treatment Note  NAME: Slade Benito  : 1960  MRN: [id number]    Date of Service: 2021    Discharge Recommendations:  Patient would benefit from continued therapy after discharge   PT Equipment Recommendations  Equipment Needed: No    Assessment   Body structures, Functions, Activity limitations: Decreased functional mobility ; Decreased ADL status; Decreased balance;Decreased safe awareness;Decreased endurance;Decreased strength  Assessment: Pt requiring increased time for tasks. Pt had fall yesterday in room with RN present - utilize RW in room with staff as pt has difficulty maneuvering objects and is a fall risk. Currently, pt is not safe to return to prior living conditions as pt has no assist at home. Treatment Diagnosis: Impaired functional mobility 2* alcohol abuse  Specific instructions for Next Treatment: progress gait, balance, pedaler  Prognosis: Good  Decision Making: Low Complexity  History: Patient states he has been feeling dizziness and fatigue for the last few weeks. He states he has come to the emergency department for fear of unsteadiness and falling. Patient states he has not fallen recently. Endorses a chronic alcoholic drinking 27-57 beers a day. He has experienced withdrawal symptoms and also endorses being admitted to the hospital about 2 months ago for similar symptoms and low sodium. He has not had an appetite and has had no food intake for 8 days. Patient endorses dry heaves in the morning. Patient smokes a pack a day and has been doing so since he was 13years old. Patient has a history of blood cancer diagnosed in 2019 and is currently on chemotherapy. Exam: ROM, MMT, bed mobility, transfers, amb, balance  Clinical Presentation: Pt alert, cooperative, but appears depressed with flat affect.   Barriers to Learning: safety awareness  REQUIRES PT FOLLOW UP: Yes  Activity Tolerance  Activity Tolerance: Patient Tolerated treatment well     Patient Diagnosis(es): The primary encounter diagnosis was Elevated troponin. Diagnoses of Dehydration, Frequent PVCs, and Hyponatremia were also pertinent to this visit. has a past medical history of Alcohol abuse, Anemia, Cancer ([address] Cervical stenosis of spine, Chronic left-sided low back pain with left-sided sciatica, Insomnia, Intentional drug overdose ([address] Left arm numbness, Left lumbar radiculitis, Macrocytosis, Major depressive disorder, recurrent severe without psychotic features ([address] Myeloproliferative disorder ([address] Osteoarthritis of spine with radiculopathy, lumbar region, Severe recurrent major depression without psychotic features ([address] and Spondylosis of lumbar region without myelopathy or radiculopathy. has a past surgical history that includes cervical fusion; Appendectomy; Tonsillectomy; Carpal tunnel release; epidural steroid injection (Left, 5/15/2019); Endoscopy, colon, diagnostic; cervical fusion (09/05/2019); and cervical fusion (N/A, 9/5/2019). Restrictions  Restrictions/Precautions  Restrictions/Precautions: General Precautions, Seizure, Fall Risk (fell 3 weeks ago on uneven sidewalk; fell yesterday)  Required Braces or Orthoses?: No  Implants present? : Metal implants (cervical fusion)  Position Activity Restriction  Other position/activity restrictions: PT OT eval and treat  Subjective   General  Chart Reviewed: Yes  Additional Pertinent Hx: CA, falls, alcoholism  Response To Previous Treatment: Patient with no complaints from previous session. Family / Caregiver Present: No  Referring Practitioner: Wai Estes MD  Subjective  Subjective: Pt sitting  at edge of bed. Pt now has teleSENDY mckenzie PT. Pt had a fall yesterday in room with RN present.    Pain Screening  Patient Currently in Pain: Denies  Vital Signs  Patient Currently in Pain: Denies  Oxygen Therapy  O2 Device: None (Room air)  Patient Observation  Observations: mild unsteadiness/shakiness       Orientation  Orientation  Overall Orientation Status: Within Normal Limits  Cognition      Objective   Bed mobility  Comment: Pt seated edge of bed at start and end of session. bed alarm on. Transfers  Sit to Stand: Stand by assistance;Contact guard assistance  Stand to sit: Stand by assistance;Contact guard assistance  Comment: Pt using no device for transfers, completed x6 times throughout tx. 40 seconds to complete 5xSTS  Ambulation  Ambulation?: Yes  Ambulation 1  Surface: level tile  Device: Rolling Walker  Assistance: Minimal assistance  Quality of Gait: slow john, unsteady, difficulty maneuvering objects appropriately, forward flexed posture  Gait Deviations: Deviated path;Decreased head and trunk rotation; Slow John  Distance: 79'  Comments: Mild signs of fatigue - pt requiring cueing for safety with RW and maneuvering objects. Stairs/Curb  Stairs?: No     Balance  Posture: Good  Sitting - Static: Good  Sitting - Dynamic: Good  Standing - Static: Good;- (no device)  Standing - Dynamic: Fair (RW)  Comments: Standing balance to amb with RW.    Other exercises  Other exercises?: Yes  Other exercises 1: 5xSTS 40 seconds  Other exercises 2: Seated program BLE x15 reps, blue tband   AROM RLE (degrees)  RLE AROM: WNL  AROM LLE (degrees)  LLE AROM : WNL  AROM RUE (degrees)  RUE General AROM: See OT  AROM LUE (degrees)  LUE General AROM: See OT  Strength RLE  Strength RLE: WNL  Comment: Grossly 4/5  Strength LLE  Strength LLE: WNL  Comment: Grossly 4/5  Strength RUE  Comment: See OT  Strength LUE  Comment: See OT               AM-PAC Score     AM-PAC Inpatient Mobility without Stair Climbing Raw Score : 17 (07/15/21 1341)  AM-PAC Inpatient without Stair Climbing T-Scale Score : 48.47 (07/15/21 1341)  Mobility Inpatient CMS 0-100% Score: 32.72 (07/15/21 1341)  Mobility Inpatient without Stair CMS G-Code Modifier : CJ (07/15/21 1341)       Goals  Short term goals  Time Frame for Short term goals: 3-4 days  Short term goal 1: pt to demo IND transfers. Short term goal 2: Pt to amb 150' IND. Short term goal 3: Pt to demo good technique for HEP for BLE strengthening and balance. Short term goal 4: pt to improve standing balance to GOOD. Short term goal 5: pt to perform 5 minutes on nustep/pedaler without change in symptoms. Patient Goals   Patient goals : To go home    Plan    Plan  Times per week: 5-6x/week  Specific instructions for Next Treatment: progress gait, balance, pedaler  Current Treatment Recommendations: Strengthening, Balance Training, Functional Mobility Training, Transfer Training, Endurance Training, Gait Training, Equipment Evaluation, Education, & procurement, Patient/Caregiver Education & Training, Safety Education & Training, Positioning, Home Exercise Program  Safety Devices  Type of devices:  All fall risk precautions in place, Call light within reach, Gait belt, Patient at risk for falls, Nurse notified, Left in bed, Bed alarm in place, Telesitter in use (RN Girish Fernandez)     Therapy Time   Individual Concurrent Group Co-treatment   Time In 1016         Time Out 1051         Minutes 35         Timed Code Treatment Minutes: [facility], [address]

## 2021-07-17 VITALS
DIASTOLIC BLOOD PRESSURE: 106 MMHG | HEART RATE: 81 BPM | BODY MASS INDEX: 27.61 KG/M2 | SYSTOLIC BLOOD PRESSURE: 141 MMHG | TEMPERATURE: 98.1 F | HEIGHT: 73 IN | WEIGHT: 208.34 LBS | OXYGEN SATURATION: 98 % | RESPIRATION RATE: 16 BRPM

## 2021-07-17 LAB
ABSOLUTE EOS #: 0.1 K/UL (ref 0–0.4)
ABSOLUTE IMMATURE GRANULOCYTE: ABNORMAL K/UL (ref 0–0.3)
ABSOLUTE LYMPH #: 1.2 K/UL (ref 1–4.8)
ABSOLUTE MONO #: 0.9 K/UL (ref 0.1–1.3)
ANION GAP SERPL CALCULATED.3IONS-SCNC: 10 MMOL/L (ref 9–17)
BASOPHILS # BLD: 1 % (ref 0–2)
BASOPHILS ABSOLUTE: 0 K/UL (ref 0–0.2)
BUN BLDV-MCNC: 4 MG/DL (ref 6–20)
BUN/CREAT BLD: ABNORMAL (ref 9–20)
CALCIUM SERPL-MCNC: 8.7 MG/DL (ref 8.6–10.4)
CHLORIDE BLD-SCNC: 102 MMOL/L (ref 98–107)
CO2: 25 MMOL/L (ref 20–31)
CREAT SERPL-MCNC: 0.72 MG/DL (ref 0.7–1.2)
DIFFERENTIAL TYPE: ABNORMAL
EOSINOPHILS RELATIVE PERCENT: 2 % (ref 0–4)
GFR AFRICAN AMERICAN: >60 ML/MIN
GFR NON-AFRICAN AMERICAN: >60 ML/MIN
GFR SERPL CREATININE-BSD FRML MDRD: ABNORMAL ML/MIN/{1.73_M2}
GFR SERPL CREATININE-BSD FRML MDRD: ABNORMAL ML/MIN/{1.73_M2}
GLUCOSE BLD-MCNC: 101 MG/DL (ref 70–99)
HCT VFR BLD CALC: 36 % (ref 41–53)
HEMOGLOBIN: 12 G/DL (ref 13.5–17.5)
IMMATURE GRANULOCYTES: ABNORMAL %
LYMPHOCYTES # BLD: 21 % (ref 24–44)
MAGNESIUM: 2.1 MG/DL (ref 1.6–2.6)
MCH RBC QN AUTO: 31.3 PG (ref 26–34)
MCHC RBC AUTO-ENTMCNC: 33.3 G/DL (ref 31–37)
MCV RBC AUTO: 94.1 FL (ref 80–100)
MONOCYTES # BLD: 15 % (ref 1–7)
NRBC AUTOMATED: ABNORMAL PER 100 WBC
PDW BLD-RTO: 18.3 % (ref 11.5–14.9)
PLATELET # BLD: 169 K/UL (ref 150–450)
PLATELET ESTIMATE: ABNORMAL
PMV BLD AUTO: 8 FL (ref 6–12)
POTASSIUM SERPL-SCNC: 3.5 MMOL/L (ref 3.7–5.3)
RBC # BLD: 3.83 M/UL (ref 4.5–5.9)
RBC # BLD: ABNORMAL 10*6/UL
SEG NEUTROPHILS: 61 % (ref 36–66)
SEGMENTED NEUTROPHILS ABSOLUTE COUNT: 3.5 K/UL (ref 1.3–9.1)
SODIUM BLD-SCNC: 137 MMOL/L (ref 135–144)
TSH SERPL DL<=0.05 MIU/L-ACNC: 2.87 MIU/L (ref 0.3–5)
WBC # BLD: 5.7 K/UL (ref 3.5–11)
WBC # BLD: ABNORMAL 10*3/UL

## 2021-07-17 PROCEDURE — 80048 BASIC METABOLIC PNL TOTAL CA: CPT

## 2021-07-17 PROCEDURE — 6360000002 HC RX W HCPCS: Performed by: STUDENT IN AN ORGANIZED HEALTH CARE EDUCATION/TRAINING PROGRAM

## 2021-07-17 PROCEDURE — 2580000003 HC RX 258: Performed by: STUDENT IN AN ORGANIZED HEALTH CARE EDUCATION/TRAINING PROGRAM

## 2021-07-17 PROCEDURE — 84443 ASSAY THYROID STIM HORMONE: CPT

## 2021-07-17 PROCEDURE — 83735 ASSAY OF MAGNESIUM: CPT

## 2021-07-17 PROCEDURE — 6370000000 HC RX 637 (ALT 250 FOR IP): Performed by: STUDENT IN AN ORGANIZED HEALTH CARE EDUCATION/TRAINING PROGRAM

## 2021-07-17 PROCEDURE — 36415 COLL VENOUS BLD VENIPUNCTURE: CPT

## 2021-07-17 PROCEDURE — 99239 HOSP IP/OBS DSCHRG MGMT >30: CPT | Performed by: INTERNAL MEDICINE

## 2021-07-17 PROCEDURE — 6370000000 HC RX 637 (ALT 250 FOR IP)

## 2021-07-17 PROCEDURE — 85025 COMPLETE CBC W/AUTO DIFF WBC: CPT

## 2021-07-17 RX ORDER — SERTRALINE HYDROCHLORIDE 100 MG/1
100 TABLET, FILM COATED ORAL DAILY
Qty: 30 TABLET | Refills: 3 | Status: SHIPPED | OUTPATIENT
Start: 2021-07-17

## 2021-07-17 RX ORDER — LISINOPRIL 10 MG/1
10 TABLET ORAL DAILY
Qty: 30 TABLET | Refills: 3 | Status: ON HOLD | OUTPATIENT
Start: 2021-07-18 | End: 2021-12-04 | Stop reason: HOSPADM

## 2021-07-17 RX ADMIN — CHLORDIAZEPOXIDE HYDROCHLORIDE 25 MG: 25 CAPSULE ORAL at 09:24

## 2021-07-17 RX ADMIN — LISINOPRIL 10 MG: 10 TABLET ORAL at 09:24

## 2021-07-17 RX ADMIN — LEVOTHYROXINE SODIUM 50 MCG: 0.05 TABLET ORAL at 09:24

## 2021-07-17 RX ADMIN — Medication 1 TABLET: at 09:24

## 2021-07-17 RX ADMIN — HYDROXYUREA 500 MG: 500 CAPSULE ORAL at 09:25

## 2021-07-17 RX ADMIN — THIAMINE HCL TAB 100 MG 100 MG: 100 TAB at 09:24

## 2021-07-17 RX ADMIN — NALTREXONE HYDROCHLORIDE 50 MG: 50 TABLET, FILM COATED ORAL at 09:45

## 2021-07-17 RX ADMIN — FOLIC ACID 1 MG: 1 TABLET ORAL at 09:24

## 2021-07-17 RX ADMIN — POTASSIUM CHLORIDE 40 MEQ: 1500 TABLET, EXTENDED RELEASE ORAL at 09:24

## 2021-07-17 RX ADMIN — SODIUM CHLORIDE, PRESERVATIVE FREE 10 ML: 5 INJECTION INTRAVENOUS at 09:29

## 2021-07-17 RX ADMIN — ENOXAPARIN SODIUM 40 MG: 40 INJECTION SUBCUTANEOUS at 09:25

## 2021-07-17 ASSESSMENT — ENCOUNTER SYMPTOMS
ABDOMINAL PAIN: 0
RHINORRHEA: 0
SHORTNESS OF BREATH: 0
DIARRHEA: 0
NAUSEA: 0
WHEEZING: 0
CONSTIPATION: 0
ABDOMINAL DISTENTION: 0
COUGH: 0
VOMITING: 0

## 2021-07-17 ASSESSMENT — PAIN SCALES - GENERAL
PAINLEVEL_OUTOF10: 0
PAINLEVEL_OUTOF10: 0

## 2021-07-17 NOTE — PROGRESS NOTES
[address]    PROGRESS NOTE             7/17/2021    8:23 AM    Name:   Gwendolyn Vo  MRN:     [id number]     Acct:      [de-identified]   Room:   2094/2094-01  IP Day:  3  Admit Date:  7/14/2021 10:45 AM    PCP:  SIMONE Munguia CNP  Code Status:  Full Code    Subjective:     C/C:   Chief Complaint   Patient presents with    Dizziness     Dizziness et lightheadedness    Fatigue    Alcohol Problem     Interval History Status: improved. No acute events overnight. Patient was seen at bedside alert and oriented. Patient has improved appetite. States he is feeling much better. Many of his complaints such as dizziness and lightheadedness have resolved. Brief History:     The patient is a 62 y.o. Non-/non  male who presents with Dizziness (Dizziness et lightheadedness), Fatigue, and Alcohol Problem and he is admitted to the hospital for the management of Alcohol Withdrawal.     Patient states he has been feeling dizziness and fatigue for the last few weeks. He states he has come to the emergency department for fear of unsteadiness and falling. Patient states he has not fallen recently. Endorses a chronic alcoholic drinking 75-65 beers a day. He has experienced withdrawal symptoms and also endorses being admitted to the hospital about 2 months ago for similar symptoms and low sodium. He has not had an appetite and has had no food intake for 8 days. Patient endorses dry heaves in the morning. Patient smokes a pack a day and has been doing so since he was 13years old. Patient has a history of myeloproliferative disorder diagnosed in 2019 and is currently on chemotherapy. He sees Dr. Laly Salinas every six months.     Patient is currently on disability and used to work as a . He lives by himself.     In the ED, patient's vitals were within normal limits.   Relevant labs include:  · Sodium: 131 · Troponin: 40 => 37  · Alkaline phosphatase: 138   · AST: 95   · ALT: 48  · Ethanol level of 0.152     Other relevant studies include:  · CT head without contrast: No acute intracranial abnormalities are noted. I really  · EKG: Sinus rhythm with frequent Premature ventricular complexes     In the ED, patient received 100 mL per hour, 100 mg, aspirin 325 mg tablet. Decision was made to admit the patient to progressive unit to to treat his alcohol intoxication and place him on the CIWA protocol. Upon admission, patient continues to feel dizzy and unsteady. Per nurse he experienced a fall but did not hit his head. Continues to feel nauseated and can hardly eat. Review of Systems:     Review of Systems   Constitutional: Negative for chills, fatigue and fever. HENT: Negative for rhinorrhea. Respiratory: Negative for cough, shortness of breath and wheezing. Cardiovascular: Negative for chest pain, palpitations and leg swelling. Gastrointestinal: Negative for abdominal distention, abdominal pain, constipation, diarrhea, nausea and vomiting. Genitourinary: Negative for difficulty urinating and hematuria. Neurological: Negative for dizziness, tremors, weakness, light-headedness and headaches. Psychiatric/Behavioral: Negative for confusion and suicidal ideas. The patient is not nervous/anxious. Medications:      Allergies:  No Known Allergies    Current Meds:   Scheduled Meds:    hydroxyurea  500 mg Oral BID    lisinopril  10 mg Oral Daily    chlordiazePOXIDE  25 mg Oral TID    folic acid  1 mg Oral Daily    levothyroxine  50 mcg Oral Daily    therapeutic multivitamin-minerals  1 tablet Oral Daily    naltrexone  50 mg Oral Daily    nicotine  1 patch Transdermal Q24H    QUEtiapine  400 mg Oral Nightly    vitamin B-1  100 mg Oral Daily    sodium chloride flush  5-40 mL Intravenous 2 times per day    enoxaparin  40 mg Subcutaneous Daily     Continuous Infusions:    sodium chloride  sodium chloride Stopped (21 1900)     PRN Meds: hydrOXYzine, sodium chloride flush, sodium chloride, ondansetron **OR** ondansetron, polyethylene glycol, acetaminophen **OR** acetaminophen, potassium chloride **OR** potassium alternative oral replacement **OR** potassium chloride, magnesium sulfate, LORazepam **OR** LORazepam **OR** LORazepam **OR** LORazepam **OR** LORazepam **OR** LORazepam **OR** LORazepam **OR** LORazepam    Data:     Past Medical History:   has a past medical history of Alcohol abuse, Anemia, Cancer ([facility] Cervical stenosis of spine, Chronic left-sided low back pain with left-sided sciatica, Insomnia, Intentional drug overdose ([address] Left arm numbness, Left lumbar radiculitis, Macrocytosis, Major depressive disorder, recurrent severe without psychotic features ([address] Myeloproliferative disorder ([facility] Osteoarthritis of spine with radiculopathy, lumbar region, Severe recurrent major depression without psychotic features ([address] and Spondylosis of lumbar region without myelopathy or radiculopathy. Social History:   reports that he has been smoking cigarettes. He started smoking about 42 years ago. He has a 64.50 pack-year smoking history. He has never used smokeless tobacco. He reports current alcohol use of about 14.0 standard drinks of alcohol per week. He reports previous drug use. Drug: Marijuana. Family History:   Family History   Problem Relation Age of Onset    Breast Cancer Mother         s/p surgical complications    Diabetes Mother     Heart Failure Father     Other Brother         \"bad back\"       Vitals:  /89   Pulse 79   Temp 98.1 °F (36.7 °C) (Oral)   Resp 16   Ht 6' 1\" (1.854 m)   Wt 208 lb 5.4 oz (94.5 kg)   SpO2 94%   BMI 27.49 kg/m²   Temp (24hrs), Av.6 °F (36.4 °C), Min:97.3 °F (36.3 °C), Max:98.1 °F (36.7 °C)    No results for input(s): POCGLU in the last 72 hours. I/O(24Hr):     Intake/Output Summary (Last 24 hours) at 2021 5498  Last data filed at 7/17/2021 0321  Gross per 24 hour   Intake 1355.83 ml   Output 800 ml   Net 555.83 ml       Labs:  [unfilled]    No results found for: SPECIAL  No results found for: CULTURE    [unfilled]    Radiology:    CT HEAD WO CONTRAST    Result Date: 7/14/2021  EXAMINATION: CT OF THE HEAD WITHOUT CONTRAST  7/14/2021 8:54 am TECHNIQUE: CT of the head was performed without the administration of intravenous contrast. Dose modulation, iterative reconstruction, and/or weight based adjustment of the mA/kV was utilized to reduce the radiation dose to as low as reasonably achievable. COMPARISON: 06/2/2021 HISTORY: ORDERING SYSTEM PROVIDED HISTORY: dizziness TECHNOLOGIST PROVIDED HISTORY: dizziness Decision Support Exception - unselect if not a suspected or confirmed emergency medical condition->Emergency Medical Condition (MA) Reason for Exam: dizziness Acuity: Unknown Type of Exam: Unknown Additional signs and symptoms: pt states dizziness and lightheadedness FINDINGS: BRAIN/VENTRICLES: The cerebral hemispheres, brainstem, and cerebellum have a normal appearance for the patient's age. The falx is midline. The ventricles and peripheral sulci are mildly dilated. There is decreased attenuation in the periventricular white matter. There is no sign of a space occupying lesion, infarction, or hemorrhage. Orbits: Portion of the orbits demonstrate no acute abnormality. SINUSES: . The  imaged portions of the paranasal sinuses are clear. The mastoids and the middle ear chambers are clear. SOFT TISSUES/SKULL:  No acute abnormality of the visualized skull or soft tissues. Vascular calcifications are seen compatible with atherosclerotic disease. Mild central and cortical cerebral atrophy. Mild chronic deep white matter ischemic changes No acute intracranial abnormalities are noted.      CT SOFT TISSUE NECK W CONTRAST    Result Date: 6/23/2021  EXAMINATION: CT OF THE NECK SOFT TISSUE WITH CONTRAST  6/23/2021 TECHNIQUE: CT of the neck was performed with the administration of intravenous contrast. Multiplanar reformatted images are provided for review. Dose modulation, iterative reconstruction, and/or weight based adjustment of the mA/kV was utilized to reduce the radiation dose to as low as reasonably achievable. COMPARISON: 06/02/2021 HISTORY: ORDERING SYSTEM PROVIDED HISTORY: sore throat TECHNOLOGIST PROVIDED HISTORY: sore throat Decision Support Exception - unselect if not a suspected or confirmed emergency medical condition->Emergency Medical Condition (MA) Reason for Exam: sore throat, pt states he has a hard time talking sometimes, pt is going thru detox of alcohol Acuity: Acute Type of Exam: Initial FINDINGS: PHARYNX/LARYNX:  The parapharyngeal fat spaces are preserved. Collateral vessels are noted along the left side of the pharynx. The base of the tongue is unremarkable. The epiglottis is normal in appearance. No pharyngeal or laryngeal mass is identified. SALIVARY GLANDS/THYROID:  The thyroid gland is normal in appearance. The submandibular and parotid glands are unremarkable. LYMPH NODES:  No cervical or supraclavicular adenopathy. SOFT TISSUES:  Vascular calcifications are noted in the proximal left subclavian artery with an estimated 50% stenosis. There are also vascular calcifications noted in the proximal internal carotid arteries bilaterally. No appreciable soft tissue swelling is identified. BRAIN/ORBITS/SINUSES:  Limited images through the cerebral and cerebellar parenchyma are unremarkable. The orbits are normal in appearance. There is scattered paranasal sinus disease with greatest involvement in the right maxillary sinus where there are associated mucous retention cysts. The mastoid air cells are clear. LUNG APICES/SUPERIOR MEDIASTINUM:  There is centrilobular emphysema. Limited images through the lung apices are otherwise unremarkable. No superior mediastinal adenopathy.  BONES:  The patient is edentulous. Multifocal degenerative changes are noted in the spine. There is sequela of an anterior C3-C4 fusion. No evidence of an acute fracture. 1. No findings to account for the patient's sore throat. 2. No cervical lymphadenopathy. 3. Atherosclerotic disease, most pronounced in the proximal left subclavian artery and bilateral carotid bulb/proximal internal carotid arteries. 4. Centrilobular emphysema. Physical Examination:        Physical Exam  Constitutional:       Appearance: Normal appearance. He is obese. HENT:      Right Ear: External ear normal.      Left Ear: External ear normal.      Nose: Nose normal.   Eyes:      General:         Right eye: No discharge. Left eye: No discharge. Extraocular Movements: Extraocular movements intact. Conjunctiva/sclera: Conjunctivae normal.   Cardiovascular:      Rate and Rhythm: Normal rate and regular rhythm. Pulses: Normal pulses. Heart sounds: Normal heart sounds. No murmur heard. Pulmonary:      Effort: Pulmonary effort is normal. No respiratory distress. Breath sounds: Normal breath sounds. No wheezing or rales. Abdominal:      General: Abdomen is protuberant. Bowel sounds are normal.      Palpations: Abdomen is soft. Tenderness: There is no abdominal tenderness. There is no guarding or rebound. Musculoskeletal:      Right lower leg: No edema. Left lower leg: No edema. Skin:     General: Skin is warm. Neurological:      General: No focal deficit present. Mental Status: He is alert and oriented to person, place, and time. Motor: Tremor present.    Psychiatric:         Attention and Perception: Attention normal.         Mood and Affect: Mood normal.         Behavior: Behavior normal.           Assessment:        Primary Problem  Alcohol abuse with intoxication [facility])    Active Hospital Problems    Diagnosis Date Noted    High blood pressure [I10] 07/16/2021    Hypothyroidism [E03.9] 07/15/2021    Depression [F32.9] 07/15/2021    Alcohol abuse with intoxication ([facility] [F10.129] 07/14/2021    Hyponatremia [E87.1] 06/02/2021    Major depressive disorder, recurrent severe without psychotic features ([facility] [F33.2] 08/12/2019    Alcohol abuse [F10.10] 08/12/2019    Hx of fusion of cervical spine [Z98.1] 05/03/2019    Insomnia [G47.00] 03/14/2019       Plan:        Acute Alcohol Withdrawal 2/2 Intoxication  [facility] protocol for alcohol withdrawal   Last dose of ativan 2 mg was 7/16 at 12:27  Librium 25 mg 3 times daily   Fluids 75 mL/h  Folic Acid 1 mg once daily  Vitamin B-12 1000 mcg once daily  Thiamine 100 mg once daily  Potassium and magnesium replacement protocol   Replacing potassium this morning (3.5)  Naltrexone 50 mg once daily oral  Orthostatics negative     Shortness of Breath 2/2 Chronic Smoker  CXR 7/16: Subacute healing nondisplaced fracture right lateral 9th rib.  Elevated right   hemidiaphragm with right basilar atelectasis. No cardiomegaly, vascular congestion, focal consolidation effusion or pneumothorax is noted. Hypothyroidism  Synthroid 50 mcg oral once daily  TSH w/o reflex     Depression and Anxiety  Seroquel 400 mg nightly  Hydroxyzine 50 mg oral every 6 hours as needed    Smoking Cessation  Nicotine patch    Hypertension  Lisinopril 10 mg once daily    Myeloproliferative Disorder  Hydroxyurea 500 mg Capsule     Consultations:   IP CONSULT TO INTERNAL MEDICINE  IP CONSULT TO SOCIAL WORK  Consult PT/OT     Full code  Diet: Adult diet regular  DVT prophylaxis: Lovenox 40 mg subcutaneous  Disposition: Not qualified for ARU    Saravanan Holt MD  7/17/2021  8:23 AM       I have discussed the care of Eleni Hendrickson , including pertinent history and exam findings,    today with the resident. I have seen and examined the patient and the key elements of all parts of the encounter have been performed by me .    I agree with the assessment, plan and orders as documented by the resident. Principal Problem:    Alcohol abuse with intoxication ([facility]  Active Problems:    Insomnia    Hx of fusion of cervical spine    Alcohol abuse    Major depressive disorder, recurrent severe without psychotic features ([facility]    Hyponatremia    Hypothyroidism    Depression    High blood pressure  Resolved Problems:    * No resolved hospital problems. *        Overall  course ;                                   are improving over time.         Patient doing much better today  As per nursing report , very stable,  Patient wanted to go home  Will request  to give adequate material for alcohol D addiction              Electronically signed by Johnny Grey MD

## 2021-07-17 NOTE — CARE COORDINATION
DISCHARGE PLANNING NOTE:    Notified [facility] Eliseo, that pt needs ETOH abuse information and that he will be discharged home today. Electronically signed by Mellisa Bell RN on 7/17/2021 at 12:44 PM    ETOH abuse information provided to patient per Kia Crespo.     Electronically signed by Mellisa Bell RN on 7/17/2021 at 1:10 PM

## 2021-07-17 NOTE — CARE COORDINATION
Writer gave the pt a list of d/a facilities. He stated he has been treated at several facilities around [address].  He has been drinking most of his life and feels he has a problem

## 2021-07-17 NOTE — PLAN OF CARE
Problem: Falls - Risk of:  Goal: Will remain free from falls  Description: Will remain free from falls  7/16/2021 2251 by Kassandra Baird RN  Outcome: Ongoing  7/16/2021 1609 by Ashley Christiansen RN  Outcome: Ongoing  Note: The patient remained free from falls this shift, call light within reach, bed in locked and lowest position. Side rails up x2. Continue to monitor closely. Goal: Absence of physical injury  Description: Absence of physical injury  Outcome: Ongoing     Problem: Infection:  Goal: Will remain free from infection  Description: Will remain free from infection  7/16/2021 2251 by Kassandra Baird RN  Outcome: Ongoing  7/16/2021 1609 by Ashley Christiansen RN  Outcome: Ongoing  Note: Dressings changed and documented, see LDA. Will continue to monitor. Problem: Safety:  Goal: Free from accidental physical injury  Description: Free from accidental physical injury  7/16/2021 2251 by Kassandra Biard RN  Outcome: Ongoing  7/16/2021 1609 by Ashley Christiansen RN  Outcome: Ongoing  Note: . Goal: Free from intentional harm  Description: Free from intentional harm  Outcome: Ongoing     Problem: Daily Care:  Goal: Daily care needs are met  Description: Daily care needs are met  7/16/2021 2251 by Kassandra Baird RN  Outcome: Ongoing  7/16/2021 1609 by Ashley Christiansen RN  Outcome: Ongoing  Note: Patient able to verbalize needs when needed, telesitter in room for patient. Will continue to monitor. Problem: Pain:  Goal: Patient's pain/discomfort is manageable  Description: Patient's pain/discomfort is manageable  Outcome: Ongoing     Problem: Skin Integrity:  Goal: Skin integrity will stabilize  Description: Skin integrity will stabilize  7/16/2021 2251 by Kassandra Baird RN  Outcome: Ongoing  7/16/2021 1609 by Ashley Christiansen RN  Outcome: Ongoing  Note: Skin assessment complete, patient turned and repositioned every two hours with assistance from staff.  Area kept free from moisture, no new breakdown this shift, will continue to monitor.        Problem: Discharge Planning:  Goal: Patients continuum of care needs are met  Description: Patients continuum of care needs are met  Outcome: Ongoing     Problem: Nutrition  Goal: Optimal nutrition therapy  Outcome: Ongoing     Problem: Musculor/Skeletal Functional Status  Goal: Highest potential functional level  Outcome: Ongoing  Goal: Absence of falls  Outcome: Ongoing     Problem: Skin Integrity:  Goal: Will show no infection signs and symptoms  Description: Will show no infection signs and symptoms  Outcome: Ongoing  Goal: Absence of new skin breakdown  Description: Absence of new skin breakdown  Outcome: Ongoing

## 2021-07-17 NOTE — DISCHARGE SUMMARY
[facility]    Discharge Summary     Patient ID: Soco Orellana  :  1960   MRN: [id number]     ACCOUNT:  [de-identified]   Patient's PCP: SIMONE Figueroa CNP  Admit Date: 2021   Discharge Date: 2021    Length of Stay: 3  Code Status:  Full Code  Admitting Physician: Amanda Arredondo MD  Discharge Physician: Leslie Young MD     Active Discharge Diagnoses:       Primary Problem  Alcohol abuse with intoxication [facility])      [place] Problems    Diagnosis Date Noted    High blood pressure [I10] 2021    Hypothyroidism [E03.9] 07/15/2021    Depression [F32.9] 07/15/2021    Alcohol abuse with intoxication ([facility] [F10.129] 2021    Hyponatremia [E87.1] 2021    Major depressive disorder, recurrent severe without psychotic features ([address] [F33.2] 2019    Alcohol abuse [F10.10] 2019    Hx of fusion of cervical spine [Z98.1] 2019    Insomnia [G47.00] 2019       Admission Condition:  poor     Discharged Condition: good    Hospital Stay:       Hospital Course:  Soco Orellana is a 62 y.o. male who was admitted for the management of  Alcohol abuse with intoxication ([address] , presented to ER with Dizziness (Dizziness et lightheadedness), Fatigue, and Alcohol Problem       Patient states he has been feeling dizziness and fatigue for the last few weeks. [facility] states he has come to the emergency department for fear of unsteadiness and falling. Judie Selma states he has not fallen recently.  Endorses a chronic alcoholic drinking 58-36 beers a day. [facility] has experienced withdrawal symptoms and also endorses being admitted to the hospital about 2 months ago for similar symptoms and low sodium.  He has not had an appetite and has had no food intake for 8 days.  Patient endorses dry heaves in the morning. Patient smokes a pack a day and has been doing so since he was 13years old.   Patient has a history of myeloproliferative disorder diagnosed in 2019 and is currently on chemotherapy. He sees Dr. Mike Mendiola every six months.     Patient is currently on disability and used to work as a . [facility] lives by himself.     In the ED, patient's vitals were within normal limits.  Relevant labs include:  · Sodium: 131   · Troponin: 40 => 37  · Alkaline phosphatase: 138   · AST: 95   · ALT: 48  · Ethanol level of 0.152     Other relevant studies include:  · CT head without contrast: No acute intracranial abnormalities are noted.  I really  · EKG: Sinus rhythm with frequent Premature ventricular complexes     In the ED, patient received 100 mL per hour, 100 mg, aspirin 325 mg tablet. Decision was made to admit the patient to progressive unit to to treat his alcohol intoxication and place him on the CIWA protocol.     Upon admission, patient continued to feel dizzy and unsteady. Per nurse he experienced a fall but did not hit his head. Continues to feel nauseated and can hardly eat. After a few treatments of Ativan and starting Librium for the patient his symptoms improved greatly. Patient was also found to be hypertensive for which she was started on lisinopril for. Fluids also improved his sodium levels which increased to 137 upon discharge. After spending 2 nights at the hospital many of his complaints such as dizziness, weakness, and lightheadedness resolved. Due to the patient's improved condition, it was decided to discharge the patient home.     Significant Diagnostic Studies:   Labs / Micro:  CBC:   Lab Results   Component Value Date    WBC 5.7 07/17/2021    RBC 3.83 07/17/2021    HGB 12.0 07/17/2021    HCT 36.0 07/17/2021    MCV 94.1 07/17/2021    MCH 31.3 07/17/2021    MCHC 33.3 07/17/2021    RDW 18.3 07/17/2021     07/17/2021     CMP:    Lab Results   Component Value Date    GLUCOSE 101 07/17/2021     07/17/2021    K 3.5 07/17/2021     07/17/2021    CO2 25 07/17/2021    BUN 4 07/17/2021    CREATININE 0.72 07/17/2021    ANIONGAP 10 07/17/2021    ALKPHOS 138 07/14/2021    ALT 48 07/14/2021    AST 95 07/14/2021    BILITOT 0.68 07/14/2021    LABALBU 4.0 07/14/2021    ALBUMIN NOT REPORTED 07/14/2021    LABGLOM >60 07/17/2021    GFRAA >60 07/17/2021    GFR      07/17/2021    GFR NOT REPORTED 07/17/2021    PROT 7.3 07/14/2021    CALCIUM 8.7 07/17/2021     TSH:    Lab Results   Component Value Date    TSH 2.87 07/17/2021        Radiology:    CT HEAD WO CONTRAST    Result Date: 7/14/2021  EXAMINATION: CT OF THE HEAD WITHOUT CONTRAST  7/14/2021 8:54 am TECHNIQUE: CT of the head was performed without the administration of intravenous contrast. Dose modulation, iterative reconstruction, and/or weight based adjustment of the mA/kV was utilized to reduce the radiation dose to as low as reasonably achievable. COMPARISON: 06/2/2021 HISTORY: ORDERING SYSTEM PROVIDED HISTORY: dizziness TECHNOLOGIST PROVIDED HISTORY: dizziness Decision Support Exception - unselect if not a suspected or confirmed emergency medical condition->Emergency Medical Condition (MA) Reason for Exam: dizziness Acuity: Unknown Type of Exam: Unknown Additional signs and symptoms: pt states dizziness and lightheadedness FINDINGS: BRAIN/VENTRICLES: The cerebral hemispheres, brainstem, and cerebellum have a normal appearance for the patient's age. The falx is midline. The ventricles and peripheral sulci are mildly dilated. There is decreased attenuation in the periventricular white matter. There is no sign of a space occupying lesion, infarction, or hemorrhage. Orbits: Portion of the orbits demonstrate no acute abnormality. SINUSES: . The  imaged portions of the paranasal sinuses are clear. The mastoids and the middle ear chambers are clear. SOFT TISSUES/SKULL:  No acute abnormality of the visualized skull or soft tissues. Vascular calcifications are seen compatible with atherosclerotic disease.      Mild central and cortical cerebral atrophy. Mild chronic deep white matter ischemic changes No acute intracranial abnormalities are noted. XR CHEST PORTABLE    Result Date: 7/16/2021  EXAMINATION: ONE XRAY VIEW OF THE CHEST 7/16/2021 12:44 pm COMPARISON: 2 June 2021 HISTORY: ORDERING SYSTEM PROVIDED HISTORY: hx of fall TECHNOLOGIST PROVIDED HISTORY: hx of fall Reason for Exam: hx of fall Acuity: Acute Type of Exam: Initial Mechanism of Injury: hx of fall Additional signs and symptoms: hx of fall FINDINGS: AP portable view of the chest time stamped at 1252 hours demonstrates overlying cardiac monitoring electrodes. Right hemidiaphragm is elevated with right basilar atelectasis. No cardiomegaly, vascular congestion, focal consolidation effusion or pneumothorax is noted. Patient has subacute healing fracture right lateral 9th rib. Osseous structures are otherwise unremarkable. Subacute healing nondisplaced fracture right lateral 9th rib. Elevated right hemidiaphragm with right basilar atelectasis. Consultations:    Consults:     Final Specialist Recommendations/Findings:   IP CONSULT TO INTERNAL MEDICINE  IP CONSULT TO SOCIAL WORK  IP CONSULT TO SOCIAL WORK      The patient was seen and examined on day of discharge and this discharge summary is in conjunction with any daily progress note from day of discharge. Discharge plan:       Disposition: Home    Physician Follow Up:     No follow-up provider specified. Requiring Further Evaluation/Follow Up POST HOSPITALIZATION/Incidental Findings: Patient should follow up with alcohol rehab groups to ensure that he does not relapse again. Diet: regular diet    Activity: As tolerated    Instructions to Patient: If patient experiences similar symptoms such as nausea vomiting, diaphoresis, headache, tremors patient should go to the emergency department directly. Patient should keep all follow-up appointments and take all medications as instructed.     Discharge Medications:      Medication List      START taking these medications    lisinopril 10 MG tablet  Commonly known as: PRINIVIL;ZESTRIL  Take 1 tablet by mouth daily  Start taking on: July 18, 2021        CHANGE how you take these medications    QUEtiapine 400 MG tablet  Commonly known as: SEROQUEL  What changed: Another medication with the same name was removed. Continue taking this medication, and follow the directions you see here. CONTINUE taking these medications    folic acid 1 MG tablet  Commonly known as: FOLVITE  Take 1 tablet by mouth daily     hydroxyurea 500 MG chemo capsule  Commonly known as: HYDREA     hydrOXYzine 50 MG tablet  Commonly known as: ATARAX     levothyroxine 50 MCG tablet  Commonly known as: SYNTHROID  Take 1 tablet by mouth daily Take in the morning on an empty stomach 1 hour before any food or other medications     naltrexone 50 MG tablet  Commonly known as: DEPADE     nicotine 21 MG/24HR  Commonly known as: NICODERM CQ  Place 1 patch onto the skin every 24 hours     omeprazole 40 MG delayed release capsule  Commonly known as: PRILOSEC     prazosin 1 MG capsule  Commonly known as: MINIPRESS     sertraline 100 MG tablet  Commonly known as: ZOLOFT  Take 1 tablet by mouth daily     therapeutic multivitamin-minerals tablet  Take 1 tablet by mouth daily     vitamin B-1 100 MG tablet  Commonly known as: THIAMINE  Take 1 tablet by mouth daily           Where to Get Your Medications      These medications were sent to [address] [place], [place] [address] [address]    Phone: [phone]   · lisinopril 10 MG tablet  · sertraline 100 MG tablet       Electronically signed by   Robert Vera MD  7/17/2021  12:34 PM      Thank you Dr. Jeremy Bales, APRN - CNP for the opportunity to be involved in this patient's care.

## 2021-07-19 ENCOUNTER — CARE COORDINATION (OUTPATIENT)
Dept: CASE MANAGEMENT | Age: 61
End: 2021-07-19

## 2021-07-19 NOTE — CARE COORDINATION
[facility] Transitions Initial Follow Up Call    Call within 2 business days of discharge: Yes    Patient: Herminia Cross Patient : 1960   MRN: [id number]  Reason for Admission: dizziness  Discharge Date: 21 RARS: Readmission Risk Score: 31      Last Discharge [facility]       Complaint Diagnosis Description Type Department Provider    21 Dizziness; Fatigue; Alcohol Problem Elevated troponin . .. ED to Hosp-Admission (Discharged) (ADMITTED) Sarwat Silverman MD; Jessica Mendoza... Spoke with: [address]: [address] [place] [address]    Non-face-to-face services provided:  Scheduled appointment with PCP-patient has hfu on 21 with pcp  Communication with home health agencies or other community services the patient is currently using-no vns at this time  Assessment and support for treatment adherence and medication management-reviewed discharge instructions and medications, 1111F order, reviewed covid precautions and healthcare decision makers, patient waiting for his new medications to be delivered from pharmacy, patient has hfu appointment scheduled, explained role of CTN, provided contact information,  will follow//ju   Patient is currently enrolled in Care Transitions. · Start day 2021, End Date 21  · Readmission Risk Score: high  · Please see patient outreach encounters for further details  · For questions contact: [facility] [facility]    Transitions of Care Initial Call    Was this an external facility discharge? No Discharge Facility: [facility]    Challenges to be reviewed by the provider   Additional needs identified to be addressed with provider: No  none             Method of communication with provider : none      Advance Care Planning:   Does patient have an Advance Directive: reviewed and current, reviewed and needs to be updated, not on file; education provided, not on file, patient declined education, decision maker updated and referral to internal ACP facilitator.      Was this a readmission? yes  Patient stated reason for admission: dizziness  Patients top risk factors for readmission: functional physical ability    Care Transition Nurse (CTN) contacted the patient by telephone to perform post hospital discharge assessment. Verified name and  with patient as identifiers. Provided introduction to self, and explanation of the CTN role. CTN reviewed discharge instructions, medical action plan and red flags with patient who verbalized understanding. Patient given an opportunity to ask questions and does not have any further questions or concerns at this time. Were discharge instructions available to patient? Yes. Reviewed appropriate site of care based on symptoms and resources available to patient including: PCP, Specialist, Urgent care clinics and When to call 911. The patient agrees to contact the PCP office for questions related to their healthcare. Medication reconciliation was performed with patient, who verbalizes understanding of administration of home medications. Advised obtaining a 90-day supply of all daily and as-needed medications. Covid Risk Education     Educated patient about risk for severe COVID-19 due to risk factors according to CDC guidelines. CTN reviewed discharge instructions, medical action plan and red flag symptoms with the patient who verbalized understanding. Discussed COVID vaccination status: Yes. Education provided on COVID-19 vaccination as appropriate. Discussed exposure protocols and quarantine with CDC Guidelines. Patient was given an opportunity to verbalize any questions and concerns and agrees to contact CTN or health care provider for questions related to their healthcare. Reviewed and educated patient on any new and changed medications related to discharge diagnosis. Was patient discharged with a pulse oximeter?  No Discussed and confirmed pulse oximeter discharge instructions and when to notify provider or seek emergency care.       CTN provided contact information. Plan for follow-up call in 5-7 days based on severity of symptoms and risk factors.   Plan for next call: routine        Care Transitions 24 Hour Call    Do you have all of your prescriptions and are they filled?: Yes  Do you have support at home?: Alone  Are you an active caregiver in your home?: No  Care Transitions Interventions         Follow Up  Future Appointments   Date Time Provider Mayco Vaughn   7/21/2021 11:20 AM SIMONE Lindsey - CNP [facility]   9/2/2021 11:15 AM Nova Georges MD  Cancer [facility] Sonali Noyola RN

## 2021-08-25 ENCOUNTER — HOSPITAL ENCOUNTER (OUTPATIENT)
Facility: MEDICAL CENTER | Age: 61
End: 2021-08-25
Payer: MEDICARE

## 2021-11-02 ENCOUNTER — NURSE TRIAGE (OUTPATIENT)
Dept: OTHER | Facility: CLINIC | Age: 61
End: 2021-11-02

## 2021-11-02 ENCOUNTER — TELEPHONE (OUTPATIENT)
Dept: PRIMARY CARE CLINIC | Age: 61
End: 2021-11-02

## 2021-11-02 NOTE — TELEPHONE ENCOUNTER
----- Message from Pascale Tello sent at 11/2/2021  5:14 PM EDT -----  Subject: Appointment Request    Reason for Call: Urgent Return from RN Triage    QUESTIONS  Type of Appointment? Established Patient  Reason for appointment request? No appointments available during search  Additional Information for Provider? nurse asked that pt be seen by   11/3/2021 but no appt time open, pt is having leg swelling .   ---------------------------------------------------------------------------  --------------  CALL BACK INFO  What is the best way for the office to contact you? OK to leave message on   voicemail  Preferred Call Back Phone Number? [phone]  ---------------------------------------------------------------------------  --------------  SCRIPT ANSWERS  Patient needs to be seen today? No  Patient needs to be seen today or tomorrow? Yes   Patient needs to be seen within 3 days? No  Patient needs to be seen within 5 days? No  Patient can be seen for a routine visit? No  Nurse Name? na  Have you been diagnosed with, awaiting test results for, or told that you   are suspected of having COVID-19 (Coronavirus)? (If patient has tested   negative or was tested as a requirement for work, school, or travel and   not based on symptoms, answer no)? No  Within the past two weeks have you developed any of the following symptoms   (answer no if symptoms have been present longer than 2 weeks or began   more than 2 weeks ago)? Fever or Chills, Cough, Shortness of breath or   difficulty breathing, Loss of taste or smell, Sore throat, Nasal   congestion, Sneezing or runny nose, Fatigue or generalized body aches   (answer no if pain is specific to a body part e.g. back pain), Diarrhea,   Headache? No  Have you had close contact with someone with COVID-19 in the last 14 days? No  (Service Expert  click yes below to proceed with [place] As Usual   Scheduling)?  Yes

## 2021-11-02 NOTE — TELEPHONE ENCOUNTER
Received call from [facility] with [employer]. Brief description of triage: Thinks he needs to be admitted to the hospital. Lower leg and feet swelling decreased appetite hasn't eaten much in 2 weeks, dry heaves in the morning. Has blood cancer. Lightheaded. Low sodium July. Left arm bruising from fall around July. Triage indicates for patient to be seen in  or ER within 3-4 hours. Patient was educated on importance of being evaluated in ER today and risks associated with not going. Patient was alert/oriented and was able to answer questions appropriately. Patient verbalizes understanding, but states that this is unlikely as he does not have transportation to get there. Did continue to stress the recommendation of being seen in ER. Did transfer the patient to schedule an appointment after he declined this recommendation. Care advice provided, patient verbalizes understanding; denies any other questions or concerns; instructed to call back for any new or worsening symptoms. Writer provided warm transfer to [place] [id number] at [facility] for appointment scheduling. Attention Provider: Thank you for allowing me to participate in the care of your patient. The patient was connected to triage in response to information provided to the [facility]/PSC. Please do not respond through this encounter as the response is not directed to a shared pool. Reason for Disposition   [1] Thigh, calf, or ankle swelling AND [2] bilateral AND [3] 1 side is more swollen    Answer Assessment - Initial Assessment Questions  1. ONSET: \"When did the swelling start? \" (e.g., minutes, hours, days)      3 weeks ago    2. LOCATION: \"What part of the leg is swollen? \"  \"Are both legs swollen or just one leg? \"     Bilateral lower legs and feet     3. SEVERITY: \"How bad is the swelling? \" (e.g., localized; mild, moderate, severe)   - Localized - small area of swelling localized to one leg   - MILD pedal edema - swelling limited to foot and ankle, pitting edema < 1/4 inch (6 mm) deep, rest and elevation eliminate most or all swelling   - MODERATE edema - swelling of lower leg to knee, pitting edema > 1/4 inch (6 mm) deep, rest and elevation only partially reduce swelling   - SEVERE edema - swelling extends above knee, facial or hand swelling present       Moderate     4. REDNESS: \"Does the swelling look red or infected? \"      Some redness- feels hard in calf area    5. PAIN: \"Is the swelling painful to touch? \" If so, ask: \"How painful is it? \"   (Scale 1-10; mild, moderate or severe)      4/10, touch does cause pain (mostly right side)- pain with stretching, burning sensation in feet     6. FEVER: \"Do you have a fever? \" If so, ask: \"What is it, how was it measured, and when did it start? \"       Denies    7. CAUSE: \"What do you think is causing the leg swelling? \"      Unsure     8. MEDICAL HISTORY: \"Do you have a history of heart failure, kidney disease, liver failure, or cancer? \"      Blood cancer- takes chemotherapy pill, forgets to take sometimes, was told has liver disease    9. RECURRENT SYMPTOM: \"Have you had leg swelling before? \" If so, ask: \"When was the last time? \" \"What happened that time? \"      Denies    10. OTHER SYMPTOMS: \"Do you have any other symptoms? \" (e.g., chest pain, difficulty breathing)        Decreased appetite, dry heaves    11. PREGNANCY: \"Is there any chance you are pregnant? \" \"When was your last menstrual period? \"       N/A    Protocols used: LEG SWELLING AND EDEMA-ADULT-

## 2021-11-03 NOTE — TELEPHONE ENCOUNTER
Pt advised that we have an appt today at 1:40 pm today with dr Stephany Sorto but pt declined.   He would rather go to the ER and plans on doing so later today

## 2021-11-28 ENCOUNTER — HOSPITAL ENCOUNTER (INPATIENT)
Age: 61
LOS: 11 days | Discharge: NURSING, REHAB OR LONG-TERM CARE | DRG: 896 | End: 2021-12-09
Attending: EMERGENCY MEDICINE | Admitting: INTERNAL MEDICINE
Payer: MEDICARE

## 2021-11-28 ENCOUNTER — APPOINTMENT (OUTPATIENT)
Dept: GENERAL RADIOLOGY | Age: 61
DRG: 896 | End: 2021-11-28
Payer: MEDICARE

## 2021-11-28 ENCOUNTER — APPOINTMENT (OUTPATIENT)
Dept: CT IMAGING | Age: 61
DRG: 896 | End: 2021-11-28
Payer: MEDICARE

## 2021-11-28 DIAGNOSIS — G62.1 ALCOHOLIC PERIPHERAL NEUROPATHY (HCC): ICD-10-CM

## 2021-11-28 DIAGNOSIS — A41.9 SEPTICEMIA (HCC): Primary | ICD-10-CM

## 2021-11-28 DIAGNOSIS — E87.1 HYPONATREMIA: ICD-10-CM

## 2021-11-28 DIAGNOSIS — K52.9 ENTERITIS: ICD-10-CM

## 2021-11-28 DIAGNOSIS — J96.01 ACUTE RESPIRATORY FAILURE WITH HYPOXIA (HCC): ICD-10-CM

## 2021-11-28 PROBLEM — F33.2 MAJOR DEPRESSIVE DISORDER, RECURRENT SEVERE WITHOUT PSYCHOTIC FEATURES (HCC): Chronic | Status: ACTIVE | Noted: 2019-08-12

## 2021-11-28 PROBLEM — R74.01 TRANSAMINITIS: Status: ACTIVE | Noted: 2021-11-28

## 2021-11-28 PROBLEM — E03.9 HYPOTHYROIDISM: Chronic | Status: ACTIVE | Noted: 2021-07-15

## 2021-11-28 PROBLEM — F19.10 SUBSTANCE ABUSE (HCC): Chronic | Status: ACTIVE | Noted: 2019-08-26

## 2021-11-28 LAB
ABSOLUTE EOS #: 0 K/UL (ref 0–0.4)
ABSOLUTE IMMATURE GRANULOCYTE: 0.48 K/UL (ref 0–0.3)
ABSOLUTE LYMPH #: 1.45 K/UL (ref 1–4.8)
ABSOLUTE MONO #: 1.21 K/UL (ref 0.1–0.8)
ALBUMIN SERPL-MCNC: 3.3 G/DL (ref 3.5–5.2)
ALBUMIN/GLOBULIN RATIO: 0.9 (ref 1–2.5)
ALP BLD-CCNC: 275 U/L (ref 40–129)
ALT SERPL-CCNC: 63 U/L (ref 5–41)
AMMONIA: 32 UMOL/L (ref 16–60)
ANION GAP SERPL CALCULATED.3IONS-SCNC: 12 MMOL/L (ref 9–17)
ANION GAP SERPL CALCULATED.3IONS-SCNC: 18 MMOL/L (ref 9–17)
AST SERPL-CCNC: 142 U/L
BASOPHILS # BLD: 1 % (ref 0–2)
BASOPHILS ABSOLUTE: 0.24 K/UL (ref 0–0.2)
BILIRUB SERPL-MCNC: 1.35 MG/DL (ref 0.3–1.2)
BILIRUBIN DIRECT: 0.7 MG/DL
BILIRUBIN URINE: NEGATIVE
BILIRUBIN, INDIRECT: 0.65 MG/DL (ref 0–1)
BUN BLDV-MCNC: 6 MG/DL (ref 8–23)
BUN BLDV-MCNC: 7 MG/DL (ref 8–23)
BUN/CREAT BLD: ABNORMAL (ref 9–20)
BUN/CREAT BLD: ABNORMAL (ref 9–20)
CALCIUM IONIZED: 1.05 MMOL/L (ref 1.13–1.33)
CALCIUM SERPL-MCNC: 7.7 MG/DL (ref 8.6–10.4)
CALCIUM SERPL-MCNC: 8.4 MG/DL (ref 8.6–10.4)
CHLORIDE BLD-SCNC: 91 MMOL/L (ref 98–107)
CHLORIDE BLD-SCNC: 95 MMOL/L (ref 98–107)
CO2: 17 MMOL/L (ref 20–31)
CO2: 20 MMOL/L (ref 20–31)
COLOR: YELLOW
COMMENT UA: NORMAL
CREAT SERPL-MCNC: 0.44 MG/DL (ref 0.7–1.2)
CREAT SERPL-MCNC: 0.54 MG/DL (ref 0.7–1.2)
DIFFERENTIAL TYPE: ABNORMAL
EOSINOPHILS RELATIVE PERCENT: 0 % (ref 1–4)
GFR AFRICAN AMERICAN: >60 ML/MIN
GFR AFRICAN AMERICAN: >60 ML/MIN
GFR NON-AFRICAN AMERICAN: >60 ML/MIN
GFR NON-AFRICAN AMERICAN: >60 ML/MIN
GFR SERPL CREATININE-BSD FRML MDRD: ABNORMAL ML/MIN/{1.73_M2}
GLOBULIN: ABNORMAL G/DL (ref 1.5–3.8)
GLUCOSE BLD-MCNC: 112 MG/DL (ref 70–99)
GLUCOSE BLD-MCNC: 112 MG/DL (ref 70–99)
GLUCOSE URINE: NEGATIVE
HCT VFR BLD CALC: 37 % (ref 40.7–50.3)
HEMOGLOBIN: 13.1 G/DL (ref 13–17)
IMMATURE GRANULOCYTES: 2 %
KETONES, URINE: NEGATIVE
LACTIC ACID, SEPSIS WHOLE BLOOD: 5 MMOL/L (ref 0.5–1.9)
LACTIC ACID, SEPSIS: ABNORMAL MMOL/L (ref 0.5–1.9)
LACTIC ACID, WHOLE BLOOD: 2.8 MMOL/L (ref 0.7–2.1)
LEGIONELLA PNEUMOPHILIA AG, URINE: NEGATIVE
LEUKOCYTE ESTERASE, URINE: NEGATIVE
LIPASE: 29 U/L (ref 13–60)
LYMPHOCYTES # BLD: 6 % (ref 24–44)
MCH RBC QN AUTO: 31 PG (ref 25.2–33.5)
MCHC RBC AUTO-ENTMCNC: 35.4 G/DL (ref 28.4–34.8)
MCV RBC AUTO: 87.7 FL (ref 82.6–102.9)
MONOCYTES # BLD: 5 % (ref 1–7)
MORPHOLOGY: ABNORMAL
NITRITE, URINE: NEGATIVE
NRBC AUTOMATED: 0 PER 100 WBC
OSMOLALITY URINE: 238 MOSM/KG (ref 80–1300)
PDW BLD-RTO: 15.2 % (ref 11.8–14.4)
PH UA: 6.5 (ref 5–8)
PLATELET # BLD: 460 K/UL (ref 138–453)
PLATELET ESTIMATE: ABNORMAL
PMV BLD AUTO: 9.5 FL (ref 8.1–13.5)
POTASSIUM SERPL-SCNC: 3.4 MMOL/L (ref 3.7–5.3)
POTASSIUM SERPL-SCNC: 4 MMOL/L (ref 3.7–5.3)
PROCALCITONIN: 0.55 NG/ML
PROTEIN UA: NEGATIVE
RBC # BLD: 4.22 M/UL (ref 4.21–5.77)
RBC # BLD: ABNORMAL 10*6/UL
SARS-COV-2, RAPID: NOT DETECTED
SEG NEUTROPHILS: 86 % (ref 36–66)
SEGMENTED NEUTROPHILS ABSOLUTE COUNT: 20.72 K/UL (ref 1.8–7.7)
SODIUM BLD-SCNC: 126 MMOL/L (ref 135–144)
SODIUM BLD-SCNC: 127 MMOL/L (ref 135–144)
SODIUM,UR: 34 MMOL/L
SPECIFIC GRAVITY UA: 1.01 (ref 1–1.03)
SPECIMEN DESCRIPTION: NORMAL
TOTAL PROTEIN: 6.9 G/DL (ref 6.4–8.3)
TROPONIN INTERP: ABNORMAL
TROPONIN INTERP: ABNORMAL
TROPONIN T: ABNORMAL NG/ML
TROPONIN T: ABNORMAL NG/ML
TROPONIN, HIGH SENSITIVITY: 23 NG/L (ref 0–22)
TROPONIN, HIGH SENSITIVITY: 27 NG/L (ref 0–22)
TURBIDITY: CLEAR
URINE HGB: NEGATIVE
UROBILINOGEN, URINE: NORMAL
WBC # BLD: 24.1 K/UL (ref 3.5–11.3)
WBC # BLD: ABNORMAL 10*3/UL

## 2021-11-28 PROCEDURE — 81003 URINALYSIS AUTO W/O SCOPE: CPT

## 2021-11-28 PROCEDURE — 6360000002 HC RX W HCPCS: Performed by: NURSE PRACTITIONER

## 2021-11-28 PROCEDURE — 71045 X-RAY EXAM CHEST 1 VIEW: CPT

## 2021-11-28 PROCEDURE — 83605 ASSAY OF LACTIC ACID: CPT

## 2021-11-28 PROCEDURE — 2580000003 HC RX 258: Performed by: STUDENT IN AN ORGANIZED HEALTH CARE EDUCATION/TRAINING PROGRAM

## 2021-11-28 PROCEDURE — 80076 HEPATIC FUNCTION PANEL: CPT

## 2021-11-28 PROCEDURE — 93005 ELECTROCARDIOGRAM TRACING: CPT | Performed by: STUDENT IN AN ORGANIZED HEALTH CARE EDUCATION/TRAINING PROGRAM

## 2021-11-28 PROCEDURE — 85025 COMPLETE CBC W/AUTO DIFF WBC: CPT

## 2021-11-28 PROCEDURE — 6370000000 HC RX 637 (ALT 250 FOR IP): Performed by: NURSE PRACTITIONER

## 2021-11-28 PROCEDURE — 87635 SARS-COV-2 COVID-19 AMP PRB: CPT

## 2021-11-28 PROCEDURE — HZ2ZZZZ DETOXIFICATION SERVICES FOR SUBSTANCE ABUSE TREATMENT: ICD-10-PCS | Performed by: INTERNAL MEDICINE

## 2021-11-28 PROCEDURE — 6360000002 HC RX W HCPCS: Performed by: STUDENT IN AN ORGANIZED HEALTH CARE EDUCATION/TRAINING PROGRAM

## 2021-11-28 PROCEDURE — 82330 ASSAY OF CALCIUM: CPT

## 2021-11-28 PROCEDURE — 94761 N-INVAS EAR/PLS OXIMETRY MLT: CPT

## 2021-11-28 PROCEDURE — 99223 1ST HOSP IP/OBS HIGH 75: CPT | Performed by: NURSE PRACTITIONER

## 2021-11-28 PROCEDURE — 74177 CT ABD & PELVIS W/CONTRAST: CPT

## 2021-11-28 PROCEDURE — 6360000004 HC RX CONTRAST MEDICATION: Performed by: STUDENT IN AN ORGANIZED HEALTH CARE EDUCATION/TRAINING PROGRAM

## 2021-11-28 PROCEDURE — 2580000003 HC RX 258: Performed by: NURSE PRACTITIONER

## 2021-11-28 PROCEDURE — 96365 THER/PROPH/DIAG IV INF INIT: CPT

## 2021-11-28 PROCEDURE — 6370000000 HC RX 637 (ALT 250 FOR IP): Performed by: STUDENT IN AN ORGANIZED HEALTH CARE EDUCATION/TRAINING PROGRAM

## 2021-11-28 PROCEDURE — 99285 EMERGENCY DEPT VISIT HI MDM: CPT

## 2021-11-28 PROCEDURE — 80048 BASIC METABOLIC PNL TOTAL CA: CPT

## 2021-11-28 PROCEDURE — 83935 ASSAY OF URINE OSMOLALITY: CPT

## 2021-11-28 PROCEDURE — 86738 MYCOPLASMA ANTIBODY: CPT

## 2021-11-28 PROCEDURE — 96375 TX/PRO/DX INJ NEW DRUG ADDON: CPT

## 2021-11-28 PROCEDURE — 96376 TX/PRO/DX INJ SAME DRUG ADON: CPT

## 2021-11-28 PROCEDURE — 1200000000 HC SEMI PRIVATE

## 2021-11-28 PROCEDURE — 84145 PROCALCITONIN (PCT): CPT

## 2021-11-28 PROCEDURE — 87040 BLOOD CULTURE FOR BACTERIA: CPT

## 2021-11-28 PROCEDURE — 87449 NOS EACH ORGANISM AG IA: CPT

## 2021-11-28 PROCEDURE — 83690 ASSAY OF LIPASE: CPT

## 2021-11-28 PROCEDURE — 84484 ASSAY OF TROPONIN QUANT: CPT

## 2021-11-28 PROCEDURE — 82140 ASSAY OF AMMONIA: CPT

## 2021-11-28 PROCEDURE — 84300 ASSAY OF URINE SODIUM: CPT

## 2021-11-28 RX ORDER — HYDROXYZINE HYDROCHLORIDE 25 MG/1
50 TABLET, FILM COATED ORAL EVERY 6 HOURS PRN
Status: DISCONTINUED | OUTPATIENT
Start: 2021-11-28 | End: 2021-12-09 | Stop reason: HOSPADM

## 2021-11-28 RX ORDER — LORAZEPAM 2 MG/ML
3 INJECTION INTRAMUSCULAR
Status: DISCONTINUED | OUTPATIENT
Start: 2021-11-28 | End: 2021-12-02

## 2021-11-28 RX ORDER — SODIUM CHLORIDE 0.9 % (FLUSH) 0.9 %
5-40 SYRINGE (ML) INJECTION EVERY 12 HOURS SCHEDULED
Status: DISCONTINUED | OUTPATIENT
Start: 2021-11-28 | End: 2021-12-09 | Stop reason: HOSPADM

## 2021-11-28 RX ORDER — ACETAMINOPHEN 650 MG/1
650 SUPPOSITORY RECTAL EVERY 6 HOURS PRN
Status: DISCONTINUED | OUTPATIENT
Start: 2021-11-28 | End: 2021-12-09 | Stop reason: HOSPADM

## 2021-11-28 RX ORDER — PANTOPRAZOLE SODIUM 40 MG/1
40 TABLET, DELAYED RELEASE ORAL
Status: DISCONTINUED | OUTPATIENT
Start: 2021-11-29 | End: 2021-12-09 | Stop reason: HOSPADM

## 2021-11-28 RX ORDER — 0.9 % SODIUM CHLORIDE 0.9 %
30 INTRAVENOUS SOLUTION INTRAVENOUS ONCE
Status: COMPLETED | OUTPATIENT
Start: 2021-11-28 | End: 2021-11-28

## 2021-11-28 RX ORDER — CALCIUM CARBONATE 200(500)MG
1000 TABLET,CHEWABLE ORAL 2 TIMES DAILY
Status: DISCONTINUED | OUTPATIENT
Start: 2021-11-28 | End: 2021-12-09 | Stop reason: HOSPADM

## 2021-11-28 RX ORDER — LORAZEPAM 0.5 MG/1
2 TABLET ORAL
Status: DISCONTINUED | OUTPATIENT
Start: 2021-11-28 | End: 2021-12-02

## 2021-11-28 RX ORDER — SODIUM CHLORIDE 0.9 % (FLUSH) 0.9 %
5-40 SYRINGE (ML) INJECTION PRN
Status: DISCONTINUED | OUTPATIENT
Start: 2021-11-28 | End: 2021-12-09 | Stop reason: HOSPADM

## 2021-11-28 RX ORDER — LORAZEPAM 2 MG/1
4 TABLET ORAL
Status: DISCONTINUED | OUTPATIENT
Start: 2021-11-28 | End: 2021-12-02

## 2021-11-28 RX ORDER — QUETIAPINE FUMARATE 200 MG/1
400 TABLET, FILM COATED ORAL NIGHTLY
Status: DISCONTINUED | OUTPATIENT
Start: 2021-11-28 | End: 2021-12-09 | Stop reason: HOSPADM

## 2021-11-28 RX ORDER — LANOLIN ALCOHOL/MO/W.PET/CERES
100 CREAM (GRAM) TOPICAL DAILY
Status: DISCONTINUED | OUTPATIENT
Start: 2021-11-28 | End: 2021-11-29

## 2021-11-28 RX ORDER — FOLIC ACID 1 MG/1
1 TABLET ORAL DAILY
Status: DISCONTINUED | OUTPATIENT
Start: 2021-11-28 | End: 2021-11-29

## 2021-11-28 RX ORDER — M-VIT,TX,IRON,MINS/CALC/FOLIC 27MG-0.4MG
1 TABLET ORAL DAILY
Status: DISCONTINUED | OUTPATIENT
Start: 2021-11-28 | End: 2021-11-29

## 2021-11-28 RX ORDER — LORAZEPAM 2 MG/ML
2 INJECTION INTRAMUSCULAR
Status: DISCONTINUED | OUTPATIENT
Start: 2021-11-28 | End: 2021-12-02

## 2021-11-28 RX ORDER — ACETAMINOPHEN 325 MG/1
650 TABLET ORAL EVERY 6 HOURS PRN
Status: DISCONTINUED | OUTPATIENT
Start: 2021-11-28 | End: 2021-12-09 | Stop reason: HOSPADM

## 2021-11-28 RX ORDER — LORAZEPAM 0.5 MG/1
1 TABLET ORAL
Status: DISCONTINUED | OUTPATIENT
Start: 2021-11-28 | End: 2021-12-02

## 2021-11-28 RX ORDER — LISINOPRIL 10 MG/1
10 TABLET ORAL DAILY
Status: DISCONTINUED | OUTPATIENT
Start: 2021-11-28 | End: 2021-12-03

## 2021-11-28 RX ORDER — HYDROXYUREA 500 MG/1
500 CAPSULE ORAL 2 TIMES DAILY
Status: DISCONTINUED | OUTPATIENT
Start: 2021-11-28 | End: 2021-12-09 | Stop reason: HOSPADM

## 2021-11-28 RX ORDER — LORAZEPAM 2 MG/ML
1 INJECTION INTRAMUSCULAR
Status: DISCONTINUED | OUTPATIENT
Start: 2021-11-28 | End: 2021-12-02

## 2021-11-28 RX ORDER — LORAZEPAM 2 MG/ML
4 INJECTION INTRAMUSCULAR
Status: DISCONTINUED | OUTPATIENT
Start: 2021-11-28 | End: 2021-12-02

## 2021-11-28 RX ORDER — PRAZOSIN HYDROCHLORIDE 1 MG/1
1 CAPSULE ORAL 2 TIMES DAILY
Status: DISCONTINUED | OUTPATIENT
Start: 2021-11-28 | End: 2021-12-09 | Stop reason: HOSPADM

## 2021-11-28 RX ORDER — POTASSIUM CHLORIDE 20 MEQ/1
20 TABLET, EXTENDED RELEASE ORAL ONCE
Status: COMPLETED | OUTPATIENT
Start: 2021-11-28 | End: 2021-11-29

## 2021-11-28 RX ORDER — SODIUM CHLORIDE 9 MG/ML
INJECTION, SOLUTION INTRAVENOUS CONTINUOUS
Status: DISCONTINUED | OUTPATIENT
Start: 2021-11-28 | End: 2021-11-29

## 2021-11-28 RX ORDER — SODIUM CHLORIDE 0.9 % (FLUSH) 0.9 %
5-40 SYRINGE (ML) INJECTION EVERY 12 HOURS SCHEDULED
Status: DISCONTINUED | OUTPATIENT
Start: 2021-11-28 | End: 2021-12-08

## 2021-11-28 RX ORDER — NICOTINE 21 MG/24HR
1 PATCH, TRANSDERMAL 24 HOURS TRANSDERMAL DAILY
Status: DISCONTINUED | OUTPATIENT
Start: 2021-11-28 | End: 2021-12-09 | Stop reason: HOSPADM

## 2021-11-28 RX ORDER — SODIUM CHLORIDE 9 MG/ML
25 INJECTION, SOLUTION INTRAVENOUS PRN
Status: DISCONTINUED | OUTPATIENT
Start: 2021-11-28 | End: 2021-12-09 | Stop reason: HOSPADM

## 2021-11-28 RX ORDER — LANOLIN ALCOHOL/MO/W.PET/CERES
100 CREAM (GRAM) TOPICAL DAILY
Status: DISCONTINUED | OUTPATIENT
Start: 2021-11-28 | End: 2021-11-28

## 2021-11-28 RX ORDER — LEVOTHYROXINE SODIUM 0.05 MG/1
50 TABLET ORAL DAILY
Status: DISCONTINUED | OUTPATIENT
Start: 2021-11-28 | End: 2021-12-09 | Stop reason: HOSPADM

## 2021-11-28 RX ADMIN — Medication 100 MG: at 22:28

## 2021-11-28 RX ADMIN — VANCOMYCIN HYDROCHLORIDE 2250 MG: 1 INJECTION, POWDER, LYOPHILIZED, FOR SOLUTION INTRAVENOUS at 17:24

## 2021-11-28 RX ADMIN — SODIUM CHLORIDE 2721 ML: 9 INJECTION, SOLUTION INTRAVENOUS at 14:44

## 2021-11-28 RX ADMIN — CEFEPIME HYDROCHLORIDE 2000 MG: 2 INJECTION, POWDER, FOR SOLUTION INTRAVENOUS at 16:09

## 2021-11-28 RX ADMIN — SODIUM CHLORIDE: 9 INJECTION, SOLUTION INTRAVENOUS at 21:24

## 2021-11-28 RX ADMIN — LORAZEPAM 1 MG: 2 INJECTION INTRAMUSCULAR at 19:42

## 2021-11-28 RX ADMIN — IOPAMIDOL 75 ML: 755 INJECTION, SOLUTION INTRAVENOUS at 18:06

## 2021-11-28 RX ADMIN — PIPERACILLIN AND TAZOBACTAM 4500 MG: 4; .5 INJECTION, POWDER, LYOPHILIZED, FOR SOLUTION INTRAVENOUS; PARENTERAL at 22:29

## 2021-11-28 RX ADMIN — FOLIC ACID 1 MG: 1 TABLET ORAL at 22:28

## 2021-11-28 RX ADMIN — SODIUM CHLORIDE, PRESERVATIVE FREE 10 ML: 5 INJECTION INTRAVENOUS at 21:00

## 2021-11-28 RX ADMIN — Medication 10 ML: at 22:28

## 2021-11-28 RX ADMIN — LISINOPRIL 10 MG: 10 TABLET ORAL at 22:28

## 2021-11-28 RX ADMIN — ENOXAPARIN SODIUM 40 MG: 100 INJECTION SUBCUTANEOUS at 22:32

## 2021-11-28 RX ADMIN — LORAZEPAM 2 MG: 2 INJECTION INTRAMUSCULAR; INTRAVENOUS at 14:55

## 2021-11-28 ASSESSMENT — ENCOUNTER SYMPTOMS
NAUSEA: 0
COUGH: 0
DIARRHEA: 1
VOMITING: 1
ABDOMINAL PAIN: 0
RHINORRHEA: 0
SHORTNESS OF BREATH: 0

## 2021-11-28 NOTE — ED PROVIDER NOTES
[facility] ED  Emergency Department Encounter  EmergencyMedicine Resident     Pt Name:Michael Dessa Nageotte  MRN: [id number]  [place] 1960  Date of evaluation: 11/28/21  PCP:  SIMONE Winn CNP    This patient was evaluated in the Emergency Department for symptoms described in the history of present illness. The patient was evaluated in the context of the global COVID-19 pandemic, which necessitated consideration that the patient might be at risk for infection with the SARS-CoV-2 virus that causes COVID-19. Institutional protocols and algorithms that pertain to the evaluation of patients at risk for COVID-19 are in a state of rapid change based on information released by regulatory bodies including the CDC and federal and state organizations. These policies and algorithms were followed during the patient's care in the ED. CHIEF COMPLAINT       Chief Complaint   Patient presents with    Dizziness    Fatigue       HISTORY OF PRESENT ILLNESS  (Location/Symptom, Timing/Onset, Context/Setting, Quality, Duration, Modifying Factors, Severity.)      Qamar Gibson is a 62 y.o. male who presents with complaint of fatigue as well as lightheadedness has been going on for the past 2 days. Medical history significant for alcohol abuse, myeloproliferative disorder, major depressive disorder as well as drug abuse. Patient states he has felt lightheaded when going from sitting to standing for the past couple days as well as generalized fatigue. Denies any fevers, chest pain, one-sided weakness, slurred speech, difficulty swallowing, headaches, runny nose, congestion, fevers. Patient does admit to intermittent diarrhea that is nonbloody and nonmelanotic. Does also admit to one episode of bloody emesis. States that it was just slightly tender with blood was not profuse hemorrhaging.   Patient states that he is an alcoholic and drinks 32-88 beers per day with his last drink being approximately 6 hours ago. PAST MEDICAL / SURGICAL / SOCIAL / FAMILY HISTORY      has a past medical history of Alcohol abuse, Anemia, Cancer ([address] Cervical stenosis of spine, Chronic left-sided low back pain with left-sided sciatica, Insomnia, Intentional drug overdose ([address] Left arm numbness, Left lumbar radiculitis, Macrocytosis, Major depressive disorder, recurrent severe without psychotic features ([address] Myeloproliferative disorder ([address] Osteoarthritis of spine with radiculopathy, lumbar region, Severe recurrent major depression without psychotic features ([address] and Spondylosis of lumbar region without myelopathy or radiculopathy. has a past surgical history that includes cervical fusion; Appendectomy; Tonsillectomy; Carpal tunnel release; epidural steroid injection (Left, 5/15/2019); Endoscopy, colon, diagnostic; cervical fusion (09/05/2019); and cervical fusion (N/A, 9/5/2019). Social History     Socioeconomic History    Marital status:      Spouse name: Not on file    Number of children: 3    Years of education: Not on file    Highest education level: Not on file   Occupational History    Not on file   Tobacco Use    Smoking status: Current Every Day Smoker     Packs/day: 1.50     Years: 43.00     Pack years: 64.50     Types: Cigarettes     Start date: 9/23/1978    Smokeless tobacco: Never Used   Vaping Use    Vaping Use: Never used   Substance and Sexual Activity    Alcohol use:  Yes     Alcohol/week: 14.0 standard drinks     Types: 14 Cans of beer per week     Comment: heavy alcohol user/ states drinks a pint of vodka occassionaly    Drug use: Not Currently     Types: Marijuana Natalia Gouty)    Sexual activity: Not Currently   Other Topics Concern    Not on file   Social History Narrative    Not on file     Social Determinants of Health     Financial Resource Strain:     Difficulty of Paying Living Expenses: Not on file   Food Insecurity:     Worried About Running Out of Food in the Last Year: Not on file    Ran Out of Food in the Last Year: Not on file   Transportation Needs:     Lack of Transportation (Medical): Not on file    Lack of Transportation (Non-Medical): Not on file   Physical Activity:     Days of Exercise per Week: Not on file    Minutes of Exercise per Session: Not on file   Stress:     Feeling of Stress : Not on file   Social Connections:     Frequency of Communication with Friends and Family: Not on file    Frequency of Social Gatherings with Friends and Family: Not on file    Attends Jewish Services: Not on file    Active Member of [address] [place] [address] [employer] or Organizations: Not on file    Attends Club or Organization Meetings: Not on file    Marital Status: Not on file   Intimate Partner Violence:     Fear of Current or Ex-Partner: Not on file    Emotionally Abused: Not on file    Physically Abused: Not on file    Sexually Abused: Not on file   Housing Stability:     Unable to Pay for Housing in the Last Year: Not on file    Number of [place] in the Last Year: Not on file    Unstable Housing in the Last Year: Not on file       Family History   Problem Relation Age of Onset    Breast Cancer Mother         s/p surgical complications    Diabetes Mother     Heart Failure Father     Other Brother         \"bad back\"       Allergies:  Patient has no known allergies. Home Medications:  Prior to Admission medications    Medication Sig Start Date End Date Taking?  Authorizing Provider   hydroxyurea (HYDREA) 500 MG chemo capsule Take 500 mg by mouth 2 times daily   Yes Historical Provider, MD   vitamin B-1 (THIAMINE) 100 MG tablet Take 1 tablet by mouth daily 6/24/21  Yes Ibeth Muse MD   nicotine (NICODERM CQ) 21 MG/24HR Place 1 patch onto the skin every 24 hours 6/24/21  Yes Ibeth Muse MD   folic acid (FOLVITE) 1 MG tablet Take 1 tablet by mouth daily 6/24/21  Yes Ibeth Muse MD   Multiple Vitamins-Minerals (THERAPEUTIC MULTIVITAMIN-MINERALS) tablet Take 1 tablet by mouth daily 6/3/21 6/3/22 Yes Coral Giron MD   QUEtiapine (SEROQUEL) 400 MG tablet Take 400 mg by mouth nightly 2/19/21  Yes Historical Provider, MD   lisinopril (PRINIVIL;ZESTRIL) 10 MG tablet Take 1 tablet by mouth daily 7/18/21   Nicole Metzger MD   sertraline (ZOLOFT) 100 MG tablet Take 1 tablet by mouth daily 7/17/21   Nicole Metzger MD   levothyroxine (SYNTHROID) 50 MCG tablet Take 1 tablet by mouth daily Take in the morning on an empty stomach 1 hour before any food or other medications 6/24/21   Trisha Villarreal MD   naltrexone (DEPADE) 50 MG tablet Take 50 mg by mouth daily    Historical Provider, MD   prazosin (MINIPRESS) 1 MG capsule Take 1 mg by mouth 2 times daily    Historical Provider, MD   omeprazole (PRILOSEC) 40 MG delayed release capsule Take 40 mg by mouth Daily with supper     Historical Provider, MD   hydrOXYzine (ATARAX) 50 MG tablet Take 50 mg by mouth every 6 hours as needed  4/13/21   Historical Provider, MD       REVIEW OF SYSTEMS    (2-9 systems for level 4, 10 or more for level 5)      Review of Systems   Constitutional: Positive for fatigue. Negative for chills and fever. HENT: Negative for congestion and rhinorrhea. Respiratory: Negative for cough and shortness of breath. Cardiovascular: Negative for chest pain and leg swelling. Gastrointestinal: Positive for diarrhea and vomiting. Negative for abdominal pain and nausea. Genitourinary: Negative for dysuria and hematuria. Musculoskeletal: Negative for neck pain and neck stiffness. Skin: Negative for rash and wound. Neurological: Positive for weakness. Negative for headaches. PHYSICAL EXAM   (up to 7 for level 4, 8 or more for level 5)      INITIAL VITALS:   /87   Pulse 93   Temp 98.4 °F (36.9 °C) (Oral)   Resp 16   Wt 200 lb (90.7 kg)   SpO2 97%   BMI 26.39 kg/m²     Physical Exam  Constitutional:       General: He is not in acute distress. Appearance: He is ill-appearing.  He is not ED OR OR/PROCEDURAL) Inpatient    Alcohol and or drug assessment    Seizure precautions    Fall precautions       MEDICATIONS ORDERED:  Orders Placed This Encounter   Medications    0.9 % sodium chloride IV bolus 2,721 mL    sodium chloride flush 0.9 % injection 5-40 mL    sodium chloride flush 0.9 % injection 5-40 mL    0.9 % sodium chloride infusion    OR Linked Order Group     LORazepam (ATIVAN) tablet 1 mg     LORazepam (ATIVAN) injection 1 mg     LORazepam (ATIVAN) tablet 2 mg     LORazepam (ATIVAN) injection 2 mg     LORazepam (ATIVAN) tablet 3 mg     LORazepam (ATIVAN) injection 3 mg     LORazepam (ATIVAN) tablet 4 mg     LORazepam (ATIVAN) injection 4 mg    cefepime (MAXIPIME) 2000 mg IVPB minibag     Order Specific Question:   Antimicrobial Indications     Answer:   Sepsis of Unknown Etiology    vancomycin (VANCOCIN) 2,250 mg in dextrose 5 % 500 mL IVPB     Order Specific Question:   Antimicrobial Indications     Answer:   Sepsis of Unknown Etiology    vancomycin (VANCOCIN) 1250 mg in dextrose 5 % 250 mL IVPB     Order Specific Question:   Antimicrobial Indications     Answer:   Sepsis of Unknown Etiology    vancomycin (VANCOCIN) intermittent dosing (placeholder)     Order Specific Question:   Antimicrobial Indications     Answer:   Sepsis of Unknown Etiology    iopamidol (ISOVUE-370) 76 % injection 75 mL    nicotine (NICODERM CQ) 21 MG/24HR 1 patch       DDX: ACS, PE, CVA, sepsis, pneumonia, enteritis, hepatitis, alcohol withdrawal    DIAGNOSTIC RESULTS / EMERGENCY DEPARTMENT COURSE / MDM   LAB RESULTS:  Results for orders placed or performed during the hospital encounter of 11/28/21   COVID-19, Rapid    Specimen: Nasopharyngeal Swab   Result Value Ref Range    Specimen Description . NASOPHARYNGEAL SWAB     SARS-CoV-2, Rapid Not Detected Not Detected   CBC Auto Differential   Result Value Ref Range    WBC 24.1 (H) 3.5 - 11.3 k/uL    RBC 4.22 4.21 - 5.77 m/uL    Hemoglobin 13.1 13.0 - 17.0 g/dL    Hematocrit 37.0 (L) 40.7 - 50.3 %    MCV 87.7 82.6 - 102.9 fL    MCH 31.0 25.2 - 33.5 pg    MCHC 35.4 (H) 28.4 - 34.8 g/dL    RDW 15.2 (H) 11.8 - 14.4 %    Platelets 546 (H) 001 - 453 k/uL    MPV 9.5 8.1 - 13.5 fL    NRBC Automated 0.0 0.0 per 100 WBC    Differential Type NOT REPORTED     WBC Morphology NOT REPORTED     RBC Morphology NOT REPORTED     Platelet Estimate NOT REPORTED     Immature Granulocytes 2 (H) 0 %    Seg Neutrophils 86 (H) 36 - 66 %    Lymphocytes 6 (L) 24 - 44 %    Monocytes 5 1 - 7 %    Eosinophils % 0 (L) 1 - 4 %    Basophils 1 0 - 2 %    Absolute Immature Granulocyte 0.48 (H) 0.00 - 0.30 k/uL    Segs Absolute 20.72 (H) 1.8 - 7.7 k/uL    Absolute Lymph # 1.45 1.0 - 4.8 k/uL    Absolute Mono # 1.21 (H) 0.1 - 0.8 k/uL    Absolute Eos # 0.00 0.0 - 0.4 k/uL    Basophils Absolute 0.24 (H) 0.0 - 0.2 k/uL    Morphology ANISOCYTOSIS PRESENT    Basic Metabolic Panel w/ Reflex to MG   Result Value Ref Range    Glucose 112 (H) 70 - 99 mg/dL    BUN 7 (L) 8 - 23 mg/dL    CREATININE 0.54 (L) 0.70 - 1.20 mg/dL    Bun/Cre Ratio NOT REPORTED 9 - 20    Calcium 8.4 (L) 8.6 - 10.4 mg/dL    Sodium 126 (L) 135 - 144 mmol/L    Potassium 4.0 3.7 - 5.3 mmol/L    Chloride 91 (L) 98 - 107 mmol/L    CO2 17 (L) 20 - 31 mmol/L    Anion Gap 18 (H) 9 - 17 mmol/L    GFR Non-African American >60 >60 mL/min    GFR African American >60 >60 mL/min    GFR Comment          GFR Staging NOT REPORTED    Troponin   Result Value Ref Range    Troponin, High Sensitivity 27 (H) 0 - 22 ng/L    Troponin T NOT REPORTED <0.03 ng/mL    Troponin Interp NOT REPORTED    Troponin   Result Value Ref Range    Troponin, High Sensitivity 23 (H) 0 - 22 ng/L    Troponin T NOT REPORTED <0.03 ng/mL    Troponin Interp NOT REPORTED    Hepatic Function Panel   Result Value Ref Range    Albumin 3.3 (L) 3.5 - 5.2 g/dL    Alkaline Phosphatase 275 (H) 40 - 129 U/L    ALT 63 (H) 5 - 41 U/L     (H) <40 U/L    Total Bilirubin 1.35 (H) 0.3 - 1.2 mg/dL    Bilirubin, Direct 0.70 (H) <0.31 mg/dL    Bilirubin, Indirect 0.65 0.00 - 1.00 mg/dL    Total Protein 6.9 6.4 - 8.3 g/dL    Globulin NOT REPORTED 1.5 - 3.8 g/dL    Albumin/Globulin Ratio 0.9 (L) 1.0 - 2.5   AMMONIA   Result Value Ref Range    Ammonia 32 16 - 60 umol/L   Lipase   Result Value Ref Range    Lipase 29 13 - 60 U/L   Lactate, Sepsis   Result Value Ref Range    Lactic Acid, Sepsis NOT REPORTED 0.5 - 1.9 mmol/L    Lactic Acid, Sepsis, Whole Blood 5.0 (H) 0.5 - 1.9 mmol/L   Urinalysis Reflex to Culture    Specimen: Urine, clean catch   Result Value Ref Range    Color, UA Yellow Yellow    Turbidity UA Clear Clear    Glucose, Ur NEGATIVE NEGATIVE    Bilirubin Urine NEGATIVE NEGATIVE    Ketones, Urine NEGATIVE NEGATIVE    Specific Gravity, UA 1.014 1.005 - 1.030    Urine Hgb NEGATIVE NEGATIVE    pH, UA 6.5 5.0 - 8.0    Protein, UA NEGATIVE NEGATIVE    Urobilinogen, Urine Normal Normal    Nitrite, Urine NEGATIVE NEGATIVE    Leukocyte Esterase, Urine NEGATIVE NEGATIVE    Urinalysis Comments       Microscopic exam not performed based on chemical results unless requested in original order. LACTIC ACID, WHOLE BLOOD   Result Value Ref Range    Lactic Acid, Whole Blood 2.8 (H) 0.7 - 2.1 mmol/L       IMPRESSION: Labs consistent with sepsis. RADIOLOGY:  CT ABDOMEN PELVIS W IV CONTRAST Additional Contrast? None    Result Date: 11/28/2021  EXAMINATION: CT OF THE ABDOMEN AND PELVIS WITH CONTRAST 11/28/2021 5:56 pm TECHNIQUE: CT of the abdomen and pelvis was performed with the administration of intravenous contrast. Multiplanar reformatted images are provided for review. Dose modulation, iterative reconstruction, and/or weight based adjustment of the mA/kV was utilized to reduce the radiation dose to as low as reasonably achievable. COMPARISON: None.  HISTORY: ORDERING SYSTEM PROVIDED HISTORY: abd pain TECHNOLOGIST PROVIDED HISTORY: [facility] pain Decision Support Exception - unselect if not a suspected or confirmed emergency medical condition->Emergency Medical Condition (MA) Reason for Exam: abd pain, Dizziness; Fatigue Acuity: Unknown Type of Exam: Unknown FINDINGS: Lower Chest: Right middle lobe and right lower lobe atelectasis versus scarring. Cardiomegaly with coronary disease. Organs: There is fatty infiltration liver. Otherwise the liver, spleen, adrenal glands, kidneys, pancreas unremarkable. Subcentimeter left renal lesions identified suggestive cyst too small to be accurately rise by CT. GI/Bowel: Increased fluid distention involving the bowel loops could suggest underlying enteritis. No bowel obstruction. Mild retained stool in the throughout the colon notably the rectosigmoid junction may represent fecal impaction or constipation. Scattered colonic diverticulosis. Appendix not well delineated however no definite pericecal inflammatory changes. Pelvis: Mild bladder distention. Prostate unremarkable. Small fat containing left inguinal hernia. Peritoneum/Retroperitoneum: No free fluid. Aortic vascular calcifications. Aorta is nonaneurysmal. Bones/Soft Tissues: Multilevel degenerate changes of the lumbar spine. Scattered areas nonspecific air-fluid involving bowel loops could suggest underlying enteritis or diarrheal illness. No bowel obstruction. Colonic diverticulosis noted. XR CHEST PORTABLE    Result Date: 11/28/2021  EXAMINATION: ONE XRAY VIEW OF THE CHEST 11/28/2021 2:14 pm COMPARISON: 07/16/2021 HISTORY: ORDERING SYSTEM PROVIDED HISTORY: fatigue TECHNOLOGIST PROVIDED HISTORY: fatigue FINDINGS: Cardiac leads project over the chest.  Elevation of right hemidiaphragm. Left costophrenic angle is not included. Linear opacity is seen at the right base. No significant pleural effusion. No pneumothorax. Cardiac and mediastinal silhouettes are similar to prior. Left hilar enlargement is similar to prior. Tortuosity of the ascending aorta.      Chronic elevation of the right hemidiaphragm with adjacent opacity, likely atelectasis. EKG  Normal sinus rhythm, normal axis, intervals grossly within normal limits, slightly prolonged QTC of 502, no acute ST elevation, no T wave inversions, slight flattening in V2, no pathologic Q waves. ,  Normal sinus rhythm, nonspecific EKG    All EKG's are interpreted by the Emergency Department Physician who either signs or Co-signs this chart in the absence of a cardiologist.    EMERGENCY DEPARTMENT COURSE:  Sepsis Times and Checklist  Vital Signs: BP: 128/87  Pulse: 93  Resp: 16  Temp: 98.4 °F (36.9 °C) SpO2: 97 %  SIRS (>2)   Temp > 38.0C or < 36C   HR > 90   RR > 20   WBC > 12 or < 4 or >10% bands  SIRS (>2) and confirmed or suspected source of infection = Sepsis  Is Sepsis due to likely bacterial infection?: Yes      Severe Sepsis Identified:  Date: 11/28   Time: 1400  Sepsis Orders:  ·  CBC(required): Yes  ·  CMP: Yes  ·  PT/PTT: No  ·  Blood Cultures x2(required): Yes  ·  Urinalysis and Urine Culture: Yes  ·  Lactate(required): Yes  ·  Antibiotics Given (within 3 hours of sepsis identification, after blood cultures):  Yes    (If unable to obtain IV access for IV antibiotics within 3 hours of identification of sepsis, IM antibiotics is acceptable.)  ·             If lactate >2.0 MUST repeat within 6 hours      IV Fluid Bolus:  Is lactate > 4.0:  Yes  If lactate >  4.0 OR hypotension (MAP<65 mmHg) (with 2 BP readings) 30ml/kg crystalloid MUST be ordered.     Must have documented in the medical record:   that administration of 30 mL/kg of crystalloid fluids would be detrimental or harmful for the patient despite having hypotension, a lactate >= 4 mmol/L, or documentation of septic shock;    AND that the patient has one of the following conditions, OR that a portion of the crystalloid fluid volume was administered as colloids (if a portion consisted of colloids, there must be an order and documentation that colloids were started or noted as given)     Fluids must be initiated within 3 hours of sepsis identification. These fluids must have a rate > 125 ml/hr. · Is the patient Morbidly Obese (BMI > 30): No  · IV Fluids given prior to arrival can be used in this calculation but the following information must be documented:  Start time: 1444, Type of fluid 0.9% saline, Volume of fluid 2.7 L bolus  · Does the patient or patient advocate refuse the entire 30 ml/kg IV fluid bolus? No      Septic Shock Identified (Initial lactate > 4.0 or 2 Hypotensive Blood Pressure readings within the first 6 hours): For septic shock sepsis focus physical exam must be completed AND documented (within 6 hours). Date: 11/18 Time: 1700      Sepsis focus exam completed. For persistent hypotension after 30ml/kg fluid bolus vasopressors must be started (within 6 hours)    Patient is a 26-year-old male presenting with chief complaint of dizziness as well as fatigue. On examination patient is in no acute stress and nontoxic-appearing. Does drink 12 to 1512 ounce beers daily. Was becoming increasingly tachycardic as well as shaky. Given this CIWA protocol was initiated patient given Ativan with resolution. Was concern for sepsis as well. Sepsis labs ordered showing elevated lactic acid. Sepsis checklist initiated. Elevated white blood cell count, given broad-spectrum antibiotics. Chest x-ray does not show any bacterial pneumonia at this time. Troponin stable at 23 unlikely to be ACS. CT abdomen pelvis does show enteritis and is the likely source for patient's elevated white blood cell count. No indication of CVA at this time. Neuro exam benign. No one-sided weakness. Patient is PERC negative low risk Wells. EKG within normal limits. No indication of ACS or PE at this time. Patient admitted to [facility] for further evaluation. Patient CARE signed out to Dr. Eulogio Stephenson awaiting bed placement.    ED Course as of 11/28/21 2011   Sun Nov 28, 2021   1437 WBC(!): 24.1 [MS]   1446 Troponin, High Sensitivity(!): 27 [MS]   1446 Troponin decreased from prior lab draws. [MS]   1518 Ammonia: 32 [MS]   1612 No source for infection at this time. Will obtain CT abdomen pelvis [MS]   1638 Lactic Acid, Whole Blood(!): 2.8 [MS]   1638 Lactic acids continuing to improve. Continue IV fluid bolus. Awaiting CT abdomen pelvis with admission after. [MS]   0355 Troponin, High Sensitivity(!): 23 [MS]   1922 CT abdomen consistent with enteritis. Will admit patient for sepsis. [MS]   1946 Patient admitted to [facility] for further evaluation [MS]      ED Course User Index  [MS] Zak Silveira DO     PROCEDURES:  n/a    CONSULTS:  IP CONSULT TO SOCIAL WORK  PHARMACY TO DOSE VANCOMYCIN  IP CONSULT TO INTERNAL MEDICINE  IP CONSULT TO HOSPITALIST  IP CONSULT TO SOCIAL WORK    CRITICAL CARE:  n/a    FINAL IMPRESSION      1. Septicemia ([address]    2. Hyponatremia    3. Enteritis          DISPOSITION / PLAN     DISPOSITION Admitted 11/28/2021 07:45:01 PM      PATIENT REFERRED TO:  No follow-up provider specified.     DISCHARGE MEDICATIONS:  New Prescriptions    No medications on file       Raymond Garay DO  Emergency Medicine Resident    (Please note that portions of thisnote were completed with a voice recognition program.  Efforts were made to edit the dictations but occasionally words are mis-transcribed.)        Zak Silveira DO  Resident  11/28/21 2012

## 2021-11-28 NOTE — CARE COORDINATION
Case Management Initial Discharge Plan  Arturo Carbajal,             Met with:patient to discuss discharge plans in the ER. Information verified: address, contacts, phone number, , insurance Yes  Insurance Provider: [facility] medicare/medicaid     Emergency Contact/Next of Kin name & number: brother bill   Who are involved in patient's support system? [facility] living gr[facility]    PCP: SIMONE Turner CNP  Date of last visit: 20      Discharge Planning    Living Arrangements:    lives alone    Home is an apartment  no stairs to climb to get into front door     Patient able to perform ADL's:Independent    Current Services (outpatient & in home) none  DME equipment: none  DME provider: none    Is patient receiving oral anticoagulation therapy? No        Potential Assistance Needed:   f/u pcp, etoh inpatient treatment         Evaluation: yes    Expected Discharge date:       Patient expects to be discharged to:   inpatient etoh tx     If home: is the family and/or caregiver wiling & able to provide support at home? Lives alone         Transportation provider: sober living grp/[employer]  Transportation arrangements needed for discharge: possibly    Readmission Risk              Risk of Unplanned Readmission:  0             Does patient have a readmission risk score greater than 14?: No  If yes, follow-up appointment must be made within 7 days of discharge.      Goals of Care: safe transition plan       Educated yes on transitional options, provided freedom of choice and are agreeable with plan      Discharge Plan: patient wants to be evaluated for inpatient drug treatment            Electronically signed by Armond Mendiola RN on 21 at 4:32 PM EST

## 2021-11-28 NOTE — ED PROVIDER NOTES
[facility]  ED  Emergency Department  Faculty Sign-Out Addendum     Care of Finesse De Oliveira was assumed from previous attending and is being seen for Dizziness and Fatigue  . The patient's initial evaluation and plan have been discussed with the prior provider who initially evaluated the patient. Handoff taken on the following patient from prior Attending Physician:    Yanelis Doan    I was available and discussed any additional care issues that arose and coordinated the management plans with the resident(s) caring for the patient during my duty period. Any areas of disagreement with residents documentation of care or procedures are noted on the chart. I was personally present for the key portions of any/all procedures during my duty period. I have documented in the chart those procedures where I was not present during the key portions.       EMERGENCY DEPARTMENT COURSE / MEDICAL DECISION MAKING:       MEDICATIONS GIVEN:  Orders Placed This Encounter   Medications    0.9 % sodium chloride IV bolus 2,721 mL    sodium chloride flush 0.9 % injection 5-40 mL    sodium chloride flush 0.9 % injection 5-40 mL    0.9 % sodium chloride infusion    OR Linked Order Group     LORazepam (ATIVAN) tablet 1 mg     LORazepam (ATIVAN) injection 1 mg     LORazepam (ATIVAN) tablet 2 mg     LORazepam (ATIVAN) injection 2 mg     LORazepam (ATIVAN) tablet 3 mg     LORazepam (ATIVAN) injection 3 mg     LORazepam (ATIVAN) tablet 4 mg     LORazepam (ATIVAN) injection 4 mg       LABS / RADIOLOGY:     Labs Reviewed   CBC WITH AUTO DIFFERENTIAL - Abnormal; Notable for the following components:       Result Value    WBC 24.1 (*)     Hematocrit 37.0 (*)     MCHC 35.4 (*)     RDW 15.2 (*)     Platelets 434 (*)     All other components within normal limits   BASIC METABOLIC PANEL W/ REFLEX TO MG FOR LOW K - Abnormal; Notable for the following components:    Glucose 112 (*)     BUN 7 (*)     CREATININE 0.54 (*)     Calcium 8.4 (*)     Sodium 126 (*)     Chloride 91 (*)     CO2 17 (*)     Anion Gap 18 (*)     All other components within normal limits   TROPONIN - Abnormal; Notable for the following components:    Troponin, High Sensitivity 27 (*)     All other components within normal limits   HEPATIC FUNCTION PANEL - Abnormal; Notable for the following components:    Albumin 3.3 (*)     Alkaline Phosphatase 275 (*)     ALT 63 (*)      (*)     Total Bilirubin 1.35 (*)     Bilirubin, Direct 0.70 (*)     Albumin/Globulin Ratio 0.9 (*)     All other components within normal limits   LACTATE, SEPSIS - Abnormal; Notable for the following components:    Lactic Acid, Sepsis, Whole Blood 5.0 (*)     All other components within normal limits   CULTURE, BLOOD 1   CULTURE, BLOOD 2   COVID-19, RAPID   AMMONIA   LIPASE   TROPONIN   URINE RT REFLEX TO CULTURE   LACTIC ACID, WHOLE BLOOD       No results found. RECENT VITALS:     Temp: 98.4 °F (36.9 °C),  Pulse: 92, Resp: 21, BP: 119/82, SpO2: 97 %    This patient is a 62 y.o. Male with generalized weakness fatigue lightheadedness. History of myeloproliferative disease, history of alcohol abuse disorder. Elevated white count, elevated lactic acid, sodium decreased to 126 from baseline 130s    OUTSTANDING TASKS / RECOMMENDATIONS:    1.  Admission      Mikhail Hernandez MD, Rajani Organ  Attending Emergency Physician  [facility] ED       Stephanie Cole MD  11/28/21 1608

## 2021-11-28 NOTE — ED PROVIDER NOTES
[facility]     Emergency Department     Faculty Attestation    I performed a history and physical examination of the patient and discussed management with the resident. I reviewed the residents note and agree with the documented findings and plan of care. Any areas of disagreement are noted on the chart. I was personally present for the key portions of any procedures. I have documented in the chart those procedures where I was not present during the key portions. I have reviewed the emergency nurses triage note. I agree with the chief complaint, past medical history, past surgical history, allergies, medications, social and family history as documented unless otherwise noted below. For Physician Assistant/ Nurse Practitioner cases/documentation I have personally evaluated this patient and have completed at least one if not all key elements of the E/M (history, physical exam, and MDM). Additional findings are as noted. I have personally seen and evaluated the patient. I find the patient's history and physical exam are consistent with the NP/PA documentation. I agree with the care provided, treatment rendered, disposition and follow-up plan. Describing generalized fatigue unable to ambulate due to weakness denies chest or abdominal pain the patient also reports history of myeloproliferative disease. On chemo treatment for that as well as 3 weeks of anorexia      Critical Care     Ismael Candelaria M.D.   Attending Emergency  Physician              Dorene Sorto MD  11/28/21 1107

## 2021-11-28 NOTE — ED NOTES
Pt reports to the ED with complaints of dizziness and weakness x2 days. Pt has a hx of alcohol abuse and states he drinks about 12-15 12 oz cans of beer daily. Pt also states he had a drink this morning at 0800. Pt is showing no signs of respiratory distress and his SpO2 is 97% on RA.  Pt was placed on continuous monitor      Jessica Ferris RN  11/28/21 3066

## 2021-11-28 NOTE — ED NOTES
Pt to the ER via EMS with c/o fatigue, dizziness and SOB. Pt a daily drinker and reports drinking 10-12 cans of beer daily for 1 year. Last drink was this morning at 0800. Pt states he has h/o DT's and alcohol withdrawal. Tremors noted. Denies seizure history r/t to alcohol. Pt also has a h/o blood cancer, petechiae noted to BUE and bilat feet. Pt stated he is not compliant with medications and f/u's with his oncologist. Nick Contreras pt on full cardiac monitor, pt tachycardic and tachypneic.  Dr. Daniela Gordillo at the bedside to evaluate the pt     Nava Mabry RN  11/28/21 8797

## 2021-11-28 NOTE — PROGRESS NOTES
Pharmacy Note  Vancomycin Consult    Anel Rai is a 62 y.o. male started on Vancomycin for sepsis; consult received from Dr. Genesis Lazo to manage therapy. Patient Active Problem List   Diagnosis    MPN (myeloproliferative neoplasm) (HCC)    Chronic left-sided low back pain with left-sided sciatica    Insomnia    Osteoarthritis of spine with radiculopathy, lumbar region    Spondylosis of lumbar region without myelopathy or radiculopathy    Left lumbar radiculitis    Hx of fusion of cervical spine    Cervical stenosis of spine    Left arm numbness    Intentional drug overdose ([address]    Alcohol abuse    Major depressive disorder, recurrent severe without psychotic features ([address]    Severe recurrent major depression without psychotic features ([address]    Anemia    Macrocytosis    Substance abuse ([address]    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 Temp max: AF    Recent Labs     11/28/21  1416   BUN 7*   CREATININE 0.54*   WBC 24.1*     No intake or output data in the 24 hours ending 11/28/21 1545  Culture Date      Source                       Results  11/28/2021        blood x 2                      pending          Ht Readings from Last 1 Encounters:   07/14/21 6' 1\" (1.854 m)        Wt Readings from Last 1 Encounters:   11/28/21 200 lb (90.7 kg)       Body mass index is 26.39 kg/m². Estimated Creatinine Clearance: 164 mL/min (A) (based on SCr of 0.54 mg/dL (L)). Goal Trough Level: 15-20 mcg/mL    AUC24: 400-600 mg/L.hr    Assessment/Plan:  Will initiate Vancomycin with a one time loading dose of 2,250 mg x1, followed by 1250 mg IV every 12 hours. Timing of trough level will be determined based on culture results, renal function, and clinical response. Thank you for the consult.   Will continue to follow. Heaven Palafox. D.  11/28/2021 3:47 PM

## 2021-11-28 NOTE — ED NOTES
Social work: providing pt will local drug/alcohol resources and will encourage him to make calls now if he is wanting in pt treatment. Pt must contact agency to let them know pt is willing to get and accept assistance in getting sober. Will Leonelaer ryan    Social work: met with pt in ED. Sherryle Moder Provided brochures for [facility], [facility] treatment programs. Pt is interested in treatment at discharge. Will leave message for social work to follow. Suggested to pt to begin called himself to get additional information on openings tomorrow. Called in pt social workers to follow tomorrow [phone] .  Will Leonelaer TERRY Flower LSW  11/28/21 TERRY Donovan LSW  11/28/21 [facility] TERRY Doll, RYAN  11/28/21 2897

## 2021-11-28 NOTE — ED NOTES
The following labs labeled with pt sticker and tubed to lab:     [x] Blue     [x] Lavender   [x] on ice  [x] Green/yellow  [x] Green/black [x] on ice  [x] Yellow  [] Red  [] Pink      [] [id number] swab    [] Rapid  [] PCR  [] Peds Viral Panel     [] Urine Sample  [] Pelvic Cultures  [] Blood Cultures          Jayro Erwin RN  11/28/21 1314

## 2021-11-28 NOTE — ED NOTES
Pt back from [address] [place] [address], [facility]  11/28/21 1824

## 2021-11-29 ENCOUNTER — APPOINTMENT (OUTPATIENT)
Dept: ULTRASOUND IMAGING | Age: 61
DRG: 896 | End: 2021-11-29
Payer: MEDICARE

## 2021-11-29 PROBLEM — E83.51 HYPOCALCEMIA: Status: ACTIVE | Noted: 2021-11-29

## 2021-11-29 LAB
ALBUMIN SERPL-MCNC: 2.6 G/DL (ref 3.5–5.2)
ALBUMIN/GLOBULIN RATIO: 1 (ref 1–2.5)
ALP BLD-CCNC: 200 U/L (ref 40–129)
ALT SERPL-CCNC: 42 U/L (ref 5–41)
AMYLASE: 27 U/L (ref 28–100)
ANION GAP SERPL CALCULATED.3IONS-SCNC: 8 MMOL/L (ref 9–17)
AST SERPL-CCNC: 87 U/L
BILIRUB SERPL-MCNC: 1.22 MG/DL (ref 0.3–1.2)
BUN BLDV-MCNC: 8 MG/DL (ref 8–23)
BUN/CREAT BLD: ABNORMAL (ref 9–20)
CALCIUM SERPL-MCNC: 7.4 MG/DL (ref 8.6–10.4)
CHLORIDE BLD-SCNC: 99 MMOL/L (ref 98–107)
CO2: 21 MMOL/L (ref 20–31)
CREAT SERPL-MCNC: 0.49 MG/DL (ref 0.7–1.2)
EKG ATRIAL RATE: 95 BPM
EKG P AXIS: 1 DEGREES
EKG P-R INTERVAL: 128 MS
EKG Q-T INTERVAL: 400 MS
EKG QRS DURATION: 72 MS
EKG QTC CALCULATION (BAZETT): 502 MS
EKG R AXIS: 53 DEGREES
EKG T AXIS: 45 DEGREES
EKG VENTRICULAR RATE: 95 BPM
GFR AFRICAN AMERICAN: >60 ML/MIN
GFR NON-AFRICAN AMERICAN: >60 ML/MIN
GFR SERPL CREATININE-BSD FRML MDRD: ABNORMAL ML/MIN/{1.73_M2}
GFR SERPL CREATININE-BSD FRML MDRD: ABNORMAL ML/MIN/{1.73_M2}
GLUCOSE BLD-MCNC: 106 MG/DL (ref 70–99)
HCT VFR BLD CALC: 29.4 % (ref 40.7–50.3)
HEMOGLOBIN: 10.4 G/DL (ref 13–17)
INR BLD: 1.4
LACTIC ACID, SEPSIS WHOLE BLOOD: 1.7 MMOL/L (ref 0.5–1.9)
LACTIC ACID, SEPSIS WHOLE BLOOD: 2.2 MMOL/L (ref 0.5–1.9)
LACTIC ACID, SEPSIS: ABNORMAL MMOL/L (ref 0.5–1.9)
LACTIC ACID, SEPSIS: NORMAL MMOL/L (ref 0.5–1.9)
MAGNESIUM: 1.9 MG/DL (ref 1.6–2.6)
MCH RBC QN AUTO: 31.5 PG (ref 25.2–33.5)
MCHC RBC AUTO-ENTMCNC: 35.4 G/DL (ref 28.4–34.8)
MCV RBC AUTO: 89.1 FL (ref 82.6–102.9)
MYCOPLASMA PNEUMONIAE IGM: 1.05
NRBC AUTOMATED: 0 PER 100 WBC
PDW BLD-RTO: 15.2 % (ref 11.8–14.4)
PLATELET # BLD: 355 K/UL (ref 138–453)
PMV BLD AUTO: 9.9 FL (ref 8.1–13.5)
POTASSIUM SERPL-SCNC: 3.1 MMOL/L (ref 3.7–5.3)
PROTHROMBIN TIME: 14.1 SEC (ref 9.1–12.3)
RBC # BLD: 3.3 M/UL (ref 4.21–5.77)
SODIUM BLD-SCNC: 128 MMOL/L (ref 135–144)
SODIUM BLD-SCNC: 128 MMOL/L (ref 135–144)
SODIUM BLD-SCNC: 134 MMOL/L (ref 135–144)
THYROXINE, FREE: 0.67 NG/DL (ref 0.93–1.7)
TOTAL PROTEIN: 5.2 G/DL (ref 6.4–8.3)
TSH SERPL DL<=0.05 MIU/L-ACNC: 9.82 MIU/L (ref 0.3–5)
WBC # BLD: 16.4 K/UL (ref 3.5–11.3)

## 2021-11-29 PROCEDURE — 6370000000 HC RX 637 (ALT 250 FOR IP): Performed by: NURSE PRACTITIONER

## 2021-11-29 PROCEDURE — 80053 COMPREHEN METABOLIC PANEL: CPT

## 2021-11-29 PROCEDURE — 83550 IRON BINDING TEST: CPT

## 2021-11-29 PROCEDURE — 84295 ASSAY OF SERUM SODIUM: CPT

## 2021-11-29 PROCEDURE — 94761 N-INVAS EAR/PLS OXIMETRY MLT: CPT

## 2021-11-29 PROCEDURE — 6370000000 HC RX 637 (ALT 250 FOR IP): Performed by: STUDENT IN AN ORGANIZED HEALTH CARE EDUCATION/TRAINING PROGRAM

## 2021-11-29 PROCEDURE — 93971 EXTREMITY STUDY: CPT

## 2021-11-29 PROCEDURE — 83735 ASSAY OF MAGNESIUM: CPT

## 2021-11-29 PROCEDURE — 93010 ELECTROCARDIOGRAM REPORT: CPT | Performed by: INTERNAL MEDICINE

## 2021-11-29 PROCEDURE — 6360000002 HC RX W HCPCS: Performed by: NURSE PRACTITIONER

## 2021-11-29 PROCEDURE — 6360000002 HC RX W HCPCS: Performed by: STUDENT IN AN ORGANIZED HEALTH CARE EDUCATION/TRAINING PROGRAM

## 2021-11-29 PROCEDURE — 2580000003 HC RX 258: Performed by: NURSE PRACTITIONER

## 2021-11-29 PROCEDURE — 82150 ASSAY OF AMYLASE: CPT

## 2021-11-29 PROCEDURE — 85610 PROTHROMBIN TIME: CPT

## 2021-11-29 PROCEDURE — 2500000003 HC RX 250 WO HCPCS: Performed by: PHYSICIAN ASSISTANT

## 2021-11-29 PROCEDURE — 85027 COMPLETE CBC AUTOMATED: CPT

## 2021-11-29 PROCEDURE — 76705 ECHO EXAM OF ABDOMEN: CPT

## 2021-11-29 PROCEDURE — 2580000003 HC RX 258: Performed by: PHYSICIAN ASSISTANT

## 2021-11-29 PROCEDURE — 84443 ASSAY THYROID STIM HORMONE: CPT

## 2021-11-29 PROCEDURE — 82728 ASSAY OF FERRITIN: CPT

## 2021-11-29 PROCEDURE — 99233 SBSQ HOSP IP/OBS HIGH 50: CPT | Performed by: PHYSICIAN ASSISTANT

## 2021-11-29 PROCEDURE — 83540 ASSAY OF IRON: CPT

## 2021-11-29 PROCEDURE — 6360000002 HC RX W HCPCS: Performed by: PHYSICIAN ASSISTANT

## 2021-11-29 PROCEDURE — 1200000000 HC SEMI PRIVATE

## 2021-11-29 PROCEDURE — 6370000000 HC RX 637 (ALT 250 FOR IP): Performed by: PHYSICIAN ASSISTANT

## 2021-11-29 PROCEDURE — 84439 ASSAY OF FREE THYROXINE: CPT

## 2021-11-29 RX ORDER — POTASSIUM CHLORIDE 20 MEQ/1
40 TABLET, EXTENDED RELEASE ORAL PRN
Status: DISCONTINUED | OUTPATIENT
Start: 2021-11-29 | End: 2021-12-09 | Stop reason: HOSPADM

## 2021-11-29 RX ORDER — LEVOFLOXACIN 5 MG/ML
750 INJECTION, SOLUTION INTRAVENOUS EVERY 24 HOURS
Status: DISCONTINUED | OUTPATIENT
Start: 2021-11-29 | End: 2021-11-29

## 2021-11-29 RX ORDER — CHLORDIAZEPOXIDE HYDROCHLORIDE 25 MG/1
25 CAPSULE, GELATIN COATED ORAL 3 TIMES DAILY
Status: DISCONTINUED | OUTPATIENT
Start: 2021-11-29 | End: 2021-12-01

## 2021-11-29 RX ORDER — MAGNESIUM SULFATE 1 G/100ML
1000 INJECTION INTRAVENOUS PRN
Status: DISCONTINUED | OUTPATIENT
Start: 2021-11-29 | End: 2021-12-09 | Stop reason: HOSPADM

## 2021-11-29 RX ORDER — 0.9 % SODIUM CHLORIDE 0.9 %
500 INTRAVENOUS SOLUTION INTRAVENOUS ONCE
Status: COMPLETED | OUTPATIENT
Start: 2021-11-29 | End: 2021-11-29

## 2021-11-29 RX ORDER — POTASSIUM CHLORIDE 7.45 MG/ML
10 INJECTION INTRAVENOUS PRN
Status: DISCONTINUED | OUTPATIENT
Start: 2021-11-29 | End: 2021-12-09 | Stop reason: HOSPADM

## 2021-11-29 RX ORDER — MIDODRINE HYDROCHLORIDE 2.5 MG/1
2.5 TABLET ORAL
Status: DISCONTINUED | OUTPATIENT
Start: 2021-11-29 | End: 2021-12-04

## 2021-11-29 RX ADMIN — THIAMINE HYDROCHLORIDE: 100 INJECTION, SOLUTION INTRAMUSCULAR; INTRAVENOUS at 18:54

## 2021-11-29 RX ADMIN — LORAZEPAM 1 MG: 2 INJECTION INTRAMUSCULAR at 21:07

## 2021-11-29 RX ADMIN — LEVOFLOXACIN 750 MG: 5 INJECTION, SOLUTION INTRAVENOUS at 16:39

## 2021-11-29 RX ADMIN — ENOXAPARIN SODIUM 40 MG: 100 INJECTION SUBCUTANEOUS at 10:47

## 2021-11-29 RX ADMIN — PIPERACILLIN AND TAZOBACTAM 4500 MG: 4; .5 INJECTION, POWDER, LYOPHILIZED, FOR SOLUTION INTRAVENOUS; PARENTERAL at 06:26

## 2021-11-29 RX ADMIN — PANTOPRAZOLE SODIUM 40 MG: 40 TABLET, DELAYED RELEASE ORAL at 10:48

## 2021-11-29 RX ADMIN — FOLIC ACID 1 MG: 1 TABLET ORAL at 10:48

## 2021-11-29 RX ADMIN — POTASSIUM CHLORIDE 20 MEQ: 1500 TABLET, EXTENDED RELEASE ORAL at 01:41

## 2021-11-29 RX ADMIN — QUETIAPINE FUMARATE 400 MG: 200 TABLET ORAL at 23:04

## 2021-11-29 RX ADMIN — HYDROXYUREA 500 MG: 500 CAPSULE ORAL at 20:57

## 2021-11-29 RX ADMIN — SODIUM CHLORIDE 500 ML: 9 INJECTION, SOLUTION INTRAVENOUS at 04:21

## 2021-11-29 RX ADMIN — Medication 100 MG: at 10:29

## 2021-11-29 RX ADMIN — SERTRALINE 100 MG: 50 TABLET, FILM COATED ORAL at 20:58

## 2021-11-29 RX ADMIN — LORAZEPAM 1 MG: 2 INJECTION INTRAMUSCULAR; INTRAVENOUS at 15:41

## 2021-11-29 RX ADMIN — LORAZEPAM 1 MG: 2 INJECTION INTRAMUSCULAR; INTRAVENOUS at 10:58

## 2021-11-29 RX ADMIN — HYDROXYUREA 500 MG: 500 CAPSULE ORAL at 10:33

## 2021-11-29 RX ADMIN — SERTRALINE 100 MG: 50 TABLET, FILM COATED ORAL at 01:41

## 2021-11-29 RX ADMIN — HYDROXYUREA 500 MG: 500 CAPSULE ORAL at 01:41

## 2021-11-29 RX ADMIN — Medication 1 TABLET: at 10:59

## 2021-11-29 RX ADMIN — Medication 1250 MG: at 04:11

## 2021-11-29 RX ADMIN — QUETIAPINE FUMARATE 400 MG: 200 TABLET ORAL at 01:41

## 2021-11-29 RX ADMIN — Medication 1 TABLET: at 01:41

## 2021-11-29 RX ADMIN — LORAZEPAM 1 MG: 2 INJECTION INTRAMUSCULAR; INTRAVENOUS at 13:28

## 2021-11-29 RX ADMIN — AMPICILLIN SODIUM AND SULBACTAM SODIUM 3000 MG: 2; 1 INJECTION, POWDER, FOR SOLUTION INTRAMUSCULAR; INTRAVENOUS at 20:50

## 2021-11-29 RX ADMIN — LORAZEPAM 1 MG: 2 INJECTION INTRAMUSCULAR; INTRAVENOUS at 01:51

## 2021-11-29 RX ADMIN — CHLORDIAZEPOXIDE HYDROCHLORIDE 25 MG: 25 CAPSULE ORAL at 23:04

## 2021-11-29 ASSESSMENT — ENCOUNTER SYMPTOMS
BLOOD IN STOOL: 0
SHORTNESS OF BREATH: 0
ABDOMINAL PAIN: 0
DIARRHEA: 1
COUGH: 0
CONSTIPATION: 0
VOMITING: 1
WHEEZING: 0
STRIDOR: 0
NAUSEA: 1

## 2021-11-29 NOTE — PLAN OF CARE
Nutrition Problem #1: Inadequate oral intake  Intervention: Food and/or Nutrient Delivery: Continue Current Diet, Start Oral Nutrition Supplement (Provide Ensure oral supplements x 2 per day.)  Nutritional Goals: Oral intakes to meet % of estimated nutrition needs.

## 2021-11-29 NOTE — PROGRESS NOTES
[facility]  Office: [address], DO, Yoandytiarra Barbour, DO, Carmen Campbell, DO, Safia Sullivan Carmela, DO, Jackelin Pringle MD, Leobardo Sanchez MD, Alessio Snow MD, Tim Ivy MD, Cee Malik MD, Vik Alcantara MD, Kal Lyn MD, [place] Officer, DO, Babar Manzanares, DO, Yissel Masterson MD,  March , DO, Gregoria Kimball MD, Jamarcus Loomis MD, Ulices Alba MD, Rani Yan MD, Funmi Robles MD, Chasidy Han MD, Jamil Chris MD, Kayleigh Dunbar, [facility], [facility], CNP, Ana Gao CNP, Christofer Alcocer, CNS, Lolis Stroud, CNP, Nataliia Guerrier, CNP, Jersey Cheema, CNP, Gearline Precise, CNP, Sahra Colon, CNP, Prakash Giordano PA-C, Sara Arroyo, [facility], Alyssa Lindo, CNP, Katarina Byrd, CNP, Amaris Kelly, CNP, Ely Campos, CNP, Jen Graham, CNP, Teja Wren, CNP, [facility], [address] [place] [address]    Progress Note    11/29/2021    4:25 PM    Name:   Sandrine Cam  MRN:     [id number]     Acct:      [de-identified]   Room:   [facility] Day:  1  Admit Date:  11/28/2021  1:56 PM    PCP:   SIMONE Hawley CNP  Code Status:  Full Code    Subjective:     C/C:   Chief Complaint   Patient presents with    Dizziness    Fatigue     Interval History Status: not changed. Pt seen and evaluated. Currently he has no new complaints. He is admittedly tremulous and feels he is starting to withdrawal from alcohol. He reports that he had two beers before he came in yesterday. Additionally, he complains of early satiety, nausea, and one episode of blood in his vomit. Denies black/bloody stool. Brief History:     62year-old male with history of myeloproliferative neoplasm (JAK2 mutation, on hydroxyurea), alcohol dependence, hypothyroidism. He presented to the ED complaining of feeling off balance and weak.  ED work-up significant for the following:    Vitals have been stable  Chest x-ray shows chronic elevation of right hemidiaphragm with adjacent obesity that is likely atelectasis. CT of the abdomen and pelvis is concerning for underlying enteritis or diarrheal illness related to Increased fluid distention involving the bowel loops. hyponatremic at 126  Initial lactic was 5.0 repeat lactic was 2.8  Calcium 8.4, ionized calcium 1.05  Procalcitonin 0.55  ALT 63,  bilirubin 1.35, lipase 29. Albumin 3.3  WBCs 24.1  Blood cultures x2 pending  COVID-19 swab negative   UA unremarkable    Pt was started on vancomycin and zosyn per sepsis protocol. He was fluid resuscitated per sepsis protocol. CIWA scale initiated. Admitted to medicine for further care. Review of Systems:     Constitutional:  negative for chills, fevers. + sweats  Respiratory:  negative for cough, dyspnea on exertion, shortness of breath, wheezing  Cardiovascular:  negative for chest pain, chest pressure/discomfort, palpitations. + lower extremity edema  Gastrointestinal:  negative for abdominal pain, constipation. + diarrhea, nausea, vomiting  Neurological:  negative for dizziness, headache    Medications:      Allergies:  No Known Allergies    Current Meds:   Scheduled Meds:    chlordiazePOXIDE  25 mg Oral TID    folic acid, thiamine, multi-vitamin with vitamin K infusion   IntraVENous Daily    midodrine  2.5 mg Oral TID WC    levofloxacin  750 mg IntraVENous Q24H    sodium chloride flush  5-40 mL IntraVENous 2 times per day    nicotine  1 patch TransDERmal Daily    hydroxyurea  500 mg Oral BID    levothyroxine  50 mcg Oral Daily    [Held by provider] lisinopril  10 mg Oral Daily    pantoprazole  40 mg Oral QAM AC    [Held by provider] prazosin  1 mg Oral BID    QUEtiapine  400 mg Oral Nightly    sertraline  100 mg Oral Daily    sodium chloride flush  5-40 mL IntraVENous 2 times per day    enoxaparin  40 mg SubCUTAneous Daily    sodium chloride flush  5-40 mL IntraVENous 2 times per day    calcium carbonate  1,000 mg Oral BID Continuous Infusions:    sodium chloride      sodium chloride      sodium chloride       PRN Meds: sodium chloride flush, sodium chloride, LORazepam **OR** LORazepam **OR** LORazepam **OR** LORazepam **OR** LORazepam **OR** LORazepam **OR** LORazepam **OR** LORazepam, hydrOXYzine, sodium chloride flush, sodium chloride, acetaminophen **OR** acetaminophen, sodium chloride flush, sodium chloride, LORazepam **OR** LORazepam **OR** LORazepam **OR** LORazepam **OR** LORazepam **OR** LORazepam **OR** LORazepam **OR** LORazepam    Data:     Past Medical History:   has a past medical history of Alcohol abuse, Anemia, Cancer ([facility] Cervical stenosis of spine, Chronic left-sided low back pain with left-sided sciatica, Insomnia, Intentional drug overdose ([facility] Left arm numbness, Left lumbar radiculitis, Macrocytosis, Major depressive disorder, recurrent severe without psychotic features ([address] Myeloproliferative disorder ([address] Osteoarthritis of spine with radiculopathy, lumbar region, Severe recurrent major depression without psychotic features ([address] and Spondylosis of lumbar region without myelopathy or radiculopathy. Social History:   reports that he has been smoking cigarettes. He started smoking about 43 years ago. He has a 64.50 pack-year smoking history. He has never used smokeless tobacco. He reports current alcohol use of about 14.0 standard drinks of alcohol per week. He reports previous drug use. Drug: Marijuana Rubina Mike). Family History:   Family History   Problem Relation Age of Onset   Cristo Merino Breast Cancer Mother         s/p surgical complications    Diabetes Mother     Heart Failure Father     Other Brother         \"bad back\"       Vitals:  /89   Pulse 81   Temp 98.4 °F (36.9 °C) (Oral)   Resp 20   Ht 6' 1\" (1.854 m)   Wt 200 lb (90.7 kg)   SpO2 94%   BMI 26.39 kg/m²   No data recorded. No results for input(s): POCGLU in the last 72 hours. I/O (24Hr):     Intake/Output Summary (Last 24 hours) at 11/29/2021 1625  Last data filed at 11/29/2021 1326  Gross per 24 hour   Intake 1000 ml   Output 750 ml   Net 250 ml       Labs:  Hematology:  Recent Labs     11/28/21 1416 11/29/21  0537   WBC 24.1* 16.4*   RBC 4.22 3.30*   HGB 13.1 10.4*   HCT 37.0* 29.4*   MCV 87.7 89.1   MCH 31.0 31.5   MCHC 35.4* 35.4*   RDW 15.2* 15.2*   * 355   MPV 9.5 9.9   INR  --  1.4     Chemistry:  Recent Labs     11/28/21  1416 11/28/21  1416 11/28/21  1628 11/28/21  1629 11/28/21 2125 11/29/21 0426 11/29/21  0537   *   < >  --   --  127* 128* 128*   K 4.0  --   --   --  3.4*  --  3.1*   CL 91*  --   --   --  95*  --  99   CO2 17*  --   --   --  20 --  21   GLUCOSE 112*  --   --   --  112*  --  106*   BUN 7*  --   --   --  6*  --  8   CREATININE 0.54*  --   --   --  0.44*  --  0.49*   MG  --   --   --   --   --   --  1.9   ANIONGAP 18*  --   --   --  12  --  8*   LABGLOM >60  --   --   --  >60  --  >60   GFRAA >60  --   --   --  >60  --  >60   CALCIUM 8.4*  --   --   --  7.7*  --  7.4*   CAION  --   --   --   --  1.05*  --   --    TROPHS 27*  --   --  23*  --   --   --    LACTACIDWB  --   --  2.8*  --   --   --   --     < > = values in this interval not displayed.      Recent Labs     11/28/21 1416 11/29/21  0537   PROT 6.9 5.2*   LABALBU 3.3* 2.6*   TSH  --  9.82*   * 87*   ALT 63* 42*   ALKPHOS 275* 200*   BILITOT 1.35* 1.22*   BILIDIR 0.70*  --    AMMONIA 32  --    AMYLASE  --  27*   LIPASE 29  --      ABG:No results found for: POCPH, PHART, PH, POCPCO2, KHB2LRD, PCO2, POCPO2, PO2ART, PO2, POCHCO3, KLC1WRD, HCO3, NBEA, PBEA, BEART, BE, THGBART, THB, NJN5ECE, QCRB0AMJ, W2CIPVXN, O2SAT, FIO2  Lab Results   Component Value Date/Time    SPECIAL L FA 10ML 11/28/2021 02:50 PM     Lab Results   Component Value Date/Time    CULTURE NO GROWTH 20 HOURS 11/28/2021 02:50 PM       Radiology:  CT ABDOMEN PELVIS W IV CONTRAST Additional Contrast? None    Result Date: 11/28/2021  Scattered areas nonspecific air-fluid involving bowel loops could suggest underlying enteritis or diarrheal illness. No bowel obstruction. Colonic diverticulosis noted. XR CHEST PORTABLE    Result Date: 11/28/2021  Chronic elevation of the right hemidiaphragm with adjacent opacity, likely atelectasis. Physical Examination:        General appearance:  alert, cooperative and no distress  Mental Status:  oriented to person, place and time and normal affect  Lungs:  clear to auscultation bilaterally, normal effort  Heart:  regular rate and rhythm, no murmur  Abdomen:  soft, nontender, nondistended, normal bowel sounds, no masses, hepatomegaly, splenomegaly  Extremities:  no edema, redness, tenderness in the calves  Skin:  no gross lesions, rashes, induration    Assessment:        Hospital Problems           Last Modified POA    * (Principal) Septicemia ([address] 11/28/2021 Yes    Alcohol abuse 11/28/2021 Yes    Major depressive disorder, recurrent severe without psychotic features ([address] (Chronic) 11/28/2021 Yes    Substance abuse ([address] (Chronic) 11/28/2021 Yes    Hyponatremia 11/28/2021 Yes    Hypothyroidism (Chronic) 11/28/2021 Yes    Transaminitis 11/28/2021 Yes    Hypocalcemia 11/29/2021 Yes          Plan:        #Sepsis, unclear etiology: CXR unrevealing, although mycoplasma IgM is equivocal. Continue broad spectrum coverage with zosyn. Add doxy for mycoplasma. Stop vanco. Non-specific changes seen on abdominal CT. Pt exam is rather benign. I suspect many of his lab abnormalities could be related to hemodilution as he was very dehydrated on admission. Follow-up blood cultures. #Hyponatremia: suspect low solute intake in conjunction with beer potomania and likely a component of SIADH as sodium has not improved much despite over 3 liters of normal saline. Hold IV fluids. Continue to monitor.     #Acute alcohol withdrawal  - IV thiamine, folate, vitamin K  - [facility] protocol  - start librium 25 mg tid  - social work consult    #Transaminitis/elevated PT/INR  - suspect underlying alcoholic liver disease. CT reviewed and revealing fatty infiltration of the liver. Will obtain hepatic ultrasound. Check hepatitis panel. #Hypothyroidism  - pt admittedly not compliant with oral meds. Will resume levothyroxine here. Repeat TSH in 4-6 weeks.      #Myeloproliferative neoplasm  - resume hydroxyurea    #Lovenox DVT prophylaxis  #PT/OT    Leigh Schaumann, PA-C  11/29/2021  4:25 PM

## 2021-11-29 NOTE — ED NOTES
RN changed the pts bed and linens, new brief applied. Pt requesting food.       Tamia Badillo RN  11/29/21 7135

## 2021-11-29 NOTE — ED NOTES
Pt resting comfortably, denies pain, denies hunger, PO fluids provided.       Rivas Number, RN  11/29/21 6257

## 2021-11-29 NOTE — H&P
patient points out that his left arm is swollen and sore. There are several areas of bruising to the left arm is  swollen, tight and painful. Radial, brachial and ulnar pulses are palpable. He states his arm was not swollen prior to coming to the hospital. Petechiae noted to BUE and bilat feet. He has a history of myeloproliferative neoplasm but did not follow with oncology. Stat venous Doppler ordered of the left upper extremity. Vitals have been stable  Chest x-ray shows chronic elevation of right hemidiaphragm with adjacent obesity that is likely atelectasis. CT of the abdomen and pelvis is concerning for underlying enteritis or diarrheal illness related to Increased fluid distention involving the bowel loops. hyponatremic at 126  Initial lactic was 5.0 repeat lactic was 2.8  Calcium 8.4, ionized calcium 1.05  Procalcitonin 0.55  ALT 63,  bilirubin 1.35, lipase 29. Albumin 3.3  WBCs 24.1  Blood cultures x2 pending  COVID-19 swab negative   UA unremarkable    Plan IV hydration, check sodium every 6 hours, check urine sodium and osmolality. Continue Zosyn and vancomycin with pharmacy dosing pending blood cultures. Concerns for alcohol withdrawal.  CIWA scale initiated.     Consult  related to history of alcoholism and dietitian related to history of poor appetite x3 weeks      Past Medical History:     Past Medical History:   Diagnosis Date    Alcohol abuse 8/12/2019    Anemia     Cancer ([address]     blood    Cervical stenosis of spine 6/14/2019    Chronic left-sided low back pain with left-sided sciatica 3/14/2019    Insomnia 3/14/2019    Intentional drug overdose ([address] 8/11/2019    Left arm numbness 6/14/2019    Left lumbar radiculitis 5/3/2019    Macrocytosis     Major depressive disorder, recurrent severe without psychotic features ([address] 8/12/2019    Myeloproliferative disorder ([address]     Osteoarthritis of spine with radiculopathy, lumbar region 4/12/2019    Severe recurrent major depression without psychotic features ([facility] [address] 8/12/2019    Spondylosis of lumbar region without myelopathy or radiculopathy 5/3/2019        Past Surgical History:     Past Surgical History:   Procedure Laterality Date    APPENDECTOMY      CARPAL TUNNEL RELEASE      bilateral     CERVICAL FUSION      s/p trampoline accident    CERVICAL FUSION  09/05/2019     ANTERIOR CERVICAL DECOMPRESSION FUSION C3-4     CERVICAL FUSION N/A 9/5/2019    ANTERIOR CERVICAL DECOMPRESSION FUSION C3-4 performed by Julian Leblanc DO at [facility], COLON, DIAGNOSTIC      EPIDURAL STEROID INJECTION Left 5/15/2019    EPIDURAL STEROID INJECTION LEFT L5S1 performed by Fely Dupree MD at [address] [place] [address]          Medications Prior to Admission:     Prior to Admission medications    Medication Sig Start Date End Date Taking?  Authorizing Provider   hydroxyurea (HYDREA) 500 MG chemo capsule Take 500 mg by mouth 2 times daily   Yes Historical Provider, MD   vitamin B-1 (THIAMINE) 100 MG tablet Take 1 tablet by mouth daily 6/24/21  Yes Peterson Bautista MD   nicotine (NICODERM CQ) 21 MG/24HR Place 1 patch onto the skin every 24 hours 6/24/21  Yes Peterson Bautista MD   folic acid (FOLVITE) 1 MG tablet Take 1 tablet by mouth daily 6/24/21  Yes Peterson Bautista MD   Multiple Vitamins-Minerals (THERAPEUTIC MULTIVITAMIN-MINERALS) tablet Take 1 tablet by mouth daily 6/3/21 6/3/22 Yes Jose Elliott MD   QUEtiapine (SEROQUEL) 400 MG tablet Take 400 mg by mouth nightly 2/19/21  Yes Historical Provider, MD   lisinopril (PRINIVIL;ZESTRIL) 10 MG tablet Take 1 tablet by mouth daily 7/18/21   Gerardo Hyde MD   sertraline (ZOLOFT) 100 MG tablet Take 1 tablet by mouth daily 7/17/21   Gerardo Hyde MD   levothyroxine (SYNTHROID) 50 MCG tablet Take 1 tablet by mouth daily Take in the morning on an empty stomach 1 hour before any food or other medications 6/24/21   Peterson Bautista MD   naltrexone (DEPADE) 50 MG tablet Take 50 Temp (24hrs), Av.4 °F (36.9 °C), Min:98.4 °F (36.9 °C), Max:98.4 °F (36.9 °C)    No results for input(s): POCGLU in the last 72 hours. Intake/Output Summary (Last 24 hours) at 2021 0423  Last data filed at 2021 2121  Gross per 24 hour   Intake 500 ml   Output --   Net 500 ml       Physical Exam  Vitals and nursing note reviewed. Constitutional:       Appearance: He is overweight. HENT:      Mouth/Throat:      Mouth: Mucous membranes are dry. Eyes:      Conjunctiva/sclera: Conjunctivae normal.   Cardiovascular:      Rate and Rhythm: Normal rate and regular rhythm. Pulses: Normal pulses. Heart sounds: Normal heart sounds. Pulmonary:      Breath sounds: Examination of the right-middle field reveals rhonchi. Examination of the left-middle field reveals rhonchi. Examination of the right-lower field reveals rhonchi. Examination of the left-lower field reveals rhonchi. Rhonchi present. Abdominal:      General: Bowel sounds are normal.      Palpations: Abdomen is soft. Musculoskeletal:      Left upper arm: Swelling and tenderness present. Left forearm: Swelling and tenderness present. Left wrist: Swelling and tenderness present. Right lower leg: No edema. Skin:     General: Skin is warm and dry. Findings: Ecchymosis present. Neurological:      Mental Status: He is alert. GCS: GCS eye subscore is 4. GCS verbal subscore is 5. GCS motor subscore is 6. Psychiatric:         Attention and Perception: Attention normal.      Comments:  The patient could tell me the signs and symptoms of withdrawal         Investigations:      Laboratory Testing:  Recent Results (from the past 24 hour(s))   CBC Auto Differential    Collection Time: 21  2:16 PM   Result Value Ref Range    WBC 24.1 (H) 3.5 - 11.3 k/uL    RBC 4.22 4.21 - 5.77 m/uL    Hemoglobin 13.1 13.0 - 17.0 g/dL    Hematocrit 37.0 (L) 40.7 - 50.3 %    MCV 87.7 82.6 - 102.9 fL    MCH 31.0 25.2 - 33.5 pg REPORTED 1.5 - 3.8 g/dL    Albumin/Globulin Ratio 0.9 (L) 1.0 - 2.5   AMMONIA    Collection Time: 11/28/21  2:16 PM   Result Value Ref Range    Ammonia 32 16 - 60 umol/L   Lipase    Collection Time: 11/28/21  2:16 PM   Result Value Ref Range    Lipase 29 13 - 60 U/L   Lactate, Sepsis    Collection Time: 11/28/21  2:16 PM   Result Value Ref Range    Lactic Acid, Sepsis NOT REPORTED 0.5 - 1.9 mmol/L    Lactic Acid, Sepsis, Whole Blood 5.0 (H) 0.5 - 1.9 mmol/L   COVID-19, Rapid    Collection Time: 11/28/21  3:17 PM    Specimen: Nasopharyngeal Swab   Result Value Ref Range    Specimen Description . NASOPHARYNGEAL SWAB     SARS-CoV-2, Rapid Not Detected Not Detected   LACTIC ACID, WHOLE BLOOD    Collection Time: 11/28/21  4:28 PM   Result Value Ref Range    Lactic Acid, Whole Blood 2.8 (H) 0.7 - 2.1 mmol/L   Troponin    Collection Time: 11/28/21  4:29 PM   Result Value Ref Range    Troponin, High Sensitivity 23 (H) 0 - 22 ng/L    Troponin T NOT REPORTED <0.03 ng/mL    Troponin Interp NOT REPORTED    Urinalysis Reflex to Culture    Collection Time: 11/28/21  7:47 PM    Specimen: Urine, clean catch   Result Value Ref Range    Color, UA Yellow Yellow    Turbidity UA Clear Clear    Glucose, Ur NEGATIVE NEGATIVE    Bilirubin Urine NEGATIVE NEGATIVE    Ketones, Urine NEGATIVE NEGATIVE    Specific Gravity, UA 1.014 1.005 - 1.030    Urine Hgb NEGATIVE NEGATIVE    pH, UA 6.5 5.0 - 8.0    Protein, UA NEGATIVE NEGATIVE    Urobilinogen, Urine Normal Normal    Nitrite, Urine NEGATIVE NEGATIVE    Leukocyte Esterase, Urine NEGATIVE NEGATIVE    Urinalysis Comments       Microscopic exam not performed based on chemical results unless requested in original order.    SODIUM, URINE, RANDOM    Collection Time: 11/28/21  7:47 PM   Result Value Ref Range    Sodium,Ur 34 mmol/L   OSMOLALITY, URINE    Collection Time: 11/28/21  7:47 PM   Result Value Ref Range    Osmolality, Ur 238 80 - 1300 mOsm/kg   LEGIONELLA ANTIGEN, URINE    Collection Time: 11/28/21  8:30 PM    Specimen: Urine, clean catch   Result Value Ref Range    Legionella Pneumophilia Ag, Urine NEGATIVE    Lactate, Sepsis    Collection Time: 11/28/21  9:00 PM   Result Value Ref Range    Lactic Acid, Sepsis NOT REPORTED 0.5 - 1.9 mmol/L    Lactic Acid, Sepsis, Whole Blood 1.7 0.5 - 1.9 mmol/L   Procalcitonin    Collection Time: 11/28/21  9:25 PM   Result Value Ref Range    Procalcitonin 0.55 (H) <0.09 ng/mL   BASIC METABOLIC PANEL    Collection Time: 11/28/21  9:25 PM   Result Value Ref Range    Glucose 112 (H) 70 - 99 mg/dL    BUN 6 (L) 8 - 23 mg/dL    CREATININE 0.44 (L) 0.70 - 1.20 mg/dL    Bun/Cre Ratio NOT REPORTED 9 - 20    Calcium 7.7 (L) 8.6 - 10.4 mg/dL    Sodium 127 (L) 135 - 144 mmol/L    Potassium 3.4 (L) 3.7 - 5.3 mmol/L    Chloride 95 (L) 98 - 107 mmol/L    CO2 20 20 - 31 mmol/L    Anion Gap 12 9 - 17 mmol/L    GFR Non-African American >60 >60 mL/min    GFR African American >60 >60 mL/min    GFR Comment          GFR Staging NOT REPORTED    Calcium, Ionized    Collection Time: 11/28/21  9:25 PM   Result Value Ref Range    Calcium, Ion 1.05 (L) 1.13 - 1.33 mmol/L       Imaging/Diagnostics:  CT ABDOMEN PELVIS W IV CONTRAST Additional Contrast? None    Result Date: 11/28/2021  Scattered areas nonspecific air-fluid involving bowel loops could suggest underlying enteritis or diarrheal illness. No bowel obstruction. Colonic diverticulosis noted. XR CHEST PORTABLE    Result Date: 11/28/2021  Chronic elevation of the right hemidiaphragm with adjacent opacity, likely atelectasis.        Assessment :      Hospital Problems           Last Modified POA    * (Principal) Septicemia ([address] 11/28/2021 Yes    Alcohol abuse 11/28/2021 Yes    Major depressive disorder, recurrent severe without psychotic features ([address] (Chronic) 11/28/2021 Yes    Substance abuse ([facility] [address] [address] (Chronic) 11/28/2021 Yes    Hyponatremia 11/28/2021 Yes    Hypothyroidism (Chronic) 11/28/2021 Yes    Transaminitis 11/28/2021 Yes Hypocalcemia 11/29/2021 Yes          Plan:     Patient status inpatient in the Progressive Unit/Step down    Sepsis; blood cultures x2 pending. Continue Zosyn and vancomycin with pharmacy dosing. Monitor CBC daily. Check Legionella and mycoplasma pneumonia antibody. Alcohol abuse and substance abuse; folic acid and thiamine daily. CIWA scale with Ativan.  monitor for falls and seizure. Social service consult    Hyponatremia; recheck BMP now and monitor sodium every 6 hours. Start gentle IV hydration. Check urine sodium and osmolality    Transaminitis; repeat liver enzymes in a.m. including amylase    DVT prophylaxis    Low calcium; start calcium carbonate    Consult dietitian related to 3 weeks decreased appetite history    GI and DVT prophylaxis    Continue home Seroquel and Zoloft related to history of major depressive disorder    Check orthostatic BP and pulse per shift related to lightheadedness with position changing. PT/OT evaluate and treat        Consultations:   IP CONSULT TO SOCIAL WORK  PHARMACY TO DOSE VANCOMYCIN  IP CONSULT TO INTERNAL MEDICINE  IP CONSULT TO HOSPITALIST  IP CONSULT TO SOCIAL WORK  IP CONSULT TO DIETITIAN     Patient is admitted as inpatient status because of co-morbidities listed above, severity of signs and symptoms as outlined, requirement for current medical therapies and most importantly because of direct risk to patient if care not provided in a hospital setting. Expected length of stay > 48 hours.     SIMONE Montes CNP  11/29/2021  4:23 AM    Copy sent to SIMONE Mott - CNP

## 2021-11-29 NOTE — ED NOTES
RN updated pts daughter Dejuan Carrion) with permission from the pt.       Tessy Sabillon RN  11/29/21 0365

## 2021-11-29 NOTE — ED NOTES
Holding on tums to prevent them stopping absorption of other oral meds.      Winston Pope RN  11/29/21 [id number]

## 2021-11-29 NOTE — ED NOTES
[facility]; awaiting meds from pharmacy.      Nabeel Patterson RN  11/29/21 2681

## 2021-11-29 NOTE — ED NOTES
The following labs labeled with pt sticker and tubed to lab:     [x] Blue     [x] Lavender   [] on ice  [x] Green/yellow  [] Green/black [] on ice  [] Yellow  [] Red  [] Pink      [] COVID-19 swab    [] Rapid  [] PCR  [] Peds Viral Panel     [] Urine Sample  [] Pelvic Cultures  [] Blood Cultures            Frances Guidry RN  11/29/21 [id number]

## 2021-11-29 NOTE — ED PROVIDER NOTES
[address] [facility]  Emergency Department  Emergency Medicine Resident Sign-out     Care of Arturo Carbajal was assumed from Dr. Brian Chapman and is being seen for Dizziness and Fatigue  . The patient's initial evaluation and plan have been discussed with the prior provider who initially evaluated the patient.      EMERGENCY DEPARTMENT COURSE / MEDICAL DECISION MAKING:       MEDICATIONS GIVEN:  Orders Placed This Encounter   Medications    0.9 % sodium chloride IV bolus 2,721 mL    sodium chloride flush 0.9 % injection 5-40 mL    sodium chloride flush 0.9 % injection 5-40 mL    0.9 % sodium chloride infusion    OR Linked Order Group     LORazepam (ATIVAN) tablet 1 mg     LORazepam (ATIVAN) injection 1 mg     LORazepam (ATIVAN) tablet 2 mg     LORazepam (ATIVAN) injection 2 mg     LORazepam (ATIVAN) tablet 3 mg     LORazepam (ATIVAN) injection 3 mg     LORazepam (ATIVAN) tablet 4 mg     LORazepam (ATIVAN) injection 4 mg    cefepime (MAXIPIME) 2000 mg IVPB minibag     Order Specific Question:   Antimicrobial Indications     Answer:   Sepsis of Unknown Etiology    vancomycin (VANCOCIN) 2,250 mg in dextrose 5 % 500 mL IVPB     Order Specific Question:   Antimicrobial Indications     Answer:   Sepsis of Unknown Etiology    vancomycin (VANCOCIN) 1250 mg in dextrose 5 % 250 mL IVPB     Order Specific Question:   Antimicrobial Indications     Answer:   Sepsis of Unknown Etiology    vancomycin (VANCOCIN) intermittent dosing (placeholder)     Order Specific Question:   Antimicrobial Indications     Answer:   Sepsis of Unknown Etiology    iopamidol (ISOVUE-370) 76 % injection 75 mL    nicotine (NICODERM CQ) 21 MG/24HR 1 patch       LABS / RADIOLOGY:     Labs Reviewed   CBC WITH AUTO DIFFERENTIAL - Abnormal; Notable for the following components:       Result Value    WBC 24.1 (*)     Hematocrit 37.0 (*)     MCHC 35.4 (*)     RDW 15.2 (*)     Platelets 224 (*)     Immature Granulocytes 2 (*)     Seg Neutrophils 86 (*)     Lymphocytes 6 (*)     Eosinophils % 0 (*)     Absolute Immature Granulocyte 0.48 (*)     Segs Absolute 20.72 (*)     Absolute Mono # 1.21 (*)     Basophils Absolute 0.24 (*)     All other components within normal limits   BASIC METABOLIC PANEL W/ REFLEX TO MG FOR LOW K - Abnormal; Notable for the following components:    Glucose 112 (*)     BUN 7 (*)     CREATININE 0.54 (*)     Calcium 8.4 (*)     Sodium 126 (*)     Chloride 91 (*)     CO2 17 (*)     Anion Gap 18 (*)     All other components within normal limits   TROPONIN - Abnormal; Notable for the following components:    Troponin, High Sensitivity 27 (*)     All other components within normal limits   TROPONIN - Abnormal; Notable for the following components:    Troponin, High Sensitivity 23 (*)     All other components within normal limits   HEPATIC FUNCTION PANEL - Abnormal; Notable for the following components:    Albumin 3.3 (*)     Alkaline Phosphatase 275 (*)     ALT 63 (*)      (*)     Total Bilirubin 1.35 (*)     Bilirubin, Direct 0.70 (*)     Albumin/Globulin Ratio 0.9 (*)     All other components within normal limits   LACTATE, SEPSIS - Abnormal; Notable for the following components:    Lactic Acid, Sepsis, Whole Blood 5.0 (*)     All other components within normal limits   LACTIC ACID, WHOLE BLOOD - Abnormal; Notable for the following components:    Lactic Acid, Whole Blood 2.8 (*)     All other components within normal limits   COVID-19, RAPID   CULTURE, BLOOD 1   CULTURE, BLOOD 2   AMMONIA   LIPASE   URINE RT REFLEX TO CULTURE       CT ABDOMEN PELVIS W IV CONTRAST Additional Contrast? None    Result Date: 11/28/2021  EXAMINATION: CT OF THE ABDOMEN AND PELVIS WITH CONTRAST 11/28/2021 5:56 pm TECHNIQUE: CT of the abdomen and pelvis was performed with the administration of intravenous contrast. Multiplanar reformatted images are provided for review.  Dose modulation, iterative reconstruction, and/or weight based adjustment of the mA/kV was utilized to reduce the radiation dose to as low as reasonably achievable. COMPARISON: None. HISTORY: ORDERING SYSTEM PROVIDED HISTORY: abd pain TECHNOLOGIST PROVIDED HISTORY: abd pain Decision Support Exception - unselect if not a suspected or confirmed emergency medical condition->Emergency Medical Condition (MA) Reason for Exam: abd pain, Dizziness; Fatigue Acuity: Unknown Type of Exam: Unknown FINDINGS: Lower Chest: Right middle lobe and right lower lobe atelectasis versus scarring. Cardiomegaly with coronary disease. Organs: There is fatty infiltration liver. Otherwise the liver, spleen, adrenal glands, kidneys, pancreas unremarkable. Subcentimeter left renal lesions identified suggestive cyst too small to be accurately rise by CT. GI/Bowel: Increased fluid distention involving the bowel loops could suggest underlying enteritis. No bowel obstruction. Mild retained stool in the throughout the colon notably the rectosigmoid junction may represent fecal impaction or constipation. Scattered colonic diverticulosis. Appendix not well delineated however no definite pericecal inflammatory changes. Pelvis: Mild bladder distention. Prostate unremarkable. Small fat containing left inguinal hernia. Peritoneum/Retroperitoneum: No free fluid. Aortic vascular calcifications. Aorta is nonaneurysmal. Bones/Soft Tissues: Multilevel degenerate changes of the lumbar spine. Scattered areas nonspecific air-fluid involving bowel loops could suggest underlying enteritis or diarrheal illness. No bowel obstruction. Colonic diverticulosis noted. XR CHEST PORTABLE    Result Date: 11/28/2021  EXAMINATION: ONE XRAY VIEW OF THE CHEST 11/28/2021 2:14 pm COMPARISON: 07/16/2021 HISTORY: ORDERING SYSTEM PROVIDED HISTORY: fatigue TECHNOLOGIST PROVIDED HISTORY: fatigue FINDINGS: Cardiac leads project over the chest.  Elevation of right hemidiaphragm. Left costophrenic angle is not included.   Linear opacity is JESSICA AGUILAR [place], [place]  Resident  11/28/21 2010

## 2021-11-29 NOTE — PROGRESS NOTES
Comprehensive Nutrition Assessment    Type and Reason for Visit:  Initial, Consult (Poor Intakes/Appetite x 5 or more days)    Nutrition Recommendations/Plan: Continue current diet. Will provide Ensure oral supplements with all meals. Encourage/monitor PO intakes as tolerated. Will monitor labs, weights, and plan of care. Nutrition Assessment:  Consulted for poor intakes/appetite x 5 or more days. Pt admitted with c/o fatigue, lightheadedness x several days. Pt reports he has had a poor appetite and not really eaten x past 3 weeks. Pt reports h/o daily alcohol use and drinks 12-15 beers daily. PMH includes: myeloproliferative disorder. Pt ate some of his breakfast this morning. Will provide Ensure supplements with meals to boost intakes. Per chart review, weight loss of 3.8% x 4 months (not significant). Labs reviewed: Na 128 mmol/L, K 3.1 mmol/L, Ca 7.4 mg/dL. Meds reviewed: Folic Acid, Synthroid, MVI, Thiamine. Malnutrition Assessment:  Malnutrition Status: At risk for malnutrition    Context:  Chronic Illness     Findings of the 6 clinical characteristics of malnutrition:  Energy Intake:  Mild decrease in energy intake - Loss of appetite/Poor PO intakes x 3 weeks  Weight Loss:  No significant weight loss   Body Fat Loss:  No significant body fat loss   Muscle Mass Loss:  No significant muscle mass loss  Fluid Accumulation:  1 - Mild (to Moderate) Extremities   Strength:  Not Performed    Estimated Daily Nutrient Needs:  Energy (kcal):  22-25 kcal/kg = 9841-4430 kcals/day; Weight Used for Energy Requirements:  Current     Protein (g):  1.1-1.3 gm/kg =  gm pro/day; Weight Used for Protein Requirements:  Ideal        Fluid (ml/day):  30 mL/kg = 2700 mL/day or per MD; Method Used for Fluid Requirements:  ml/Kg      Nutrition Related Findings:  Labs/Meds reviewed. Wounds:  None       Current Nutrition Therapies:    ADULT DIET;  Regular    Anthropometric Measures:  · Height: 6' 1\" (185.4 cm)  · Current Body Weight: 200 lb (90.7 kg)   · Admission Body Weight: 200 lb (90.7 kg)    · Usual Body Weight: 208 lb 5.4 oz (94.5 kg)     · Ideal Body Weight: 184 lbs; % Ideal Body Weight 108.7 %   · BMI: 26.4  · BMI Categories: Overweight (BMI 25.0-29. 9)       Nutrition Diagnosis:   · Inadequate oral intake related to  (loss of appetite) as evidenced by poor intake prior to admission, intake 0-25%, intake 26-50% (need for oral supplements)    Nutrition Interventions:   Food and/or Nutrient Delivery:  Continue Current Diet, Start Oral Nutrition Supplement (Provide Ensure oral supplements x 2 per day.)  Nutrition Education/Counseling:  No recommendation at this time   Coordination of Nutrition Care:  Continue to monitor while inpatient    Goals:  Oral intakes to meet % of estimated nutrition needs. Nutrition Monitoring and Evaluation:   Behavioral-Environmental Outcomes:  None Identified   Food/Nutrient Intake Outcomes:  Food and Nutrient Intake, Supplement Intake  Physical Signs/Symptoms Outcomes:  Biochemical Data, GI Status, Fluid Status or Edema, Hemodynamic Status, Nutrition Focused Physical Findings, Skin, Weight     Discharge Planning:     Too soon to determine     Electronically signed by Suzan Means RD, LD on 11/29/21 at 12:13 PM EST    Contact: [phone]

## 2021-11-30 LAB
-: NORMAL
ABSOLUTE EOS #: 0.16 K/UL (ref 0–0.4)
ABSOLUTE IMMATURE GRANULOCYTE: 0.94 K/UL (ref 0–0.3)
ABSOLUTE LYMPH #: 1.41 K/UL (ref 1–4.8)
ABSOLUTE MONO #: 1.41 K/UL (ref 0.1–0.8)
ALBUMIN SERPL-MCNC: 2.3 G/DL (ref 3.5–5.2)
ALBUMIN/GLOBULIN RATIO: 0.9 (ref 1–2.5)
ALP BLD-CCNC: 193 U/L (ref 40–129)
ALT SERPL-CCNC: 35 U/L (ref 5–41)
ANION GAP SERPL CALCULATED.3IONS-SCNC: 11 MMOL/L (ref 9–17)
AST SERPL-CCNC: 62 U/L
BASOPHILS # BLD: 1 % (ref 0–2)
BASOPHILS ABSOLUTE: 0.16 K/UL (ref 0–0.2)
BILIRUB SERPL-MCNC: 0.92 MG/DL (ref 0.3–1.2)
BUN BLDV-MCNC: 8 MG/DL (ref 8–23)
BUN/CREAT BLD: ABNORMAL (ref 9–20)
CALCIUM SERPL-MCNC: 7.8 MG/DL (ref 8.6–10.4)
CHLORIDE BLD-SCNC: 106 MMOL/L (ref 98–107)
CO2: 17 MMOL/L (ref 20–31)
CREAT SERPL-MCNC: 0.66 MG/DL (ref 0.7–1.2)
DIFFERENTIAL TYPE: ABNORMAL
EOSINOPHILS RELATIVE PERCENT: 1 % (ref 1–4)
FERRITIN: 1392 UG/L (ref 30–400)
GFR AFRICAN AMERICAN: >60 ML/MIN
GFR NON-AFRICAN AMERICAN: >60 ML/MIN
GFR SERPL CREATININE-BSD FRML MDRD: ABNORMAL ML/MIN/{1.73_M2}
GFR SERPL CREATININE-BSD FRML MDRD: ABNORMAL ML/MIN/{1.73_M2}
GLUCOSE BLD-MCNC: 91 MG/DL (ref 70–99)
HCT VFR BLD CALC: 29.8 % (ref 40.7–50.3)
HEMOGLOBIN: 9.7 G/DL (ref 13–17)
IMMATURE GRANULOCYTES: 6 %
IRON SATURATION: 50 % (ref 20–55)
IRON: 49 UG/DL (ref 59–158)
LYMPHOCYTES # BLD: 9 % (ref 24–44)
MAGNESIUM: 1.8 MG/DL (ref 1.6–2.6)
MCH RBC QN AUTO: 30 PG (ref 25.2–33.5)
MCHC RBC AUTO-ENTMCNC: 32.6 G/DL (ref 28.4–34.8)
MCV RBC AUTO: 92.3 FL (ref 82.6–102.9)
MONOCYTES # BLD: 9 % (ref 1–7)
MORPHOLOGY: ABNORMAL
NRBC AUTOMATED: 0 PER 100 WBC
PDW BLD-RTO: 15.6 % (ref 11.8–14.4)
PLATELET # BLD: 367 K/UL (ref 138–453)
PLATELET ESTIMATE: ABNORMAL
PMV BLD AUTO: 9.8 FL (ref 8.1–13.5)
POTASSIUM SERPL-SCNC: 3 MMOL/L (ref 3.7–5.3)
POTASSIUM SERPL-SCNC: 3.9 MMOL/L (ref 3.7–5.3)
RBC # BLD: 3.23 M/UL (ref 4.21–5.77)
RBC # BLD: ABNORMAL 10*6/UL
REASON FOR REJECTION: NORMAL
SEG NEUTROPHILS: 74 % (ref 36–66)
SEGMENTED NEUTROPHILS ABSOLUTE COUNT: 11.62 K/UL (ref 1.8–7.7)
SODIUM BLD-SCNC: 134 MMOL/L (ref 135–144)
SODIUM BLD-SCNC: 135 MMOL/L (ref 135–144)
TOTAL IRON BINDING CAPACITY: 98 UG/DL (ref 250–450)
TOTAL PROTEIN: 4.9 G/DL (ref 6.4–8.3)
UNSATURATED IRON BINDING CAPACITY: 49 UG/DL (ref 112–347)
WBC # BLD: 15.7 K/UL (ref 3.5–11.3)
WBC # BLD: ABNORMAL 10*3/UL
ZZ NTE CLEAN UP: ORDERED TEST: NORMAL
ZZ NTE WITH NAME CLEAN UP: SPECIMEN SOURCE: NORMAL

## 2021-11-30 PROCEDURE — 84295 ASSAY OF SERUM SODIUM: CPT

## 2021-11-30 PROCEDURE — 1200000000 HC SEMI PRIVATE

## 2021-11-30 PROCEDURE — 36415 COLL VENOUS BLD VENIPUNCTURE: CPT

## 2021-11-30 PROCEDURE — 97535 SELF CARE MNGMENT TRAINING: CPT

## 2021-11-30 PROCEDURE — 2580000003 HC RX 258: Performed by: PHYSICIAN ASSISTANT

## 2021-11-30 PROCEDURE — 6370000000 HC RX 637 (ALT 250 FOR IP): Performed by: NURSE PRACTITIONER

## 2021-11-30 PROCEDURE — 97162 PT EVAL MOD COMPLEX 30 MIN: CPT

## 2021-11-30 PROCEDURE — 6370000000 HC RX 637 (ALT 250 FOR IP): Performed by: PHYSICIAN ASSISTANT

## 2021-11-30 PROCEDURE — 87040 BLOOD CULTURE FOR BACTERIA: CPT

## 2021-11-30 PROCEDURE — 83735 ASSAY OF MAGNESIUM: CPT

## 2021-11-30 PROCEDURE — 97167 OT EVAL HIGH COMPLEX 60 MIN: CPT

## 2021-11-30 PROCEDURE — 2580000003 HC RX 258: Performed by: STUDENT IN AN ORGANIZED HEALTH CARE EDUCATION/TRAINING PROGRAM

## 2021-11-30 PROCEDURE — 6360000002 HC RX W HCPCS: Performed by: NURSE PRACTITIONER

## 2021-11-30 PROCEDURE — 2500000003 HC RX 250 WO HCPCS: Performed by: PHYSICIAN ASSISTANT

## 2021-11-30 PROCEDURE — 6370000000 HC RX 637 (ALT 250 FOR IP): Performed by: STUDENT IN AN ORGANIZED HEALTH CARE EDUCATION/TRAINING PROGRAM

## 2021-11-30 PROCEDURE — 97530 THERAPEUTIC ACTIVITIES: CPT

## 2021-11-30 PROCEDURE — 99233 SBSQ HOSP IP/OBS HIGH 50: CPT | Performed by: STUDENT IN AN ORGANIZED HEALTH CARE EDUCATION/TRAINING PROGRAM

## 2021-11-30 PROCEDURE — 2580000003 HC RX 258: Performed by: NURSE PRACTITIONER

## 2021-11-30 PROCEDURE — APPSS30 APP SPLIT SHARED TIME 16-30 MINUTES: Performed by: PHYSICIAN ASSISTANT

## 2021-11-30 PROCEDURE — 2060000000 HC ICU INTERMEDIATE R&B

## 2021-11-30 PROCEDURE — 85025 COMPLETE CBC W/AUTO DIFF WBC: CPT

## 2021-11-30 PROCEDURE — 6360000002 HC RX W HCPCS: Performed by: PHYSICIAN ASSISTANT

## 2021-11-30 PROCEDURE — 97116 GAIT TRAINING THERAPY: CPT

## 2021-11-30 PROCEDURE — 84132 ASSAY OF SERUM POTASSIUM: CPT

## 2021-11-30 PROCEDURE — 76937 US GUIDE VASCULAR ACCESS: CPT

## 2021-11-30 PROCEDURE — 80053 COMPREHEN METABOLIC PANEL: CPT

## 2021-11-30 RX ADMIN — CHLORDIAZEPOXIDE HYDROCHLORIDE 25 MG: 25 CAPSULE ORAL at 15:06

## 2021-11-30 RX ADMIN — SODIUM CHLORIDE 25 ML: 9 INJECTION, SOLUTION INTRAVENOUS at 01:02

## 2021-11-30 RX ADMIN — Medication 10 ML: at 22:00

## 2021-11-30 RX ADMIN — LORAZEPAM 1 MG: 2 INJECTION INTRAMUSCULAR; INTRAVENOUS at 01:09

## 2021-11-30 RX ADMIN — DOXYCYCLINE 100 MG: 100 INJECTION, POWDER, LYOPHILIZED, FOR SOLUTION INTRAVENOUS at 01:00

## 2021-11-30 RX ADMIN — MIDODRINE HYDROCHLORIDE 2.5 MG: 2.5 TABLET ORAL at 13:00

## 2021-11-30 RX ADMIN — SERTRALINE 100 MG: 50 TABLET, FILM COATED ORAL at 09:34

## 2021-11-30 RX ADMIN — LORAZEPAM 2 MG: 2 INJECTION INTRAMUSCULAR; INTRAVENOUS at 03:26

## 2021-11-30 RX ADMIN — AMPICILLIN SODIUM AND SULBACTAM SODIUM 3000 MG: 2; 1 INJECTION, POWDER, FOR SOLUTION INTRAMUSCULAR; INTRAVENOUS at 09:41

## 2021-11-30 RX ADMIN — POTASSIUM CHLORIDE 40 MEQ: 1500 TABLET, EXTENDED RELEASE ORAL at 12:10

## 2021-11-30 RX ADMIN — ENOXAPARIN SODIUM 40 MG: 100 INJECTION SUBCUTANEOUS at 09:35

## 2021-11-30 RX ADMIN — ACETAMINOPHEN 650 MG: 325 TABLET ORAL at 01:09

## 2021-11-30 RX ADMIN — QUETIAPINE FUMARATE 400 MG: 200 TABLET ORAL at 22:00

## 2021-11-30 RX ADMIN — POTASSIUM CHLORIDE 40 MEQ: 1500 TABLET, EXTENDED RELEASE ORAL at 13:09

## 2021-11-30 RX ADMIN — AMPICILLIN SODIUM AND SULBACTAM SODIUM 3000 MG: 2; 1 INJECTION, POWDER, FOR SOLUTION INTRAMUSCULAR; INTRAVENOUS at 03:07

## 2021-11-30 RX ADMIN — HYDROXYUREA 500 MG: 500 CAPSULE ORAL at 22:00

## 2021-11-30 RX ADMIN — Medication 10 ML: at 09:55

## 2021-11-30 RX ADMIN — CHLORDIAZEPOXIDE HYDROCHLORIDE 25 MG: 25 CAPSULE ORAL at 22:00

## 2021-11-30 RX ADMIN — LEVOTHYROXINE SODIUM 50 MCG: 50 TABLET ORAL at 09:35

## 2021-11-30 RX ADMIN — ANTACID TABLETS 1000 MG: 500 TABLET, CHEWABLE ORAL at 09:35

## 2021-11-30 RX ADMIN — HYDROXYUREA 500 MG: 500 CAPSULE ORAL at 09:34

## 2021-11-30 RX ADMIN — SODIUM CHLORIDE, PRESERVATIVE FREE 10 ML: 5 INJECTION INTRAVENOUS at 20:00

## 2021-11-30 RX ADMIN — MIDODRINE HYDROCHLORIDE 2.5 MG: 2.5 TABLET ORAL at 09:36

## 2021-11-30 RX ADMIN — POTASSIUM CHLORIDE 10 MEQ: 7.46 INJECTION, SOLUTION INTRAVENOUS at 06:51

## 2021-11-30 RX ADMIN — THIAMINE HYDROCHLORIDE: 100 INJECTION, SOLUTION INTRAMUSCULAR; INTRAVENOUS at 13:00

## 2021-11-30 RX ADMIN — Medication 10 ML: at 00:23

## 2021-11-30 RX ADMIN — Medication 10 ML: at 09:56

## 2021-11-30 RX ADMIN — MIDODRINE HYDROCHLORIDE 2.5 MG: 2.5 TABLET ORAL at 17:33

## 2021-11-30 RX ADMIN — ANTACID TABLETS 1000 MG: 500 TABLET, CHEWABLE ORAL at 22:00

## 2021-11-30 RX ADMIN — POTASSIUM CHLORIDE 10 MEQ: 7.46 INJECTION, SOLUTION INTRAVENOUS at 14:55

## 2021-11-30 RX ADMIN — POTASSIUM CHLORIDE 10 MEQ: 7.46 INJECTION, SOLUTION INTRAVENOUS at 11:08

## 2021-11-30 RX ADMIN — POTASSIUM CHLORIDE 10 MEQ: 7.46 INJECTION, SOLUTION INTRAVENOUS at 09:29

## 2021-11-30 RX ADMIN — SODIUM CHLORIDE, PRESERVATIVE FREE 10 ML: 5 INJECTION INTRAVENOUS at 00:23

## 2021-11-30 RX ADMIN — SODIUM CHLORIDE, PRESERVATIVE FREE 10 ML: 5 INJECTION INTRAVENOUS at 00:24

## 2021-11-30 RX ADMIN — PANTOPRAZOLE SODIUM 40 MG: 40 TABLET, DELAYED RELEASE ORAL at 09:36

## 2021-11-30 RX ADMIN — CHLORDIAZEPOXIDE HYDROCHLORIDE 25 MG: 25 CAPSULE ORAL at 09:35

## 2021-11-30 RX ADMIN — LORAZEPAM 2 MG: 2 INJECTION INTRAMUSCULAR; INTRAVENOUS at 09:36

## 2021-11-30 ASSESSMENT — PAIN DESCRIPTION - LOCATION: LOCATION: WRIST

## 2021-11-30 ASSESSMENT — PAIN SCALES - GENERAL
PAINLEVEL_OUTOF10: 0
PAINLEVEL_OUTOF10: 5
PAINLEVEL_OUTOF10: 6
PAINLEVEL_OUTOF10: 0
PAINLEVEL_OUTOF10: 0

## 2021-11-30 ASSESSMENT — PAIN DESCRIPTION - ORIENTATION: ORIENTATION: RIGHT

## 2021-11-30 NOTE — ED NOTES
Report to [facility]. No questions after report was given.       Alba Fisher, SENDY  11/30/21 0820

## 2021-11-30 NOTE — PROGRESS NOTES
atelectasis. CT of the abdomen and pelvis is concerning for underlying enteritis or diarrheal illness related to Increased fluid distention involving the bowel loops. hyponatremic at 126  Initial lactic was 5.0 repeat lactic was 2.8  Calcium 8.4, ionized calcium 1.05  Procalcitonin 0.55  ALT 63,  bilirubin 1.35, lipase 29. Albumin 3.3  WBCs 24.1  Blood cultures x2 pending  COVID-19 swab negative   UA unremarkable    Pt was started on vancomycin and zosyn per sepsis protocol. He was fluid resuscitated per sepsis protocol. CIWA scale initiated. Admitted to medicine for further care. Review of Systems:     Constitutional:  negative for chills, fevers. + sweats  Respiratory:  negative for cough, dyspnea on exertion, shortness of breath, wheezing  Cardiovascular:  negative for chest pain, chest pressure/discomfort, palpitations. + lower extremity edema  Gastrointestinal:  negative for abdominal pain, constipation, diarrhea, nausea, vomiting  Neurological:  negative for dizziness, headache    Medications:      Allergies:  No Known Allergies    Current Meds:   Scheduled Meds:    chlordiazePOXIDE  25 mg Oral TID    folic acid, thiamine, multi-vitamin with vitamin K infusion   IntraVENous Daily    midodrine  2.5 mg Oral TID WC    sodium chloride flush  5-40 mL IntraVENous 2 times per day    nicotine  1 patch TransDERmal Daily    hydroxyurea  500 mg Oral BID    levothyroxine  50 mcg Oral Daily    [Held by provider] lisinopril  10 mg Oral Daily    pantoprazole  40 mg Oral QAM AC    [Held by provider] prazosin  1 mg Oral BID    QUEtiapine  400 mg Oral Nightly    sertraline  100 mg Oral Daily    sodium chloride flush  5-40 mL IntraVENous 2 times per day    enoxaparin  40 mg SubCUTAneous Daily    sodium chloride flush  5-40 mL IntraVENous 2 times per day    calcium carbonate  1,000 mg Oral BID     Continuous Infusions:    sodium chloride      sodium chloride      sodium chloride 25 mL (11/30/21 0102)     PRN Meds: potassium chloride **OR** potassium alternative oral replacement **OR** potassium chloride, magnesium sulfate, sodium chloride flush, sodium chloride, LORazepam **OR** LORazepam **OR** LORazepam **OR** LORazepam **OR** LORazepam **OR** LORazepam **OR** LORazepam **OR** LORazepam, hydrOXYzine, sodium chloride flush, sodium chloride, acetaminophen **OR** acetaminophen, sodium chloride flush, sodium chloride, LORazepam **OR** LORazepam **OR** LORazepam **OR** LORazepam **OR** LORazepam **OR** LORazepam **OR** LORazepam **OR** LORazepam    Data:     Past Medical History:   has a past medical history of Alcohol abuse, Anemia, Cancer ([address] Cervical stenosis of spine, Chronic left-sided low back pain with left-sided sciatica, Insomnia, Intentional drug overdose ([address] Left arm numbness, Left lumbar radiculitis, Macrocytosis, Major depressive disorder, recurrent severe without psychotic features ([address] Myeloproliferative disorder ([address] Osteoarthritis of spine with radiculopathy, lumbar region, Severe recurrent major depression without psychotic features ([address] and Spondylosis of lumbar region without myelopathy or radiculopathy. Social History:   reports that he has been smoking cigarettes. He started smoking about 43 years ago. He has a 64.50 pack-year smoking history. He has never used smokeless tobacco. He reports current alcohol use of about 14.0 standard drinks of alcohol per week. He reports previous drug use. Drug: Marijuana Nuria Aranda).      Family History:   Family History   Problem Relation Age of Onset    Breast Cancer Mother         s/p surgical complications    Diabetes Mother     Heart Failure Father     Other Brother         \"bad back\"       Vitals:  BP (!) 132/91   Pulse 86   Temp 97.9 °F (36.6 °C) (Axillary)   Resp 26   Ht 6' 1\" (1.854 m)   Wt 200 lb (90.7 kg)   SpO2 95%   BMI 26.39 kg/m²   Temp (24hrs), Av.8 °F (36.6 °C), Min:97.5 °F (36.4 °C), Max:98.1 °F (36.7 °C)    No results for input(s): POCGLU in the last 72 hours. I/O (24Hr): No intake or output data in the 24 hours ending 11/30/21 1702    Labs:  Hematology:  Recent Labs     11/28/21  1416 11/29/21  0537 11/30/21  0343   WBC 24.1* 16.4* 15.7*   RBC 4.22 3.30* 3.23*   HGB 13.1 10.4* 9.7*   HCT 37.0* 29.4* 29.8*   MCV 87.7 89.1 92.3   MCH 31.0 31.5 30.0   MCHC 35.4* 35.4* 32.6   RDW 15.2* 15.2* 15.6*   * 355 367   MPV 9.5 9.9 9.8   INR  --  1.4  --      Chemistry:  Recent Labs     11/28/21  1416 11/28/21  1416 11/28/21  1628 11/28/21  1629 11/28/21 2125 11/29/21  0426 11/29/21  0537 11/29/21  0537 11/29/21  1950 11/30/21  0343 11/30/21  1042   *   < >  --   --  127*   < > 128*   < > 134* 134* 135   K 4.0   < >  --   --  3.4*  --  3.1*  --   --  3.0*  --    CL 91*   < >  --   --  95*  --  99  --   --  106  --    CO2 17*   < >  --   --  20  --  21  --   --  17*  --    GLUCOSE 112*   < >  --   --  112*  --  106*  --   --  91  --    BUN 7*   < >  --   --  6*  --  8  --   --  8  --    CREATININE 0.54*   < >  --   --  0.44*  --  0.49*  --   --  0.66*  --    MG  --   --   --   --   --   --  1.9  --   --  1.8  --    ANIONGAP 18*   < >  --   --  12  --  8*  --   --  11  --    LABGLOM >60   < >  --   --  >60  --  >60  --   --  >60  --    GFRAA >60   < >  --   --  >60  --  >60  --   --  >60  --    CALCIUM 8.4*   < >  --   --  7.7*  --  7.4*  --   --  7.8*  --    CAION  --   --   --   --  1.05*  --   --   --   --   --   --    TROPHS 27*  --   --  23*  --   --   --   --   --   --   --    LACTACIDWB  --   --  2.8*  --   --   --   --   --   --   --   --     < > = values in this interval not displayed.      Recent Labs     11/28/21  1416 11/29/21  0537 11/30/21  0343   PROT 6.9 5.2* 4.9*   LABALBU 3.3* 2.6* 2.3*   TSH  --  9.82*  --    * 87* 62*   ALT 63* 42* 35   ALKPHOS 275* 200* 193*   BILITOT 1.35* 1.22* 0.92   BILIDIR 0.70*  --   --    AMMONIA 32  --   --    AMYLASE  --  27*  --    LIPASE 29  --   --      ABG:No results found for: POCPH, PHART, PH, POCPCO2, SFO6KMZ, PCO2, POCPO2, PO2ART, PO2, POCHCO3, IEY9VKY, HCO3, NBEA, PBEA, BEART, BE, THGBART, THB, PDB6SUH, LVYJ6TBY, R6KUIYST, O2SAT, FIO2  Lab Results   Component Value Date/Time    SPECIAL LT HAND 6ML 11/30/2021 03:46 AM     Lab Results   Component Value Date/Time    CULTURE NO GROWTH 12 HOURS 11/30/2021 03:46 AM       Radiology:  CT ABDOMEN PELVIS W IV CONTRAST Additional Contrast? None    Result Date: 11/28/2021  Scattered areas nonspecific air-fluid involving bowel loops could suggest underlying enteritis or diarrheal illness. No bowel obstruction. Colonic diverticulosis noted. XR CHEST PORTABLE    Result Date: 11/28/2021  Chronic elevation of the right hemidiaphragm with adjacent opacity, likely atelectasis. Physical Examination:        General appearance: Somnolent, cooperative and no distress  Mental Status:  oriented to person, place and time and normal affect  Lungs:  clear to auscultation bilaterally, normal effort  Heart:  regular rate and rhythm, no murmur  Abdomen:  soft, nontender, nondistended, normal bowel sounds, no masses, hepatomegaly, splenomegaly  Extremities:  no edema, redness, tenderness in the calves  Skin:  no gross lesions, rashes, induration    Assessment:        Hospital Problems           Last Modified POA    * (Principal) Septicemia ([facility] 11/28/2021 Yes    Alcohol abuse 11/28/2021 Yes    Major depressive disorder, recurrent severe without psychotic features ([address] (Chronic) 11/28/2021 Yes    Substance abuse ([facility] [address] (Chronic) 11/28/2021 Yes    Hyponatremia 11/28/2021 Yes    Hypothyroidism (Chronic) 11/28/2021 Yes    Transaminitis 11/28/2021 Yes    Hypocalcemia 11/29/2021 Yes          Plan:        #Sepsis ruled out. We will monitor off antibiotics. Non-specific changes seen on abdominal CT. Pt exam is rather benign. Blood cultures with no growth to date. #Hyponatremia: Improving.   Likely a commendation of poor solute intake, beer

## 2021-11-30 NOTE — ED NOTES
Pt resting in bed comfortably. NAD noted. Call light within reach. Dinner tray provided. Denies any needs at this time.      Ana Martin RN  11/29/21 2016

## 2021-11-30 NOTE — PROGRESS NOTES
Physical Therapy    Facility/Department: [facility] CAR 2  Initial Assessment    NAME: Christin Barnes  : 1960  MRN: [id number]  Obtained from medical chart:   \" Anjelica Sears a 62 y.o. Non- / non  male who presents with Dizziness and Fatigue and is admitted to the hospital for the management of Septicemia ([facility]    Patient presents ER with complaints of fatigue, lightheadedness with position changing, intermittent loose stools, one episode of bloody emesis he describes as slightly bloody, and poor appetite x3 weeks.  Patient admits to being a daily drinker and his last drink was this morning.  His other significant history includes major depressive disorder, drug abuse, and hypothyroidism. \"  Date of Service: 2021    Discharge Recommendations:  Patient would benefit from continued therapy after discharge   PT Equipment Recommendations  Equipment Needed: Yes  Mobility Devices: Justin Columbia: Rolling    Assessment   Body structures, Functions, Activity limitations: Decreased functional mobility ; Decreased endurance; Decreased balance; Decreased strength; Decreased posture  Assessment: Sat up with less effort compared to this AM.  Stood with mod assist.  Unable to ambulate away from the bed due to instability in upright. His hips lack the strength to support him in standing. Patient will need further PT to regain functional independence. Prognosis: Good  Decision Making: Medium Complexity  Clinical Presentation: evolving  PT Education: Goals; General Safety; Gait Training; Plan of Care; Equipment; Functional Mobility Training; Injury Prevention; Precautions; Transfer Training  REQUIRES PT FOLLOW UP: Yes  Activity Tolerance  Activity Tolerance: Patient limited by fatigue       Patient Diagnosis(es): The primary encounter diagnosis was Septicemia ([facility] Diagnoses of Hyponatremia and Enteritis were also pertinent to this visit.      has a past medical history of Alcohol abuse, Anemia, Cancer [facility]), Cervical stenosis of spine, Chronic left-sided low back pain with left-sided sciatica, Insomnia, Intentional drug overdose ([address] Left arm numbness, Left lumbar radiculitis, Macrocytosis, Major depressive disorder, recurrent severe without psychotic features ([address] Myeloproliferative disorder ([address] Osteoarthritis of spine with radiculopathy, lumbar region, Severe recurrent major depression without psychotic features ([address] and Spondylosis of lumbar region without myelopathy or radiculopathy. has a past surgical history that includes cervical fusion; Appendectomy; Tonsillectomy; Carpal tunnel release; epidural steroid injection (Left, 5/15/2019); Endoscopy, colon, diagnostic; cervical fusion (09/05/2019); and cervical fusion (N/A, 9/5/2019).     Restrictions  Restrictions/Precautions  Restrictions/Precautions: Seizure, Fall Risk, Up as Tolerated  Required Braces or Orthoses?: No  Position Activity Restriction  Other position/activity restrictions: Up w/ assist  Vision/Hearing  Vision: Impaired  Vision Exceptions: Wears glasses for reading  Hearing: Within functional limits     Subjective  General  Chart Reviewed: Yes  Patient assessed for rehabilitation services?: Yes  Family / Caregiver Present: No  Follows Commands: Within Functional Limits  Pain Screening  Patient Currently in Pain: Denies  Vital Signs  Pulse: 89  Resp: 26  Patient Currently in Pain: Denies  Oxygen Therapy  SpO2: 92 %  O2 Device: None (Room air)      Social/Functional History  Social/Functional History  Lives With: Alone  Type of Home: Apartment  Home Access: Elevator, Level entry  Bathroom Shower/Tub: Tub/Shower unit  Bathroom Toilet: Standard  Bathroom Equipment: Grab bars in shower  ADL Assistance: Independent  Homemaking Assistance: Independent  Homemaking Responsibilities: Yes  Meal Prep Responsibility: Primary  Laundry Responsibility: Primary  Cleaning Responsibility: Primary  Ambulation Assistance: Independent  Transfer Assistance: Independent  Occupation: On disability  Leisure & Hobbies: Laying in the sun  Additional Comments: Pt questionable historian this date d/t decreased cognition and lethargy following activity. Pt falling asleep w/ questions. Cognition   Cognition  Arousal/Alertness: Appropriate responses to stimuli  Following Commands: Follows one step commands with increased time; Follows one step commands with repetition  Attention Span: Appears intact  Safety Judgement: Decreased awareness of need for assistance; Decreased awareness of need for safety  Problem Solving: Assistance required to identify errors made; Assistance required to generate solutions  Insights: Decreased awareness of deficits  Initiation: Requires cues for some  Sequencing: Requires cues for some    Objective     Observation/Palpation  Edema: Right UE edematous, skin tears present. Legs have dense edema    AROM RLE (degrees)  RLE General AROM: Has approx 1/4 of normal active ankle motion present bilaterally  Strength RLE  Strength RLE: Exception  R Hip Flexion: 2+/5  R Hip Extension: 3+/5  R Ankle Dorsiflexion: 4-/5  R Ankle Plantar flexion: 4-/5  Strength LLE  Strength LLE: Exception  L Hip Flexion: 2+/5  L Hip Extension: 3+/5  L Ankle Dorsiflexion: 4-/5  L Ankle Plantar Flexion: 4-/5        Bed mobility  Supine to Sit: Moderate assistance  Sit to Supine: Minimal assistance  Transfers  Sit to Stand: Moderate Assistance  Stand to sit: Minimal Assistance  Ambulation  Ambulation?: Yes  Ambulation 1  Surface: level tile  Device: Rolling Walker  Quality of Gait: Based on sitting edge of bed with minimal effort and stable vitals, gait a few forward and backwards away from bed was suggested to patient as a plan. Once in standing, patient is not stable in upright. Gait toward head of bed only was done in order to stay at the safety of the bed surface. Foot and walker movements are in inches at a time. Pulse mid 90s, oxygen saturation 92%.   Gait Deviations: Slow Christy; Shuffles; Decreased step length  Distance: 2' toward head of bed     Balance  Sitting - Static: Fair  Sitting - Dynamic: Fair; -  Standing - Static: Poor; +  Standing - Dynamic: Poor  Comments: In standing, he needs min assist and BUE support on walker to prevent a fall. Plan   Plan  Times per week: 5-6x/wk  Current Treatment Recommendations: Strengthening, Home Exercise Program, Safety Education & Training, Endurance Training, Patient/Caregiver Education & Training, Functional Mobility Training, Transfer Training, Gait Training, Equipment Evaluation, Education, & procurement  Safety Devices  Type of devices: All fall risk precautions in place, Bed alarm in place, Call light within reach, Gait belt, Left in bed, Nurse notified, Patient at risk for falls    AM-PAC Score  AM-PAC Inpatient Mobility Raw Score : 12 (11/30/21 1452)  AM-PAC Inpatient T-Scale Score : 35.33 (11/30/21 1452)  Mobility Inpatient CMS 0-100% Score: 68.66 (11/30/21 1452)  Mobility Inpatient CMS G-Code Modifier : CL (11/30/21 1452)          Goals  Short term goals  Time Frame for Short term goals: 14 visits  Short term goal 1: Supine to/from sit with SBA. Short term goal 2: Sit to/from stand with SBA. Short term goal 3: Ambulate 22' with walker with CGA. Short term goal 4: Stand and march in place with BUE on walker 60 seconds with stable vitals.        Therapy Time   Individual Concurrent Group Co-treatment   Time In 1400         Time Out 1440         Minutes 40         Timed Code Treatment Minutes: 22 Minutes       Chester Maddox PT

## 2021-11-30 NOTE — CARE COORDINATION
Transitional planning-attempted to call patient-line busy.  S.W. documented patient wanting inpatient drug and alcohol rehab

## 2021-11-30 NOTE — PLAN OF CARE
Problem: Nutrition  Goal: Optimal nutrition therapy  Outcome: Ongoing     Problem: Falls - Risk of:  Goal: Will remain free from falls  Description: Will remain free from falls  Outcome: Ongoing  Goal: Absence of physical injury  Description: Absence of physical injury  Outcome: Ongoing     Problem: Skin Integrity:  Goal: Will show no infection signs and symptoms  Description: Will show no infection signs and symptoms  Outcome: Ongoing  Goal: Absence of new skin breakdown  Description: Absence of new skin breakdown  Outcome: Ongoing     Problem: Pain:  Goal: Pain level will decrease  Description: Pain level will decrease  Outcome: Ongoing  Goal: Control of acute pain  Description: Control of acute pain  Outcome: Ongoing  Goal: Control of chronic pain  Description: Control of chronic pain  Outcome: Ongoing     Problem: IP BALANCE  Goal: BALANCE EDUCATION  Description: Educate patients on maintaining dynamic/static standing/sitting balance, with/without upper extremity support.   Outcome: Ongoing     Problem: IP MOBILITY  Goal: LTG - patient will ambulate household distance  Outcome: Ongoing

## 2021-11-30 NOTE — PROGRESS NOTES
Occupational Therapy   Occupational Therapy Initial Assessment  Date: 2021   Patient Name: Riya Acosta  MRN: [id number]     : 1960    Date of Service: 2021   Obtained from medical chart:   \" Riya Acosta is a 62 y.o. Non- / non  male who presents with Dizziness and Fatigue   and is admitted to the hospital for the management of Septicemia ([facility] [address]    Patient presents ER with complaints of fatigue, lightheadedness with position changing, intermittent loose stools, one episode of bloody emesis he describes as slightly bloody, and poor appetite x3 weeks. Patient admits to being a daily drinker and his last drink was this morning. His other significant history includes major depressive disorder, drug abuse, and hypothyroidism. \"    Discharge Recommendations:  Patient would benefit from continued therapy after discharge. Patient is currently unsafe to return to prior living environment at this time d/t functional deficits impacting patient's performance and safety with ADLs and functional tasks. OT Equipment Recommendations  Equipment Needed: Yes  Mobility Devices: Frankie Rhein; ADL Assistive Devices  Walker: Rolling  ADL Assistive Devices: Toileting - Standard Commode; Shower Chair with back; [address] - toilet; Grab Bars - shower    Assessment   Performance deficits / Impairments: Decreased functional mobility ; Decreased safe awareness; Decreased balance; Decreased coordination; Decreased ADL status; Decreased cognition; Decreased posture; Decreased ROM; Decreased endurance; Decreased high-level IADLs; Decreased strength; Decreased sensation; Decreased fine motor control  Assessment: Pt supine in bed upon arrival. OT facilitated bed mobility to EOB at Mod A, requiring assist for trunk. OT facilitated donning socks at Max A, see ADL assist level below. Pt limited d/t decreased dynamic sitting balance, functional reach, ROM, and strength.  OT facilitated functional transfer at Mod A X2 w/ use of RW. Pt required vc and physical assist, demonstrated flexed posture upon standing despite vc. OT facilitated functional mobility at Mod A X2, pt able to progress R LE minimally, demonstrating increased fatigue and returning to sitting position. OT facilitated bed mobility back to supine at Mod A, requiring assist for trunk and BLE. OT facilitated washing face and beverage management at SBA, demonstrating decreased coordination and strength to complete. Pt RR reaching 34, OT educated and demonstrated pursed lip breathing w/ fair return. BP assessed at 108/70 supine, RN notified. Pt will benefit from acute OT services to address deficits endurance, cognition, strength, ROM, balance, coordination, and posture to promote engagement in ADLs/IADLs/functional tasks. Prognosis: Good  Decision Making: High Complexity  Patient Education: OT role, OT POC, purpose of evaluation, transfer training, safety awareness, use of gait belt, use of RW, pursed lip breathing - fair return  Barriers to Learning: Cognition  REQUIRES OT FOLLOW UP: Yes  Activity Tolerance  Activity Tolerance: Patient limited by fatigue  Safety Devices  Safety Devices in place: Yes  Type of devices: Left in bed; Nurse notified; Call light within reach; Bed alarm in place; Gait belt  Restraints  Initially in place: No         Patient Diagnosis(es): The primary encounter diagnosis was Septicemia ([address] Diagnoses of Hyponatremia and Enteritis were also pertinent to this visit.    has a past medical history of Alcohol abuse, Anemia, Cancer ([address] Cervical stenosis of spine, Chronic left-sided low back pain with left-sided sciatica, Insomnia, Intentional drug overdose ([address] Left arm numbness, Left lumbar radiculitis, Macrocytosis, Major depressive disorder, recurrent severe without psychotic features ([address] Myeloproliferative disorder ([address] Osteoarthritis of spine with radiculopathy, lumbar region, Severe recurrent major depression without psychotic features ([address] and Spondylosis of lumbar region without myelopathy or radiculopathy. has a past surgical history that includes cervical fusion; Appendectomy; Tonsillectomy; Carpal tunnel release; epidural steroid injection (Left, 5/15/2019); Endoscopy, colon, diagnostic; cervical fusion (09/05/2019); and cervical fusion (N/A, 9/5/2019). Restrictions  Restrictions/Precautions  Restrictions/Precautions: Seizure, Fall Risk, Up as Tolerated  Required Braces or Orthoses?: No  Position Activity Restriction  Other position/activity restrictions: Up w/ assist    Subjective   General  Patient assessed for rehabilitation services?: Yes  Family / Caregiver Present: No  General Comment  Comments: RN okay'gustavo OT evaluation. Pt agreeable and participatory throughout. Patient Currently in Pain: Yes  Pain Assessment  Pain Assessment: 0-10  Pain Level: 6  Pain Location: Wrist  Pain Orientation: Right  Non-Pharmaceutical Pain Intervention(s): Ambulation/Increased Activity; Distraction;  Therapeutic presence; Repositioned  Response to Pain Intervention: Patient Satisfied  Vital Signs  Pulse: 92  Resp: 27  BP: 108/70  MAP (mmHg): 81  Patient Currently in Pain: Yes    Social/Functional History  Social/Functional History  Lives With: Alone  Type of Home: Apartment (Pt reports living on 4th floor)  Home Access: Elevator, Level entry  Bathroom Shower/Tub: Tub/Shower unit  Bathroom Toilet: Standard  Bathroom Equipment: Grab bars in shower  ADL Assistance: Independent (Pt reports increased time to complete, reports limited engagement in bathing recently d/t decline)  Homemaking Assistance: Independent (Pt reports Ind, reports needing increased time to complete)  Homemaking Responsibilities: Yes  Meal Prep Responsibility: Primary  Laundry Responsibility: Primary  Cleaning Responsibility: Primary  Ambulation Assistance: Independent (Pt reports using RW)  Transfer Assistance: Independent  Occupation: On [address] [facility] [place]: Laying in the sun  Additional Comments: Pt questionable historian this date d/t decreased cognition and lethargy following activity. Pt falling asleep w/ questions. Objective   Vision: Impaired  Vision Exceptions: Wears glasses for reading  Hearing: Within functional limits    Orientation  Overall Orientation Status: Impaired  Orientation Level: Oriented to place; Oriented to situation; Oriented to person; Disoriented to time     Balance  Sitting Balance: Minimal assistance (~10 min EOB supported, pt required Min A with dynamic, requiring CGA for static)  Standing Balance: Moderate assistance (X2 assist)  Standing Balance  Time: ~1-2 minutes  Activity: Static, EOB  Comment: Pt demonstrated decreased posture and balance, pt demonstrated knee flexion w/ standing despite vc for standing upright  Functional Mobility  Functional - Mobility Device: Rolling Walker  Activity: Other  Assist Level: Moderate assistance (X2)  Functional Mobility Comments: Attempted steps w/ R LE, pt able to progress R LE, demonstrating increased fatigue and return to sitting position     ADL  Feeding: Stand by assistance; Increased time to complete; Verbal cueing; Setup  Grooming: Setup; Increased time to complete; Verbal cueing; Minimal assistance  UE Bathing: Increased time to complete; Setup; Verbal cueing; Moderate assistance  LE Bathing: Increased time to complete; Setup; Verbal cueing; Maximum assistance  UE Dressing: Increased time to complete; Setup; Verbal cueing; Moderate assistance  LE Dressing: Increased time to complete; Setup; Verbal cueing; Maximum assistance  Toileting: Setup; Increased time to complete; Maximum assistance; Other (Comment) (Verbal Cueing)  Additional Comments: OT facilitated donning socks at Max A while sitting EOB.  Pt limited d/t decreased dynamic sitting balance, decreased functional reach, and decreased [address] to retrieve and grasp sock, able to reach figure four w/ Min A. OT facilitated beverage management at SBA, requiring increased time and set up to complete. OT facilitated washing face at SBA, requiring increased time and verbal cueing. Pt unable to reach above eyes. Pt demonstrated decrease coordination to complete. Tone RUE  RUE Tone: Normotonic  Tone LUE  LUE Tone: Normotonic  Coordination  Movements Are Fluid And Coordinated: No  Coordination and Movement description: Fine motor impairments; Tremors; Right UE; Left UE; Decreased accuracy; Decreased speed     Bed mobility  Supine to Sit: Moderate assistance (For trunk progression)  Sit to Supine: Moderate assistance (For BLE and trunk)  Scooting: Maximal assistance  Comment: Pt completed w/ use of bedrails and elevated HOB for supine>sit, HOB flat for sit>supine. Pt required increased time and vc to complete bed mobility. Transfers  Sit to stand: Moderate assistance; 2 Person assistance  Stand to sit: Moderate assistance; 2 Person assistance  Transfer Comments: Pt required vc and physical assist for hand placement w/ transfers. Cognition  Overall Cognitive Status: Exceptions  Arousal/Alertness: Delayed responses to stimuli  Following Commands: Follows one step commands with increased time;  Follows one step commands with repetition  Attention Span: Attends with cues to redirect  Safety Judgement: Decreased awareness of need for assistance; Decreased awareness of need for safety  Problem Solving: Decreased awareness of errors; Assistance required to identify errors made; Assistance required to generate solutions  Insights: Decreased awareness of deficits  Initiation: Requires cues for some  Sequencing: Requires cues for some     Sensation  Overall Sensation Status: Impaired (Pt reports numbness/tingling in B UE and BLE)      LUE PROM: WFL  LUE AROM : Exceptions  L Shoulder Flexion 0-180: ~90 degrees  L Elbow Flexion 0-145: WFL  L Elbow Extension 145-0: WFL  Left Hand AROM: WFL  RUE PROM: WFL  RUE AROM : Exceptions  R Shoulder Flexion 0-180: ~80 degrees  R Elbow Flexion 0-145: WFL  R Elbow Extension 145-0: WFL  Right Hand AROM: WFL  LUE Strength  Gross LUE Strength: Exceptions to [facility]  L Shoulder Flex: 3-/5  L Elbow Flex: 4-/5  L Elbow Ext: 4-/5  L Hand General: 4-/5  RUE Strength  Gross RUE Strength: Exceptions to [facility]  R Shoulder Flex: 3-/5  R Elbow Flex: 4-/5  R Elbow Ext: 4-/5  R Hand General: 4-/5     Plan   Plan  Times per week: 3-4X/Wk  Current Treatment Recommendations: Strengthening, ROM, Balance Training, Functional Mobility Training, Endurance Training, Patient/Caregiver Education & Training, Self-Care / ADL, Equipment Evaluation, Education, & procurement, Safety Education & Training, Home Management Training    AM-PAC Score  AM-PAC Inpatient Daily Activity Raw Score: 14 (11/30/21 1215)  AM-PAC Inpatient ADL T-Scale Score : 33.39 (11/30/21 1215)  ADL Inpatient CMS 0-100% Score: 59.67 (11/30/21 1215)  ADL Inpatient CMS G-Code Modifier : CK (11/30/21 1215)    Goals  Short term goals  Time Frame for Short term goals: Patient will, by discharge  Short term goal 1: Demo self-feeding/grooming at [facility] I  Short term goal 2: Demo UB ADLs at [address] [place] [address] term goal 3: Demo LB ADLs at [facility] A w/ AE PRN  Short term goal 4: Demo 10+ min of dynamic sitting at [facility] w/ unilateral hand release for engagement in ADLs  Short term goal 5: Demo 4+ min of static standing tolerance at [facility] to promote endurance for functional tasks  Short term goal 6: Demo functional transfers/mobility at [facility] w/ LRD PRN to engage in ADLs  Short term goal 7: Notify OTR to update POC as patient progresses     Therapy Time   Individual Concurrent Group Co-treatment   Time In 0957         Time Out 1031         Minutes 34         Timed Code Treatment Minutes: [address], S/OT

## 2021-12-01 LAB
ABSOLUTE EOS #: 0.34 K/UL (ref 0–0.44)
ABSOLUTE IMMATURE GRANULOCYTE: 1.18 K/UL (ref 0–0.3)
ABSOLUTE LYMPH #: 1.68 K/UL (ref 1.1–3.7)
ABSOLUTE MONO #: 1.68 K/UL (ref 0.1–1.2)
BASOPHILS # BLD: 0 % (ref 0–2)
BASOPHILS ABSOLUTE: 0 K/UL (ref 0–0.2)
DIFFERENTIAL TYPE: ABNORMAL
EOSINOPHILS RELATIVE PERCENT: 2 % (ref 1–4)
FOLATE: 13.2 NG/ML
HCT VFR BLD CALC: 30.9 % (ref 40.7–50.3)
HEMOGLOBIN: 10 G/DL (ref 13–17)
IMMATURE GRANULOCYTES: 7 %
LYMPHOCYTES # BLD: 10 % (ref 24–43)
MCH RBC QN AUTO: 30.2 PG (ref 25.2–33.5)
MCHC RBC AUTO-ENTMCNC: 32.4 G/DL (ref 28.4–34.8)
MCV RBC AUTO: 93.4 FL (ref 82.6–102.9)
MONOCYTES # BLD: 10 % (ref 3–12)
MORPHOLOGY: ABNORMAL
NRBC AUTOMATED: 0 PER 100 WBC
PDW BLD-RTO: 15.9 % (ref 11.8–14.4)
PLATELET # BLD: 472 K/UL (ref 138–453)
PLATELET ESTIMATE: ABNORMAL
PMV BLD AUTO: 9.7 FL (ref 8.1–13.5)
RBC # BLD: 3.31 M/UL (ref 4.21–5.77)
RBC # BLD: ABNORMAL 10*6/UL
SEG NEUTROPHILS: 71 % (ref 36–65)
SEGMENTED NEUTROPHILS ABSOLUTE COUNT: 11.92 K/UL (ref 1.5–8.1)
VITAMIN B-12: >2000 PG/ML (ref 232–1245)
WBC # BLD: 16.8 K/UL (ref 3.5–11.3)
WBC # BLD: ABNORMAL 10*3/UL

## 2021-12-01 PROCEDURE — 2580000003 HC RX 258: Performed by: NURSE PRACTITIONER

## 2021-12-01 PROCEDURE — 6370000000 HC RX 637 (ALT 250 FOR IP): Performed by: STUDENT IN AN ORGANIZED HEALTH CARE EDUCATION/TRAINING PROGRAM

## 2021-12-01 PROCEDURE — 85025 COMPLETE CBC W/AUTO DIFF WBC: CPT

## 2021-12-01 PROCEDURE — 82607 VITAMIN B-12: CPT

## 2021-12-01 PROCEDURE — 6370000000 HC RX 637 (ALT 250 FOR IP): Performed by: PHYSICIAN ASSISTANT

## 2021-12-01 PROCEDURE — 6360000002 HC RX W HCPCS: Performed by: STUDENT IN AN ORGANIZED HEALTH CARE EDUCATION/TRAINING PROGRAM

## 2021-12-01 PROCEDURE — 6370000000 HC RX 637 (ALT 250 FOR IP): Performed by: NURSE PRACTITIONER

## 2021-12-01 PROCEDURE — 2060000000 HC ICU INTERMEDIATE R&B

## 2021-12-01 PROCEDURE — APPSS30 APP SPLIT SHARED TIME 16-30 MINUTES: Performed by: PHYSICIAN ASSISTANT

## 2021-12-01 PROCEDURE — 2500000003 HC RX 250 WO HCPCS: Performed by: STUDENT IN AN ORGANIZED HEALTH CARE EDUCATION/TRAINING PROGRAM

## 2021-12-01 PROCEDURE — 36415 COLL VENOUS BLD VENIPUNCTURE: CPT

## 2021-12-01 PROCEDURE — 6360000002 HC RX W HCPCS: Performed by: NURSE PRACTITIONER

## 2021-12-01 PROCEDURE — 99232 SBSQ HOSP IP/OBS MODERATE 35: CPT | Performed by: STUDENT IN AN ORGANIZED HEALTH CARE EDUCATION/TRAINING PROGRAM

## 2021-12-01 PROCEDURE — 2580000003 HC RX 258: Performed by: STUDENT IN AN ORGANIZED HEALTH CARE EDUCATION/TRAINING PROGRAM

## 2021-12-01 PROCEDURE — 82746 ASSAY OF FOLIC ACID SERUM: CPT

## 2021-12-01 PROCEDURE — 97110 THERAPEUTIC EXERCISES: CPT

## 2021-12-01 RX ORDER — SODIUM CHLORIDE, SODIUM LACTATE, POTASSIUM CHLORIDE, AND CALCIUM CHLORIDE .6; .31; .03; .02 G/100ML; G/100ML; G/100ML; G/100ML
1000 INJECTION, SOLUTION INTRAVENOUS ONCE
Status: COMPLETED | OUTPATIENT
Start: 2021-12-01 | End: 2021-12-01

## 2021-12-01 RX ORDER — CHLORDIAZEPOXIDE HYDROCHLORIDE 5 MG/1
10 CAPSULE, GELATIN COATED ORAL 3 TIMES DAILY
Status: DISCONTINUED | OUTPATIENT
Start: 2021-12-01 | End: 2021-12-02

## 2021-12-01 RX ORDER — DEXTROSE AND SODIUM CHLORIDE 5; .45 G/100ML; G/100ML
INJECTION, SOLUTION INTRAVENOUS CONTINUOUS
Status: DISCONTINUED | OUTPATIENT
Start: 2021-12-01 | End: 2021-12-02

## 2021-12-01 RX ORDER — ONDANSETRON 4 MG/1
4 TABLET, ORALLY DISINTEGRATING ORAL EVERY 6 HOURS PRN
Status: DISCONTINUED | OUTPATIENT
Start: 2021-12-01 | End: 2021-12-09 | Stop reason: HOSPADM

## 2021-12-01 RX ADMIN — SERTRALINE 100 MG: 50 TABLET, FILM COATED ORAL at 10:18

## 2021-12-01 RX ADMIN — CHLORDIAZEPOXIDE HYDROCHLORIDE 10 MG: 5 CAPSULE ORAL at 21:52

## 2021-12-01 RX ADMIN — QUETIAPINE FUMARATE 400 MG: 200 TABLET ORAL at 21:52

## 2021-12-01 RX ADMIN — CHLORDIAZEPOXIDE HYDROCHLORIDE 10 MG: 5 CAPSULE ORAL at 13:23

## 2021-12-01 RX ADMIN — ANTACID TABLETS 1000 MG: 500 TABLET, CHEWABLE ORAL at 10:17

## 2021-12-01 RX ADMIN — MIDODRINE HYDROCHLORIDE 2.5 MG: 2.5 TABLET ORAL at 10:18

## 2021-12-01 RX ADMIN — ANTACID TABLETS 1000 MG: 500 TABLET, CHEWABLE ORAL at 21:52

## 2021-12-01 RX ADMIN — PANTOPRAZOLE SODIUM 40 MG: 40 TABLET, DELAYED RELEASE ORAL at 06:18

## 2021-12-01 RX ADMIN — DEXTROSE AND SODIUM CHLORIDE: 5; 450 INJECTION, SOLUTION INTRAVENOUS at 12:15

## 2021-12-01 RX ADMIN — SODIUM CHLORIDE, POTASSIUM CHLORIDE, SODIUM LACTATE AND CALCIUM CHLORIDE 1000 ML: 600; 310; 30; 20 INJECTION, SOLUTION INTRAVENOUS at 10:20

## 2021-12-01 RX ADMIN — HYDROXYUREA 500 MG: 500 CAPSULE ORAL at 10:18

## 2021-12-01 RX ADMIN — LEVOTHYROXINE SODIUM 50 MCG: 50 TABLET ORAL at 06:18

## 2021-12-01 RX ADMIN — HYDROXYUREA 500 MG: 500 CAPSULE ORAL at 21:57

## 2021-12-01 RX ADMIN — ENOXAPARIN SODIUM 40 MG: 100 INJECTION SUBCUTANEOUS at 10:18

## 2021-12-01 RX ADMIN — MIDODRINE HYDROCHLORIDE 2.5 MG: 2.5 TABLET ORAL at 13:27

## 2021-12-01 RX ADMIN — SODIUM CHLORIDE, PRESERVATIVE FREE 10 ML: 5 INJECTION INTRAVENOUS at 21:54

## 2021-12-01 RX ADMIN — LORAZEPAM 2 MG: 0.5 TABLET ORAL at 13:27

## 2021-12-01 RX ADMIN — THIAMINE HYDROCHLORIDE: 100 INJECTION, SOLUTION INTRAMUSCULAR; INTRAVENOUS at 10:17

## 2021-12-01 ASSESSMENT — PAIN SCALES - GENERAL
PAINLEVEL_OUTOF10: 0

## 2021-12-01 NOTE — PROGRESS NOTES
Comprehensive Nutrition Assessment    Type and Reason for Visit:  Reassess    Nutrition Recommendations/Plan:   - Continue current diet, Regular  - Will send Boost Pudding, TID  - Will d/c Ensure Enlive TID  - Will send Glucerna, TID    Nutrition Assessment:  Pt reports continued poor appetite and some nausea. Per chart, eating 1-25%. RN reported that pt did eat most of his dinner yesterday, but no other meals. Pt has not recieved Ensure w/ his tray yet, but willing to drink. May be due to product shortage, will revise order. Pt also willing to try Boost Pudding, will send TID. RN confirmed pt is not ordered Zofran, she requested following discussion. Malnutrition Assessment:  Malnutrition Status: At risk for malnutrition (Comment)    Context:  Chronic Illness     Findings of the 6 clinical characteristics of malnutrition:  Energy Intake:  Mild decrease in energy intake (Comment) (Loss of appetite/Poor PO intakes x 3 weeks)  Weight Loss:  No significant weight loss     Body Fat Loss:  No significant body fat loss     Muscle Mass Loss:  No significant muscle mass loss    Fluid Accumulation:  1 - Mild (to Moderate) Extremities   Strength:  Not Performed    Estimated Daily Nutrient Needs:  Energy (kcal):  22-25 kcal/kg = 2458-1373 kcals/day; Weight Used for Energy Requirements:  Admission     Protein (g):  1.1-1.3 gm/kg =  gm pro/day; Weight Used for Protein Requirements:  Ideal        Fluid (ml/day):  30 mL/kg = 2700 mL/day or per MD; Method Used for Fluid Requirements:  ml/Kg      Nutrition Related Findings:  Labs/meds reviewed. LBM 11/30. BLE +2 non-pitting edema. Wounds:  None       Current Nutrition Therapies:    ADULT DIET;  Regular  ADULT ORAL NUTRITION SUPPLEMENT; Breakfast, Lunch, Dinner; Diabetic Oral Supplement  ADULT ORAL NUTRITION SUPPLEMENT; Breakfast, Lunch, Dinner; Fortified Pudding Oral Supplement    Anthropometric Measures:  · Height: 6' 1\" (185.4 cm)  · Current Body Weight: 206 lb (93.4 kg) (12/1, [facility])   · Admission Body Weight: 200 lb (90.7 kg)    · Usual Body Weight: 208 lb 5.4 oz (94.5 kg)     · Ideal Body Weight: 184 lbs; % Ideal Body Weight 112 %   · BMI: 27.2  · BMI Categories: Overweight (BMI 25.0-29. 9)       Nutrition Diagnosis:   · Inadequate oral intake related to  (loss of appetite) as evidenced by poor intake prior to admission, intake 0-25%, intake 26-50% (need for oral supplements)    Nutrition Interventions:   Food and/or Nutrient Delivery:  Continue Current Diet, Modify Current Diet  Nutrition Education/Counseling:  No recommendation at this time   Coordination of Nutrition Care:  Continue to monitor while inpatient    Goals:  Oral intakes to meet % of estimated nutrition needs. Nutrition Monitoring and Evaluation:   Behavioral-Environmental Outcomes:  None Identified   Food/Nutrient Intake Outcomes:  Food and Nutrient Intake, Supplement Intake  Physical Signs/Symptoms Outcomes:  Biochemical Data, Nausea or Vomiting, Fluid Status or Edema, Nutrition Focused Physical Findings, Weight     Discharge Planning:     Too soon to determine     Electronically signed by Lion Carrillo MS, RD, LD on 12/1/21 at 11:05 AM EST    Contact: [phone] or floor RD

## 2021-12-01 NOTE — PROGRESS NOTES
Physical Therapy  Facility/Department: [facility] 2  Daily Treatment Note  NAME: Brent Cali  : 1960  MRN: [id number]    Date of Service: 2021    Discharge Recommendations:  Patient would benefit from continued therapy after discharge     Patient would benefit from continued therapy after discharge   PT Equipment Recommendations  Equipment Needed: Yes  Mobility Devices: Gladies Holes: Rolling    Assessment     Body structures, Functions, Activity limitations: Decreased functional mobility ; Decreased endurance; Decreased balance; Decreased strength; Decreased posture  Assessment: Per RN pt has had several bowel movements and was recently cleaned up after having another. Pt requested ex's in bed. Patient will need further PT to regain functional independence. Prognosis: Good  Decision Making: Medium Complexity  Clinical Presentation: evolving  PT Education: Goals; General Safety; Gait Training; Plan of Care; Equipment; Functional Mobility Training; Injury Prevention; Precautions; Transfer Training  REQUIRES PT FOLLOW UP: Yes  Activity Tolerance  Activity Tolerance: Patient limited by fatigue         Patient Diagnosis(es): The primary encounter diagnosis was Septicemia ([address] Diagnoses of Hyponatremia and Enteritis were also pertinent to this visit. has a past medical history of Alcohol abuse, Anemia, Cancer ([address] Cervical stenosis of spine, Chronic left-sided low back pain with left-sided sciatica, Insomnia, Intentional drug overdose ([address] Left arm numbness, Left lumbar radiculitis, Macrocytosis, Major depressive disorder, recurrent severe without psychotic features ([address] Myeloproliferative disorder ([address] Osteoarthritis of spine with radiculopathy, lumbar region, Severe recurrent major depression without psychotic features ([address] and Spondylosis of lumbar region without myelopathy or radiculopathy. has a past surgical history that includes cervical fusion; Appendectomy; Tonsillectomy;  Carpal Therapy Time   Individual Concurrent Group Co-treatment   Time In  300         Time Out  325         Minutes  25                 [place], [place]

## 2021-12-01 NOTE — PROGRESS NOTES
[facility]  Office: [address], DO, Hola Humphries, DO, Marcus Evans, DO, Tracy Matos Blood, DO, Zuly Rosenberg MD, Guille Payne MD, Dorothy Kaye MD, China Carson MD, Tori Gamboa MD, Connor Simon MD, Tiny Jon MD, Deena Torres, DO, Edie Resendiz, DO, Kayode Sharma MD,  Cecilia Damon, DO, Klarissa Jacinto MD, Aaron Lauren MD, Lazarus Hernandez MD, Hima Heaton MD, Manasa Hendrix MD, Zohreh Brito MD, Jamilah Hinson MD, Martha Constantino [facility], [facility], CNP, Asif Cardenas, CNP, Jennifer , CNS, Bianca Santiago, CNP, Shelby Moses, CNP, Lindsey Mann, CNP, Dean Villeda, CNP, Huong Cordon, CNP, Toshia Ridley PA-C, Mansi Saleh, [facility], Ibeth Burks, CNP, Hiram Porter, CNP, Wayne Lopez, CNP, Seble Simons, CNP, Raad King, CNP, Tavia Camacho, CNP, Monica Nava, [address] [place] [address]    Progress Note    12/1/2021    3:50 PM    Name:   Qamar Gibson  MRN:     [id number]     Acct:      [de-identified]   Room:   2016/2016-01   Day:  3  Admit Date:  11/28/2021  1:56 PM    PCP:   SIMONE Winn CNP  Code Status:  Full Code    Subjective:     C/C:   Chief Complaint   Patient presents with    Dizziness    Fatigue     Interval History Status: not changed. Patient seen and evaluated. He is feeling better today and is more awake. Continues to experience withdrawal symptoms, particularly tremors. He continues to be very weak and unsteady on his feet. Brief History:     62year-old male with history of myeloproliferative neoplasm (JAK2 mutation, on hydroxyurea), alcohol dependence, hypothyroidism. He presented to the ED complaining of feeling off balance and weak. ED work-up significant for the following:    Vitals have been stable  Chest x-ray shows chronic elevation of right hemidiaphragm with adjacent obesity that is likely atelectasis.   CT of the abdomen and pelvis is concerning for underlying enteritis or diarrheal illness related to Increased fluid distention involving the bowel loops. hyponatremic at 126  Initial lactic was 5.0 repeat lactic was 2.8  Calcium 8.4, ionized calcium 1.05  Procalcitonin 0.55  ALT 63,  bilirubin 1.35, lipase 29. Albumin 3.3  WBCs 24.1  Blood cultures x2 pending  COVID-19 swab negative   UA unremarkable    Pt was started on vancomycin and zosyn per sepsis protocol. He was fluid resuscitated per sepsis protocol. CIWA scale initiated. Admitted to medicine for further care. Review of Systems:     Constitutional:  negative for chills, fevers. + sweats  Respiratory:  negative for cough, dyspnea on exertion, shortness of breath, wheezing  Cardiovascular:  negative for chest pain, chest pressure/discomfort, palpitations. + lower extremity edema  Gastrointestinal:  negative for abdominal pain, constipation, diarrhea, nausea, vomiting  Neurological:  negative for dizziness, headache    Medications:      Allergies:  No Known Allergies    Current Meds:   Scheduled Meds:    thiamine and folic acid IVPB   IntraVENous Daily    chlordiazePOXIDE  10 mg Oral TID    midodrine  2.5 mg Oral TID WC    sodium chloride flush  5-40 mL IntraVENous 2 times per day    nicotine  1 patch TransDERmal Daily    hydroxyurea  500 mg Oral BID    levothyroxine  50 mcg Oral Daily    [Held by provider] lisinopril  10 mg Oral Daily    pantoprazole  40 mg Oral QAM AC    [Held by provider] prazosin  1 mg Oral BID    QUEtiapine  400 mg Oral Nightly    sertraline  100 mg Oral Daily    sodium chloride flush  5-40 mL IntraVENous 2 times per day    enoxaparin  40 mg SubCUTAneous Daily    sodium chloride flush  5-40 mL IntraVENous 2 times per day    calcium carbonate  1,000 mg Oral BID     Continuous Infusions:    dextrose 5 % and 0.45 % NaCl 125 mL/hr at 12/01/21 1215    sodium chloride      sodium chloride      sodium chloride 25 mL (11/30/21 0102)     PRN Meds: ondansetron, potassium chloride **OR** potassium alternative oral replacement **OR** potassium chloride, magnesium sulfate, sodium chloride flush, sodium chloride, LORazepam **OR** LORazepam **OR** LORazepam **OR** LORazepam **OR** LORazepam **OR** LORazepam **OR** LORazepam **OR** LORazepam, hydrOXYzine, sodium chloride flush, sodium chloride, acetaminophen **OR** acetaminophen, sodium chloride flush, sodium chloride, LORazepam **OR** LORazepam **OR** LORazepam **OR** LORazepam **OR** LORazepam **OR** LORazepam **OR** LORazepam **OR** LORazepam    Data:     Past Medical History:   has a past medical history of Alcohol abuse, Anemia, Cancer ([facility] [address] Cervical stenosis of spine, Chronic left-sided low back pain with left-sided sciatica, Insomnia, Intentional drug overdose ([facility] [address] Left arm numbness, Left lumbar radiculitis, Macrocytosis, Major depressive disorder, recurrent severe without psychotic features ([address] Myeloproliferative disorder ([address] Osteoarthritis of spine with radiculopathy, lumbar region, Severe recurrent major depression without psychotic features ([address] and Spondylosis of lumbar region without myelopathy or radiculopathy. Social History:   reports that he has been smoking cigarettes. He started smoking about 43 years ago. He has a 64.50 pack-year smoking history. He has never used smokeless tobacco. He reports current alcohol use of about 14.0 standard drinks of alcohol per week. He reports previous drug use. Drug: Marijuana Vertical Acuity).      Family History:   Family History   Problem Relation Age of Onset    Breast Cancer Mother         s/p surgical complications    Diabetes Mother     Heart Failure Father     Other Brother         \"bad back\"       Vitals:  BP (!) 120/92   Pulse 84   Temp 98 °F (36.7 °C) (Oral)   Resp 26   Ht 6' 1\" (1.854 m)   Wt 206 lb (93.4 kg)   SpO2 93%   BMI 27.18 kg/m²   Temp (24hrs), Av.6 °F (37 °C), Min:97.9 °F (36.6 °C), Max:99 °F (37.2 °C)    No results for input(s): PADDYU daily.  - social work consult    #Transaminitis/elevated PT/INR  - suspect underlying alcoholic liver disease. CT reviewed and revealing fatty infiltration of the liver. Hepatic ultrasound revealing hepatomegaly and hepatic steatosis with borderline cirrhotic configuration. #Hypothyroidism  - pt admittedly not compliant with oral meds. Will resume levothyroxine here. Repeat TSH in 4-6 weeks. #Myeloproliferative neoplasm  - resume hydroxyurea    #Leukocytosis  -Suspicion for infection is low. Likely reactive given alcohol withdrawal in conjunction with known myeloproliferative neoplasm. We will continue to monitor off antibiotics.     #Lovenox DVT prophylaxis  #PT/OT    James Hinkle PA-C  12/1/2021  3:50 PM

## 2021-12-01 NOTE — PLAN OF CARE
Problem: Nutrition  Goal: Optimal nutrition therapy  12/1/2021 0233 by Rufina Ha RN  Outcome: Ongoing  11/30/2021 1850 by Noel Alba RN  Outcome: Ongoing  11/30/2021 1844 by Noel Alba RN  Outcome: Ongoing     Problem: Falls - Risk of:  Goal: Will remain free from falls  Description: Will remain free from falls  12/1/2021 0233 by Rufina Ha RN  Outcome: Ongoing  11/30/2021 1850 by Noel Alba RN  Outcome: Ongoing  11/30/2021 1844 by Noel Alba RN  Outcome: Ongoing  Goal: Absence of physical injury  Description: Absence of physical injury  12/1/2021 0233 by Rufina Ha RN  Outcome: Ongoing  11/30/2021 1850 by Noel Alba RN  Outcome: Ongoing  11/30/2021 1844 by Noel Alba RN  Outcome: Ongoing     Problem: Skin Integrity:  Goal: Will show no infection signs and symptoms  Description: Will show no infection signs and symptoms  12/1/2021 0233 by Rufina Ha RN  Outcome: Ongoing  11/30/2021 1850 by Noel Alba RN  Outcome: Ongoing  11/30/2021 1844 by Noel Alba RN  Outcome: Ongoing  Goal: Absence of new skin breakdown  Description: Absence of new skin breakdown  12/1/2021 0233 by Rufina Ha RN  Outcome: Ongoing  11/30/2021 1850 by Noel Alba RN  Outcome: Ongoing  11/30/2021 1844 by Noel Alba RN  Outcome: Ongoing     Problem: Pain:  Goal: Pain level will decrease  Description: Pain level will decrease  12/1/2021 0233 by Rufina Ha RN  Outcome: Ongoing  11/30/2021 1850 by Noel Alba RN  Outcome: Ongoing  11/30/2021 1844 by Noel Alba RN  Outcome: Ongoing  Goal: Control of acute pain  Description: Control of acute pain  12/1/2021 0233 by Rufina Ha RN  Outcome: Ongoing  11/30/2021 1850 by Noel Alba RN  Outcome: Ongoing  11/30/2021 1844 by Noel Alba RN  Outcome: Ongoing  Goal: Control of chronic pain  Description: Control of chronic pain  12/1/2021 0233 by Rufina Ha RN  Outcome: Ongoing  11/30/2021 1850 by Noel Alba RN  Outcome: Ongoing  11/30/2021 1844 by Kierra Galo RN  Outcome: Ongoing     Problem: IP BALANCE  Goal: BALANCE EDUCATION  Description: Educate patients on maintaining dynamic/static standing/sitting balance, with/without upper extremity support.   12/1/2021 0233 by Albania Garber RN  Outcome: Ongoing  11/30/2021 1850 by Kierra Galo RN  Outcome: Ongoing  11/30/2021 1844 by Kierra Galo RN  Outcome: Ongoing     Problem: IP MOBILITY  Goal: LTG - patient will ambulate household distance  12/1/2021 0233 by Albania Garber RN  Outcome: Ongoing  11/30/2021 1850 by Kierra Galo RN  Outcome: Ongoing  11/30/2021 1844 by Kierra Galo RN  Outcome: Ongoing

## 2021-12-02 ENCOUNTER — APPOINTMENT (OUTPATIENT)
Dept: GENERAL RADIOLOGY | Age: 61
DRG: 896 | End: 2021-12-02
Payer: MEDICARE

## 2021-12-02 LAB
ABSOLUTE EOS #: 0.17 K/UL (ref 0–0.4)
ABSOLUTE IMMATURE GRANULOCYTE: 0.67 K/UL (ref 0–0.3)
ABSOLUTE LYMPH #: 1.67 K/UL (ref 1–4.8)
ABSOLUTE MONO #: 1.34 K/UL (ref 0.1–0.8)
ANION GAP SERPL CALCULATED.3IONS-SCNC: 12 MMOL/L (ref 9–17)
BASOPHILS # BLD: 1 % (ref 0–2)
BASOPHILS ABSOLUTE: 0.17 K/UL (ref 0–0.2)
BUN BLDV-MCNC: 4 MG/DL (ref 8–23)
BUN/CREAT BLD: ABNORMAL (ref 9–20)
CALCIUM SERPL-MCNC: 7.9 MG/DL (ref 8.6–10.4)
CHLORIDE BLD-SCNC: 105 MMOL/L (ref 98–107)
CO2: 19 MMOL/L (ref 20–31)
CREAT SERPL-MCNC: 0.58 MG/DL (ref 0.7–1.2)
DIFFERENTIAL TYPE: ABNORMAL
EOSINOPHILS RELATIVE PERCENT: 1 % (ref 1–4)
GFR AFRICAN AMERICAN: >60 ML/MIN
GFR NON-AFRICAN AMERICAN: >60 ML/MIN
GFR SERPL CREATININE-BSD FRML MDRD: ABNORMAL ML/MIN/{1.73_M2}
GFR SERPL CREATININE-BSD FRML MDRD: ABNORMAL ML/MIN/{1.73_M2}
GLUCOSE BLD-MCNC: 103 MG/DL (ref 70–99)
HCT VFR BLD CALC: 29.6 % (ref 40.7–50.3)
HEMOGLOBIN: 9.7 G/DL (ref 13–17)
IMMATURE GRANULOCYTES: 4 %
LYMPHOCYTES # BLD: 10 % (ref 24–44)
MAGNESIUM: 1.7 MG/DL (ref 1.6–2.6)
MCH RBC QN AUTO: 30 PG (ref 25.2–33.5)
MCHC RBC AUTO-ENTMCNC: 32.8 G/DL (ref 28.4–34.8)
MCV RBC AUTO: 91.6 FL (ref 82.6–102.9)
MONOCYTES # BLD: 8 % (ref 1–7)
MORPHOLOGY: ABNORMAL
NRBC AUTOMATED: 0 PER 100 WBC
NUCLEATED RED BLOOD CELLS: 1 PER 100 WBC
PDW BLD-RTO: 16.1 % (ref 11.8–14.4)
PLATELET # BLD: 530 K/UL (ref 138–453)
PLATELET ESTIMATE: ABNORMAL
PMV BLD AUTO: 9.4 FL (ref 8.1–13.5)
POTASSIUM SERPL-SCNC: 3 MMOL/L (ref 3.7–5.3)
RBC # BLD: 3.23 M/UL (ref 4.21–5.77)
RBC # BLD: ABNORMAL 10*6/UL
SEG NEUTROPHILS: 76 % (ref 36–66)
SEGMENTED NEUTROPHILS ABSOLUTE COUNT: 12.68 K/UL (ref 1.8–7.7)
SODIUM BLD-SCNC: 136 MMOL/L (ref 135–144)
WBC # BLD: 16.7 K/UL (ref 3.5–11.3)
WBC # BLD: ABNORMAL 10*3/UL

## 2021-12-02 PROCEDURE — 36415 COLL VENOUS BLD VENIPUNCTURE: CPT

## 2021-12-02 PROCEDURE — 71045 X-RAY EXAM CHEST 1 VIEW: CPT

## 2021-12-02 PROCEDURE — 6370000000 HC RX 637 (ALT 250 FOR IP): Performed by: STUDENT IN AN ORGANIZED HEALTH CARE EDUCATION/TRAINING PROGRAM

## 2021-12-02 PROCEDURE — 2580000003 HC RX 258: Performed by: NURSE PRACTITIONER

## 2021-12-02 PROCEDURE — 6360000002 HC RX W HCPCS: Performed by: PHYSICIAN ASSISTANT

## 2021-12-02 PROCEDURE — 6370000000 HC RX 637 (ALT 250 FOR IP): Performed by: PHYSICIAN ASSISTANT

## 2021-12-02 PROCEDURE — 6360000002 HC RX W HCPCS: Performed by: NURSE PRACTITIONER

## 2021-12-02 PROCEDURE — 99232 SBSQ HOSP IP/OBS MODERATE 35: CPT | Performed by: STUDENT IN AN ORGANIZED HEALTH CARE EDUCATION/TRAINING PROGRAM

## 2021-12-02 PROCEDURE — 85025 COMPLETE CBC W/AUTO DIFF WBC: CPT

## 2021-12-02 PROCEDURE — 2060000000 HC ICU INTERMEDIATE R&B

## 2021-12-02 PROCEDURE — 83735 ASSAY OF MAGNESIUM: CPT

## 2021-12-02 PROCEDURE — 2580000003 HC RX 258: Performed by: STUDENT IN AN ORGANIZED HEALTH CARE EDUCATION/TRAINING PROGRAM

## 2021-12-02 PROCEDURE — 80048 BASIC METABOLIC PNL TOTAL CA: CPT

## 2021-12-02 PROCEDURE — 6360000002 HC RX W HCPCS: Performed by: STUDENT IN AN ORGANIZED HEALTH CARE EDUCATION/TRAINING PROGRAM

## 2021-12-02 PROCEDURE — APPSS30 APP SPLIT SHARED TIME 16-30 MINUTES: Performed by: PHYSICIAN ASSISTANT

## 2021-12-02 PROCEDURE — 97535 SELF CARE MNGMENT TRAINING: CPT

## 2021-12-02 PROCEDURE — 6370000000 HC RX 637 (ALT 250 FOR IP): Performed by: NURSE PRACTITIONER

## 2021-12-02 PROCEDURE — 2500000003 HC RX 250 WO HCPCS: Performed by: STUDENT IN AN ORGANIZED HEALTH CARE EDUCATION/TRAINING PROGRAM

## 2021-12-02 RX ORDER — CHLORDIAZEPOXIDE HYDROCHLORIDE 5 MG/1
5 CAPSULE, GELATIN COATED ORAL 3 TIMES DAILY
Status: DISCONTINUED | OUTPATIENT
Start: 2021-12-02 | End: 2021-12-03

## 2021-12-02 RX ORDER — LANOLIN ALCOHOL/MO/W.PET/CERES
100 CREAM (GRAM) TOPICAL DAILY
Status: DISCONTINUED | OUTPATIENT
Start: 2021-12-02 | End: 2021-12-09 | Stop reason: HOSPADM

## 2021-12-02 RX ORDER — MAGNESIUM SULFATE IN WATER 40 MG/ML
2000 INJECTION, SOLUTION INTRAVENOUS ONCE
Status: COMPLETED | OUTPATIENT
Start: 2021-12-02 | End: 2021-12-02

## 2021-12-02 RX ORDER — POTASSIUM CHLORIDE 20 MEQ/1
40 TABLET, EXTENDED RELEASE ORAL
Status: DISPENSED | OUTPATIENT
Start: 2021-12-02 | End: 2021-12-02

## 2021-12-02 RX ORDER — FUROSEMIDE 10 MG/ML
20 INJECTION INTRAMUSCULAR; INTRAVENOUS ONCE
Status: COMPLETED | OUTPATIENT
Start: 2021-12-02 | End: 2021-12-02

## 2021-12-02 RX ORDER — FOLIC ACID 1 MG/1
1 TABLET ORAL DAILY
Status: DISCONTINUED | OUTPATIENT
Start: 2021-12-02 | End: 2021-12-09 | Stop reason: HOSPADM

## 2021-12-02 RX ADMIN — PRAZOSIN HYDROCHLORIDE 1 MG: 1 CAPSULE ORAL at 21:12

## 2021-12-02 RX ADMIN — CHLORDIAZEPOXIDE HYDROCHLORIDE 5 MG: 5 CAPSULE ORAL at 15:15

## 2021-12-02 RX ADMIN — THIAMINE HYDROCHLORIDE: 100 INJECTION, SOLUTION INTRAMUSCULAR; INTRAVENOUS at 09:18

## 2021-12-02 RX ADMIN — FOLIC ACID 1 MG: 1 TABLET ORAL at 14:14

## 2021-12-02 RX ADMIN — POTASSIUM CHLORIDE 40 MEQ: 1500 TABLET, EXTENDED RELEASE ORAL at 14:14

## 2021-12-02 RX ADMIN — CHLORDIAZEPOXIDE HYDROCHLORIDE 5 MG: 5 CAPSULE ORAL at 21:12

## 2021-12-02 RX ADMIN — Medication 10 ML: at 21:12

## 2021-12-02 RX ADMIN — Medication 100 MG: at 14:14

## 2021-12-02 RX ADMIN — QUETIAPINE FUMARATE 400 MG: 200 TABLET ORAL at 21:12

## 2021-12-02 RX ADMIN — HYDROXYUREA 500 MG: 500 CAPSULE ORAL at 21:12

## 2021-12-02 RX ADMIN — MAGNESIUM SULFATE HEPTAHYDRATE 2000 MG: 40 INJECTION, SOLUTION INTRAVENOUS at 16:05

## 2021-12-02 RX ADMIN — CHLORDIAZEPOXIDE HYDROCHLORIDE 10 MG: 5 CAPSULE ORAL at 09:31

## 2021-12-02 RX ADMIN — Medication 10 ML: at 12:17

## 2021-12-02 RX ADMIN — ENOXAPARIN SODIUM 40 MG: 100 INJECTION SUBCUTANEOUS at 09:18

## 2021-12-02 RX ADMIN — POTASSIUM CHLORIDE 40 MEQ: 1500 TABLET, EXTENDED RELEASE ORAL at 09:31

## 2021-12-02 RX ADMIN — SERTRALINE 100 MG: 50 TABLET, FILM COATED ORAL at 09:17

## 2021-12-02 RX ADMIN — FUROSEMIDE 20 MG: 10 INJECTION, SOLUTION INTRAMUSCULAR; INTRAVENOUS at 15:15

## 2021-12-02 RX ADMIN — ANTACID TABLETS 1000 MG: 500 TABLET, CHEWABLE ORAL at 09:17

## 2021-12-02 RX ADMIN — LEVOTHYROXINE SODIUM 50 MCG: 50 TABLET ORAL at 09:18

## 2021-12-02 RX ADMIN — PANTOPRAZOLE SODIUM 40 MG: 40 TABLET, DELAYED RELEASE ORAL at 09:18

## 2021-12-02 RX ADMIN — HYDROXYUREA 500 MG: 500 CAPSULE ORAL at 09:18

## 2021-12-02 NOTE — PROGRESS NOTES
[facility]  Office: [address], DO, Aisha Thomas, DO, Elliott Patrick, DO, Neelajadon Luevano Blood, DO, Mike Fagan MD, Chari Hunt MD, Zev Ramirez MD, Catalina Barrios MD, Lorena Medina MD, Kye Kaye MD, Uche Mckeon MD, Slade Zimmerman, DO, Patricia Gurrola, DO, Yasmine Almaguer MD,  Pamela Friday, DO, Delma Garcia MD, Raad Bass MD, Bailey Montana MD, Jaimie Merritt MD, Yesi Mason MD, Thu Morejon MD, Adeline Goldmann, MD, [address], [facility], [facility], [facility], Demetra Nurse, CNP, Brayden Prabhakar, CNS, Po Mendoza, CNP, Cassandra Stearns, CNP, Hansa Hernandez, CNP, Wendy Mcclain, CNP, Kim Simeon, CNP, Osei Brower PA-C, Vi Vieyra [facility], Sergio Condeelor, CNP, Taj Moyer, CNP, Jewell Enterprise, CNP, Stacey Squibb, CNP, Silvia Omer, CNP, Yoandy Reid, [facility], [facility], [address] [place] [address]    Progress Note    12/2/2021    12:39 PM    Name:   Colette Hilliard  MRN:     [id number]     Acct:      [de-identified]   Room:   2016/2016-01  IP Day:  4  Admit Date:  11/28/2021  1:56 PM    PCP:   Dennison Aschoff, APRN - CNP  Code Status:  Full Code    Subjective:     C/C:   Chief Complaint   Patient presents with    Dizziness    Fatigue     Interval History Status: not changed. Patient was seen and evaluated this morning. Seated in the bedside chair. Patient states that he feels his withdrawal symptoms are subsiding. He continues to feel weak, but better than yesterday. He did eat some breakfast this morning. He is denying any nausea, vomiting, diarrhea. He has no new complaints. Notably, he is now requiring supplemental oxygen at 3 L/min. Brief History:     62year-old male with history of myeloproliferative neoplasm (JAK2 mutation, on hydroxyurea), alcohol dependence, hypothyroidism. He presented to the ED complaining of feeling off balance and weak.  ED work-up significant for the following:    Vitals have been stable  Chest x-ray shows chronic elevation of right hemidiaphragm with adjacent obesity that is likely atelectasis. CT of the abdomen and pelvis is concerning for underlying enteritis or diarrheal illness related to Increased fluid distention involving the bowel loops. hyponatremic at 126  Initial lactic was 5.0 repeat lactic was 2.8  Calcium 8.4, ionized calcium 1.05  Procalcitonin 0.55  ALT 63,  bilirubin 1.35, lipase 29. Albumin 3.3  WBCs 24.1  Blood cultures x2 pending  COVID-19 swab negative   UA unremarkable    Pt was started on vancomycin and zosyn per sepsis protocol. He was fluid resuscitated per sepsis protocol. CIWA scale initiated. Admitted to medicine for further care. Review of Systems:     Constitutional:  negative for chills, fevers. + sweats  Respiratory:  negative for cough, dyspnea on exertion, wheezing. Positive for shortness of breath. Cardiovascular:  negative for chest pain, chest pressure/discomfort, palpitations. + lower extremity edema  Gastrointestinal:  negative for abdominal pain, constipation, diarrhea, nausea, vomiting  Neurological:  negative for dizziness, headache    Medications:      Allergies:  No Known Allergies    Current Meds:   Scheduled Meds:    potassium chloride  40 mEq Oral every 2 hours    magnesium sulfate  2,000 mg IntraVENous Once    chlordiazePOXIDE  5 mg Oral TID    thiamine  100 mg Oral Daily    folic acid  1 mg Oral Daily    furosemide  20 mg IntraVENous Once    midodrine  2.5 mg Oral TID WC    sodium chloride flush  5-40 mL IntraVENous 2 times per day    nicotine  1 patch TransDERmal Daily    hydroxyurea  500 mg Oral BID    levothyroxine  50 mcg Oral Daily    [Held by provider] lisinopril  10 mg Oral Daily    pantoprazole  40 mg Oral QAM AC    prazosin  1 mg Oral BID    QUEtiapine  400 mg Oral Nightly    sertraline  100 mg Oral Daily    sodium chloride flush  5-40 mL IntraVENous 2 times per day    enoxaparin  40 mg SubCUTAneous Daily    sodium chloride flush  5-40 mL IntraVENous 2 times per day    calcium carbonate  1,000 mg Oral BID     Continuous Infusions:    sodium chloride      sodium chloride      sodium chloride 25 mL (11/30/21 0102)     PRN Meds: ondansetron, potassium chloride **OR** potassium alternative oral replacement **OR** potassium chloride, magnesium sulfate, sodium chloride flush, sodium chloride, LORazepam **OR** LORazepam **OR** LORazepam **OR** LORazepam **OR** LORazepam **OR** LORazepam **OR** LORazepam **OR** LORazepam, hydrOXYzine, sodium chloride flush, sodium chloride, acetaminophen **OR** acetaminophen, sodium chloride flush, sodium chloride, LORazepam **OR** LORazepam **OR** LORazepam **OR** LORazepam **OR** LORazepam **OR** LORazepam **OR** LORazepam **OR** LORazepam    Data:     Past Medical History:   has a past medical history of Alcohol abuse, Anemia, Cancer ([facility] [address] Cervical stenosis of spine, Chronic left-sided low back pain with left-sided sciatica, Insomnia, Intentional drug overdose ([facility] [address] Left arm numbness, Left lumbar radiculitis, Macrocytosis, Major depressive disorder, recurrent severe without psychotic features ([address] Myeloproliferative disorder ([facility] [address] Osteoarthritis of spine with radiculopathy, lumbar region, Severe recurrent major depression without psychotic features ([facility] [address] and Spondylosis of lumbar region without myelopathy or radiculopathy. Social History:   reports that he has been smoking cigarettes. He started smoking about 43 years ago. He has a 64.50 pack-year smoking history. He has never used smokeless tobacco. He reports current alcohol use of about 14.0 standard drinks of alcohol per week. He reports previous drug use. Drug: Marijuana Champ Cue).      Family History:   Family History   Problem Relation Age of Onset   [facility] Breast Cancer Mother         s/p surgical complications    Diabetes Mother     Heart Failure Father     Other Brother         \"bad back\" Vitals:  /72   Pulse 80   Temp 98 °F (36.7 °C)   Resp 25   Ht 6' 1\" (1.854 m)   Wt 206 lb (93.4 kg)   SpO2 97%   BMI 27.18 kg/m²   Temp (24hrs), Av.4 °F (36.9 °C), Min:98 °F (36.7 °C), Max:98.8 °F (37.1 °C)    No results for input(s): POCGLU in the last 72 hours. I/O (24Hr): Intake/Output Summary (Last 24 hours) at 2021 1239  Last data filed at 2021 1727  Gross per 24 hour   Intake 2861 ml   Output --   Net 2861 ml       Labs:  Hematology:  Recent Labs     21  0343 21  0556 21  0704   WBC 15.7* 16.8* 16.7*   RBC 3.23* 3.31* 3.23*   HGB 9.7* 10.0* 9.7*   HCT 29.8* 30.9* 29.6*   MCV 92.3 93.4 91.6   MCH 30.0 30.2 30.0   MCHC 32.6 32.4 32.8   RDW 15.6* 15.9* 16.1*    472* 530*   MPV 9.8 9.7 9.4     Chemistry:  Recent Labs     21  0343 11/30/21  1042 21  1919 21  0704   * 135  --  136   K 3.0*  --  3.9 3.0*     --   --  105   CO2 17*  --   --  19*   GLUCOSE 91  --   --  103*   BUN 8  --   --  4*   CREATININE 0.66*  --   --  0.58*   MG 1.8  --   --  1.7   ANIONGAP 11  --   --  12   LABGLOM >60  --   --  >60   GFRAA >60  --   --  >60   CALCIUM 7.8*  --   --  7.9*     Recent Labs     21   PROT 4.9*   LABALBU 2.3*   AST 62*   ALT 35   ALKPHOS 193*   BILITOT 0.92     ABG:No results found for: POCPH, PHART, PH, POCPCO2, JQC0WBE, PCO2, POCPO2, PO2ART, PO2, POCHCO3, WKD3FCE, HCO3, NBEA, PBEA, BEART, BE, THGBART, THB, AMB8LGK, HLJR5YJW, E2GYMRNB, O2SAT, FIO2  Lab Results   Component Value Date/Time    SPECIAL LT HAND 6ML 2021 03:46 AM     Lab Results   Component Value Date/Time    CULTURE NO GROWTH 2 DAYS 2021 03:46 AM       Radiology:  CT ABDOMEN PELVIS W IV CONTRAST Additional Contrast? None    Result Date: 2021  Scattered areas nonspecific air-fluid involving bowel loops could suggest underlying enteritis or diarrheal illness. No bowel obstruction. Colonic diverticulosis noted.      XR CHEST PORTABLE    Result Date: 11/28/2021  Chronic elevation of the right hemidiaphragm with adjacent opacity, likely atelectasis. Physical Examination:        General appearance: Alert, cooperative and no distress  Mental Status:  oriented to person, place and time and normal affect  Lungs:  clear to auscultation bilaterally, normal effort  Heart:  regular rate and rhythm, no murmur  Abdomen:  soft, nontender, nondistended, normal bowel sounds, no masses, hepatomegaly, splenomegaly  Extremities:  no edema, redness, tenderness in the calves  Skin:  no gross lesions, rashes, induration    Assessment:        Hospital Problems           Last Modified POA    * (Principal) Septicemia ([facility] [address] 11/28/2021 Yes    Alcohol abuse 11/28/2021 Yes    Major depressive disorder, recurrent severe without psychotic features ([facility] [address] (Chronic) 11/28/2021 Yes    Substance abuse ([facility] [address] (Chronic) 11/28/2021 Yes    Hyponatremia 11/28/2021 Yes    Hypothyroidism (Chronic) 11/28/2021 Yes    Transaminitis 11/28/2021 Yes    Hypocalcemia 11/29/2021 Yes          Plan:        #Sepsis ruled out. We will monitor off antibiotics. Non-specific changes seen on abdominal CT. Pt exam is rather benign. Blood cultures with no growth to date. #Acute alcohol withdrawal  - IV thiamine, folate, vitamin K  - [facility] protocol  -Wean Librium. Reduce to 5mg 3 times daily. - social work consult    #Transaminitis/elevated PT/INR  - suspect underlying alcoholic liver disease. CT reviewed and revealing fatty infiltration of the liver. Hepatic ultrasound revealing hepatomegaly and hepatic steatosis with borderline cirrhotic configuration. #Pulmonary edema secondary to fluid overload  -IV Lasix x1. Reviewed chest x-ray suggesting interstitial edema and small pleural effusion. #Hypothyroidism  - pt admittedly not compliant with oral meds. Will resume levothyroxine here. Repeat TSH in 4-6 weeks.      #Myeloproliferative neoplasm  - resume hydroxyurea    #Leukocytosis  -Suspicion for infection is low. Likely reactive given alcohol withdrawal in conjunction with known myeloproliferative neoplasm. We will continue to monitor off antibiotics. #Lovenox DVT prophylaxis  #PT/OT  Disposition: Likely will need skilled nursing facility pending PT and OT assessment.     Cee Cisse PA-C  12/2/2021  12:39 PM

## 2021-12-02 NOTE — CARE COORDINATION
Transitional planning-Shar GRIFFIN asked this RN to talk with patient about SNF-list given-he said it is up in the air right now.

## 2021-12-02 NOTE — PLAN OF CARE
Problem: Nutrition  Goal: Optimal nutrition therapy  Outcome: Ongoing     Problem: Falls - Risk of:  Goal: Will remain free from falls  Description: Will remain free from falls  12/2/2021 1735 by Anjum Kwon RN  Outcome: Ongoing  12/2/2021 0500 by Luciano Paul RN  Outcome: Met This Shift  Goal: Absence of physical injury  Description: Absence of physical injury  Outcome: Ongoing     Problem: Skin Integrity:  Goal: Will show no infection signs and symptoms  Description: Will show no infection signs and symptoms  12/2/2021 1735 by Anjum Kwon RN  Outcome: Ongoing  12/2/2021 0500 by Luciano Paul RN  Outcome: Met This Shift  Goal: Absence of new skin breakdown  Description: Absence of new skin breakdown  Outcome: Ongoing     Problem: Pain:  Description: Pain management should include both nonpharmacologic and pharmacologic interventions.   Goal: Pain level will decrease  Description: Pain level will decrease  12/2/2021 1735 by Anjum Kwon RN  Outcome: Ongoing  12/2/2021 0500 by Luciano Paul RN  Outcome: Met This Shift  Goal: Control of acute pain  Description: Control of acute pain  Outcome: Ongoing  Goal: Control of chronic pain  Description: Control of chronic pain  Outcome: Ongoing

## 2021-12-02 NOTE — PLAN OF CARE
Problem: Falls - Risk of:  Goal: Will remain free from falls  12/2/2021 0500 by Nael Noonan RN  Outcome: Met This Shift  12/1/2021 1730 by Paige Torres RN  Outcome: Ongoing     Problem: Skin Integrity:  Goal: Will show no infection signs and symptoms  12/2/2021 0500 by Nael Noonan RN  Outcome: Met This Shift  12/1/2021 1730 by Paige Torres RN  Outcome: Ongoing     Problem: Pain:  Description: Pain management should include both nonpharmacologic and pharmacologic interventions.   Goal: Pain level will decrease  12/2/2021 0500 by Nael Noonan RN  Outcome: Met This Shift  12/1/2021 1730 by Paige Torres RN  Outcome: Ongoing

## 2021-12-02 NOTE — PROGRESS NOTES
colon, diagnostic; cervical fusion (09/05/2019); and cervical fusion (N/A, 9/5/2019). Restrictions  Restrictions/Precautions  Restrictions/Precautions: Seizure, General Precautions, Fall Risk, Isolation, Up as Tolerated  Required Braces or Orthoses?: No  Position Activity Restriction  Other position/activity restrictions: Up w/assist, O2  Subjective   General  Patient assessed for rehabilitation services?: Yes  Family / Caregiver Present: No  General Comment  Comments: RN alexa'gustavo OT tx. Pt agreeable and participatory throughout. Pain Assessment  Response to Pain Intervention: Patient Satisfied  Vital Signs  Patient Currently in Pain: Denies   Orientation  Orientation  Overall Orientation Status: Impaired  Orientation Level: Oriented to place; Oriented to time; Oriented to situation; Oriented to person  Objective    ADL  Feeding: Stand by assistance; Setup; Verbal cueing; Increased time to complete (able to open container and feed self)  Grooming: Stand by assistance; Setup; Verbal cueing; Increased time to complete (wash face and hair, rinse mouth w/mouthwash)  UE Bathing: Minimal assistance; Setup; Verbal cueing; Increased time to complete (assist to wash back)  LE Bathing: Minimal assistance; Setup; Verbal cueing; Increased time to complete (assist to wash feet)  UE Dressing: Minimal assistance; Setup; Verbal cueing; Increased time to complete (assist to snap, thread arms and tie gown)  LE Dressing: Maximum assistance; Setup; Verbal cueing; Increased time to complete (assist to doff/don footies)  Toileting: Stand by assistance; Setup; Increased time to complete; (incont of urine in bed, able to complete bebe care after BM, brief placed under pt, urinal at chair side)      Pt in bed upon arrival. Sup to sit to EOB w/min assist and vc's. STS w/SW and vc's for hand placement w/min assist. Transferred to [facility] next to bed for toileting and bebe care while seated.  Transferred to recliner and setup at tray table for

## 2021-12-02 NOTE — CARE COORDINATION
Met with pt to follow up from previous visit. Encouraged pt to go to a SNF until he is strong enough to go to treatment for alcohol. Pt stated that he will consider going to a SNF.

## 2021-12-03 ENCOUNTER — APPOINTMENT (OUTPATIENT)
Dept: CT IMAGING | Age: 61
DRG: 896 | End: 2021-12-03
Payer: MEDICARE

## 2021-12-03 PROBLEM — E44.0 MODERATE PROTEIN-CALORIE MALNUTRITION (HCC): Status: ACTIVE | Noted: 2021-12-03

## 2021-12-03 PROBLEM — J96.01 ACUTE RESPIRATORY FAILURE WITH HYPOXIA (HCC): Status: ACTIVE | Noted: 2021-12-03

## 2021-12-03 PROBLEM — E87.6 HYPOKALEMIA: Status: ACTIVE | Noted: 2021-12-03

## 2021-12-03 LAB
ABSOLUTE EOS #: 0.3 K/UL (ref 0–0.44)
ABSOLUTE IMMATURE GRANULOCYTE: 0.75 K/UL (ref 0–0.3)
ABSOLUTE LYMPH #: 1.8 K/UL (ref 1.1–3.7)
ABSOLUTE MONO #: 1.5 K/UL (ref 0.1–1.2)
ANION GAP SERPL CALCULATED.3IONS-SCNC: 11 MMOL/L (ref 9–17)
BASOPHILS # BLD: 0 % (ref 0–2)
BASOPHILS ABSOLUTE: 0 K/UL (ref 0–0.2)
BUN BLDV-MCNC: 3 MG/DL (ref 8–23)
BUN/CREAT BLD: ABNORMAL (ref 9–20)
CALCIUM SERPL-MCNC: 7.9 MG/DL (ref 8.6–10.4)
CHLORIDE BLD-SCNC: 109 MMOL/L (ref 98–107)
CO2: 20 MMOL/L (ref 20–31)
CREAT SERPL-MCNC: 0.6 MG/DL (ref 0.7–1.2)
CULTURE: NORMAL
DIFFERENTIAL TYPE: ABNORMAL
EOSINOPHILS RELATIVE PERCENT: 2 % (ref 1–4)
GFR AFRICAN AMERICAN: >60 ML/MIN
GFR NON-AFRICAN AMERICAN: >60 ML/MIN
GFR SERPL CREATININE-BSD FRML MDRD: ABNORMAL ML/MIN/{1.73_M2}
GFR SERPL CREATININE-BSD FRML MDRD: ABNORMAL ML/MIN/{1.73_M2}
GLUCOSE BLD-MCNC: 104 MG/DL (ref 70–99)
HCT VFR BLD CALC: 29.6 % (ref 40.7–50.3)
HEMOGLOBIN: 9.7 G/DL (ref 13–17)
IMMATURE GRANULOCYTES: 5 %
LACTOFERRIN, QUAL: NORMAL
LYMPHOCYTES # BLD: 12 % (ref 24–43)
Lab: NORMAL
Lab: NORMAL
MAGNESIUM: 2 MG/DL (ref 1.6–2.6)
MCH RBC QN AUTO: 30.7 PG (ref 25.2–33.5)
MCHC RBC AUTO-ENTMCNC: 32.8 G/DL (ref 28.4–34.8)
MCV RBC AUTO: 93.7 FL (ref 82.6–102.9)
MICRO OVA & PARASITES: NORMAL
MONOCYTES # BLD: 10 % (ref 3–12)
MORPHOLOGY: ABNORMAL
NRBC AUTOMATED: 0 PER 100 WBC
PDW BLD-RTO: 17 % (ref 11.8–14.4)
PLATELET # BLD: 559 K/UL (ref 138–453)
PLATELET ESTIMATE: ABNORMAL
PMV BLD AUTO: 9.5 FL (ref 8.1–13.5)
POTASSIUM SERPL-SCNC: 2.9 MMOL/L (ref 3.7–5.3)
RBC # BLD: 3.16 M/UL (ref 4.21–5.77)
RBC # BLD: ABNORMAL 10*6/UL
SEG NEUTROPHILS: 71 % (ref 36–65)
SEGMENTED NEUTROPHILS ABSOLUTE COUNT: 10.65 K/UL (ref 1.5–8.1)
SODIUM BLD-SCNC: 140 MMOL/L (ref 135–144)
SPECIMEN DESCRIPTION: NORMAL
SPECIMEN DESCRIPTION: NORMAL
WBC # BLD: 15 K/UL (ref 3.5–11.3)
WBC # BLD: ABNORMAL 10*3/UL

## 2021-12-03 PROCEDURE — 2060000000 HC ICU INTERMEDIATE R&B

## 2021-12-03 PROCEDURE — 87210 SMEAR WET MOUNT SALINE/INK: CPT

## 2021-12-03 PROCEDURE — 2580000003 HC RX 258: Performed by: NURSE PRACTITIONER

## 2021-12-03 PROCEDURE — 86738 MYCOPLASMA ANTIBODY: CPT

## 2021-12-03 PROCEDURE — 87329 GIARDIA AG IA: CPT

## 2021-12-03 PROCEDURE — 87209 SMEAR COMPLEX STAIN: CPT

## 2021-12-03 PROCEDURE — 71260 CT THORAX DX C+: CPT

## 2021-12-03 PROCEDURE — 6370000000 HC RX 637 (ALT 250 FOR IP): Performed by: STUDENT IN AN ORGANIZED HEALTH CARE EDUCATION/TRAINING PROGRAM

## 2021-12-03 PROCEDURE — 2500000003 HC RX 250 WO HCPCS: Performed by: NURSE PRACTITIONER

## 2021-12-03 PROCEDURE — 2700000000 HC OXYGEN THERAPY PER DAY

## 2021-12-03 PROCEDURE — 87015 SPECIMEN INFECT AGNT CONCNTJ: CPT

## 2021-12-03 PROCEDURE — 83735 ASSAY OF MAGNESIUM: CPT

## 2021-12-03 PROCEDURE — 6360000002 HC RX W HCPCS: Performed by: NURSE PRACTITIONER

## 2021-12-03 PROCEDURE — 6370000000 HC RX 637 (ALT 250 FOR IP): Performed by: PHYSICIAN ASSISTANT

## 2021-12-03 PROCEDURE — 83630 LACTOFERRIN FECAL (QUAL): CPT

## 2021-12-03 PROCEDURE — 36415 COLL VENOUS BLD VENIPUNCTURE: CPT

## 2021-12-03 PROCEDURE — 94640 AIRWAY INHALATION TREATMENT: CPT

## 2021-12-03 PROCEDURE — 85025 COMPLETE CBC W/AUTO DIFF WBC: CPT

## 2021-12-03 PROCEDURE — 6360000002 HC RX W HCPCS: Performed by: STUDENT IN AN ORGANIZED HEALTH CARE EDUCATION/TRAINING PROGRAM

## 2021-12-03 PROCEDURE — 87328 CRYPTOSPORIDIUM AG IA: CPT

## 2021-12-03 PROCEDURE — 80048 BASIC METABOLIC PNL TOTAL CA: CPT

## 2021-12-03 PROCEDURE — 6370000000 HC RX 637 (ALT 250 FOR IP): Performed by: NURSE PRACTITIONER

## 2021-12-03 PROCEDURE — 6360000004 HC RX CONTRAST MEDICATION: Performed by: NURSE PRACTITIONER

## 2021-12-03 PROCEDURE — 99233 SBSQ HOSP IP/OBS HIGH 50: CPT | Performed by: STUDENT IN AN ORGANIZED HEALTH CARE EDUCATION/TRAINING PROGRAM

## 2021-12-03 PROCEDURE — 94761 N-INVAS EAR/PLS OXIMETRY MLT: CPT

## 2021-12-03 PROCEDURE — 87506 IADNA-DNA/RNA PROBE TQ 6-11: CPT

## 2021-12-03 RX ORDER — IPRATROPIUM BROMIDE AND ALBUTEROL SULFATE 2.5; .5 MG/3ML; MG/3ML
1 SOLUTION RESPIRATORY (INHALATION) 4 TIMES DAILY
Status: DISCONTINUED | OUTPATIENT
Start: 2021-12-03 | End: 2021-12-07

## 2021-12-03 RX ORDER — CHLORDIAZEPOXIDE HYDROCHLORIDE 5 MG/1
5 CAPSULE, GELATIN COATED ORAL ONCE
Status: COMPLETED | OUTPATIENT
Start: 2021-12-03 | End: 2021-12-03

## 2021-12-03 RX ORDER — FUROSEMIDE 10 MG/ML
20 INJECTION INTRAMUSCULAR; INTRAVENOUS ONCE
Status: COMPLETED | OUTPATIENT
Start: 2021-12-03 | End: 2021-12-03

## 2021-12-03 RX ORDER — POTASSIUM CHLORIDE 20 MEQ/1
40 TABLET, EXTENDED RELEASE ORAL
Status: COMPLETED | OUTPATIENT
Start: 2021-12-03 | End: 2021-12-03

## 2021-12-03 RX ORDER — ALBUTEROL SULFATE 2.5 MG/3ML
2.5 SOLUTION RESPIRATORY (INHALATION)
Status: DISCONTINUED | OUTPATIENT
Start: 2021-12-03 | End: 2021-12-09 | Stop reason: HOSPADM

## 2021-12-03 RX ORDER — LEVOFLOXACIN 750 MG/1
750 TABLET ORAL DAILY
Status: COMPLETED | OUTPATIENT
Start: 2021-12-03 | End: 2021-12-09

## 2021-12-03 RX ADMIN — FOLIC ACID 1 MG: 1 TABLET ORAL at 08:52

## 2021-12-03 RX ADMIN — IPRATROPIUM BROMIDE AND ALBUTEROL SULFATE 1 AMPULE: .5; 2.5 SOLUTION RESPIRATORY (INHALATION) at 15:31

## 2021-12-03 RX ADMIN — HYDROXYUREA 500 MG: 500 CAPSULE ORAL at 08:53

## 2021-12-03 RX ADMIN — PRAZOSIN HYDROCHLORIDE 1 MG: 1 CAPSULE ORAL at 22:02

## 2021-12-03 RX ADMIN — ANTI-FUNGAL POWDER MICONAZOLE NITRATE TALC FREE: 1.42 POWDER TOPICAL at 22:00

## 2021-12-03 RX ADMIN — LEVOFLOXACIN 750 MG: 750 TABLET, FILM COATED ORAL at 16:31

## 2021-12-03 RX ADMIN — PRAZOSIN HYDROCHLORIDE 1 MG: 1 CAPSULE ORAL at 08:52

## 2021-12-03 RX ADMIN — ENOXAPARIN SODIUM 40 MG: 100 INJECTION SUBCUTANEOUS at 08:52

## 2021-12-03 RX ADMIN — IOPAMIDOL 85 ML: 755 INJECTION, SOLUTION INTRAVENOUS at 13:59

## 2021-12-03 RX ADMIN — FUROSEMIDE 20 MG: 10 INJECTION, SOLUTION INTRAMUSCULAR; INTRAVENOUS at 16:32

## 2021-12-03 RX ADMIN — SODIUM CHLORIDE, PRESERVATIVE FREE 10 ML: 5 INJECTION INTRAVENOUS at 09:40

## 2021-12-03 RX ADMIN — Medication 10 ML: at 08:54

## 2021-12-03 RX ADMIN — LEVOTHYROXINE SODIUM 50 MCG: 50 TABLET ORAL at 08:52

## 2021-12-03 RX ADMIN — ANTACID TABLETS 1000 MG: 500 TABLET, CHEWABLE ORAL at 08:52

## 2021-12-03 RX ADMIN — IPRATROPIUM BROMIDE AND ALBUTEROL SULFATE 1 AMPULE: .5; 2.5 SOLUTION RESPIRATORY (INHALATION) at 21:13

## 2021-12-03 RX ADMIN — QUETIAPINE FUMARATE 400 MG: 200 TABLET ORAL at 22:02

## 2021-12-03 RX ADMIN — MIDODRINE HYDROCHLORIDE 2.5 MG: 2.5 TABLET ORAL at 08:51

## 2021-12-03 RX ADMIN — POTASSIUM CHLORIDE 40 MEQ: 1500 TABLET, EXTENDED RELEASE ORAL at 12:30

## 2021-12-03 RX ADMIN — Medication 100 MG: at 08:52

## 2021-12-03 RX ADMIN — MIDODRINE HYDROCHLORIDE 2.5 MG: 2.5 TABLET ORAL at 16:36

## 2021-12-03 RX ADMIN — Medication 10 ML: at 22:02

## 2021-12-03 RX ADMIN — ANTACID TABLETS 1000 MG: 500 TABLET, CHEWABLE ORAL at 22:02

## 2021-12-03 RX ADMIN — ANTI-FUNGAL POWDER MICONAZOLE NITRATE TALC FREE: 1.42 POWDER TOPICAL at 08:55

## 2021-12-03 RX ADMIN — POTASSIUM CHLORIDE 40 MEQ: 1500 TABLET, EXTENDED RELEASE ORAL at 08:51

## 2021-12-03 RX ADMIN — HYDROXYUREA 500 MG: 500 CAPSULE ORAL at 22:02

## 2021-12-03 RX ADMIN — SERTRALINE 100 MG: 50 TABLET, FILM COATED ORAL at 08:51

## 2021-12-03 RX ADMIN — POTASSIUM CHLORIDE 40 MEQ: 1500 TABLET, EXTENDED RELEASE ORAL at 11:49

## 2021-12-03 RX ADMIN — POTASSIUM CHLORIDE 40 MEQ: 1500 TABLET, EXTENDED RELEASE ORAL at 16:32

## 2021-12-03 RX ADMIN — CHLORDIAZEPOXIDE HYDROCHLORIDE 5 MG: 5 CAPSULE ORAL at 08:51

## 2021-12-03 RX ADMIN — PANTOPRAZOLE SODIUM 40 MG: 40 TABLET, DELAYED RELEASE ORAL at 08:52

## 2021-12-03 NOTE — PROGRESS NOTES
Occupational [address]  Occupational Therapy Not Seen Note    DATE: 12/3/2021    NAME: Araceli Doty  MRN: [id number]   : 1960      Patient not seen this date for Occupational Therapy due to:    Pt.eating a late lunch, will check back if time allows.   Electronically signed by KHARI Madera on 12/3/2021 at 2:45 PM

## 2021-12-03 NOTE — PLAN OF CARE
Problem: Nutrition  Goal: Optimal nutrition therapy  12/2/2021 1735 by Nicolasa Romberg, RN  Outcome: Ongoing     Problem: Falls - Risk of:  Goal: Will remain free from falls  Description: Will remain free from falls  12/2/2021 1924 by Héctor Yin RN  Outcome: Ongoing  12/2/2021 1735 by Nicolasa Romberg, RN  Outcome: Ongoing  Goal: Absence of physical injury  Description: Absence of physical injury  12/2/2021 1924 by Héctor Yin RN  Outcome: Ongoing  12/2/2021 1735 by Nicolasa Romberg, RN  Outcome: Ongoing     Problem: Skin Integrity:  Goal: Will show no infection signs and symptoms  Description: Will show no infection signs and symptoms  12/2/2021 1924 by Héctor Yin RN  Outcome: Ongoing  12/2/2021 1735 by Nicolasa Romberg, RN  Outcome: Ongoing  Goal: Absence of new skin breakdown  Description: Absence of new skin breakdown  12/2/2021 1924 by Héctor Yin RN  Outcome: Ongoing  12/2/2021 1735 by Nicolasa Romberg, RN  Outcome: Ongoing     Problem: Pain:  Goal: Pain level will decrease  Description: Pain level will decrease  12/2/2021 1735 by Nicolasa Romberg, RN  Outcome: Ongoing  Goal: Control of acute pain  Description: Control of acute pain  12/2/2021 1735 by Nicolasa Romberg, RN  Outcome: Ongoing  Goal: Control of chronic pain  Description: Control of chronic pain  12/2/2021 1735 by Nicolasa Romberg, RN  Outcome: Ongoing

## 2021-12-03 NOTE — PROGRESS NOTES
home oxygen. Patient states that he does not have an at home BiPAP or CPAP machine. Patient states that he does not feel shortness of breath but, feels that a little difficulty breathing.       RR 22  Breath Sounds: diminished      Bronchodilator assessment at level  3    [x]    Bronchodilator Assessment  BRONCHODILATOR ASSESSMENT SCORE  Score 0 1 2 3 4 5   Breath Sounds   []  Patient Baseline []  No Wheeze good aeration []  Faint, scattered wheezing, good aeration [x]  Expiratory Wheezing and or moderately diminished []  Insp/Exp wheeze and/or very diminished []  Insp/Exp and/ or marked distress   Respiratory Rate   []  Patient Baseline []  Less than 20 []  Less than 20 [x]  20-25 []  Greater than 25 []  Greater than 25   Peak flow % of Pred or PB [x]  NA   []  Greater than 90%  []  81-90% []  71-80% []  Less than or equal to 70%  or unable to perform []  Unable due to Respiratory Distress   Dyspnea re []  Patient Baseline [x]  No SOB [x]  No SOB []  SOB on exertion []  SOB min activity []  At rest/acute   e FEV% Predicted       [x]  NA []  Above 69%  []  Unable []  Above 60-69%  []  Unable []  Above 50-59%  []  Unable []  Above 35-49%  []  Unable []  Less than 35%  []  Unable          Nereyda Gamez RCP  1:08 PM

## 2021-12-03 NOTE — PROGRESS NOTES
Physical Therapy        Physical Therapy Cancel Note      DATE: 12/3/2021    NAME: Alla Medellin  MRN: [id number]   : 1960      Patient not seen this date for Physical Therapy due to: Other: CT. Will check on pt tomorrow.       Electronically signed by Amada Hart PTA on 12/3/2021 at 1:42 PM

## 2021-12-03 NOTE — PROGRESS NOTES
Comprehensive Nutrition Assessment    Type and Reason for Visit:  Reassess    Nutrition Recommendations/Plan:   - Continue Regular Diet as tolerated  - Continue diabetic oral nutrition supplement (Glucerna) 3x/d as tolerated  - Continue fortified pudding (Boost Pudding) 3x/d as tolerated  - Monitor/encourage PO intakes     Nutrition Assessment:  Attempted to speak with patient at time of visit, but he did not wake to writer's voice. Per EHR, patient's PO intake has varied from 0%-100%. Observed two unopened Ensure Clear supplements at patient's bed side. Multiple episodes of diarrhea noted today. Labs reviewed include hypokalemia. Malnutrition Assessment:  Malnutrition Status: At risk for malnutrition     Context:  Chronic Illness     Findings of the 6 clinical characteristics of malnutrition:  Energy Intake:  Mild decrease in energy intake   Weight Loss:  No significant weight loss     Body Fat Loss:  No significant body fat loss     Muscle Mass Loss:  No significant muscle mass loss    Fluid Accumulation:  1 - Mild- Extremities   Strength:  Not Performed    Estimated Daily Nutrient Needs:  Energy (kcal):  22-25 kcal/kg = 2288-3252 kcals/day; Weight Used for Energy Requirements:  Admission     Protein (g):  1.1-1.3 gm/kg =  gm pro/day; Weight Used for Protein Requirements:  Ideal        Fluid (ml/day):  30 mL/kg = 2700 mL/day or per MD; Method Used for Fluid Requirements:  ml/Kg      Nutrition Related Findings:  Labs: K 2.9 (L). Meds: Folic Acid, Synthroid, Klor-Con, Thiamine. Last BM 12/3 (diarrhea). +1 non-pitting/+2 non-pitting edema. Wounds:  None       Current Nutrition Therapies:    ADULT DIET;  Regular  ADULT ORAL NUTRITION SUPPLEMENT; Breakfast, Lunch, Dinner; Diabetic Oral Supplement  ADULT ORAL NUTRITION SUPPLEMENT; Breakfast, Lunch, Dinner; Fortified Pudding Oral Supplement    Anthropometric Measures:  · Height: 6' 1\" (185.4 cm)  · Current Body Weight: 207 lb 1.6 oz (93.9 kg) · Admission Body Weight: 200 lb (90.7 kg)    · Usual Body Weight: 211 lb 13.8 oz (96.1 kg) (6/23/2021)     · Ideal Body Weight: 184 lbs; % Ideal Body Weight 112.6 %   · BMI: 27.3  · BMI Categories: Overweight (BMI 25.0-29. 9)       Nutrition Diagnosis:   · Inadequate oral intake related to loss of appetite as evidenced by intake 0-25%, intake 26-50%, intake 51-75%    Nutrition Interventions:   Food and/or Nutrient Delivery:  Continue Current Diet, Continue Oral Nutrition Supplement  Nutrition Education/Counseling:  No recommendation at this time   Coordination of Nutrition Care:  Continue to monitor while inpatient    Goals:  Oral intakes to meet % of estimated nutrition needs. Nutrition Monitoring and Evaluation:   Behavioral-Environmental Outcomes:  None Identified   Food/Nutrient Intake Outcomes:  Food and Nutrient Intake, Supplement Intake  Physical Signs/Symptoms Outcomes:  Biochemical Data, GI Status, Diarrhea, Fluid Status or Edema, Nutrition Focused Physical Findings, Skin, Weight     Discharge Planning:     Too soon to determine     Electronically signed by Mary Heath RD, LD on 12/3/21 at 1:16 PM EST    Contact: [phone]

## 2021-12-03 NOTE — PROGRESS NOTES
[facility]  Office: [address], DO, Jason Barreto, DO, Otilioradhames Liu, DO, Janiceclem Cook Blood, DO, Tarah Deng MD, Tawanda Alas MD, Renate Warner MD, Lesley Stiles MD, Radha West MD, Meng Bejarano MD, Roland Ibarra MD, Lexie Flowers, DO, Frank Simmonds, DO, Rancho Shepherd MD,  Nena Murray DO, Ronda Ball MD, Delroy Lynn MD, Estrellita Mcneil MD, Tonia Mendoza MD, Marciano Bernheim, MD, Kamran Brown MD, Walter Carcamo MD, Gloria Hinton, [facility], [facility], [facility], Susie Nolasco, CNP, Rome Kim, CNS, Migel Dsouza, CNP, Merced Kaiser, CNP, Eugenia Roberts, CNP, Zari Garcia, CNP, Uche Reyes, CNP, Lauren Brand PA-C, Deena Dyer, [facility], [facility], CNP, Woodward Brittle, CNP, Kennedi Olivo, CNP, Fernanda Snyder, CNP, Perez Fenton, CNP, Mounika Berkowitz, [facility], Neri Robesonia, [address] [place] [address]    Progress Note    12/3/2021    1:48 PM    Name:   Edilia Dacosta  MRN:     [id number]     [place]:      [de-identified]   Room:   2016/2016-01  IP Day:  5  Admit Date:  11/28/2021  1:56 PM    PCP:   SIMONE Rios CNP  Code Status:  Full Code    Subjective:     C/C:   Chief Complaint   Patient presents with    Dizziness    Fatigue     Interval History Status: worsened. Patient noted to be hypoxic at bedside, patient was on 4 to 5 L of nasal oxygen, saturating around 88%  As per the patient he was not eating drinking well at home and also been very weak with physical therapy to move around  Appetite is slowly increasing  Brief History:     60-year-old male with known medical history of myeloproliferative disorder on hydroxyurea, documentation, alcohol dependence, hypothyroidism came to the hospital for weakness and feeling off balance. In the ER patient was noted to be leukocytotic, hypotensive, initially was concern for sepsis. Patient lactic acid was 5.   Patient was started on antibiotics initially and --  0.58* 0.60*   MG  --  1.7 2.0   ANIONGAP  --  12 11   LABGLOM  --  >60 >60   GFRAA  --  >60 >60   CALCIUM  --  7.9* 7.9*   No results for input(s): PROT, LABALBU, LABA1C, K2UBAGY, G6TKOEV, FT4, TSH, AST, ALT, LDH, GGT, ALKPHOS, LABGGT, BILITOT, BILIDIR, AMMONIA, AMYLASE, LIPASE, LACTATE, CHOL, HDL, LDLCHOLESTEROL, CHOLHDLRATIO, TRIG, VLDL, VSC36XX, PHENYTOIN, PHENYF, URICACID, POCGLU in the last 72 hours. ABG:No results found for: POCPH, PHART, PH, POCPCO2, FFK8ZLF, PCO2, POCPO2, PO2ART, PO2, POCHCO3, HSJ1FUZ, HCO3, NBEA, PBEA, BEART, BE, THGBART, THB, WYL9KZR, HLUN3GCF, C6OWJPKD, O2SAT, FIO2  Lab Results   Component Value Date/Time    SPECIAL LT HAND 6ML 11/30/2021 03:46 AM     Lab Results   Component Value Date/Time    CULTURE NO GROWTH 3 DAYS 11/30/2021 03:46 AM       Radiology:  CT ABDOMEN PELVIS W IV CONTRAST Additional Contrast? None    Result Date: 11/28/2021  Scattered areas nonspecific air-fluid involving bowel loops could suggest underlying enteritis or diarrheal illness. No bowel obstruction. Colonic diverticulosis noted. XR CHEST PORTABLE    Result Date: 12/2/2021  Interstitial pulmonary edema and trace right pleural effusion compatible with fluid overload and/or CHF. Minimal right basilar airspace disease most likely represents atelectasis. XR CHEST PORTABLE    Result Date: 11/28/2021  Chronic elevation of the right hemidiaphragm with adjacent opacity, likely atelectasis. US ABDOMEN LIMITED Specify organ? LIVER    Result Date: 11/29/2021  1. Hepatomegaly and hepatic steatosis. Borderline cirrhotic configuration. 2. Gallbladder distention without cholelithiasis or sonographic evidence of cholecystitis.        Physical Examination:        General appearance:  alert, cooperative and moderate distress  Mental Status:  oriented to person, place and time and normal affect  Lungs: Decreased breath sounds bilaterally with basal crepitations  Heart:  regular rate and rhythm, no murmur  Abdomen:  soft, nontender, nondistended, normal bowel sounds, no masses, hepatomegaly, splenomegaly  Extremities:  no edema, redness, tenderness in the calves  Skin:  no gross lesions, rashes, induration    Assessment:        Hospital Problems           Last Modified POA    * (Principal) Septicemia ([address] 11/28/2021 Yes    Alcohol abuse 11/28/2021 Yes    Major depressive disorder, recurrent severe without psychotic features ([address] (Chronic) 11/28/2021 Yes    Substance abuse ([address] (Chronic) 11/28/2021 Yes    Hyponatremia 11/28/2021 Yes    Hypothyroidism (Chronic) 11/28/2021 Yes    Transaminitis 11/28/2021 Yes    Hypocalcemia 11/29/2021 Yes    Acute respiratory failure with hypoxia ([address] 12/3/2021 Yes    Moderate protein-calorie malnutrition ([address] 12/3/2021 Yes          Plan:        Acute hypoxic respiratory failure-patient initially was on 2 to 3 L of nasal oxygen maintaining saturation around 95%, received Lasix 12/2, suddenly started having desaturation episodes, satting only 88% on 6 L of nasal cannula oxygen. Will obtain CT PE scan to rule out pulmonary embolism as patient has not been moving around at home. Repeat dose of Lasix.     Alcohol withdrawal-stop Librium after 1 dose today, continue CIWA if needed, continue thiamine and folate    Leukocytosis secondary to myeloproliferative disorder with JAK2 mutation positive-continue Hydrea    Alcohol liver disease-Ensure complete cessation of alcohol    Hypothyroidism on Synthyroid    Sepsis ruled out, discontinue antibiotics, repeat mycoplasma IgM antibody as previous test was equivocal    Replace potassium and magnesium    Encourage oral intake for malnutrition  Continue all home psych medicines  Nicotine patch for smoking    Lovenox for DVT prophylaxis  PT OT evaluation  Discharge planning    Davie Pace MD  12/3/2021  1:48 PM

## 2021-12-04 ENCOUNTER — APPOINTMENT (OUTPATIENT)
Dept: GENERAL RADIOLOGY | Age: 61
DRG: 896 | End: 2021-12-04
Payer: MEDICARE

## 2021-12-04 PROBLEM — J15.7 PNEUMONIA OF BOTH LUNGS DUE TO MYCOPLASMA PNEUMONIAE: Status: ACTIVE | Noted: 2021-12-04

## 2021-12-04 PROBLEM — J18.9 PNEUMONIA OF BOTH LUNGS DUE TO INFECTIOUS ORGANISM: Status: ACTIVE | Noted: 2021-12-04

## 2021-12-04 LAB
-: NORMAL
ABSOLUTE EOS #: 0.43 K/UL (ref 0–0.4)
ABSOLUTE IMMATURE GRANULOCYTE: 0.43 K/UL (ref 0–0.3)
ABSOLUTE LYMPH #: 2.15 K/UL (ref 1–4.8)
ABSOLUTE MONO #: 1.29 K/UL (ref 0.1–0.8)
ANION GAP SERPL CALCULATED.3IONS-SCNC: 10 MMOL/L (ref 9–17)
BASOPHILS # BLD: 1 % (ref 0–2)
BASOPHILS ABSOLUTE: 0.14 K/UL (ref 0–0.2)
BUN BLDV-MCNC: 3 MG/DL (ref 8–23)
BUN/CREAT BLD: ABNORMAL (ref 9–20)
CALCIUM SERPL-MCNC: 8.6 MG/DL (ref 8.6–10.4)
CAMPYLOBACTER PCR: NORMAL
CHLORIDE BLD-SCNC: 107 MMOL/L (ref 98–107)
CO2: 21 MMOL/L (ref 20–31)
CREAT SERPL-MCNC: 0.7 MG/DL (ref 0.7–1.2)
CULTURE: NORMAL
DIFFERENTIAL TYPE: ABNORMAL
E COLI ENTEROTOXIGENIC PCR: NORMAL
EOSINOPHILS RELATIVE PERCENT: 3 % (ref 1–4)
GFR AFRICAN AMERICAN: >60 ML/MIN
GFR NON-AFRICAN AMERICAN: >60 ML/MIN
GFR SERPL CREATININE-BSD FRML MDRD: ABNORMAL ML/MIN/{1.73_M2}
GFR SERPL CREATININE-BSD FRML MDRD: ABNORMAL ML/MIN/{1.73_M2}
GLUCOSE BLD-MCNC: 101 MG/DL (ref 70–99)
HCT VFR BLD CALC: 28.5 % (ref 40.7–50.3)
HEMOGLOBIN: 9.5 G/DL (ref 13–17)
IMMATURE GRANULOCYTES: 3 %
LYMPHOCYTES # BLD: 15 % (ref 24–44)
Lab: NORMAL
MCH RBC QN AUTO: 31.4 PG (ref 25.2–33.5)
MCHC RBC AUTO-ENTMCNC: 33.3 G/DL (ref 28.4–34.8)
MCV RBC AUTO: 94.1 FL (ref 82.6–102.9)
MONOCYTES # BLD: 9 % (ref 1–7)
MORPHOLOGY: ABNORMAL
MORPHOLOGY: ABNORMAL
NRBC AUTOMATED: 0 PER 100 WBC
PDW BLD-RTO: 17.5 % (ref 11.8–14.4)
PLATELET # BLD: 586 K/UL (ref 138–453)
PLATELET ESTIMATE: ABNORMAL
PLESIOMONAS SHIGELLOIDES PCR: NORMAL
PMV BLD AUTO: 9.7 FL (ref 8.1–13.5)
POTASSIUM SERPL-SCNC: 3.6 MMOL/L (ref 3.7–5.3)
RBC # BLD: 3.03 M/UL (ref 4.21–5.77)
RBC # BLD: ABNORMAL 10*6/UL
REASON FOR REJECTION: NORMAL
SALMONELLA PCR: NORMAL
SEG NEUTROPHILS: 69 % (ref 36–66)
SEGMENTED NEUTROPHILS ABSOLUTE COUNT: 9.86 K/UL (ref 1.8–7.7)
SHIGATOXIN GENE PCR: NORMAL
SHIGELLA SP PCR: NORMAL
SODIUM BLD-SCNC: 138 MMOL/L (ref 135–144)
SPECIMEN DESCRIPTION: NORMAL
SPECIMEN DESCRIPTION: NORMAL
VIBRIO PCR: NORMAL
WBC # BLD: 14.3 K/UL (ref 3.5–11.3)
WBC # BLD: ABNORMAL 10*3/UL
YERSINIA ENTEROCOLITICA PCR: NORMAL
ZZ NTE CLEAN UP: ORDERED TEST: NORMAL
ZZ NTE WITH NAME CLEAN UP: SPECIMEN SOURCE: NORMAL

## 2021-12-04 PROCEDURE — 85025 COMPLETE CBC W/AUTO DIFF WBC: CPT

## 2021-12-04 PROCEDURE — 97535 SELF CARE MNGMENT TRAINING: CPT

## 2021-12-04 PROCEDURE — 97530 THERAPEUTIC ACTIVITIES: CPT

## 2021-12-04 PROCEDURE — 94640 AIRWAY INHALATION TREATMENT: CPT

## 2021-12-04 PROCEDURE — 2700000000 HC OXYGEN THERAPY PER DAY

## 2021-12-04 PROCEDURE — 6360000002 HC RX W HCPCS: Performed by: NURSE PRACTITIONER

## 2021-12-04 PROCEDURE — 6370000000 HC RX 637 (ALT 250 FOR IP): Performed by: NURSE PRACTITIONER

## 2021-12-04 PROCEDURE — 99232 SBSQ HOSP IP/OBS MODERATE 35: CPT | Performed by: STUDENT IN AN ORGANIZED HEALTH CARE EDUCATION/TRAINING PROGRAM

## 2021-12-04 PROCEDURE — 6370000000 HC RX 637 (ALT 250 FOR IP): Performed by: STUDENT IN AN ORGANIZED HEALTH CARE EDUCATION/TRAINING PROGRAM

## 2021-12-04 PROCEDURE — 6370000000 HC RX 637 (ALT 250 FOR IP): Performed by: PHYSICIAN ASSISTANT

## 2021-12-04 PROCEDURE — 80048 BASIC METABOLIC PNL TOTAL CA: CPT

## 2021-12-04 PROCEDURE — 97110 THERAPEUTIC EXERCISES: CPT

## 2021-12-04 PROCEDURE — 36415 COLL VENOUS BLD VENIPUNCTURE: CPT

## 2021-12-04 PROCEDURE — 71045 X-RAY EXAM CHEST 1 VIEW: CPT

## 2021-12-04 PROCEDURE — 2580000003 HC RX 258: Performed by: NURSE PRACTITIONER

## 2021-12-04 PROCEDURE — 94761 N-INVAS EAR/PLS OXIMETRY MLT: CPT

## 2021-12-04 PROCEDURE — 2060000000 HC ICU INTERMEDIATE R&B

## 2021-12-04 RX ORDER — POTASSIUM CHLORIDE 20 MEQ/1
40 TABLET, EXTENDED RELEASE ORAL
Status: DISCONTINUED | OUTPATIENT
Start: 2021-12-05 | End: 2021-12-04

## 2021-12-04 RX ORDER — CALCIUM CARBONATE 200(500)MG
1000 TABLET,CHEWABLE ORAL 2 TIMES DAILY
Qty: 120 TABLET | Refills: 0 | Status: SHIPPED | OUTPATIENT
Start: 2021-12-04 | End: 2022-01-03

## 2021-12-04 RX ORDER — LEVOFLOXACIN 750 MG/1
750 TABLET ORAL DAILY
Qty: 5 TABLET | Refills: 0 | Status: SHIPPED | OUTPATIENT
Start: 2021-12-05 | End: 2021-12-07

## 2021-12-04 RX ORDER — LORAZEPAM 2 MG/ML
0.5 INJECTION INTRAMUSCULAR ONCE
Status: DISCONTINUED | OUTPATIENT
Start: 2021-12-04 | End: 2021-12-09 | Stop reason: HOSPADM

## 2021-12-04 RX ORDER — MIDODRINE HYDROCHLORIDE 5 MG/1
5 TABLET ORAL
Qty: 90 TABLET | Refills: 3 | Status: SHIPPED | OUTPATIENT
Start: 2021-12-04 | End: 2021-12-08 | Stop reason: HOSPADM

## 2021-12-04 RX ORDER — MIDODRINE HYDROCHLORIDE 5 MG/1
5 TABLET ORAL
Status: DISCONTINUED | OUTPATIENT
Start: 2021-12-04 | End: 2021-12-08

## 2021-12-04 RX ORDER — POTASSIUM CHLORIDE 20 MEQ/1
40 TABLET, EXTENDED RELEASE ORAL 2 TIMES DAILY WITH MEALS
Status: DISCONTINUED | OUTPATIENT
Start: 2021-12-04 | End: 2021-12-04

## 2021-12-04 RX ORDER — POTASSIUM CHLORIDE 20 MEQ/1
40 TABLET, EXTENDED RELEASE ORAL
Status: DISCONTINUED | OUTPATIENT
Start: 2021-12-04 | End: 2021-12-09 | Stop reason: HOSPADM

## 2021-12-04 RX ADMIN — ANTI-FUNGAL POWDER MICONAZOLE NITRATE TALC FREE: 1.42 POWDER TOPICAL at 21:16

## 2021-12-04 RX ADMIN — IPRATROPIUM BROMIDE AND ALBUTEROL SULFATE 1 AMPULE: .5; 2.5 SOLUTION RESPIRATORY (INHALATION) at 21:51

## 2021-12-04 RX ADMIN — ANTACID TABLETS 1000 MG: 500 TABLET, CHEWABLE ORAL at 09:19

## 2021-12-04 RX ADMIN — POTASSIUM CHLORIDE 40 MEQ: 1500 TABLET, EXTENDED RELEASE ORAL at 12:56

## 2021-12-04 RX ADMIN — PRAZOSIN HYDROCHLORIDE 1 MG: 1 CAPSULE ORAL at 21:17

## 2021-12-04 RX ADMIN — ACETAMINOPHEN 650 MG: 325 TABLET ORAL at 21:16

## 2021-12-04 RX ADMIN — ENOXAPARIN SODIUM 40 MG: 100 INJECTION SUBCUTANEOUS at 09:18

## 2021-12-04 RX ADMIN — LEVOTHYROXINE SODIUM 50 MCG: 50 TABLET ORAL at 09:18

## 2021-12-04 RX ADMIN — SERTRALINE 100 MG: 50 TABLET, FILM COATED ORAL at 09:18

## 2021-12-04 RX ADMIN — ACETAMINOPHEN 650 MG: 325 TABLET ORAL at 11:59

## 2021-12-04 RX ADMIN — HYDROXYUREA 500 MG: 500 CAPSULE ORAL at 09:18

## 2021-12-04 RX ADMIN — ANTACID TABLETS 1000 MG: 500 TABLET, CHEWABLE ORAL at 21:17

## 2021-12-04 RX ADMIN — QUETIAPINE FUMARATE 400 MG: 200 TABLET ORAL at 21:17

## 2021-12-04 RX ADMIN — ANTI-FUNGAL POWDER MICONAZOLE NITRATE TALC FREE: 1.42 POWDER TOPICAL at 09:27

## 2021-12-04 RX ADMIN — FOLIC ACID 1 MG: 1 TABLET ORAL at 09:18

## 2021-12-04 RX ADMIN — MIDODRINE HYDROCHLORIDE 5 MG: 5 TABLET ORAL at 12:00

## 2021-12-04 RX ADMIN — HYDROXYUREA 500 MG: 500 CAPSULE ORAL at 21:17

## 2021-12-04 RX ADMIN — IPRATROPIUM BROMIDE AND ALBUTEROL SULFATE 1 AMPULE: .5; 2.5 SOLUTION RESPIRATORY (INHALATION) at 08:14

## 2021-12-04 RX ADMIN — PRAZOSIN HYDROCHLORIDE 1 MG: 1 CAPSULE ORAL at 09:18

## 2021-12-04 RX ADMIN — Medication 10 ML: at 21:21

## 2021-12-04 RX ADMIN — MIDODRINE HYDROCHLORIDE 2.5 MG: 2.5 TABLET ORAL at 09:18

## 2021-12-04 RX ADMIN — LEVOFLOXACIN 750 MG: 750 TABLET, FILM COATED ORAL at 09:18

## 2021-12-04 RX ADMIN — PANTOPRAZOLE SODIUM 40 MG: 40 TABLET, DELAYED RELEASE ORAL at 09:18

## 2021-12-04 RX ADMIN — IPRATROPIUM BROMIDE AND ALBUTEROL SULFATE 1 AMPULE: .5; 2.5 SOLUTION RESPIRATORY (INHALATION) at 16:11

## 2021-12-04 RX ADMIN — Medication 100 MG: at 09:18

## 2021-12-04 ASSESSMENT — PAIN SCALES - GENERAL
PAINLEVEL_OUTOF10: 3
PAINLEVEL_OUTOF10: 3

## 2021-12-04 NOTE — PROGRESS NOTES
[facility]  Office: [address], DO, Jordy Mujica, DO, Dominique Pitts, DO, Daviddarvin Casey, DO, Delaney Ludwig MD, Paula Fox MD, Leanne Rosa MD, Simon Abernathy MD, Bryan Garcia MD, Vanessa Cordero MD, Lexi Kearney MD, Kassie Lang, DO, Rosalia Quiñonez, DO, Jake Siegel MD,  Atris Florence DO, Izabel Canela MD, Timothy Wilkins MD, Deirdre Ji MD, Jessica Matamoros MD, Cristina Navarrete MD, Eren Leon MD, Caty Garg MD, Velma Call, [facility], [facility], [facility], Niranjan Gonzáles, [facility], Carlito Leonard, CNS, Dami Brito, CNP, Mayi Alegria, CNP, Kemal Constantino, CNP, David Matthew, CNP, Demetrius Larry, CNP, Kiki Kirby PA-C, Dennise Charles, [facility], Kaley Marquez, CNP, Amrik Cornelius, CNP, Teresa Ulloa, CNP, Meghann Mcintyre, CNP, Wilda Kelly, CNP, Fabby Garcia, [facility], [facility], [address] [place] [address]    Progress Note    12/4/2021    2:33 PM    Name:   Mj Noyola  MRN:     [id number]     [place]:      [de-identified]   Room:   2016/2016-01  IP Day:  6  Admit Date:  11/28/2021  1:56 PM    PCP:   SIMONE León CNP  Code Status:  Full Code    Subjective:     C/C:   Chief Complaint   Patient presents with    Dizziness    Fatigue     Interval History Status: Slightly improved    Has been afebrile, feels really weak  States today that now he wants to go to a nursing home as he is extremely weak  No fever overnight  Continues to be on oxygen support but weaned down to 1 L  Potassium 2.6. Brief History:     52-year-old male with known medical history of myeloproliferative disorder on hydroxyurea, documentation, alcohol dependence, hypothyroidism came to the hospital for weakness and feeling off balance. In the ER patient was noted to be leukocytotic, hypotensive, initially was concern for sepsis. Patient lactic acid was 5. Patient was started on antibiotics initially and admitted.   Patient underwent alcohol withdrawals and was on Librium. Review of Systems:     Constitutional:  negative for chills, fevers, sweats, fatigue positive  Respiratory: Negative for cough, positive for shortness of breath, occasional wheezing   cardiovascular:  negative for chest pain, chest pressure/discomfort, lower extremity edema, palpitations  Gastrointestinal:  negative for abdominal pain, constipation, nausea, vomiting, 3 soft stools reported  Neurological:  negative for dizziness, headache    Medications:      Allergies:  No Known Allergies    Current Meds:   Scheduled Meds:    LORazepam  0.5 mg IntraVENous Once    potassium chloride  40 mEq Oral Daily with breakfast    midodrine  5 mg Oral TID WC    miconazole   Topical BID    ipratropium-albuterol  1 ampule Inhalation 4x daily    levoFLOXacin  750 mg Oral Daily    thiamine  100 mg Oral Daily    folic acid  1 mg Oral Daily    sodium chloride flush  5-40 mL IntraVENous 2 times per day    nicotine  1 patch TransDERmal Daily    hydroxyurea  500 mg Oral BID    levothyroxine  50 mcg Oral Daily    pantoprazole  40 mg Oral QAM AC    prazosin  1 mg Oral BID    QUEtiapine  400 mg Oral Nightly    sertraline  100 mg Oral Daily    sodium chloride flush  5-40 mL IntraVENous 2 times per day    enoxaparin  40 mg SubCUTAneous Daily    sodium chloride flush  5-40 mL IntraVENous 2 times per day    calcium carbonate  1,000 mg Oral BID     Continuous Infusions:    sodium chloride      sodium chloride      sodium chloride 25 mL (11/30/21 0102)     PRN Meds: albuterol, ondansetron, potassium chloride **OR** potassium alternative oral replacement **OR** potassium chloride, magnesium sulfate, sodium chloride flush, sodium chloride, hydrOXYzine, sodium chloride flush, sodium chloride, acetaminophen **OR** acetaminophen, sodium chloride flush, sodium chloride    Data:     Past Medical History:   has a past medical history of Alcohol abuse, Anemia, [address] Cervical stenosis of spine, Chronic left-sided low back pain with left-sided sciatica, Insomnia, Intentional drug overdose ([facility] Left arm numbness, Left lumbar radiculitis, Macrocytosis, Major depressive disorder, recurrent severe without psychotic features ([address] Myeloproliferative disorder ([facility] [address] Osteoarthritis of spine with radiculopathy, lumbar region, Severe recurrent major depression without psychotic features ([address] and Spondylosis of lumbar region without myelopathy or radiculopathy. Social History:   reports that he has been smoking cigarettes. He started smoking about 43 years ago. He has a 64.50 pack-year smoking history. He has never used smokeless tobacco. He reports current alcohol use of about 14.0 standard drinks of alcohol per week. He reports previous drug use. Drug: Marijuana Maurita Handsome). Family History:   Family History   Problem Relation Age of Onset    Breast Cancer Mother         s/p surgical complications    Diabetes Mother     Heart Failure Father     Other Brother         \"bad back\"       Vitals:  BP 91/70   Pulse 84   Temp 97.9 °F (36.6 °C) (Oral)   Resp 15   Ht 6' 1\" (1.854 m)   Wt 207 lb 1.6 oz (93.9 kg)   SpO2 92%   BMI 27.32 kg/m²   Temp (24hrs), Av.1 °F (36.7 °C), Min:97.9 °F (36.6 °C), Max:98.4 °F (36.9 °C)    No results for input(s): POCGLU in the last 72 hours. I/O (24Hr):   No intake or output data in the 24 hours ending 21 1433    Labs:  Hematology:  Recent Labs     21  0704 21  0531 21  0536   WBC 16.7* 15.0* 14.3*   RBC 3.23* 3.16* 3.03*   HGB 9.7* 9.7* 9.5*   HCT 29.6* 29.6* 28.5*   MCV 91.6 93.7 94.1   MCH 30.0 30.7 31.4   MCHC 32.8 32.8 33.3   RDW 16.1* 17.0* 17.5*   * 559* 586*   MPV 9.4 9.5 9.7     Chemistry:  Recent Labs     21  0704 21  0531 21  1102    140 138   K 3.0* 2.9* 3.6*    109* 107   CO2 19* 20 21   GLUCOSE 103* 104* 101*   BUN 4* 3* 3*   CREATININE 0.58* 0.60* 0.70   MG 1.7 2.0  --    ANIONGAP 12 11 10   LABGLOM >60 >60 >60   GFRAA >60 >60 >60   CALCIUM 7.9* 7.9* 8.6   No results for input(s): PROT, LABALBU, LABA1C, E4QJSCU, Z4JZEOX, FT4, TSH, AST, ALT, LDH, GGT, ALKPHOS, LABGGT, BILITOT, BILIDIR, AMMONIA, AMYLASE, LIPASE, LACTATE, CHOL, HDL, LDLCHOLESTEROL, CHOLHDLRATIO, TRIG, VLDL, JNW12WQ, PHENYTOIN, PHENYF, URICACID, POCGLU in the last 72 hours. ABG:No results found for: POCPH, PHART, PH, POCPCO2, IOS7DVQ, PCO2, POCPO2, PO2ART, PO2, POCHCO3, MIM2ZLU, HCO3, NBEA, PBEA, BEART, BE, THGBART, THB, EJA4QHZ, BNKF4RUP, I0AXIIOS, O2SAT, FIO2  Lab Results   Component Value Date/Time    SPECIAL NOT REPORTED 12/03/2021 08:00 AM     Lab Results   Component Value Date/Time    CULTURE NO GROWTH 4 DAYS 11/30/2021 03:46 AM       Radiology:  CT ABDOMEN PELVIS W IV CONTRAST Additional Contrast? None    Result Date: 11/28/2021  Scattered areas nonspecific air-fluid involving bowel loops could suggest underlying enteritis or diarrheal illness. No bowel obstruction. Colonic diverticulosis noted. XR CHEST PORTABLE    Result Date: 12/2/2021  Interstitial pulmonary edema and trace right pleural effusion compatible with fluid overload and/or CHF. Minimal right basilar airspace disease most likely represents atelectasis. XR CHEST PORTABLE    Result Date: 11/28/2021  Chronic elevation of the right hemidiaphragm with adjacent opacity, likely atelectasis. US ABDOMEN LIMITED Specify organ? LIVER    Result Date: 11/29/2021  1. Hepatomegaly and hepatic steatosis. Borderline cirrhotic configuration. 2. Gallbladder distention without cholelithiasis or sonographic evidence of cholecystitis.        Physical Examination:        General appearance:  alert, cooperative and moderate distress  Mental Status:  oriented to person, place and time and normal affect  Lungs: Decreased breath sounds bilaterally with basal crepitations  Heart:  regular rate and rhythm, no murmur  Abdomen:  soft, nontender, nondistended, normal bowel sounds, no masses, hepatomegaly, splenomegaly  Extremities:  no edema, redness, tenderness in the calves  Skin:  no gross lesions, rashes, induration    Assessment:        Hospital Problems           Last Modified POA    * (Principal) Septicemia ([address] 11/28/2021 Yes    Alcohol abuse 11/28/2021 Yes    Major depressive disorder, recurrent severe without psychotic features ([address] (Chronic) 11/28/2021 Yes    Substance abuse ([address] (Chronic) 11/28/2021 Yes    Hyponatremia 11/28/2021 Yes    Hypothyroidism (Chronic) 11/28/2021 Yes    Transaminitis 11/28/2021 Yes    Hypocalcemia 11/29/2021 Yes    Acute respiratory failure with hypoxia ([address] 12/3/2021 Yes    Moderate protein-calorie malnutrition ([address] 12/3/2021 Yes    Hypokalemia 12/3/2021 Yes    Pneumonia of both lungs due to infectious organism 12/4/2021 Yes          Plan:        Acute hypoxic respiratory failure 2/2 mycoplasma pneumonia-patient weaned down to1 L oxygen, improvement in oxygen requirements. Encourage incentive spirometry. Lasix given 12/3. Continue Levaquin for concern for bilateral infiltrates. Droplet precaution for mycoplasma infection. Alcohol withdrawal-continue thiamine and folate, encourage cessation    Leukocytosis secondary to myeloproliferative disorder with JAK2 mutation positive-continue Hydrea    Alcohol liver disease-Ensure complete cessation of alcohol, ultrasound with borderline cirrhotic configuration. Will need GI follow-up.     Hypothyroidism on Synthyroid    Replace potassium and magnesium, start daily potassium supplementation    Encourage oral intake for malnutrition  Continue all home psych medicines  Nicotine patch for smoking    Lovenox for DVT prophylaxis  PT OT evaluation  Patient with SNF placement  Discharge planning    Lazarus Hernandez MD  12/4/2021  2:33 PM

## 2021-12-04 NOTE — PROGRESS NOTES
Physical Therapy  Facility/Department: [facility]  Daily Treatment Note  NAME: Aron Acosta  : 1960  MRN: [id number]    Date of Service: 2021    Discharge Recommendations:  Patient would benefit from continued therapy after discharge   PT Equipment Recommendations  Equipment Needed: No  Mobility Devices: Lorrayne Diaz: Rolling    Assessment   Assessment: Pt presents with mobility deficits requiring min-A for bed mobility for BLE progression during sit to supine transfer. Pt presents with increased tremors in BLE with activity. Pt requires mod-A during STS transfer using a RW along with verbal cues for hand placement and sequencing. Pt demos a standing tolerance of 30 seconds before requiring a seated rest break. Pt deferred gait training this date due to c/o increased dizziness with change of position. Pt would benefit from continued PT following discharge to address functional deficits to regain prior level of independence. Prognosis: Good  Decision Making: Medium Complexity  Clinical Presentation: evolving  PT Education: Goals; General Safety; Gait Training; Plan of Care; Equipment; Functional Mobility Training; Injury Prevention; Precautions; Transfer Training  REQUIRES PT FOLLOW UP: Yes  Activity Tolerance  Activity Tolerance: Patient limited by fatigue     Patient Diagnosis(es): The primary encounter diagnosis was Septicemia ([address] Diagnoses of Hyponatremia and Enteritis were also pertinent to this visit.      has a past medical history of Alcohol abuse, Anemia, Cancer ([address] Cervical stenosis of spine, Chronic left-sided low back pain with left-sided sciatica, Insomnia, Intentional drug overdose ([address] Left arm numbness, Left lumbar radiculitis, Macrocytosis, Major depressive disorder, recurrent severe without psychotic features ([address] Myeloproliferative disorder ([address] Osteoarthritis of spine with radiculopathy, lumbar region, Severe recurrent major depression without psychotic features ([address] and Spondylosis of lumbar region without myelopathy or radiculopathy. has a past surgical history that includes cervical fusion; Appendectomy; Tonsillectomy; Carpal tunnel release; epidural steroid injection (Left, 5/15/2019); Endoscopy, colon, diagnostic; cervical fusion (09/05/2019); and cervical fusion (N/A, 9/5/2019). Restrictions  Restrictions/Precautions  Restrictions/Precautions: Seizure, General Precautions, Fall Risk, Isolation, Up as Tolerated  Required Braces or Orthoses?: No  Position Activity Restriction  Other position/activity restrictions: Up w/assist, O2  Subjective   General  Chart Reviewed: Yes  Response To Previous Treatment: Patient with no complaints from previous session. Family / Caregiver Present: No  Subjective  Subjective: Pt and RN agreeable to PT. General Comment  Comments: Pt returned to supine in bed post PT. Pain Screening  Patient Currently in Pain: Denies  Pain Assessment  Pain Assessment: 0-10  Pain Level: 3  Vital Signs  Patient Currently in Pain: Denies       Orientation  Orientation  Overall Orientation Status: Within Functional Limits  Cognition   Cognition  Overall Cognitive Status: Exceptions  Arousal/Alertness: Appropriate responses to stimuli  Following Commands: Follows one step commands with increased time; Follows one step commands with repetition  Attention Span: Appears intact  Safety Judgement: Decreased awareness of need for assistance; Decreased awareness of need for safety  Problem Solving: Assistance required to identify errors made; Assistance required to generate solutions  Insights: Decreased awareness of deficits  Initiation: Requires cues for some  Sequencing: Requires cues for some  Objective   Bed mobility  Rolling to Right: Minimal assistance  Supine to Sit: Minimal assistance  Sit to Supine: Unable to assess  Scooting: Stand by assistance  Transfers  Sit to Stand:  Moderate Assistance  Stand to sit: Minimal Assistance  Ambulation  Ambulation?: No  More Ambulation?: No     Balance  Posture: Good  Sitting - Static: Fair  Sitting - Dynamic: Fair; -  Standing - Static: Poor; +  Standing - Dynamic: Poor  Comments: In standing, he needs min assist and BUE support on walker to prevent a fall. Exercises  Straight Leg Raise: x10 BLE in supine  Heelslides: x10 BLE in supine  Hip Abduction: x10 BLE in supine  Ankle Pumps: x10 BLE in supine  Core Strengthening: Pt sat EOB for 8 mins with CGA with no noted LOB. Other exercises  Other exercises?: No                                     AM-PAC Score  AM-PAC Inpatient Mobility Raw Score : 12 (12/04/21 1310)  AM-PAC Inpatient T-Scale Score : 35.33 (12/04/21 1310)  Mobility Inpatient CMS 0-100% Score: 68.66 (12/04/21 1310)  Mobility Inpatient CMS G-Code Modifier : CL (12/04/21 1310)          Goals  Short term goals  Time Frame for Short term goals: 14 visits  Short term goal 1: Supine to/from sit with SBA. Short term goal 2: Sit to/from stand with SBA. Short term goal 3: Ambulate 22' with walker with CGA. Short term goal 4: Stand and march in place with BUE on walker 60 seconds with stable vitals. Plan    Plan  Times per week: 5-6x/wk  Current Treatment Recommendations: Strengthening, Home Exercise Program, Safety Education & Training, Endurance Training, Patient/Caregiver Education & Training, Functional Mobility Training, Transfer Training, Gait Training, Equipment Evaluation, Education, & procurement  Safety Devices  Type of devices:  All fall risk precautions in place, Bed alarm in place, Call light within reach, Gait belt, Left in bed, Nurse notified, Patient at risk for falls  Restraints  Initially in place: No     Therapy Time   Individual Concurrent Group Co-treatment   Time In 1105         Time Out 1130         Minutes 25         Timed Code Treatment Minutes: [address], \[facility] [place] [facility]\""

## 2021-12-04 NOTE — PROGRESS NOTES
Occupational Therapy  Facility/Department: [facility] CAR 2  Daily Treatment Note  NAME: Amanda Kelly  : 1960  MRN: [id number]    Date of Service: 2021    Discharge Recommendations:  Patient would benefit from continued therapy after discharge       Assessment   Performance deficits / Impairments: Decreased functional mobility ; Decreased safe awareness; Decreased balance; Decreased coordination; Decreased ADL status; Decreased cognition; Decreased posture; Decreased ROM; Decreased endurance; Decreased high-level IADLs; Decreased strength; Decreased sensation; Decreased fine motor control  Assessment: Pt would benefit from continued acute care and post acute care OT to address above deficits. Treatment Diagnosis: Enteritis  Prognosis: Good  REQUIRES OT FOLLOW UP: Yes  Activity Tolerance  Activity Tolerance: Patient Tolerated treatment well; Patient limited by fatigue  Safety Devices  Safety Devices in place: Yes  Type of devices: Call light within reach; Gait belt; Patient at risk for falls; Left in chair; Nurse notified         Patient Diagnosis(es): The primary encounter diagnosis was Septicemia ([address] Diagnoses of Hyponatremia and Enteritis were also pertinent to this visit. has a past medical history of Alcohol abuse, Anemia, Cancer ([address] Cervical stenosis of spine, Chronic left-sided low back pain with left-sided sciatica, Insomnia, Intentional drug overdose ([address] Left arm numbness, Left lumbar radiculitis, Macrocytosis, Major depressive disorder, recurrent severe without psychotic features ([address] Myeloproliferative disorder ([address] Osteoarthritis of spine with radiculopathy, lumbar region, Severe recurrent major depression without psychotic features ([address] and Spondylosis of lumbar region without myelopathy or radiculopathy. has a past surgical history that includes cervical fusion; Appendectomy; Tonsillectomy; Carpal tunnel release; epidural steroid injection (Left, 5/15/2019);  Endoscopy, colon, diagnostic; cervical fusion (09/05/2019); and cervical fusion (N/A, 9/5/2019). Restrictions  Restrictions/Precautions  Restrictions/Precautions: Seizure, General Precautions, Fall Risk, Isolation, Up as Tolerated  Required Braces or Orthoses?: No  Position Activity Restriction  Other position/activity restrictions: Up w/assist, O2  Subjective   General  Patient assessed for rehabilitation services?: Yes  Family / Caregiver Present: No  General Comment  Comments: RN alexa'gustavo OT tx. Pt agreeable and participatory throughout. Pain Assessment  Response to Pain Intervention: Patient Satisfied  Vital Signs  Patient Currently in Pain: Denies   Orientation  Orientation  Overall Orientation Status: Impaired  Orientation Level: Oriented to place; Oriented to time; Oriented to situation; Oriented to person  Objective    ADL  Feeding: Stand by assistance; Minimal assistance; Beverage management; Setup; Verbal cueing; Increased time to complete (able to open most containers & feed self, assist w/beverage management d/t tremors in bilat hands w/functional use)  Grooming: Stand by assistance; Setup; Verbal cueing; Increased time to complete (wash face/head/hands)  UE Bathing: Minimal assistance; Setup; Verbal cueing; Increased time to complete (assist to wash back)  UE Dressing: Minimal assistance; Setup; Verbal cueing; Increased time to complete (assist to snap, thread arms and tie gown)  LE Dressing: Maximum assistance; Setup; Verbal cueing; Increased time to complete (assist to don footies)  Toileting: Unable to assess (wearing brief, dry, using urinal inconsistently)      Pt in bed asleep upon arrival. Setup for self care at tray table. Pt needed increased time to awaken. Sup to sit to EOB min assist and vc's. Completed self care seated at EOB (see above for LOF). STS w/SW and func mob to chair w/min assist and no LOB. Pt is unsteady, takes small shuffling steps, needs vc's to take bigger normal steps.  Vc's for hand placement for safe transfers. Breakfast tray arrived during tx and foot was warmed and setup at tray table for self feeding. Call light and phone in reach. BP taken at end of tx. 82/66, pt states feels a little dizzy, RN notified.        Balance  Sitting Balance: Stand by assistance (pt sat unsupported on EOB and in recliner)  Standing Balance: Contact guard assistance (w/SW)  Standing Balance  Time: Pt stood for approx 2 min total for transfers, func mob   Comment: w/SW w/no LOB  Functional Mobility  Functional - Mobility Device: Standard Walker  Assist Level: Minimal assistance  Functional Mobility Comments: unsteady, no LOB, vc's for walker safety and to take bigger steps  Toilet Transfers  Toilet - Technique: Ambulating  Equipment Used: Standard bedside commode  Toilet Transfer: Minimal assistance  Bed mobility  Rolling to Right: Minimal assistance  Supine to Sit: Minimal assistance  Sit to Supine: Unable to assess (left up in chair)  Scooting: Stand by assistance  Transfers  Stand Step Transfers: Minimal assistance  Sit to stand: Minimal assistance  Stand to sit: Minimal assistance  Transfer Comments: vc's for hand placement and bed slightly elevated     Plan   Plan  Times per week: 3-4X/Wk  Current Treatment Recommendations: Strengthening, ROM, Balance Training, Functional Mobility Training, Endurance Training, Patient/Caregiver Education & Training, Self-Care / ADL, Equipment Evaluation, Education, & procurement, Safety Education & Training, Home Management Training    AM-PAC Score  AM-PAC Inpatient Daily Activity Raw Score: 16 (12/04/21 0904)  AM-PAC Inpatient ADL T-Scale Score : 35.96 (12/04/21 0904)  ADL Inpatient CMS 0-100% Score: 53.32 (12/04/21 0904)  ADL Inpatient CMS G-Code Modifier : CK (12/04/21 0904)    Goals  Short term goals  Time Frame for Short term goals: Patient will, by discharge  Short term goal 1: Demo [facility]feeding[facility]  Short term goal 2: Demo UB ADLs at [address] [place] [address] term goal 3: Demo LB ADLs at [facility] w/ AE PRN  Short term goal 4: Demo 10+ min of dynamic sitting at [facility] w/ unilateral hand release for engagement in ADLs  Short term goal 5: Demo 4+ min of static standing tolerance at [facility] to promote endurance for functional tasks  Short term goal 6: Demo functional transfers/mobility at [facility] w/ LRD PRN to engage in ADLs  Short term goal 7: Notify OTR to update POC as patient progresses     Therapy Time   Individual Concurrent Group Co-treatment   Time In  0745         Time Out 0840         Minutes  55 total tx time                 FRANC OLIVARES/ALIA

## 2021-12-04 NOTE — PLAN OF CARE
reBRONCHOSPASM/BRONCHOCONSTRICTION     [x]         IMPROVE AERATION/BREATH SOUNDS  [x]   ADMINISTER BRONCHODILATOR THERAPY AS APPROPRIATE  [x]   ASSESS BREATH SOUNDS  [x]   IMPLEMENT AEROSOL/MDI PROTOCOL  [x]   PATIENT EDUCATION AS NEEDED

## 2021-12-04 NOTE — PROGRESS NOTES
[facility]ADA TREADWELL, [facility]. Enteritis [K52.9]  Hyponatremia [E87.1]  Septicemia ([facility] [address] [A41.9]  Sepsis ([facility] [address] [A41.9] . Vitals:    12/04/21 0816   BP:    Pulse:    Resp:    Temp:    SpO2: 95%   . Patients home meds are   Prior to Admission medications    Medication Sig Start Date End Date Taking?  Authorizing Provider   hydroxyurea (HYDREA) 500 MG chemo capsule Take 500 mg by mouth 2 times daily   Yes Historical Provider, MD   vitamin B-1 (THIAMINE) 100 MG tablet Take 1 tablet by mouth daily 6/24/21  Yes Marc Pratt MD   nicotine (NICODERM CQ) 21 MG/24HR Place 1 patch onto the skin every 24 hours 6/24/21  Yes Marc Pratt MD   folic acid (FOLVITE) 1 MG tablet Take 1 tablet by mouth daily 6/24/21  Yes Marc Pratt MD   Multiple Vitamins-Minerals (THERAPEUTIC MULTIVITAMIN-MINERALS) tablet Take 1 tablet by mouth daily 6/3/21 6/3/22 Yes Brea Arizmendi MD   QUEtiapine (SEROQUEL) 400 MG tablet Take 400 mg by mouth nightly 2/19/21  Yes Historical Provider, MD   lisinopril (PRINIVIL;ZESTRIL) 10 MG tablet Take 1 tablet by mouth daily 7/18/21   Nilton Newell MD   sertraline (ZOLOFT) 100 MG tablet Take 1 tablet by mouth daily 7/17/21   Nilton Newell MD   levothyroxine (SYNTHROID) 50 MCG tablet Take 1 tablet by mouth daily Take in the morning on an empty stomach 1 hour before any food or other medications 6/24/21   Marc Partt MD   naltrexone (DEPADE) 50 MG tablet Take 50 mg by mouth daily    Historical Provider, MD   prazosin (MINIPRESS) 1 MG capsule Take 1 mg by mouth 2 times daily    Historical Provider, MD   omeprazole (PRILOSEC) 40 MG delayed release capsule Take 40 mg by mouth Daily with supper     Historical Provider, MD   hydrOXYzine (ATARAX) 50 MG tablet Take 50 mg by mouth every 6 hours as needed  4/13/21   Historical Provider, MD   .  RR 22  Breath Sounds: diminished with exp wheezes in bases      Bronchodilator assessment at level  3  Hyperinflation assessment at level   Secretion Management assessment at level      []    Bronchodilator Assessment  BRONCHODILATOR ASSESSMENT SCORE  Score 0 1 2 3 4 5   Breath Sounds   []  Patient Baseline []  No Wheeze good aeration []  Faint, scattered wheezing, good aeration [x]  Expiratory Wheezing and or moderately diminished []  Insp/Exp wheeze and/or very diminished []  Insp/Exp and/ or marked distress   Respiratory Rate   []  Patient Baseline []  Less than 20 []  Less than 20 [x]  20-25 []  Greater than 25 []  Greater than 25   Peak flow % of Pred or PB [x]  NA   []  Greater than 90%  []  81-90% []  71-80% []  Less than or equal to 70%  or unable to perform []  Unable due to Respiratory Distress   Dyspnea re []  Patient Baseline []  No SOB []  No SOB [x]  SOB on exertion []  SOB min activity []  At rest/acute   e FEV% Predicted       [x]  NA []  Above 69%  []  Unable []  Above 60-69%  []  Unable []  Above 50-59%  []  Unable []  Above 35-49%  []  Unable []  Less than 35%  []  Unable                 []  Hyperinflation Assessment  Score 1 2 3   CXR and Breath Sounds   []  Clear []  No atelectasis  Basilar aeration []  Atelectasis or absent basilar breath sounds   Incentive Spirometry Volume  (Per IBW)   []  Greater than or equal to 15ml/Kg []  less than 15ml/Kg []  less than 15ml/Kg   Surgery within last 2 weeks []  None or general   []  Abdominal or thoracic surgery  []  Abdominal or thoracic   Chronic Pulmonary Historyre []  No []  Yes []  Yes     []  Secretion Management Assessment  Score 1 2 3   Bilateral Breath Sounds   []  Occasional Rhonchi []  Scattered Rhonchi []  Course Rhonchi and/or poor aeration   Sputum    []  Small amount of thin secretions []  Moderate amount of viscous secretions []  Copius, Viscious Yellow/ Secretions   CXR as reported by physician []  clear  []  Unavailable []  Infiltrates and/or consolidation  []  Unavailable []  Mucus Plugging and or lobar consolidation  []  Unavailable   Cough []  Strong, productive cough []  Weak productive cough []  No cough or weak non-productive cough   AMADA TREADWELL, [facility]  8:23 AM                            FEMALE                                  MALE                            FEV1 Predicted Normal Values                        FEV1 Predicted Normal Values          Age                                     Height in Feet and Inches       Age                                     Height in Feet and Inches       4' 11\" 5' 1\" 5' 3\" 5' 5\" 5' 7\" 5' 9\" 5' 11\" 6' 1\"  4' 11\" 5' 1\" 5' 3\" 5' 5\" 5' 7\" 5' 9\" 5' 11\" 6' 1\"   42 - 45 2.49 2.66 2.84 3.03 3.22 3.42 3.62 3.83 42 - 45 2.82 3.03 3.26 3.49 3.72 3.96 4.22 4.47   46 - 49 2.40 2.57 2.76 2.94 3.14 3.33 3.54 3.75 46 - 49 2.70 2.92 3.14 3.37 3.61 3.85 4.10 4.36   50 - 53 2.31 2.48 2.66 2.85 3.04 3.24 3.45 3.66 50 - 53 2.58 2.80 3.02 3.25 3.49 3.73 3.98 4.24   54 - 57 2.21 2.38 2.57 2.75 2.95 3.14 3.35 3.56 54 - 57 2.46 2.67 2.89 3.12 3.36 3.60 3.85 4.11   58 - 61 2.10 2.28 2.46 2.65 2.84 3.04 3.24 3.45 58 - 61 2.32 2.54 2.76 2.99 3.23 3.47 3.72 3.98   62 - 65 1.99 2.17 2.35 2.54 2.73 2.93 3.13 3.34 62 - 65 2.19 2.40 2.62 2.85 3.09 3.33 3.58 3.84   66 - 69 1.88 2.05 2.23 2.42 2.61 2.81 3.02 3.23 66 - 69 2.04 2.26 2.48 2.71 2.95 3.19 3.44 3.70   70+ 1.82 1.99 2.17 2.36 2.55 2.75 2.95 3.16 70+ 1.97 2.19 2.41 2.64 2.87 3.12 3.37 3.62             Predicted Peak Expiratory Flow Rate                                       Height (in)  Female       Height (in) Male           Age 71 14 72 23 67 77 78 74 Age            20 693 403 755 054 543 663 146 915  98 66 10 39 32 70 72 74 76   25 337 352 366 381 396 411 426 441 25 447 476 505 533 562 591 [phone]   30 329 344 359 374 389 404 419 434 30 437 466 494 523 552 [phone] 667   35 322 337 351 366 381 396 411 426 35 426 455 484 512 541 570 [phone]   40 314 329 344 359 374 389 404 419 40 416 445 473 502 531 [phone] 647   45 307 322 336 351 366 381 396 411 45 405 434 463 491 520 549 577 606 636   50 299 314 329 344 [phone]   65 277 292 306 321 336 351 366 381 65 363 392 421 449 478 507 535 564 594   70 269 284 299 314 329 344 359 374 70 353 382 410 439 468 [phone] 583   75 261 274 289 305 319 334 348 364 75 344 372 400 429 458 487 515 544 573   80 253 266 282 296 312 327 342 356 80 335 362 390 419 448 [phone]

## 2021-12-04 NOTE — CARE COORDINATION
Met with pt to discuss transition plans, and per pt he has not yet reviewed his list and doesn't know where it is. SNF list provided, will obtain choices.

## 2021-12-05 LAB
ABSOLUTE EOS #: 0.62 K/UL (ref 0–0.4)
ABSOLUTE IMMATURE GRANULOCYTE: 0.62 K/UL (ref 0–0.3)
ABSOLUTE LYMPH #: 2.46 K/UL (ref 1–4.8)
ABSOLUTE MONO #: 1.11 K/UL (ref 0.1–0.8)
ANION GAP SERPL CALCULATED.3IONS-SCNC: 10 MMOL/L (ref 9–17)
BASOPHILS # BLD: 0 % (ref 0–2)
BASOPHILS ABSOLUTE: 0 K/UL (ref 0–0.2)
BUN BLDV-MCNC: 4 MG/DL (ref 8–23)
BUN/CREAT BLD: ABNORMAL (ref 9–20)
CALCIUM SERPL-MCNC: 8.5 MG/DL (ref 8.6–10.4)
CHLORIDE BLD-SCNC: 108 MMOL/L (ref 98–107)
CO2: 21 MMOL/L (ref 20–31)
CREAT SERPL-MCNC: 0.62 MG/DL (ref 0.7–1.2)
CULTURE: NORMAL
DIFFERENTIAL TYPE: ABNORMAL
EOSINOPHILS RELATIVE PERCENT: 5 % (ref 1–4)
GFR AFRICAN AMERICAN: >60 ML/MIN
GFR NON-AFRICAN AMERICAN: >60 ML/MIN
GFR SERPL CREATININE-BSD FRML MDRD: ABNORMAL ML/MIN/{1.73_M2}
GFR SERPL CREATININE-BSD FRML MDRD: ABNORMAL ML/MIN/{1.73_M2}
GLUCOSE BLD-MCNC: 92 MG/DL (ref 70–99)
HCT VFR BLD CALC: 30.2 % (ref 40.7–50.3)
HEMOGLOBIN: 9.7 G/DL (ref 13–17)
IMMATURE GRANULOCYTES: 5 %
LYMPHOCYTES # BLD: 20 % (ref 24–44)
Lab: NORMAL
MCH RBC QN AUTO: 30.7 PG (ref 25.2–33.5)
MCHC RBC AUTO-ENTMCNC: 32.1 G/DL (ref 28.4–34.8)
MCV RBC AUTO: 95.6 FL (ref 82.6–102.9)
MONOCYTES # BLD: 9 % (ref 1–7)
MORPHOLOGY: ABNORMAL
MORPHOLOGY: ABNORMAL
NRBC AUTOMATED: 0 PER 100 WBC
PDW BLD-RTO: 18.5 % (ref 11.8–14.4)
PLATELET # BLD: 565 K/UL (ref 138–453)
PLATELET ESTIMATE: ABNORMAL
PMV BLD AUTO: 9.4 FL (ref 8.1–13.5)
POTASSIUM SERPL-SCNC: 4 MMOL/L (ref 3.7–5.3)
RBC # BLD: 3.16 M/UL (ref 4.21–5.77)
RBC # BLD: ABNORMAL 10*6/UL
SEG NEUTROPHILS: 61 % (ref 36–66)
SEGMENTED NEUTROPHILS ABSOLUTE COUNT: 7.49 K/UL (ref 1.8–7.7)
SODIUM BLD-SCNC: 139 MMOL/L (ref 135–144)
SPECIMEN DESCRIPTION: NORMAL
WBC # BLD: 12.3 K/UL (ref 3.5–11.3)
WBC # BLD: ABNORMAL 10*3/UL

## 2021-12-05 PROCEDURE — 36415 COLL VENOUS BLD VENIPUNCTURE: CPT

## 2021-12-05 PROCEDURE — 85025 COMPLETE CBC W/AUTO DIFF WBC: CPT

## 2021-12-05 PROCEDURE — 2060000000 HC ICU INTERMEDIATE R&B

## 2021-12-05 PROCEDURE — 6370000000 HC RX 637 (ALT 250 FOR IP): Performed by: STUDENT IN AN ORGANIZED HEALTH CARE EDUCATION/TRAINING PROGRAM

## 2021-12-05 PROCEDURE — 6370000000 HC RX 637 (ALT 250 FOR IP): Performed by: NURSE PRACTITIONER

## 2021-12-05 PROCEDURE — 2580000003 HC RX 258: Performed by: NURSE PRACTITIONER

## 2021-12-05 PROCEDURE — 94640 AIRWAY INHALATION TREATMENT: CPT

## 2021-12-05 PROCEDURE — 80048 BASIC METABOLIC PNL TOTAL CA: CPT

## 2021-12-05 PROCEDURE — 6360000002 HC RX W HCPCS: Performed by: NURSE PRACTITIONER

## 2021-12-05 PROCEDURE — 99232 SBSQ HOSP IP/OBS MODERATE 35: CPT | Performed by: STUDENT IN AN ORGANIZED HEALTH CARE EDUCATION/TRAINING PROGRAM

## 2021-12-05 RX ADMIN — QUETIAPINE FUMARATE 400 MG: 200 TABLET ORAL at 21:30

## 2021-12-05 RX ADMIN — HYDROXYUREA 500 MG: 500 CAPSULE ORAL at 07:54

## 2021-12-05 RX ADMIN — MIDODRINE HYDROCHLORIDE 5 MG: 5 TABLET ORAL at 00:28

## 2021-12-05 RX ADMIN — ANTI-FUNGAL POWDER MICONAZOLE NITRATE TALC FREE: 1.42 POWDER TOPICAL at 09:49

## 2021-12-05 RX ADMIN — ENOXAPARIN SODIUM 40 MG: 100 INJECTION SUBCUTANEOUS at 07:54

## 2021-12-05 RX ADMIN — Medication 10 ML: at 21:30

## 2021-12-05 RX ADMIN — MIDODRINE HYDROCHLORIDE 5 MG: 5 TABLET ORAL at 07:54

## 2021-12-05 RX ADMIN — ANTACID TABLETS 1000 MG: 500 TABLET, CHEWABLE ORAL at 07:54

## 2021-12-05 RX ADMIN — PANTOPRAZOLE SODIUM 40 MG: 40 TABLET, DELAYED RELEASE ORAL at 07:54

## 2021-12-05 RX ADMIN — FOLIC ACID 1 MG: 1 TABLET ORAL at 07:54

## 2021-12-05 RX ADMIN — ACETAMINOPHEN 650 MG: 325 TABLET ORAL at 21:30

## 2021-12-05 RX ADMIN — ANTACID TABLETS 1000 MG: 500 TABLET, CHEWABLE ORAL at 21:30

## 2021-12-05 RX ADMIN — Medication 100 MG: at 07:53

## 2021-12-05 RX ADMIN — Medication 10 ML: at 08:04

## 2021-12-05 RX ADMIN — ANTI-FUNGAL POWDER MICONAZOLE NITRATE TALC FREE: 1.42 POWDER TOPICAL at 21:30

## 2021-12-05 RX ADMIN — IPRATROPIUM BROMIDE AND ALBUTEROL SULFATE 1 AMPULE: .5; 2.5 SOLUTION RESPIRATORY (INHALATION) at 12:27

## 2021-12-05 RX ADMIN — HYDROXYUREA 500 MG: 500 CAPSULE ORAL at 21:30

## 2021-12-05 RX ADMIN — SERTRALINE 100 MG: 50 TABLET, FILM COATED ORAL at 07:53

## 2021-12-05 RX ADMIN — POTASSIUM CHLORIDE 40 MEQ: 1500 TABLET, EXTENDED RELEASE ORAL at 07:54

## 2021-12-05 RX ADMIN — PRAZOSIN HYDROCHLORIDE 1 MG: 1 CAPSULE ORAL at 07:54

## 2021-12-05 RX ADMIN — PRAZOSIN HYDROCHLORIDE 1 MG: 1 CAPSULE ORAL at 21:30

## 2021-12-05 RX ADMIN — LEVOTHYROXINE SODIUM 50 MCG: 50 TABLET ORAL at 07:54

## 2021-12-05 RX ADMIN — LEVOFLOXACIN 750 MG: 750 TABLET, FILM COATED ORAL at 07:54

## 2021-12-05 ASSESSMENT — PAIN SCALES - GENERAL
PAINLEVEL_OUTOF10: 5
PAINLEVEL_OUTOF10: 5

## 2021-12-05 ASSESSMENT — PAIN DESCRIPTION - DESCRIPTORS: DESCRIPTORS: ACHING

## 2021-12-05 ASSESSMENT — PAIN DESCRIPTION - LOCATION: LOCATION: HEAD

## 2021-12-05 NOTE — CARE COORDINATION
Spoke with pt to obtain SNF choices and per pt, he is still reviewing, discussed that will contact him around 11am today to obtain choices. Met with pt, he has no preference in facility, lives on [facility]. Referrals sent to [facility] and [facility]. Referral also sent to [facility]. Met with pt, he had additional choices - [facility], [address], [address], and [facility]. Referrals sent. Received call from [facility], they will submit referral to the [place] facility. Received call from [facility], they will be in to do an on-site visit tomorrow 12/6/21.

## 2021-12-05 NOTE — PROGRESS NOTES
Comprehensive Nutrition Assessment    Type and Reason for Visit:  Reassess    Nutrition Recommendations/Plan: Continue current diet. Will provide frozen Magic Cups x 1 per day. Encourage/monitor PO intakes as tolerated. Will monitor labs, weights, and plan of care. Nutrition Assessment:  RN reports pt does not like the Glucerna or Boost Pudding oral supplements - reports they do not taste good. RN states pt is not much of a breakfast eater and did not eat much of breakfast yesterday or today. Pt ate bites of his lunch and majority of his dinner last night. Will provide frozen Magic Cups x 1 per day to boost intakes. Labs/Meds reviewed. Malnutrition Assessment:  Malnutrition Status: At risk for malnutrition (Comment)    Context:  Chronic Illness       Estimated Daily Nutrient Needs:  Energy (kcal):  22-25 kcal/kg = 4792-7045 kcals/day; Weight Used for Energy Requirements:  Admission     Protein (g):  1.1-1.3 gm/kg =  gm pro/day; Weight Used for Protein Requirements:  Ideal        Fluid (ml/day):  30 mL/kg = 2700 mL/day or per MD; Method Used for Fluid Requirements:  ml/Kg      Nutrition Related Findings:  Labs reviewed. Meds: Folic Acid, Synthroid, Thiamine. Last BM 12/3 (diarrhea). Wounds:  None       Current Nutrition Therapies:    ADULT DIET; Regular  ADULT ORAL NUTRITION SUPPLEMENT; Dinner; Frozen Oral Supplement    Anthropometric Measures:  · Height: 6' 1\" (185.4 cm)  · Current Body Weight: 207 lb 1.6 oz (93.9 kg)   · Admission Body Weight: 200 lb (90.7 kg)    · Usual Body Weight: 211 lb 13.8 oz (96.1 kg) (6/23/2021)     · Ideal Body Weight: 184 lbs; % Ideal Body Weight 112.6 %   · BMI: 27.3  · BMI Categories: Overweight (BMI 25.0-29. 9)       Nutrition Diagnosis:   · Inadequate oral intake related to  (loss of appetite) as evidenced by intake 0-25%, intake 26-50%, intake 51-75% (variable PO intakes; need for ONS)    Nutrition Interventions:   Food and/or Nutrient Delivery: Continue Current Diet, Modify Oral Nutrition Supplement (Provide frozen Magic Cup x 1 per day.)  Nutrition Education/Counseling:  No recommendation at this time   Coordination of Nutrition Care:  Continue to monitor while inpatient    Goals:  Oral intakes to meet % of estimated nutrition needs. Nutrition Monitoring and Evaluation:   Behavioral-Environmental Outcomes:  None Identified   Food/Nutrient Intake Outcomes:  Food and Nutrient Intake, Supplement Intake  Physical Signs/Symptoms Outcomes:  Biochemical Data, Diarrhea, Fluid Status or Edema, Hemodynamic Status, Nutrition Focused Physical Findings, Skin, Weight     Discharge Planning:     Too soon to determine     Electronically signed by Pool Johns RD, LD on 12/5/21 at 12:49 PM EST    Contact: [phone]

## 2021-12-05 NOTE — PROGRESS NOTES
[facility]  Office: [address], DO, Kylah Morton, DO, Rosanna Frances, DO, Patricio Ruiz [facility], DO, Flip Jane MD, Annalise Bryson MD, Kennis Osler, MD, Jacqueline Forte MD, Paul Hahn MD, Jeff Garza MD, Linda Torres MD, Kathy Altman, DO, Maximilian Arce, DO, Nathalie Duarte MD,  Chua Arceo DO, Tarun Carey MD, David Meyer MD, Agustín Voss MD, Meghana Garza MD, Radha Raymond MD, Chang Baker MD, Dyana Nair MD, Vidya Maya [facility], [facility], CNP, Madison Vazquez, CNP, Virginia Phillips, CNS, Almas Frank, CNP, Zoe Walker, CNP, Colleen Sherman, CNP, Jaxson Donis, CNP, Ricco Sanders CNP, Isi Lazcano PA-C, Moise Chapin [facility], Kamilla Cisse, CNP, Billy Crowley, CNP, Kiki Hansen, CNP, Thao Garcia CNP, Bola Obrien CNP, David Capps CNP, Clair Murphy, [address] [place] [address]    Progress Note    12/5/2021    3:00 PM    Name:   Thomas Phillips  MRN:     [id number]     [place]:      [de-identified]   Room:   2016/2016-01  IP Day:  7  Admit Date:  11/28/2021  1:56 PM    PCP:   SIMONE Cohn CNP  Code Status:  Full Code    Subjective:     C/C:   Chief Complaint   Patient presents with    Dizziness    Fatigue     Interval History Status: Slightly improved    Patient slightly better but still feels fatigued  Appetite has improved  Weaned down to room air  Saturating above 90%  Picked SNF choices for discharge    Brief History:     29-year-old male with known medical history of myeloproliferative disorder on hydroxyurea, documentation, alcohol dependence, hypothyroidism came to the hospital for weakness and feeling off balance. In the ER patient was noted to be leukocytotic, hypotensive, initially was concern for sepsis. Patient lactic acid was 5. Patient was started on antibiotics initially and admitted. Patient underwent alcohol withdrawals and was on Librium.     Review of Systems: sciatica, Insomnia, Intentional drug overdose ([address] Left arm numbness, Left lumbar radiculitis, Macrocytosis, Major depressive disorder, recurrent severe without psychotic features ([address] Myeloproliferative disorder ([address] Osteoarthritis of spine with radiculopathy, lumbar region, Severe recurrent major depression without psychotic features ([address] and Spondylosis of lumbar region without myelopathy or radiculopathy. Social History:   reports that he has been smoking cigarettes. He started smoking about 43 years ago. He has a 64.50 pack-year smoking history. He has never used smokeless tobacco. He reports current alcohol use of about 14.0 standard drinks of alcohol per week. He reports previous drug use. Drug: Marijuana Phuc Coburn). Family History:   Family History   Problem Relation Age of Onset    Breast Cancer Mother         s/p surgical complications    Diabetes Mother     Heart Failure Father     Other Brother         \"bad back\"       Vitals:  BP (!) 131/93   Pulse 79   Temp 97.9 °F (36.6 °C) (Oral)   Resp 15   Ht 6' 1\" (1.854 m)   Wt 207 lb 1.6 oz (93.9 kg)   SpO2 94%   BMI 27.32 kg/m²   Temp (24hrs), Av.2 °F (36.8 °C), Min:97.6 °F (36.4 °C), Max:99 °F (37.2 °C)    No results for input(s): POCGLU in the last 72 hours. I/O (24Hr):   No intake or output data in the 24 hours ending 21 1500    Labs:  Hematology:  Recent Labs     21  0531 21  0536 21  0740   WBC 15.0* 14.3* 12.3*   RBC 3.16* 3.03* 3.16*   HGB 9.7* 9.5* 9.7*   HCT 29.6* 28.5* 30.2*   MCV 93.7 94.1 95.6   MCH 30.7 31.4 30.7   MCHC 32.8 33.3 32.1   RDW 17.0* 17.5* 18.5*   * 586* 565*   MPV 9.5 9.7 9.4     Chemistry:  Recent Labs     21  0531 21  1102 21  0740    138 139   K 2.9* 3.6* 4.0   * 107 108*   CO2    GLUCOSE 104* 101* 92   BUN 3* 3* 4*   CREATININE 0.60* 0.70 0.62*   MG 2.0  --   --    ANIONGAP 11 10 10   LABGLOM >60 >60 >60   GFRAA >60 >60 >60   CALCIUM 7. 9* 8.6 8.5*   No results for input(s): PROT, LABALBU, LABA1C, G2ARJEG, P3WKBCF, FT4, TSH, AST, ALT, LDH, GGT, ALKPHOS, LABGGT, BILITOT, BILIDIR, AMMONIA, AMYLASE, LIPASE, LACTATE, CHOL, HDL, LDLCHOLESTEROL, CHOLHDLRATIO, TRIG, VLDL, ZBZ40VO, PHENYTOIN, PHENYF, URICACID, POCGLU in the last 72 hours. ABG:No results found for: POCPH, PHART, PH, POCPCO2, KGA1OGH, PCO2, POCPO2, PO2ART, PO2, POCHCO3, YWG5RSQ, HCO3, NBEA, PBEA, BEART, BE, THGBART, THB, YJT1KAI, XDHR9AJI, U0BHYXLM, O2SAT, FIO2  Lab Results   Component Value Date/Time    SPECIAL NOT REPORTED 12/03/2021 08:00 AM     Lab Results   Component Value Date/Time    CULTURE NO GROWTH 5 DAYS 11/30/2021 03:46 AM       Radiology:  CT ABDOMEN PELVIS W IV CONTRAST Additional Contrast? None    Result Date: 11/28/2021  Scattered areas nonspecific air-fluid involving bowel loops could suggest underlying enteritis or diarrheal illness. No bowel obstruction. Colonic diverticulosis noted. XR CHEST PORTABLE    Result Date: 12/2/2021  Interstitial pulmonary edema and trace right pleural effusion compatible with fluid overload and/or CHF. Minimal right basilar airspace disease most likely represents atelectasis. XR CHEST PORTABLE    Result Date: 11/28/2021  Chronic elevation of the right hemidiaphragm with adjacent opacity, likely atelectasis. US ABDOMEN LIMITED Specify organ? LIVER    Result Date: 11/29/2021  1. Hepatomegaly and hepatic steatosis. Borderline cirrhotic configuration. 2. Gallbladder distention without cholelithiasis or sonographic evidence of cholecystitis.        Physical Examination:        General appearance:  alert, cooperative and moderate distress  Mental Status:  oriented to person, place and time and normal affect  Lungs: Decreased breath sounds bilaterally   Heart:  regular rate and rhythm, no murmur  Abdomen:  soft, nontender, nondistended, normal bowel sounds, no masses, hepatomegaly, splenomegaly  Extremities:  no edema, redness, tenderness in the calves  Skin:  no gross lesions, rashes, induration    Assessment:        Hospital Problems           Last Modified POA    * (Principal) Septicemia ([address] 11/28/2021 Yes    Alcohol abuse 11/28/2021 Yes    Major depressive disorder, recurrent severe without psychotic features ([address] (Chronic) 11/28/2021 Yes    Substance abuse ([address] (Chronic) 11/28/2021 Yes    Hyponatremia 11/28/2021 Yes    Hypothyroidism (Chronic) 11/28/2021 Yes    Transaminitis 11/28/2021 Yes    Hypocalcemia 11/29/2021 Yes    Acute respiratory failure with hypoxia ([address] 12/3/2021 Yes    Moderate protein-calorie malnutrition ([address] 12/3/2021 Yes    Hypokalemia 12/3/2021 Yes    Pneumonia of both lungs due to Mycoplasma pneumoniae 12/4/2021 Yes          Plan:        Acute hypoxic respiratory failure 2/2 mycoplasma pneumonia-patient weaned down to room air. Encourage incentive spirometry. Continue Levaquin for concern for bilateral infiltrates. Droplet precaution for mycoplasma infection. Alcohol withdrawal-continue thiamine and folate, encourage cessation    Leukocytosis secondary to myeloproliferative disorder with JAK2 mutation positive-continue Hydrea    Alcohol liver disease-Ensure complete cessation of alcohol, ultrasound with borderline cirrhotic configuration. Will need GI follow-up.     Hypothyroidism on Synthyroid    Replace potassium and magnesium,continue daily potassium supplementation    Encourage oral intake for malnutrition  Continue all home psych medicines  Nicotine patch for smoking    Lovenox for DVT prophylaxis  PT OT evaluation  Patient with SNF placement  Discharge planning    Brant Malone MD  12/5/2021  3:00 PM

## 2021-12-06 LAB
ANION GAP SERPL CALCULATED.3IONS-SCNC: 13 MMOL/L (ref 9–17)
BUN BLDV-MCNC: 4 MG/DL (ref 8–23)
BUN/CREAT BLD: ABNORMAL (ref 9–20)
CALCIUM SERPL-MCNC: 8.6 MG/DL (ref 8.6–10.4)
CHLORIDE BLD-SCNC: 108 MMOL/L (ref 98–107)
CO2: 20 MMOL/L (ref 20–31)
CREAT SERPL-MCNC: 0.71 MG/DL (ref 0.7–1.2)
DIRECT EXAM: NORMAL
DIRECT EXAM: NORMAL
GFR AFRICAN AMERICAN: >60 ML/MIN
GFR NON-AFRICAN AMERICAN: >60 ML/MIN
GFR SERPL CREATININE-BSD FRML MDRD: ABNORMAL ML/MIN/{1.73_M2}
GFR SERPL CREATININE-BSD FRML MDRD: ABNORMAL ML/MIN/{1.73_M2}
GLUCOSE BLD-MCNC: 94 MG/DL (ref 70–99)
Lab: NORMAL
MYCOPLASMA PNEUMONIAE IGM: 1.04
POTASSIUM SERPL-SCNC: 3.9 MMOL/L (ref 3.7–5.3)
SODIUM BLD-SCNC: 141 MMOL/L (ref 135–144)
SPECIMEN DESCRIPTION: NORMAL

## 2021-12-06 PROCEDURE — 6370000000 HC RX 637 (ALT 250 FOR IP): Performed by: STUDENT IN AN ORGANIZED HEALTH CARE EDUCATION/TRAINING PROGRAM

## 2021-12-06 PROCEDURE — 36415 COLL VENOUS BLD VENIPUNCTURE: CPT

## 2021-12-06 PROCEDURE — 2580000003 HC RX 258: Performed by: NURSE PRACTITIONER

## 2021-12-06 PROCEDURE — 6370000000 HC RX 637 (ALT 250 FOR IP): Performed by: NURSE PRACTITIONER

## 2021-12-06 PROCEDURE — 99231 SBSQ HOSP IP/OBS SF/LOW 25: CPT | Performed by: STUDENT IN AN ORGANIZED HEALTH CARE EDUCATION/TRAINING PROGRAM

## 2021-12-06 PROCEDURE — 6360000002 HC RX W HCPCS: Performed by: NURSE PRACTITIONER

## 2021-12-06 PROCEDURE — 94640 AIRWAY INHALATION TREATMENT: CPT

## 2021-12-06 PROCEDURE — 97110 THERAPEUTIC EXERCISES: CPT

## 2021-12-06 PROCEDURE — 2060000000 HC ICU INTERMEDIATE R&B

## 2021-12-06 PROCEDURE — 97530 THERAPEUTIC ACTIVITIES: CPT

## 2021-12-06 PROCEDURE — 97535 SELF CARE MNGMENT TRAINING: CPT

## 2021-12-06 PROCEDURE — 80048 BASIC METABOLIC PNL TOTAL CA: CPT

## 2021-12-06 RX ADMIN — Medication 10 ML: at 09:05

## 2021-12-06 RX ADMIN — ANTI-FUNGAL POWDER MICONAZOLE NITRATE TALC FREE: 1.42 POWDER TOPICAL at 09:05

## 2021-12-06 RX ADMIN — Medication 100 MG: at 09:03

## 2021-12-06 RX ADMIN — MIDODRINE HYDROCHLORIDE 5 MG: 5 TABLET ORAL at 17:18

## 2021-12-06 RX ADMIN — PRAZOSIN HYDROCHLORIDE 1 MG: 1 CAPSULE ORAL at 09:03

## 2021-12-06 RX ADMIN — HYDROXYUREA 500 MG: 500 CAPSULE ORAL at 21:01

## 2021-12-06 RX ADMIN — LEVOFLOXACIN 750 MG: 750 TABLET, FILM COATED ORAL at 09:03

## 2021-12-06 RX ADMIN — ANTACID TABLETS 1000 MG: 500 TABLET, CHEWABLE ORAL at 23:03

## 2021-12-06 RX ADMIN — MIDODRINE HYDROCHLORIDE 5 MG: 5 TABLET ORAL at 12:54

## 2021-12-06 RX ADMIN — ENOXAPARIN SODIUM 40 MG: 100 INJECTION SUBCUTANEOUS at 09:02

## 2021-12-06 RX ADMIN — Medication 10 ML: at 20:59

## 2021-12-06 RX ADMIN — ANTACID TABLETS 1000 MG: 500 TABLET, CHEWABLE ORAL at 09:03

## 2021-12-06 RX ADMIN — IPRATROPIUM BROMIDE AND ALBUTEROL SULFATE 1 AMPULE: .5; 2.5 SOLUTION RESPIRATORY (INHALATION) at 21:11

## 2021-12-06 RX ADMIN — PRAZOSIN HYDROCHLORIDE 1 MG: 1 CAPSULE ORAL at 23:03

## 2021-12-06 RX ADMIN — IPRATROPIUM BROMIDE AND ALBUTEROL SULFATE 1 AMPULE: .5; 2.5 SOLUTION RESPIRATORY (INHALATION) at 16:13

## 2021-12-06 RX ADMIN — QUETIAPINE FUMARATE 400 MG: 200 TABLET ORAL at 21:00

## 2021-12-06 RX ADMIN — FOLIC ACID 1 MG: 1 TABLET ORAL at 09:03

## 2021-12-06 RX ADMIN — ACETAMINOPHEN 650 MG: 325 TABLET ORAL at 21:00

## 2021-12-06 RX ADMIN — IPRATROPIUM BROMIDE AND ALBUTEROL SULFATE 1 AMPULE: .5; 2.5 SOLUTION RESPIRATORY (INHALATION) at 09:53

## 2021-12-06 RX ADMIN — PANTOPRAZOLE SODIUM 40 MG: 40 TABLET, DELAYED RELEASE ORAL at 09:03

## 2021-12-06 RX ADMIN — HYDROXYUREA 500 MG: 500 CAPSULE ORAL at 09:03

## 2021-12-06 RX ADMIN — ANTI-FUNGAL POWDER MICONAZOLE NITRATE TALC FREE: 1.42 POWDER TOPICAL at 21:02

## 2021-12-06 RX ADMIN — SERTRALINE 100 MG: 50 TABLET, FILM COATED ORAL at 09:03

## 2021-12-06 RX ADMIN — POTASSIUM CHLORIDE 40 MEQ: 1500 TABLET, EXTENDED RELEASE ORAL at 09:02

## 2021-12-06 RX ADMIN — LEVOTHYROXINE SODIUM 50 MCG: 50 TABLET ORAL at 09:03

## 2021-12-06 ASSESSMENT — PAIN DESCRIPTION - PAIN TYPE
TYPE: NEUROPATHIC PAIN
TYPE: NEUROPATHIC PAIN

## 2021-12-06 ASSESSMENT — PAIN DESCRIPTION - DESCRIPTORS
DESCRIPTORS: BURNING
DESCRIPTORS: BURNING

## 2021-12-06 ASSESSMENT — PAIN SCALES - GENERAL
PAINLEVEL_OUTOF10: 0
PAINLEVEL_OUTOF10: 6
PAINLEVEL_OUTOF10: 7

## 2021-12-06 ASSESSMENT — PAIN DESCRIPTION - LOCATION
LOCATION: FOOT
LOCATION: FOOT

## 2021-12-06 ASSESSMENT — PAIN DESCRIPTION - ORIENTATION
ORIENTATION: POSTERIOR
ORIENTATION: POSTERIOR

## 2021-12-06 NOTE — PROGRESS NOTES
Occupational Therapy  Facility/Department: [facility] CAR 2  Daily Treatment Note  NAME: Thomas Phillips  : 1960  MRN: [id number]    Date of Service: 2021    Discharge Recommendations:  Patient would benefit from continued therapy after discharge in order to increase pt balance, strength and independence. Discussed with OTR to update POC. Assessment   Performance deficits / Impairments: Decreased functional mobility ; Decreased safe awareness; Decreased balance; Decreased coordination; Decreased ADL status; Decreased cognition; Decreased posture; Decreased endurance; Decreased high-level IADLs; Decreased strength; Decreased fine motor control  Treatment Diagnosis: Enteritis  Prognosis: Good  OT Education: OT Role; Transfer Training; Precautions  Patient Education: purpose of OT: proper hand and foot placement; balance maintaince; walker management; safety precautions  Barriers to Learning: pt demo F carry over  REQUIRES OT FOLLOW UP: Yes  Activity Tolerance  Activity Tolerance: Patient Tolerated treatment well; Patient limited by fatigue  Safety Devices  Safety Devices in place: Yes  Type of devices: Gait belt; Patient at risk for falls; Left in chair; Chair alarm in place; Call light within reach; Nurse notified  Restraints  Initially in place: No       Patient Diagnosis(es): The primary encounter diagnosis was Septicemia ([address] Diagnoses of Hyponatremia and Enteritis were also pertinent to this visit.     has a past medical history of Alcohol abuse, Anemia, Cancer ([address] Cervical stenosis of spine, Chronic left-sided low back pain with left-sided sciatica, Insomnia, Intentional drug overdose ([address] Left arm numbness, Left lumbar radiculitis, Macrocytosis, Major depressive disorder, recurrent severe without psychotic features ([address] Myeloproliferative disorder ([address] Osteoarthritis of spine with radiculopathy, lumbar region, Severe recurrent major depression without psychotic features ([address] and Device: Rolling Walker  Activity: Other  Functional Mobility Comments: EOB >chair req assist with walker management  Bed mobility  Supine to Sit: Contact guard assistance  Scooting: Contact guard assistance  Comment: HOB elevated, utilizing bed rails with increased time  Transfers  Sit to stand:  Moderate assistance  Stand to sit: Moderate assistance  Transfer Comments: utilizing RW req mod verbal/tactile cues for proper hand placement     Cognition  Overall Cognitive Status: [facility]     Plan   Plan  Times per week: 3-4X/Wk  Current Treatment Recommendations: Strengthening, ROM, Balance Training, Functional Mobility Training, Endurance Training, Patient/Caregiver Education & Training, Self-Care / ADL, Equipment Evaluation, Education, & procurement, Safety Education & Training, Home Management Training    AM-PAC Score  AM-PAC Inpatient Daily Activity Raw Score: 16 (12/06/21 1319)  AM-PAC Inpatient ADL T-Scale Score : 35.96 (12/06/21 1319)  ADL Inpatient CMS 0-100% Score: 53.32 (12/06/21 1319)  ADL Inpatient CMS G-Code Modifier : CK (12/06/21 1319)    Goals  Short term goals  Time Frame for Short term goals: Patient will, by discharge  Short term goal 1: Demo self-feeding/grooming at [facility]  Short term goal 2: Demo UB ADLs at [address] [place] [address] term goal 3: Demo LB ADLs at [facility] w/ AE PRN  Short term goal 4: Demo 10+ min of dynamic sitting at [facility] w/ unilateral hand release for engagement in ADLs  Short term goal 5: Demo 4+ min of static standing tolerance at [facility] to promote endurance for functional tasks  Short term goal 6: Demo functional transfers/mobility at [facility] w/ LRD PRN to engage in ADLs  Short term goal 7: Notify OTR to update POC as patient progresses       Therapy Time   Individual Concurrent Group Co-treatment   Time In 1135         Time Out 1206         Minutes 31         Timed Code Treatment Minutes: [address] [facility]

## 2021-12-06 NOTE — PROGRESS NOTES
[facility]  Office: [address], DO, Ezekiel Jacob, DO, Anna Estrada, DO, Lian Spimonalisa Blood, DO, Estrella Mendez MD, Luis Salomon MD, Nurys Cruz MD, Claudean Millard, MD, Kaela Aldridge MD, Dominique Holley MD, Olimpia England MD, Rusty Tubbs, DO, Rosio Ray, DO, Melita Trivedi MD,  Keila Wheatley DO, Bean Merino MD, Court Arana MD, Ann Mantilla MD, Ronald Hdz MD, Artemio De Souza MD, Radha Logan MD, Suze Verde MD, Velia Agustin [facility], [facility], CNP, Bibi Donovan, CNP, Candida Perry, CNS, Nataliia Live, CNP, William Stearns, CNP, Aníbal Vargas, CNP, Vazquez Murphy, CNP, Festus Wall CNP, CHIVO MotaC, Thomas Bergman, [facility], Loc Luke, CNP, Lidia Powell, CNP, Jerman Ayoub, CNP, Rogelio Mack, CNP, Madyson Rahman, CNP, Daniela Bentley, [facility], Tim Saint, [address] [place] [address]    Progress Note    12/6/2021    6:05 PM    Name:   Aron Acosta  MRN:     [id number]     [place]:      [de-identified]   Room:   2016/2016-01   Day:  8  Admit Date:  11/28/2021  1:56 PM    PCP:   SIMONE Benavides CNP  Code Status:  Full Code    Subjective:     C/C:   Chief Complaint   Patient presents with    Dizziness    Fatigue     Interval History Status: Slightly improved    No acute events overnight  Doing well  Discharge planning    Brief History:     81-year-old male with known medical history of myeloproliferative disorder on hydroxyurea, documentation, alcohol dependence, hypothyroidism came to the hospital for weakness and feeling off balance. In the ER patient was noted to be leukocytotic, hypotensive, initially was concern for sepsis. Patient lactic acid was 5. Patient was started on antibiotics initially and admitted. Patient underwent alcohol withdrawals and was on Librium.     Review of Systems:     Constitutional:  negative for chills, fevers, sweats, fatigue positive  Respiratory: Negative for cough, positive for shortness of breath, occasional wheezing   cardiovascular:  negative for chest pain, chest pressure/discomfort, lower extremity edema, palpitations  Gastrointestinal:  negative for abdominal pain, constipation, nausea, vomiting, 3 soft stools reported  Neurological:  negative for dizziness, headache    Medications:      Allergies:  No Known Allergies    Current Meds:   Scheduled Meds:    LORazepam  0.5 mg IntraVENous Once    potassium chloride  40 mEq Oral Daily with breakfast    midodrine  5 mg Oral TID WC    miconazole   Topical BID    ipratropium-albuterol  1 ampule Inhalation 4x daily    levoFLOXacin  750 mg Oral Daily    thiamine  100 mg Oral Daily    folic acid  1 mg Oral Daily    sodium chloride flush  5-40 mL IntraVENous 2 times per day    nicotine  1 patch TransDERmal Daily    hydroxyurea  500 mg Oral BID    levothyroxine  50 mcg Oral Daily    pantoprazole  40 mg Oral QAM AC    prazosin  1 mg Oral BID    QUEtiapine  400 mg Oral Nightly    sertraline  100 mg Oral Daily    sodium chloride flush  5-40 mL IntraVENous 2 times per day    enoxaparin  40 mg SubCUTAneous Daily    sodium chloride flush  5-40 mL IntraVENous 2 times per day    calcium carbonate  1,000 mg Oral BID     Continuous Infusions:    sodium chloride      sodium chloride      sodium chloride 25 mL (11/30/21 0102)     PRN Meds: albuterol, ondansetron, potassium chloride **OR** potassium alternative oral replacement **OR** potassium chloride, magnesium sulfate, sodium chloride flush, sodium chloride, hydrOXYzine, sodium chloride flush, sodium chloride, acetaminophen **OR** acetaminophen, sodium chloride flush, sodium chloride    Data:     Past Medical History:   has a past medical history of Alcohol abuse, Anemia, Cancer (HCC), Cervical stenosis of spine, Chronic left-sided low back pain with left-sided sciatica, Insomnia, Intentional drug overdose ([facility] Left arm numbness, Left lumbar radiculitis, Macrocytosis, Major depressive disorder, recurrent severe without psychotic features ([address] Myeloproliferative disorder ([address] Osteoarthritis of spine with radiculopathy, lumbar region, Severe recurrent major depression without psychotic features ([address] and Spondylosis of lumbar region without myelopathy or radiculopathy. Social History:   reports that he has been smoking cigarettes. He started smoking about 43 years ago. He has a 64.50 pack-year smoking history. He has never used smokeless tobacco. He reports current alcohol use of about 14.0 standard drinks of alcohol per week. He reports previous drug use. Drug: Marijuana Estil Catena). Family History:   Family History   Problem Relation Age of Onset    Breast Cancer Mother         s/p surgical complications    Diabetes Mother     Heart Failure Father     Other Brother         \"bad back\"       Vitals:  BP 96/68   Pulse 92   Temp 98.4 °F (36.9 °C) (Oral)   Resp 18   Ht 6' 1\" (1.854 m)   Wt 207 lb 1.6 oz (93.9 kg)   SpO2 91%   BMI 27.32 kg/m²   Temp (24hrs), Av.3 °F (36.8 °C), Min:97.9 °F (36.6 °C), Max:98.5 °F (36.9 °C)    No results for input(s): POCGLU in the last 72 hours. I/O (24Hr):     Intake/Output Summary (Last 24 hours) at 2021 1805  Last data filed at 2021 2300  Gross per 24 hour   Intake 240 ml   Output 200 ml   Net 40 ml       Labs:  Hematology:  Recent Labs     21  0536 21  0740   WBC 14.3* 12.3*   RBC 3.03* 3.16*   HGB 9.5* 9.7*   HCT 28.5* 30.2*   MCV 94.1 95.6   MCH 31.4 30.7   MCHC 33.3 32.1   RDW 17.5* 18.5*   * 565*   MPV 9.7 9.4     Chemistry:  Recent Labs     21  1102 21  0740 21  0432    139 141   K 3.6* 4.0 3.9    108* 108*   CO2 21 21 20   GLUCOSE 101* 92 94   BUN 3* 4* 4*   CREATININE 0.70 0.62* 0.71   ANIONGAP 10 10 13   LABGLOM >60 >60 >60   GFRAA >60 >60 >60   CALCIUM 8.6 8.5* 8.6   No results for input(s): PROT, LABALBU, LABA1C, Q1HDTSA, N1YETCD, FT4, TSH, AST, ALT, LDH, GGT, ALKPHOS, LABGGT, BILITOT, BILIDIR, AMMONIA, AMYLASE, LIPASE, LACTATE, CHOL, HDL, LDLCHOLESTEROL, CHOLHDLRATIO, TRIG, VLDL, [id number], PHENYTOIN, PHENYF, URICACID, POCGLU in the last 72 hours. ABG:No results found for: POCPH, PHART, PH, POCPCO2, FGT0EFL, PCO2, POCPO2, PO2ART, PO2, POCHCO3, FBM7GIB, HCO3, NBEA, PBEA, BEART, BE, THGBART, THB, EJB8OZS, LNMW3HLA, W1OCABOE, O2SAT, FIO2  Lab Results   Component Value Date/Time    SPECIAL NOT REPORTED 12/03/2021 02:38 PM     Lab Results   Component Value Date/Time    CULTURE NO GROWTH 5 DAYS 11/30/2021 03:46 AM       Radiology:  CT ABDOMEN PELVIS W IV CONTRAST Additional Contrast? None    Result Date: 11/28/2021  Scattered areas nonspecific air-fluid involving bowel loops could suggest underlying enteritis or diarrheal illness. No bowel obstruction. Colonic diverticulosis noted. XR CHEST PORTABLE    Result Date: 12/2/2021  Interstitial pulmonary edema and trace right pleural effusion compatible with fluid overload and/or CHF. Minimal right basilar airspace disease most likely represents atelectasis. XR CHEST PORTABLE    Result Date: 11/28/2021  Chronic elevation of the right hemidiaphragm with adjacent opacity, likely atelectasis. US ABDOMEN LIMITED Specify organ? LIVER    Result Date: 11/29/2021  1. Hepatomegaly and hepatic steatosis. Borderline cirrhotic configuration. 2. Gallbladder distention without cholelithiasis or sonographic evidence of cholecystitis.        Physical Examination:        General appearance:  alert, cooperative and moderate distress  Mental Status:  oriented to person, place and time and normal affect  Lungs: normal breath sounds bilaterally   Heart:  regular rate and rhythm, no murmur  Abdomen:  soft, nontender, nondistended, normal bowel sounds, no masses, hepatomegaly, splenomegaly  Extremities:  no edema, redness, tenderness in the calves  Skin:  no gross lesions, rashes, induration    Assessment:        [facility] Problems           Last Modified POA    * (Principal) Septicemia ([address] 11/28/2021 Yes    Alcohol abuse 11/28/2021 Yes    Major depressive disorder, recurrent severe without psychotic features ([address] (Chronic) 11/28/2021 Yes    Substance abuse ([address] (Chronic) 11/28/2021 Yes    Hyponatremia 11/28/2021 Yes    Hypothyroidism (Chronic) 11/28/2021 Yes    Transaminitis 11/28/2021 Yes    Hypocalcemia 11/29/2021 Yes    Acute respiratory failure with hypoxia ([address] 12/3/2021 Yes    Moderate protein-calorie malnutrition ([address] 12/3/2021 Yes    Hypokalemia 12/3/2021 Yes    Pneumonia of both lungs due to Mycoplasma pneumoniae 12/4/2021 Yes          Plan:        Acute hypoxic respiratory failure 2/2 mycoplasma pneumonia- continues on room air. Encourage incentive spirometry. Continue Levaquin for 7 days. Droplet precaution for mycoplasma infection. Alcohol withdrawal-continue thiamine and folate, encourage cessation    Leukocytosis secondary to myeloproliferative disorder with JAK2 mutation positive-continue Hydrea    Alcohol liver disease-Ensure complete cessation of alcohol, ultrasound with borderline cirrhotic configuration. Will need GI follow-up.     Hypothyroidism on Synthyroid    Replace electrolyte as needed    Encourage oral intake for malnutrition  Continue all home psych medicines  Nicotine patch for smoking    Lovenox for DVT prophylaxis  PT OT evaluation  Ready for discharge, issues with placement    Izzy Casas MD  12/6/2021  6:05 PM

## 2021-12-06 NOTE — PROGRESS NOTES
Physical Therapy  Facility/Department: [facility] CAR 2  Daily Treatment Note  NAME: Thomas Phillips  : 1960  MRN: [id number]    Date of Service: 2021    Discharge Recommendations:  Patient would benefit from continued therapy after discharge     PT Equipment Recommendations  Equipment Needed: No    Assessment   Assessment: Pt presents with increased tremors in BLE with activity. Pt deferred gait training this date due to c/o pain in B Feet. Pt would benefit from continued PT following discharge to address functional deficits to regain prior level of independence. Prognosis: Good  Decision Making: Medium Complexity  Clinical Presentation: evolving     PT Education: Goals; General Safety; Gait Training; Plan of Care; Equipment; Functional Mobility Training; Injury Prevention; Precautions; Transfer Training  REQUIRES PT FOLLOW UP: Yes  Activity Tolerance  Activity Tolerance: Patient limited by fatigue     Patient Diagnosis(es): The primary encounter diagnosis was Septicemia ([address] Diagnoses of Hyponatremia and Enteritis were also pertinent to this visit. has a past medical history of Alcohol abuse, Anemia, Cancer ([address] Cervical stenosis of spine, Chronic left-sided low back pain with left-sided sciatica, Insomnia, Intentional drug overdose ([address] Left arm numbness, Left lumbar radiculitis, Macrocytosis, Major depressive disorder, recurrent severe without psychotic features ([address] Myeloproliferative disorder ([address] Osteoarthritis of spine with radiculopathy, lumbar region, Severe recurrent major depression without psychotic features ([address] and Spondylosis of lumbar region without myelopathy or radiculopathy. has a past surgical history that includes cervical fusion; Appendectomy; Tonsillectomy; Carpal tunnel release; epidural steroid injection (Left, 5/15/2019);  Endoscopy, colon, diagnostic; cervical fusion (2019); and cervical fusion (N/A, 9/5/2019). Restrictions  Restrictions/Precautions  Restrictions/Precautions: Seizure, General Precautions, Fall Risk, Isolation, Up as Tolerated  Required Braces or Orthoses?: No  Position Activity Restriction  Other position/activity restrictions: Up w/assist  Subjective   Pt sitting up in his chair. Pt declined to ambulate d/t anticipated pain in B feet with WB. Pt had no c/o pain when asked. Orientation    Overall Orientation Status: Within Functional Limits    Objective       Ex's  Upper extremity exercises: Bicep curl, shoulder flexion/extension, punches. Reps: 2 x 10 with 2 lb wt on a SPC. 4 lbs with Biceps. Seated LE exercise program: Long Arc Quads,heel/toe raises, and marches. Reps: 2 x 10 with 2 lb wt on B LE's. Comment: Several extended breaks were taken d/t fatigue and LE shakiness. AM-PAC Score  AM-PAC Inpatient Mobility Raw Score : 12 (12/06/21 1459)  -PAC Inpatient T-Scale Score : 35.33 (12/06/21 1459)  Mobility Inpatient CMS 0-100% Score: 68.66 (12/06/21 [facility]9)  Mobility Inpatient CMS G-Code Modifier : CL (12/06/21 1459)      Goals  Short term goals  Time Frame for Short term goals: 14 visits  Short term goal 1: Supine to/from sit with SBA. Short term goal 2: Sit to/from stand with SBA. Short term goal 3: Ambulate 22' with walker with CGA. Short term goal 4: Stand and march in place with BUE on walker 60 seconds with stable vitals. Plan    Plan  Times per week: 5-6x/wk  Current Treatment Recommendations: Strengthening, Home Exercise Program, Safety Education & Training, Endurance Training, Patient/Caregiver Education & Training, Functional Mobility Training, Transfer Training, Gait Training, Equipment Evaluation, Education, & procurement  Safety Devices  Type of devices:  All fall risk precautions in place, Bed alarm in place, Call light within reach, Gait belt, Left in chair, Nurse notified, Patient at risk for falls  Restraints  Initially in place: No     Therapy Time   Individual Concurrent Group Co-treatment   Time In  225         Time Out  250         Minutes  25                 [facility] Hospital[facility], [place]

## 2021-12-06 NOTE — CARE COORDINATION
Transitional planning-1100 Called Nitesh Forman and talked with Chris-unable to accept due to drug and alcohol abuse. [facility]. Left message with Ruben Mendieta, left message with [facility]. [address] and talked with Basim. Called Xavi Negrete-no beds. Left message with Jin Keita at [facility]. 1130 call from Erica Zelaya at [facility] beds  [address] from [address] [facility] call from Bascom at [place] to accept due to drug and alcohol abuse  [phone] asked for more SNF choices  26 call from Vidya Johnson at [address] beds until Thursday    1630 Patient chose [facility], [address] [place] [address], [place] [address], [facility]. Referral called to [facility] and faxed to [facility]. Referral faxed to [facility] with Bear Washburn. Referral faxed to [facility]-talked with Saint Davies.

## 2021-12-06 NOTE — PLAN OF CARE
Problem: Nutrition  Goal: Optimal nutrition therapy  12/6/2021 1510 by Rusty Naqvi RN  Outcome: Ongoing  12/6/2021 0408 by aPige Lopez RN  Outcome: Ongoing     Problem: Falls - Risk of:  Goal: Will remain free from falls  Description: Will remain free from falls  12/6/2021 1510 by Rusty Naqvi RN  Outcome: Ongoing  12/6/2021 0408 by Paige Lopez RN  Outcome: Ongoing  Goal: Absence of physical injury  Description: Absence of physical injury  12/6/2021 1510 by Rusty Naqvi RN  Outcome: Ongoing  12/6/2021 0408 by Paige Lopez RN  Outcome: Ongoing     Problem: Skin Integrity:  Goal: Will show no infection signs and symptoms  Description: Will show no infection signs and symptoms  12/6/2021 1510 by Rusty Naqvi RN  Outcome: Ongoing  12/6/2021 0408 by Paige Lopez RN  Outcome: Ongoing  Goal: Absence of new skin breakdown  Description: Absence of new skin breakdown  12/6/2021 1510 by Rusty Naqvi RN  Outcome: Ongoing  12/6/2021 0408 by Paige Lopez RN  Outcome: Ongoing     Problem: Pain:  Goal: Pain level will decrease  Description: Pain level will decrease  12/6/2021 1510 by Rusty Naqvi RN  Outcome: Ongoing  12/6/2021 0408 by Paige Lopez RN  Outcome: Ongoing  Goal: Control of acute pain  Description: Control of acute pain  12/6/2021 1510 by Rusty Naqvi RN  Outcome: Ongoing  12/6/2021 0408 by Paige Lopez RN  Outcome: Ongoing  Goal: Control of chronic pain  Description: Control of chronic pain  12/6/2021 1510 by Rusty Naqvi RN  Outcome: Ongoing  12/6/2021 0408 by Paige Lopez RN  Outcome: Ongoing     Problem: IP BALANCE  Goal: BALANCE EDUCATION  Description: Educate patients on maintaining dynamic/static standing/sitting balance, with/without upper extremity support.   12/6/2021 1510 by Rusty Naqvi RN  Outcome: Ongoing  12/6/2021 0408 by Paige Lopez RN  Outcome: Ongoing     Problem: IP MOBILITY  Goal: LTG - patient will ambulate household distance  12/6/2021 1510 by Sharla Cortez RN  Outcome: Ongoing  12/6/2021 0408 by Didier Norwood RN  Outcome: Ongoing

## 2021-12-06 NOTE — FLOWSHEET NOTE
Assessment: Patient is a 62 y.o. male who was admitted to the hospital due to \"dizziness and fatigue. \" Patient answered call when  reached out by phone outside room. Intervention:  visited patient per initial rounding visits.  inquired about patient's well-being and learned about his plans for discharge.  learned from patient that he and his daughter had a \"falling out,\" however, she visited with him on his first day in the hospital. Patient shared feelings of hopefulness regarding their relationship. Patient has been hospitalized for over a week and is hopeful for discharge to facility.  learned that patient has his cell phone to contact family, however, he does not have a .  was unable to find a spare  for patient. Patient was open and receptive to 's call and offer of support. Outcome: Patient was receptive of 's visit and support. Plan: Chaplains can make follow-up visit, per request. Ari Vasquez can be reached 24/7 via [employer]. Khai Mello     12/05/21 2106   Encounter Summary   Services provided to: Patient   Referral/Consult From: [facility]; Family members   Place of [facility] Visiting   (12/05/2021)   Complexity of Encounter Moderate   Length of Encounter 15 minutes   Spiritual Assessment Completed Yes   Routine   Type Initial   Spiritual/Anglican   Type Spiritual support   Assessment Calm; Approachable; Hopeful; Coping; Helplessness   Intervention Active listening; Explored feelings, thoughts, concerns; Explored coping resources; Nurtured hope; Sustaining presence/ Ministry of presence;  Discussed illness/injury and it's impact   Outcome Expressed gratitude; Receptive

## 2021-12-07 PROCEDURE — 6370000000 HC RX 637 (ALT 250 FOR IP): Performed by: STUDENT IN AN ORGANIZED HEALTH CARE EDUCATION/TRAINING PROGRAM

## 2021-12-07 PROCEDURE — 2580000003 HC RX 258: Performed by: NURSE PRACTITIONER

## 2021-12-07 PROCEDURE — 94640 AIRWAY INHALATION TREATMENT: CPT

## 2021-12-07 PROCEDURE — 6370000000 HC RX 637 (ALT 250 FOR IP): Performed by: NURSE PRACTITIONER

## 2021-12-07 PROCEDURE — 2060000000 HC ICU INTERMEDIATE R&B

## 2021-12-07 PROCEDURE — 97110 THERAPEUTIC EXERCISES: CPT

## 2021-12-07 PROCEDURE — 99232 SBSQ HOSP IP/OBS MODERATE 35: CPT | Performed by: STUDENT IN AN ORGANIZED HEALTH CARE EDUCATION/TRAINING PROGRAM

## 2021-12-07 PROCEDURE — 97530 THERAPEUTIC ACTIVITIES: CPT

## 2021-12-07 PROCEDURE — 94761 N-INVAS EAR/PLS OXIMETRY MLT: CPT

## 2021-12-07 PROCEDURE — 6360000002 HC RX W HCPCS: Performed by: NURSE PRACTITIONER

## 2021-12-07 RX ORDER — PREGABALIN 75 MG/1
75 CAPSULE ORAL 2 TIMES DAILY
Status: DISCONTINUED | OUTPATIENT
Start: 2021-12-07 | End: 2021-12-09

## 2021-12-07 RX ORDER — PREGABALIN 75 MG/1
75 CAPSULE ORAL 2 TIMES DAILY
Qty: 60 CAPSULE | Refills: 0 | Status: SHIPPED | OUTPATIENT
Start: 2021-12-07 | End: 2021-12-09 | Stop reason: HOSPADM

## 2021-12-07 RX ORDER — LEVOFLOXACIN 750 MG/1
750 TABLET ORAL DAILY
Qty: 2 TABLET | Refills: 0 | Status: SHIPPED | OUTPATIENT
Start: 2021-12-07 | End: 2021-12-09 | Stop reason: HOSPADM

## 2021-12-07 RX ORDER — IPRATROPIUM BROMIDE AND ALBUTEROL SULFATE 2.5; .5 MG/3ML; MG/3ML
1 SOLUTION RESPIRATORY (INHALATION) EVERY 6 HOURS PRN
Status: DISCONTINUED | OUTPATIENT
Start: 2021-12-07 | End: 2021-12-09 | Stop reason: HOSPADM

## 2021-12-07 RX ADMIN — PRAZOSIN HYDROCHLORIDE 1 MG: 1 CAPSULE ORAL at 20:26

## 2021-12-07 RX ADMIN — PRAZOSIN HYDROCHLORIDE 1 MG: 1 CAPSULE ORAL at 09:40

## 2021-12-07 RX ADMIN — IPRATROPIUM BROMIDE AND ALBUTEROL SULFATE 1 AMPULE: .5; 2.5 SOLUTION RESPIRATORY (INHALATION) at 10:00

## 2021-12-07 RX ADMIN — SERTRALINE 100 MG: 50 TABLET, FILM COATED ORAL at 09:41

## 2021-12-07 RX ADMIN — MIDODRINE HYDROCHLORIDE 5 MG: 5 TABLET ORAL at 12:45

## 2021-12-07 RX ADMIN — FOLIC ACID 1 MG: 1 TABLET ORAL at 09:41

## 2021-12-07 RX ADMIN — HYDROXYUREA 500 MG: 500 CAPSULE ORAL at 20:28

## 2021-12-07 RX ADMIN — ANTI-FUNGAL POWDER MICONAZOLE NITRATE TALC FREE: 1.42 POWDER TOPICAL at 10:33

## 2021-12-07 RX ADMIN — HYDROXYZINE HYDROCHLORIDE 50 MG: 25 TABLET ORAL at 17:46

## 2021-12-07 RX ADMIN — ANTACID TABLETS 1000 MG: 500 TABLET, CHEWABLE ORAL at 20:26

## 2021-12-07 RX ADMIN — ANTACID TABLETS 1000 MG: 500 TABLET, CHEWABLE ORAL at 09:40

## 2021-12-07 RX ADMIN — PREGABALIN 75 MG: 75 CAPSULE ORAL at 20:27

## 2021-12-07 RX ADMIN — PREGABALIN 75 MG: 75 CAPSULE ORAL at 11:57

## 2021-12-07 RX ADMIN — HYDROXYUREA 500 MG: 500 CAPSULE ORAL at 09:43

## 2021-12-07 RX ADMIN — POTASSIUM CHLORIDE 40 MEQ: 1500 TABLET, EXTENDED RELEASE ORAL at 08:07

## 2021-12-07 RX ADMIN — ENOXAPARIN SODIUM 40 MG: 100 INJECTION SUBCUTANEOUS at 09:42

## 2021-12-07 RX ADMIN — ANTI-FUNGAL POWDER MICONAZOLE NITRATE TALC FREE: 1.42 POWDER TOPICAL at 20:26

## 2021-12-07 RX ADMIN — QUETIAPINE FUMARATE 400 MG: 200 TABLET ORAL at 20:26

## 2021-12-07 RX ADMIN — Medication 10 ML: at 10:06

## 2021-12-07 RX ADMIN — LEVOTHYROXINE SODIUM 50 MCG: 50 TABLET ORAL at 08:06

## 2021-12-07 RX ADMIN — Medication 10 ML: at 20:38

## 2021-12-07 RX ADMIN — Medication 100 MG: at 09:41

## 2021-12-07 RX ADMIN — LEVOFLOXACIN 750 MG: 750 TABLET, FILM COATED ORAL at 09:41

## 2021-12-07 RX ADMIN — PANTOPRAZOLE SODIUM 40 MG: 40 TABLET, DELAYED RELEASE ORAL at 08:06

## 2021-12-07 RX ADMIN — MIDODRINE HYDROCHLORIDE 5 MG: 5 TABLET ORAL at 17:46

## 2021-12-07 ASSESSMENT — PAIN SCALES - GENERAL
PAINLEVEL_OUTOF10: 0
PAINLEVEL_OUTOF10: 5
PAINLEVEL_OUTOF10: 0
PAINLEVEL_OUTOF10: 5

## 2021-12-07 ASSESSMENT — PAIN DESCRIPTION - LOCATION: LOCATION: FOOT

## 2021-12-07 ASSESSMENT — PAIN DESCRIPTION - PAIN TYPE: TYPE: CHRONIC PAIN

## 2021-12-07 NOTE — CARE COORDINATION
Transitional planning:  Received call from Akanksha Navarro at [facility], has a bed for this pt. Asking about his drug history. Pt has tremors per unit RN from past ETOH abuse. 1043 Attempted to call pt's room and called his cell phone and left message to call NCM back. [address] pt and asked about his SNF preference. He states he is having pain today and would like to wait until Thursday to go to [facility]. He was notified [facility] has a bed today. When asked what his preference was, he said he really didn't know. Just is having pains and he can't hardly walk and wants to wait until Thursday. [address] back at [facility] at [phone] and notified pt states he mostly drinks alcohol and a few months ago had a \"little CBD. \"     [address] at [facility], she is still waiting for administration to accept because of drug and alcohol history. States found +for methadone and CBD in June 2021.     1340 Received vm from Mikala Aly at [facility], can accept this pt. Call her back at [phone]. At same time, received denial from  [facility],  due to substance abuse issues per Precious Sanchez on vm.     1348 Notified pt of above and asked for a choice of [facility] or [facility]. He is going to call his brother and find out which one is \"better. \"    [phone] pt and he chose [facility]. Matt Phan, admissions back at [phone] and she will start precert today. [address] back at [facility], [phone]. Notified pt chose another facility instead. She voiced understanding.

## 2021-12-07 NOTE — PROGRESS NOTES
Physical Therapy  Facility/Department: [facility] CAR 2  Daily Treatment Note  NAME: Gaston Loo  : 1960  MRN: [id number]    Date of Service: 2021    Discharge Recommendations:  Patient would benefit from continued therapy after discharge        Assessment   Body structures, Functions, Activity limitations: Decreased functional mobility ; Decreased endurance; Decreased balance; Decreased strength; Decreased posture  Assessment: Pt presents with mild tremors in BLE with during static standing with HR increasing to  bpm with recovery to 85-95 when sitting down; RN notified. Pt requires mod-A during STS transfer using a RW along with verbal cues for hand placement and sequencing. Pt demos a standing tolerance 15 seconds- 1 minute before required seated rest break. Pt deferred gait training this date due to mild tremors in B LEs. Pt would benefit form continued PT following discharge to address functional deficits to regain prior level of independence. Prognosis: Good  PT Education: Goals; General Safety; Gait Training; Plan of Care; Equipment; Functional Mobility Training; Injury Prevention; Precautions; Transfer Training  REQUIRES PT FOLLOW UP: Yes  Activity Tolerance  Activity Tolerance: Patient limited by fatigue  Activity Tolerance: Pt having mild tremors in B LEs this day when standing. Patient Diagnosis(es): The primary encounter diagnosis was Septicemia ([address] Diagnoses of Hyponatremia, Enteritis, and Alcoholic peripheral neuropathy ([address] were also pertinent to this visit.      has a past medical history of Alcohol abuse, Anemia, Cancer ([address] Cervical stenosis of spine, Chronic left-sided low back pain with left-sided sciatica, Insomnia, Intentional drug overdose ([address] Left arm numbness, Left lumbar radiculitis, Macrocytosis, Major depressive disorder, recurrent severe without psychotic features ([address] Myeloproliferative disorder ([address] Osteoarthritis of spine with radiculopathy, lumbar region, Severe recurrent major depression without psychotic features ([facility] and Spondylosis of lumbar region without myelopathy or radiculopathy. has a past surgical history that includes cervical fusion; Appendectomy; Tonsillectomy; Carpal tunnel release; epidural steroid injection (Left, 5/15/2019); Endoscopy, colon, diagnostic; cervical fusion (09/05/2019); and cervical fusion (N/A, 9/5/2019). Restrictions  Restrictions/Precautions  Restrictions/Precautions: Seizure, General Precautions, Fall Risk, Isolation, Up as Tolerated  Required Braces or Orthoses?: No  Position Activity Restriction  Other position/activity restrictions: Up w/assist  Subjective   General  Chart Reviewed: Yes  Response To Previous Treatment: Patient with no complaints from previous session. Family / Caregiver Present: No  Subjective  Subjective: Pt and RN agreeable to PT. Pt in recliner upon arrival. Pt pleasant and cooperative. General Comment  Comments: Pt retired to recliner with call light and chair alarm. RN notified of HR increase to 174 when standing but subsided immediately when sitting down. Pain Screening  Patient Currently in Pain: Denies  Vital Signs  Patient Currently in Pain: Denies       Orientation  Orientation  Overall Orientation Status: Within Functional Limits  Cognition      Objective      Transfers  Sit to Stand: Moderate Assistance  Stand to sit: Minimal Assistance  Comment: Assessed with RW in front of pt. Balance  Posture: Good  Sitting - Static: Fair; +  Sitting - Dynamic: Fair; -  Standing - Static: Fair; -  Standing - Dynamic: Poor  Comments: Assessed with RW with B UE support; pt had mild tremors in B LEs. Exercises  Hip Abduction: x10 reps  Knee Long Arc Quad: x 10 reps  Ankle Pumps: x 10 reps  Comments: Pt staticly stood at RW x 15 seconds when HR increased to 174 bpm.  Pt sat down immediately and recovered to 85 bpm. Pt stood a second time and HR increased to 95 x 1 minute.   Pt stood a 3rd time x 30 seconds with HR increasing to 95 bpm again. AM-PAC Score  AM-PAC Inpatient Mobility Raw Score : 12 (12/07/21 1502)  AM-PAC Inpatient T-Scale Score : 35.33 (12/07/21 1502)  Mobility Inpatient CMS 0-100% Score: 68.66 (12/07/21 1502)  Mobility Inpatient CMS G-Code Modifier : CL (12/07/21 1502)          Goals  Short term goals  Time Frame for Short term goals: 14 visits  Short term goal 1: Supine to/from sit with SBA. Short term goal 2: Sit to/from stand with SBA. Short term goal 3: Ambulate 22' with walker with CGA. Short term goal 4: Stand and march in place with BUE on walker 60 seconds with stable vitals. Plan    Plan  Times per week: 5-6x/wk  Current Treatment Recommendations: Strengthening, Home Exercise Program, Safety Education & Training, Endurance Training, Patient/Caregiver Education & Training, Functional Mobility Training, Transfer Training, Gait Training, Equipment Evaluation, Education, & procurement  Safety Devices  Type of devices:  All fall risk precautions in place, Call light within reach, Gait belt, Nurse notified, Patient at risk for falls, Chair alarm in place, Left in chair  Restraints  Initially in place: No     Therapy Time   Individual Concurrent Group Co-treatment   Time In 1357         Time Out 1425         Minutes 28         Timed Code Treatment Minutes: Mayco Sanchez

## 2021-12-07 NOTE — PROGRESS NOTES
fevers, sweats, fatigue positive  Respiratory: Negative for cough, positive for shortness of breath, occasional wheezing   cardiovascular:  negative for chest pain, chest pressure/discomfort, lower extremity edema, palpitations  Gastrointestinal:  negative for abdominal pain, constipation, nausea, vomiting, 3 soft stools reported  Neurological:  negative for dizziness, headache    Medications:      Allergies:  No Known Allergies    Current Meds:   Scheduled Meds:    pregabalin  75 mg Oral BID    LORazepam  0.5 mg IntraVENous Once    potassium chloride  40 mEq Oral Daily with breakfast    midodrine  5 mg Oral TID WC    miconazole   Topical BID    levoFLOXacin  750 mg Oral Daily    thiamine  100 mg Oral Daily    folic acid  1 mg Oral Daily    sodium chloride flush  5-40 mL IntraVENous 2 times per day    nicotine  1 patch TransDERmal Daily    hydroxyurea  500 mg Oral BID    levothyroxine  50 mcg Oral Daily    pantoprazole  40 mg Oral QAM AC    prazosin  1 mg Oral BID    QUEtiapine  400 mg Oral Nightly    sertraline  100 mg Oral Daily    sodium chloride flush  5-40 mL IntraVENous 2 times per day    enoxaparin  40 mg SubCUTAneous Daily    sodium chloride flush  5-40 mL IntraVENous 2 times per day    calcium carbonate  1,000 mg Oral BID     Continuous Infusions:    sodium chloride      sodium chloride      sodium chloride 25 mL (11/30/21 0102)     PRN Meds: ipratropium-albuterol, albuterol, ondansetron, potassium chloride **OR** potassium alternative oral replacement **OR** potassium chloride, magnesium sulfate, sodium chloride flush, sodium chloride, hydrOXYzine, sodium chloride flush, sodium chloride, acetaminophen **OR** acetaminophen, sodium chloride flush, sodium chloride    Data:     Past Medical History:   has a past medical history of Alcohol abuse, Anemia, Cancer (HCC), Cervical stenosis of spine, Chronic left-sided low back pain with left-sided sciatica, Insomnia, Intentional drug overdose LACTATE, CHOL, HDL, LDLCHOLESTEROL, CHOLHDLRATIO, TRIG, VLDL, [id number], PHENYTOIN, PHENYF, URICACID, POCGLU in the last 72 hours. ABG:No results found for: POCPH, PHART, PH, POCPCO2, NLN6EBH, PCO2, POCPO2, PO2ART, PO2, POCHCO3, VCZ6SEQ, HCO3, NBEA, PBEA, BEART, BE, THGBART, THB, WRJ0GOJ, CYVK6GWW, I8WMHYSL, O2SAT, FIO2  Lab Results   Component Value Date/Time    SPECIAL NOT REPORTED 12/03/2021 02:38 PM     Lab Results   Component Value Date/Time    CULTURE NO GROWTH 5 DAYS 11/30/2021 03:46 AM       Radiology:  CT ABDOMEN PELVIS W IV CONTRAST Additional Contrast? None    Result Date: 11/28/2021  Scattered areas nonspecific air-fluid involving bowel loops could suggest underlying enteritis or diarrheal illness. No bowel obstruction. Colonic diverticulosis noted. XR CHEST PORTABLE    Result Date: 12/2/2021  Interstitial pulmonary edema and trace right pleural effusion compatible with fluid overload and/or CHF. Minimal right basilar airspace disease most likely represents atelectasis. XR CHEST PORTABLE    Result Date: 11/28/2021  Chronic elevation of the right hemidiaphragm with adjacent opacity, likely atelectasis. US ABDOMEN LIMITED Specify organ? LIVER    Result Date: 11/29/2021  1. Hepatomegaly and hepatic steatosis. Borderline cirrhotic configuration. 2. Gallbladder distention without cholelithiasis or sonographic evidence of cholecystitis.        Physical Examination:        General appearance:  alert, cooperative and moderate distress  Mental Status:  oriented to person, place and time and normal affect  Lungs: normal breath sounds bilaterally   Heart:  regular rate and rhythm, no murmur  Abdomen:  soft, nontender, nondistended, normal bowel sounds, no masses, hepatomegaly, splenomegaly  Extremities:  no edema, redness, tenderness in the calves  Skin:  no gross lesions, rashes, induration    Assessment:        Hospital Problems           Last Modified POA    * (Principal) Septicemia ([facility] 11/28/2021 Yes    Alcohol abuse 11/28/2021 Yes    Major depressive disorder, recurrent severe without psychotic features ([address] (Chronic) 11/28/2021 Yes    Substance abuse ([address] (Chronic) 11/28/2021 Yes    Hyponatremia 11/28/2021 Yes    Hypothyroidism (Chronic) 11/28/2021 Yes    Transaminitis 11/28/2021 Yes    Hypocalcemia 11/29/2021 Yes    Acute respiratory failure with hypoxia ([address] 12/3/2021 Yes    Moderate protein-calorie malnutrition ([address] 12/3/2021 Yes    Hypokalemia 12/3/2021 Yes    Pneumonia of both lungs due to Mycoplasma pneumoniae 12/4/2021 Yes          Plan:        Acute hypoxic respiratory failure 2/2 mycoplasma pneumonia- continues on room air. Encourage incentive spirometry. Continue Levaquin for 7 days. Droplet precaution for mycoplasma infection. Alcohol-induced neuropathy-start Lyrica 75 mg twice daily, patient strongly encouraged to quit alcohol use. Alcohol withdrawal-continue thiamine and folate, encourage cessation    Leukocytosis secondary to myeloproliferative disorder with JAK2 mutation positive-continue Hydrea    Alcohol liver disease-Ensure complete cessation of alcohol, ultrasound with borderline cirrhotic configuration.   Will need GI follow-up outpatient    Hypothyroidism on Synthyroid    Replace electrolyte as needed    Encourage oral intake for malnutrition  Continue all home psych medicines  Nicotine patch for smoking    Lovenox for DVT prophylaxis  PT OT evaluation  Ready for discharge, issues with placement    Rajwinder Mohan MD  12/7/2021  1:36 PM

## 2021-12-07 NOTE — PROGRESS NOTES
ellie curry, [facility]. Enteritis [K52.9]  Hyponatremia [E87.1]  Septicemia ([address] [A41.9]  Sepsis ([facility] [address] [A41.9] . Vitals:    12/07/21 1200   BP: 111/83   Pulse: 93   Resp:    Temp:    SpO2: 93%   . Patients home meds are   Prior to Admission medications    Medication Sig Start Date End Date Taking? Authorizing Provider   pregabalin (LYRICA) 75 MG capsule Take 1 capsule by mouth 2 times daily for 30 days.  12/7/21 1/6/22 Yes Ashley Gutierrez MD   levoFLOXacin (LEVAQUIN) 750 MG tablet Take 1 tablet by mouth daily for 2 doses 12/7/21 12/9/21 Yes Ashley Gutierrez MD   calcium carbonate (TUMS) 500 MG chewable tablet Take 2 tablets by mouth 2 times daily 12/4/21 1/3/22 Yes Ashley Gutierrez MD   midodrine (PROAMATINE) 5 MG tablet Take 1 tablet by mouth 3 times daily (with meals) 12/4/21  Yes Ashley Gutierrez MD   hydroxyurea (HYDREA) 500 MG chemo capsule Take 500 mg by mouth 2 times daily   Yes Historical Provider, MD   vitamin B-1 (THIAMINE) 100 MG tablet Take 1 tablet by mouth daily 6/24/21  Yes Veronique Crenshaw MD   nicotine (NICODERM CQ) 21 MG/24HR Place 1 patch onto the skin every 24 hours 6/24/21  Yes Veronique Crenshaw MD   folic acid (FOLVITE) 1 MG tablet Take 1 tablet by mouth daily 6/24/21  Yes Veronique Crenshaw MD   Multiple Vitamins-Minerals (THERAPEUTIC MULTIVITAMIN-MINERALS) tablet Take 1 tablet by mouth daily 6/3/21 6/3/22 Yes Lisandra Romano MD   QUEtiapine (SEROQUEL) 400 MG tablet Take 400 mg by mouth nightly 2/19/21  Yes Historical Provider, MD   sertraline (ZOLOFT) 100 MG tablet Take 1 tablet by mouth daily 7/17/21   Garrett Stone MD   levothyroxine (SYNTHROID) 50 MCG tablet Take 1 tablet by mouth daily Take in the morning on an empty stomach 1 hour before any food or other medications 6/24/21   Veronique Crenshaw MD   naltrexone (DEPADE) 50 MG tablet Take 50 mg by mouth daily    Historical Provider, MD   prazosin (MINIPRESS) 1 MG capsule Take 1 mg by mouth 2 times daily    Historical []  Yes     []  Secretion Management Assessment  Score 1 2 3   Bilateral Breath Sounds   []  Occasional Rhonchi []  Scattered Rhonchi []  Course Rhonchi and/or poor aeration   Sputum    []  Small amount of thin secretions []  Moderate amount of viscous secretions []  Copius, Viscious Yellow/ Secretions   CXR as reported by physician []  clear  []  Unavailable []  Infiltrates and/or consolidation  []  Unavailable []  Mucus Plugging and or lobar consolidation  []  Unavailable   Cough []  Strong, productive cough []  Weak productive cough []  No cough or weak non-productive cough   ellie curry, [facility]  12:29 PM                            FEMALE                                  MALE                            FEV1 Predicted Normal Values                        FEV1 Predicted Normal Values          Age                                     Height in Feet and Inches       Age                                     Height in Feet and Inches       4' 11\" 5' 1\" 5' 3\" 5' 5\" 5' 7\" 5' 9\" 5' 11\" 6' 1\"  4' 11\" 5' 1\" 5' 3\" 5' 5\" 5' 7\" 5' 9\" 5' 11\" 6' 1\"   42 - 45 2.49 2.66 2.84 3.03 3.22 3.42 3.62 3.83 42 - 45 2.82 3.03 3.26 3.49 3.72 3.96 4.22 4.47   46 - 49 2.40 2.57 2.76 2.94 3.14 3.33 3.54 3.75 46 - 49 2.70 2.92 3.14 3.37 3.61 3.85 4.10 4.36   50 - 53 2.31 2.48 2.66 2.85 3.04 3.24 3.45 3.66 50 - 53 2.58 2.80 3.02 3.25 3.49 3.73 3.98 4.24   54 - 57 2.21 2.38 2.57 2.75 2.95 3.14 3.35 3.56 54 - 57 2.46 2.67 2.89 3.12 3.36 3.60 3.85 4.11   58 - 61 2.10 2.28 2.46 2.65 2.84 3.04 3.24 3.45 58 - 61 2.32 2.54 2.76 2.99 3.23 3.47 3.72 3.98   62 - 65 1.99 2.17 2.35 2.54 2.73 2.93 3.13 3.34 62 - 65 2.19 2.40 2.62 2.85 3.09 3.33 3.58 3.84   66 - 69 1.88 2.05 2.23 2.42 2.61 2.81 3.02 3.23 66 - 69 2.04 2.26 2.48 2.71 2.95 3.19 3.44 3.70   70+ 1.82 1.99 2.17 2.36 2.55 2.75 2.95 3.16 70+ 1.97 2.19 2.41 2.64 2.87 3.12 3.37 3.62             Predicted Peak Expiratory Flow Rate                                       Height (in)  Female

## 2021-12-08 PROCEDURE — 99232 SBSQ HOSP IP/OBS MODERATE 35: CPT | Performed by: STUDENT IN AN ORGANIZED HEALTH CARE EDUCATION/TRAINING PROGRAM

## 2021-12-08 PROCEDURE — 97116 GAIT TRAINING THERAPY: CPT

## 2021-12-08 PROCEDURE — 6370000000 HC RX 637 (ALT 250 FOR IP): Performed by: STUDENT IN AN ORGANIZED HEALTH CARE EDUCATION/TRAINING PROGRAM

## 2021-12-08 PROCEDURE — 97110 THERAPEUTIC EXERCISES: CPT

## 2021-12-08 PROCEDURE — 6370000000 HC RX 637 (ALT 250 FOR IP): Performed by: NURSE PRACTITIONER

## 2021-12-08 PROCEDURE — 2580000003 HC RX 258: Performed by: STUDENT IN AN ORGANIZED HEALTH CARE EDUCATION/TRAINING PROGRAM

## 2021-12-08 PROCEDURE — 6360000002 HC RX W HCPCS: Performed by: NURSE PRACTITIONER

## 2021-12-08 PROCEDURE — 2060000000 HC ICU INTERMEDIATE R&B

## 2021-12-08 PROCEDURE — 94761 N-INVAS EAR/PLS OXIMETRY MLT: CPT

## 2021-12-08 RX ORDER — FUROSEMIDE 20 MG/1
20 TABLET ORAL DAILY
Qty: 60 TABLET | Refills: 3 | Status: SHIPPED | OUTPATIENT
Start: 2021-12-08 | End: 2021-12-08 | Stop reason: HOSPADM

## 2021-12-08 RX ORDER — FUROSEMIDE 20 MG/1
20 TABLET ORAL DAILY
Status: DISCONTINUED | OUTPATIENT
Start: 2021-12-08 | End: 2021-12-08

## 2021-12-08 RX ORDER — POLYETHYLENE GLYCOL 3350 17 G/17G
17 POWDER, FOR SOLUTION ORAL DAILY PRN
Status: DISCONTINUED | OUTPATIENT
Start: 2021-12-08 | End: 2021-12-09 | Stop reason: HOSPADM

## 2021-12-08 RX ADMIN — PRAZOSIN HYDROCHLORIDE 1 MG: 1 CAPSULE ORAL at 21:56

## 2021-12-08 RX ADMIN — POTASSIUM CHLORIDE 40 MEQ: 1500 TABLET, EXTENDED RELEASE ORAL at 08:44

## 2021-12-08 RX ADMIN — ANTACID TABLETS 1000 MG: 500 TABLET, CHEWABLE ORAL at 21:56

## 2021-12-08 RX ADMIN — PREGABALIN 75 MG: 75 CAPSULE ORAL at 21:56

## 2021-12-08 RX ADMIN — FUROSEMIDE 20 MG: 20 TABLET ORAL at 11:09

## 2021-12-08 RX ADMIN — ANTI-FUNGAL POWDER MICONAZOLE NITRATE TALC FREE: 1.42 POWDER TOPICAL at 21:57

## 2021-12-08 RX ADMIN — SODIUM CHLORIDE, PRESERVATIVE FREE 10 ML: 5 INJECTION INTRAVENOUS at 22:02

## 2021-12-08 RX ADMIN — MIDODRINE HYDROCHLORIDE 5 MG: 5 TABLET ORAL at 08:44

## 2021-12-08 RX ADMIN — ANTACID TABLETS 1000 MG: 500 TABLET, CHEWABLE ORAL at 08:43

## 2021-12-08 RX ADMIN — ANTI-FUNGAL POWDER MICONAZOLE NITRATE TALC FREE: 1.42 POWDER TOPICAL at 08:47

## 2021-12-08 RX ADMIN — LEVOTHYROXINE SODIUM 50 MCG: 50 TABLET ORAL at 08:43

## 2021-12-08 RX ADMIN — ENOXAPARIN SODIUM 40 MG: 100 INJECTION SUBCUTANEOUS at 08:45

## 2021-12-08 RX ADMIN — SODIUM CHLORIDE, PRESERVATIVE FREE 10 ML: 5 INJECTION INTRAVENOUS at 08:46

## 2021-12-08 RX ADMIN — HYDROXYUREA 500 MG: 500 CAPSULE ORAL at 22:00

## 2021-12-08 RX ADMIN — LEVOFLOXACIN 750 MG: 750 TABLET, FILM COATED ORAL at 08:43

## 2021-12-08 RX ADMIN — HYDROXYUREA 500 MG: 500 CAPSULE ORAL at 11:09

## 2021-12-08 RX ADMIN — QUETIAPINE FUMARATE 400 MG: 200 TABLET ORAL at 21:56

## 2021-12-08 RX ADMIN — PANTOPRAZOLE SODIUM 40 MG: 40 TABLET, DELAYED RELEASE ORAL at 08:44

## 2021-12-08 RX ADMIN — SERTRALINE 100 MG: 50 TABLET, FILM COATED ORAL at 08:44

## 2021-12-08 RX ADMIN — PRAZOSIN HYDROCHLORIDE 1 MG: 1 CAPSULE ORAL at 11:10

## 2021-12-08 RX ADMIN — Medication 100 MG: at 08:44

## 2021-12-08 RX ADMIN — PREGABALIN 75 MG: 75 CAPSULE ORAL at 08:44

## 2021-12-08 RX ADMIN — POLYETHYLENE GLYCOL 3350 17 G: 17 POWDER, FOR SOLUTION ORAL at 21:56

## 2021-12-08 RX ADMIN — FOLIC ACID 1 MG: 1 TABLET ORAL at 08:44

## 2021-12-08 NOTE — PROGRESS NOTES
Physical Therapy  Facility/Department: [facility] CAR 2  Daily Treatment Note  NAME: Colette Hilliard  : 1960  MRN: [id number]    Date of Service: 2021    Discharge Recommendations:  Patient would benefit from continued therapy after discharge     PT Equipment Recommendations  Equipment Needed: No    Assessment   Assessment:   Pt states he's been taking a new medicine for his feet. Pt feels he can walk today. Pt would benefit from continued PT following discharge to address functional deficits to regain prior level of independence. Prognosis: Good  Decision Making: Medium Complexity  Clinical Presentation: evolving     PT Education: Goals; General Safety; Gait Training; Plan of Care; Equipment; Functional Mobility Training; Injury Prevention; Precautions; Transfer Training  REQUIRES PT FOLLOW UP: Yes  Activity Tolerance  Activity Tolerance: Patient limited by fatigue     Patient Diagnosis(es): The primary encounter diagnosis was Septicemia ([address] Diagnoses of Hyponatremia, Enteritis, Alcoholic peripheral neuropathy ([address] and Acute respiratory failure with hypoxia ([address] were also pertinent to this visit. has a past medical history of Alcohol abuse, Anemia, Cancer ([address] Cervical stenosis of spine, Chronic left-sided low back pain with left-sided sciatica, Insomnia, Intentional drug overdose ([address] Left arm numbness, Left lumbar radiculitis, Macrocytosis, Major depressive disorder, recurrent severe without psychotic features ([address] Myeloproliferative disorder ([address] Osteoarthritis of spine with radiculopathy, lumbar region, Severe recurrent major depression without psychotic features ([address] and Spondylosis of lumbar region without myelopathy or radiculopathy. has a past surgical history that includes cervical fusion; Appendectomy; Tonsillectomy; Carpal tunnel release; epidural steroid injection (Left, 5/15/2019);  Endoscopy, colon, diagnostic; cervical fusion (2019); and cervical fusion (N/A, Treatment Recommendations: Strengthening, Home Exercise Program, Safety Education & Training, Endurance Training, Patient/Caregiver Education & Training, Functional Mobility Training, Transfer Training, Gait Training, Equipment Evaluation, Education, & procurement  Safety Devices  Type of devices:  All fall risk precautions in place, Bed alarm in place, Call light within reach, Gait belt, Left in chair, Nurse notified, Patient at risk for falls  Restraints  Initially in place: No     Therapy Time   Individual Concurrent Group Co-treatment   Time In   115         Time Out   145         Minutes   [address] [place] [address]

## 2021-12-08 NOTE — PROGRESS NOTES
Systems:     Constitutional:  negative for chills, fevers, sweats, fatigue positive  Respiratory: Negative for cough,no shortness of breath, occasional wheezing   cardiovascular:  negative for chest pain, chest pressure/discomfort, lower extremity edema, palpitations  Gastrointestinal:  negative for abdominal pain, constipation, nausea, vomiting, 3 soft stools reported  Neurological:  negative for dizziness, headache    Medications:      Allergies:  No Known Allergies    Current Meds:   Scheduled Meds:    furosemide  20 mg Oral Daily    pregabalin  75 mg Oral BID    LORazepam  0.5 mg IntraVENous Once    potassium chloride  40 mEq Oral Daily with breakfast    midodrine  5 mg Oral TID WC    miconazole   Topical BID    levoFLOXacin  750 mg Oral Daily    thiamine  100 mg Oral Daily    folic acid  1 mg Oral Daily    sodium chloride flush  5-40 mL IntraVENous 2 times per day    nicotine  1 patch TransDERmal Daily    hydroxyurea  500 mg Oral BID    levothyroxine  50 mcg Oral Daily    pantoprazole  40 mg Oral QAM AC    prazosin  1 mg Oral BID    QUEtiapine  400 mg Oral Nightly    sertraline  100 mg Oral Daily    enoxaparin  40 mg SubCUTAneous Daily    calcium carbonate  1,000 mg Oral BID     Continuous Infusions:    sodium chloride      sodium chloride      sodium chloride 25 mL (11/30/21 0102)     PRN Meds: ipratropium-albuterol, albuterol, ondansetron, potassium chloride **OR** potassium alternative oral replacement **OR** potassium chloride, magnesium sulfate, sodium chloride flush, sodium chloride, hydrOXYzine, sodium chloride flush, sodium chloride, acetaminophen **OR** acetaminophen, sodium chloride flush, sodium chloride    Data:     Past Medical History:   has a past medical history of Alcohol abuse, Anemia, Cancer (HCC), Cervical stenosis of spine, Chronic left-sided low back pain with left-sided sciatica, Insomnia, Intentional drug overdose ([facility] [address] Left arm numbness, Left lumbar radiculitis, [place] in the last 72 hours. ABG:No results found for: POCPH, PHART, PH, POCPCO2, PBO3GKP, PCO2, POCPO2, PO2ART, PO2, POCHCO3, LDM3ORM, HCO3, NBEA, PBEA, BEART, BE, THGBART, THB, PSO9SAL, YQDS5PVX, C8NCBMGF, O2SAT, FIO2  Lab Results   Component Value Date/Time    SPECIAL NOT REPORTED 12/03/2021 02:38 PM     Lab Results   Component Value Date/Time    CULTURE NO GROWTH 5 DAYS 11/30/2021 03:46 AM       Radiology:  CT ABDOMEN PELVIS W IV CONTRAST Additional Contrast? None    Result Date: 11/28/2021  Scattered areas nonspecific air-fluid involving bowel loops could suggest underlying enteritis or diarrheal illness. No bowel obstruction. Colonic diverticulosis noted. XR CHEST PORTABLE    Result Date: 12/2/2021  Interstitial pulmonary edema and trace right pleural effusion compatible with fluid overload and/or CHF. Minimal right basilar airspace disease most likely represents atelectasis. XR CHEST PORTABLE    Result Date: 11/28/2021  Chronic elevation of the right hemidiaphragm with adjacent opacity, likely atelectasis. US ABDOMEN LIMITED Specify organ? LIVER    Result Date: 11/29/2021  1. Hepatomegaly and hepatic steatosis. Borderline cirrhotic configuration. 2. Gallbladder distention without cholelithiasis or sonographic evidence of cholecystitis.        Physical Examination:        General appearance:  alert, cooperative and no distress  Mental Status:  oriented to person, place and time and normal affect  Lungs: normal breath sounds bilaterally   Heart:  regular rate and rhythm, no murmur  Abdomen:  soft, nontender, nondistended, normal bowel sounds, no masses, hepatomegaly, splenomegaly  Extremities:  no edema, redness, tenderness in the calves  Skin:  no gross lesions, rashes, induration    Assessment:        Hospital Problems           Last Modified POA    * (Principal) Septicemia ([facility] [address] 11/28/2021 Yes    Alcohol abuse 11/28/2021 Yes    Major depressive disorder, recurrent severe without psychotic features ([facility] [address] (Chronic) 11/28/2021 Yes    Substance abuse ([facility] [address] (Chronic) 11/28/2021 Yes    Hyponatremia 11/28/2021 Yes    Hypothyroidism (Chronic) 11/28/2021 Yes    Transaminitis 11/28/2021 Yes    Hypocalcemia 11/29/2021 Yes    Acute respiratory failure with hypoxia ([facility] [address] 12/3/2021 Yes    Moderate protein-calorie malnutrition ([facility] [address] 12/3/2021 Yes    Hypokalemia 12/3/2021 Yes    Pneumonia of both lungs due to Mycoplasma pneumoniae 12/4/2021 Yes          Plan:        Acute hypoxic respiratory failure 2/2 mycoplasma pneumonia- continues on room air. Encourage incentive spirometry. Continue Levaquin for 7 days. Droplet precaution for mycoplasma infection. Give 1 dose lasix 20mg, encourage incentive spirometry    Alcohol-induced neuropathy-continue Lyrica 75 mg twice daily, patient strongly encouraged to quit alcohol use. Alcohol withdrawal-continue thiamine and folate, encourage cessation    Leukocytosis secondary to myeloproliferative disorder with JAK2 mutation positive-continue Hydrea    Alcohol liver disease-Ensure complete cessation of alcohol, ultrasound with borderline cirrhotic configuration.   Will need GI follow-up outpatient    Hypothyroidism on Synthyroid    Replace electrolyte as needed    Encourage oral intake for malnutrition  Continue all home psych medicines  Nicotine patch for smoking    Lovenox for DVT prophylaxis  PT OT evaluation  Ready for discharge, issues with placement    Cathryn Sequeira MD  12/8/2021  12:25 PM

## 2021-12-08 NOTE — PROGRESS NOTES
Nutrition Assessment     Type and Reason for Visit: Reassess    Nutrition Recommendations/Plan:   -Continue regular diet   -Continue magic cup supplements QD    Nutrition Assessment:  Pt reports that his appetite has greatly improved and is consuming 100% of his meals. Pt reports that he likes the magic cup supplements. No wounds noted. Pt stated UBW of 195 lbs and denies any recent wt loss. Pt deemed to be low-risk. Full nutrition assessment not needed at this time. Will monitor for changes in nutritional status. Malnutrition Assessment:  Malnutrition Status: No malnutrition    Current Nutrition Therapies:    ADULT DIET; Regular  ADULT ORAL NUTRITION SUPPLEMENT; Dinner; Frozen Oral Supplement     Nutrition Diagnosis:   No nutrition diagnosis at this time    Nutrition Interventions:   Food and/or Nutrient Delivery:  Continue Current Diet, Continue Oral Nutrition Supplement  Nutrition Education/Counseling:  Education not indicated   Coordination of Nutrition Care:  Continue to monitor while inpatient    Goals: Achieved   Oral intakes to meet % of estimated nutrition needs.        Nutrition Monitoring and Evaluation:   Food/Nutrient Intake Outcomes:  Food and Nutrient Intake, Supplement Intake  Physical Signs/Symptoms Outcomes:  Biochemical Data, Nutrition Focused Physical Findings, Skin, Weight, GI Status, Fluid Status or Edema     Discharge Planning:    Continue current diet     Electronically signed by Chandler Souza RD, LD on 12/8/21 at 12:35 PM EST    Contact: [phone]

## 2021-12-08 NOTE — PLAN OF CARE
Problem: Nutrition  Goal: Optimal nutrition therapy  12/8/2021 0646 by Kira Bailey RN  Outcome: Met This Shift  12/7/2021 1936 by Dewayne Curiel RN  Outcome: Ongoing     Problem: Falls - Risk of:  Goal: Will remain free from falls  12/8/2021 0646 by Kira Bailey RN  Outcome: Met This Shift  12/7/2021 1936 by Dewayne Curiel RN  Outcome: Ongoing

## 2021-12-08 NOTE — DISCHARGE INSTR - COC
Continuity of Care Form    Patient Name: Randy Scott   :  1960  MRN:  [id number]    Admit date:  2021  Discharge date:  2021***    Code Status Order: Full Code   Advance Directives:      Admitting Physician:  Harjit Hernandez MD  PCP: Meng Rosen, APRN - CNP    Discharging Nurse: Rosalia Sheldon [address] Unit/Room#:   Discharging Unit Phone Number: [phone]    Emergency Contact:   Extended Emergency Contact Information  [facility]  Address: N/A  Home Phone: [phone]  Mobile Phone: [phone]  Relation: Brother/Sister  Preferred language: English   needed?  No  Secondary Emergency Contact: Edelmira Khan  Home Phone: [phone]  Relation: Child    Past Surgical History:  Past Surgical History:   Procedure Laterality Date    APPENDECTOMY      CARPAL TUNNEL RELEASE      bilateral     CERVICAL FUSION      s/p trampoline accident    CERVICAL FUSION  2019     ANTERIOR CERVICAL DECOMPRESSION FUSION C3-4     CERVICAL FUSION N/A 2019    ANTERIOR CERVICAL DECOMPRESSION FUSION C3-4 performed by Tiffany Oliver DO at [address] [place] [address] Av, COLON, DIAGNOSTIC      EPIDURAL STEROID INJECTION Left 5/15/2019    EPIDURAL STEROID INJECTION LEFT L5S1 performed by Raz Tavares MD at [address] [place] [address]         Immunization History:   Immunization History   Administered Date(s) Administered    Hepatitis A Adult (Havrix, Vaqta) 2019    Influenza, Quadv, IM, PF (6 mo and older Fluzone, Flulaval, Fluarix, and 3 yrs and older Afluria) 2019    Pneumococcal Polysaccharide ([id number]) 2020    Tdap (Boostrix, Adacel) 10/02/2018, 2019       Active Problems:  Patient Active Problem List   Diagnosis Code    MPN (myeloproliferative neoplasm) ([facility] [address] D47.1    Chronic left-sided low back pain with left-sided sciatica M54.42, G89.29    Insomnia G47.00    Osteoarthritis of spine with radiculopathy, lumbar region M47.26 (0-10 scale): Pain Level: 0  Last Weight:   Wt Readings from Last 1 Encounters:   12/06/21 201 lb 9.6 oz (91.4 kg)     Mental Status:  oriented and alert    IV Access:  - None    Nursing Mobility/ADLs:  Walking   Assisted  Transfer  Assisted  Bathing  Assisted  Dressing  Independent  [address]  Assisted  Med Delivery   whole    Wound Care Documentation and Therapy:        Elimination:  Continence: Bowel: Yes  Bladder: Yes  Urinary Catheter: None   Colostomy/Ileostomy/Ileal Conduit: No       Date of Last BM: 12-9-2021    Intake/Output Summary (Last 24 hours) at 12/8/2021 0826  Last data filed at 12/8/2021 0500  Gross per 24 hour   Intake 780 ml   Output 1400 ml   Net -620 ml     I/O last 3 completed shifts: In: 80 [P.O.:780]  Out: 1400 [Urine:1400]    Safety Concerns: At Risk for Falls    Impairments/Disabilities:      None    Nutrition Therapy:  Current Nutrition Therapy:   - Oral Diet:  General    Routes of Feeding: Oral  Liquids: No Restrictions  Daily Fluid Restriction: no  Last Modified Barium Swallow with Video (Video Swallowing Test): not done    Treatments at the Time of Hospital Discharge:   Respiratory Treatments: ***  Oxygen Therapy:  is not on home oxygen therapy.   Ventilator:    - No ventilator support    Rehab Therapies: Physical Therapy and Occupational Therapy  Weight Bearing Status/Restrictions: No weight bearing restirctions  Other Medical Equipment (for information only, NOT a DME order):  {EQUIPMENT:[id number]}  Other Treatments: ***    Patient's personal belongings (please select all that are sent with patient):  {[facility] DME Belongings:[id number]}    RN SIGNATURE:  Electronically signed by Patito Prasad RN on 12/9/21 at 2:45 PM EST    CASE MANAGEMENT/SOCIAL WORK SECTION    Inpatient Status Date: 11-    Readmission Risk Assessment Score:  Readmission Risk              Risk of Unplanned Readmission:  38           Discharging to Facility/ Agency   Name: Carolyn  Address:  Phone:  Fax:    Dialysis Facility (if applicable)   Name:  Address:  Dialysis Schedule:  Phone:  Fax:    / signature: Electronically signed by Kristopher Anders RN on 12/9/21 at 12:48 PM EST    PHYSICIAN SECTION    Prognosis: Fair    Condition at Discharge: Stable    Rehab Potential (if transferring to Rehab): Fair    Recommended Labs or Other Treatments After Discharge: [address] 73-    Physician Certification: I certify the above information and transfer of Riya Acosta  is necessary for the continuing treatment of the diagnosis listed and that he requires [facility] for less 30 days.      Update Admission H&P: Changes in H&P as follows - discharge summary to follow    PHYSICIAN SIGNATURE:  Electronically signed by Brant Malone MD on 12/9/21 at 12:57 PM EST

## 2021-12-09 VITALS
TEMPERATURE: 97.7 F | WEIGHT: 201.6 LBS | SYSTOLIC BLOOD PRESSURE: 90 MMHG | HEIGHT: 73 IN | BODY MASS INDEX: 26.72 KG/M2 | HEART RATE: 84 BPM | OXYGEN SATURATION: 93 % | RESPIRATION RATE: 17 BRPM | DIASTOLIC BLOOD PRESSURE: 62 MMHG

## 2021-12-09 PROCEDURE — 97110 THERAPEUTIC EXERCISES: CPT

## 2021-12-09 PROCEDURE — 6370000000 HC RX 637 (ALT 250 FOR IP): Performed by: NURSE PRACTITIONER

## 2021-12-09 PROCEDURE — 6370000000 HC RX 637 (ALT 250 FOR IP): Performed by: STUDENT IN AN ORGANIZED HEALTH CARE EDUCATION/TRAINING PROGRAM

## 2021-12-09 PROCEDURE — 97530 THERAPEUTIC ACTIVITIES: CPT

## 2021-12-09 PROCEDURE — 99239 HOSP IP/OBS DSCHRG MGMT >30: CPT | Performed by: STUDENT IN AN ORGANIZED HEALTH CARE EDUCATION/TRAINING PROGRAM

## 2021-12-09 PROCEDURE — 6360000002 HC RX W HCPCS: Performed by: NURSE PRACTITIONER

## 2021-12-09 PROCEDURE — 97116 GAIT TRAINING THERAPY: CPT

## 2021-12-09 PROCEDURE — 97535 SELF CARE MNGMENT TRAINING: CPT

## 2021-12-09 RX ORDER — MIDODRINE HYDROCHLORIDE 5 MG/1
5 TABLET ORAL
Status: DISCONTINUED | OUTPATIENT
Start: 2021-12-09 | End: 2021-12-09 | Stop reason: HOSPADM

## 2021-12-09 RX ORDER — PREGABALIN 100 MG/1
100 CAPSULE ORAL 2 TIMES DAILY
Qty: 60 CAPSULE | Refills: 0 | Status: SHIPPED | OUTPATIENT
Start: 2021-12-09 | End: 2022-04-21 | Stop reason: ALTCHOICE

## 2021-12-09 RX ORDER — MIDODRINE HYDROCHLORIDE 5 MG/1
5 TABLET ORAL
Qty: 90 TABLET | Refills: 3 | Status: ON HOLD | OUTPATIENT
Start: 2021-12-09 | End: 2022-11-02 | Stop reason: HOSPADM

## 2021-12-09 RX ORDER — PREGABALIN 100 MG/1
100 CAPSULE ORAL 2 TIMES DAILY
Status: DISCONTINUED | OUTPATIENT
Start: 2021-12-09 | End: 2021-12-09 | Stop reason: HOSPADM

## 2021-12-09 RX ADMIN — LEVOTHYROXINE SODIUM 50 MCG: 50 TABLET ORAL at 08:59

## 2021-12-09 RX ADMIN — MIDODRINE HYDROCHLORIDE 5 MG: 5 TABLET ORAL at 13:56

## 2021-12-09 RX ADMIN — PREGABALIN 75 MG: 75 CAPSULE ORAL at 09:01

## 2021-12-09 RX ADMIN — PANTOPRAZOLE SODIUM 40 MG: 40 TABLET, DELAYED RELEASE ORAL at 08:59

## 2021-12-09 RX ADMIN — HYDROXYUREA 500 MG: 500 CAPSULE ORAL at 09:00

## 2021-12-09 RX ADMIN — SERTRALINE 100 MG: 50 TABLET, FILM COATED ORAL at 08:59

## 2021-12-09 RX ADMIN — ENOXAPARIN SODIUM 40 MG: 100 INJECTION SUBCUTANEOUS at 09:03

## 2021-12-09 RX ADMIN — LEVOFLOXACIN 750 MG: 750 TABLET, FILM COATED ORAL at 09:00

## 2021-12-09 RX ADMIN — POTASSIUM CHLORIDE 40 MEQ: 1500 TABLET, EXTENDED RELEASE ORAL at 08:59

## 2021-12-09 RX ADMIN — ANTACID TABLETS 1000 MG: 500 TABLET, CHEWABLE ORAL at 09:00

## 2021-12-09 RX ADMIN — FOLIC ACID 1 MG: 1 TABLET ORAL at 09:00

## 2021-12-09 RX ADMIN — Medication 100 MG: at 08:59

## 2021-12-09 NOTE — PROGRESS NOTES
Physical Therapy  Facility/Department: [facility] CAR 2  Daily Treatment Note  NAME: Basil Massey  : 1960  MRN: [id number]    Date of Service: 2021    Discharge Recommendations:  Patient would benefit from continued therapy after discharge     PT Equipment Recommendations  Equipment Needed: No    Assessment   Assessment:   Pt is Min. A x 1 for STS. Pt ambulated 45' x 1 with RW, CGA. Pt would benefit from continued PT following discharge to address functional deficits to regain prior level of independence. Prognosis: Good  Decision Making: Medium Complexity  Clinical Presentation: evolving     PT Education: Goals; General Safety; Gait Training; Plan of Care; Equipment; Functional Mobility Training; Injury Prevention; Precautions; Transfer Training  REQUIRES PT FOLLOW UP: Yes  Activity Tolerance  Activity Tolerance: Patient limited by fatigue     Patient Diagnosis(es): The primary encounter diagnosis was Septicemia ([address] Diagnoses of Hyponatremia, Enteritis, Alcoholic peripheral neuropathy ([address] and Acute respiratory failure with hypoxia ([address] were also pertinent to this visit. has a past medical history of Alcohol abuse, Anemia, Cancer ([address] Cervical stenosis of spine, Chronic left-sided low back pain with left-sided sciatica, Insomnia, Intentional drug overdose ([address] Left arm numbness, Left lumbar radiculitis, Macrocytosis, Major depressive disorder, recurrent severe without psychotic features ([address] Myeloproliferative disorder ([address] Osteoarthritis of spine with radiculopathy, lumbar region, Severe recurrent major depression without psychotic features ([address] and Spondylosis of lumbar region without myelopathy or radiculopathy. has a past surgical history that includes cervical fusion; Appendectomy; Tonsillectomy; Carpal tunnel release; epidural steroid injection (Left, 5/15/2019);  Endoscopy, colon, diagnostic; cervical fusion (2019); and cervical fusion (N/A, 9/5/2019). Restrictions  Restrictions/Precautions  Restrictions/Precautions: Seizure, General Precautions, Fall Risk, Isolation, Up as Tolerated  Required Braces or Orthoses?: No  Position Activity Restriction  Other position/activity restrictions: Up w/assist  Subjective   Pt's sitting up in his chair wanting to walk today. Pt c/o 5/10 B feet have a burning sensation with WB. Orientation    Overall Orientation Status: Within Functional Limits    Objective     Transfers  Sit to Stand: Min. Assistance  Stand to sit: Minimal Assistance  Ambulation  Ambulation?: Yes  Ambulation 1  Surface: level tile  Device: Rolling Walker  Quality of Gait: slow,cautious,  Gait Deviations: Slow Christy; Shuffles; Decreased step length  Distance: 45' x 1 with chair to follow. Balance  Sitting - Static: Fair  Sitting - Dynamic: Fair; -  Standing - Static: Poor; +  Standing - Dynamic: Poor  Comments: In standing, he needs min assist and BUE support on walker to prevent a fall. Ex's  Upper extremity exercises: Bicep curl, shoulder flexion/extension, punches. Reps: 10 x with 2 lb wt on a SPC. 4 lbs with Biceps. Seated LE exercise program: Long Arc Quads,heel/toe raises, and marches. Reps: 2 x 10 with 2 lb wt on B LE's. Comment: Several extended breaks were taken d/t fatigue and LE shakiness. AM-PAC Score  -PAC Inpatient Mobility Raw Score : 12 (12/09/21 1322)  -PAC Inpatient T-Scale Score : 35.33 (12/09/21 1322)  Mobility Inpatient CMS 0-100% Score: 68.66 (12/09/21 1322)  Mobility Inpatient CMS G-Code Modifier : CL (12/09/21 1322)      Goals  Short term goals  Time Frame for Short term goals: 14 visits  Short term goal 1: Supine to/from sit with SBA. Short term goal 2: Sit to/from stand with SBA. Short term goal 3: Ambulate 22' with walker with CGA. Short term goal 4: Stand and march in place with BUE on walker 60 seconds with stable vitals.     Plan    Plan  Times per week: 5-6x/wk  Current Treatment Recommendations: Strengthening, Home Exercise Program, Safety Education & Training, Endurance Training, Patient/Caregiver Education & Training, Functional Mobility Training, Transfer Training, Gait Training, Equipment Evaluation, Education, & procurement  Safety Devices  Type of devices:  All fall risk precautions in place, Bed alarm in place, Call light within reach, Gait belt, Left in chair, Nurse notified, Patient at risk for falls  Restraints  Initially in place: No     Therapy Time   Individual Concurrent Group Co-treatment   Time In   1130         Time Out   1200         Minutes   [address] [place] [address]

## 2021-12-09 NOTE — PROGRESS NOTES
[facility]  Office: [address], DO, Belén Press, DO, Colette Cushing, DO, Lanette Rosario Blood, DO, Kristina Suggs MD, Marilou Richardson MD, Alec Hubbard MD, Shamar Lux MD, Leroy Hannon MD, Michelle Hilliard MD, Kelsi Maciel MD, Kevin Yousif, DO, Tiffanie Guallpa, DO, Edilson Coles MD,  Gena Siemens, DO, De Giraldo MD, Florencio Bearden MD, Sarita Mckeon MD, Radha Henao MD, Bandar Chairez MD, Daniela Garnica MD, Fernando Macias MD, La Nena Gan, [facility], [facility], CNP, Eusebio Montero, CNP, Rick Nicholson, CNS, Asha Davies, [facility], Ayaka Blocker, CNP, Manuelito Jackson, CNP, Keshav Ross, CNP, Mariana Khan, CNP, Roylene Goodell, PA-C, Kavitha Morejon, [facility], Gudelia Holbrook, CNP, Melyssa Davis, CNP, Corrina Jolley, CNP, Christine Salinas, CNP, Shirin Atkinson, CNP, Jason Santiago, [facility], [facility], [address] [place] [address]    Progress Note    12/9/2021    3:02 PM    Name:   Zeina Zhu  MRN:     [id number]     Acct:      [de-identified]   Room:   2016/2016-01  IP Day:  6  Admit Date:  11/28/2021  1:56 PM    PCP:   SIMONE Hardwick CNP  Code Status:  Full Code    Subjective:     C/C:   Chief Complaint   Patient presents with    Dizziness    Fatigue     Interval History Status: Slightly improved    Patient has burning pain in the legs and feet has improved  Vitals stable  Patient off oxygen  Continues to improve    Brief History:     32-year-old male with known medical history of myeloproliferative disorder on hydroxyurea, documentation, alcohol dependence, hypothyroidism came to the hospital for weakness and feeling off balance. In the ER patient was noted to be leukocytotic, hypotensive, initially was concern for sepsis. Patient lactic acid was 5. Patient was started on antibiotics initially and admitted. Patient underwent alcohol withdrawals and was on Librium.     Review of Systems:     Constitutional:  negative for chills, fevers, sweats, fatigue positive  Respiratory: Negative for cough,no shortness of breath, occasional wheezing   cardiovascular:  negative for chest pain, chest pressure/discomfort, lower extremity edema, palpitations  Gastrointestinal:  negative for abdominal pain, constipation, nausea, vomiting, 3 soft stools reported  Neurological:  negative for dizziness, headache    Medications:      Allergies:  No Known Allergies    Current Meds:   Scheduled Meds:    midodrine  5 mg Oral TID WC    pregabalin  100 mg Oral BID    LORazepam  0.5 mg IntraVENous Once    potassium chloride  40 mEq Oral Daily with breakfast    miconazole   Topical BID    thiamine  100 mg Oral Daily    folic acid  1 mg Oral Daily    sodium chloride flush  5-40 mL IntraVENous 2 times per day    nicotine  1 patch TransDERmal Daily    hydroxyurea  500 mg Oral BID    levothyroxine  50 mcg Oral Daily    pantoprazole  40 mg Oral QAM AC    prazosin  1 mg Oral BID    QUEtiapine  400 mg Oral Nightly    sertraline  100 mg Oral Daily    enoxaparin  40 mg SubCUTAneous Daily    calcium carbonate  1,000 mg Oral BID     Continuous Infusions:    sodium chloride      sodium chloride      sodium chloride 25 mL (11/30/21 0102)     PRN Meds: polyethylene glycol, ipratropium-albuterol, albuterol, ondansetron, potassium chloride **OR** potassium alternative oral replacement **OR** potassium chloride, magnesium sulfate, sodium chloride flush, sodium chloride, hydrOXYzine, sodium chloride flush, sodium chloride, acetaminophen **OR** acetaminophen, sodium chloride flush, sodium chloride    Data:     Past Medical History:   has a past medical history of Alcohol abuse, Anemia, [facility] Cervical stenosis of spine, Chronic left-sided low back pain with left-sided sciatica, Insomnia, Intentional drug overdose ([facility] Left arm numbness, Left lumbar radiculitis, Macrocytosis, Major depressive disorder, recurrent severe without psychotic features ([facility] Myeloproliferative disorder ([facility] Osteoarthritis of spine with radiculopathy, lumbar region, Severe recurrent major depression without psychotic features ([facility] and Spondylosis of lumbar region without myelopathy or radiculopathy. Social History:   reports that he has been smoking cigarettes. He started smoking about 43 years ago. He has a 64.50 pack-year smoking history. He has never used smokeless tobacco. He reports current alcohol use of about 14.0 standard drinks of alcohol per week. He reports previous drug use. Drug: Marijuana Erum Mcintyre). Family History:   Family History   Problem Relation Age of Onset    Breast Cancer Mother         s/p surgical complications    Diabetes Mother     Heart Failure Father     Other Brother         \"bad back\"       Vitals:  BP 90/62   Pulse 84   Temp 97.7 °F (36.5 °C) (Oral)   Resp 17   Ht 6' 1\" (1.854 m)   Wt 201 lb 9.6 oz (91.4 kg)   SpO2 93%   BMI 26.60 kg/m²   Temp (24hrs), Av.3 °F (36.8 °C), Min:97.7 °F (36.5 °C), Max:98.8 °F (37.1 °C)    No results for input(s): POCGLU in the last 72 hours. I/O (24Hr): Intake/Output Summary (Last 24 hours) at 2021 1502  Last data filed at 2021 0513  Gross per 24 hour   Intake 300 ml   Output 1100 ml   Net -800 ml       Labs:  Hematology:  No results for input(s): WBC, RBC, HGB, HCT, MCV, MCH, MCHC, RDW, PLT, MPV, SEDRATE, CRP, INR, DDIMER, PQ2UCRPG, LABABSO in the last 72 hours. Invalid input(s): PT  Chemistry:  No results for input(s): NA, K, CL, CO2, GLUCOSE, BUN, CREATININE, MG, ANIONGAP, LABGLOM, GFRAA, CALCIUM, CAION, PHOS, PSA, PROBNP, TROPHS, CKTOTAL, CKMB, CKMBINDEX, MYOGLOBIN, DIGOXIN, LACTACIDWB in the last 72 hours. No results for input(s): PROT, LABALBU, LABA1C, D1RSAPR, P7RDMEW, FT4, TSH, AST, ALT, LDH, GGT, ALKPHOS, LABGGT, BILITOT, BILIDIR, AMMONIA, AMYLASE, LIPASE, LACTATE, CHOL, HDL, LDLCHOLESTEROL, CHOLHDLRATIO, TRIG, VLDL, WXZ19VR, PHENYTOIN, PHENYF, URICACID, POCGLU in the last 72 hours.  ABG:No results found for: POCPH, PHART, PH, POCPCO2, MHG3YNE, PCO2, POCPO2, PO2ART, PO2, POCHCO3, JNL3DSC, HCO3, NBEA, PBEA, BEART, BE, THGBART, THB, ONN4XGN, FOZS7OMJ, L1HFHPEU, O2SAT, FIO2  Lab Results   Component Value Date/Time    SPECIAL NOT REPORTED 12/03/2021 02:38 PM     Lab Results   Component Value Date/Time    CULTURE NO GROWTH 5 DAYS 11/30/2021 03:46 AM       Radiology:  CT ABDOMEN PELVIS W IV CONTRAST Additional Contrast? None    Result Date: 11/28/2021  Scattered areas nonspecific air-fluid involving bowel loops could suggest underlying enteritis or diarrheal illness. No bowel obstruction. Colonic diverticulosis noted. XR CHEST PORTABLE    Result Date: 12/2/2021  Interstitial pulmonary edema and trace right pleural effusion compatible with fluid overload and/or CHF. Minimal right basilar airspace disease most likely represents atelectasis. XR CHEST PORTABLE    Result Date: 11/28/2021  Chronic elevation of the right hemidiaphragm with adjacent opacity, likely atelectasis. US ABDOMEN LIMITED Specify organ? LIVER    Result Date: 11/29/2021  1. Hepatomegaly and hepatic steatosis. Borderline cirrhotic configuration. 2. Gallbladder distention without cholelithiasis or sonographic evidence of cholecystitis.        Physical Examination:        General appearance:  alert, cooperative and no distress  Mental Status:  oriented to person, place and time and normal affect  Lungs: normal breath sounds bilaterally   Heart:  regular rate and rhythm, no murmur  Abdomen:  soft, nontender, nondistended, normal bowel sounds, no masses, hepatomegaly, splenomegaly  Extremities:  no edema, redness, tenderness in the calves  Skin:  no gross lesions, rashes, induration    Assessment:        Hospital Problems           Last Modified POA    * (Principal) Septicemia ([facility] 11/28/2021 Yes    Alcohol abuse 11/28/2021 Yes    Major depressive disorder, recurrent severe without psychotic features ([facility] (Chronic) 11/28/2021 Yes    Substance abuse ([address] (Chronic) 11/28/2021 Yes    Hyponatremia 11/28/2021 Yes    Hypothyroidism (Chronic) 11/28/2021 Yes    Transaminitis 11/28/2021 Yes    Hypocalcemia 11/29/2021 Yes    Acute respiratory failure with hypoxia ([address] 12/3/2021 Yes    Moderate protein-calorie malnutrition ([address] 12/3/2021 Yes    Hypokalemia 12/3/2021 Yes    Pneumonia of both lungs due to Mycoplasma pneumoniae 12/4/2021 Yes          Plan:        Acute hypoxic respiratory failure 2/2 mycoplasma pneumonia- continues on room air. Encourage incentive spirometry. Continue Levaquin for 7 days. Droplet precaution for mycoplasma infection. Give 1 dose lasix 20mg, encourage incentive spirometry    Alcohol-induced neuropathy-increase Lyrica to 100 mg twice a day, patient strongly encouraged to quit alcohol use. Alcohol withdrawal-continue thiamine and folate, encourage cessation    Leukocytosis secondary to myeloproliferative disorder with JAK2 mutation positive-continue Hydrea    Alcohol liver disease-Ensure complete cessation of alcohol, ultrasound with borderline cirrhotic configuration. Will need GI follow-up outpatient    Hypothyroidism on Synthyroid    Replace electrolyte as needed    Encourage oral intake for malnutrition  Continue all home psych medicines  Nicotine patch for smoking  Add midodrine for pressure support.     Lovenox for DVT prophylaxis  PT OT evaluation  Ready for discharge, issues with placement    Sarita Mckeon MD  12/9/2021  3:02 PM

## 2021-12-09 NOTE — PROGRESS NOTES
Occupational Therapy  Facility/Department: [facility] CAR 2  Daily Treatment Note  NAME: Thomas Phillips  : 1960  MRN: [id number]    Date of Service: 2021    Discharge Recommendations: Pt. Would benefit from further skilled oT services prior to returning home. Pt. Is a fall risk at this time. Patient would benefit from continued therapy after discharge  OT Equipment Recommendations  Equipment Needed: Yes  Walker: Rolling  ADL Assistive Devices: Toileting - Standard Commode; Grab Bars - toilet; Grab Bars - shower; Transfer Tub Bench; Long-handled Sponge; Long-handled Shoe Horn    Assessment   Performance deficits / Impairments: Decreased functional mobility ; Decreased safe awareness; Decreased balance; Decreased coordination; Decreased ADL status; Decreased cognition; Decreased posture; Decreased endurance; Decreased high-level IADLs; Decreased strength; Decreased fine motor control  Assessment: Pt would benefit from continued acute care and post acute care OT to address above deficits. Treatment Diagnosis: Enteritis  Prognosis: Good  Decision Making: High Complexity  OT Education: OT Role; Transfer Training; Precautions  Patient Education: purpose of OT: proper hand and foot placement; balance maintaince; walker management; safety precautions  Barriers to Learning: pt demo F carry over  REQUIRES OT FOLLOW UP: Yes  Activity Tolerance  Activity Tolerance: Patient Tolerated treatment well; Patient limited by fatigue  Safety Devices  Safety Devices in place: Yes  Type of devices: Gait belt; Patient at risk for falls; Left in chair; Chair alarm in place; Call light within reach; Nurse notified  Restraints  Initially in place: No         Patient Diagnosis(es): The primary encounter diagnosis was Septicemia ([facility] Diagnoses of Hyponatremia, Enteritis, Alcoholic peripheral neuropathy ([facility] and Acute respiratory failure with hypoxia ([facility] were also pertinent to this visit.       has a past medical history of Alcohol before I came to the hospital\". Balance  Sitting Balance: Stand by assistance (SBA sitting unsupported in recliner chair.)  Standing Balance: Contact guard assistance  Standing Balance  Time: ~10 minutes  Activity: Item retrieval around room, functional mobility  Comment: utilizing RW, Needing min cues for proper RW usage. Functional Mobility  Functional - Mobility Device: Rolling Walker  Activity: Other (Room distances, min rest breaks, pt. unstable.)  Assist Level: Minimal assistance  Functional Mobility Comments: Pt. demo one minor LOB during a directional change, needing min A to correct. Verbal cues for RW usage. Transfers  Sit to stand: Contact guard assistance  Stand to sit: Contact guard assistance  Transfer Comments: utilizing RW req min verbal/tactile cues for proper hand placement                       Cognition  Overall Cognitive Status: Exceptions  Arousal/Alertness: Appropriate responses to stimuli  Following Commands: Follows all commands without difficulty  Attention Span: Appears intact  Safety Judgement: Decreased awareness of need for assistance; Decreased awareness of need for safety  Insights: Decreased awareness of deficits  Cognition Comment: Pt. walking around room upon entering, pt. very unsteady, holing onto furniture during mobility. Pt. educated in [address] [place] [address] 115 network disks and having assistance when up d/t high fall risk. Pt. stated he understands.                                          Plan   Plan  Times per week: 3-4X/Wk  Current Treatment Recommendations: Strengthening, ROM, Balance Training, Functional Mobility Training, Endurance Training, Patient/Caregiver Education & Training, Self-Care / ADL, Equipment Evaluation, Education, & procurement, Safety Education & Training, Home Management Training                                               AM-PAC Score        AM-[facility] Inpatient Daily Activity Raw Score: 19 (12/09/21 1517)  AM-PAC Inpatient ADL T-Scale Score : 40.22 (12/09/21 1517)  ADL Inpatient CMS 0-100% Score: 42.8 (12/09/21 1517)  ADL Inpatient CMS G-Code Modifier : CK (12/09/21 1517)    Goals  Short term goals  Time Frame for Short term goals: Patient will, by discharge  Short term goal 1: Demo self-feeding/grooming at [address] term goal 2: Demo UB ADLs at supervision (Goal updated by Riley De León OTR/L on 12/6/2021)  Short term goal 3: Demo LB ADLs at Mod A w/ AE PRN  Short term goal 4: Demo 10+ min of dynamic sitting at supervision w/ unilateral hand release for engagement in ADLs (Goal updated by Riley De León OTR/L on 12/6/2021)  Short term goal 5: Demo 8+ min of static standing tolerance at SBA to promote endurance for functional tasks (Goal updated by Riley De León OTR/L on 12/6/2021)  Short term goal 6: Demo functional transfers/mobility at [facility] w/ LRD PRN to engage in ADLs (Goal updated by Riley De León OTR/L on 12/6/2021)  Short term goal 7: Notify OTR to update POC as patient progresses       Therapy Time   Individual Concurrent Group Co-treatment   Time In 1439         Time Out 1502         Minutes 23         Timed Code Treatment Minutes: [address], [address]

## 2021-12-09 NOTE — DISCHARGE SUMMARY
[facility]  Office: [address], DO, Gomez Estevan, DO, Otilio Lui, DO, Mumtaz aCsey, DO, Tarah Deng MD, Tawanda Alas MD, Renate Warner MD, Lesley Stiles MD, Radha West MD, Meng Bejarano MD, Roland Ibarra MD, Lexie Flowers, DO, Frank Simmonds, DO, Rancho Shepherd MD,  Nena Murray DO, Ronda Ball MD, Delroy Lynn MD, Estrellita Mcneil MD, Tonia Mendoza MD, Marciano Bernheim, MD, Kamran Brown MD, Walter Carcamo MD, Gloria Hinton [facility], [facility], [facility], Susie Nolasco, CNP, Rome Kim, CNS, Migel Dsouza, CNP, Merced Kaiser, CNP, Eugenia Roberts, CNP, Zari Garcia, CNP, Uche Reyes, CNP, Lauren Brand PA-C, Deena Dyer, [facility], Shahram Willis, CNP, Woodward Brittle, CNP, Kennedi Olivo, CNP, Fernanda Snyder, CNP, Perez Fenton, CNP, Mounika Berkowitz, [facility], Neri Meade, [facility]    Discharge Summary     Patient ID: Edilia Dacosta  :  1960   MRN: [id number]     ACCOUNT:  [de-identified]   Patient's PCP: SIMONE Rios CNP  Admit Date: 2021   Discharge Date: 2021     Length of Stay: 11  Code Status:  Full Code  Admitting Physician: Estrellita Mcneil MD  Discharge Physician: Estrellita Mcneil MD     Active Discharge Diagnoses:     Hospital Problem Lists:  Principal Problem:    Septicemia [facility])  Active Problems:    Alcohol abuse    Major depressive disorder, recurrent severe without psychotic features ([facility]    Substance abuse ([facility]    Hyponatremia    Hypothyroidism    Transaminitis    Hypocalcemia    Acute respiratory failure with hypoxia (HCC)    Moderate protein-calorie malnutrition (HCC)    Hypokalemia    Pneumonia of both lungs due to Mycoplasma pneumoniae  Resolved Problems:    * No resolved hospital problems.  *      Admission Condition:  poor     Discharged Condition: fair    Hospital Stay:     Hospital Course:  Edilia Dacosta is a 62 y.o. male who was admitted for the management of   Septicemia ([facility] , presented to ER with Dizziness and Fatigue    26-year-old male with known medical history of myeloproliferative disorder on hydroxyurea, documentation, alcohol dependence, hypothyroidism came to the hospital for weakness and feeling off balance. In the ER patient was noted to be leukocytotic, hypotensive, initially was concern for sepsis. Patient lactic acid was 5. Patient was started on antibiotics. Patient came back positive for mycoplasma. Patient completed 7-day course of Levaquin. Patient required oxygen in the hospital, chest x-ray showed congestion, patient received Lasix with relief. Patient underwent alcohol withdrawals and was on Librium. Gradually weaned off Librium. Patient was very weak to work with physical therapy, discussion was made regarding SNF placement, patient agreed. Significant therapeutic interventions:   Acute hypoxic respiratory failure 2/2 mycoplasma pneumonia- continues on room air. Encourage incentive spirometry. Completed Levaquin for 7 days. Droplet precaution for mycoplasma infection. Obtain echo outpatient.     Alcohol-induced neuropathy-increase Lyrica to 100 mg twice a day, patient strongly encouraged to quit alcohol use.     Leukocytosis secondary to myeloproliferative disorder with JAK2 mutation positive-continue Hydrea     Alcohol liver disease-Ensure complete cessation of alcohol, ultrasound with borderline cirrhotic configuration.   Will need GI follow-up outpatient     Hypothyroidism on Synthyroid    Significant Diagnostic Studies:   Labs / Micro:  CBC:   Lab Results   Component Value Date    WBC 12.3 12/05/2021    RBC 3.16 12/05/2021    HGB 9.7 12/05/2021    HCT 30.2 12/05/2021    MCV 95.6 12/05/2021    MCH 30.7 12/05/2021    MCHC 32.1 12/05/2021    RDW 18.5 12/05/2021     12/05/2021     BMP:    Lab Results   Component Value Date    GLUCOSE 94 12/06/2021     12/06/2021    K 3.9 12/06/2021     12/06/2021    CO2 20 12/06/2021    ANIONGAP 13 12/06/2021    BUN 4 12/06/2021    CREATININE 0.71 12/06/2021    BUNCRER NOT REPORTED 12/06/2021    CALCIUM 8.6 12/06/2021    LABGLOM >60 12/06/2021    GFRAA >60 12/06/2021    GFR      12/06/2021    GFR NOT REPORTED 12/06/2021     HFP:    Lab Results   Component Value Date    PROT 4.9 11/30/2021     CMP:    Lab Results   Component Value Date    GLUCOSE 94 12/06/2021     12/06/2021    K 3.9 12/06/2021     12/06/2021    CO2 20 12/06/2021    BUN 4 12/06/2021    CREATININE 0.71 12/06/2021    ANIONGAP 13 12/06/2021    ALKPHOS 193 11/30/2021    ALT 35 11/30/2021    AST 62 11/30/2021    BILITOT 0.92 11/30/2021    LABALBU 2.3 11/30/2021    ALBUMIN 0.9 11/30/2021    LABGLOM >60 12/06/2021    GFRAA >60 12/06/2021    GFR      12/06/2021    GFR NOT REPORTED 12/06/2021    PROT 4.9 11/30/2021    CALCIUM 8.6 12/06/2021     PT/INR:    Lab Results   Component Value Date    PROTIME 14.1 11/29/2021    INR 1.4 11/29/2021        Radiology:  XR CHEST PORTABLE    Result Date: 12/4/2021  Small right basilar effusion with bibasilar infiltrates. CT CHEST PULMONARY EMBOLISM W CONTRAST    Result Date: 12/3/2021  1. No evidence of pulmonary embolism. 2.  Small bilateral pleural effusions with adjacent atelectasis, including partial right lower lobe collapse. 3.  Interlobular septal thickening and patchy bilateral ground-glass opacities, most consistent with moderate pulmonary edema. Superimposed infectious/inflammatory process is not excluded. 4.  Likely subacute right 9th, 10th, and 11th rib fractures. 5.  There is mild dilation of the left and right pulmonary arteries which can be seen in pulmonary hypertension.        Consultations:    Consults:     Final Specialist Recommendations/Findings:   IP CONSULT TO SOCIAL WORK  IP CONSULT TO INTERNAL MEDICINE  IP CONSULT TO HOSPITALIST  IP CONSULT TO SOCIAL WORK  IP CONSULT TO DIETITIAN  IP CONSULT TO SOCIAL WORK  IP CONSULT TO IV TEAM  IP CONSULT TO IV TEAM      The patient was seen and examined on day of discharge and this discharge summary is in conjunction with any daily progress note from day of discharge. Discharge plan:     Disposition: To a non-[facility] facility    Physician Follow Up:     SIMONE Schulz - CNP  [address]  [place] [address]  [phone]    In 3 days      [address] [facility]  [address] [place] [address] Drive [address] [phone]           Requiring Further Evaluation/Follow Up POST HOSPITALIZATION/Incidental Findings: cirrhosis workup  Follow up echo    Diet: regular diet    Activity: As tolerated    Instructions to Patient: take meds as prescribed    Discharge Medications:      Medication List      START taking these medications    calcium carbonate 500 MG chewable tablet  Commonly known as: TUMS  Take 2 tablets by mouth 2 times daily     midodrine 5 MG tablet  Commonly known as: PROAMATINE  Take 1 tablet by mouth 3 times daily (with meals)     pregabalin 100 MG capsule  Commonly known as: LYRICA  Take 1 capsule by mouth 2 times daily for 30 days.         CONTINUE taking these medications    folic acid 1 MG tablet  Commonly known as: FOLVITE  Take 1 tablet by mouth daily     hydroxyurea 500 MG chemo capsule  Commonly known as: HYDREA     hydrOXYzine 50 MG tablet  Commonly known as: ATARAX     levothyroxine 50 MCG tablet  Commonly known as: SYNTHROID  Take 1 tablet by mouth daily Take in the morning on an empty stomach 1 hour before any food or other medications     naltrexone 50 MG tablet  Commonly known as: DEPADE     nicotine 21 MG/24HR  Commonly known as: NICODERM CQ  Place 1 patch onto the skin every 24 hours     omeprazole 40 MG delayed release capsule  Commonly known as: PRILOSEC     prazosin 1 MG capsule  Commonly known as: MINIPRESS     QUEtiapine 400 MG tablet  Commonly known as: SEROQUEL     sertraline 100 MG tablet  Commonly known as: ZOLOFT  Take 1 tablet by mouth daily     therapeutic multivitamin-minerals tablet  Take 1 tablet by mouth daily     vitamin B-1 100 MG tablet  Commonly known as: THIAMINE  Take 1 tablet by mouth daily        STOP taking these medications    lisinopril 10 MG tablet  Commonly known as: PRINIVIL;ZESTRIL           Where to Get Your Medications      These medications were sent to [address] [place] [address], [address] [place] [address] [address]  [address]    Phone: [phone]   calcium carbonate 500 MG chewable tablet  midodrine 5 MG tablet  pregabalin 100 MG capsule         Discharge Procedure Orders   ECHO Complete 2D W Doppler W Color   Standing Status: Future Standing Exp. Date: 12/08/22     Order Specific Question Answer Comments   Reason for exam: shortnesss of breath, trace pedal edema        Time Spent on discharge is  34 mins in patient examination, evaluation, counseling as well as medication reconciliation, prescriptions for required medications, discharge plan and follow up. Electronically signed by   Ann Mantilla MD  12/9/2021  3:03 PM      Thank you Dr. Ava Smith, APRN - CNP for the opportunity to be involved in this patient's care.

## 2021-12-09 NOTE — CARE COORDINATION
Transitional planning-called [facility] and left message with admissions. [facility]  [phone] call from Quynh Nagy at [facility] [address]aut[address] 53 approval.  [phone] [facility]. Notified [facility]h Nagy at [facility]. 1415 notified patient of transport time.  He will notify his family    [phone] faxed AVS and HENS to [facility]

## 2022-01-24 ENCOUNTER — TELEPHONE (OUTPATIENT)
Dept: FAMILY MEDICINE CLINIC | Age: 62
End: 2022-01-24

## 2022-01-24 NOTE — TELEPHONE ENCOUNTER
----- Message from Zainab Bailon sent at 1/24/2022  2:34 PM EST -----  Subject: Appointment Request    Reason for Call: Routine Existing Condition Follow Up    QUESTIONS  Type of Appointment? Established Patient  Reason for appointment request? Available appointments did not meet   patient need  Additional Information for Provider? Pt. need an in person visit for the   1st week of Feb and would need to know 3 days prior so they can schedule   transportation  ---------------------------------------------------------------------------  --------------  [employer]  What is the best way for the office to contact you? OK to leave message on   voicemail  Preferred Call Back Phone Number? [phone]  ---------------------------------------------------------------------------  --------------  SCRIPT ANSWERS  Relationship to Patient? Self  Have your symptoms changed? No  Is this follow up request related to routine Diabetes Management? No  Have you been diagnosed with, awaiting test results for, or told that you   are suspected of having COVID-19 (Coronavirus)? (If patient has tested   negative or was tested as a requirement for work, school, or travel and   not based on symptoms, answer no)? No  Within the past two weeks have you developed any of the following symptoms   (answer no if symptoms have been present longer than 2 weeks or began   more than 2 weeks ago)? Fever or Chills, Cough, Shortness of breath or   difficulty breathing, Loss of taste or smell, Sore throat, Nasal   congestion, Sneezing or runny nose, Fatigue or generalized body aches   (answer no if pain is specific to a body part e.g. back pain), Diarrhea,   Headache? No  Have you had close contact with someone with COVID-19 in the last 14 days? No  (Service Expert  click yes below to proceed with [employer] As Usual   Scheduling)?  Yes

## 2022-02-04 ENCOUNTER — TELEPHONE (OUTPATIENT)
Dept: FAMILY MEDICINE CLINIC | Age: 62
End: 2022-02-04

## 2022-02-04 NOTE — TELEPHONE ENCOUNTER
Patient was scheduled with Dr. Josh Remy today but was canceled due to being for chronic conditions. Patient is requesting a rx for a walker due to falling and would like to discuss medication options for Lyrica that he was given in the hospital. He wants to know if you will refill it. Please advise, patient is going to reschedule with you but needs a 3 days notice due to cab transportation.

## 2022-02-07 DIAGNOSIS — G62.1 NEUROPATHY, ALCOHOLIC (HCC): Primary | ICD-10-CM

## 2022-02-07 NOTE — TELEPHONE ENCOUNTER
I would recommend that Neurology manages this for him - I have placed a referral for a patient to schedule appointment.

## 2022-02-07 NOTE — TELEPHONE ENCOUNTER
I cannot provide a bridge until he is seen by our office since this is a controlled medication. I do not prescribe Seroquel , I am not sure who started him on this but it will need to be filled by psychiatry.  I can take care of the prescription for the walker at his 2/14 appointment

## 2022-02-07 NOTE — TELEPHONE ENCOUNTER
Patient notified, he said he did get scheduled but not until April 7th. He wants to know if you will gap the medication until that appt. He is also requesting a refill on Seroquel 400mg once at bedtime for sleep. He also wants a prescription for a walker. Please advise.

## 2022-02-07 NOTE — TELEPHONE ENCOUNTER
Spoke with patient today, he said they put him on it in Nov when in the hospital for neuropathy in his feet and legs. He said it is back and having a hard time walking as it feels he is walking on pins and needles, burning sensation and difficulty walking. He said it helps best out of all the things he has tried. Appt has been scheduled.

## 2022-02-08 DIAGNOSIS — G62.1 ALCOHOLIC PERIPHERAL NEUROPATHY (HCC): ICD-10-CM

## 2022-02-08 RX ORDER — PREGABALIN 100 MG/1
100 CAPSULE ORAL 2 TIMES DAILY
Qty: 60 CAPSULE | Refills: 0 | OUTPATIENT
Start: 2022-02-08 | End: 2022-03-10

## 2022-02-08 RX ORDER — QUETIAPINE FUMARATE 400 MG/1
400 TABLET, FILM COATED ORAL NIGHTLY
Qty: 60 TABLET | Refills: 1 | OUTPATIENT
Start: 2022-02-08

## 2022-02-08 NOTE — TELEPHONE ENCOUNTER
Brigitte Monet is calling to request a refill on the following medication(s):    Medication Request:  Requested Prescriptions     Pending Prescriptions Disp Refills    pregabalin (LYRICA) 100 MG capsule 60 capsule 0     Sig: Take 1 capsule by mouth 2 times daily for 30 days.     QUEtiapine (SEROQUEL) 400 MG tablet 60 tablet 1     Sig: Take 1 tablet by mouth nightly       Last Visit Date (If Applicable):  3/59/9935    Next Visit Date:    2/14/2022

## 2022-03-10 ENCOUNTER — HOSPITAL ENCOUNTER (OUTPATIENT)
Dept: VASCULAR LAB | Age: 62
Discharge: HOME | End: 2022-03-10
Payer: MEDICARE

## 2022-03-10 ENCOUNTER — HOSPITAL ENCOUNTER (OUTPATIENT)
Facility: MEDICAL CENTER | Age: 62
End: 2022-03-10

## 2022-03-10 DIAGNOSIS — G62.9 PERIPHERAL NERVE DISORDER: ICD-10-CM

## 2022-03-10 DIAGNOSIS — I73.9 INTERMITTENT CLAUDICATION (HCC): ICD-10-CM

## 2022-03-10 PROCEDURE — 93922 UPR/L XTREMITY ART 2 LEVELS: CPT

## 2022-03-10 PROCEDURE — 93925 LOWER EXTREMITY STUDY: CPT

## 2022-03-11 ENCOUNTER — TELEPHONE (OUTPATIENT)
Dept: VASCULAR SURGERY | Age: 62
End: 2022-03-11

## 2022-03-11 NOTE — TELEPHONE ENCOUNTER
LVM letting the patient know that his appt will be cancelled for 3/14/22 due to never receiving the referral.

## 2022-03-28 ENCOUNTER — INITIAL CONSULT (OUTPATIENT)
Dept: VASCULAR SURGERY | Age: 62
End: 2022-03-28
Payer: MEDICARE

## 2022-03-28 VITALS
SYSTOLIC BLOOD PRESSURE: 115 MMHG | DIASTOLIC BLOOD PRESSURE: 78 MMHG | HEART RATE: 81 BPM | HEIGHT: 73 IN | RESPIRATION RATE: 20 BRPM | OXYGEN SATURATION: 96 % | TEMPERATURE: 98.2 F | WEIGHT: 220 LBS | BODY MASS INDEX: 29.16 KG/M2

## 2022-03-28 DIAGNOSIS — G57.93 NEUROPATHY INVOLVING BOTH LOWER EXTREMITIES: Primary | ICD-10-CM

## 2022-03-28 PROCEDURE — 99204 OFFICE O/P NEW MOD 45 MIN: CPT | Performed by: SURGERY

## 2022-03-28 ASSESSMENT — ENCOUNTER SYMPTOMS
TROUBLE SWALLOWING: 0
COLOR CHANGE: 0
BACK PAIN: 1
VOMITING: 0
CHEST TIGHTNESS: 0
SHORTNESS OF BREATH: 0
COUGH: 0
ABDOMINAL PAIN: 0
EYE PAIN: 0
ABDOMINAL DISTENTION: 0
VOICE CHANGE: 0

## 2022-03-28 NOTE — PROGRESS NOTES
[facility]  [address] [facility] [address] [place] [address]  Dept: [phone]     Patient: Lynette Phillips  : 1960  MRN: [id number]  DOS: 3/28/2022    Referring provider:  Paige Silveira      HPI:  Lynette Phillips is a 62 y.o. male who comes to the office for the first time regarding bilateral lower extremity pain. He has bilateral lower extremity pain tingling numbness and burning sensation occurs in the feet and the calves and some even in the thighs. This has been going on for several months. He has no relief during exercise or at rest.  It is a constant type of pain. He has had significant back problems and also has a myeloproliferative disorder for which she is going to be seeing an oncologist.  He also has a scheduled appointment with neurology on . He does not really complain of claudication and does not have symptoms typical of rest pain. He has never had ulcers or gangrene of either lower extremity. He has no family history of aneurysmal disease. He does smoke cigarettes. He does have high blood pressure which is treated. He does not know if he has high cholesterol. He has never had coronary events. He has never had a stroke.   He has had cervical spine surgery in the past.  Past Medical History:   Diagnosis Date    Alcohol abuse 2019    Anemia     Cancer ([address]     blood    Cervical stenosis of spine 2019    Chronic left-sided low back pain with left-sided sciatica 3/14/2019    Insomnia 3/14/2019    Intentional drug overdose ([address] 2019    Left arm numbness 2019    Left lumbar radiculitis 5/3/2019    Macrocytosis     Major depressive disorder, recurrent severe without psychotic features ([address] 2019    Myeloproliferative disorder ([address]     Osteoarthritis of spine with radiculopathy, lumbar region 2019    Severe recurrent major depression without psychotic features ([facility] 8/12/2019    Spondylosis of lumbar region without myelopathy or radiculopathy 5/3/2019     Family History   Problem Relation Age of Onset    Breast Cancer Mother         s/p surgical complications    Diabetes Mother     Heart Failure Father     Other Brother         \"bad back\"      Social History     Socioeconomic History    Marital status:      Spouse name: Not on file    Number of children: 3    Years of education: Not on file    Highest education level: Not on file   Occupational History    Not on file   Tobacco Use    Smoking status: Current Every Day Smoker     Packs/day: 1.50     Years: 43.00     Pack years: 64.50     Types: Cigarettes     Start date: 9/23/1978    Smokeless tobacco: Never Used   Vaping Use    Vaping Use: Never used   Substance and Sexual Activity    Alcohol use: Yes     Alcohol/week: 14.0 standard drinks     Types: 14 Cans of beer per week     Comment: heavy alcohol user/ states drinks a pint of vodka occassionaly    Drug use: Not Currently     Types: Marijuana Randy Wilburn)    Sexual activity: Not Currently   Other Topics Concern    Not on file   Social History Narrative    Not on file     Social Determinants of Health     Financial Resource Strain:     Difficulty of Paying Living Expenses: Not on file   Food Insecurity:     Worried About Running Out of Food in the Last Year: Not on file    [place] of Food in the Last Year: Not on file   Transportation Needs:     Lack of Transportation (Medical): Not on file    Lack of Transportation (Non-Medical):  Not on file   Physical Activity:     Days of Exercise per Week: Not on file    Minutes of Exercise per Session: Not on file   Stress:     Feeling of Stress : Not on file   Social Connections:     Frequency of Communication with Friends and Family: Not on file    Frequency of Social Gatherings with Friends and Family: Not on file    Attends Gnosticism Services: Not on file   [employer] of Clubs or Organizations: Not on file    Attends Club or Organization Meetings: Not on file    Marital Status: Not on file   Intimate Partner Violence:     Fear of Current or Ex-Partner: Not on file    Emotionally Abused: Not on file    Physically Abused: Not on file    Sexually Abused: Not on file   Housing Stability:     Unable to Pay for Housing in the Last Year: Not on file    Number of [place] in the Last Year: Not on file    Unstable Housing in the Last Year: Not on file      Past Surgical History:   Procedure Laterality Date    APPENDECTOMY      CARPAL TUNNEL RELEASE      bilateral     CERVICAL FUSION      s/p trampoline accident   [address] [facility]  09/05/2019     [address] [facility] C3-4     CERVICAL FUSION N/A 9/5/2019    ANTERIOR CERVICAL DECOMPRESSION FUSION C3-4 performed by David Fry DO at [address], DIAGNOSTIC      EPIDURAL STEROID INJECTION Left 5/15/2019    EPIDURAL STEROID INJECTION LEFT L5S1 performed by Vic Lagunas MD at [facility]        Review of Systems   Constitutional: Negative for activity change, fever and unexpected weight change. HENT: Negative for trouble swallowing and voice change. Eyes: Negative for pain and visual disturbance. Respiratory: Negative for cough, chest tightness and shortness of breath. Cardiovascular: Negative for chest pain and palpitations. Gastrointestinal: Negative for abdominal distention, abdominal pain and vomiting. Endocrine: Negative for cold intolerance and heat intolerance. Genitourinary: Negative for dysuria, flank pain and hematuria. Musculoskeletal: Positive for back pain. Negative for joint swelling and neck pain. Skin: Negative for color change and rash. Allergic/Immunologic: Negative for immunocompromised state. Neurological: Positive for numbness. Negative for syncope, speech difficulty, weakness and headaches.    Hematological: Negative for adenopathy. Psychiatric/Behavioral: Negative for behavioral problems and suicidal ideas. Vitals:    22 0943   BP: 115/78   Site: Left Upper Arm   Position: Sitting   Cuff Size: Large Adult   Pulse: 81   Resp: 20   Temp: 98.2 °F (36.8 °C)   TempSrc: Temporal   SpO2: 96%   Weight: 220 lb (99.8 kg)   Height: 6' 1\" (1.854 m)          Physical Exam  Constitutional:       General: He is not in acute distress. HENT:      Mouth/Throat:      Mouth: Mucous membranes are moist.      Pharynx: Oropharynx is clear. Eyes:      General: No scleral icterus. Extraocular Movements: Extraocular movements intact. Conjunctiva/sclera: Conjunctivae normal.   Neck:      Thyroid: No thyroid mass or thyromegaly. Cardiovascular:      Rate and Rhythm: Normal rate and regular rhythm. Pulses:           Carotid pulses are 2+ on the right side and 2+ on the left side. Radial pulses are 2+ on the right side and 2+ on the left side. Femoral pulses are 2+ on the right side and 2+ on the left side. Popliteal pulses are 2+ on the right side and 2+ on the left side. Dorsalis pedis pulses are 2+ on the right side and 2+ on the left side. Posterior tibial pulses are 2+ on the right side and 2+ on the left side. Heart sounds: No murmur heard. Comments: He has no ulceration and no gangrene. He has minimal edema bilaterally. He has strongly palpable distal pulses in both lower extremities. Pulmonary:      Effort: No respiratory distress. Breath sounds: No rales. Abdominal:      General: There is no distension. Palpations: There is no mass. Tenderness: There is no abdominal tenderness. There is no guarding. Musculoskeletal:      Cervical back: No rigidity or tenderness. Right lower le+ Edema present. Left lower le+ Edema present. Lymphadenopathy:      Cervical: No cervical adenopathy. Skin:     Coloration: Skin is not jaundiced. Findings: No rash. Neurological:      General: No focal deficit present. Mental Status: He is alert and oriented to person, place, and time. Cranial Nerves: No cranial nerve deficit. Sensory: Sensory deficit present. Comments: He has numbness in both lower extremities with tingling. He has no motor dysfunction. Psychiatric:         Mood and Affect: Mood normal.         Assessment:  1. Neuropathy involving both lower extremities          Plan: At this point I do not think that his lower extremity symptoms are due to a vascular issue. He has palpable distal pulses and no ulceration or gangrene. He has some edema which I think is due to immobility. He has symptoms consistent with neuropathy and I think that his visit to the neurologist will be fruitful for him. He also has a visit to an oncologist.  We can see him back to the office on a as needed basis.     Electronically signed by:  Eula Medeiros MD

## 2022-04-14 ENCOUNTER — HOSPITAL ENCOUNTER (OUTPATIENT)
Facility: MEDICAL CENTER | Age: 62
End: 2022-04-14
Payer: MEDICARE

## 2022-04-20 NOTE — GROUP NOTE
Group Therapy Note    Date: August 13    Group Start Time: 1430  Group End Time: 8821  Group Topic: Psychoeducation    ADALID ThakurS    Pt did not attend Therapeutic Recreation d/t resting in room despite staff invitation to attend. 1:1 talk time offered as alternative to group session, pt declined.
Group Therapy Note    Date: August 13    Group Start Time: 1610  Group End Time: 1645  Group Topic: Group Therapy    [place], RN        Group Therapy Note    Attendees: 11/13         Patient's goal: To improve interpersonal skills    Notes:  Patient attended group and participated. Status After Intervention:  Unchanged    Participation Level:  Active Listener and Interactive    Participation Quality: Appropriate, Sharing and Supportive      Speech:  normal      Thought Process/Content: Logical      Affective Functioning: Congruent and Flat      Mood: anxious and depressed      Level of consciousness:  Alert and Oriented x4      Response to Learning: Progressing to goal      Endings: None Reported    Modes of Intervention: Support, Socialization, Exploration, Clarifying and Problem-solving      Discipline Responsible: Registered Nurse      Signature:  Sam Stevenson RN
Group Therapy Note    Date: August 14    Group Start Time: 0900  Group End Time: 0930  Group Topic: Community Meeting    AMARJIT Cruz        Group Therapy Note    Attendees: 9         Patient's Goal:  To increase socialization     Notes:  Patient attended group and participated     Status After Intervention:  Improved    Participation Level:  Active Listener and Interactive    Participation Quality: Appropriate, Attentive and Sharing      Speech:  normal      Thought Process/Content: Logical      Affective Functioning: Congruent      Mood: euthymic      Level of consciousness:  Alert and Oriented x4      Response to Learning: Progressing to goal      Endings: None Reported    Modes of Intervention: Education, Support and Socialization      Discipline Responsible: Psychoeducational Specialist      Signature:  Raymon Bearden
Group Therapy Note    Date: August 15    Group Start Time: 0900  Group End Time: 0915  Group Topic: Community Meeting    ADALID SubramanianS    Pt did not attend [employer] at 0900 d/t resting in room despite staff invitation to attend. 1:1 talk time offered as alternative to group session, pt declined.
Group Therapy Note    Date: August 16    Group Start Time: 1000  Group End Time: 1833  Group Topic: Group Therapy    [facility]    TERRY Villegas, CALVINW        Group Therapy Note    Attendees: 9         Patient's Goal:  Increase interpersonal relationship skills    Notes:  Pt was an active participant in group discussion    Status After Intervention:  Unchanged    Participation Level:  Active Listener and Interactive    Participation Quality: Appropriate, Attentive, Sharing and Supportive      Speech:  normal      Thought Process/Content: Logical      Affective Functioning: Congruent      Mood: anxious and depressed      Level of consciousness:  Alert, Oriented x4 and Attentive      Response to Learning: Able to change behavior and Progressing to goal      Endings: None Reported    Modes of Intervention: Support, Socialization, Exploration and Clarifying      Discipline Responsible: /Counselor      Signature:  TERRY Villegas, RYAN
Group Therapy Note    Date: August 16    Group Start Time: 1600  Group End Time: 1630  Group Topic: Healthy Living/Wellness    [facility]    April M Jovanny Anguiano RN        Group Therapy Note    Attendees: 7       Patient's Goal:  Participate in group  Notes: socialization group    Status After Intervention:  Unchanged    Participation Level:  Active Listener and Interactive    Participation Quality: Appropriate, Attentive, Sharing and Supportive      Speech:  normal      Thought Process/Content: Logical      Affective Functioning: Congruent      Mood: depressed      Level of consciousness:  Alert, Oriented x4 and Attentive      Response to Learning: Able to verbalize current knowledge/experience, Able to verbalize/acknowledge new learning and Able to retain information      Endings: None Reported    Modes of Intervention: Socialization      Discipline Responsible: Registered Nurse      Signature:  Mary Carmen Zapata RN
Group Therapy Note    Date: August 17    Group Start Time: 1000  Group End Time: 9405  Group Topic: Psychotherapy    CASSIA Souza    RYAN Hahn        Group Therapy Note    Attendees: 7/13         Patient's Goal:  To increase interpersonal skills    Notes:  Pt attended and participated in group    Status After Intervention:  Unchanged    Participation Level:  Active Listener and Interactive    Participation Quality: Appropriate, Attentive, Sharing and Supportive      Speech:  normal      Thought Process/Content: Logical      Affective Functioning: Congruent      Mood: euthymic      Level of consciousness:  Alert, Oriented x4 and Attentive      Response to Learning: Able to verbalize current knowledge/experience, Able to verbalize/acknowledge new learning, Able to retain information, Capable of insight and Progressing to goal      Endings: None Reported    Modes of Intervention: Education, Support, Socialization and Problem-solving      Discipline Responsible: /Counselor      Signature:  RYAN Hahn
Group Therapy Note    Date: August 17    Group Start Time: 1600  Group End Time: 1625  Group Topic: Group Therapy    [facility] Charleen Villegas RN        Group Therapy Note    Attendees: 4/13         Patient's Goal:  To increase social skills    Notes:  Participated in group. Status After Intervention:  Unchanged    Participation Level:  Active Listener and Interactive    Participation Quality: Appropriate, Attentive, Sharing and Supportive      Speech:  normal      Thought Process/Content: Logical      Affective Functioning: Congruent      Mood: anxious      Level of consciousness:  Alert and Oriented x4      Response to Learning: Progressing to goal      Endings: None Reported    Modes of Intervention: Education, Support and Socialization      Discipline Responsible: Registered Nurse      Signature:  Vincent Robles RN
Group Therapy Note    Date: August 18    Group Start Time: 1330  Group End Time: 1500  Group Topic: Psychotherapy    [facility] RYAN Beckford    Group Therapy Note     Attendees: 10/13           Patient's Goal:  To gain a better understanding of grief     Notes:  Pt attended and participated in group     Status After Intervention:  Improved     Participation Level:  Active Listener and Interactive     Participation Quality: Appropriate, Attentive, Sharing and Supportive        Speech:  Normal        Thought Process/Content: Logical        Affective Functioning: Congruent        Mood: Euthymic     Level of consciousness:  Alert, Oriented x4 and Attentive      Response to Learning: Able to verbalize current knowledge/experience, Able to verbalize/acknowledge new learning, Able to retain information, Capable of insight and Progressing to goal      Endings: None Reported     Modes of Intervention: Education, Support, Socialization and Problem-solving        Discipline Responsible: /Counselor        Signature:  RYAN Severino
Group Therapy Note    Date: August 19    Group Start Time: 1330  Group End Time: 1430  Group Topic: Psychoeducation    CASSIA Bobo [address] [facility]    Patient refused to attend recreational therapy group at 1330 despite encouragement from staff. 1:1 talk time offered, but refused.         Signature:  Chayo Cortes
no

## 2022-04-21 ENCOUNTER — HOSPITAL ENCOUNTER (OUTPATIENT)
Facility: MEDICAL CENTER | Age: 62
Discharge: HOME | End: 2022-04-21
Payer: MEDICARE

## 2022-04-21 ENCOUNTER — TELEPHONE (OUTPATIENT)
Dept: ONCOLOGY | Age: 62
End: 2022-04-21

## 2022-04-21 ENCOUNTER — OFFICE VISIT (OUTPATIENT)
Dept: ONCOLOGY | Age: 62
End: 2022-04-21
Payer: MEDICARE

## 2022-04-21 VITALS
BODY MASS INDEX: 29.42 KG/M2 | RESPIRATION RATE: 18 BRPM | SYSTOLIC BLOOD PRESSURE: 113 MMHG | DIASTOLIC BLOOD PRESSURE: 72 MMHG | HEART RATE: 81 BPM | WEIGHT: 223 LBS | TEMPERATURE: 98 F

## 2022-04-21 DIAGNOSIS — D47.1 MYELOPROLIFERATIVE DISEASE (HCC): ICD-10-CM

## 2022-04-21 DIAGNOSIS — D59.9 ACQUIRED HEMOLYTIC ANEMIA, UNSPECIFIED (HCC): ICD-10-CM

## 2022-04-21 DIAGNOSIS — D47.1 MYELOPROLIFERATIVE DISEASE (HCC): Primary | ICD-10-CM

## 2022-04-21 LAB
ABSOLUTE EOS #: 0.66 K/UL (ref 0–0.4)
ABSOLUTE IMMATURE GRANULOCYTE: 0.39 K/UL (ref 0–0.3)
ABSOLUTE LYMPH #: 1.57 K/UL (ref 1–4.8)
ABSOLUTE MONO #: 0.92 K/UL (ref 0.2–0.8)
ALBUMIN SERPL-MCNC: 4.5 G/DL (ref 3.5–5.2)
ALP BLD-CCNC: 106 U/L (ref 40–129)
ALT SERPL-CCNC: 65 U/L (ref 5–41)
ANION GAP SERPL CALCULATED.3IONS-SCNC: 14 MMOL/L (ref 9–17)
AST SERPL-CCNC: 100 U/L
BASOPHILS # BLD: 3 %
BASOPHILS ABSOLUTE: 0.39 K/UL (ref 0–0.2)
BILIRUB SERPL-MCNC: 0.35 MG/DL (ref 0.3–1.2)
BUN BLDV-MCNC: 7 MG/DL (ref 8–23)
BUN/CREAT BLD: 11 (ref 9–20)
CALCIUM SERPL-MCNC: 8.7 MG/DL (ref 8.6–10.4)
CHLORIDE BLD-SCNC: 99 MMOL/L (ref 98–107)
CO2: 22 MMOL/L (ref 20–31)
CREAT SERPL-MCNC: 0.63 MG/DL (ref 0.7–1.2)
EOSINOPHILS RELATIVE PERCENT: 5 % (ref 1–4)
FERRITIN: 227 NG/ML (ref 30–400)
FOLATE: 6.4 NG/ML
GFR AFRICAN AMERICAN: >60 ML/MIN
GFR NON-AFRICAN AMERICAN: >60 ML/MIN
GFR SERPL CREATININE-BSD FRML MDRD: ABNORMAL ML/MIN/{1.73_M2}
GLUCOSE BLD-MCNC: 90 MG/DL (ref 70–99)
HAPTOGLOBIN: 167 MG/DL (ref 30–200)
HCT VFR BLD CALC: 46.2 % (ref 40.7–50.3)
HEMOGLOBIN: 15.4 G/DL (ref 13–17)
IMMATURE GRANULOCYTES: 3 %
IRON SATURATION: 52 % (ref 20–55)
IRON: 158 UG/DL (ref 59–158)
LYMPHOCYTES # BLD: 12 % (ref 24–44)
MCH RBC QN AUTO: 31 PG (ref 25.2–33.5)
MCHC RBC AUTO-ENTMCNC: 33.3 G/DL (ref 28.4–34.8)
MCV RBC AUTO: 93 FL (ref 82.6–102.9)
MONOCYTES # BLD: 7 % (ref 1–7)
NRBC AUTOMATED: 0 PER 100 WBC
PDW BLD-RTO: 13.3 % (ref 11.8–14.4)
PLATELET # BLD: 652 K/UL (ref 138–453)
PMV BLD AUTO: 8.9 FL (ref 8.1–13.5)
POTASSIUM SERPL-SCNC: 4.5 MMOL/L (ref 3.7–5.3)
RBC # BLD: 4.97 M/UL (ref 4.21–5.77)
SEG NEUTROPHILS: 70 % (ref 36–66)
SEGMENTED NEUTROPHILS ABSOLUTE COUNT: 9.17 K/UL (ref 1.8–7.7)
SODIUM BLD-SCNC: 135 MMOL/L (ref 135–144)
TOTAL IRON BINDING CAPACITY: 305 UG/DL (ref 250–450)
TOTAL PROTEIN: 7.7 G/DL (ref 6.4–8.3)
UNSATURATED IRON BINDING CAPACITY: 147 UG/DL (ref 112–347)
VITAMIN B-12: 678 PG/ML (ref 232–1245)
WBC # BLD: 13.1 K/UL (ref 3.5–11.3)

## 2022-04-21 PROCEDURE — 83010 ASSAY OF HAPTOGLOBIN QUANT: CPT

## 2022-04-21 PROCEDURE — 85025 COMPLETE CBC W/AUTO DIFF WBC: CPT

## 2022-04-21 PROCEDURE — 4004F PT TOBACCO SCREEN RCVD TLK: CPT | Performed by: INTERNAL MEDICINE

## 2022-04-21 PROCEDURE — 83550 IRON BINDING TEST: CPT

## 2022-04-21 PROCEDURE — G8419 CALC BMI OUT NRM PARAM NOF/U: HCPCS | Performed by: INTERNAL MEDICINE

## 2022-04-21 PROCEDURE — 3017F COLORECTAL CA SCREEN DOC REV: CPT | Performed by: INTERNAL MEDICINE

## 2022-04-21 PROCEDURE — G8427 DOCREV CUR MEDS BY ELIG CLIN: HCPCS | Performed by: INTERNAL MEDICINE

## 2022-04-21 PROCEDURE — 80053 COMPREHEN METABOLIC PANEL: CPT

## 2022-04-21 PROCEDURE — 82746 ASSAY OF FOLIC ACID SERUM: CPT

## 2022-04-21 PROCEDURE — 83540 ASSAY OF IRON: CPT

## 2022-04-21 PROCEDURE — 36415 COLL VENOUS BLD VENIPUNCTURE: CPT

## 2022-04-21 PROCEDURE — 99211 OFF/OP EST MAY X REQ PHY/QHP: CPT | Performed by: INTERNAL MEDICINE

## 2022-04-21 PROCEDURE — 99214 OFFICE O/P EST MOD 30 MIN: CPT | Performed by: INTERNAL MEDICINE

## 2022-04-21 PROCEDURE — 82607 VITAMIN B-12: CPT

## 2022-04-21 PROCEDURE — 82728 ASSAY OF FERRITIN: CPT

## 2022-04-21 RX ORDER — GABAPENTIN 300 MG/1
300 CAPSULE ORAL 2 TIMES DAILY
Status: ON HOLD | COMMUNITY
Start: 2022-04-07 | End: 2022-11-02 | Stop reason: HOSPADM

## 2022-04-21 RX ORDER — LISINOPRIL 10 MG/1
10 TABLET ORAL DAILY
COMMUNITY
Start: 2022-04-07

## 2022-04-21 RX ORDER — HYDROXYUREA 500 MG/1
500 CAPSULE ORAL DAILY
Qty: 30 CAPSULE | Refills: 2 | Status: SHIPPED | OUTPATIENT
Start: 2022-04-21 | End: 2022-09-21 | Stop reason: SDUPTHER

## 2022-04-21 RX ORDER — FUROSEMIDE 20 MG/1
20 TABLET ORAL DAILY
COMMUNITY
Start: 2022-04-01

## 2022-04-21 NOTE — PROGRESS NOTES
_           Chief Complaint   Patient presents with    Follow-up    Medication Refill     DIAGNOSIS:       Myeloproliferative disorder, NOS, positive VINICIO 2 gene mutation. Negative for CML. CURRENT THERAPY:         Hydroxyurea 500 mg twice daily. Allopurinol, discontinued  Aspirin  Therapeutic phlebotomy as needed    BRIEF CASE HISTORY:      Mr. Izzy Hay is a very pleasant 62 y.o. male with history of recently diagnosed myeloproliferative disorder is referred for further management. He has Chronic back pain. Old injury in the cervical spine after trampoline fall. Surgery C-spine long time ago  The patient is having frequent headaches and left sided numbness. No chest pains  In October 2018 he was seen in [facility] and had elevated white blood cells and red blood cells and platelets  Diagnosis myeloproliferative disorder with positive JAK2 gene mutation. Bone marrow showed no specific type and cytogenetics showed no CML  He was started on Hydroxyurea twice daily and allopurinol. Treatment tolerated well so far with no side effects. INTERIM HISTORY:   The patient seen for follow-up myeloproliferative disorder. Patient was last seen March 2021.he has problem with compliance. Patient is maintained on hydroxyurea but treatment was interrupted. Recently he was evaluated by vascular due to pain and numbness of the lower extremities. Vascular cause was ruled out. Patient will be seen by neurology.         PAST MEDICAL HISTORY: has a past medical history of Alcohol abuse, Anemia, Cancer ([address] Cervical stenosis of spine, Chronic left-sided low back pain with left-sided sciatica, Insomnia, Intentional drug overdose ([address] Left arm numbness, Left lumbar radiculitis, Macrocytosis, Major depressive disorder, recurrent severe without psychotic features ([address] Myeloproliferative disorder ([address] Osteoarthritis of spine with radiculopathy, lumbar region, Severe recurrent major depression without psychotic features ([facility] [address] and Spondylosis of lumbar region without myelopathy or radiculopathy. PAST SURGICAL HISTORY: has a past surgical history that includes cervical fusion; Appendectomy; Tonsillectomy; Carpal tunnel release; epidural steroid injection (Left, 5/15/2019); Endoscopy, colon, diagnostic; cervical fusion (09/05/2019); and cervical fusion (N/A, 9/5/2019). CURRENT MEDICATIONS:  has a current medication list which includes the following prescription(s): gabapentin, furosemide, lisinopril, hydroxyurea, midodrine, sertraline, folic acid, levothyroxine, prazosin, omeprazole, quetiapine, nicotine, naltrexone, and therapeutic multivitamin-minerals. ALLERGIES:  has No Known Allergies. FAMILY HISTORY: Negative for any hematological or oncological conditions. SOCIAL HISTORY:  reports that he has been smoking cigarettes. He started smoking about 43 years ago. He has a 64.50 pack-year smoking history. He has never used smokeless tobacco. He reports current alcohol use of about 14.0 standard drinks of alcohol per week. He reports previous drug use. Drug: Marijuana [employer]). REVIEW OF SYSTEMS:     · General: No weakness or fatigue. No unanticipated weight loss or decreased appetite. No fever or chills. · Eyes: No blurred vision, eye pain or double vision. · Ears: No hearing problems or drainage. No tinnitus. · Throat: No sore throat, problems with swallowing or dysphagia. · Respiratory: Positive for cough. No sputum or hemoptysis. No shortness of breath. Positive for pleuritic chest pain. · Cardiovascular: No chest pain, orthopnea or PND. No lower extremity edema. No palpitation. · Gastrointestinal: No problems with swallowing. No abdominal pain or bloating. No nausea or vomiting. No diarrhea or constipation. No GI bleeding.    · Genitourinary: No dysuria, hematuria, frequency or urgency. · Musculoskeletal: No muscle aches or pains. No limitation of movement. No back pain. No gait disturbance, No joint complaints. · Dermatologic: No skin rashes or pruritus. Skin lesion on the dorsum of the left hand   · psychiatric: No depression, anxiety, or stress or signs of schizophrenia. No change in mood or affect. · Hematologic: No history of bleeding tendency. No bruises or ecchymosis. No history of clotting problems. · Infectious disease: No fever, chills or frequent infections. · Endocrine: No problems with obesity. No polydipsia or polyuria. No temperature intolerance. · Neurologic: + headaches No dizziness. No weakness. + numbness of the extremities. No changes in balance, coordination,  memory, mentation, behavior. · Allergic/Immunologic: No nasal congestion or hives. No repeated infections. PHYSICAL EXAM:  The patient is not in acute distress. Vital signs: Blood pressure 113/72, pulse 81, temperature 98 °F (36.7 °C), temperature source Temporal, resp. rate 18, weight 223 lb (101.2 kg). HEENT:  Eyes are normal. Ears, nose and throat are normal.  Neck: Supple. No lymph node enlargement. No thyroid enlargement. Trachea is centrally located. Chest:  Clear to auscultation. No wheezes or crepitations. Heart: Regular sinus rhythm. Abdomen: Soft, nontender. No hepatosplenomegaly. No masses. Extremities:  With no edema. Lymph Nodes:  No cervical, axillary or inguinal lymph node enlargement. Neurologic:  Conscious and oriented. No focal neurological deficits. Psychosocial: No depression, anxiety or stress. Skin: No rashes, bruises or ecchymoses. Skin lesion of the dorsum of the left hand suspicious for possible skin cancer.       Review of Diagnostic data:   Lab Results   Component Value Date    WBC 13.1 (H) 04/21/2022    HGB 15.4 04/21/2022    HCT 46.2 04/21/2022    MCV 93.0 04/21/2022     (H) 04/21/2022       Chemistry        Component Value Date/Time     04/21/2022 1013    K 4.5 04/21/2022 1013    CL 99 04/21/2022 1013    CO2 22 04/21/2022 1013    BUN 7 (L) 04/21/2022 1013    CREATININE 0.63 (L) 04/21/2022 1013        Component Value Date/Time    CALCIUM 8.7 04/21/2022 1013    ALKPHOS 106 04/21/2022 1013     (H) 04/21/2022 1013    ALT 65 (H) 04/21/2022 1013    BILITOT 0.35 04/21/2022 1013        IMPRESSION:   Myeloproliferative disorder, NOS, positive VINICIO 2 gene mutation. Negative for CML. Chronic headaches  Long-standing history of Episodes of cough and chest pain. Normocytic anemia    PLAN: records and labs were reviewed and explained to patient. I explained to the patient the nature of this problem with myeloproliferative disorder and management plan. Based on Bone marrow and cytogenetics no CML was seen. Diagnosis is made with myeloproliferative disorder NOS. Patient initiated well on hydroxyurea. However he was seen last about 1 year ago. He has interrupted treatment. His white blood cells and platelets are still elevated but his MCV is normal.  I am not sure of how much intake of hydroxyurea over the last few weeks. Recent time I am not clear about the reason for anemia. So I will investigate for this. There rule out other causes for this anemia  In the interim I will cut down the hydroxyurea to be once a day and will make further recommendation based on results of the labs. If needed we may need to do repeated bone marrow test.  Patient will be seen by neurology for further management of peripheral neuropathy. RV 2 months.

## 2022-04-21 NOTE — TELEPHONE ENCOUNTER
FIDEL HERE FOR MD VISIT  LABS SOON   MAKE HYDREA ONCE DAILY  RV 2 MTHS W/ CBC @ RV  LABS CDP CMP FERRITIN FE TIBC VITAMIN B 12 & FOLATE DONE ON EXIT  MD VISIT 6/16/22 @ 8:15AM  AVS PRINTED W/ INSTRUCTIONS AND GIVEN TO PT ON EXIT

## 2022-06-15 ENCOUNTER — HOSPITAL ENCOUNTER (OUTPATIENT)
Facility: MEDICAL CENTER | Age: 62
End: 2022-06-15

## 2022-07-22 ENCOUNTER — HOSPITAL ENCOUNTER (INPATIENT)
Age: 62
LOS: 3 days | Discharge: HOME HEALTH | DRG: 640 | End: 2022-07-25
Attending: EMERGENCY MEDICINE | Admitting: INTERNAL MEDICINE
Payer: MEDICARE

## 2022-07-22 DIAGNOSIS — F10.920 ACUTE ALCOHOLIC INTOXICATION WITHOUT COMPLICATION (HCC): ICD-10-CM

## 2022-07-22 DIAGNOSIS — E87.1 HYPONATREMIA: Primary | ICD-10-CM

## 2022-07-22 PROBLEM — F10.220 ALCOHOL INTOXICATION IN ACTIVE ALCOHOLIC WITHOUT COMPLICATION (HCC): Status: ACTIVE | Noted: 2022-07-22

## 2022-07-22 LAB
ABSOLUTE EOS #: 0.28 K/UL (ref 0–0.4)
ABSOLUTE LYMPH #: 0.97 K/UL (ref 1–4.8)
ABSOLUTE MONO #: 1.25 K/UL (ref 0.1–1.3)
ALBUMIN SERPL-MCNC: 4.4 G/DL (ref 3.5–5.2)
ALP BLD-CCNC: 90 U/L (ref 40–129)
ALT SERPL-CCNC: 47 U/L (ref 5–41)
ANION GAP SERPL CALCULATED.3IONS-SCNC: 18 MMOL/L (ref 9–17)
ANION GAP SERPL CALCULATED.3IONS-SCNC: 19 MMOL/L (ref 9–17)
AST SERPL-CCNC: 80 U/L
BASOPHILS # BLD: 0 % (ref 0–2)
BASOPHILS ABSOLUTE: 0 K/UL (ref 0–0.2)
BILIRUB SERPL-MCNC: 0.5 MG/DL (ref 0.3–1.2)
BUN BLDV-MCNC: 5 MG/DL (ref 8–23)
CALCIUM SERPL-MCNC: 8.8 MG/DL (ref 8.6–10.4)
CHLORIDE BLD-SCNC: 87 MMOL/L (ref 98–107)
CHLORIDE BLD-SCNC: 89 MMOL/L (ref 98–107)
CO2: 17 MMOL/L (ref 20–31)
CO2: 18 MMOL/L (ref 20–31)
CREAT SERPL-MCNC: 0.49 MG/DL (ref 0.7–1.2)
EOSINOPHILS RELATIVE PERCENT: 2 % (ref 0–4)
ETHANOL PERCENT: 0.2 %
ETHANOL: 205 MG/DL
GFR AFRICAN AMERICAN: >60 ML/MIN
GFR NON-AFRICAN AMERICAN: >60 ML/MIN
GFR SERPL CREATININE-BSD FRML MDRD: ABNORMAL ML/MIN/{1.73_M2}
GLUCOSE BLD-MCNC: 98 MG/DL (ref 70–99)
HCT VFR BLD CALC: 41.3 % (ref 41–53)
HEMOGLOBIN: 14.5 G/DL (ref 13.5–17.5)
LIPASE: 38 U/L (ref 13–60)
LYMPHOCYTES # BLD: 7 % (ref 24–44)
MAGNESIUM: 2.2 MG/DL (ref 1.6–2.6)
MCH RBC QN AUTO: 33.3 PG (ref 26–34)
MCHC RBC AUTO-ENTMCNC: 35.1 G/DL (ref 31–37)
MCV RBC AUTO: 95 FL (ref 80–100)
MONOCYTES # BLD: 9 % (ref 1–7)
MORPHOLOGY: ABNORMAL
PDW BLD-RTO: 14 % (ref 11.5–14.9)
PLATELET # BLD: 373 K/UL (ref 150–450)
PMV BLD AUTO: 6.8 FL (ref 6–12)
POTASSIUM SERPL-SCNC: 3.9 MMOL/L (ref 3.7–5.3)
POTASSIUM SERPL-SCNC: 4.2 MMOL/L (ref 3.7–5.3)
RBC # BLD: 4.35 M/UL (ref 4.5–5.9)
SEG NEUTROPHILS: 82 % (ref 36–66)
SEGMENTED NEUTROPHILS ABSOLUTE COUNT: 11.4 K/UL (ref 1.3–9.1)
SODIUM BLD-SCNC: 123 MMOL/L (ref 135–144)
SODIUM BLD-SCNC: 125 MMOL/L (ref 135–144)
TOTAL PROTEIN: 7.7 G/DL (ref 6.4–8.3)
WBC # BLD: 13.9 K/UL (ref 3.5–11)

## 2022-07-22 PROCEDURE — 2500000003 HC RX 250 WO HCPCS

## 2022-07-22 PROCEDURE — 2580000003 HC RX 258

## 2022-07-22 PROCEDURE — 2580000003 HC RX 258: Performed by: EMERGENCY MEDICINE

## 2022-07-22 PROCEDURE — 2060000000 HC ICU INTERMEDIATE R&B

## 2022-07-22 PROCEDURE — 6370000000 HC RX 637 (ALT 250 FOR IP): Performed by: INTERNAL MEDICINE

## 2022-07-22 PROCEDURE — 80051 ELECTROLYTE PANEL: CPT

## 2022-07-22 PROCEDURE — 83930 ASSAY OF BLOOD OSMOLALITY: CPT

## 2022-07-22 PROCEDURE — 83690 ASSAY OF LIPASE: CPT

## 2022-07-22 PROCEDURE — 99285 EMERGENCY DEPT VISIT HI MDM: CPT

## 2022-07-22 PROCEDURE — 80053 COMPREHEN METABOLIC PANEL: CPT

## 2022-07-22 PROCEDURE — 36415 COLL VENOUS BLD VENIPUNCTURE: CPT

## 2022-07-22 PROCEDURE — G0480 DRUG TEST DEF 1-7 CLASSES: HCPCS

## 2022-07-22 PROCEDURE — 6360000002 HC RX W HCPCS: Performed by: INTERNAL MEDICINE

## 2022-07-22 PROCEDURE — 83735 ASSAY OF MAGNESIUM: CPT

## 2022-07-22 PROCEDURE — 6360000002 HC RX W HCPCS

## 2022-07-22 PROCEDURE — 6360000002 HC RX W HCPCS: Performed by: EMERGENCY MEDICINE

## 2022-07-22 PROCEDURE — 99223 1ST HOSP IP/OBS HIGH 75: CPT | Performed by: INTERNAL MEDICINE

## 2022-07-22 PROCEDURE — 85025 COMPLETE CBC W/AUTO DIFF WBC: CPT

## 2022-07-22 RX ORDER — ACETAMINOPHEN 650 MG/1
650 SUPPOSITORY RECTAL EVERY 6 HOURS PRN
Status: DISCONTINUED | OUTPATIENT
Start: 2022-07-22 | End: 2022-07-25 | Stop reason: HOSPADM

## 2022-07-22 RX ORDER — SODIUM CHLORIDE 9 MG/ML
INJECTION, SOLUTION INTRAVENOUS CONTINUOUS
Status: DISCONTINUED | OUTPATIENT
Start: 2022-07-22 | End: 2022-07-22

## 2022-07-22 RX ORDER — SODIUM CHLORIDE 9 MG/ML
INJECTION, SOLUTION INTRAVENOUS PRN
Status: DISCONTINUED | OUTPATIENT
Start: 2022-07-22 | End: 2022-07-25 | Stop reason: HOSPADM

## 2022-07-22 RX ORDER — ONDANSETRON 4 MG/1
4 TABLET, ORALLY DISINTEGRATING ORAL EVERY 8 HOURS PRN
Status: DISCONTINUED | OUTPATIENT
Start: 2022-07-22 | End: 2022-07-22 | Stop reason: SDUPTHER

## 2022-07-22 RX ORDER — QUETIAPINE FUMARATE 200 MG/1
400 TABLET, FILM COATED ORAL NIGHTLY
Status: DISCONTINUED | OUTPATIENT
Start: 2022-07-22 | End: 2022-07-23

## 2022-07-22 RX ORDER — LORAZEPAM 2 MG/ML
3 INJECTION INTRAMUSCULAR
Status: DISCONTINUED | OUTPATIENT
Start: 2022-07-22 | End: 2022-07-24

## 2022-07-22 RX ORDER — SODIUM CHLORIDE 0.9 % (FLUSH) 0.9 %
10 SYRINGE (ML) INJECTION PRN
Status: DISCONTINUED | OUTPATIENT
Start: 2022-07-22 | End: 2022-07-25 | Stop reason: HOSPADM

## 2022-07-22 RX ORDER — MAGNESIUM SULFATE 1 G/100ML
1000 INJECTION INTRAVENOUS PRN
Status: DISCONTINUED | OUTPATIENT
Start: 2022-07-22 | End: 2022-07-25 | Stop reason: HOSPADM

## 2022-07-22 RX ORDER — NICOTINE 21 MG/24HR
1 PATCH, TRANSDERMAL 24 HOURS TRANSDERMAL DAILY
Status: DISCONTINUED | OUTPATIENT
Start: 2022-07-22 | End: 2022-07-25 | Stop reason: HOSPADM

## 2022-07-22 RX ORDER — ENOXAPARIN SODIUM 100 MG/ML
40 INJECTION SUBCUTANEOUS DAILY
Status: DISCONTINUED | OUTPATIENT
Start: 2022-07-22 | End: 2022-07-22 | Stop reason: SDUPTHER

## 2022-07-22 RX ORDER — LORAZEPAM 1 MG/1
4 TABLET ORAL
Status: DISCONTINUED | OUTPATIENT
Start: 2022-07-22 | End: 2022-07-24

## 2022-07-22 RX ORDER — LISINOPRIL 10 MG/1
10 TABLET ORAL DAILY
Status: DISCONTINUED | OUTPATIENT
Start: 2022-07-22 | End: 2022-07-25 | Stop reason: HOSPADM

## 2022-07-22 RX ORDER — GABAPENTIN 300 MG/1
300 CAPSULE ORAL 2 TIMES DAILY
Status: DISCONTINUED | OUTPATIENT
Start: 2022-07-22 | End: 2022-07-25 | Stop reason: HOSPADM

## 2022-07-22 RX ORDER — LORAZEPAM 2 MG/ML
1 INJECTION INTRAMUSCULAR
Status: DISCONTINUED | OUTPATIENT
Start: 2022-07-22 | End: 2022-07-24

## 2022-07-22 RX ORDER — SODIUM CHLORIDE 0.9 % (FLUSH) 0.9 %
5-40 SYRINGE (ML) INJECTION EVERY 12 HOURS SCHEDULED
Status: DISCONTINUED | OUTPATIENT
Start: 2022-07-22 | End: 2022-07-25 | Stop reason: HOSPADM

## 2022-07-22 RX ORDER — ONDANSETRON 2 MG/ML
4 INJECTION INTRAMUSCULAR; INTRAVENOUS EVERY 6 HOURS PRN
Status: DISCONTINUED | OUTPATIENT
Start: 2022-07-22 | End: 2022-07-22 | Stop reason: SDUPTHER

## 2022-07-22 RX ORDER — ONDANSETRON 2 MG/ML
8 INJECTION INTRAMUSCULAR; INTRAVENOUS ONCE
Status: COMPLETED | OUTPATIENT
Start: 2022-07-22 | End: 2022-07-22

## 2022-07-22 RX ORDER — FOLIC ACID 1 MG/1
1 TABLET ORAL DAILY
Status: CANCELLED | OUTPATIENT
Start: 2022-07-22

## 2022-07-22 RX ORDER — LORAZEPAM 1 MG/1
2 TABLET ORAL
Status: DISCONTINUED | OUTPATIENT
Start: 2022-07-22 | End: 2022-07-24

## 2022-07-22 RX ORDER — ENOXAPARIN SODIUM 100 MG/ML
40 INJECTION SUBCUTANEOUS DAILY
Status: DISCONTINUED | OUTPATIENT
Start: 2022-07-22 | End: 2022-07-25 | Stop reason: HOSPADM

## 2022-07-22 RX ORDER — ACETAMINOPHEN 325 MG/1
650 TABLET ORAL EVERY 6 HOURS PRN
Status: DISCONTINUED | OUTPATIENT
Start: 2022-07-22 | End: 2022-07-25 | Stop reason: HOSPADM

## 2022-07-22 RX ORDER — ONDANSETRON 2 MG/ML
4 INJECTION INTRAMUSCULAR; INTRAVENOUS EVERY 6 HOURS PRN
Status: DISCONTINUED | OUTPATIENT
Start: 2022-07-22 | End: 2022-07-25 | Stop reason: HOSPADM

## 2022-07-22 RX ORDER — LORAZEPAM 2 MG/ML
4 INJECTION INTRAMUSCULAR
Status: DISCONTINUED | OUTPATIENT
Start: 2022-07-22 | End: 2022-07-24

## 2022-07-22 RX ORDER — LEVOTHYROXINE SODIUM 0.05 MG/1
50 TABLET ORAL DAILY
Status: DISCONTINUED | OUTPATIENT
Start: 2022-07-22 | End: 2022-07-25 | Stop reason: HOSPADM

## 2022-07-22 RX ORDER — POTASSIUM CHLORIDE 7.45 MG/ML
10 INJECTION INTRAVENOUS PRN
Status: DISCONTINUED | OUTPATIENT
Start: 2022-07-22 | End: 2022-07-25 | Stop reason: HOSPADM

## 2022-07-22 RX ORDER — ONDANSETRON 4 MG/1
4 TABLET, ORALLY DISINTEGRATING ORAL EVERY 8 HOURS PRN
Status: DISCONTINUED | OUTPATIENT
Start: 2022-07-22 | End: 2022-07-25 | Stop reason: HOSPADM

## 2022-07-22 RX ORDER — LORAZEPAM 1 MG/1
1 TABLET ORAL
Status: DISCONTINUED | OUTPATIENT
Start: 2022-07-22 | End: 2022-07-24

## 2022-07-22 RX ORDER — GAUZE BANDAGE 2" X 2"
100 BANDAGE TOPICAL DAILY
Status: CANCELLED | OUTPATIENT
Start: 2022-07-22

## 2022-07-22 RX ORDER — POLYETHYLENE GLYCOL 3350 17 G/17G
17 POWDER, FOR SOLUTION ORAL DAILY PRN
Status: DISCONTINUED | OUTPATIENT
Start: 2022-07-22 | End: 2022-07-25 | Stop reason: HOSPADM

## 2022-07-22 RX ORDER — PRAZOSIN HYDROCHLORIDE 1 MG/1
1 CAPSULE ORAL 2 TIMES DAILY
Status: DISCONTINUED | OUTPATIENT
Start: 2022-07-22 | End: 2022-07-25 | Stop reason: HOSPADM

## 2022-07-22 RX ORDER — HYDROXYUREA 500 MG/1
500 CAPSULE ORAL DAILY
Status: DISCONTINUED | OUTPATIENT
Start: 2022-07-22 | End: 2022-07-25 | Stop reason: HOSPADM

## 2022-07-22 RX ORDER — LORAZEPAM 2 MG/ML
2 INJECTION INTRAMUSCULAR
Status: DISCONTINUED | OUTPATIENT
Start: 2022-07-22 | End: 2022-07-24

## 2022-07-22 RX ORDER — PANTOPRAZOLE SODIUM 40 MG/1
40 TABLET, DELAYED RELEASE ORAL
Status: DISCONTINUED | OUTPATIENT
Start: 2022-07-23 | End: 2022-07-25 | Stop reason: HOSPADM

## 2022-07-22 RX ORDER — LORAZEPAM 1 MG/1
3 TABLET ORAL
Status: DISCONTINUED | OUTPATIENT
Start: 2022-07-22 | End: 2022-07-24

## 2022-07-22 RX ORDER — SODIUM CHLORIDE 9 MG/ML
INJECTION, SOLUTION INTRAVENOUS PRN
Status: DISCONTINUED | OUTPATIENT
Start: 2022-07-22 | End: 2022-07-22 | Stop reason: SDUPTHER

## 2022-07-22 RX ORDER — SODIUM CHLORIDE 0.9 % (FLUSH) 0.9 %
5-40 SYRINGE (ML) INJECTION PRN
Status: DISCONTINUED | OUTPATIENT
Start: 2022-07-22 | End: 2022-07-25 | Stop reason: HOSPADM

## 2022-07-22 RX ORDER — POTASSIUM CHLORIDE 20 MEQ/1
40 TABLET, EXTENDED RELEASE ORAL PRN
Status: DISCONTINUED | OUTPATIENT
Start: 2022-07-22 | End: 2022-07-25 | Stop reason: HOSPADM

## 2022-07-22 RX ADMIN — SODIUM CHLORIDE: 9 INJECTION, SOLUTION INTRAVENOUS at 15:44

## 2022-07-22 RX ADMIN — PRAZOSIN HYDROCHLORIDE 1 MG: 1 CAPSULE ORAL at 20:48

## 2022-07-22 RX ADMIN — QUETIAPINE FUMARATE 400 MG: 200 TABLET ORAL at 20:48

## 2022-07-22 RX ADMIN — GABAPENTIN 300 MG: 300 CAPSULE ORAL at 20:48

## 2022-07-22 RX ADMIN — LORAZEPAM 2 MG: 2 INJECTION, SOLUTION INTRAMUSCULAR; INTRAVENOUS at 17:13

## 2022-07-22 RX ADMIN — LORAZEPAM 2 MG: 2 INJECTION, SOLUTION INTRAMUSCULAR; INTRAVENOUS at 18:25

## 2022-07-22 RX ADMIN — LISINOPRIL 10 MG: 10 TABLET ORAL at 17:36

## 2022-07-22 RX ADMIN — FOLIC ACID: 5 INJECTION, SOLUTION INTRAMUSCULAR; INTRAVENOUS; SUBCUTANEOUS at 17:38

## 2022-07-22 RX ADMIN — ENOXAPARIN SODIUM 40 MG: 40 INJECTION SUBCUTANEOUS at 17:36

## 2022-07-22 RX ADMIN — ONDANSETRON 8 MG: 2 INJECTION INTRAMUSCULAR; INTRAVENOUS at 16:01

## 2022-07-22 RX ADMIN — LORAZEPAM 2 MG: 2 INJECTION, SOLUTION INTRAMUSCULAR; INTRAVENOUS at 20:37

## 2022-07-22 ASSESSMENT — PAIN - FUNCTIONAL ASSESSMENT
PAIN_FUNCTIONAL_ASSESSMENT: NONE - DENIES PAIN

## 2022-07-22 ASSESSMENT — LIFESTYLE VARIABLES
HOW OFTEN DO YOU HAVE A DRINK CONTAINING ALCOHOL: 4 OR MORE TIMES A WEEK
HOW MANY STANDARD DRINKS CONTAINING ALCOHOL DO YOU HAVE ON A TYPICAL DAY: 3 OR 4

## 2022-07-22 ASSESSMENT — ENCOUNTER SYMPTOMS
WHEEZING: 0
BLOOD IN STOOL: 0
STRIDOR: 0
ABDOMINAL DISTENTION: 0
ABDOMINAL PAIN: 1
SINUS PAIN: 0
COLOR CHANGE: 0
ANAL BLEEDING: 0
NAUSEA: 1
VOMITING: 1
PHOTOPHOBIA: 0
BACK PAIN: 0
APNEA: 0
SORE THROAT: 0
COUGH: 0
SHORTNESS OF BREATH: 0
DIARRHEA: 0
CHEST TIGHTNESS: 0
CONSTIPATION: 1

## 2022-07-22 ASSESSMENT — PATIENT HEALTH QUESTIONNAIRE - PHQ9: SUM OF ALL RESPONSES TO PHQ QUESTIONS 1-9: 5

## 2022-07-22 NOTE — ED NOTES
TRANSFER - OUT REPORT:    Verbal report given to Elaine RN on Qamar Gibson  being transferred to [facility] for routine progression of patient care       Report consisted of patient's Situation, Background, Assessment and   Recommendations(SBAR). Information from the following report(s) Nurse Handoff Report was reviewed with the receiving nurse. Lines:   Peripheral IV 07/22/22 Right Hand (Active)   Site Assessment Clean, dry & intact 07/22/22 1544   Line Status Blood return noted 07/22/22 1544   Phlebitis Assessment No symptoms 07/22/22 1544   Infiltration Assessment 0 07/22/22 1544   Alcohol Cap Used No 07/22/22 1544   Dressing Status Clean, dry & intact 07/22/22 1544   Dressing Type Transparent 07/22/22 1544   Dressing Intervention New 07/22/22 1544    Pending Urine analysis specimen. Ongoing Normal saline 1L at 150ml/hour at right arm IV line. Opportunity for questions and clarification was provided.       Patient transported with:  Carmen Macario RN  90/67/71 [address] [facility], [place]  63/78/90 [id number]

## 2022-07-22 NOTE — ED TRIAGE NOTES
Mode of arrival (squad #, walk in, police, etc) : walk in        Chief complaint(s): Alcohol Withdraw        Arrival Note (brief scenario, treatment PTA, etc). : Pt states he drinks approx 15 beers a day. Pt states his last drink was a few hours ago. Pt states he is experiencing shaking and nausea, pt denies a hx of seizure with withdraw and is denying SI/HI.

## 2022-07-22 NOTE — PROGRESS NOTES
Rn notified Dr. Madhu Montana of pt requesting nicotine patch, pt smokes pack a day, Dr. Madhu Montana states he will pt order in.

## 2022-07-22 NOTE — PROGRESS NOTES
Pt arrived to floor from ED to room 2118. Vitals taken and telemetry applied. No distress noted. See doc flowsheet for details. Call light within reach, and pt educated on its use. Bed in lowest position, and locked. Side rails up x 2. Pt has 1:1 sitter due to suicide precautions. Denied further questions or needs at this time. Will continue to monitor.

## 2022-07-22 NOTE — H&P
[address]      [address] [facility]     HISTORY AND PHYSICAL EXAMINATION            Date:   7/22/2022  Patient name:  Vladislav Warner  Date of admission:  7/22/2022  1:43 PM  MRN:   [id number]  Account:  [de-identified]  YOB: 1960  PCP:    SIMONE Holt NP  Room:   [address] [place] [address]  Code Status:    Full Code    Chief Complaint:     Chief Complaint   Patient presents with    Alcohol Problem   Alcohol withdrawal, hyponatremia  History Obtained From:     patient    History of Present Illness: The patient is a 62 y.o. gentleman with history of depression, myeloproliferative disorder, hypothyroidism, hypertension, smoking and history of mycoplasma pneumonia who presents with alcohol withdrawal, and hyponatremia. Patient was brought to the emergency department for evaluation as he wants to quit drinking but does not want to go through detox. Patient was found to be hyponatremic 124 and he is admitted to the hospital for the management of  hyponatremia and alcohol withdrawal.  He also said that he had thoughts of killing himself for the past couple of days. However patient says he is Hinduism and he cannot act upon    Patient said that he has been drinking excessively since 2017. He tried to quit many times and went into detox but he only get there for 3 to 4 days due to his insurance. He drinks 10 to 13 12oz ounce beers a day, he has not had a meal in 5 days only drinks [patient says that this happens regularly]. Patient came with hyponatremia, he is anxious, and started to have tremors in the ED    Patient used to be a , until he was diagnosed with myeloproliferative disorder and has been on disability. Patient did had an episode of vomiting in the ER.   However, he did not have any blurry vision, headache, falls, confusion, or hallucination. Past Medical History:     Past Medical History:   Diagnosis Date    Alcohol abuse 8/12/2019    Anemia     Cancer (HCC)     blood    Cervical stenosis of spine 6/14/2019    Chronic left-sided low back pain with left-sided sciatica 3/14/2019    Insomnia 3/14/2019    Intentional drug overdose ([address] 8/11/2019    Left arm numbness 6/14/2019    Left lumbar radiculitis 5/3/2019    Macrocytosis     Major depressive disorder, recurrent severe without psychotic features ([address] 8/12/2019    Myeloproliferative disorder ([address]     Osteoarthritis of spine with radiculopathy, lumbar region 4/12/2019    Severe recurrent major depression without psychotic features ([address] 8/12/2019    Spondylosis of lumbar region without myelopathy or radiculopathy 5/3/2019        Past SurgicalHistory:     Past Surgical History:   Procedure Laterality Date    APPENDECTOMY      CARPAL TUNNEL RELEASE      bilateral     CERVICAL FUSION      s/p trampoline accident    CERVICAL FUSION  09/05/2019     ANTERIOR CERVICAL DECOMPRESSION FUSION C3-4     CERVICAL FUSION N/A 9/5/2019    ANTERIOR CERVICAL DECOMPRESSION FUSION C3-4 performed by Compa Gottlieb DO at [facility], DIAGNOSTIC      EPIDURAL STEROID INJECTION Left 5/15/2019    EPIDURAL STEROID INJECTION LEFT L5S1 performed by Arslan Goncalves MD at [facility]          Medications Prior to Admission:        Prior to Admission medications    Medication Sig Start Date End Date Taking? Authorizing Provider   gabapentin (NEURONTIN) 300 MG capsule Take 300 mg by mouth in the morning and at bedtime.  4/7/22   Historical Provider, MD   furosemide (LASIX) 20 MG tablet Take 20 mg by mouth daily 4/1/22   Historical Provider, MD   lisinopril (PRINIVIL;ZESTRIL) 10 MG tablet Take 10 mg by mouth daily 4/7/22   Historical Provider, MD   hydroxyurea (HYDREA) 500 MG chemo capsule Take 1 capsule by mouth daily 4/21/22   Jacobo Alston MD   midodrine (PROAMATINE) 5 MG tablet Take 1 tablet by mouth 3 times daily (with meals) 12/9/21   Deena Malone MD   sertraline (ZOLOFT) 100 MG tablet Take 1 tablet by mouth daily 7/17/21   Yossi Arita MD   nicotine (NICODERM CQ) 21 MG/24HR Place 1 patch onto the skin every 24 hours  Patient not taking: Reported on 4/21/2022 6/24/21   Ata Merino MD   folic acid (FOLVITE) 1 MG tablet Take 1 tablet by mouth daily 6/24/21   Ata Merino MD   levothyroxine (SYNTHROID) 50 MCG tablet Take 1 tablet by mouth daily Take in the morning on an empty stomach 1 hour before any food or other medications 6/24/21   Ata Merino MD   naltrexone (DEPADE) 50 MG tablet Take 50 mg by mouth daily  Patient not taking: Reported on 4/21/2022    Historical Provider, MD   prazosin (MINIPRESS) 1 MG capsule Take 1 mg by mouth 2 times daily    Historical Provider, MD   omeprazole (PRILOSEC) 40 MG delayed release capsule Take 40 mg by mouth Daily with supper     Historical Provider, MD   Multiple Vitamins-Minerals (THERAPEUTIC MULTIVITAMIN-MINERALS) tablet Take 1 tablet by mouth daily  Patient not taking: Reported on 4/21/2022 6/3/21 6/3/22  Gena Flower MD   QUEtiapine (SEROQUEL) 400 MG tablet Take 400 mg by mouth nightly 2/19/21   Historical Provider, MD        Allergies:     Patient has no known allergies. Social History:     Tobacco:    reports that he has been smoking cigarettes. He started smoking about 43 years ago. He has a 64.50 pack-year smoking history. He has never used smokeless tobacco.  Alcohol:      reports current alcohol use of about 14.0 standard drinks per week. Drug Use:  reports that he does not currently use drugs after having used the following drugs: Marijuana Inga James).     Family History:     Family History   Problem Relation Age of Onset    Breast Cancer Mother         s/p surgical complications    Diabetes Mother     Heart Failure Father     Other Brother         \"bad back\"       Review of Systems:     Positive and Negative as described in 11.5 - 14.9 %    Platelets 473 923 - 368 k/uL    MPV 6.8 6.0 - 12.0 fL    Seg Neutrophils 82 (H) 36 - 66 %    Lymphocytes 7 (L) 24 - 44 %    Monocytes 9 (H) 1 - 7 %    Eosinophils % 2 0 - 4 %    Basophils 0 0 - 2 %    Segs Absolute 11.40 (H) 1.3 - 9.1 k/uL    Absolute Lymph # 0.97 (L) 1.0 - 4.8 k/uL    Absolute Mono # 1.25 0.1 - 1.3 k/uL    Absolute Eos # 0.28 0.0 - 0.4 k/uL    Basophils Absolute 0.00 0.0 - 0.2 k/uL    Morphology HYPOCHROMIA PRESENT    Ethanol    Collection Time: 07/22/22  2:47 PM   Result Value Ref Range    Ethanol 205 (H) <10 mg/dL    Ethanol percent 0.205 %   Comprehensive Metabolic Panel    Collection Time: 07/22/22  2:47 PM   Result Value Ref Range    Glucose 98 70 - 99 mg/dL    BUN 5 (L) 8 - 23 mg/dL    Creatinine 0.49 (L) 0.70 - 1.20 mg/dL    Calcium 8.8 8.6 - 10.4 mg/dL    Sodium 123 (L) 135 - 144 mmol/L    Potassium 4.2 3.7 - 5.3 mmol/L    Chloride 87 (L) 98 - 107 mmol/L    CO2 17 (L) 20 - 31 mmol/L    Anion Gap 19 (H) 9 - 17 mmol/L    Alkaline Phosphatase 90 40 - 129 U/L    ALT 47 (H) 5 - 41 U/L    AST 80 (H) <40 U/L    Total Bilirubin 0.50 0.3 - 1.2 mg/dL    Total Protein 7.7 6.4 - 8.3 g/dL    Albumin 4.4 3.5 - 5.2 g/dL    GFR Non-African American >60 >60 mL/min    GFR African American >60 >60 mL/min    GFR Comment         Magnesium    Collection Time: 07/22/22  2:47 PM   Result Value Ref Range    Magnesium 2.2 1.6 - 2.6 mg/dL       Imaging/Diagnostics:  No results found. Assessment :      Primary Problem  Alcohol intoxication in active alcoholic without complication [facility])    Active Hospital Problems    Diagnosis Date Noted    Alcohol intoxication in active alcoholic without complication [facility]) [C14.329] 07/22/2022     Priority: Medium       Plan:     Patient status Admit as inpatient in the  Progressive Unit/Step down    Alcohol withdrawal/Hyponatremia.   -Nephrology on board  -Patient on CIWA protocol  -Patient on NS 0.9/Banana bag at rate of 75 ml/hr  -Fluid restriction

## 2022-07-22 NOTE — ED PROVIDER NOTES
EMERGENCY DEPARTMENT ENCOUNTER    Pt Name: Randy Scott  MRN: [id number]  [place] 1960  Date of evaluation: 7/22/22  CHIEF COMPLAINT       Chief Complaint   Patient presents with    Alcohol Problem     HISTORY OF PRESENT ILLNESS   HPI  Here for alcohol detox  Last drink a few hours ago  Wants to quit drinking, but doesn't want to go through DTs  PCP Rashid Lebron NP sent him here for eval  He denies other complaints  Admits to marijuana use als    REVIEW OF SYSTEMS     Review of Systems   All other systems reviewed and are negative.   PASTMEDICAL HISTORY     Past Medical History:   Diagnosis Date    Alcohol abuse 8/12/2019    Anemia     Cancer (HCC)     blood    Cervical stenosis of spine 6/14/2019    Chronic left-sided low back pain with left-sided sciatica 3/14/2019    Insomnia 3/14/2019    Intentional drug overdose ([address] 8/11/2019    Left arm numbness 6/14/2019    Left lumbar radiculitis 5/3/2019    Macrocytosis     Major depressive disorder, recurrent severe without psychotic features ([address] 8/12/2019    Myeloproliferative disorder ([address]     Osteoarthritis of spine with radiculopathy, lumbar region 4/12/2019    Severe recurrent major depression without psychotic features ([address] 8/12/2019    Spondylosis of lumbar region without myelopathy or radiculopathy 5/3/2019     Past Problem List  Patient Active Problem List   Diagnosis Code    Myeloproliferative disease ([address] D47.1    Chronic left-sided low back pain with left-sided sciatica M54.42, G89.29    Insomnia G47.00    Osteoarthritis of spine with radiculopathy, lumbar region M47.26    Spondylosis of lumbar region without myelopathy or radiculopathy M47.816    Left lumbar radiculitis M54.16    Hx of fusion of cervical spine Z98.1    Cervical stenosis of spine M48.02    Left arm numbness R20.0    Intentional drug overdose ([address] T50.902A    Alcohol abuse F10.10    Major depressive disorder, recurrent severe without psychotic features ([address] F33.2    Severe recurrent major depression without psychotic features ([facility]) F33.2    Anemia D64.9    Macrocytosis D75.89    Substance abuse ([facility]) F19.10    Stenosis of cervical spine with myelopathy ([facility]) M48.02, G99.2    Hyponatremia I19.4    Uncomplicated alcohol dependence ([facility]) F10.20    Alcohol abuse with intoxication ([facility] F10.129    Hypothyroidism E03.9    Depression F32. A    High blood pressure I10    Septicemia ([facility]) A41.9    Transaminitis R74.01    Enteritis K52.9    Hypocalcemia E83.51    Acute respiratory failure with hypoxia ([facility]) J96.01    Moderate protein-calorie malnutrition ([facility]) E44.0    Hypokalemia E87.6    Pneumonia of both lungs due to Mycoplasma pneumoniae J15.7    Neuropathy involving both lower extremities G57.93    Acquired hemolytic anemia, unspecified ([facility]) D59.9     SURGICAL HISTORY       Past Surgical History:   Procedure Laterality Date    APPENDECTOMY      CARPAL TUNNEL RELEASE      bilateral     CERVICAL FUSION      s/p trampoline accident    CERVICAL FUSION  09/05/2019     ANTERIOR CERVICAL DECOMPRESSION FUSION C3-4     CERVICAL FUSION N/A 9/5/2019    ANTERIOR CERVICAL DECOMPRESSION FUSION C3-4 performed by Gregorio Aguilar DO at [address] [place] [address], [place], DIAGNOSTIC      EPIDURAL STEROID INJECTION Left 5/15/2019    EPIDURAL STEROID INJECTION LEFT L5S1 performed by Lucrecia Milligan MD at [place] [address]       Previous Medications    FOLIC ACID (FOLVITE) 1 MG TABLET    Take 1 tablet by mouth daily    FUROSEMIDE (LASIX) 20 MG TABLET    Take 20 mg by mouth daily    GABAPENTIN (NEURONTIN) 300 MG CAPSULE    Take 300 mg by mouth in the morning and at bedtime.     HYDROXYUREA (HYDREA) 500 MG CHEMO CAPSULE    Take 1 capsule by mouth daily    LEVOTHYROXINE (SYNTHROID) 50 MCG TABLET    Take 1 tablet by mouth daily Take in the morning on an empty stomach 1 hour before any food or other medications    LISINOPRIL (PRINIVIL;ZESTRIL) 10 MG TABLET    Take 10 mg by mouth daily MIDODRINE (PROAMATINE) 5 MG TABLET    Take 1 tablet by mouth 3 times daily (with meals)    MULTIPLE VITAMINS-MINERALS (THERAPEUTIC MULTIVITAMIN-MINERALS) TABLET    Take 1 tablet by mouth daily    NALTREXONE (DEPADE) 50 MG TABLET    Take 50 mg by mouth daily    NICOTINE (NICODERM CQ) 21 MG/24HR    Place 1 patch onto the skin every 24 hours    OMEPRAZOLE (PRILOSEC) 40 MG DELAYED RELEASE CAPSULE    Take 40 mg by mouth Daily with supper     PRAZOSIN (MINIPRESS) 1 MG CAPSULE    Take 1 mg by mouth 2 times daily    QUETIAPINE (SEROQUEL) 400 MG TABLET    Take 400 mg by mouth nightly    SERTRALINE (ZOLOFT) 100 MG TABLET    Take 1 tablet by mouth daily     ALLERGIES     has No Known Allergies. FAMILY HISTORY     He indicated that his mother is . He indicated that his father is . He indicated that the status of his brother is unknown. SOCIAL HISTORY       Social History     Tobacco Use    Smoking status: Every Day     Packs/day: 1.50     Years: 43.00     Pack years: 64.50     Types: Cigarettes     Start date: 1978    Smokeless tobacco: Never   Vaping Use    Vaping Use: Never used   Substance Use Topics    Alcohol use: Yes     Alcohol/week: 14.0 standard drinks     Types: 14 Cans of beer per week     Comment: heavy alcohol user/ states drinks a pint of vodka occassionaly    Drug use: Not Currently     Types: Marijuana (Weed)     PHYSICAL EXAM     INITIAL VITALS: BP (!) 140/83   Pulse 86   Temp 97.5 °F (36.4 °C) (Oral)   Resp 17   Ht 6' 1\" (1.854 m)   Wt 220 lb (99.8 kg)   SpO2 97%   BMI 29.03 kg/m²    Physical Exam  Constitutional:       General: He is not in acute distress. Appearance: Normal appearance. He is well-developed. He is not diaphoretic. HENT:      Head: Normocephalic and atraumatic. Right Ear: External ear normal.      Left Ear: External ear normal.      Nose: Nose normal. No congestion.       Mouth/Throat:      Mouth: Mucous membranes are moist.      Pharynx: Oropharynx is clear. Eyes:      General:         Right eye: No discharge. Left eye: No discharge. Conjunctiva/sclera: Conjunctivae normal.      Pupils: Pupils are equal, round, and reactive to light. Neck:      Trachea: No tracheal deviation. Cardiovascular:      Rate and Rhythm: Normal rate and regular rhythm. Pulses: Normal pulses. Heart sounds: Normal heart sounds. Pulmonary:      Effort: Pulmonary effort is normal. No respiratory distress. Breath sounds: Normal breath sounds. No stridor. No wheezing or rales. Abdominal:      Palpations: Abdomen is soft. Tenderness: There is no abdominal tenderness. There is no guarding or rebound. Musculoskeletal:         General: No tenderness or deformity. Normal range of motion. Cervical back: Normal range of motion and neck supple. Skin:     General: Skin is warm and dry. Capillary Refill: Capillary refill takes less than 2 seconds. Findings: No erythema or rash. Neurological:      General: No focal deficit present. Mental Status: He is alert and oriented to person, place, and time. Coordination: Coordination normal.   Psychiatric:         Mood and Affect: Mood normal.         Behavior: Behavior normal.         Thought Content: Thought content normal.         Judgment: Judgment normal.      Comments: Calm and cooperative, admits to recent SI, but no plan to harm himself, no SI now       MEDICAL DECISION MAKING:   IV NS for hyponatremia  DW Dr Randolph Hilario and  residents for admit  He will need CIWA protocol also         Procedures    DIAGNOSTIC RESULTS       LABS: All lab results were reviewed by myself, and all abnormals are listed below.   Labs Reviewed   CBC WITH AUTO DIFFERENTIAL - Abnormal; Notable for the following components:       Result Value    WBC 13.9 (*)     RBC 4.35 (*)     Seg Neutrophils 82 (*)     Lymphocytes 7 (*)     Monocytes 9 (*)     Segs Absolute 11.40 (*)     Absolute Lymph # 0.97 (*)     All other components within normal limits   ETHANOL - Abnormal; Notable for the following components:    Ethanol 205 (*)     All other components within normal limits   COMPREHENSIVE METABOLIC PANEL - Abnormal; Notable for the following components:    BUN 5 (*)     Creatinine 0.49 (*)     Sodium 123 (*)     Chloride 87 (*)     CO2 17 (*)     Anion Gap 19 (*)     ALT 47 (*)     AST 80 (*)     All other components within normal limits   MAGNESIUM   URINE DRUG SCREEN       EMERGENCY DEPARTMENTCOURSE:         Vitals:    Vitals:    07/22/22 1340 07/22/22 1348 07/22/22 1350   BP: 134/77 (!) 140/83 (!) 140/83   Pulse: 87 86    Resp: 16 17    Temp: 97.2 °F (36.2 °C) 97.5 °F (36.4 °C)    TempSrc:  Oral    SpO2: 97% 97%    Weight:  220 lb (99.8 kg)    Height:  6' 1\" (1.854 m)        The patient was given the following medications while in the emergency department:  Orders Placed This Encounter   Medications    0.9 % sodium chloride infusion     CONSULTS:  IP CONSULT TO INTERNAL MEDICINE    FINAL IMPRESSION      1. Hyponatremia    2. Acute alcoholic intoxication without complication [facility])          DISPOSITION/PLAN   DISPOSITION Decision To Admit 07/22/2022 03:10:56 PM      PATIENT REFERRED TO:  No follow-up provider specified. DISCHARGE MEDICATIONS:  New Prescriptions    No medications on file     The care is provided during an unprecedented national emergency due to the novel coronavirus, COVID 19.   MD Nicolle Almanza MD  07/22/22 5754

## 2022-07-23 ENCOUNTER — APPOINTMENT (OUTPATIENT)
Dept: ULTRASOUND IMAGING | Age: 62
DRG: 640 | End: 2022-07-23
Payer: MEDICARE

## 2022-07-23 ENCOUNTER — APPOINTMENT (OUTPATIENT)
Dept: GENERAL RADIOLOGY | Age: 62
DRG: 640 | End: 2022-07-23
Payer: MEDICARE

## 2022-07-23 PROBLEM — J44.9 COPD (CHRONIC OBSTRUCTIVE PULMONARY DISEASE) (HCC): Status: ACTIVE | Noted: 2022-07-23

## 2022-07-23 PROBLEM — R33.9 URINARY RETENTION: Status: ACTIVE | Noted: 2022-07-23

## 2022-07-23 PROBLEM — F32.9 MDD (MAJOR DEPRESSIVE DISORDER): Status: ACTIVE | Noted: 2021-07-15

## 2022-07-23 PROBLEM — R45.851 SUICIDE IDEATION: Status: ACTIVE | Noted: 2022-07-23

## 2022-07-23 LAB
AMPHETAMINE SCREEN URINE: NEGATIVE
ANION GAP SERPL CALCULATED.3IONS-SCNC: 11 MMOL/L (ref 9–17)
ANION GAP SERPL CALCULATED.3IONS-SCNC: 12 MMOL/L (ref 9–17)
ANION GAP SERPL CALCULATED.3IONS-SCNC: 13 MMOL/L (ref 9–17)
BARBITURATE SCREEN URINE: NEGATIVE
BENZODIAZEPINE SCREEN, URINE: NEGATIVE
BUN BLDV-MCNC: 6 MG/DL (ref 8–23)
CALCIUM SERPL-MCNC: 8.4 MG/DL (ref 8.6–10.4)
CANNABINOID SCREEN URINE: POSITIVE
CHLORIDE BLD-SCNC: 92 MMOL/L (ref 98–107)
CO2: 20 MMOL/L (ref 20–31)
CO2: 23 MMOL/L (ref 20–31)
CO2: 23 MMOL/L (ref 20–31)
COCAINE METABOLITE, URINE: NEGATIVE
CREAT SERPL-MCNC: 0.54 MG/DL (ref 0.7–1.2)
FENTANYL URINE: NEGATIVE
GFR AFRICAN AMERICAN: >60 ML/MIN
GFR NON-AFRICAN AMERICAN: >60 ML/MIN
GFR SERPL CREATININE-BSD FRML MDRD: ABNORMAL ML/MIN/{1.73_M2}
GLUCOSE BLD-MCNC: 106 MG/DL (ref 70–99)
GLUCOSE BLD-MCNC: 117 MG/DL (ref 75–110)
INR BLD: 1.2
LEGIONELLA PNEUMOPHILIA AG, URINE: NEGATIVE
METHADONE SCREEN, URINE: NEGATIVE
OPIATES, URINE: NEGATIVE
OSMOLALITY URINE: 231 MOSM/KG (ref 80–1300)
OXYCODONE SCREEN URINE: NEGATIVE
PHENCYCLIDINE, URINE: NEGATIVE
POTASSIUM SERPL-SCNC: 4.3 MMOL/L (ref 3.7–5.3)
POTASSIUM SERPL-SCNC: 4.4 MMOL/L (ref 3.7–5.3)
POTASSIUM SERPL-SCNC: 4.7 MMOL/L (ref 3.7–5.3)
PROCALCITONIN: 0.08 NG/ML
PROSTATE SPECIFIC ANTIGEN: 0.37 NG/ML
PROTHROMBIN TIME: 14.9 SEC (ref 11.8–14.6)
REASON FOR REJECTION: NORMAL
SERUM OSMOLALITY: 302 MOSM/KG (ref 275–295)
SODIUM BLD-SCNC: 125 MMOL/L (ref 135–144)
SODIUM BLD-SCNC: 126 MMOL/L (ref 135–144)
SODIUM BLD-SCNC: 127 MMOL/L (ref 135–144)
SODIUM,UR: <20 MMOL/L
SOURCE: NORMAL
STREP PNEUMONIAE ANTIGEN: NEGATIVE
TEST INFORMATION: ABNORMAL
ZZ NTE CLEAN UP: ORDERED TEST: NORMAL
ZZ NTE WITH NAME CLEAN UP: SPECIMEN SOURCE: NORMAL

## 2022-07-23 PROCEDURE — 87205 SMEAR GRAM STAIN: CPT

## 2022-07-23 PROCEDURE — 2580000003 HC RX 258

## 2022-07-23 PROCEDURE — 87070 CULTURE OTHR SPECIMN AEROBIC: CPT

## 2022-07-23 PROCEDURE — 71045 X-RAY EXAM CHEST 1 VIEW: CPT

## 2022-07-23 PROCEDURE — 2060000000 HC ICU INTERMEDIATE R&B

## 2022-07-23 PROCEDURE — 85610 PROTHROMBIN TIME: CPT

## 2022-07-23 PROCEDURE — 86738 MYCOPLASMA ANTIBODY: CPT

## 2022-07-23 PROCEDURE — 80051 ELECTROLYTE PANEL: CPT

## 2022-07-23 PROCEDURE — 80307 DRUG TEST PRSMV CHEM ANLYZR: CPT

## 2022-07-23 PROCEDURE — 84300 ASSAY OF URINE SODIUM: CPT

## 2022-07-23 PROCEDURE — 84153 ASSAY OF PSA TOTAL: CPT

## 2022-07-23 PROCEDURE — 6360000002 HC RX W HCPCS: Performed by: INTERNAL MEDICINE

## 2022-07-23 PROCEDURE — 83935 ASSAY OF URINE OSMOLALITY: CPT

## 2022-07-23 PROCEDURE — 87899 AGENT NOS ASSAY W/OPTIC: CPT

## 2022-07-23 PROCEDURE — 6370000000 HC RX 637 (ALT 250 FOR IP): Performed by: PSYCHIATRY & NEUROLOGY

## 2022-07-23 PROCEDURE — 6370000000 HC RX 637 (ALT 250 FOR IP)

## 2022-07-23 PROCEDURE — 6360000002 HC RX W HCPCS

## 2022-07-23 PROCEDURE — 82947 ASSAY GLUCOSE BLOOD QUANT: CPT

## 2022-07-23 PROCEDURE — 90792 PSYCH DIAG EVAL W/MED SRVCS: CPT | Performed by: PSYCHIATRY & NEUROLOGY

## 2022-07-23 PROCEDURE — 51798 US URINE CAPACITY MEASURE: CPT

## 2022-07-23 PROCEDURE — 87641 MR-STAPH DNA AMP PROBE: CPT

## 2022-07-23 PROCEDURE — 36415 COLL VENOUS BLD VENIPUNCTURE: CPT

## 2022-07-23 PROCEDURE — 84145 PROCALCITONIN (PCT): CPT

## 2022-07-23 PROCEDURE — 76705 ECHO EXAM OF ABDOMEN: CPT

## 2022-07-23 PROCEDURE — 6370000000 HC RX 637 (ALT 250 FOR IP): Performed by: INTERNAL MEDICINE

## 2022-07-23 PROCEDURE — 87449 NOS EACH ORGANISM AG IA: CPT

## 2022-07-23 PROCEDURE — 80048 BASIC METABOLIC PNL TOTAL CA: CPT

## 2022-07-23 PROCEDURE — 94640 AIRWAY INHALATION TREATMENT: CPT

## 2022-07-23 PROCEDURE — 2500000003 HC RX 250 WO HCPCS

## 2022-07-23 PROCEDURE — 2700000000 HC OXYGEN THERAPY PER DAY

## 2022-07-23 RX ORDER — METHYLPREDNISOLONE SODIUM SUCCINATE 40 MG/ML
40 INJECTION, POWDER, LYOPHILIZED, FOR SOLUTION INTRAMUSCULAR; INTRAVENOUS EVERY 12 HOURS
Status: DISCONTINUED | OUTPATIENT
Start: 2022-07-23 | End: 2022-07-25

## 2022-07-23 RX ORDER — METHYLPREDNISOLONE SODIUM SUCCINATE 40 MG/ML
40 INJECTION, POWDER, LYOPHILIZED, FOR SOLUTION INTRAMUSCULAR; INTRAVENOUS EVERY 8 HOURS
Status: DISCONTINUED | OUTPATIENT
Start: 2022-07-23 | End: 2022-07-23

## 2022-07-23 RX ORDER — IPRATROPIUM BROMIDE AND ALBUTEROL SULFATE 2.5; .5 MG/3ML; MG/3ML
1 SOLUTION RESPIRATORY (INHALATION)
Status: DISCONTINUED | OUTPATIENT
Start: 2022-07-23 | End: 2022-07-25 | Stop reason: HOSPADM

## 2022-07-23 RX ORDER — AZITHROMYCIN 250 MG/1
500 TABLET, FILM COATED ORAL DAILY
Status: COMPLETED | OUTPATIENT
Start: 2022-07-23 | End: 2022-07-25

## 2022-07-23 RX ORDER — ACAMPROSATE CALCIUM 333 MG/1
333 TABLET, DELAYED RELEASE ORAL 3 TIMES DAILY
Status: DISCONTINUED | OUTPATIENT
Start: 2022-07-23 | End: 2022-07-24

## 2022-07-23 RX ADMIN — QUETIAPINE FUMARATE 300 MG: 200 TABLET ORAL at 21:50

## 2022-07-23 RX ADMIN — IPRATROPIUM BROMIDE AND ALBUTEROL SULFATE 1 AMPULE: .5; 2.5 SOLUTION RESPIRATORY (INHALATION) at 10:45

## 2022-07-23 RX ADMIN — PRAZOSIN HYDROCHLORIDE 1 MG: 1 CAPSULE ORAL at 09:33

## 2022-07-23 RX ADMIN — PANTOPRAZOLE SODIUM 40 MG: 40 TABLET, DELAYED RELEASE ORAL at 06:10

## 2022-07-23 RX ADMIN — LORAZEPAM 2 MG: 2 INJECTION, SOLUTION INTRAMUSCULAR; INTRAVENOUS at 16:18

## 2022-07-23 RX ADMIN — HYDROXYUREA 500 MG: 500 CAPSULE ORAL at 09:26

## 2022-07-23 RX ADMIN — GABAPENTIN 300 MG: 300 CAPSULE ORAL at 10:57

## 2022-07-23 RX ADMIN — FOLIC ACID: 5 INJECTION, SOLUTION INTRAMUSCULAR; INTRAVENOUS; SUBCUTANEOUS at 09:33

## 2022-07-23 RX ADMIN — LORAZEPAM 2 MG: 2 INJECTION, SOLUTION INTRAMUSCULAR; INTRAVENOUS at 06:41

## 2022-07-23 RX ADMIN — GABAPENTIN 300 MG: 300 CAPSULE ORAL at 21:50

## 2022-07-23 RX ADMIN — LEVOTHYROXINE SODIUM 50 MCG: 0.05 TABLET ORAL at 06:10

## 2022-07-23 RX ADMIN — LORAZEPAM 2 MG: 2 INJECTION, SOLUTION INTRAMUSCULAR; INTRAVENOUS at 14:28

## 2022-07-23 RX ADMIN — METHYLPREDNISOLONE SODIUM SUCCINATE 40 MG: 40 INJECTION, POWDER, FOR SOLUTION INTRAMUSCULAR; INTRAVENOUS at 09:42

## 2022-07-23 RX ADMIN — ACAMPROSATE CALCIUM 333 MG: 333 TABLET, DELAYED RELEASE ORAL at 21:49

## 2022-07-23 RX ADMIN — LORAZEPAM 1 MG: 2 INJECTION, SOLUTION INTRAMUSCULAR; INTRAVENOUS at 09:26

## 2022-07-23 RX ADMIN — METHYLPREDNISOLONE SODIUM SUCCINATE 40 MG: 40 INJECTION, POWDER, FOR SOLUTION INTRAMUSCULAR; INTRAVENOUS at 21:50

## 2022-07-23 RX ADMIN — IPRATROPIUM BROMIDE AND ALBUTEROL SULFATE 1 AMPULE: .5; 2.5 SOLUTION RESPIRATORY (INHALATION) at 19:31

## 2022-07-23 RX ADMIN — CEFTRIAXONE SODIUM 1000 MG: 1 INJECTION, POWDER, FOR SOLUTION INTRAMUSCULAR; INTRAVENOUS at 12:18

## 2022-07-23 RX ADMIN — LORAZEPAM 2 MG: 2 INJECTION, SOLUTION INTRAMUSCULAR; INTRAVENOUS at 22:05

## 2022-07-23 RX ADMIN — ACETAMINOPHEN 325MG 650 MG: 325 TABLET ORAL at 15:47

## 2022-07-23 RX ADMIN — IPRATROPIUM BROMIDE AND ALBUTEROL SULFATE 1 AMPULE: .5; 2.5 SOLUTION RESPIRATORY (INHALATION) at 14:35

## 2022-07-23 RX ADMIN — LORAZEPAM 2 MG: 2 INJECTION, SOLUTION INTRAMUSCULAR; INTRAVENOUS at 11:37

## 2022-07-23 RX ADMIN — PRAZOSIN HYDROCHLORIDE 1 MG: 1 CAPSULE ORAL at 21:50

## 2022-07-23 RX ADMIN — AZITHROMYCIN MONOHYDRATE 500 MG: 250 TABLET ORAL at 09:41

## 2022-07-23 RX ADMIN — ENOXAPARIN SODIUM 40 MG: 40 INJECTION SUBCUTANEOUS at 09:26

## 2022-07-23 ASSESSMENT — ENCOUNTER SYMPTOMS
ABDOMINAL PAIN: 0
DIARRHEA: 0
WHEEZING: 0
ABDOMINAL DISTENTION: 1
CONSTIPATION: 0
VOMITING: 0
NAUSEA: 0

## 2022-07-23 ASSESSMENT — PAIN DESCRIPTION - ORIENTATION: ORIENTATION: LOWER

## 2022-07-23 ASSESSMENT — PAIN DESCRIPTION - LOCATION: LOCATION: BACK

## 2022-07-23 ASSESSMENT — PAIN SCALES - GENERAL: PAINLEVEL_OUTOF10: 7

## 2022-07-23 NOTE — PLAN OF CARE
Problem: Discharge Planning  Goal: Discharge to home or other facility with appropriate resources  7/23/2022 1638 by Los Desir RN  Outcome: Progressing     Problem: Safety - Adult  Goal: Free from fall injury  7/23/2022 1638 by Los Desir RN  Outcome: Progressing  Flowsheets (Taken 7/23/2022 0327 by Dee Stafford RN)  Free From Fall Injury: Instruct family/caregiver on patient safety     Problem: Pain  Goal: Verbalizes/displays adequate comfort level or baseline comfort level  7/23/2022 1638 by Los Desir RN  Outcome: Progressing     Problem: ABCDS Injury Assessment  Goal: Absence of physical injury  Outcome: Progressing

## 2022-07-23 NOTE — CARE COORDINATION
CASE MANAGEMENT NOTE:    Admission Date:  7/22/2022 Tammie Alas is a 62 y.o.  male    Admitted for : Hyponatremia [Y28.8]  Acute alcoholic intoxication without complication ([facility] [I37.358]  Alcohol intoxication in active alcoholic without complication ([facility] [R00.012]    Met with:  Patient    PCP:  Jessenia Ramirez NP                                Insurance:  [facility] Medicare and Medicaid      Is patient alert and oriented at time of discussion:  Yes    Current Residence/ Living Arrangements:  independently at home alone in apt            Current Services PTA:  No    Does patient go to outpatient dialysis: No  If yes, location and chair time: n/a    Is patient agreeable to V[facility]: Yes    Freedom of choice provided:  Yes    List of [address] provided: Yes    [facility] chosen:  [facility] with referral faxed    DME:  walker    Home Oxygen: No    Nebulizer: No    CPAP/BIPAP: No    Supplier: N/A    Potential Assistance Needed: Pt would like ETOH abuse resources. Drinks about 10 beers per day. SNF needed: No    Freedom of choice and list provided: NA    Pharmacy:  [facility]       Is patient currently receiving oral anticoagulation therapy? No    Is the Patient an [facility] with Readmission Risk Score greater than 14%? No  If yes, pt needs a follow up appointment made within 7 days. Family Members/Caregivers that pt would like involved in their care:    Yes    If yes, list name here[facility]  [facility]:  asha             Discharge Plan:  home with [facility] - [facility]                 Electronically signed by: Alla Alvarado RN on 7/23/2022 at 2:57 PM     Received call from [facility] stating they cannot accept patient. Referral sent to [address] [place] [address]     Electronically signed by Alla Alvarado RN on 7/23/2022 at 4:50 PM

## 2022-07-23 NOTE — PROGRESS NOTES
Right upper quadrant ultrasound gallbladder; fatty infiltration of the liver otherwise unremarkable    Chest x-ray; right basilar infiltrate represent atelectasis versus pneumonia    Will order echo, pneumonia work-up, sputum culture, Pro-Pancho, MRSA nasal swab.   Rocephin 1000 mg IV for 5 days started

## 2022-07-23 NOTE — CONSULTS
NEPHROLOGY CONSULT     Patient :  Caro Pichardo; 62 y.o. MRN# [id number]  Location:  2118/2118-01  Attending:  Velta Bernheim, MD  Admit Date:  7/22/2022   Hospital Day: 1      Reason for Consult: Hyponatremia      Chief Complaint: Alcohol dependence,  History Obtained From: Patient    History of Present Illness: This is a 62 y.o. male with past medical history of alcohol abuse, myeloproliferative disorder, hypothyroidism, essential hypertension. Presented to the full because of his alcohol abuse he wants to quit and never detox. Labs showed serum sodium of 123 and therefore nephrology consultation has been requested.   Patient denies any nausea vomiting diarrhea patient drinks about 10-14 bottles of beer every day    Medication list SHOWS use of Lasix, Zoloft SSRI    Past Medical History:        Diagnosis Date    Alcohol abuse 8/12/2019    Anemia     Cancer (HCC)     blood    Cervical stenosis of spine 6/14/2019    Chronic left-sided low back pain with left-sided sciatica 3/14/2019    Insomnia 3/14/2019    Intentional drug overdose ([address] 8/11/2019    Left arm numbness 6/14/2019    Left lumbar radiculitis 5/3/2019    Macrocytosis     Major depressive disorder, recurrent severe without psychotic features ([address] 8/12/2019    Myeloproliferative disorder ([address]     Osteoarthritis of spine with radiculopathy, lumbar region 4/12/2019    Severe recurrent major depression without psychotic features ([address] 8/12/2019    Spondylosis of lumbar region without myelopathy or radiculopathy 5/3/2019       Past Surgical History:        Procedure Laterality Date    APPENDECTOMY      CARPAL TUNNEL RELEASE      bilateral     CERVICAL FUSION      s/p trampoline accident    CERVICAL FUSION  09/05/2019     ANTERIOR CERVICAL DECOMPRESSION FUSION C3-4     CERVICAL FUSION N/A 9/5/2019    ANTERIOR CERVICAL DECOMPRESSION FUSION C3-4 performed by Tori Miller DO at [address], DIAGNOSTIC      EPIDURAL STEROID INJECTION Or  LORazepam (ATIVAN) injection 3 mg, Q1H PRN   Or  LORazepam (ATIVAN) tablet 4 mg, Q1H PRN   Or  LORazepam (ATIVAN) injection 4 mg, Q1H PRN  nicotine (NICODERM CQ) 21 MG/24HR 1 patch, Daily        Allergies:  Patient has no known allergies. Social History:   Social History     Socioeconomic History    Marital status:      Spouse name: Not on file    Number of children: 3    Years of education: Not on file    Highest education level: Not on file   Occupational History    Not on file   Tobacco Use    Smoking status: Every Day     Packs/day: 1.50     Years: 43.00     Pack years: 64.50     Types: Cigarettes     Start date: 9/23/1978    Smokeless tobacco: Never   Vaping Use    Vaping Use: Never used   Substance and Sexual Activity    Alcohol use: Yes     Alcohol/week: 14.0 standard drinks     Types: 14 Cans of beer per week     Comment: heavy alcohol user/ states drinks a pint of vodka occassionaly    Drug use: Not Currently     Types: Marijuana [place])    Sexual activity: Not Currently   Other Topics Concern    Not on file   Social History Narrative    Not on file     Social Determinants of Health     Financial Resource Strain: Not on file   Food Insecurity: Not on file   Transportation Needs: Not on file   Physical Activity: Not on file   Stress: Not on file   Social Connections: Not on file   Intimate Partner Violence: Not on file   Housing Stability: Not on file       Family History:   Family History   Problem Relation Age of Onset    Breast Cancer Mother         s/p surgical complications    Diabetes Mother     Heart Failure Father     Other Brother         \"bad back\"       Review of Systems:    Constitutional: No fever, no chills, no night sweats,+ fatigue, generalized weakness, loss of appetite  HEENT:  No headache, otalgia, itchy eyes, epistaxis, nasal discharge or sore throat.   Cardiac:  No chest pain, dyspnea, orthopnea or PND, palpitations  Chest:  No cough, hemoptysis, pleuritic chest pain, wheezing,SOB  Abdomen:  No abdominal pain, +nausea, vomiting, diarrhea, melena, dysphagia hematemesis,constipation, abdominal bloating, flank pain  Neuro:  No CVA, TIA or seizure like activity. Skin:   No rashes, no itching. :   No hematuria, no pyuria, no dysuria, no flank pain. Extremities:  No swelling or joint pains. Objective:  CURRENT TEMPERATURE:  Temp: 97.5 °F (36.4 °C)  MAXIMUM TEMPERATURE OVER 24HRS:  Temp (24hrs), Av °F (36.7 °C), Min:97.5 °F (36.4 °C), Max:98.5 °F (36.9 °C)    CURRENT RESPIRATORY RATE:  Resp: 16  CURRENT PULSE:  Heart Rate: 81  CURRENT BLOOD PRESSURE:  BP: 125/86  24HR BLOOD PRESSURE RANGE:  Systolic (67ALZ), CZZ:464 , Min:91 , DRN:343   ; Diastolic (78LFA), ZDZ:82, Min:61, Max:88    24HR INTAKE/OUTPUT:    Intake/Output Summary (Last 24 hours) at 2022 1620  Last data filed at 2022 1418  Gross per 24 hour   Intake 720 ml   Output 1352 ml   Net -632 ml     Patient Vitals for the past 96 hrs (Last 3 readings):   Weight   22 0045 218 lb 11.1 oz (99.2 kg)   22 1348 220 lb (99.8 kg)       Physical Exam:  GENERAL APPEARANCE: Alert and cooperative, and appears to be in no acute distress. HEAD: normocephalic  EYES: EOMI. Not pale, anicteric   NOSE:  No nasal discharge. CARDIAC: Normal S1 and S2. No S3, S4 or murmurs. Rhythm is regular. LUNGS: Clear to auscultation and percussion without rales, rhonchi, wheezing or diminished breath sounds. GI soft nontender    MUSKULOSKELETAL: Adequately aligned spine. No joint erythema or tenderness. EXTREMITIES: No edema. Peripheral pulses intact.    NEURO: No focal      Labs:   CBC:  Recent Labs     22  1405   WBC 13.9*   RBC 4.35*   HGB 14.5   HCT 41.3   MCV 95.0   MCH 33.3   MCHC 35.1   RDW 14.0      MPV 6.8      BMP:   Recent Labs     22  1447 22  2211 22  0514 22  1154   * 125* 127* 126*   K 4.2 3.9 4.4 4.7   CL 87* 89* 92* 92*   CO2 17* 18* 23 23   BUN 5*  --  6*  --

## 2022-07-23 NOTE — PROGRESS NOTES
I saw and examined patient at bedside this afternoon. Patient was sitting in bed and had just come out of the shower. Patient complains of chronic lower back pain radiating down lower extremities. Patient denies any fecal or urinary incontinence and sensory intact. Muscle strength 5 out of 5 left lower extremity, 4 out of 5 right lower extremity. Patient mentioned that he required an MRI spine when he visited his PCP which was not performed for his lower back pain. At this time, MRI spine is not clinically indicated and should be performed outpatient. CIWA score 13 last performed 4:30 PM.  Ativan 2 mg given as per nurse.

## 2022-07-23 NOTE — CONSULTS
Department of Psychiatry  Behavioral Health Consult    REASON FOR CONSULT: Suicidal ideation. CONSULTING PHYSICIAN: Dr. Heber Hutchinson    History obtained from: Patient and chart    HISTORY OF PRESENT ILLNESS:    The patient is a 62 y.o. male with significant past psychiatric history of alcohol abuse and depression and a medical history significant for myeloproliferative disorder, hypothyroidism, hypertension who presents with hyponatremia and alcohol withdrawal.   Noted that the patient's urine drug screen was positive for cannabinoids. The patient's sodium was 123 on presentation and has improved to 126 today. The patient was seen at bedside. He reports some depressive symptoms. He has been drinking heavily. He estimates he drinks about 12-15 beers a day. He lives alone at the time. The patient denies any suicidal ideation. He denies any auditory visual hallucinations. He is coherent in his speech. He appears to have no psychotic phenomena. He was oriented to time place and person. Patient talked about the circumstances of his drinking. He had a 5-year sobriety duration between 2012 and 2017 after his wife, who was also an alcoholic told him that she would leave him if he did not quit drinking. His wife passed away after a brain bleed which led to his relapse. He has been drinking continuously after her deathd. The patient is not currently receiving care for the above psychiatric illness. Psychiatric Review of Systems           Obsessions and Compulsions: Denies       Gerda or Hypomania: Denies     Hallucinations: Denies     Panic Attacks:  Denies     Delusions:  Denies     Phobias:  Denies     Trauma: Denies      Substance Abuse History:  ETOH: The patient has extensive history of heavy drinking.   For more details see above  Marijuana: denies  Opiates: denies  Other Drugs: denies      Past Psychiatric History:  Prior Diagnosis: Depression and alcohol abuse    Hospitalization: yes  Hx of Suicidal Attempts: yes  Hx of violence:  no      Personal History: The patient currently lives alone and receives Social Security income. he has a daughter    Past Medical History:        Diagnosis Date    Alcohol abuse 8/12/2019    Anemia     Cancer (HCC)     blood    Cervical stenosis of spine 6/14/2019    Chronic left-sided low back pain with left-sided sciatica 3/14/2019    Insomnia 3/14/2019    Intentional drug overdose ([address] 8/11/2019    Left arm numbness 6/14/2019    Left lumbar radiculitis 5/3/2019    Macrocytosis     Major depressive disorder, recurrent severe without psychotic features ([address] 8/12/2019    Myeloproliferative disorder ([address]     Osteoarthritis of spine with radiculopathy, lumbar region 4/12/2019    Severe recurrent major depression without psychotic features ([address] 8/12/2019    Spondylosis of lumbar region without myelopathy or radiculopathy 5/3/2019       Past Surgical History:        Procedure Laterality Date    APPENDECTOMY      CARPAL TUNNEL RELEASE      bilateral     CERVICAL FUSION      s/p trampoline accident    CERVICAL FUSION  09/05/2019     ANTERIOR CERVICAL DECOMPRESSION FUSION C3-4     CERVICAL FUSION N/A 9/5/2019    ANTERIOR CERVICAL DECOMPRESSION FUSION C3-4 performed by Hilario Dumont DO at [facility] [place]      EPIDURAL STEROID INJECTION Left 5/15/2019    EPIDURAL STEROID INJECTION LEFT L5S1 performed by Jacqueline Benites MD at [facility]           Medications Prior to Admission:   Medications Prior to Admission: gabapentin (NEURONTIN) 300 MG capsule, Take 300 mg by mouth in the morning and at bedtime.  Pt state he takes 600mg two daily  furosemide (LASIX) 20 MG tablet, Take 20 mg by mouth daily  lisinopril (PRINIVIL;ZESTRIL) 10 MG tablet, Take 10 mg by mouth daily  hydroxyurea (HYDREA) 500 MG chemo capsule, Take 1 capsule by mouth daily  midodrine (PROAMATINE) 5 MG tablet, Take 1 tablet by mouth 3 times daily (with meals)  sertraline (ZOLOFT) 100 MG tablet, Take 1 tablet by mouth daily  nicotine (NICODERM CQ) 21 MG/24HR, Place 1 patch onto the skin every 24 hours (Patient not taking: Reported on 3/16/7204)  folic acid (FOLVITE) 1 MG tablet, Take 1 tablet by mouth daily  levothyroxine (SYNTHROID) 50 MCG tablet, Take 1 tablet by mouth daily Take in the morning on an empty stomach 1 hour before any food or other medications  naltrexone (DEPADE) 50 MG tablet, Take 50 mg by mouth in the morning. prazosin (MINIPRESS) 1 MG capsule, Take 1 mg by mouth 2 times daily  omeprazole (PRILOSEC) 40 MG delayed release capsule, Take 40 mg by mouth Daily with supper   Multiple Vitamins-Minerals (THERAPEUTIC MULTIVITAMIN-MINERALS) tablet, Take 1 tablet by mouth daily (Patient not taking: Reported on 4/21/2022)  QUEtiapine (SEROQUEL) 400 MG tablet, Take 400 mg by mouth nightly    Allergies:  Patient has no known allergies. FAMILY/SOCIAL HISTORY:  Family History   Problem Relation Age of Onset    Breast Cancer Mother         s/p surgical complications    Diabetes Mother     Heart Failure Father     Other Brother         \"bad back\"     Social History     Socioeconomic History    Marital status:      Spouse name: Not on file    Number of children: 3    Years of education: Not on file    Highest education level: Not on file   Occupational History    Not on file   Tobacco Use    Smoking status: Every Day     Packs/day: 1.50     Years: 43.00     Pack years: 64.50     Types: Cigarettes     Start date: 9/23/1978    Smokeless tobacco: Never   Vaping Use    Vaping Use: Never used   Substance and Sexual Activity    Alcohol use:  Yes     Alcohol/week: 14.0 standard drinks     Types: 14 Cans of beer per week     Comment: heavy alcohol user/ states drinks a pint of vodka occassionaly    Drug use: Not Currently     Types: Marijuana [place])    Sexual activity: Not Currently   Other Topics Concern    Not on file   Social History Narrative    Not on file     Social Determinants of Health     Financial Resource Strain: Not on file   Food Insecurity: Not on file   Transportation Needs: Not on file   Physical Activity: Not on file   Stress: Not on file   Social Connections: Not on file   Intimate Partner Violence: Not on file   Housing Stability: Not on file       REVIEW OF SYSTEMS    Constitutional: [] fever  [] chills  [] weight loss  []weakness [] Other:  Eyes:  [] photophobia  [] discharge [] acuity change   [] Diplopia   [] Other:  HENT:  [] sore throat  [] ear pain [] Tinnitus   [] Other  Respiratory:  [] Cough  [] Shortness of breath   [] Sputum   [] Other:   Cardiac: []Chest pain   []Palpitations []Edema  []PND  [] Other:  GI:  []Abdominal pain   []Nausea  []Vomiting  []Diarrhea  [] Other:  :  [] Dysuria   []Frequency  []Hematuria  []Discharge  [] Other:  Possible Pregnancy: []Yes   []No   LMP:   Musculoskeletal:  []Back pain  []Neck pain  []Recent Injury   Skin:  []Rash  [] Itching  [] Other:  Neurologic:  [] Headache  [] Focal weakness  [] Sensory changes []Other:  Endocrine:  [] Polyuria  [] Polydipsia  [] Hair Loss  [] Other:  Lymphatic:   [] Swollen glands   Psychiatric:  As per HPI      All other systems negative except as marked or mentioned/indicated in the HPI. Mayte Galo      PHYSICAL EXAM:  Vitals:  /86   Pulse 81   Temp 97.5 °F (36.4 °C)   Resp 16   Ht 6' 1\" (1.854 m)   Wt 218 lb 11.1 oz (99.2 kg)   SpO2 92%   BMI 28.85 kg/m²      Neuro Exam:      Involuntary Movements: No    Mental Status Examination:    Level of consciousness:  within normal limits   Appearance: Appears frail and hospital attire  Behavior/Motor:  psychomotor retardation  Attitude toward examiner:  cooperative and attentive  Speech:  slow and increased latency   Mood: depressed  Affect:  blunted  Thought processes:  linear, goal directed, and coherent   Thought content:  Suicidal Ideation:  denies suicidal ideation  Delusions:  no evidence of delusions  Perceptual Disturbance:  denies any perceptual disturbance  Cognition:  oriented to person, place, and time   Concentration intact  Memory intact  Insight fair   Judgement good   Fund of Knowledge adequate        LABS: REVIEWED TODAY:  Recent Labs     07/22/22  1405   WBC 13.9*   HGB 14.5        Recent Labs     07/22/22  1447 07/22/22  2211 07/23/22  0514 07/23/22  1154   * 125* 127* 126*   K 4.2 3.9 4.4 4.7   CL 87* 89* 92* 92*   CO2 17* 18* 23 23   BUN 5*  --  6*  --    CREATININE 0.49*  --  0.54*  --    GLUCOSE 98  --  106*  --      Recent Labs     07/22/22  1447   BILITOT 0.50   ALKPHOS 90   AST 80*   ALT 47*     Lab Results   Component Value Date/Time    BARBSCNU NEGATIVE 07/23/2022 07:24 AM    LABBENZ NEGATIVE 07/23/2022 07:24 AM    LABMETH NEGATIVE 07/23/2022 07:24 AM    PPXUR NOT REPORTED 06/23/2021 01:03 PM     Lab Results   Component Value Date/Time    TSH 9.82 11/29/2021 05:37 AM     No results found for: LITHIUM  No results found for: VALPROATE, CBMZ  No results found for: LITHIUM, VALPROATE    FURTHER LABS ORDERED :      Radiology   US GALLBLADDER RUQ    Result Date: 7/23/2022  EXAMINATION: RIGHT UPPER QUADRANT ULTRASOUND 7/23/2022 8:05 am COMPARISON: None. HISTORY: ORDERING SYSTEM PROVIDED HISTORY: RUQ pain and tenderness TECHNOLOGIST PROVIDED HISTORY: RUQ pain and tenderness FINDINGS: LIVER:  Liver appears diffusely echogenic. No focal liver abnormality. No intrahepatic biliary ductal dilatation. BILIARY SYSTEM:  Gallbladder is unremarkable without evidence of pericholecystic fluid, wall thickening or stones. Negative sonographic Daugherty's sign. Common bile duct is within normal limits measuring 5 mm. RIGHT KIDNEY: The right kidney is grossly unremarkable without evidence of hydronephrosis. PANCREAS:  Visualized portions of the pancreas are unremarkable. OTHER: No evidence of right upper quadrant ascites.      Fatty infiltration of the liver, otherwise, unremarkable right upper quadrant ultrasound     XR CHEST PORTABLE    Result Date: 7/23/2022  EXAMINATION: ONE XRAY VIEW OF THE CHEST 7/23/2022 9:30 am COMPARISON: Chest radiograph performed 12/04/2021. HISTORY: ORDERING SYSTEM PROVIDED HISTORY: SOB TECHNOLOGIST PROVIDED HISTORY: SOB FINDINGS: There is a right basilar infiltrate. There is no pneumothorax. The mediastinal structures are unremarkable. The upper abdomen is unremarkable. The extrathoracic soft tissues are unremarkable. Right basilar infiltrate representing atelectasis versus pneumonia. Noted history of prolonged QTC in 2041. He also has more recent EKG. DIAGNOSIS:  MDD recurrent moderate  Alcohol abuse  Hyponatremia  Delirium due to medical condition        RISK ASSESSMENT: low risk of suicide or harm to others      RECOMMENDATIONS-no indication for admission to psychiatry or sitter at this time. He would benefit from rehab for his alcohol use. We will follow up    Risk Management:  routine:  no special precautions necessary    Medications: The patient's chronic hyponatremia may be worsened by adding an antidepressant at this time. He is receiving Seroquel 400 mg nightly. Reduce the dose to 300 mg at nighttime. Will add Acamprosate 3 times daily to reduce alcohol craving    Discussed with the treating physician/ team about the patient and treatment plan  Reviewed the chart    Discussed with the patient risk, benefit, alternative and common side effects for the  proposed medication treatment. Patient is consenting to the treatment. Thanks for the consult. Please call me if needed. Electronically signed by Jayshree Contreras MD on 7/23/2022 at 4:16 PM    Please note that this chart was generated using voice recognition Dragon dictation software. Although every effort was made to ensure the accuracy of this automated transcription, some errors in transcription may have occurred.

## 2022-07-23 NOTE — CARDIO/PULMONARY
BRONCHOSPASM/BRONCHOCONSTRICTION     [x]         IMPROVE AERATION/BREATH SOUNDS  [x]   ADMINISTER BRONCHODILATOR THERAPY AS APPROPRIATE  [x]   ASSESS BREATH SOUNDS  [x]   IMPLEMENT AEROSOL/MDI PROTOCOL  [x]   PATIENT EDUCATION AS NEEDED   MOBILIZE SECRETIONS    [x]   ASSESS BREATH SOUNDS  [x]   ASSESS SPUTUM PRODUCTION  [x]   COUGH AND DEEP BREATHING  [x]  IMPLEMENT SECRETION MANAGEMENT PROTOCOL  [x]   PATIENT EDUCATION AS NEEDED

## 2022-07-23 NOTE — PROGRESS NOTES
[address]    PROGRESS NOTE             7/23/2022    8:17 AM    Name:   Tarah Ceballos  MRN:     [id number]     Acct:      [de-identified]   Room:   2118/2118-01   Day:  1  Admit Date:  7/22/2022  1:43 PM    PCP:  SIMONE Dawn NP  Code Status:  Full Code    Subjective:     C/C:   Chief Complaint   Patient presents with    Alcohol Problem     Interval History Status: not changed. I saw and examined patient at bedside this morning. Patient was laying in semi-Marrufo's position and sleeping comfortably. Patient is currently receiving 2 L O2 nasal cannula and saturating at 98%. Patient was not needing oxygen pregnancy and review of chart shows patient was desaturating at 85% overnight. On physical exam patient has bilateral wheezing and decreased air movement. We will order chest x-ray, start Zithromax and steroids. Last Ativan given this morning at 6 AM 2 mg    Patient had urinary retention last night. Bladder scan showed 999+ urine. Patient went to the bathroom himself and had approximately 700 urine output. Patient denies having any prostate issues or ever being on Flomax. However patient notes that he has urinary urgency and frequency. We will order PSA. Brief History:     The patient is a 62 y.o. gentleman with history of depression, myeloproliferative disorder, hypothyroidism, hypertension, COPD and history of mycoplasma pneumonia who presents with alcohol withdrawal, and hyponatremia. Patient was brought to the emergency department for evaluation as he wants to quit drinking but does not want to go through detox. Patient was found to be hyponatremic 124 and he is admitted to the hospital for the management of  hyponatremia and alcohol withdrawal.  He also said that he had thoughts of killing himself for the past couple of days.   However patient says he is [place] and he cannot act upon    Patient said that he has been drinking excessively since 2017. He tried to quit many times and went into detox but he only get there for 3 to 4 days due to his insurance. He drinks 10 to 13 12oz ounce beers a day, he has not had a meal in 5 days only drinks [patient says that this happens regularly]. Patient came with hyponatremia, he is anxious, and started to have tremors in the ED    Patient used to be a , until he was diagnosed with myeloproliferative disorder and has been on disability. Patient did have an episode of vomiting in the ER. However, he did not have any blurry vision, headache, falls, confusion, or hallucination. Review of Systems:     Review of Systems   Constitutional:  Positive for diaphoresis and fatigue. Negative for fever. Respiratory:  Negative for wheezing. Cardiovascular:  Negative for chest pain and leg swelling. Gastrointestinal:  Positive for abdominal distention. Negative for abdominal pain, constipation, diarrhea, nausea and vomiting. Genitourinary:  Positive for frequency and urgency. Negative for dysuria. Musculoskeletal:  Negative for arthralgias. Neurological:  Negative for seizures, light-headedness and headaches. Psychiatric/Behavioral:  Negative for agitation and behavioral problems. Medications:      Allergies:  No Known Allergies    Current Meds:   Scheduled Meds:    gabapentin  300 mg Oral BID    hydroxyurea  500 mg Oral Daily    levothyroxine  50 mcg Oral Daily    [Held by provider] lisinopril  10 mg Oral Daily    pantoprazole  40 mg Oral QAM AC    QUEtiapine  400 mg Oral Nightly    prazosin  1 mg Oral BID    sodium chloride flush  5-40 mL IntraVENous 2 times per day    enoxaparin  40 mg SubCUTAneous Daily    folic acid, thiamine, multi-vitamin with vitamin K infusion   IntraVENous Daily    sodium chloride flush  5-40 mL IntraVENous 2 times per day    nicotine  1 patch TransDERmal Daily     Continuous Infusions:    sodium chloride       PRN Meds: sodium chloride flush, potassium chloride **OR** potassium alternative oral replacement **OR** potassium chloride, magnesium sulfate, ondansetron **OR** ondansetron, polyethylene glycol, acetaminophen **OR** acetaminophen, sodium chloride flush, sodium chloride, LORazepam **OR** LORazepam **OR** LORazepam **OR** LORazepam **OR** LORazepam **OR** LORazepam **OR** LORazepam **OR** LORazepam    Data:     Past Medical History:   has a past medical history of Alcohol abuse, Anemia, Cancer ([facility] [address] Cervical stenosis of spine, Chronic left-sided low back pain with left-sided sciatica, Insomnia, Intentional drug overdose ([address] Left arm numbness, Left lumbar radiculitis, Macrocytosis, Major depressive disorder, recurrent severe without psychotic features ([address] Myeloproliferative disorder ([facility] [address] Osteoarthritis of spine with radiculopathy, lumbar region, Severe recurrent major depression without psychotic features ([facility] [address] and Spondylosis of lumbar region without myelopathy or radiculopathy. Social History:   reports that he has been smoking cigarettes. He started smoking about 43 years ago. He has a 64.50 pack-year smoking history. He has never used smokeless tobacco. He reports current alcohol use of about 14.0 standard drinks per week. He reports that he does not currently use drugs after having used the following drugs: Marijuana Kelvin Semen). Family History:   Family History   Problem Relation Age of Onset    Breast Cancer Mother         s/p surgical complications    Diabetes Mother     Heart Failure Father     Other Brother         \"bad back\"       Vitals:  /63   Pulse 85   Temp 98.2 °F (36.8 °C) (Oral)   Resp 20   Ht 6' 1\" (1.854 m)   Wt 218 lb 11.1 oz (99.2 kg)   SpO2 94%   BMI 28.85 kg/m²   Temp (24hrs), Av.9 °F (36.6 °C), Min:97.2 °F (36.2 °C), Max:98.5 °F (36.9 °C)    Recent Labs     22  0743   POCGLU 117*       I/O(24Hr):     Intake/Output Summary (Last 24 hours) at 2022 6648  Last data filed at 7/23/2022 0616  Gross per 24 hour   Intake --   Output 852 ml   Net -852 ml       Labs:  [unfilled]    Lab Results   Component Value Date/Time    SPECIAL NOT REPORTED 12/03/2021 02:38 PM     Lab Results   Component Value Date/Time    CULTURE NO GROWTH 5 DAYS 11/30/2021 03:46 AM       [facility]    Radiology:    No results found. Physical Examination:        Physical Exam  Constitutional:       General: He is not in acute distress. Appearance: He is obese. He is diaphoretic. Cardiovascular:      Rate and Rhythm: Normal rate and regular rhythm. Heart sounds: Normal heart sounds. Pulmonary:      Effort: Respiratory distress present. Comments: Bilateral wheezes in all lung fields and decreased breath sounds bilateral  Abdominal:      General: There is distension. Tenderness: There is no abdominal tenderness. There is no guarding. Musculoskeletal:      Right lower leg: No edema. Left lower leg: No edema. Skin:     General: Skin is warm. Neurological:      Mental Status: He is oriented to person, place, and time. Psychiatric:         Mood and Affect: Mood normal.         Behavior: Behavior normal.         Assessment:        Primary Problem  Alcohol intoxication in active alcoholic without complication [facility])    Active Hospital Problems    Diagnosis Date Noted    Alcohol intoxication in active alcoholic without complication [facility]) [J10.848] 07/22/2022     Priority: Medium       Plan:        Patient status Admit as inpatient in the  Progressive Unit/Step down     Urinary retention possibly secondary to BPH  -PSA ordered  -Urinary retention last night. Bladder scan showed 999+ urine. Patient went to the bathroom himself and had approximately 700 mL urine output    Alcohol withdrawal  -2 L O2 nasal cannula saturating at 94%, low BP readings.  Holding Lisinpril  -Urine drug screen;  -PT 14.9, ALT 47, AST 80  -Patient on CIWA protocol, last Ativan given at 6 AM this morning 2 mg  -Fluid restriction 1500    Hyponatremia  -Na 123 on admission, today 127. Trending up  -Potassium 4.4, chloride 92, trending up  -On NS 0.9/Banana bag at rate of 75 ml/hr  -I/O daily  -Serum osmolality 302, urine osm pending, urine Na pending   -Nephrology on board    Acute respiratory failure possibly d/t COPD exacerbation or pneumonia  -Overnight O2 sat 85%, requiring nasal cannula oxygen.   Patient currently receiving 2 L O2 oxygen nasal cannula saturating at 98%  -Chest x-ray ordered  -Duoneb and Zithromax started      Myeloproliferative disorder  -Continue Hydroxyurea 500 mg daily  -Careful with blood withdrawals     -RUQ pain  -Ultrasound currently pending  -Lipase 38     Major depressive disorder/Suicidal ideations  -Continue on quetiapine  -Sitter in place  -Psychiatry on board     Diet  -NPO d/t awaiting RUQ U/S    PT/OT    SW    DVT prophylaxis  -On Lovenox    Continue Levothyroxine for Hypothyroid, Continue Lisinopril, for HTN, Continue Gabapentin for Left-sided SCIATICA and radiculitis Continue Prazosin    Evi Vazquez MD  7/23/2022  8:17 AM

## 2022-07-24 LAB
ABSOLUTE EOS #: 0 K/UL (ref 0–0.4)
ABSOLUTE LYMPH #: 0.47 K/UL (ref 1–4.8)
ABSOLUTE MONO #: 0.63 K/UL (ref 0.1–1.3)
ANION GAP SERPL CALCULATED.3IONS-SCNC: 10 MMOL/L (ref 9–17)
ANION GAP SERPL CALCULATED.3IONS-SCNC: 11 MMOL/L (ref 9–17)
ANION GAP SERPL CALCULATED.3IONS-SCNC: 13 MMOL/L (ref 9–17)
ANION GAP SERPL CALCULATED.3IONS-SCNC: 14 MMOL/L (ref 9–17)
ANION GAP SERPL CALCULATED.3IONS-SCNC: 14 MMOL/L (ref 9–17)
BASOPHILS # BLD: 0 % (ref 0–2)
BASOPHILS ABSOLUTE: 0 K/UL (ref 0–0.2)
BUN BLDV-MCNC: 7 MG/DL (ref 8–23)
CALCIUM SERPL-MCNC: 9.5 MG/DL (ref 8.6–10.4)
CHLORIDE BLD-SCNC: 93 MMOL/L (ref 98–107)
CHLORIDE BLD-SCNC: 96 MMOL/L (ref 98–107)
CHLORIDE BLD-SCNC: 97 MMOL/L (ref 98–107)
CHLORIDE BLD-SCNC: 98 MMOL/L (ref 98–107)
CHLORIDE BLD-SCNC: 99 MMOL/L (ref 98–107)
CHOLESTEROL/HDL RATIO: 2
CHOLESTEROL: 107 MG/DL
CO2: 20 MMOL/L (ref 20–31)
CO2: 21 MMOL/L (ref 20–31)
CO2: 21 MMOL/L (ref 20–31)
CO2: 23 MMOL/L (ref 20–31)
CO2: 24 MMOL/L (ref 20–31)
CREAT SERPL-MCNC: 0.88 MG/DL (ref 0.7–1.2)
CULTURE: NORMAL
DIRECT EXAM: NORMAL
EOSINOPHILS RELATIVE PERCENT: 0 % (ref 0–4)
GFR AFRICAN AMERICAN: >60 ML/MIN
GFR NON-AFRICAN AMERICAN: >60 ML/MIN
GFR SERPL CREATININE-BSD FRML MDRD: ABNORMAL ML/MIN/{1.73_M2}
GLUCOSE BLD-MCNC: 118 MG/DL (ref 70–99)
GLUCOSE BLD-MCNC: 127 MG/DL (ref 75–110)
GLUCOSE BLD-MCNC: 128 MG/DL (ref 75–110)
HCT VFR BLD CALC: 40.9 % (ref 41–53)
HDLC SERPL-MCNC: 54 MG/DL
HEMOGLOBIN: 13.7 G/DL (ref 13.5–17.5)
LDL CHOLESTEROL: 43 MG/DL (ref 0–130)
LYMPHOCYTES # BLD: 3 % (ref 24–44)
MCH RBC QN AUTO: 32.5 PG (ref 26–34)
MCHC RBC AUTO-ENTMCNC: 33.4 G/DL (ref 31–37)
MCV RBC AUTO: 97.3 FL (ref 80–100)
MONOCYTES # BLD: 4 % (ref 1–7)
MORPHOLOGY: ABNORMAL
MRSA, DNA, NASAL: NEGATIVE
PDW BLD-RTO: 13.6 % (ref 11.5–14.9)
PLATELET # BLD: 334 K/UL (ref 150–450)
PMV BLD AUTO: 7.3 FL (ref 6–12)
POTASSIUM SERPL-SCNC: 3.8 MMOL/L (ref 3.7–5.3)
POTASSIUM SERPL-SCNC: 4.4 MMOL/L (ref 3.7–5.3)
POTASSIUM SERPL-SCNC: 4.7 MMOL/L (ref 3.7–5.3)
POTASSIUM SERPL-SCNC: 4.7 MMOL/L (ref 3.7–5.3)
POTASSIUM SERPL-SCNC: 4.9 MMOL/L (ref 3.7–5.3)
RBC # BLD: 4.2 M/UL (ref 4.5–5.9)
SEG NEUTROPHILS: 93 % (ref 36–66)
SEGMENTED NEUTROPHILS ABSOLUTE COUNT: 14.6 K/UL (ref 1.3–9.1)
SODIUM BLD-SCNC: 127 MMOL/L (ref 135–144)
SODIUM BLD-SCNC: 131 MMOL/L (ref 135–144)
SODIUM BLD-SCNC: 134 MMOL/L (ref 135–144)
SPECIMEN DESCRIPTION: NORMAL
SPECIMEN DESCRIPTION: NORMAL
TRIGL SERPL-MCNC: 51 MG/DL
WBC # BLD: 15.7 K/UL (ref 3.5–11)

## 2022-07-24 PROCEDURE — 6370000000 HC RX 637 (ALT 250 FOR IP)

## 2022-07-24 PROCEDURE — 2700000000 HC OXYGEN THERAPY PER DAY

## 2022-07-24 PROCEDURE — 99232 SBSQ HOSP IP/OBS MODERATE 35: CPT | Performed by: INTERNAL MEDICINE

## 2022-07-24 PROCEDURE — 2060000000 HC ICU INTERMEDIATE R&B

## 2022-07-24 PROCEDURE — 2580000003 HC RX 258: Performed by: INTERNAL MEDICINE

## 2022-07-24 PROCEDURE — 80051 ELECTROLYTE PANEL: CPT

## 2022-07-24 PROCEDURE — 36415 COLL VENOUS BLD VENIPUNCTURE: CPT

## 2022-07-24 PROCEDURE — 99232 SBSQ HOSP IP/OBS MODERATE 35: CPT | Performed by: PSYCHIATRY & NEUROLOGY

## 2022-07-24 PROCEDURE — 2500000003 HC RX 250 WO HCPCS: Performed by: INTERNAL MEDICINE

## 2022-07-24 PROCEDURE — 85025 COMPLETE CBC W/AUTO DIFF WBC: CPT

## 2022-07-24 PROCEDURE — 94761 N-INVAS EAR/PLS OXIMETRY MLT: CPT

## 2022-07-24 PROCEDURE — 6360000002 HC RX W HCPCS: Performed by: INTERNAL MEDICINE

## 2022-07-24 PROCEDURE — 82947 ASSAY GLUCOSE BLOOD QUANT: CPT

## 2022-07-24 PROCEDURE — 6370000000 HC RX 637 (ALT 250 FOR IP): Performed by: PSYCHIATRY & NEUROLOGY

## 2022-07-24 PROCEDURE — 2580000003 HC RX 258

## 2022-07-24 PROCEDURE — 6370000000 HC RX 637 (ALT 250 FOR IP): Performed by: INTERNAL MEDICINE

## 2022-07-24 PROCEDURE — 94640 AIRWAY INHALATION TREATMENT: CPT

## 2022-07-24 PROCEDURE — 80061 LIPID PANEL: CPT

## 2022-07-24 PROCEDURE — 80048 BASIC METABOLIC PNL TOTAL CA: CPT

## 2022-07-24 PROCEDURE — 6360000002 HC RX W HCPCS

## 2022-07-24 RX ORDER — CHLORDIAZEPOXIDE HYDROCHLORIDE 25 MG/1
25 CAPSULE, GELATIN COATED ORAL 3 TIMES DAILY
Status: DISCONTINUED | OUTPATIENT
Start: 2022-07-24 | End: 2022-07-25 | Stop reason: HOSPADM

## 2022-07-24 RX ORDER — OXYCODONE HYDROCHLORIDE 5 MG/1
5 TABLET ORAL EVERY 4 HOURS PRN
Status: COMPLETED | OUTPATIENT
Start: 2022-07-24 | End: 2022-07-24

## 2022-07-24 RX ORDER — ACAMPROSATE CALCIUM 333 MG/1
666 TABLET, DELAYED RELEASE ORAL 3 TIMES DAILY
Status: DISCONTINUED | OUTPATIENT
Start: 2022-07-24 | End: 2022-07-25 | Stop reason: HOSPADM

## 2022-07-24 RX ORDER — KETOROLAC TROMETHAMINE 30 MG/ML
30 INJECTION, SOLUTION INTRAMUSCULAR; INTRAVENOUS EVERY 6 HOURS PRN
Status: DISCONTINUED | OUTPATIENT
Start: 2022-07-24 | End: 2022-07-25 | Stop reason: HOSPADM

## 2022-07-24 RX ADMIN — ENOXAPARIN SODIUM 40 MG: 40 INJECTION SUBCUTANEOUS at 08:31

## 2022-07-24 RX ADMIN — LEVOTHYROXINE SODIUM 50 MCG: 0.05 TABLET ORAL at 06:14

## 2022-07-24 RX ADMIN — METHYLPREDNISOLONE SODIUM SUCCINATE 40 MG: 40 INJECTION, POWDER, FOR SOLUTION INTRAMUSCULAR; INTRAVENOUS at 21:40

## 2022-07-24 RX ADMIN — SODIUM CHLORIDE, PRESERVATIVE FREE 10 ML: 5 INJECTION INTRAVENOUS at 08:30

## 2022-07-24 RX ADMIN — ACAMPROSATE CALCIUM 333 MG: 333 TABLET, DELAYED RELEASE ORAL at 08:30

## 2022-07-24 RX ADMIN — HYDROXYUREA 500 MG: 500 CAPSULE ORAL at 08:59

## 2022-07-24 RX ADMIN — PRAZOSIN HYDROCHLORIDE 1 MG: 1 CAPSULE ORAL at 19:41

## 2022-07-24 RX ADMIN — CHLORDIAZEPOXIDE HYDROCHLORIDE 25 MG: 25 CAPSULE ORAL at 09:06

## 2022-07-24 RX ADMIN — CHLORDIAZEPOXIDE HYDROCHLORIDE 25 MG: 25 CAPSULE ORAL at 15:05

## 2022-07-24 RX ADMIN — IPRATROPIUM BROMIDE AND ALBUTEROL SULFATE 1 AMPULE: .5; 2.5 SOLUTION RESPIRATORY (INHALATION) at 10:41

## 2022-07-24 RX ADMIN — METHYLPREDNISOLONE SODIUM SUCCINATE 40 MG: 40 INJECTION, POWDER, FOR SOLUTION INTRAMUSCULAR; INTRAVENOUS at 08:30

## 2022-07-24 RX ADMIN — PRAZOSIN HYDROCHLORIDE 1 MG: 1 CAPSULE ORAL at 08:30

## 2022-07-24 RX ADMIN — QUETIAPINE FUMARATE 300 MG: 200 TABLET ORAL at 19:41

## 2022-07-24 RX ADMIN — IPRATROPIUM BROMIDE AND ALBUTEROL SULFATE 1 AMPULE: .5; 2.5 SOLUTION RESPIRATORY (INHALATION) at 08:14

## 2022-07-24 RX ADMIN — PANTOPRAZOLE SODIUM 40 MG: 40 TABLET, DELAYED RELEASE ORAL at 06:14

## 2022-07-24 RX ADMIN — Medication 10 ML: at 21:40

## 2022-07-24 RX ADMIN — FOLIC ACID: 5 INJECTION, SOLUTION INTRAMUSCULAR; INTRAVENOUS; SUBCUTANEOUS at 15:54

## 2022-07-24 RX ADMIN — CHLORDIAZEPOXIDE HYDROCHLORIDE 25 MG: 25 CAPSULE ORAL at 19:41

## 2022-07-24 RX ADMIN — IPRATROPIUM BROMIDE AND ALBUTEROL SULFATE 1 AMPULE: .5; 2.5 SOLUTION RESPIRATORY (INHALATION) at 19:19

## 2022-07-24 RX ADMIN — OXYCODONE HYDROCHLORIDE 5 MG: 5 TABLET ORAL at 13:58

## 2022-07-24 RX ADMIN — GABAPENTIN 300 MG: 300 CAPSULE ORAL at 08:30

## 2022-07-24 RX ADMIN — GABAPENTIN 300 MG: 300 CAPSULE ORAL at 19:41

## 2022-07-24 RX ADMIN — ACAMPROSATE CALCIUM 333 MG: 333 TABLET, DELAYED RELEASE ORAL at 15:05

## 2022-07-24 RX ADMIN — CEFTRIAXONE SODIUM 1000 MG: 1 INJECTION, POWDER, FOR SOLUTION INTRAMUSCULAR; INTRAVENOUS at 16:10

## 2022-07-24 RX ADMIN — AZITHROMYCIN MONOHYDRATE 500 MG: 250 TABLET ORAL at 08:30

## 2022-07-24 RX ADMIN — ACAMPROSATE CALCIUM 666 MG: 333 TABLET, DELAYED RELEASE ORAL at 19:42

## 2022-07-24 ASSESSMENT — PAIN DESCRIPTION - LOCATION: LOCATION: BACK

## 2022-07-24 ASSESSMENT — PAIN SCALES - GENERAL: PAINLEVEL_OUTOF10: 7

## 2022-07-24 NOTE — PROGRESS NOTES
Iv began to leak after infusion started, iv d/c'd. Unable to obtain new iv access x4 unsuccessful attempts. Residents attempted to call for midline order, no answer. Will continue to try.

## 2022-07-24 NOTE — PROGRESS NOTES
[address]    PROGRESS NOTE             7/24/2022    10:08 AM    Name:   Caro Pichardo  MRN:     [id number]     Acct:      [de-identified]   Room:   2118/2118-01  IP Day:  2  Admit Date:  7/22/2022  1:43 PM    PCP:  SIMONE Noyola NP  Code Status:  Full Code    Subjective:     C/C:   Chief Complaint   Patient presents with    Alcohol Problem     Interval History Status: improved. Patient seen and examined at the bed side, no new acute events overnight. No new complaints. Yesterday patient had complained of lower back pain, that radiates to his lower extremities patient has radiculoneuropathy for which he takes gabapentin. Patient requested Librium instead of Ativan as it works better for him. Notes from nursing staff and Consults had been reviewed, and the overnight progress had been checked with the nursing staff as well. Brief History:     The patient is a 62 y.o. gentleman with history of depression, myeloproliferative disorder, hypothyroidism, hypertension, COPD and history of mycoplasma pneumonia who presents with alcohol withdrawal, and hyponatremia. Patient was brought to the emergency department for evaluation as he wants to quit drinking but does not want to go through detox. Patient was found to be hyponatremic 124 and he is admitted to the hospital for the management of  hyponatremia and alcohol withdrawal.  He also said that he had thoughts of killing himself for the past couple of days. However patient says he is [place] and he cannot act upon    Patient said that he has been drinking excessively since 2017. He tried to quit many times and went into detox but he only get there for 3 to 4 days due to his insurance. He drinks 10 to 13 12oz ounce beers a day, he has not had a meal in 5 days only drinks [patient says that this happens regularly].   Patient came with hyponatremia, he is anxious, and started to have tremors in the ED    Patient used to be a , until he was diagnosed with myeloproliferative disorder and has been on disability. Patient did have an episode of vomiting in the ER. However, he did not have any blurry vision, headache, falls, confusion, or hallucination. Review of Systems:     CONSTITUTIONAL: Fatigue  Psych: Normal mood and affect, no depression, no suicidal ideation. EYES: negative for blury vision  HEENT: No headaches, no nasal congestion, no difficulty swallowing  RESPIRATORY:negative for dyspnea, no wheezing, + Cough  CARDIOVASCULAR: negative for chest pain, no palpitations  GASTROINTESTINAL: Abdominal distention  GENITOURINARY: Frequency and urgency  MUSCULOSKELETAL: Back pain  NEUROLOGICAL: Bilateral hand tremors       Medications:      Allergies:    No Known Allergies    Current Meds:   Scheduled Meds:    chlordiazePOXIDE  25 mg Oral TID    ipratropium-albuterol  1 ampule Inhalation Q4H WA    azithromycin  500 mg Oral Daily    cefTRIAXone (ROCEPHIN) IV  1,000 mg IntraVENous Q24H    methylPREDNISolone  40 mg IntraVENous Q12H    QUEtiapine  300 mg Oral Nightly    acamprosate  333 mg Oral TID    gabapentin  300 mg Oral BID    hydroxyurea  500 mg Oral Daily    levothyroxine  50 mcg Oral Daily    [Held by provider] lisinopril  10 mg Oral Daily    pantoprazole  40 mg Oral QAM AC    prazosin  1 mg Oral BID    sodium chloride flush  5-40 mL IntraVENous 2 times per day    enoxaparin  40 mg SubCUTAneous Daily    folic acid, thiamine, multi-vitamin with vitamin K infusion   IntraVENous Daily    sodium chloride flush  5-40 mL IntraVENous 2 times per day    nicotine  1 patch TransDERmal Daily     Continuous Infusions:    IV infusion builder      sodium chloride       PRN Meds: sodium chloride flush, potassium chloride **OR** potassium alternative oral replacement **OR** potassium chloride, magnesium sulfate, ondansetron **OR** ondansetron, polyethylene glycol, acetaminophen **OR** acetaminophen, sodium chloride flush, sodium chloride    Data:     Past Medical History:   has a past medical history of Alcohol abuse, Anemia, Cancer ([facility] [address] Cervical stenosis of spine, Chronic left-sided low back pain with left-sided sciatica, Insomnia, Intentional drug overdose ([facility] [address] Left arm numbness, Left lumbar radiculitis, Macrocytosis, Major depressive disorder, recurrent severe without psychotic features ([facility] [address] Myeloproliferative disorder ([facility] [address] Osteoarthritis of spine with radiculopathy, lumbar region, Severe recurrent major depression without psychotic features ([facility] [address] and Spondylosis of lumbar region without myelopathy or radiculopathy. Social History:   reports that he has been smoking cigarettes. He started smoking about 43 years ago. He has a 64.50 pack-year smoking history. He has never used smokeless tobacco. He reports current alcohol use of about 14.0 standard drinks per week. He reports that he does not currently use drugs after having used the following drugs: Marijuana Fraser Kylah). Family History:   Family History   Problem Relation Age of Onset    Breast Cancer Mother         s/p surgical complications    Diabetes Mother     Heart Failure Father     Other Brother         \"bad back\"       Vitals:  /76   Pulse 87   Temp 97.3 °F (36.3 °C) (Oral)   Resp 18   Ht 6' 1\" (1.854 m)   Wt 218 lb 14.7 oz (99.3 kg)   SpO2 95%   BMI 28.88 kg/m²   Temp (24hrs), Av.6 °F (36.4 °C), Min:97.3 °F (36.3 °C), Max:97.9 °F (36.6 °C)      Recent Labs     22  0743   POCGLU 117*       I/O(24Hr):     Intake/Output Summary (Last 24 hours) at 2022 1008  Last data filed at 2022 0656  Gross per 24 hour   Intake 2495 ml   Output 2200 ml   Net 295 ml       Labs:        Lab Results   Component Value Date/Time    SPECIAL NOT REPORTED 2021 02:38 PM     Lab Results   Component Value Date/Time    CULTURE PENDING 2022 12:20 PM         Radiology:    US GALLBLADDER RUQ    Result Date: 7/23/2022  EXAMINATION: RIGHT UPPER QUADRANT ULTRASOUND 7/23/2022 8:05 am COMPARISON: None. HISTORY: ORDERING SYSTEM PROVIDED HISTORY: RUQ pain and tenderness TECHNOLOGIST PROVIDED HISTORY: RUQ pain and tenderness FINDINGS: LIVER:  Liver appears diffusely echogenic. No focal liver abnormality. No intrahepatic biliary ductal dilatation. BILIARY SYSTEM:  Gallbladder is unremarkable without evidence of pericholecystic fluid, wall thickening or stones. Negative sonographic Daugherty's sign. Common bile duct is within normal limits measuring 5 mm. RIGHT KIDNEY: The right kidney is grossly unremarkable without evidence of hydronephrosis. PANCREAS:  Visualized portions of the pancreas are unremarkable. OTHER: No evidence of right upper quadrant ascites. Fatty infiltration of the liver, otherwise, unremarkable right upper quadrant ultrasound     XR CHEST PORTABLE    Result Date: 7/23/2022  EXAMINATION: ONE XRAY VIEW OF THE CHEST 7/23/2022 9:30 am COMPARISON: Chest radiograph performed 12/04/2021. HISTORY: ORDERING SYSTEM PROVIDED HISTORY: SOB TECHNOLOGIST PROVIDED HISTORY: SOB FINDINGS: There is a right basilar infiltrate. There is no pneumothorax. The mediastinal structures are unremarkable. The upper abdomen is unremarkable. The extrathoracic soft tissues are unremarkable. Right basilar infiltrate representing atelectasis versus pneumonia. EKG:      Physical Examination:        PHYSICAL EXAM:  General Appearance  Alert , awake, not in acute distress  Psych: Looks sad  HEENT - Head is normocephalic, atraumatic. Neck: supple, no rigidity, normal ROM, no neck swellings, normal thyroid gland  Lungs - Bilateral equal air entry, no wheezes, rales or rhonchi, mildly decreased air entry on left lower lobe  Cardiovascular - Heart sounds are normal.  Regular rhythm, normal rate without murmur, gallop or rub.   Abdomen - Soft, right upper quadrant tenderness +distended, no masses or organomegaly  Neurologic - Larry Whalen MD  7/24/2022  10:08 AM    Attending Physician Statement  I have discussed the care of Madelin Zuñiga and I have examined the patient myselft and taken ros and hpi , including pertinent history and exam findings,  with the resident. I have reviewed the key elements of all parts of the encounter with the resident. I agree with the assessment, plan and orders as documented by the resident. Spent 35 minutes in reviewing data/medicines/talking to patient/family,  explaining and answering all the questions.     71-year-old gentleman with underlying history of alcohol intoxication, advanced liver failure ,Hyponatremia,Ac resp failure due to Pneumonia  Started on abx,  MDS  Major depression/suicidal ideations, psych on board  Polysubstance abuse  Multiple major comorbid conditions contributing to poor outcome     Electronically signed by Christie Younger MD

## 2022-07-24 NOTE — CONSULTS
Department of Psychiatry  Behavioral Health Consult    REASON FOR CONSULT: Suicidal ideation. CONSULTING PHYSICIAN: Dr. Sundar Hoffmann    History obtained from: Patient and chart    Interim history  The patient was seen at bedside. He appears to be improving. He is able to engage better in the interview. He appears to be making better eye contact and is more motivated. He has tolerated the change in his medication without any problems. His sodium has improved to 134. We will increase his acamprosate as noted below  HISTORY OF PRESENT ILLNESS:    The patient is a 62 y.o. male with significant past psychiatric history of alcohol abuse and depression and a medical history significant for myeloproliferative disorder, hypothyroidism, hypertension who presents with hyponatremia and alcohol withdrawal.   Noted that the patient's urine drug screen was positive for cannabinoids. The patient's sodium was 123 on presentation and has improved to 126 today. The patient was seen at bedside. He reports some depressive symptoms. He has been drinking heavily. He estimates he drinks about 12-15 beers a day. He lives alone at the time. The patient denies any suicidal ideation. He denies any auditory visual hallucinations. He is coherent in his speech. He appears to have no psychotic phenomena. He was oriented to time place and person. Patient talked about the circumstances of his drinking. He had a 5-year sobriety duration between 2012 and 2017 after his wife, who was also an alcoholic told him that she would leave him if he did not quit drinking. His wife passed away after a brain bleed which led to his relapse. He has been drinking continuously after her deathd. The patient is not currently receiving care for the above psychiatric illness.       Psychiatric Review of Systems           Obsessions and Compulsions: Denies       Gerda or Hypomania: Denies     Hallucinations: Denies     Panic Attacks: Denies     Delusions:  Denies     Phobias:  Denies     Trauma: Denies      Substance Abuse History:  ETOH: The patient has extensive history of heavy drinking. For more details see above  Marijuana: denies  Opiates: denies  Other Drugs: denies      Past Psychiatric History:  Prior Diagnosis: Depression and alcohol abuse    Hospitalization: yes  Hx of Suicidal Attempts: yes  Hx of violence:  no      Personal History: The patient currently lives alone and receives Social Security income.   he has a daughter    Past Medical History:        Diagnosis Date    Alcohol abuse 8/12/2019    Anemia     Cancer (HCC)     blood    Cervical stenosis of spine 6/14/2019    Chronic left-sided low back pain with left-sided sciatica 3/14/2019    Insomnia 3/14/2019    Intentional drug overdose ([address] 8/11/2019    Left arm numbness 6/14/2019    Left lumbar radiculitis 5/3/2019    Macrocytosis     Major depressive disorder, recurrent severe without psychotic features ([address] 8/12/2019    Myeloproliferative disorder ([address]     Osteoarthritis of spine with radiculopathy, lumbar region 4/12/2019    Severe recurrent major depression without psychotic features ([address] 8/12/2019    Spondylosis of lumbar region without myelopathy or radiculopathy 5/3/2019       Past Surgical History:        Procedure Laterality Date    APPENDECTOMY      CARPAL TUNNEL RELEASE      bilateral     CERVICAL FUSION      s/p trampoline accident    CERVICAL FUSION  09/05/2019     ANTERIOR CERVICAL DECOMPRESSION FUSION C3-4     CERVICAL FUSION N/A 9/5/2019    ANTERIOR CERVICAL DECOMPRESSION FUSION C3-4 performed by Richard Haile DO at [address] [place] [address], DIAGNOSTIC      EPIDURAL STEROID INJECTION Left 5/15/2019    EPIDURAL STEROID INJECTION LEFT L5S1 performed by Marcus Nolan MD at [address] [place] [address]           Medications Prior to Admission:   Medications Prior to Admission: gabapentin (NEURONTIN) 300 MG capsule, Take 300 mg by mouth in the morning and at bedtime. Pt state he takes 600mg two daily  furosemide (LASIX) 20 MG tablet, Take 20 mg by mouth daily  lisinopril (PRINIVIL;ZESTRIL) 10 MG tablet, Take 10 mg by mouth daily  hydroxyurea (HYDREA) 500 MG chemo capsule, Take 1 capsule by mouth daily  midodrine (PROAMATINE) 5 MG tablet, Take 1 tablet by mouth 3 times daily (with meals)  sertraline (ZOLOFT) 100 MG tablet, Take 1 tablet by mouth daily  nicotine (NICODERM CQ) 21 MG/24HR, Place 1 patch onto the skin every 24 hours (Patient not taking: Reported on 0/64/2163)  folic acid (FOLVITE) 1 MG tablet, Take 1 tablet by mouth daily  levothyroxine (SYNTHROID) 50 MCG tablet, Take 1 tablet by mouth daily Take in the morning on an empty stomach 1 hour before any food or other medications  naltrexone (DEPADE) 50 MG tablet, Take 50 mg by mouth in the morning. prazosin (MINIPRESS) 1 MG capsule, Take 1 mg by mouth 2 times daily  omeprazole (PRILOSEC) 40 MG delayed release capsule, Take 40 mg by mouth Daily with supper   Multiple Vitamins-Minerals (THERAPEUTIC MULTIVITAMIN-MINERALS) tablet, Take 1 tablet by mouth daily (Patient not taking: Reported on 4/21/2022)  QUEtiapine (SEROQUEL) 400 MG tablet, Take 400 mg by mouth nightly    Allergies:  Patient has no known allergies. FAMILY/SOCIAL HISTORY:  Family History   Problem Relation Age of Onset    Breast Cancer Mother         s/p surgical complications    Diabetes Mother     Heart Failure Father     Other Brother         \"bad back\"     Social History     Socioeconomic History    Marital status:       Spouse name: Not on file    Number of children: 3    Years of education: Not on file    Highest education level: Not on file   Occupational History    Not on file   Tobacco Use    Smoking status: Every Day     Packs/day: 1.50     Years: 43.00     Pack years: 64.50     Types: Cigarettes     Start date: 9/23/1978    Smokeless tobacco: Never   Vaping Use    Vaping Use: Never used   Substance and Sexual Activity    Alcohol use: Yes     Alcohol/week: 14.0 standard drinks     Types: 14 Cans of beer per week     Comment: heavy alcohol user/ states drinks a pint of vodka occassionaly    Drug use: Not Currently     Types: Marijuana Patience Aly)    Sexual activity: Not Currently   Other Topics Concern    Not on file   Social History Narrative    Not on file     Social Determinants of Health     Financial Resource Strain: Not on file   Food Insecurity: Not on file   Transportation Needs: Not on file   Physical Activity: Not on file   Stress: Not on file   Social Connections: Not on file   Intimate Partner Violence: Not on file   Housing Stability: Not on file       REVIEW OF SYSTEMS    Constitutional: [] fever  [] chills  [] weight loss  []weakness [] Other:  Eyes:  [] photophobia  [] discharge [] acuity change   [] Diplopia   [] Other:  HENT:  [] sore throat  [] ear pain [] Tinnitus   [] Other  Respiratory:  [] Cough  [] Shortness of breath   [] Sputum   [] Other:   Cardiac: []Chest pain   []Palpitations []Edema  []PND  [] Other:  GI:  []Abdominal pain   []Nausea  []Vomiting  []Diarrhea  [] Other:  :  [] Dysuria   []Frequency  []Hematuria  []Discharge  [] Other:  Possible Pregnancy: []Yes   []No   LMP:   Musculoskeletal:  []Back pain  []Neck pain  []Recent Injury   Skin:  []Rash  [] Itching  [] Other:  Neurologic:  [] Headache  [] Focal weakness  [] Sensory changes []Other:  Endocrine:  [] Polyuria  [] Polydipsia  [] Hair Loss  [] Other:  Lymphatic:   [] Swollen glands   Psychiatric:  As per HPI      All other systems negative except as marked or mentioned/indicated in the HPI. [facility]      PHYSICAL EXAM:  Vitals:  /84   Pulse 90   Temp 97.7 °F (36.5 °C) (Oral)   Resp 18   Ht 6' 1\" (1.854 m)   Wt 218 lb 14.7 oz (99.3 kg)   SpO2 94%   BMI 28.88 kg/m²      Neuro Exam:      Involuntary Movements: No    Mental Status Examination:    Level of consciousness: Alert and awake   appearance: Hospital attire  Behavior/Motor:  psychomotor retardation is improving  Attitude toward examiner:  cooperative and attentive  Speech:  slow and low volume  Mood: depressed  Affect: Constricted  Thought processes:  linear, goal directed, and coherent   Thought content:  Suicidal Ideation:  denies suicidal ideation  Delusions:  no evidence of delusions  Perceptual Disturbance:  denies any perceptual disturbance  Cognition:  oriented to person, place, and time   Concentration intact  Memory intact  Insight fair   Judgement good   Fund of Knowledge adequate        LABS: REVIEWED TODAY:  Recent Labs     07/22/22  1405 07/24/22  0949   WBC 13.9* 15.7*   HGB 14.5 13.7    334     Recent Labs     07/22/22  1447 07/22/22  2211 07/23/22  0514 07/23/22  1154 07/23/22  2341 07/24/22  0600 07/24/22  0949   *   < > 127*   < > 127* 131* 134*   K 4.2   < > 4.4   < > 4.4 4.7 4.9   CL 87*   < > 92*   < > 93* 98 96*   CO2 17*   < > 23   < > 20 23 24   BUN 5*  --  6*  --   --   --  7*   CREATININE 0.49*  --  0.54*  --   --   --  0.88   GLUCOSE 98  --  106*  --   --   --  118*    < > = values in this interval not displayed. Recent Labs     07/22/22  1447   BILITOT 0.50   ALKPHOS 90   AST 80*   ALT 47*     Lab Results   Component Value Date/Time    BARBSCNU NEGATIVE 07/23/2022 07:24 AM    LABBENZ NEGATIVE 07/23/2022 07:24 AM    LABMETH NEGATIVE 07/23/2022 07:24 AM    PPXUR NOT REPORTED 06/23/2021 01:03 PM     Lab Results   Component Value Date/Time    TSH 9.82 11/29/2021 05:37 AM     No results found for: LITHIUM  No results found for: VALPROATE, CBMZ  No results found for: LITHIUM, VALPROATE    FURTHER LABS ORDERED :      Radiology   US GALLBLADDER RUQ    Result Date: 7/23/2022  EXAMINATION: RIGHT UPPER QUADRANT ULTRASOUND 7/23/2022 8:05 am COMPARISON: None. HISTORY: ORDERING SYSTEM PROVIDED HISTORY: RUQ pain and tenderness TECHNOLOGIST PROVIDED HISTORY: RUQ pain and tenderness FINDINGS: LIVER:  Liver appears diffusely echogenic. No focal liver abnormality.   No intrahepatic biliary ductal dilatation. BILIARY SYSTEM:  Gallbladder is unremarkable without evidence of pericholecystic fluid, wall thickening or stones. Negative sonographic Daugherty's sign. Common bile duct is within normal limits measuring 5 mm. RIGHT KIDNEY: The right kidney is grossly unremarkable without evidence of hydronephrosis. PANCREAS:  Visualized portions of the pancreas are unremarkable. OTHER: No evidence of right upper quadrant ascites. Fatty infiltration of the liver, otherwise, unremarkable right upper quadrant ultrasound     XR CHEST PORTABLE    Result Date: 7/23/2022  EXAMINATION: ONE XRAY VIEW OF THE CHEST 7/23/2022 9:30 am COMPARISON: Chest radiograph performed 12/04/2021. HISTORY: ORDERING SYSTEM PROVIDED HISTORY: SOB TECHNOLOGIST PROVIDED HISTORY: SOB FINDINGS: There is a right basilar infiltrate. There is no pneumothorax. The mediastinal structures are unremarkable. The upper abdomen is unremarkable. The extrathoracic soft tissues are unremarkable. Right basilar infiltrate representing atelectasis versus pneumonia. Noted history of prolonged QTC in 2041. He also has more recent EKG. DIAGNOSIS:  MDD recurrent moderate  Alcohol abuse  Hyponatremia  Delirium due to medical condition        RISK ASSESSMENT: low risk of suicide or harm to others      RECOMMENDATIONS-no indication for admission to psychiatry or sitter at this time. He would benefit from rehab for his alcohol use. We will follow up    Risk Management:  routine:  no special precautions necessary    Medications: The patient's chronic hyponatremia may be worsened by adding an antidepressant at this time. He is receiving Seroquel 400 mg nightly. Reduce the dose to 300 mg at nighttime. Acamprosate increased to 666mg tid.      Discussed with the treating physician/ team about the patient and treatment plan  Reviewed the chart    Discussed with the patient risk, benefit, alternative and common side effects for the proposed medication treatment. Patient is consenting to the treatment. Thanks for the consult. Please call me if needed. Electronically signed by Davidson Stern MD on 7/24/2022 at 5:05 PM    Please note that this chart was generated using voice recognition Dragon dictation software. Although every effort was made to ensure the accuracy of this automated transcription, some errors in transcription may have occurred.

## 2022-07-24 NOTE — PLAN OF CARE
Problem: Discharge Planning  Goal: Discharge to home or other facility with appropriate resources  7/24/2022 1545 by Sarath Meng RN  Outcome: Progressing  DC plan is to go home with possibly home care     Problem: Safety - Adult  Goal: Free from fall injury  7/24/2022 1545 by Sarath Meng RN  Outcome: Progressing  Pt assessed as a fall risk this shift. Remains free from falls and accidental injury at this time. Fall precautions in place, including falling star sign and fall risk band on pt. Floor free from obstacles, and bed is locked and in lowest position. Adequate lighting provided. Pt encouraged to call before getting OOB for any need. Bed alarm activated. Will continue to monitor needs during hourly rounding, and reinforce education on use of call light. Problem: Pain  Goal: Verbalizes/displays adequate comfort level or baseline comfort level  Outcome: Progressing   Pt medicated with pain medication prn. Assessed all pain characteristics including level, type, location, frequency, and onset. Non-pharmacologic interventions offered to pt as well. Pt states pain is tolerable at this time. Will continue to monitor.

## 2022-07-24 NOTE — CARE COORDINATION
ONGOING DISCHARGE PLAN:    Patient is alert and oriented x4. Spoke with patient regarding discharge plan and patient confirms that plan is still home with [facility]. Referral sent to [address] [place] [address], waiting to see if they can accept. ETOH abuse resources provided to patient. Na 134 today. Per psych, pt does not need BHI on d/c. Active order for PO Zithro, IV Rocephin, PO Librium, and IV solu-medrol 40mg every 12 hours. Will continue to follow for additional discharge needs.     Electronically signed by Chaparro Johnson RN on 7/24/2022 at 1:25 PM

## 2022-07-24 NOTE — PROGRESS NOTES
NEPHROLOGY PROGRESS NOTE    Patient :  Tawny Mcdaniel; 62 y.o. MRN# [id number]  Location:  2118/2118-01  Attending:  Sona Cote MD  Admit Date:  7/22/2022   Hospital Day: 2      Reason for Consult: Hyponatremia      Chief Complaint: Alcohol dependence,    Subjective/interval history. Patient seen and examined, patient complains of shakiness no nausea vomiting good appetite able to eat regular food. CIWA score is 14 patient received Librium. Electrolytes improved serum sodium improved    History of Present Illness: This is a 62 y.o. male with past medical history of alcohol abuse, myeloproliferative disorder, hypothyroidism, essential hypertension. Presented to the full because of his alcohol abuse he wants to quit and never detox. Labs showed serum sodium of 123 and therefore nephrology consultation has been requested.   Patient denies any nausea vomiting diarrhea patient drinks about 10-14 bottles of beer every day    Medication list SHOWS use of Lasix, Zoloft SSRI    Past Medical History:        Diagnosis Date    Alcohol abuse 8/12/2019    Anemia     Cancer (HCC)     blood    Cervical stenosis of spine 6/14/2019    Chronic left-sided low back pain with left-sided sciatica 3/14/2019    Insomnia 3/14/2019    Intentional drug overdose ([address] 8/11/2019    Left arm numbness 6/14/2019    Left lumbar radiculitis 5/3/2019    Macrocytosis     Major depressive disorder, recurrent severe without psychotic features ([address] 8/12/2019    Myeloproliferative disorder ([address]     Osteoarthritis of spine with radiculopathy, lumbar region 4/12/2019    Severe recurrent major depression without psychotic features ([address] 8/12/2019    Spondylosis of lumbar region without myelopathy or radiculopathy 5/3/2019       Past Surgical History:        Procedure Laterality Date    APPENDECTOMY      CARPAL TUNNEL RELEASE      bilateral     CERVICAL FUSION      s/p trampoline accident    CERVICAL FUSION  09/05/2019     ANTERIOR CERVICAL DECOMPRESSION FUSION C3-4     CERVICAL FUSION N/A 9/5/2019    ANTERIOR CERVICAL DECOMPRESSION FUSION C3-4 performed by Richard Haile DO at [address] [facility], [place], DIAGNOSTIC      EPIDURAL STEROID INJECTION Left 5/15/2019    EPIDURAL STEROID INJECTION LEFT L5S1 performed by Marcus Nolan MD at [address] [place] [address]         Current Medications:    chlordiazePOXIDE (LIBRIUM) capsule 25 mg, TID  adult multi-vitamin with vitamin k 10 mL, thiamine 430 mg, folic acid 1 mg in sodium chloride 0.9 % 1,000 mL infusion, Continuous  ketorolac (TORADOL) injection 30 mg, Q6H PRN  ipratropium-albuterol (DUONEB) nebulizer solution 1 ampule, Q4H WA  azithromycin (ZITHROMAX) tablet 500 mg, Daily  cefTRIAXone (ROCEPHIN) 1000 mg IVPB in 50 mL D5W minibag, Q24H  methylPREDNISolone sodium (SOLU-MEDROL) injection 40 mg, Q12H  QUEtiapine (SEROQUEL) tablet 300 mg, Nightly  acamprosate (CAMPRAL) tablet 333 mg, TID  gabapentin (NEURONTIN) capsule 300 mg, BID  hydroxyurea (HYDREA) chemo capsule 500 mg, Daily  levothyroxine (SYNTHROID) tablet 50 mcg, Daily  [Held by provider] lisinopril (PRINIVIL;ZESTRIL) tablet 10 mg, Daily  pantoprazole (PROTONIX) tablet 40 mg, QAM AC  prazosin (MINIPRESS) capsule 1 mg, BID  sodium chloride flush 0.9 % injection 5-40 mL, 2 times per day  sodium chloride flush 0.9 % injection 10 mL, PRN  potassium chloride (KLOR-CON M) extended release tablet 40 mEq, PRN   Or  potassium bicarb-citric acid (EFFER-K) effervescent tablet 40 mEq, PRN   Or  potassium chloride 10 mEq/100 mL IVPB (Peripheral Line), PRN  magnesium sulfate 1000 mg in dextrose 5% 100 mL IVPB, PRN  ondansetron (ZOFRAN-ODT) disintegrating tablet 4 mg, Q8H PRN   Or  ondansetron (ZOFRAN) injection 4 mg, Q6H PRN  polyethylene glycol (GLYCOLAX) packet 17 g, Daily PRN  acetaminophen (TYLENOL) tablet 650 mg, Q6H PRN   Or  acetaminophen (TYLENOL) suppository 650 mg, Q6H PRN  enoxaparin (LOVENOX) injection 40 mg, Daily  sodium chloride 0.9 % 1,000 mL with folic acid 1 mg, adult multi-vitamin with vitamin k 10 mL, thiamine 100 mg, Daily  sodium chloride flush 0.9 % injection 5-40 mL, 2 times per day  sodium chloride flush 0.9 % injection 5-40 mL, PRN  0.9 % sodium chloride infusion, PRN  nicotine (NICODERM CQ) 21 MG/24HR 1 patch, Daily      Allergies:  Patient has no known allergies. Social History:   Social History     Socioeconomic History    Marital status:      Spouse name: Not on file    Number of children: 3    Years of education: Not on file    Highest education level: Not on file   Occupational History    Not on file   Tobacco Use    Smoking status: Every Day     Packs/day: 1.50     Years: 43.00     Pack years: 64.50     Types: Cigarettes     Start date: 1978    Smokeless tobacco: Never   Vaping Use    Vaping Use: Never used   Substance and Sexual Activity    Alcohol use:  Yes     Alcohol/week: 14.0 standard drinks     Types: 14 Cans of beer per week     Comment: heavy alcohol user/ states drinks a pint of vodka occassionaly    Drug use: Not Currently     Types: Marijuana Inga Jacob)    Sexual activity: Not Currently   Other Topics Concern    Not on file   Social History Narrative    Not on file     Social Determinants of Health     Financial Resource Strain: Not on file   Food Insecurity: Not on file   Transportation Needs: Not on file   Physical Activity: Not on file   Stress: Not on file   Social Connections: Not on file   Intimate Partner Violence: Not on file   Housing Stability: Not on file       Family History:   Family History   Problem Relation Age of Onset    Breast Cancer Mother         s/p surgical complications    Diabetes Mother     Heart Failure Father     Other Brother         \"bad back\"           Objective:  CURRENT TEMPERATURE:  Temp: 97.3 °F (36.3 °C)  MAXIMUM TEMPERATURE OVER 24HRS:  Temp (24hrs), Av.6 °F (36.4 °C), Min:97.3 °F (36.3 °C), Max:97.8 °F (36.6 °C)    CURRENT RESPIRATORY RATE:  Resp: 18  CURRENT PULSE:  Heart Rate: 88  CURRENT BLOOD PRESSURE:  BP: 129/76  24HR BLOOD PRESSURE RANGE:  Systolic (16ACR), RJJ:733 , Min:117 , PUU:873   ; Diastolic (50GGG), QPA:31, Min:76, Max:90    24HR INTAKE/OUTPUT:    Intake/Output Summary (Last 24 hours) at 7/24/2022 1455  Last data filed at 7/24/2022 1400  Gross per 24 hour   Intake 2015 ml   Output 2400 ml   Net -385 ml     Patient Vitals for the past 96 hrs (Last 3 readings):   Weight   07/24/22 0600 218 lb 14.7 oz (99.3 kg)   07/23/22 0045 218 lb 11.1 oz (99.2 kg)   07/22/22 1348 220 lb (99.8 kg)       Physical Exam:  GENERAL APPEARANCE: Alert and cooperative, and appears to be in no acute distress. HEAD: normocephalic  EYES: EOMI. Not pale, anicteric   NOSE:  No nasal discharge. CARDIAC: Normal S1 and S2. No S3, S4 or murmurs. Rhythm is regular. LUNGS: Clear to auscultation and percussion without rales, rhonchi, wheezing or diminished breath sounds. GI soft nontender    MUSKULOSKELETAL: Adequately aligned spine. No joint erythema or tenderness. EXTREMITIES: No edema. Peripheral pulses intact. NEURO: No focal      Labs:   CBC:  Recent Labs     07/22/22  1405 07/24/22  0949   WBC 13.9* 15.7*   RBC 4.35* 4.20*   HGB 14.5 13.7   HCT 41.3 40.9*   MCV 95.0 97.3   MCH 33.3 32.5   MCHC 35.1 33.4   RDW 14.0 13.6    334   MPV 6.8 7.3      BMP:   Recent Labs     07/22/22  1447 07/22/22  2211 07/23/22  0514 07/23/22  1154 07/23/22  2341 07/24/22  0600 07/24/22  0949   *   < > 127*   < > 127* 131* 134*   K 4.2   < > 4.4   < > 4.4 4.7 4.9   CL 87*   < > 92*   < > 93* 98 96*   CO2 17*   < > 23   < > 20 23 24   BUN 5*  --  6*  --   --   --  7*   CREATININE 0.49*  --  0.54*  --   --   --  0.88   GLUCOSE 98  --  106*  --   --   --  118*   CALCIUM 8.8  --  8.4*  --   --   --  9.5    < > = values in this interval not displayed. Phosphorus:  No results for input(s): PHOS in the last 72 hours.   Magnesium:   Recent Labs     07/22/22  1447   MG 2.2     Albumin:   Recent Labs 07/22/22  1447   LABALBU 4.4       IRON:  No results for input(s): IRON in the last 72 hours. Iron Saturation:  Invalid input(s): PERCENTFE  TIBC:  No results for input(s): TIBC in the last 72 hours. FERRITIN:  No results for input(s): FERRITIN in the last 72 hours. SPEP: No results for input(s): SPEP in the last 72 hours. Recent Labs     07/22/22  1447   PROT 7.7     UPEP: No results for input(s): TPU in the last 72 hours. Urine Sodium:    Recent Labs     07/23/22  0723   KENNA <20      Urine Potassium: No results for input(s): KUR in the last 72 hours. Urine Chloride:  No results for input(s): CLU in the last 72 hours. Urine Ph:  Invalid input(s): PO4U  Urine Osmolarity:   Recent Labs     07/23/22  0723   OSMOU 231     Urine Creatinine:  No results for input(s): LABCREA in the last 72 hours. Urine Eosinophils: Invalid input(s): EOSU  Urine Protein:  No results for input(s): TPU in the last 72 hours. Urinalysis:  No results for input(s): Poli Daily, PHUR, LABCAST, WBCUA, RBCUA, MUCUS, TRICHOMONAS, YEAST, BACTERIA, CLARITYU, SPECGRAV, LEUKOCYTESUR, UROBILINOGEN, BILIRUBINUR, BLOODU, GLUCOSEU, KETUA, AMORPHOUS in the last 72 hours. Radiology:  Reviewed as available. Assessment:  Hyponatremia, hypoosmolar hyponatremia most likely beer potomania urine sodium 22 urine osmolality 243  History of alcohol abuse       Plan:  Continue IV fluids  Fluid restriction less than 1500 mils per day  Serum sodium daily        Thank you for the consultation.       Electronically signed by Angelita Ruffin MD on 7/24/2022 at 2:55 PM

## 2022-07-24 NOTE — CARDIO/PULMONARY
BRONCHOSPASM/BRONCHOCONSTRICTION     [x]         IMPROVE AERATION/BREATH SOUNDS  [x]   ADMINISTER BRONCHODILATOR THERAPY AS APPROPRIATE  [x]   ASSESS BREATH SOUNDS  [x]   IMPLEMENT AEROSOL/MDI PROTOCOL  []   PATIENT EDUCATION AS NEEDED   MOBILIZE SECRETIONS    [x]   ASSESS BREATH SOUNDS  [x]   ASSESS SPUTUM PRODUCTION  [x]   COUGH AND DEEP BREATHING  [x]  IMPLEMENT SECRETION MANAGEMENT PROTOCOL  [x]   PATIENT EDUCATION AS NEEDED

## 2022-07-24 NOTE — PLAN OF CARE
Problem: Discharge Planning  Goal: Discharge to home or other facility with appropriate resources  7/24/2022 0629 by Cely Greco RN  Outcome: Progressing     Problem: Safety - Adult  Goal: Free from fall injury  7/24/2022 0629 by Cely Greco RN  Outcome: Progressing  Flowsheets (Taken 7/24/2022 0329)  Free From Fall Injury: Instruct family/caregiver on patient safety     Problem: Chronic Conditions and Co-morbidities  Goal: Patient's chronic conditions and co-morbidity symptoms are monitored and maintained or improved  Outcome: Progressing     Problem: ABCDS Injury Assessment  Goal: Absence of physical injury  7/24/2022 8174 by Cely Greco RN  Outcome: Progressing  Flowsheets (Taken 7/24/2022 0329)  Absence of Physical Injury: Implement safety measures based on patient assessment

## 2022-07-25 ENCOUNTER — APPOINTMENT (OUTPATIENT)
Dept: GENERAL RADIOLOGY | Age: 62
DRG: 640 | End: 2022-07-25
Payer: MEDICARE

## 2022-07-25 VITALS
HEART RATE: 86 BPM | DIASTOLIC BLOOD PRESSURE: 101 MMHG | RESPIRATION RATE: 20 BRPM | TEMPERATURE: 97.9 F | BODY MASS INDEX: 29.42 KG/M2 | OXYGEN SATURATION: 94 % | SYSTOLIC BLOOD PRESSURE: 150 MMHG | HEIGHT: 73 IN | WEIGHT: 222 LBS

## 2022-07-25 PROBLEM — R33.9 URINARY RETENTION: Status: RESOLVED | Noted: 2022-07-23 | Resolved: 2022-07-25

## 2022-07-25 PROBLEM — F05 DELIRIUM DUE TO ANOTHER MEDICAL CONDITION: Status: ACTIVE | Noted: 2022-07-25

## 2022-07-25 LAB
ABSOLUTE EOS #: 0.15 K/UL (ref 0–0.4)
ABSOLUTE LYMPH #: 0.6 K/UL (ref 1–4.8)
ABSOLUTE MONO #: 1.04 K/UL (ref 0.1–1.3)
ANION GAP SERPL CALCULATED.3IONS-SCNC: 13 MMOL/L (ref 9–17)
ANION GAP SERPL CALCULATED.3IONS-SCNC: 8 MMOL/L (ref 9–17)
BASOPHILS # BLD: 0 % (ref 0–2)
BASOPHILS ABSOLUTE: 0 K/UL (ref 0–0.2)
BUN BLDV-MCNC: 9 MG/DL (ref 8–23)
CALCIUM SERPL-MCNC: 8.6 MG/DL (ref 8.6–10.4)
CHLORIDE BLD-SCNC: 103 MMOL/L (ref 98–107)
CHLORIDE BLD-SCNC: 96 MMOL/L (ref 98–107)
CO2: 23 MMOL/L (ref 20–31)
CO2: 24 MMOL/L (ref 20–31)
CREAT SERPL-MCNC: 0.58 MG/DL (ref 0.7–1.2)
EOSINOPHILS RELATIVE PERCENT: 1 % (ref 0–4)
GFR AFRICAN AMERICAN: >60 ML/MIN
GFR NON-AFRICAN AMERICAN: >60 ML/MIN
GFR SERPL CREATININE-BSD FRML MDRD: ABNORMAL ML/MIN/{1.73_M2}
GLUCOSE BLD-MCNC: 118 MG/DL (ref 75–110)
GLUCOSE BLD-MCNC: 126 MG/DL (ref 75–110)
GLUCOSE BLD-MCNC: 129 MG/DL (ref 70–99)
HCT VFR BLD CALC: 36.7 % (ref 41–53)
HEMOGLOBIN: 12.4 G/DL (ref 13.5–17.5)
LYMPHOCYTES # BLD: 4 % (ref 24–44)
MCH RBC QN AUTO: 32.6 PG (ref 26–34)
MCHC RBC AUTO-ENTMCNC: 33.6 G/DL (ref 31–37)
MCV RBC AUTO: 96.9 FL (ref 80–100)
MONOCYTES # BLD: 7 % (ref 1–7)
MORPHOLOGY: NORMAL
MYCOPLASMA PNEUMONIAE IGM: 1.38
PDW BLD-RTO: 13.6 % (ref 11.5–14.9)
PLATELET # BLD: 314 K/UL (ref 150–450)
PMV BLD AUTO: 7.5 FL (ref 6–12)
POTASSIUM SERPL-SCNC: 4.4 MMOL/L (ref 3.7–5.3)
POTASSIUM SERPL-SCNC: 4.8 MMOL/L (ref 3.7–5.3)
RBC # BLD: 3.79 M/UL (ref 4.5–5.9)
SEG NEUTROPHILS: 88 % (ref 36–66)
SEGMENTED NEUTROPHILS ABSOLUTE COUNT: 13.11 K/UL (ref 1.3–9.1)
SODIUM BLD-SCNC: 133 MMOL/L (ref 135–144)
SODIUM BLD-SCNC: 134 MMOL/L (ref 135–144)
WBC # BLD: 14.9 K/UL (ref 3.5–11)

## 2022-07-25 PROCEDURE — 99232 SBSQ HOSP IP/OBS MODERATE 35: CPT | Performed by: PSYCHIATRY & NEUROLOGY

## 2022-07-25 PROCEDURE — 71045 X-RAY EXAM CHEST 1 VIEW: CPT

## 2022-07-25 PROCEDURE — 2580000003 HC RX 258

## 2022-07-25 PROCEDURE — 6370000000 HC RX 637 (ALT 250 FOR IP): Performed by: INTERNAL MEDICINE

## 2022-07-25 PROCEDURE — 99239 HOSP IP/OBS DSCHRG MGMT >30: CPT | Performed by: INTERNAL MEDICINE

## 2022-07-25 PROCEDURE — 80051 ELECTROLYTE PANEL: CPT

## 2022-07-25 PROCEDURE — 85025 COMPLETE CBC W/AUTO DIFF WBC: CPT

## 2022-07-25 PROCEDURE — 6370000000 HC RX 637 (ALT 250 FOR IP)

## 2022-07-25 PROCEDURE — 6360000002 HC RX W HCPCS

## 2022-07-25 PROCEDURE — 6360000002 HC RX W HCPCS: Performed by: INTERNAL MEDICINE

## 2022-07-25 PROCEDURE — 94761 N-INVAS EAR/PLS OXIMETRY MLT: CPT

## 2022-07-25 PROCEDURE — 80048 BASIC METABOLIC PNL TOTAL CA: CPT

## 2022-07-25 PROCEDURE — 6370000000 HC RX 637 (ALT 250 FOR IP): Performed by: PSYCHIATRY & NEUROLOGY

## 2022-07-25 PROCEDURE — 82947 ASSAY GLUCOSE BLOOD QUANT: CPT

## 2022-07-25 PROCEDURE — 2700000000 HC OXYGEN THERAPY PER DAY

## 2022-07-25 PROCEDURE — 94640 AIRWAY INHALATION TREATMENT: CPT

## 2022-07-25 PROCEDURE — 36415 COLL VENOUS BLD VENIPUNCTURE: CPT

## 2022-07-25 RX ORDER — IPRATROPIUM BROMIDE AND ALBUTEROL SULFATE 2.5; .5 MG/3ML; MG/3ML
1 SOLUTION RESPIRATORY (INHALATION) EVERY 6 HOURS PRN
Qty: 360 ML | Refills: 0 | Status: SHIPPED | OUTPATIENT
Start: 2022-07-25 | End: 2022-08-04

## 2022-07-25 RX ORDER — PREDNISONE 20 MG/1
40 TABLET ORAL DAILY
Status: DISCONTINUED | OUTPATIENT
Start: 2022-07-25 | End: 2022-07-25 | Stop reason: HOSPADM

## 2022-07-25 RX ORDER — LEVOFLOXACIN 750 MG/1
750 TABLET ORAL DAILY
Status: DISCONTINUED | OUTPATIENT
Start: 2022-07-25 | End: 2022-07-25 | Stop reason: HOSPADM

## 2022-07-25 RX ORDER — SODIUM CHLORIDE 1000 MG
1 TABLET, SOLUBLE MISCELLANEOUS
Qty: 90 TABLET | Refills: 3 | Status: SHIPPED | OUTPATIENT
Start: 2022-07-25

## 2022-07-25 RX ORDER — GABAPENTIN 300 MG/1
300 CAPSULE ORAL 2 TIMES DAILY
Qty: 90 CAPSULE | Refills: 3 | Status: CANCELLED | OUTPATIENT
Start: 2022-07-25

## 2022-07-25 RX ORDER — ACAMPROSATE CALCIUM 333 MG/1
666 TABLET, DELAYED RELEASE ORAL 3 TIMES DAILY
Qty: 90 TABLET | Refills: 3 | Status: ON HOLD | OUTPATIENT
Start: 2022-07-25 | End: 2022-11-02 | Stop reason: HOSPADM

## 2022-07-25 RX ORDER — QUETIAPINE FUMARATE 300 MG/1
300 TABLET, FILM COATED ORAL NIGHTLY
Qty: 60 TABLET | Refills: 3 | Status: SHIPPED | OUTPATIENT
Start: 2022-07-25

## 2022-07-25 RX ORDER — LEVOFLOXACIN 750 MG/1
750 TABLET ORAL DAILY
Qty: 7 TABLET | Refills: 0 | Status: SHIPPED | OUTPATIENT
Start: 2022-07-25 | End: 2022-08-01

## 2022-07-25 RX ORDER — LEVOTHYROXINE SODIUM 0.05 MG/1
50 TABLET ORAL DAILY
Qty: 30 TABLET | Refills: 3 | Status: CANCELLED | OUTPATIENT
Start: 2022-07-26

## 2022-07-25 RX ORDER — SODIUM CHLORIDE 1000 MG
1 TABLET, SOLUBLE MISCELLANEOUS
Status: DISCONTINUED | OUTPATIENT
Start: 2022-07-25 | End: 2022-07-25 | Stop reason: HOSPADM

## 2022-07-25 RX ADMIN — ENOXAPARIN SODIUM 40 MG: 40 INJECTION SUBCUTANEOUS at 09:17

## 2022-07-25 RX ADMIN — CHLORDIAZEPOXIDE HYDROCHLORIDE 25 MG: 25 CAPSULE ORAL at 09:17

## 2022-07-25 RX ADMIN — PANTOPRAZOLE SODIUM 40 MG: 40 TABLET, DELAYED RELEASE ORAL at 05:58

## 2022-07-25 RX ADMIN — METHYLPREDNISOLONE SODIUM SUCCINATE 40 MG: 40 INJECTION, POWDER, FOR SOLUTION INTRAMUSCULAR; INTRAVENOUS at 09:29

## 2022-07-25 RX ADMIN — ACAMPROSATE CALCIUM 666 MG: 333 TABLET, DELAYED RELEASE ORAL at 09:16

## 2022-07-25 RX ADMIN — SODIUM CHLORIDE 1 G: 1 TABLET ORAL at 11:55

## 2022-07-25 RX ADMIN — IPRATROPIUM BROMIDE AND ALBUTEROL SULFATE 1 AMPULE: .5; 2.5 SOLUTION RESPIRATORY (INHALATION) at 07:27

## 2022-07-25 RX ADMIN — CHLORDIAZEPOXIDE HYDROCHLORIDE 25 MG: 25 CAPSULE ORAL at 14:31

## 2022-07-25 RX ADMIN — PRAZOSIN HYDROCHLORIDE 1 MG: 1 CAPSULE ORAL at 09:17

## 2022-07-25 RX ADMIN — AZITHROMYCIN MONOHYDRATE 500 MG: 250 TABLET ORAL at 09:17

## 2022-07-25 RX ADMIN — HYDROXYUREA 500 MG: 500 CAPSULE ORAL at 09:18

## 2022-07-25 RX ADMIN — SODIUM CHLORIDE, PRESERVATIVE FREE 10 ML: 5 INJECTION INTRAVENOUS at 09:24

## 2022-07-25 RX ADMIN — IPRATROPIUM BROMIDE AND ALBUTEROL SULFATE 1 AMPULE: .5; 2.5 SOLUTION RESPIRATORY (INHALATION) at 11:02

## 2022-07-25 RX ADMIN — LEVOTHYROXINE SODIUM 50 MCG: 0.05 TABLET ORAL at 05:58

## 2022-07-25 RX ADMIN — CEFTRIAXONE SODIUM 1000 MG: 1 INJECTION, POWDER, FOR SOLUTION INTRAMUSCULAR; INTRAVENOUS at 11:58

## 2022-07-25 RX ADMIN — GABAPENTIN 300 MG: 300 CAPSULE ORAL at 09:17

## 2022-07-25 RX ADMIN — ACAMPROSATE CALCIUM 666 MG: 333 TABLET, DELAYED RELEASE ORAL at 14:30

## 2022-07-25 NOTE — PROGRESS NOTES
Discharge teaching and instructions for diagnosis completed with patient using teachback method. AVS reviewed. Rx going to [facility]. Patient voiced understanding regarding prescriptions, follow up appointments, and care of self at home. Discharged via cab with all belongings ambulatory, denying a w/c. Easy respirations Skin Pk/w/d. Denies pain.

## 2022-07-25 NOTE — PROGRESS NOTES
Physical Therapy        Physical Therapy Cancel Note      DATE: 2022    NAME: Aron Acosta  MRN: [id number]   : 1960      Patient not seen this date for Physical Therapy due to:    22 D/C PT; Patient at baseline functional level with no acute PT needs. Patient demonstrates independent functional mobility within room and reports No concerns regarding physical function. Will defer PT evaluation at this time. Please reorder PT if future needs arise.    Time 13:26 - 13:28    Electronically signed by Cheyenne Samuels PT on 2022 at 3:09 PM

## 2022-07-25 NOTE — PROGRESS NOTES
BEHAVIORAL HEALTH FOLLOW-UP NOTE     7/25/2022     Patient was seen and examined in person, Chart reviewed   Patient's case discussed with staff/team    Chief Complaint: Suicidal ideation     Interim History:   The patient was seen at bedside. He has been improving since the time of admission with better eye contact and more motivation. His sodium improved as well and his acamprosate dose was increased. History of present illness:  62years old male with significant past psychiatric history of alcohol abuse and depression and medical hx significant for myeloproliferative disorder, hypothyroidism, hypertension, COPD who presented with hyponatremia and alcohol withdrawal.  Noted that the patient's urine drug screen was positive for cannabinoids. Patient's sodium was 123 on presentation and has improved over the course of admission. The patient reported depressive symptoms and that he has been drinking heavily. He estimated he drinks about 12-15 beers a day. he lives alone and denied any suicidal ideation. Denied any auditory or visual hallucinations. His speech is coherent. No psychotic phenomena appreciated. He is oriented to time place and person. There was a discussion about the circumstances of his drinking. He had 5 years sobriety duration 2012 in 2017 after his wife, who was also an alcoholic told him that she would leave him if he did not quit drinking. His wife passed away after a brain bleed which led to his relapse. He has been drinking continuously after her death.     BP (!) 135/90   Pulse 74   Temp 98.2 °F (36.8 °C) (Oral)   Resp 16   Ht 6' 1\" (1.854 m)   Wt 222 lb 0.1 oz (100.7 kg)   SpO2 97%   BMI 29.29 kg/m²   Appetite:   [x] Normal/Unchanged  [] Increased  [] Decreased      Sleep:       [x] Normal/Unchanged  [] Fair       [] Poor              Energy:    [x] Normal/Unchanged  [] Increased  [] Decreased        Aggression:  [] yes  [x] no    Patient is [x] able  [] unable to CONTRACT FOR SAFETY ON THE UNIT    PAST MEDICAL/PSYCHIATRIC HISTORY:   Past Medical History:   Diagnosis Date    Alcohol abuse 8/12/2019    Anemia     Cancer (HCC)     blood    Cervical stenosis of spine 6/14/2019    Chronic left-sided low back pain with left-sided sciatica 3/14/2019    Insomnia 3/14/2019    Intentional drug overdose ([facility] [facility] 8/11/2019    Left arm numbness 6/14/2019    Left lumbar radiculitis 5/3/2019    Macrocytosis     Major depressive disorder, recurrent severe without psychotic features ([facility] 8/12/2019    Myeloproliferative disorder ([facility] [address]     Osteoarthritis of spine with radiculopathy, lumbar region 4/12/2019    Severe recurrent major depression without psychotic features ([facility] [address] [facility] 8/12/2019    Spondylosis of lumbar region without myelopathy or radiculopathy 5/3/2019    Urinary retention 7/23/2022       FAMILY/SOCIAL HISTORY:  Family History   Problem Relation Age of Onset    Breast Cancer Mother         s/p surgical complications    Diabetes Mother     Heart Failure Father     Other Brother         \"bad back\"     Social History     Socioeconomic History    Marital status:      Spouse name: Not on file    Number of children: 3    Years of education: Not on file    Highest education level: Not on file   Occupational History    Not on file   Tobacco Use    Smoking status: Every Day     Packs/day: 1.50     Years: 43.00     Pack years: 64.50     Types: Cigarettes     Start date: 9/23/1978    Smokeless tobacco: Never   Vaping Use    Vaping Use: Never used   Substance and Sexual Activity    Alcohol use:  Yes     Alcohol/week: 14.0 standard drinks     Types: 14 Cans of beer per week     Comment: heavy alcohol user/ states drinks a pint of vodka occassionaly    Drug use: Not Currently     Types: Marijuana Candiss Dixon)    Sexual activity: Not Currently   Other Topics Concern    Not on file   Social History Narrative    Not on file     Social Determinants of Health     Financial Resource Strain: Not on file   Food (PRINIVIL;ZESTRIL) tablet 10 mg, 10 mg, Oral, Daily, Anat Carrillo MD, 10 mg at 07/22/22 1736    pantoprazole (PROTONIX) tablet 40 mg, 40 mg, Oral, QAM AC, Anat Carrillo MD, 40 mg at 07/25/22 0558    prazosin (MINIPRESS) capsule 1 mg, 1 mg, Oral, BID, Anat Carrillo MD, 1 mg at 07/25/22 0917    sodium chloride flush 0.9 % injection 5-40 mL, 5-40 mL, IntraVENous, 2 times per day, Anat Carrillo MD    sodium chloride flush 0.9 % injection 10 mL, 10 mL, IntraVENous, PRN, Anat Carrillo MD, 10 mL at 07/24/22 2140    potassium chloride (KLOR-CON M) extended release tablet 40 mEq, 40 mEq, Oral, PRN **OR** potassium bicarb-citric acid (EFFER-K) effervescent tablet 40 mEq, 40 mEq, Oral, PRN **OR** potassium chloride 10 mEq/100 mL IVPB (Peripheral Line), 10 mEq, IntraVENous, PRN, Anat Carrillo MD    magnesium sulfate 1000 mg in dextrose 5% 100 mL IVPB, 1,000 mg, IntraVENous, PRN, Anat Carrillo MD    ondansetron (ZOFRAN-ODT) disintegrating tablet 4 mg, 4 mg, Oral, Q8H PRN **OR** ondansetron (ZOFRAN) injection 4 mg, 4 mg, IntraVENous, Q6H PRN, Anat Carrillo MD    polyethylene glycol (GLYCOLAX) packet 17 g, 17 g, Oral, Daily PRN, nAat Carrillo MD    acetaminophen (TYLENOL) tablet 650 mg, 650 mg, Oral, Q6H PRN, 650 mg at 07/23/22 1547 **OR** acetaminophen (TYLENOL) suppository 650 mg, 650 mg, Rectal, Q6H PRN, Anat Carrillo MD    enoxaparin (LOVENOX) injection 40 mg, 40 mg, SubCUTAneous, Daily, Anat Carrillo MD, 40 mg at 07/25/22 0917    sodium chloride flush 0.9 % injection 5-40 mL, 5-40 mL, IntraVENous, 2 times per day, Maureen Jones MD, 10 mL at 07/25/22 0924    sodium chloride flush 0.9 % injection 5-40 mL, 5-40 mL, IntraVENous, PRN, Maureen Jones MD    0.9 % sodium chloride infusion, , IntraVENous, PRN, Maureen Jones MD, Last Rate: 20 mL/hr at 07/24/22 1608, Restarted at 07/24/22 1608    nicotine (NICODERM CQ) 21 MG/24HR 1 patch, 1 patch, TransDERmal, Daily, Anat Carrillo MD, 1 patch at 07/25/22 0917      Examination:  BP (!) 135/90   Pulse 74   Temp 98.2 °F (36.8 °C) (Oral)   Resp 16   Ht 6' 1\" (1.854 m)   Wt 222 lb 0.1 oz (100.7 kg)   SpO2 97%   BMI 29.29 kg/m²   Gait - steady  Medication side effects(SE): None    Mental Status Examination:    Level of consciousness:  within normal limits   Appearance:  good grooming and good hygiene  Behavior/Motor:  no abnormalities noted  Attitude toward examiner:  cooperative  Speech:  spontaneous   Mood: Normal  Affect:  mood congruent  Thought processes:  goal directed   Thought content:  Homicidal ideation - none  Suicidal Ideation:  denies suicidal ideation  Delusions:  no evidence of delusions  Perceptual Disturbance:  denies any perceptual disturbance  Cognition:  oriented to person, place, and time   Concentration intact  Insight good   Judgement good     ASSESSMENT:   Patient symptoms are:  [x] Well controlled  [x] Improving  [] Worsening  [] No change      Diagnosis:   Principal Problem:    Alcohol intoxication in active alcoholic without complication ([facility] [address]  Active Problems:    Suicide ideation    Myeloproliferative disease ([facility]    Hyponatremia    MDD (major depressive disorder)    Acute respiratory failure with hypoxia (HCC)  Resolved Problems:    Urinary retention  Alcohol abuse and withdrawal  Depression symptoms without psychotic features  Hyponatremia likely worsening depressive symptoms      LABS:    Recent Labs     07/22/22  1405 07/24/22  0949 07/25/22  0525   WBC 13.9* 15.7* 14.9*   HGB 14.5 13.7 12.4*    334 314     Recent Labs     07/23/22  0514 07/23/22  1154 07/24/22  0949 07/24/22  1614 07/24/22  2245 07/25/22  0525   *   < > 134* 131* 131* 134*   K 4.4   < > 4.9 4.7 3.8 4.8   CL 92*   < > 96* 97* 99 103   CO2 23   < > 24 21 21 23   BUN 6*  --  7*  --   --  9   CREATININE 0.54*  --  0.88  --   --  0.58*   GLUCOSE 106*  --  118*  --   --  129*    < > = values in this interval not displayed.      Recent Labs 07/22/22  1447   BILITOT 0.50   ALKPHOS 90   AST 80*   ALT 47*     Lab Results   Component Value Date/Time    BARBSCNU NEGATIVE 07/23/2022 07:24 AM    LABBENZ NEGATIVE 07/23/2022 07:24 AM    LABMETH NEGATIVE 07/23/2022 07:24 AM    PPXUR NOT REPORTED 06/23/2021 01:03 PM     Lab Results   Component Value Date/Time    TSH 9.82 11/29/2021 05:37 AM     No results found for: LITHIUM  No results found for: VALPROATE, CBMZ    RISK ASSESSMENT: Low risk for suicide or harm to others    Treatment Plan:  No indication for admission to psychiatry or sitter at this time. He would benefit from rehab for his alcohol use. No special precautions necessary from psych standpoint  Keep Seroquel dose 300 mg nightly  Acamprosate  666 mg 3 times daily    Reviewed current Medications with the patient. Risks, benefits, side effects, drug-to-drug interactions and alternatives to treatment were discussed. The patient is consented to treatment. Encourage patient to attend group and other milieu activities. Discharge planning discussed with the patient and treatment team.    PSYCHOTHERAPY/COUNSELING:  [] Therapeutic interview  [x] Supportive  [] CBT  [] Ongoing  [] Other    [x] Patient continues to need, on a daily basis, active treatment furnished directly by or requiring the supervision of inpatient psychiatric personnel        Zeina Zhu is a 62 y.o. male being evaluated face to face. --Ranjeet Feliciano MD on 7/25/2022 at 10:15 AM    An electronic signature was used to authenticate this note. **This report has been created using voice recognition software. It may contain minor errors which are inherent in voice recognition technology. **  I independently saw and evaluated the patient. I reviewed the  documentation above. Any additional comments or changes to the   documentation are stated below otherwise agree with assessment. The patient reports an improvement in his mood.   He is tolerating his acamprosate without any problems. He is looking forward to discharge and denies any suicidal thoughts. PLAN  Medications as noted above  Attempt to develop insight  Psycho-education conducted. Supportive Therapy conducted.   Follow-up daily while on inpatient unit    Electronically signed by Armida Lomas MD on 7/25/22 at 9:54 PM EDT

## 2022-07-25 NOTE — PROGRESS NOTES
RN agrees with [employer] RNs charting  Electronically signed by Milla Oleary RN on 7/25/2022 at 3:46 PM

## 2022-07-25 NOTE — PROGRESS NOTES
[facility]   OCCUPATIONAL THERAPY MISSED TREATMENT NOTE   INPATIENT   Date: 22  Patient Name: Aron Acosta       Room: 6380/4734-99  MRN: [id number]   Account #: [de-identified]    : 1960  (62 y.o.)  Gender: male                 REASON FOR MISSED TREATMENT:  22    -   OT being discontinued at this time. Patient functioning at Premorbid Level  No further needs . Pt reports no concerns with completion of self-care tasks upon d/c. No OT goals identified at this time and no need for skilled OT.     13:26      BERNICE Rosado/L

## 2022-07-25 NOTE — PROGRESS NOTES
Nutrition Note    Nutrition Note    Type and Reason for Visit: Positive Nutrition Screen (Cultural, Holiness, or ethnic food preferences)    Nutrition screen completed by nursing. Issue that was identified on the screen was not confirmed and does not appear to be a problem at this time. Patient will be re-evaluated based on length of stay or if otherwise requested.      Nydiavanesasimone Hannon [address]  [phone]

## 2022-07-25 NOTE — DISCHARGE SUMMARY
[facility]    Discharge Summary     Patient ID: Vladislav Warner  :  1960   MRN: [id number]     ACCOUNT:  [de-identified]   Patient's PCP: SIMONE Holt NP  Admit Date: 2022   Discharge Date: 2022   Length of Stay: 3  Code Status:  Full Code  Admitting Physician: Brian Newell MD  Discharge Physician: Katt Hutchison MD     Active Discharge Diagnoses:       Primary Problem  Alcohol intoxication in active alcoholic without complication [facility])      [facility] Problems    Diagnosis Date Noted    Suicide ideation [R45.851] 2022     Priority: Medium    Alcohol intoxication in active alcoholic without complication ([facility] [address] [H14.966] 2022     Priority: Medium    Acute respiratory failure with hypoxia ([facility] [address] [J96.01] 2021    MDD (major depressive disorder) [F32.9] 07/15/2021    Hyponatremia [E87.1] 2021    Myeloproliferative disease ([facility] [address] [D47.1]        Admission Condition:  fair     Discharged Condition: good    Hospital Stay:       Hospital Course:  Vladislav Warner is a 62 y.o. male with a history of myeloproliferative disorder, hypothyroidism, hypertension, COPD and history of mycoplasma pneumonia who presents with alcohol withdrawal and hyponatremia patient was admitted for further management of Alcohol intoxication in active alcoholic without complication ([facility] [address] Mr. Xi Perkins presented to ER for evaluation as he wants to quit drinking but does not want to go to detox. Patient was found hyponatremic on admission [124]. Patient also said that he had thoughts of harming himself for the past couple of days but would never act upon. Patient has a history of drinking excessively since 2017. Patient was anxious upon admission and had tremors. Patient was started on IV fluids. And multivitamins hyponatremia [mostly secondary to beer proteinemia].   Patient was also found to have chest x-ray infiltration. This was concerning for pneumonia, he was started on IV cefepime. Upon doing the pneumonia panel, patient was found to have positive mycoplasma pneumonia IgM and was started on oral levofloxacin. Upon discharge patient hyponatremia corrected into 133. Patient will be discharged on salt tablets and orders per nephrology. Significant therapeutic interventions:     Significant Diagnostic Studies:   Labs / Micro:      Radiology:    US GALLBLADDER RUQ    Result Date: 7/23/2022  EXAMINATION: RIGHT UPPER QUADRANT ULTRASOUND 7/23/2022 8:05 am COMPARISON: None. HISTORY: ORDERING SYSTEM PROVIDED HISTORY: RUQ pain and tenderness TECHNOLOGIST PROVIDED HISTORY: RUQ pain and tenderness FINDINGS: LIVER:  Liver appears diffusely echogenic. No focal liver abnormality. No intrahepatic biliary ductal dilatation. BILIARY SYSTEM:  Gallbladder is unremarkable without evidence of pericholecystic fluid, wall thickening or stones. Negative sonographic Daugherty's sign. Common bile duct is within normal limits measuring 5 mm. RIGHT KIDNEY: The right kidney is grossly unremarkable without evidence of hydronephrosis. PANCREAS:  Visualized portions of the pancreas are unremarkable. OTHER: No evidence of right upper quadrant ascites. Fatty infiltration of the liver, otherwise, unremarkable right upper quadrant ultrasound     XR CHEST PORTABLE    Result Date: 7/25/2022  EXAMINATION: ONE XRAY VIEW OF THE CHEST 7/25/2022 10:31 am COMPARISON: None. HISTORY: ORDERING SYSTEM PROVIDED HISTORY: follow up pneumonia TECHNOLOGIST PROVIDED HISTORY: follow up pneumonia Reason for Exam: f/u pneumonia FINDINGS: No acute airspace infiltrate. No pneumothorax or pleural effusion. Normal cardiomediastinal silhouette     No acute cardiopulmonary disease     XR CHEST PORTABLE    Result Date: 7/23/2022  EXAMINATION: ONE XRAY VIEW OF THE CHEST 7/23/2022 9:30 am COMPARISON: Chest radiograph performed 12/04/2021. HISTORY: ORDERING SYSTEM PROVIDED HISTORY: SOB TECHNOLOGIST PROVIDED HISTORY: SOB FINDINGS: There is a right basilar infiltrate. There is no pneumothorax. The mediastinal structures are unremarkable. The upper abdomen is unremarkable. The extrathoracic soft tissues are unremarkable. Right basilar infiltrate representing atelectasis versus pneumonia. Consultations:    Consults:     Final Specialist Recommendations/Findings:   IP CONSULT TO INTERNAL MEDICINE  IP CONSULT TO NEPHROLOGY  IP CONSULT TO SOCIAL WORK  IP CONSULT TO PSYCHIATRY      The patient was seen and examined on day of discharge and this discharge summary is in conjunction with any daily progress note from day of discharge. Discharge plan:       Disposition: Home    Physician Follow Up:     SIMONE Graham NP  [address]  [phone]    Schedule an appointment as soon as possible for a visit in 1 week(s)      Mirlande Cooper MD  [address] [place] [address] [address]  [address] [facility] [id number] [phone]    Call  As needed    Nicho Ornelas MD  [address]  [phone]    Follow up  As needed       Requiring Further Evaluation/Follow Up POST HOSPITALIZATION/Incidental Findings:     Diet: regular diet    Activity: As tolerated    Instructions to Patient:     Discharge Medications:      Medication List        START taking these medications      acamprosate 333 MG tablet  Commonly known as: CAMPRAL  Take 2 tablets by mouth in the morning and 2 tablets at noon and 2 tablets before bedtime. ipratropium-albuterol 0.5-2.5 (3) MG/3ML Soln nebulizer solution  Commonly known as: DUONEB  Inhale 3 mLs into the lungs every 6 hours as needed for Shortness of Breath     levoFLOXacin 750 MG tablet  Commonly known as: LEVAQUIN  Take 1 tablet by mouth in the morning for 7 doses. sodium chloride 1 g tablet  Take 1 tablet by mouth in the morning and 1 tablet at noon and 1 tablet in the evening.  Take with meals.            CHANGE how you take these medications      QUEtiapine 300 MG tablet  Commonly known as: SEROQUEL  Take 1 tablet by mouth nightly  What changed:   medication strength  how much to take            CONTINUE taking these medications      folic acid 1 MG tablet  Commonly known as: FOLVITE  Take 1 tablet by mouth daily     furosemide 20 MG tablet  Commonly known as: LASIX     gabapentin 300 MG capsule  Commonly known as: NEURONTIN     hydroxyurea 500 MG chemo capsule  Commonly known as: HYDREA  Take 1 capsule by mouth daily     levothyroxine 50 MCG tablet  Commonly known as: SYNTHROID  Take 1 tablet by mouth daily Take in the morning on an empty stomach 1 hour before any food or other medications     lisinopril 10 MG tablet  Commonly known as: PRINIVIL;ZESTRIL     midodrine 5 MG tablet  Commonly known as: PROAMATINE  Take 1 tablet by mouth 3 times daily (with meals)     naltrexone 50 MG tablet  Commonly known as: DEPADE     omeprazole 40 MG delayed release capsule  Commonly known as: PRILOSEC     prazosin 1 MG capsule  Commonly known as: MINIPRESS     sertraline 100 MG tablet  Commonly known as: ZOLOFT  Take 1 tablet by mouth daily     therapeutic multivitamin-minerals tablet  Take 1 tablet by mouth daily            STOP taking these medications      nicotine 21 MG/24HR  Commonly known as: Lizeth Hum               Where to Get Your Medications        These medications were sent to [facility] [address], [address] [facility] [address]      Phone: [phone]   acamprosate 333 MG tablet  ipratropium-albuterol 0.5-2.5 (3) MG/3ML Soln nebulizer solution  levoFLOXacin 750 MG tablet  QUEtiapine 300 MG tablet  sodium chloride 1 g tablet           Electronically signed by   Juliet Block MD  7/25/2022  4:23 PM      Thank you Dr. Blanca Glasgow, APRN - NP for the opportunity to be involved in this patient's care.  Attending Physician Statement  I have discussed the care of Ilan Knight and I have examined the patient myselft and taken ros and hpi , including pertinent history and exam findings,  with the resident. I have reviewed the key elements of all parts of the encounter with the resident. I agree with the assessment, plan and orders as documented by the resident. I spent approx 35 mins in direct patient care as above and discussing discharge with patient, reviewing medications and counseling for discharge .     Electronically signed by Gurpreet Bryson MD

## 2022-07-25 NOTE — CARE COORDINATION
ONGOING DISCHARGE PLAN:    Patient is alert and oriented x4. Spoke with patient regarding discharge plan and patient confirms that plan is still to discharge to home today   On Levaquin   Ok per primary to discharge   Cxr and echo ordered for today      Will continue to follow for additional discharge needs.     Electronically signed by Zulema Olivares RN on 7/25/2022 at 12:38 PM

## 2022-07-25 NOTE — FLOWSHEET NOTE
Dr Udell Mcburney aware of ciwa score 13 and requesting librium instead of ativan \"it works better for me. \" Librium bid and prn for ciwa orders will be ordered
Residents called to ask if we can d/c. Need order. OK, will work on it.
no indicators present

## 2022-07-25 NOTE — PROGRESS NOTES
[address]    PROGRESS NOTE             7/25/2022    8:39 AM    Name:   Araceli Doty  MRN:     [id number]     Acct:      [de-identified]   Room:   2118/2118-[facility] Day:  3  Admit Date:  7/22/2022  1:43 PM    PCP:  SIMONE Ruiz NP  Code Status:  Full Code    Subjective:     C/C:   Chief Complaint   Patient presents with    Alcohol Problem     Interval History Status: significantly improved. Patient seen and examined at the bed side, no new acute events overnight. Patient was only asking when he can leave as he feels he is significantly improved    This morning the patient was sitting in bed comfortable, eating breakfast, on examination patient had no tremors. Notes from nursing staff and Consults had been reviewed, and the overnight progress had been checked with the nursing staff as well. Brief History:     The patient is a 62 y.o. gentleman with history of depression, myeloproliferative disorder, hypothyroidism, hypertension, COPD and history of mycoplasma pneumonia who presents with alcohol withdrawal, and hyponatremia. Patient was brought to the emergency department for evaluation as he wants to quit drinking but does not want to go through detox. Patient was found to be hyponatremic 124 and he is admitted to the hospital for the management of  hyponatremia and alcohol withdrawal.  He also said that he had thoughts of killing himself for the past couple of days. However patient says he is Sabianism and he cannot act upon    Patient said that he has been drinking excessively since 2017. He tried to quit many times and went into detox but he only get there for 3 to 4 days due to his insurance. He drinks 10 to 13 12oz ounce beers a day, he has not had a meal in 5 days only drinks [patient says that this happens regularly].   Patient came with hyponatremia, he is anxious, and started to have tremors in the alternative oral replacement **OR** potassium chloride, magnesium sulfate, ondansetron **OR** ondansetron, polyethylene glycol, acetaminophen **OR** acetaminophen, sodium chloride flush, sodium chloride    Data:     Past Medical History:   has a past medical history of Alcohol abuse, Anemia, Cancer ([facility] [address] Cervical stenosis of spine, Chronic left-sided low back pain with left-sided sciatica, Insomnia, Intentional drug overdose ([facility] [address] Left arm numbness, Left lumbar radiculitis, Macrocytosis, Major depressive disorder, recurrent severe without psychotic features ([facility] [address] Myeloproliferative disorder ([facility] [address] Osteoarthritis of spine with radiculopathy, lumbar region, Severe recurrent major depression without psychotic features ([facility] [address] and Spondylosis of lumbar region without myelopathy or radiculopathy. Social History:   reports that he has been smoking cigarettes. He started smoking about 43 years ago. He has a 64.50 pack-year smoking history. He has never used smokeless tobacco. He reports current alcohol use of about 14.0 standard drinks per week. He reports that he does not currently use drugs after having used the following drugs: Marijuana Tobias Ibeth). Family History:   Family History   Problem Relation Age of Onset    Breast Cancer Mother         s/p surgical complications    Diabetes Mother     Heart Failure Father     Other Brother         \"bad back\"       Vitals:  BP (!) 135/90   Pulse 74   Temp 98.2 °F (36.8 °C) (Oral)   Resp 16   Ht 6' 1\" (1.854 m)   Wt 222 lb 0.1 oz (100.7 kg)   SpO2 97%   BMI 29.29 kg/m²   Temp (24hrs), Av.9 °F (36.6 °C), Min:97.5 °F (36.4 °C), Max:98.2 °F (36.8 °C)      Recent Labs     22  0743 22  1138 22  0710   POCGLU 117* 128* 127* 126*       I/O(24Hr):     Intake/Output Summary (Last 24 hours) at 2022 0839  Last data filed at 2022 0554  Gross per 24 hour   Intake 452.99 ml   Output 2075 ml   Net -1622.01 ml         Lab Results Component Value Date/Time    SPECIAL NOT REPORTED 12/03/2021 02:38 PM     Lab Results   Component Value Date/Time    CULTURE NORMAL RESPIRATORY MAXIMINO LIGHT GROWTH 07/23/2022 12:20 PM         Radiology:    Destiny Alu RUQ    Result Date: 7/23/2022  EXAMINATION: RIGHT UPPER QUADRANT ULTRASOUND 7/23/2022 8:05 am COMPARISON: None. HISTORY: ORDERING SYSTEM PROVIDED HISTORY: RUQ pain and tenderness TECHNOLOGIST PROVIDED HISTORY: RUQ pain and tenderness FINDINGS: LIVER:  Liver appears diffusely echogenic. No focal liver abnormality. No intrahepatic biliary ductal dilatation. BILIARY SYSTEM:  Gallbladder is unremarkable without evidence of pericholecystic fluid, wall thickening or stones. Negative sonographic Daugherty's sign. Common bile duct is within normal limits measuring 5 mm. RIGHT KIDNEY: The right kidney is grossly unremarkable without evidence of hydronephrosis. PANCREAS:  Visualized portions of the pancreas are unremarkable. OTHER: No evidence of right upper quadrant ascites. Fatty infiltration of the liver, otherwise, unremarkable right upper quadrant ultrasound     XR CHEST PORTABLE    Result Date: 7/23/2022  EXAMINATION: ONE XRAY VIEW OF THE CHEST 7/23/2022 9:30 am COMPARISON: Chest radiograph performed 12/04/2021. HISTORY: ORDERING SYSTEM PROVIDED HISTORY: SOB TECHNOLOGIST PROVIDED HISTORY: SOB FINDINGS: There is a right basilar infiltrate. There is no pneumothorax. The mediastinal structures are unremarkable. The upper abdomen is unremarkable. The extrathoracic soft tissues are unremarkable. Right basilar infiltrate representing atelectasis versus pneumonia. EKG:    Physical Examination:        PHYSICAL EXAM:  General Appearance  Alert , awake, not in acute distress  Psych: Normal mood and affect, normal behavior, not agitated, maintaining good eye contact   HEENT - Head is normocephalic, atraumatic.   Neck: supple, no rigidity, normal ROM, no neck swellings, normal thyroid gland  Lungs - Bilateral equal air entry, wheezes and rales are heard lower lung fields, mostly on the right side. Slightly improved from before  Cardiovascular - Heart sounds are normal.  Regular rhythm, normal rate without murmur, gallop or rub. Abdomen - Soft, nontender, nondistended, no masses or organomegaly  Neurologic - There are no new focal motor or sensory deficits  Skin - No bruising or bleeding on exposed skin area  Extremities - No cyanosis, clubbing or edema      Assessment:        Primary Problem  Alcohol intoxication in active alcoholic without complication [facility])    Active Hospital Problems    Diagnosis Date Noted    Suicide ideation [R45.851] 07/23/2022     Priority: Medium    Urinary retention [R33.9] 07/23/2022     Priority: Medium    Alcohol intoxication in active alcoholic without complication [facility]) [X58.799] 07/22/2022     Priority: Medium    Acute respiratory failure with hypoxia (HCC) [J96.01] 12/03/2021    MDD (major depressive disorder) [F32.9] 07/15/2021    Hyponatremia [E87.1] 06/02/2021    Myeloproliferative disease ([facility] [address] [D47.1]        Plan:                  Hyponatremia  -Na 123 on admission, today 134  -Fluid restriction 1500  -Potassium 4.8, chloride 103  -On NS 0.9/Banana bag at rate of 75 ml/hr  -I/O daily  -Serum osmolality 302  -Nephrology on board     Acute respiratory failure possibly d/t COPD exacerbation or pneumonia  -Overnight O2 sat 85%, requiring nasal cannula oxygen. 2L nasal  -Chest x-ray infiltration right basilar infiltrate [atelectasis versus pneumonia  -Started on cefepime  -On DuoNeb. Alcohol withdrawal  -2 L O2 nasal cannula saturating at 94%, low BP readings.  Holding Lisinpril  -Urine drug screen;  -PT 14.9, ALT 47, AST 80  -Patient on CIWA protocol, switched Ativan to Librium 3 times daily       Myeloproliferative disorder  -Continue Hydroxyurea 500 mg daily  -Careful with blood withdrawals        Major depressive disorder/Suicidal ideations  -Continue on quetiapine, reduced to 300 nighttime  - Acamprosate increased to 666 nightly  -Discontinue sitter/no need for hospitalization for psych-psychiatry recommendation  -Psychiatry on board       Chronic back pain  - On gabapentin        DVT prophylaxis: Lovenox 40 mg SC  GI prophylaxis: Protonix 40 mg daily    Luis Montes MD  7/25/2022  8:39 AM     Attending Physician Statement  I have discussed the care of Reynold Avila with the resident team. I have examined the patient myself and taken ros and hpi , including pertinent history and exam findings,  with the resident. I have reviewed the key elements of all parts of the encounter with the resident. I agree with the assessment, plan and orders as documented by the resident.     Principal Problem:    Alcohol intoxication in active alcoholic without complication ([facility] [address]  Active Problems:    Suicide ideation    Myeloproliferative disease ([facility] [address]    Hyponatremia    MDD (major depressive disorder)    Acute respiratory failure with hypoxia (HCC)  Resolved Problems:    Urinary retention    Hyponatremia resolved  Mycoplasma IgM positive, initial CXR concerning for PNA, will swtich to levaquin  Appreciate psyc recs  Pt received 4 doses of librium  Doing well off oxygen during rounds  Discharge today    Electronically signed by Misty Interiano MD

## 2022-07-25 NOTE — PROGRESS NOTES
NEPHROLOGY PROGRESS NOTE    Patient :  Amanda Kelly; 62 y.o. MRN# [id number]  Location:  2118/2118-01  Attending:  Laura Teixeira MD  Admit Date:  7/22/2022   Hospital Day: 3    Reason for consultation: Management of hyponatremia. Consulting physician: Laura Teixeira MD    Interval history: Patient was seen and examined today and is awake and alert and denies headache or dizziness. Laboratory studies were reviewed. History of Present Illness: This is a 62 y.o. male with past medical history of alcohol abuse, myeloproliferative disorder, hypothyroidism and essential hypertension, who presented to the full because of his alcohol abuse he wants to quit and never detox. Labs showed serum sodium of 123 and therefore nephrology consultation has been requested. Patient denies any nausea vomiting diarrhea patient drinks about 10-14 bottles of beer every day. Home medications include furosemide and Zoloft.     Current Medications:    perflutren lipid microspheres (DEFINITY) injection 1.65 mg, ONCE PRN  chlordiazePOXIDE (LIBRIUM) capsule 25 mg, TID  ketorolac (TORADOL) injection 30 mg, Q6H PRN  acamprosate (CAMPRAL) tablet 666 mg, TID  ipratropium-albuterol (DUONEB) nebulizer solution 1 ampule, Q4H WA  cefTRIAXone (ROCEPHIN) 1000 mg IVPB in 50 mL D5W minibag, Q24H  methylPREDNISolone sodium (SOLU-MEDROL) injection 40 mg, Q12H  QUEtiapine (SEROQUEL) tablet 300 mg, Nightly  gabapentin (NEURONTIN) capsule 300 mg, BID  hydroxyurea (HYDREA) chemo capsule 500 mg, Daily  levothyroxine (SYNTHROID) tablet 50 mcg, Daily  [Held by provider] lisinopril (PRINIVIL;ZESTRIL) tablet 10 mg, Daily  pantoprazole (PROTONIX) tablet 40 mg, QAM AC  prazosin (MINIPRESS) capsule 1 mg, BID  sodium chloride flush 0.9 % injection 5-40 mL, 2 times per day  sodium chloride flush 0.9 % injection 10 mL, PRN  potassium chloride (KLOR-CON M) extended release tablet 40 mEq, PRN   Or  potassium bicarb-citric acid (EFFER-K) effervescent tablet 40 mEq, PRN   Or  potassium chloride 10 mEq/100 mL IVPB (Peripheral Line), PRN  magnesium sulfate 1000 mg in dextrose 5% 100 mL IVPB, PRN  ondansetron (ZOFRAN-ODT) disintegrating tablet 4 mg, Q8H PRN   Or  ondansetron (ZOFRAN) injection 4 mg, Q6H PRN  polyethylene glycol (GLYCOLAX) packet 17 g, Daily PRN  acetaminophen (TYLENOL) tablet 650 mg, Q6H PRN   Or  acetaminophen (TYLENOL) suppository 650 mg, Q6H PRN  enoxaparin (LOVENOX) injection 40 mg, Daily  sodium chloride flush 0.9 % injection 5-40 mL, 2 times per day  sodium chloride flush 0.9 % injection 5-40 mL, PRN  0.9 % sodium chloride infusion, PRN  nicotine (NICODERM CQ) 21 MG/24HR 1 patch, Daily    Objective:  CURRENT TEMPERATURE:  Temp: 98.2 °F (36.8 °C)  MAXIMUM TEMPERATURE OVER 24HRS:  Temp (24hrs), Av.9 °F (36.6 °C), Min:97.5 °F (36.4 °C), Max:98.2 °F (36.8 °C)    CURRENT RESPIRATORY RATE:  Resp: 16  CURRENT PULSE:  Heart Rate: 74  CURRENT BLOOD PRESSURE:  BP: (!) 135/90  24HR BLOOD PRESSURE RANGE:  Systolic (06BWM), IHC:505 , Min:118 , UFS:401   ; Diastolic (62EXX), YJO:83, Min:79, Max:90    24HR INTAKE/OUTPUT:    Intake/Output Summary (Last 24 hours) at 2022 0955  Last data filed at 2022 0915  Gross per 24 hour   Intake 552.99 ml   Output 2075 ml   Net -1522.01 ml       Patient Vitals for the past 96 hrs (Last 3 readings):   Weight   22 0515 222 lb 0.1 oz (100.7 kg)   22 0600 218 lb 14.7 oz (99.3 kg)   22 0045 218 lb 11.1 oz (99.2 kg)       Physical Exam:  GENERAL APPEARANCE: Alert and cooperative, and appears to be in no acute distress. HEAD: normocephalic  EYES: EOMI. Not pale, anicteric   NOSE:  No nasal discharge. CARDIAC: Normal S1 and S2. No S3, S4 or murmurs. Rhythm is regular. LUNGS: Clear to auscultation and percussion without rales, rhonchi, wheezing or diminished breath sounds. ABDOMEN: Soft with normal bowel sounds. EXTREMITIES: No edema. Peripheral pulses intact.    NEURO: No acute focal neurologic deficits. Labs:   CBC:  Recent Labs     07/22/22  1405 07/24/22  0949 07/25/22  0525   WBC 13.9* 15.7* 14.9*   RBC 4.35* 4.20* 3.79*   HGB 14.5 13.7 12.4*   HCT 41.3 40.9* 36.7*   MCV 95.0 97.3 96.9   MCH 33.3 32.5 32.6   MCHC 35.1 33.4 33.6   RDW 14.0 13.6 13.6    334 314   MPV 6.8 7.3 7.5        BMP:   Recent Labs     07/23/22  0514 07/23/22  1154 07/24/22  0949 07/24/22  1614 07/24/22  2245 07/25/22  0525   *   < > 134* 131* 131* 134*   K 4.4   < > 4.9 4.7 3.8 4.8   CL 92*   < > 96* 97* 99 103   CO2 23   < > 24 21 21 23   BUN 6*  --  7*  --   --  9   CREATININE 0.54*  --  0.88  --   --  0.58*   GLUCOSE 106*  --  118*  --   --  129*   CALCIUM 8.4*  --  9.5  --   --  8.6    < > = values in this interval not displayed. Magnesium:   Recent Labs     07/22/22  1447   MG 2.2       Albumin:   Recent Labs     07/22/22  1447   LABALBU 4.4       Recent Labs     07/22/22  1447   PROT 7.7       Urine Sodium:    Recent Labs     07/23/22  0723   KENNA <20       Urine Osmolarity:   Recent Labs     07/23/22  0723   OSMOU 231       Assessment/plan:    1. Hyponatremia - hypoosmolar and hypovolemic. There is also element of beer potomania suggested by random urine osmolality 231 mOsm/kg. Serum sodium has corrected at an acceptable rate [11 mmol/L over 48 hours]. Plan: Discontinue IV fluid. Sodium chloride tablet 1000 mg p.o. 3 times daily. Fluid restriction 1500 mL/day. Check serum sodium level every 24 hours. 2.  Systemic hypertension - blood pressure control is adequate. Prognosis is guarded.     Electronically signed by Elidia Spencer MD on 7/25/2022 at 9:55 AM

## 2022-07-26 ENCOUNTER — CARE COORDINATION (OUTPATIENT)
Dept: CASE MANAGEMENT | Age: 62
End: 2022-07-26

## 2022-07-26 NOTE — CARE COORDINATION
Request from Quynh Bolanos, RN.,  please schedule patient. .    Nephrology-  unable to get through to number, spoke with answering service. They will send msg for someone to call this writer. PCP-8/2  2:20PM    [facility]. .patient uses- [facility] in [place] for delivery. Was unable to afford medication . Called pharmacy, they will deliver patient's medication that does not require copay. ([phone]- [facility])    Worked with pharmacy regarding nebulizer solution and the fact a machine was not ordered. Will call PCP tomorrow to ask for script. Spoke with PCP office, they are placing script for nebulizer to [facility] now.    sodium chloride--16.76 cost which patient states he can not afford until the first of the month. This writer will work on this to see what can be done to help the patient. [facility]  --[phone]  Nebulizer machine needs to be sent to above pharmacy.     Angel Trivedi, [address] [facility] Coordination Transition

## 2022-07-26 NOTE — CARE COORDINATION
[facility] Transitions Initial Follow Up Call    Call within 2 business days of discharge: Yes    Patient: Reynold Avila Patient : 1960   MRN: [id number]  Reason for Admission: alcohol roblem  Discharge Date: 22 RARS: Readmission Risk Score: 12.7      Last Discharge [facility]       Date Complaint Diagnosis Description Type Department Provider    22 Alcohol Problem Hyponatremia . .. ED to Hosp-Admission (Discharged) (ADMITTED) CASSIA Rodriguez MD; Romero Barr. .. Spoke with: [address] Sophie Julien: [facility]    Non-face-to-face services provided:  Education provided  WHAT YOU NEED TO KNOW ABOUT TAKING ANTIBIOTICS  If you have a bacterial infection, your doctor may prescribe an antibiotic. Antibiotics are powerful drugs and  when used correctly, they can save lives. But like all medications, they can have side effects. FINISH TAKING ALL YOUR ANTIBIOTICS  Feeling better doesn't mean your infection is gone and if you stop taking your antibiotics without your  physician's direction, your infection could still be active in your body, causing you to develop an antibiotic  resistant infection. MANAGING SIDE EFFECTS  Like all medications, antibiotics have side effects, some of which can be serious. Allergies: If you experience face swelling, or difficulty breathing, seek medical attention immediately. Diarrhea: Some antibiotics, while treating one infection, may kill some good germs along with the bad. This  allows bacteria like Clostridium difficile (C. difficile) to take over and cause diarrhea. Should you experience  diarrhea or painful stomach cramps, you need to let your physician know immediately as this is serious and  may require treatment. FUTURE RESISTANT INFECTION  Another serious side effects the risk of getting an antibiotic-resistant infection later.  This type of infection is  often more difficult to treat and, in some cases, can lead to serious disability or even death.  Hyponatremia (say \"ir-fe-mut-TREE-pete-uh\") means that you don't have enough sodium in your blood. It can cause  nausea, vomiting, and headaches. Or you maynot feel hungry. In serious cases, it can cause seizures, a coma, or even  death. Hyponatremia is not a disease. It is a problem caused by something else, suchas medicines or exercising for a long time  in hot weather. You can get hyponatremia if you lose a lot of fluids and then you drink a lot of water or other liquids that don't have  much sodium. You can also get it ifyou have kidney, liver, heart, or other health problems. Treatment is focused on getting your sodium levels back to normal.  Follow-up care is a key part of your treatment and safety. Be sure to make and go to all appointments, and call your  doctor if you are having problems. It's also a good idea to know your test results and keep alist of the medicines you  take. How can you care for yourself at home? If your doctor recommends it, drink fluids that have sodium. Sports drinks are a good choice. Or you can eat salty  foods. If your doctor recommends it, limit the amount of water you drink. And limit fluids that are mostly water. These  include tea, coffee, and juice. Take your medicines exactly as prescribed. Call your doctor if you have any problems with your medicine. Get your sodium levels tested when your doctor tells you to. When should you call for help? Call 911 anytime you think you may need emergency care. For example, call if:   You have a seizure. You passed out (lost consciousness). You are confused or it is hard to focus. You have little or no appetite. You feel sick to your stomach or you vomit. You have a headache. You have mood changes. You feel more tired than usual.   You do not get better as expected.   Sent request for HFU appointment and transportation needs to [address]  Patient stated still doesn't feel good, waiting for medications to be delivered, reviewed discharge instructions and medications, patient v/u, no f/c, cp, sob, c/o nausea, weakness, just doesn't feel good at all, checked his BP yesterday and it was 171/114, today was better at 148/85, patient thought he was to have VNS when he got home, writer contacted both Erin and OL, both stated they were not following patient, no HC on AVS, explained role of CTN, provided contact information, will follow  Writer contacted [facility] Kirk, spoke to Gus, he sent patient's scripts to [facility] so they can be delivered, writer contacted [facility] to confirm, no answer, left vm message requesting call back  Transitions of Care Initial Call    Was this an external facility discharge? No Discharge Facility: [address] [place] [address]    Challenges to be reviewed by the provider   Additional needs identified to be addressed with provider: Yes  medications-patient needs refill on Seroquel,    patient would benefit from alcohol treatment, please address, TY//[id number]             Method of communication with provider : chart routing    Advance Care Planning:   Does patient have an Advance Directive: reviewed and current, reviewed and needs to be updated, not on file; education provided, not on file, patient declined education, decision maker updated, and referral to internal ACP facilitator. Care Transition Nurse contacted the patient by telephone to perform post hospital discharge assessment. Verified name and  with patient as identifiers. Provided introduction to self, and explanation of the CTN role. CTN reviewed discharge instructions, medical action plan and red flags with patient who verbalized understanding. Patient given an opportunity to ask questions and does not have any further questions or concerns at this time. Were discharge instructions available to patient? Yes.  Reviewed appropriate site of care based on symptoms and resources available to patient including: PCP  Specialist  Urgent care clinics  When to call 911. The patient agrees to contact the PCP office for questions related to their healthcare. Medication reconciliation was performed with patient, who verbalizes understanding of administration of home medications. Advised obtaining a 90-day supply of all daily and as-needed medications. Was patient discharged with a pulse oximeter? no    CTN provided contact information. Plan for follow-up call in 3-5 days based on severity of symptoms and risk factors. Plan for next call: symptom management-   follow up appointment-   medication management-      Care Transitions 24 Hour Call    Do you have all of your prescriptions and are they filled?: Yes  Do you have support at home?: Alone  Are you an active caregiver in your home?: No  Care Transitions Interventions         Follow Up  No future appointments.     Azar Daniel RN

## 2022-07-26 NOTE — PROGRESS NOTES
Physician Progress Note      Antonina Blackwell  [facility] #:                  [id number]  :                       1960  ADMIT DATE:       2022 1:43 PM  [address] [place] DATE:        2022 4:09 PM  RESPONDING  PROVIDER #:        Gennaro Rosenbaum MD          QUERY TEXT:    Patient admitted for treatment of acute alcohol intoxication and hyponatremia. A diagnosis of acute respiratory failure due to possible PNA is noted in   progress notes starting . Documented vital signs without tachypnea,   assessment findings indicate \"awake and alert, not in acute distress. Lung   sounds w/equal air entry, no wheezes, rales or rhonchi\". In order to support   the diagnosis of acute respiratory failure, please include additional clinical   indicators in your documentation. Or please document if the diagnosis of   acute respiratory failure has been ruled out after f    The medical record reflects the following:  - Risk Factors: alcohol dependence, depression, myeloproliferative disorder,   hypothyroidism, htn, COPD, mycoplasma pna  - Clinical Indicators: VS: RR 15- 20; SPO2 desaturations to 85% over night,   placed on 3L NC and weaned to RA. no abgs. No mental status changes, RNs   document \"Regular Respiratory pattern w/ normal effort. No PÉREZ\"  - Treatment: Levaquin, azithromycin, ceftriaxone, Supplemental O2, DuoNeb,   methylprednisolone, Xray.     Acute Respiratory Failure Clinical Indicators per 3M [employer]-DR[employer] Training Guide and   Quick Reference Guide:  pO2 < 60 mmHg or SpO2 (pulse oximetry) < 91% breathing room air  pCO2 > 50 and pH < 7.35  P/F ratio (pO2 / FIO2) < 300  pO2 decrease or pCO2 increase by 10 mmHg from baseline (if known)  Supplemental oxygen of 40% or more  Presence of respiratory distress, tachypnea, dyspnea, shortness of breath,   wheezing  Unable to speak in complete sentences  Use of accessory muscles to breathe  Extreme anxiety and feeling of impending doom  Tripod position  Confusion/altered mental status/obtunded  Options provided:  -- Acute Respiratory Failure as evidenced by, Please document evidence.   -- Acute Respiratory Failure ruled out after study  -- Other - I will add my own diagnosis  -- Disagree - Not applicable / Not valid  -- Disagree - Clinically unable to determine / Unknown  -- Refer to Clinical Documentation Reviewer    PROVIDER RESPONSE TEXT:    This patient is in acute respiratory failure as evidenced by SpO2 (pulse   oximetry) < 91% (85%)breathing room air    Query created by: Rubia zafar 7/26/2022 3:28 PM      Electronically signed by:  Jair Hart MD 7/26/2022 4:45 PM

## 2022-07-27 ENCOUNTER — CARE COORDINATION (OUTPATIENT)
Dept: CASE MANAGEMENT | Age: 62
End: 2022-07-27

## 2022-07-28 ENCOUNTER — CARE COORDINATION (OUTPATIENT)
Dept: CARE COORDINATION | Age: 62
End: 2022-07-28

## 2022-07-28 ENCOUNTER — CARE COORDINATION (OUTPATIENT)
Dept: CASE MANAGEMENT | Age: 62
End: 2022-07-28

## 2022-07-28 NOTE — CARE COORDINATION
[facility] Transitions Follow Up Call    2022    Patient: Tammie Alas  Patient : 1960   MRN: [id number]  Reason for Admission:   Discharge Date: 22 RARS: Readmission Risk Score: 12.7         Spoke with: Suleiman Trammell spoke to patient, he is feeling better, still hasn't received the antibiotic or the sodium tabs, writer contacted Gian spoke to Nicolasshantelle Misaeldarvin. They didn't received order for the Levaquin, will be delivering antibiotic to patient tomorrow, no co pay for that medication, patient cannot get the sodium tabs until he gets his money on the first, patient stated he is feeling better , was cleaning his apartment today. Liberty Benítez had contacted patient earlier today, she will be following also, will continue to follow//JU  Care Transitions Follow Up Call    Needs to be reviewed by the provider       CTN provided contact information for future needs. Plan for follow-up call in 3-5 days based on severity of symptoms and risk factors. Plan for next call: symptom management-   follow up appointment-   medication management-          Care Transitions Subsequent and Final Call    Subsequent and Final Calls  Do you have any ongoing symptoms?: No  Do you have any needs or concerns that I can assist you with?: Yes  Patient-reported Needs or Concerns: hasnt received the antibiotic, writer contacted [facility], patient will get medication delivered tomorrow  Care Transitions Interventions    Social Work: Completed    Other Interventions: Follow Up  No future appointments.     Noel Moses RN

## 2022-07-28 NOTE — CARE COORDINATION
Patient was referred to  by Daniel Langford, who states that this patient needs assistance with resources. HC phoned and spoke with this patient, make introductions and stated the reason for the call. Patient and HC engaged in conversation regarding his social needs. Patient reports not to have a need for the basics with the exception that he runs out of money  before the end of the month. HC discussed the ACP process with the patient, he will think more about it. He also mentioned that he needs a life insurance policy, but can't afford it. Plan of Care  [facility] [place], [facility]. will follow up with patient regarding ACP and his monthly income review.

## 2022-08-01 ENCOUNTER — CARE COORDINATION (OUTPATIENT)
Dept: CARE COORDINATION | Age: 62
End: 2022-08-01

## 2022-08-01 NOTE — CARE COORDINATION
HC phoned patient today to review his income, expenditures, and to discuss life insurance and the possibilities of purchasing a policy. Patient was surprised to hear how much money he has after paying his monthly bills. HC inquired about where patients additional money goes after paying his few bills. Patient acknowledged that she spends money on drinking and smoking and realizes that he needs to do better. Patient  will take time to decide what he really wants  He stated that he would like to get some patches to aid him in stop smoking and has already been given a script to lessen his desire for drinking,    Plan of Care  [facility] [place], [facility]. will follow up with patient in a few days to see where the patients thinking is. ..

## 2022-08-02 ENCOUNTER — CARE COORDINATION (OUTPATIENT)
Dept: CASE MANAGEMENT | Age: 62
End: 2022-08-02

## 2022-08-02 NOTE — CARE COORDINATION
[facility] Transitions Follow Up Call    2022    Patient: Riya Acosta  Patient : 1960   MRN: [id number]  Reason for Admission:   Discharge Date: 22 RARS: Readmission Risk Score: 12.7         Spoke with: Siobhan Johnson spoke to patient, he is still having bed bug issues that is causing him anxiety, stated  was out today and will be back tomorrow so hopefully they can get the situation under control, physically he is feeling better, just stressed. patient stated his BP's are high in the morning, was 150/90 this morning , takes his bp medication and it brings his BP down within a half an hour, no c/o f/c, n/v, sob,ha or cp. Patient has not started the acamprosate yet, does know it will make him sick if he drinks alcohol, discussed with him and he said he will start taking it soon, no upcoming appointments at this time, will continue to follow//JU  Care Transitions Follow Up Call        CTN provided contact information for future needs. Plan for follow-up call in 3-5 days based on severity of symptoms and risk factors. Plan for next call: symptom management-   medication management-        Care Transitions Subsequent and Final Call    Subsequent and Final Calls  Do you have any ongoing symptoms?: Yes  Onset of Patient-reported symptoms: Today  Patient-reported symptoms: Other  Do you have any needs or concerns that I can assist you with?:  (Comment: he is having issues with bed bugs , exterminater was out today)  Identified Barriers: Stress, Other  Care Transitions Interventions    Social Work: Completed    Other Interventions: Follow Up  No future appointments.     Sharie Baumgarten, RN

## 2022-08-03 ENCOUNTER — CARE COORDINATION (OUTPATIENT)
Dept: CASE MANAGEMENT | Age: 62
End: 2022-08-03

## 2022-08-03 NOTE — CARE COORDINATION
[facility] Transitions Follow Up Call    8/3/2022    Patient: Tarah Ceballos  Patient : 1960   MRN: [id number]  Reason for Admission:   Discharge Date: 22 RARS: Readmission Risk Score: 12.7         # 1 attempt-LM  Attempted to reach patient for subsequent call. Left Hip[employer] appropriate message with contact information requesting return call to [phone]   Care Transitions Follow Up Call        CTN provided contact information for future needs. Plan for follow-up call in 1-2 days based on severity of symptoms and risk factors. Plan for next call:  fpllow up on home situation, symptom management       Care Transitions Subsequent and Final Call    Subsequent and Final Calls  Care Transitions Interventions    Social Work: Completed    Other Interventions: Follow Up  No future appointments.     Mel Liu RN

## 2022-08-05 ENCOUNTER — CARE COORDINATION (OUTPATIENT)
Dept: CARE COORDINATION | Age: 62
End: 2022-08-05

## 2022-08-05 ENCOUNTER — CARE COORDINATION (OUTPATIENT)
Dept: CASE MANAGEMENT | Age: 62
End: 2022-08-05

## 2022-08-05 NOTE — CARE COORDINATION
[facility] Transitions Follow Up Call    2022    Patient: Caro Pichardo  Patient : 1960   MRN: [id number]  Reason for Admission:   Discharge Date: 22 RARS: Readmission Risk Score: 12.7       # 1 attempt-Attempted to reach patient for subsequent call. Left [employer] appropriate message with contact information requesting return call to [phone]   Care Transitions Follow Up Call        CTN provided contact information for future needs. Plan for follow-up call in 3-5 days based on severity of symptoms and risk factors. Plan for next call: symptom management-   medication management-          Care Transitions Subsequent and Final Call    Subsequent and Final Calls  Care Transitions Interventions    Social Work: Completed    Other Interventions: Follow Up  No future appointments.     Bora Crook RN

## 2022-08-05 NOTE — CARE COORDINATION
[facility]. phoned patient to follow up on his thinking about stopping or slowing his drinking and making plans to get a life insurance policy. Patient stated that it's one thing to think about and another to do something about it. HC agreed. HC suggested that she will speak with the patient at another time, to see if his thoughts have changed and if he wants to pursue a life insurance policy.     Plan of Care  [facility]. will follow up with patient and his affairs

## 2022-08-09 ENCOUNTER — CARE COORDINATION (OUTPATIENT)
Dept: CASE MANAGEMENT | Age: 62
End: 2022-08-09

## 2022-08-09 NOTE — CARE COORDINATION
[facility] Transitions Follow Up Call    2022    Patient: Martha Chambers  Patient : 1960   MRN: <[id number]>  Reason for Admission: Hyponatremia; acute alcohol intoxication  Discharge Date: 22 RARS: Readmission Risk Score: 12.7       Spoke with: Jaymes Seip    I spoke with the patient about this. Briefly to Jaymes Seip. Stated he is currently at PCP office in waiting room. Requested call back another day. Agreeable to call back toward end of week. Care Transitions Subsequent and Final Call    Subsequent and Final Calls  Care Transitions Interventions  Other Interventions: Follow Up  No future appointments.     Leobardo Torres RN

## 2022-08-11 ENCOUNTER — CARE COORDINATION (OUTPATIENT)
Dept: CASE MANAGEMENT | Age: 62
End: 2022-08-11

## 2022-08-18 ENCOUNTER — CARE COORDINATION (OUTPATIENT)
Dept: CARE COORDINATION | Age: 62
End: 2022-08-18

## 2022-08-18 NOTE — CARE COORDINATION
HC phoned this patient to find out if he has any current social needs, due to the fact that he isn't ready to address his need for life insurance, etc.... Patient denied any current social needs, [facility]. encouraged him to contact her if needed.     Plan of Care  [facility] will sign off of patient at this time

## 2022-09-02 RX ORDER — FUROSEMIDE 20 MG/1
TABLET ORAL
Qty: 30 TABLET | Refills: 3 | OUTPATIENT
Start: 2022-09-02

## 2022-09-20 DIAGNOSIS — D47.1 MYELOPROLIFERATIVE DISEASE (HCC): Primary | ICD-10-CM

## 2022-09-21 RX ORDER — HYDROXYUREA 500 MG/1
500 CAPSULE ORAL DAILY
Qty: 30 CAPSULE | Refills: 2 | Status: SHIPPED | OUTPATIENT
Start: 2022-09-21

## 2022-10-13 ENCOUNTER — HOSPITAL ENCOUNTER (OUTPATIENT)
Age: 62
Setting detail: SPECIMEN
Discharge: HOME | End: 2022-10-13
Payer: MEDICARE

## 2022-10-13 ENCOUNTER — OFFICE VISIT (OUTPATIENT)
Dept: ONCOLOGY | Age: 62
End: 2022-10-13
Payer: MEDICARE

## 2022-10-13 ENCOUNTER — TELEPHONE (OUTPATIENT)
Dept: ONCOLOGY | Age: 62
End: 2022-10-13

## 2022-10-13 VITALS
DIASTOLIC BLOOD PRESSURE: 79 MMHG | SYSTOLIC BLOOD PRESSURE: 118 MMHG | BODY MASS INDEX: 27.94 KG/M2 | TEMPERATURE: 97.5 F | HEART RATE: 91 BPM | RESPIRATION RATE: 18 BRPM | WEIGHT: 211.8 LBS

## 2022-10-13 DIAGNOSIS — D47.1 MYELOPROLIFERATIVE DISEASE (HCC): Primary | ICD-10-CM

## 2022-10-13 DIAGNOSIS — D47.1 MYELOPROLIFERATIVE DISEASE (HCC): ICD-10-CM

## 2022-10-13 LAB
ABSOLUTE EOS #: 0.31 K/UL (ref 0–0.4)
ABSOLUTE IMMATURE GRANULOCYTE: 0.92 K/UL (ref 0–0.3)
ABSOLUTE LYMPH #: 2.14 K/UL (ref 1–4.8)
ABSOLUTE MONO #: 1.22 K/UL (ref 0.2–0.8)
BASOPHILS # BLD: 3 %
BASOPHILS ABSOLUTE: 0.46 K/UL (ref 0–0.2)
EOSINOPHILS RELATIVE PERCENT: 2 % (ref 1–4)
HCT VFR BLD CALC: 43.2 % (ref 40.7–50.3)
HEMOGLOBIN: 14.3 G/DL (ref 13–17)
IMMATURE GRANULOCYTES: 6 %
LYMPHOCYTES # BLD: 14 % (ref 24–44)
MCH RBC QN AUTO: 33.6 PG (ref 25.2–33.5)
MCHC RBC AUTO-ENTMCNC: 33.1 G/DL (ref 28.4–34.8)
MCV RBC AUTO: 101.6 FL (ref 82.6–102.9)
MONOCYTES # BLD: 8 % (ref 1–7)
MORPHOLOGY: ABNORMAL
NRBC AUTOMATED: 0 PER 100 WBC
PDW BLD-RTO: 15.4 % (ref 11.8–14.4)
PLATELET # BLD: 532 K/UL (ref 138–453)
PMV BLD AUTO: 8.9 FL (ref 8.1–13.5)
RBC # BLD: 4.25 M/UL (ref 4.21–5.77)
SEG NEUTROPHILS: 67 % (ref 36–66)
SEGMENTED NEUTROPHILS ABSOLUTE COUNT: 10.25 K/UL (ref 1.8–7.7)
WBC # BLD: 15.3 K/UL (ref 3.5–11.3)

## 2022-10-13 PROCEDURE — 36415 COLL VENOUS BLD VENIPUNCTURE: CPT

## 2022-10-13 PROCEDURE — G8484 FLU IMMUNIZE NO ADMIN: HCPCS | Performed by: INTERNAL MEDICINE

## 2022-10-13 PROCEDURE — 3017F COLORECTAL CA SCREEN DOC REV: CPT | Performed by: INTERNAL MEDICINE

## 2022-10-13 PROCEDURE — G8419 CALC BMI OUT NRM PARAM NOF/U: HCPCS | Performed by: INTERNAL MEDICINE

## 2022-10-13 PROCEDURE — G8427 DOCREV CUR MEDS BY ELIG CLIN: HCPCS | Performed by: INTERNAL MEDICINE

## 2022-10-13 PROCEDURE — 4004F PT TOBACCO SCREEN RCVD TLK: CPT | Performed by: INTERNAL MEDICINE

## 2022-10-13 PROCEDURE — 99214 OFFICE O/P EST MOD 30 MIN: CPT | Performed by: INTERNAL MEDICINE

## 2022-10-13 PROCEDURE — 85025 COMPLETE CBC W/AUTO DIFF WBC: CPT

## 2022-10-13 PROCEDURE — 99211 OFF/OP EST MAY X REQ PHY/QHP: CPT | Performed by: INTERNAL MEDICINE

## 2022-10-13 NOTE — TELEPHONE ENCOUNTER
[facility] ARRIVES AMBULATORY FOR MD VISIT   DR Earline Wang IN TO SEE PATIENT  ORDERS RECEIVED  HYDREA ONCE DAILY  RV 4 MONTHS WITH CBC AT RV  LABS CDP 02/14/23  MD VISIT 02/14/23 @10:15AM  AVS PRINTED AND GIVEN TO PATIENT WITH INSTRUCTIONS  PATIENT DISCHARGED AMBULATORY

## 2022-10-15 NOTE — PROGRESS NOTES
_           Chief Complaint   Patient presents with    Follow-up    Discuss Labs     DIAGNOSIS:       Myeloproliferative disorder, NOS, positive VINICIO 2 gene mutation. Negative for CML. CURRENT THERAPY:         Hydroxyurea 500 mg twice daily. Allopurinol, discontinued  Aspirin  Therapeutic phlebotomy as needed    BRIEF CASE HISTORY:      Mr. Jairon Guevara is a very pleasant 65 y.o. male with history of recently diagnosed myeloproliferative disorder is referred for further management. He has Chronic back pain. Old injury in the cervical spine after trampoline fall. Surgery C-spine long time ago  The patient is having frequent headaches and left sided numbness. No chest pains  In October 2018 he was [facility] in [facility] and had elevated white blood cells and red blood cells and platelets  Diagnosis myeloproliferative disorder with positive JAK2 gene mutation. Bone marrow showed no specific type and cytogenetics showed no CML  He was started on Hydroxyurea twice daily and allopurinol. Treatment tolerated well so far with no side effects. INTERIM HISTORY:   The patient seen for follow-up myeloproliferative disorder. Patient is maintained on hydroxyurea but treatment was interrupted. Recently he was maintained on Hydrea. Tolerated well with no side effects.        PAST MEDICAL HISTORY: has a past medical history of Alcohol abuse, Anemia, Cancer ([address] Cervical stenosis of spine, Chronic left-sided low back pain with left-sided sciatica, Insomnia, Intentional drug overdose ([address] Left arm numbness, Left lumbar radiculitis, Macrocytosis, Major depressive disorder, recurrent severe without psychotic features ([address] Myeloproliferative disorder ([address] Osteoarthritis of spine with radiculopathy, lumbar region, Severe recurrent major depression without psychotic features ([address] Spondylosis of lumbar region without myelopathy or radiculopathy, and Urinary retention. PAST SURGICAL HISTORY: has a past surgical history that includes cervical fusion; Appendectomy; Tonsillectomy; Carpal tunnel release; epidural steroid injection (Left, 5/15/2019); Endoscopy, colon, diagnostic; cervical fusion (09/05/2019); and cervical fusion (N/A, 9/5/2019). CURRENT MEDICATIONS:  has a current medication list which includes the following prescription(s): hydroxyurea, quetiapine, lisinopril, sertraline, levothyroxine, prazosin, omeprazole, acamprosate, sodium chloride, ipratropium-albuterol, gabapentin, furosemide, midodrine, folic acid, [DISCONTINUED] nicotine, naltrexone, and therapeutic multivitamin-minerals. ALLERGIES:  has No Known Allergies. FAMILY HISTORY: Negative for any hematological or oncological conditions. SOCIAL HISTORY:  reports that he has been smoking cigarettes. He started smoking about 44 years ago. He has a 64.50 pack-year smoking history. He has never used smokeless tobacco. He reports current alcohol use of about 14.0 standard drinks per week. He reports that he does not currently use drugs after having used the following drugs: Marijuana [place]). REVIEW OF SYSTEMS:     General: No weakness or fatigue. No unanticipated weight loss or decreased appetite. No fever or chills. Eyes: No blurred vision, eye pain or double vision. Ears: No hearing problems or drainage. No tinnitus. Throat: No sore throat, problems with swallowing or dysphagia. Respiratory: Positive for cough. No sputum or hemoptysis. No shortness of breath. Positive for pleuritic chest pain. Cardiovascular: No chest pain, orthopnea or PND. No lower extremity edema. No palpitation. Gastrointestinal: No problems with swallowing. No abdominal pain or bloating. No nausea or vomiting. No diarrhea or constipation. No GI bleeding. Genitourinary: No dysuria, hematuria, frequency or urgency.      Musculoskeletal: No muscle aches or (L) 07/25/2022 1056    CO2 24 07/25/2022 1056    BUN 9 07/25/2022 0525    CREATININE 0.58 (L) 07/25/2022 0525        Component Value Date/Time    CALCIUM 8.6 07/25/2022 0525    ALKPHOS 90 07/22/2022 1447    AST 80 (H) 07/22/2022 1447    ALT 47 (H) 07/22/2022 1447    BILITOT 0.50 07/22/2022 1447        IMPRESSION:   Myeloproliferative disorder, NOS, positive VINICIO 2 gene mutation. Negative for CML. Chronic headaches  Long-standing history of Episodes of cough and chest pain. Normocytic anemia    PLAN: records and labs were reviewed and explained to patient. I explained to the patient the nature of this problem with myeloproliferative disorder and management plan. Based on Bone marrow and cytogenetics no CML was seen. Diagnosis is made with myeloproliferative disorder NOS. Labs reviewed. I will continue the hydroxyurea once a day and will make further recommendation based on results of the labs. If needed we may need to do repeated bone marrow test.  Patient will be seen by neurology for further management of peripheral neuropathy. RV 4 months.

## 2022-10-30 ENCOUNTER — HOSPITAL ENCOUNTER (INPATIENT)
Age: 62
LOS: 3 days | Discharge: NURSING, REHAB OR LONG-TERM CARE | DRG: 897 | End: 2022-11-03
Attending: EMERGENCY MEDICINE | Admitting: FAMILY MEDICINE
Payer: MEDICARE

## 2022-10-30 ENCOUNTER — APPOINTMENT (OUTPATIENT)
Dept: GENERAL RADIOLOGY | Age: 62
DRG: 897 | End: 2022-10-30
Payer: MEDICARE

## 2022-10-30 DIAGNOSIS — F10.930 ALCOHOL WITHDRAWAL SYNDROME WITHOUT COMPLICATION (HCC): Primary | ICD-10-CM

## 2022-10-30 DIAGNOSIS — E87.1 HYPONATREMIA: ICD-10-CM

## 2022-10-30 PROCEDURE — 80053 COMPREHEN METABOLIC PANEL: CPT

## 2022-10-30 PROCEDURE — 6360000002 HC RX W HCPCS: Performed by: STUDENT IN AN ORGANIZED HEALTH CARE EDUCATION/TRAINING PROGRAM

## 2022-10-30 PROCEDURE — 80179 DRUG ASSAY SALICYLATE: CPT

## 2022-10-30 PROCEDURE — G0480 DRUG TEST DEF 1-7 CLASSES: HCPCS

## 2022-10-30 PROCEDURE — 96375 TX/PRO/DX INJ NEW DRUG ADDON: CPT

## 2022-10-30 PROCEDURE — 85025 COMPLETE CBC W/AUTO DIFF WBC: CPT

## 2022-10-30 PROCEDURE — 85379 FIBRIN DEGRADATION QUANT: CPT

## 2022-10-30 PROCEDURE — 2580000003 HC RX 258: Performed by: STUDENT IN AN ORGANIZED HEALTH CARE EDUCATION/TRAINING PROGRAM

## 2022-10-30 PROCEDURE — 83690 ASSAY OF LIPASE: CPT

## 2022-10-30 PROCEDURE — 71046 X-RAY EXAM CHEST 2 VIEWS: CPT

## 2022-10-30 PROCEDURE — 80307 DRUG TEST PRSMV CHEM ANLYZR: CPT

## 2022-10-30 PROCEDURE — 99285 EMERGENCY DEPT VISIT HI MDM: CPT

## 2022-10-30 PROCEDURE — 84484 ASSAY OF TROPONIN QUANT: CPT

## 2022-10-30 PROCEDURE — 80143 DRUG ASSAY ACETAMINOPHEN: CPT

## 2022-10-30 PROCEDURE — 83880 ASSAY OF NATRIURETIC PEPTIDE: CPT

## 2022-10-30 PROCEDURE — 6370000000 HC RX 637 (ALT 250 FOR IP): Performed by: STUDENT IN AN ORGANIZED HEALTH CARE EDUCATION/TRAINING PROGRAM

## 2022-10-30 PROCEDURE — 93005 ELECTROCARDIOGRAM TRACING: CPT | Performed by: STUDENT IN AN ORGANIZED HEALTH CARE EDUCATION/TRAINING PROGRAM

## 2022-10-30 RX ORDER — LORAZEPAM 0.5 MG/1
1 TABLET ORAL ONCE
Status: COMPLETED | OUTPATIENT
Start: 2022-10-30 | End: 2022-10-30

## 2022-10-30 RX ORDER — 0.9 % SODIUM CHLORIDE 0.9 %
1000 INTRAVENOUS SOLUTION INTRAVENOUS ONCE
Status: COMPLETED | OUTPATIENT
Start: 2022-10-30 | End: 2022-10-31

## 2022-10-30 RX ORDER — ONDANSETRON 2 MG/ML
4 INJECTION INTRAMUSCULAR; INTRAVENOUS ONCE
Status: COMPLETED | OUTPATIENT
Start: 2022-10-30 | End: 2022-10-30

## 2022-10-30 RX ORDER — MIDAZOLAM HYDROCHLORIDE 2 MG/2ML
2 INJECTION, SOLUTION INTRAMUSCULAR; INTRAVENOUS ONCE
Status: DISCONTINUED | OUTPATIENT
Start: 2022-10-30 | End: 2022-10-30

## 2022-10-30 RX ADMIN — ONDANSETRON 4 MG: 2 INJECTION INTRAMUSCULAR; INTRAVENOUS at 23:34

## 2022-10-30 RX ADMIN — SODIUM CHLORIDE 1000 ML: 9 INJECTION, SOLUTION INTRAVENOUS at 23:33

## 2022-10-30 RX ADMIN — LORAZEPAM 1 MG: 0.5 TABLET ORAL at 23:32

## 2022-10-31 PROBLEM — F10.939 ALCOHOL WITHDRAWAL SYNDROME WITH COMPLICATION, WITH UNSPECIFIED COMPLICATION (HCC): Status: ACTIVE | Noted: 2022-10-31

## 2022-10-31 LAB
ABSOLUTE EOS #: 0 K/UL (ref 0–0.4)
ABSOLUTE IMMATURE GRANULOCYTE: 0.21 K/UL (ref 0–0.3)
ABSOLUTE LYMPH #: 0.21 K/UL (ref 1–4.8)
ABSOLUTE MONO #: 1.24 K/UL (ref 0.1–0.8)
ACETAMINOPHEN LEVEL: <5 UG/ML (ref 10–30)
ALBUMIN SERPL-MCNC: 3.9 G/DL (ref 3.5–5.2)
ALBUMIN SERPL-MCNC: 4.4 G/DL (ref 3.5–5.2)
ALBUMIN/GLOBULIN RATIO: 1.1 (ref 1–2.5)
ALBUMIN/GLOBULIN RATIO: 1.2 (ref 1–2.5)
ALP BLD-CCNC: 125 U/L (ref 40–129)
ALP BLD-CCNC: 139 U/L (ref 40–129)
ALT SERPL-CCNC: 50 U/L (ref 5–41)
ALT SERPL-CCNC: 56 U/L (ref 5–41)
ANION GAP SERPL CALCULATED.3IONS-SCNC: 13 MMOL/L (ref 9–17)
ANION GAP SERPL CALCULATED.3IONS-SCNC: 15 MMOL/L (ref 9–17)
ANION GAP SERPL CALCULATED.3IONS-SCNC: 23 MMOL/L (ref 9–17)
AST SERPL-CCNC: 126 U/L
AST SERPL-CCNC: 135 U/L
BASOPHILS # BLD: 2 % (ref 0–2)
BASOPHILS ABSOLUTE: 0.41 K/UL (ref 0–0.2)
BILIRUB SERPL-MCNC: 1 MG/DL (ref 0.3–1.2)
BILIRUB SERPL-MCNC: 1.1 MG/DL (ref 0.3–1.2)
BUN BLDV-MCNC: 4 MG/DL (ref 8–23)
BUN BLDV-MCNC: 5 MG/DL (ref 8–23)
BUN BLDV-MCNC: 6 MG/DL (ref 8–23)
CALCIUM IONIZED: 1.06 MMOL/L (ref 1.13–1.33)
CALCIUM SERPL-MCNC: 8.8 MG/DL (ref 8.6–10.4)
CALCIUM SERPL-MCNC: 8.9 MG/DL (ref 8.6–10.4)
CALCIUM SERPL-MCNC: 9.4 MG/DL (ref 8.6–10.4)
CHLORIDE BLD-SCNC: 88 MMOL/L (ref 98–107)
CHLORIDE BLD-SCNC: 94 MMOL/L (ref 98–107)
CHLORIDE BLD-SCNC: 96 MMOL/L (ref 98–107)
CO2: 12 MMOL/L (ref 20–31)
CO2: 17 MMOL/L (ref 20–31)
CO2: 20 MMOL/L (ref 20–31)
CREAT SERPL-MCNC: 0.47 MG/DL (ref 0.7–1.2)
CREAT SERPL-MCNC: 0.53 MG/DL (ref 0.7–1.2)
CREAT SERPL-MCNC: 0.59 MG/DL (ref 0.7–1.2)
D-DIMER QUANTITATIVE: 0.53 MG/L FEU
EKG ATRIAL RATE: 107 BPM
EKG P AXIS: 47 DEGREES
EKG P-R INTERVAL: 150 MS
EKG Q-T INTERVAL: 368 MS
EKG QRS DURATION: 66 MS
EKG QTC CALCULATION (BAZETT): 488 MS
EKG R AXIS: 53 DEGREES
EKG T AXIS: 51 DEGREES
EKG VENTRICULAR RATE: 106 BPM
EOSINOPHILS RELATIVE PERCENT: 0 % (ref 1–4)
ESTIMATED AVERAGE GLUCOSE: 77 MG/DL
ETHANOL PERCENT: 0.01 %
ETHANOL PERCENT: <0.01 %
ETHANOL: 10 MG/DL
ETHANOL: <10 MG/DL
GFR SERPL CREATININE-BSD FRML MDRD: >60 ML/MIN/1.73M2
GLUCOSE BLD-MCNC: 114 MG/DL (ref 70–99)
GLUCOSE BLD-MCNC: 127 MG/DL (ref 70–99)
GLUCOSE BLD-MCNC: 88 MG/DL (ref 70–99)
HBA1C MFR BLD: 4.3 % (ref 4–6)
HCT VFR BLD CALC: 44 % (ref 40.7–50.3)
HEMOGLOBIN: 15 G/DL (ref 13–17)
IMMATURE GRANULOCYTES: 1 %
LIPASE: 29 U/L (ref 13–60)
LYMPHOCYTES # BLD: 1 % (ref 24–44)
MCH RBC QN AUTO: 33.8 PG (ref 25.2–33.5)
MCHC RBC AUTO-ENTMCNC: 34.1 G/DL (ref 28.4–34.8)
MCV RBC AUTO: 99.1 FL (ref 82.6–102.9)
MONOCYTES # BLD: 6 % (ref 1–7)
MORPHOLOGY: NORMAL
NRBC AUTOMATED: 0 PER 100 WBC
PDW BLD-RTO: 13.3 % (ref 11.8–14.4)
PHOSPHORUS: 2.8 MG/DL (ref 2.5–4.5)
PLATELET # BLD: 529 K/UL (ref 138–453)
PMV BLD AUTO: 9.5 FL (ref 8.1–13.5)
POTASSIUM SERPL-SCNC: 3.9 MMOL/L (ref 3.7–5.3)
POTASSIUM SERPL-SCNC: 4 MMOL/L (ref 3.7–5.3)
POTASSIUM SERPL-SCNC: 4.1 MMOL/L (ref 3.7–5.3)
PRO-BNP: 486 PG/ML
RBC # BLD: 4.44 M/UL (ref 4.21–5.77)
SALICYLATE LEVEL: <1 MG/DL (ref 3–10)
SEG NEUTROPHILS: 90 % (ref 36–66)
SEGMENTED NEUTROPHILS ABSOLUTE COUNT: 18.63 K/UL (ref 1.8–7.7)
SODIUM BLD-SCNC: 123 MMOL/L (ref 135–144)
SODIUM BLD-SCNC: 126 MMOL/L (ref 135–144)
SODIUM BLD-SCNC: 129 MMOL/L (ref 135–144)
TOTAL PROTEIN: 7.3 G/DL (ref 6.4–8.3)
TOTAL PROTEIN: 8 G/DL (ref 6.4–8.3)
TOXIC TRICYCLIC SC,BLOOD: NEGATIVE
TROPONIN, HIGH SENSITIVITY: 24 NG/L (ref 0–22)
TROPONIN, HIGH SENSITIVITY: 25 NG/L (ref 0–22)
TSH SERPL DL<=0.05 MIU/L-ACNC: 1.29 UIU/ML (ref 0.3–5)
WBC # BLD: 20.7 K/UL (ref 3.5–11.3)

## 2022-10-31 PROCEDURE — 2580000003 HC RX 258: Performed by: STUDENT IN AN ORGANIZED HEALTH CARE EDUCATION/TRAINING PROGRAM

## 2022-10-31 PROCEDURE — 2500000003 HC RX 250 WO HCPCS: Performed by: STUDENT IN AN ORGANIZED HEALTH CARE EDUCATION/TRAINING PROGRAM

## 2022-10-31 PROCEDURE — 83036 HEMOGLOBIN GLYCOSYLATED A1C: CPT

## 2022-10-31 PROCEDURE — 84484 ASSAY OF TROPONIN QUANT: CPT

## 2022-10-31 PROCEDURE — 93010 ELECTROCARDIOGRAM REPORT: CPT | Performed by: INTERNAL MEDICINE

## 2022-10-31 PROCEDURE — G0480 DRUG TEST DEF 1-7 CLASSES: HCPCS

## 2022-10-31 PROCEDURE — 6370000000 HC RX 637 (ALT 250 FOR IP): Performed by: STUDENT IN AN ORGANIZED HEALTH CARE EDUCATION/TRAINING PROGRAM

## 2022-10-31 PROCEDURE — 2060000000 HC ICU INTERMEDIATE R&B

## 2022-10-31 PROCEDURE — 6370000000 HC RX 637 (ALT 250 FOR IP): Performed by: INTERNAL MEDICINE

## 2022-10-31 PROCEDURE — 36415 COLL VENOUS BLD VENIPUNCTURE: CPT

## 2022-10-31 PROCEDURE — 94761 N-INVAS EAR/PLS OXIMETRY MLT: CPT

## 2022-10-31 PROCEDURE — 2580000003 HC RX 258: Performed by: INTERNAL MEDICINE

## 2022-10-31 PROCEDURE — 82330 ASSAY OF CALCIUM: CPT

## 2022-10-31 PROCEDURE — 2500000003 HC RX 250 WO HCPCS: Performed by: INTERNAL MEDICINE

## 2022-10-31 PROCEDURE — 84100 ASSAY OF PHOSPHORUS: CPT

## 2022-10-31 PROCEDURE — 6360000002 HC RX W HCPCS: Performed by: NURSE PRACTITIONER

## 2022-10-31 PROCEDURE — 2580000003 HC RX 258: Performed by: NURSE PRACTITIONER

## 2022-10-31 PROCEDURE — 6370000000 HC RX 637 (ALT 250 FOR IP): Performed by: FAMILY MEDICINE

## 2022-10-31 PROCEDURE — 84443 ASSAY THYROID STIM HORMONE: CPT

## 2022-10-31 PROCEDURE — 80048 BASIC METABOLIC PNL TOTAL CA: CPT

## 2022-10-31 PROCEDURE — 6370000000 HC RX 637 (ALT 250 FOR IP): Performed by: NURSE PRACTITIONER

## 2022-10-31 PROCEDURE — 99222 1ST HOSP IP/OBS MODERATE 55: CPT | Performed by: INTERNAL MEDICINE

## 2022-10-31 PROCEDURE — 80053 COMPREHEN METABOLIC PANEL: CPT

## 2022-10-31 RX ORDER — LORAZEPAM 2 MG/ML
4 INJECTION INTRAMUSCULAR
Status: DISCONTINUED | OUTPATIENT
Start: 2022-10-31 | End: 2022-11-01

## 2022-10-31 RX ORDER — SODIUM CHLORIDE 9 MG/ML
INJECTION, SOLUTION INTRAVENOUS PRN
Status: DISCONTINUED | OUTPATIENT
Start: 2022-10-31 | End: 2022-11-03 | Stop reason: HOSPADM

## 2022-10-31 RX ORDER — ONDANSETRON 4 MG/1
4 TABLET, ORALLY DISINTEGRATING ORAL EVERY 8 HOURS PRN
Status: DISCONTINUED | OUTPATIENT
Start: 2022-10-31 | End: 2022-11-03 | Stop reason: HOSPADM

## 2022-10-31 RX ORDER — POTASSIUM CHLORIDE 20 MEQ/1
40 TABLET, EXTENDED RELEASE ORAL PRN
Status: DISCONTINUED | OUTPATIENT
Start: 2022-10-31 | End: 2022-11-03 | Stop reason: HOSPADM

## 2022-10-31 RX ORDER — ONDANSETRON 2 MG/ML
4 INJECTION INTRAMUSCULAR; INTRAVENOUS EVERY 6 HOURS PRN
Status: DISCONTINUED | OUTPATIENT
Start: 2022-10-31 | End: 2022-11-03 | Stop reason: HOSPADM

## 2022-10-31 RX ORDER — GABAPENTIN 300 MG/1
300 CAPSULE ORAL 2 TIMES DAILY
Status: DISCONTINUED | OUTPATIENT
Start: 2022-10-31 | End: 2022-11-03 | Stop reason: HOSPADM

## 2022-10-31 RX ORDER — LANOLIN ALCOHOL/MO/W.PET/CERES
100 CREAM (GRAM) TOPICAL ONCE
Status: COMPLETED | OUTPATIENT
Start: 2022-10-31 | End: 2022-10-31

## 2022-10-31 RX ORDER — QUETIAPINE FUMARATE 300 MG/1
300 TABLET, FILM COATED ORAL NIGHTLY
Status: DISCONTINUED | OUTPATIENT
Start: 2022-10-31 | End: 2022-11-03 | Stop reason: HOSPADM

## 2022-10-31 RX ORDER — THIAMINE HYDROCHLORIDE 100 MG/ML
100 INJECTION, SOLUTION INTRAMUSCULAR; INTRAVENOUS DAILY
Status: DISCONTINUED | OUTPATIENT
Start: 2022-10-31 | End: 2022-11-03 | Stop reason: HOSPADM

## 2022-10-31 RX ORDER — LORAZEPAM 2 MG/ML
2 INJECTION INTRAMUSCULAR
Status: DISCONTINUED | OUTPATIENT
Start: 2022-10-31 | End: 2022-11-01

## 2022-10-31 RX ORDER — FOLIC ACID 1 MG/1
1 TABLET ORAL DAILY
Status: DISCONTINUED | OUTPATIENT
Start: 2022-10-31 | End: 2022-11-03 | Stop reason: HOSPADM

## 2022-10-31 RX ORDER — LORAZEPAM 2 MG/1
4 TABLET ORAL
Status: DISCONTINUED | OUTPATIENT
Start: 2022-10-31 | End: 2022-11-01

## 2022-10-31 RX ORDER — LORAZEPAM 2 MG/1
2 TABLET ORAL
Status: DISCONTINUED | OUTPATIENT
Start: 2022-10-31 | End: 2022-11-01

## 2022-10-31 RX ORDER — SODIUM CHLORIDE 0.9 % (FLUSH) 0.9 %
5-40 SYRINGE (ML) INJECTION EVERY 12 HOURS SCHEDULED
Status: DISCONTINUED | OUTPATIENT
Start: 2022-10-31 | End: 2022-11-03 | Stop reason: HOSPADM

## 2022-10-31 RX ORDER — FOLIC ACID 5 MG/ML
1 INJECTION, SOLUTION INTRAMUSCULAR; INTRAVENOUS; SUBCUTANEOUS ONCE
Status: DISCONTINUED | OUTPATIENT
Start: 2022-10-31 | End: 2022-10-31

## 2022-10-31 RX ORDER — LORAZEPAM 0.5 MG/1
2 TABLET ORAL ONCE
Status: COMPLETED | OUTPATIENT
Start: 2022-10-31 | End: 2022-10-31

## 2022-10-31 RX ORDER — SODIUM CHLORIDE 1000 MG
1 TABLET, SOLUBLE MISCELLANEOUS
Status: DISCONTINUED | OUTPATIENT
Start: 2022-11-01 | End: 2022-11-03 | Stop reason: HOSPADM

## 2022-10-31 RX ORDER — SODIUM CHLORIDE 9 MG/ML
INJECTION, SOLUTION INTRAVENOUS CONTINUOUS
Status: DISCONTINUED | OUTPATIENT
Start: 2022-10-31 | End: 2022-10-31

## 2022-10-31 RX ORDER — FUROSEMIDE 20 MG/1
20 TABLET ORAL DAILY
Status: DISCONTINUED | OUTPATIENT
Start: 2022-10-31 | End: 2022-10-31

## 2022-10-31 RX ORDER — LORAZEPAM 1 MG/1
1 TABLET ORAL
Status: DISCONTINUED | OUTPATIENT
Start: 2022-10-31 | End: 2022-11-01

## 2022-10-31 RX ORDER — NICOTINE 21 MG/24HR
1 PATCH, TRANSDERMAL 24 HOURS TRANSDERMAL DAILY
Status: DISCONTINUED | OUTPATIENT
Start: 2022-10-31 | End: 2022-11-03 | Stop reason: HOSPADM

## 2022-10-31 RX ORDER — ACETAMINOPHEN 325 MG/1
650 TABLET ORAL EVERY 6 HOURS PRN
Status: DISCONTINUED | OUTPATIENT
Start: 2022-10-31 | End: 2022-11-03 | Stop reason: HOSPADM

## 2022-10-31 RX ORDER — POTASSIUM CHLORIDE 7.45 MG/ML
10 INJECTION INTRAVENOUS PRN
Status: DISCONTINUED | OUTPATIENT
Start: 2022-10-31 | End: 2022-11-03 | Stop reason: HOSPADM

## 2022-10-31 RX ORDER — CALCIUM GLUCONATE 20 MG/ML
1000 INJECTION, SOLUTION INTRAVENOUS ONCE
Status: COMPLETED | OUTPATIENT
Start: 2022-10-31 | End: 2022-10-31

## 2022-10-31 RX ORDER — LEVOTHYROXINE SODIUM 0.05 MG/1
50 TABLET ORAL DAILY
Status: DISCONTINUED | OUTPATIENT
Start: 2022-10-31 | End: 2022-11-03 | Stop reason: HOSPADM

## 2022-10-31 RX ORDER — PANTOPRAZOLE SODIUM 40 MG/1
40 TABLET, DELAYED RELEASE ORAL
Status: DISCONTINUED | OUTPATIENT
Start: 2022-10-31 | End: 2022-11-03 | Stop reason: HOSPADM

## 2022-10-31 RX ORDER — ACETAMINOPHEN 650 MG/1
650 SUPPOSITORY RECTAL EVERY 6 HOURS PRN
Status: DISCONTINUED | OUTPATIENT
Start: 2022-10-31 | End: 2022-11-03 | Stop reason: HOSPADM

## 2022-10-31 RX ORDER — DICYCLOMINE HYDROCHLORIDE 10 MG/ML
20 INJECTION INTRAMUSCULAR 4 TIMES DAILY PRN
Status: DISCONTINUED | OUTPATIENT
Start: 2022-10-31 | End: 2022-11-03 | Stop reason: HOSPADM

## 2022-10-31 RX ORDER — ENOXAPARIN SODIUM 100 MG/ML
40 INJECTION SUBCUTANEOUS DAILY
Status: DISCONTINUED | OUTPATIENT
Start: 2022-10-31 | End: 2022-11-03 | Stop reason: HOSPADM

## 2022-10-31 RX ORDER — LORAZEPAM 2 MG/ML
3 INJECTION INTRAMUSCULAR
Status: DISCONTINUED | OUTPATIENT
Start: 2022-10-31 | End: 2022-11-01

## 2022-10-31 RX ORDER — POLYETHYLENE GLYCOL 3350 17 G/17G
17 POWDER, FOR SOLUTION ORAL DAILY PRN
Status: DISCONTINUED | OUTPATIENT
Start: 2022-10-31 | End: 2022-11-03 | Stop reason: HOSPADM

## 2022-10-31 RX ORDER — PRAZOSIN HYDROCHLORIDE 1 MG/1
1 CAPSULE ORAL 2 TIMES DAILY
Status: DISCONTINUED | OUTPATIENT
Start: 2022-10-31 | End: 2022-11-03 | Stop reason: HOSPADM

## 2022-10-31 RX ORDER — ACAMPROSATE CALCIUM 333 MG/1
666 TABLET, DELAYED RELEASE ORAL 3 TIMES DAILY
Status: DISCONTINUED | OUTPATIENT
Start: 2022-10-31 | End: 2022-11-01

## 2022-10-31 RX ORDER — HYDROXYZINE HYDROCHLORIDE 50 MG/ML
50 INJECTION, SOLUTION INTRAMUSCULAR EVERY 6 HOURS PRN
Status: DISCONTINUED | OUTPATIENT
Start: 2022-10-31 | End: 2022-11-03 | Stop reason: HOSPADM

## 2022-10-31 RX ORDER — SODIUM CHLORIDE 0.9 % (FLUSH) 0.9 %
10 SYRINGE (ML) INJECTION PRN
Status: DISCONTINUED | OUTPATIENT
Start: 2022-10-31 | End: 2022-11-03 | Stop reason: HOSPADM

## 2022-10-31 RX ORDER — LORAZEPAM 2 MG/ML
1 INJECTION INTRAMUSCULAR
Status: DISCONTINUED | OUTPATIENT
Start: 2022-10-31 | End: 2022-11-01

## 2022-10-31 RX ORDER — LISINOPRIL 10 MG/1
10 TABLET ORAL DAILY
Status: DISCONTINUED | OUTPATIENT
Start: 2022-10-31 | End: 2022-11-03 | Stop reason: HOSPADM

## 2022-10-31 RX ORDER — HYDROXYUREA 500 MG/1
500 CAPSULE ORAL DAILY
Status: DISCONTINUED | OUTPATIENT
Start: 2022-10-31 | End: 2022-11-03 | Stop reason: HOSPADM

## 2022-10-31 RX ORDER — SODIUM CHLORIDE 9 MG/ML
INJECTION, SOLUTION INTRAVENOUS ONCE
Status: COMPLETED | OUTPATIENT
Start: 2022-10-31 | End: 2022-10-31

## 2022-10-31 RX ADMIN — LORAZEPAM 1 MG: 0.5 TABLET ORAL at 05:46

## 2022-10-31 RX ADMIN — QUETIAPINE FUMARATE 300 MG: 300 TABLET ORAL at 21:51

## 2022-10-31 RX ADMIN — HYDROXYUREA 500 MG: 500 CAPSULE ORAL at 08:35

## 2022-10-31 RX ADMIN — LORAZEPAM 2 MG: 0.5 TABLET ORAL at 12:30

## 2022-10-31 RX ADMIN — LEVOTHYROXINE SODIUM 50 MCG: 50 TABLET ORAL at 08:35

## 2022-10-31 RX ADMIN — LORAZEPAM 4 MG: 2 INJECTION INTRAMUSCULAR; INTRAVENOUS at 18:40

## 2022-10-31 RX ADMIN — PANTOPRAZOLE SODIUM 40 MG: 40 TABLET, DELAYED RELEASE ORAL at 07:00

## 2022-10-31 RX ADMIN — SERTRALINE 100 MG: 50 TABLET, FILM COATED ORAL at 08:35

## 2022-10-31 RX ADMIN — LORAZEPAM 2 MG: 0.5 TABLET ORAL at 00:34

## 2022-10-31 RX ADMIN — ACAMPROSATE CALCIUM 666 MG: 333 TABLET, DELAYED RELEASE ORAL at 21:51

## 2022-10-31 RX ADMIN — SODIUM CHLORIDE, PRESERVATIVE FREE 10 ML: 5 INJECTION INTRAVENOUS at 21:51

## 2022-10-31 RX ADMIN — CALCIUM GLUCONATE 1000 MG: 20 INJECTION, SOLUTION INTRAVENOUS at 06:57

## 2022-10-31 RX ADMIN — ENOXAPARIN SODIUM 40 MG: 100 INJECTION SUBCUTANEOUS at 08:35

## 2022-10-31 RX ADMIN — LORAZEPAM 2 MG: 2 INJECTION INTRAMUSCULAR; INTRAVENOUS at 16:17

## 2022-10-31 RX ADMIN — LORAZEPAM 2 MG: 0.5 TABLET ORAL at 08:34

## 2022-10-31 RX ADMIN — PRAZOSIN HYDROCHLORIDE 1 MG: 1 CAPSULE ORAL at 21:51

## 2022-10-31 RX ADMIN — FOLIC ACID 1 MG: 5 INJECTION, SOLUTION INTRAMUSCULAR; INTRAVENOUS; SUBCUTANEOUS at 01:17

## 2022-10-31 RX ADMIN — GABAPENTIN 300 MG: 300 CAPSULE ORAL at 08:35

## 2022-10-31 RX ADMIN — SODIUM CHLORIDE: 9 INJECTION, SOLUTION INTRAVENOUS at 06:55

## 2022-10-31 RX ADMIN — ACAMPROSATE CALCIUM 666 MG: 333 TABLET, DELAYED RELEASE ORAL at 12:31

## 2022-10-31 RX ADMIN — FOLIC ACID 1 MG: 1 TABLET ORAL at 08:35

## 2022-10-31 RX ADMIN — Medication 100 MG: at 00:34

## 2022-10-31 RX ADMIN — LORAZEPAM 2 MG: 0.5 TABLET ORAL at 10:22

## 2022-10-31 RX ADMIN — PRAZOSIN HYDROCHLORIDE 1 MG: 1 CAPSULE ORAL at 08:35

## 2022-10-31 RX ADMIN — LISINOPRIL 10 MG: 10 TABLET ORAL at 08:35

## 2022-10-31 RX ADMIN — LORAZEPAM 2 MG: 2 INJECTION INTRAMUSCULAR; INTRAVENOUS at 14:16

## 2022-10-31 RX ADMIN — THIAMINE HYDROCHLORIDE 100 MG: 100 INJECTION, SOLUTION INTRAMUSCULAR; INTRAVENOUS at 09:20

## 2022-10-31 RX ADMIN — SODIUM CHLORIDE: 9 INJECTION, SOLUTION INTRAVENOUS at 03:47

## 2022-10-31 RX ADMIN — ACAMPROSATE CALCIUM 666 MG: 333 TABLET, DELAYED RELEASE ORAL at 08:41

## 2022-10-31 RX ADMIN — LORAZEPAM 3 MG: 2 INJECTION INTRAMUSCULAR; INTRAVENOUS at 04:10

## 2022-10-31 RX ADMIN — GABAPENTIN 300 MG: 300 CAPSULE ORAL at 21:51

## 2022-10-31 ASSESSMENT — ENCOUNTER SYMPTOMS
ABDOMINAL PAIN: 0
FACIAL SWELLING: 0
BACK PAIN: 0
TROUBLE SWALLOWING: 0
VOMITING: 1
DIARRHEA: 0
SHORTNESS OF BREATH: 1
CHEST TIGHTNESS: 0
ABDOMINAL DISTENTION: 0
NAUSEA: 1

## 2022-10-31 NOTE — ED NOTES
I did not see this patient. I was not involved in his care.      Romelia Liriano MD  Resident  10/30/22 5228

## 2022-10-31 NOTE — ED NOTES
The following labs were labeled with patient stickers & tubed to lab;    [x]Lavender   []On Ice  []Blue  [x]Green/ Yellow  [x]Green/ Black [x]On Ice  []Pink  []Red  []Yellow    []COVID-19 Swab []Rapid  []COVID-19 Swab []PCR    []Urine Sample  []Pelvic Cultures    []Blood Cultures     Phyllis Lyman RN  10/31/22 2476

## 2022-10-31 NOTE — ED NOTES
[facility] Pt respirations are even and unlabored, pt is oriented X 4, speaking in complete sentences, bed is in the lowest position, call light is within reach. Will continue to monitor.        Suma Muhammad RN  10/31/22 7697

## 2022-10-31 NOTE — ED NOTES
Report given to Dara Alvarado and Perfecto German RN, all questions answered     Rommel Jennings RN  10/31/22 0520

## 2022-10-31 NOTE — ED NOTES
The following labs were labeled with appropriate pt sticker and tubed to lab:     [] Blue     [] Lavender   [] on ice  [x] Green/yellow  [] Green/black [] on ice  [] Berneda Guerline  [] on ice  [] Yellow  [] Red  [] Type/ Screen  [] ABG  [] VBG    [] COVID-19 swab    [] Rapid  [] PCR  [] Flu swab  [] Peds Viral Panel     [] Urine Sample  [] Pelvic Cultures  [] Blood Cultures  [] X 2  [] STREP Cultures         Yared Guillen RN  10/31/22 8146

## 2022-10-31 NOTE — ED NOTES
Pt reports to the ED with complaints of alcohol withdrawal. Pt states his last drink was yesterday. Pt states he typically drinks 10-15 beers per day and has been doing this for many years. Pt also has complaints of SOB. Pt denies falling today. Pt also denies hallucinations at this time. Pt also states he has had no appetite so he has not eaten in about a week. Pt arrived with unsteady gait and tremors. Pt tachycardic upon arrival. Pt does not have any other complaints at this time. Pt is A&O x4 and speaking in complete sentences. Pt is resting in bed comfortably, NAD noted. Pt denies chest pain.  Pt placed on full cardiac monitor       Neo Ramírez RN  10/30/22 2180

## 2022-10-31 NOTE — ED NOTES
Report received from [facility], RN. Pt up to bedside commode with RN for assistance. Pt states still having slight tremors and anxiety. Will reassess CIWA.       Lauren Godinez RN  10/31/22 1085

## 2022-10-31 NOTE — H&P
[facility]  Office: [address], DO, Senthil Dunlap, DO, Demetrio Jacobs, DO, Brian Cali Blood, DO, Lily Pratt MD, French Owusu MD, Robin Olivera MD, Audrey Mosley MD,  Ronda Verma MD, Oliver Moon MD, Eric Costa, DO, Pat Crystal MD,  Alf Gray MD, Genie Isaac MD, Deb Ceballos, DO, Flex Vargas MD, Chandler Lombard, MD, Sarai Smith, DO, Rachel Sandoval MD, Bayron Colbert MD, Philomena Shukla MD, Karan Ray MD, Rekha Traore, DO, Olinda Bush MD, Marcio Perea MD, Iris Chung, CNP,  Vero Jimenez, CNP, Zac Petit, CNP, Valentina Garcia, CNP,  Lencho Thorpe, DNP, Ced Foote, CNP, Mauro Estrella, CNP, Manuel Lezama, CNP, Micha Sewell, CNP, Dom Gomez, CNP, Delroy Hernandez PA-C, Jackelin Stewart, CNS, Rosa Palacios, DNP, Narda Pearson, CNP, Alicia Beaulieu, CNP, Lulu Fischer, [facility]    HISTORY AND PHYSICAL EXAMINATION            Date:   10/31/2022  Patient name:  Lisbeth Dunlap  Date of admission:  10/30/2022 11:10 PM  MRN:   [id number]  Account:  [de-identified]  YOB: 1960  PCP:    SIMONE Calderon NP  Room:   23/23  Code Status:    Prior    Chief Complaint:     Chief Complaint   Patient presents with    Withdrawal     Last drink was yesterday    Nausea     History of Present Illness:     Lisbeth Dunlap is a 65 y.o. male with underlying Etoh abuse and dependence, myloprliferative disorder, chronic lower back pain w/ radiculopathy, left arm numbness and polysubstance abuse who presents     to hospital with request to help him quit drinking/ previous attempts in past have been unsuccessful. . Patient appears determined but in distress, restless, nausea and vomiting. Patient drinks 6-10 beers per day with last drink was last night as he doesn't have funds or energy to continue drinking today, thus came in for assistance. Patient reports symptoms are due to drug withdrawal as he is familiar with this feeling. he denied Dts and seizure disorder. Patient reports associated breathing changes but attributes this to anxiety. Patient denied any suicidal or homicidal ideation. Patient admitted for stabilization and continued mgt. Past Medical History:     Past Medical History:   Diagnosis Date    Alcohol abuse 8/12/2019    Anemia     Cancer (HCC)     blood    Cervical stenosis of spine 6/14/2019    Chronic left-sided low back pain with left-sided sciatica 3/14/2019    Insomnia 3/14/2019    Intentional drug overdose ([address] 8/11/2019    Left arm numbness 6/14/2019    Left lumbar radiculitis 5/3/2019    Macrocytosis     Major depressive disorder, recurrent severe without psychotic features ([address] 8/12/2019    Myeloproliferative disorder ([address]     Osteoarthritis of spine with radiculopathy, lumbar region 4/12/2019    Severe recurrent major depression without psychotic features ([address] 8/12/2019    Spondylosis of lumbar region without myelopathy or radiculopathy 5/3/2019    Urinary retention 7/23/2022        Past Surgical History:     Past Surgical History:   Procedure Laterality Date    APPENDECTOMY      CARPAL TUNNEL RELEASE      bilateral     CERVICAL FUSION      s/p trampoline accident    CERVICAL FUSION  09/05/2019     ANTERIOR CERVICAL DECOMPRESSION FUSION C3-4     CERVICAL FUSION N/A 9/5/2019    ANTERIOR CERVICAL DECOMPRESSION FUSION C3-4 performed by Julian Herrera DO at [address] [facility], DIAGNOSTIC      EPIDURAL STEROID INJECTION Left 5/15/2019    EPIDURAL STEROID INJECTION LEFT L5S1 performed by Rogelio Middleton MD at [address] [place] [address]          Medications Prior to Admission:     Prior to Admission medications    Medication Sig Start Date End Date Taking?  Authorizing Provider   hydroxyurea (HYDREA) 500 MG chemo capsule Take 1 capsule by mouth daily 9/21/22   Catalino Escobar MD   acamprosate (CAMPRAL) 333 MG tablet Take 2 tablets by mouth in the morning and 2 tablets at noon and 2 tablets before bedtime. Patient not taking: Reported on 10/13/2022 7/25/22   Sheldon Clarke MD   QUEtiapine (SEROQUEL) 300 MG tablet Take 1 tablet by mouth nightly 7/25/22   Sheldon Clarke MD   sodium chloride 1 g tablet Take 1 tablet by mouth in the morning and 1 tablet at noon and 1 tablet in the evening. Take with meals. Patient not taking: Reported on 10/13/2022 7/25/22   Sheldon Clarke MD   ipratropium-albuterol (DUONEB) 0.5-2.5 (3) MG/3ML SOLN nebulizer solution Inhale 3 mLs into the lungs every 6 hours as needed for Shortness of Breath  Patient not taking: Reported on 10/13/2022 7/25/22 8/4/22  Sheldon Clarke MD   gabapentin (NEURONTIN) 300 MG capsule Take 300 mg by mouth in the morning and at bedtime. Pt state he takes 600mg two daily  Patient not taking: Reported on 10/13/2022 4/7/22   Historical Provider, MD   furosemide (LASIX) 20 MG tablet Take 20 mg by mouth daily 4/1/22   Historical Provider, MD   lisinopril (PRINIVIL;ZESTRIL) 10 MG tablet Take 10 mg by mouth daily 4/7/22   Historical Provider, MD   midodrine (PROAMATINE) 5 MG tablet Take 1 tablet by mouth 3 times daily (with meals)  Patient not taking: Reported on 10/13/2022 12/9/21   Hafsa Alexandre MD   sertraline (ZOLOFT) 100 MG tablet Take 1 tablet by mouth daily 7/17/21   Colleen Quinn MD   folic acid (FOLVITE) 1 MG tablet Take 1 tablet by mouth daily 6/24/21   Faustino Cunha MD   levothyroxine (SYNTHROID) 50 MCG tablet Take 1 tablet by mouth daily Take in the morning on an empty stomach 1 hour before any food or other medications 6/24/21   Faustino Cunha MD   nicotine (NICODERM CQ) 21 MG/24HR Place 1 patch onto the skin every 24 hours  Patient not taking: Reported on 7/22/2022 6/24/21 7/25/22  Faustino Cunha MD   naltrexone (DEPADE) 50 MG tablet Take 50 mg by mouth in the morning.     Historical Provider, MD   prazosin (MINIPRESS) 1 MG capsule Take 1 mg by mouth 2 times daily    Historical Provider, MD   omeprazole (PRILOSEC) 40 MG delayed release capsule Take 40 mg by mouth Daily with supper     Historical Provider, MD   Multiple Vitamins-Minerals (THERAPEUTIC MULTIVITAMIN-MINERALS) tablet Take 1 tablet by mouth daily  Patient not taking: No sig reported 6/3/21 6/3/22  Karma Castillo MD        Allergies:     Patient has no known allergies. Social History:     Tobacco:    reports that he has been smoking cigarettes. He started smoking about 44 years ago. He has a 64.50 pack-year smoking history. He has never used smokeless tobacco.  Alcohol:      reports current alcohol use of about 14.0 standard drinks per week. Drug Use:  reports that he does not currently use drugs after having used the following drugs: Marijuana Dietra Due). Family History:     Family History   Problem Relation Age of Onset    Breast Cancer Mother         s/p surgical complications    Diabetes Mother     Heart Failure Father     Other Brother         \"bad back\"       Review of Systems:     Positive and Negative as described in HPI. ROS as per above noted     Physical Exam:   BP (!) 140/92   Pulse 92   Temp 98.3 °F (36.8 °C) (Oral)   Resp 19   Wt 205 lb (93 kg)   SpO2 95%   BMI 27.05 kg/m²   Temp (24hrs), Av.3 °F (36.8 °C), Min:98.3 °F (36.8 °C), Max:98.3 °F (36.8 °C)    No results for input(s): POCGLU in the last 72 hours. No intake or output data in the 24 hours ending 10/31/22 0303    General Appearance: Restless,, tremulous, anxious but overall mood congruent and cooperative.    Mental status: oriented to person, place, and time  Head: normocephalic, atraumatic  Eye: no icterus, redness, pupils equal and reactive, extraocular eye movements intact, conjunctiva clear  Ear: normal external ear, no discharge, hearing intact  Nose: no drainage noted  Mouth: mucous membranes  dry  Neck: supple, no carotid bruits, thyroid not palpable  Lungs: Bilateral equal air entry, clear to ausculation, no wheezing, rales or rhonchi, normal effort  Cardiovascular: normal rate, regular rhythm, no murmur, gallop, rub  Abdomen: Soft, nontender, nondistended, Hyperactive BS  Neurologic: There are no new focal motor or sensory deficits, normal muscle tone and bulk, no abnormal sensation, normal speech, cranial nerves II through XII grossly intact  Skin: No gross lesions, rashes, bruising or bleeding on exposed skin area  Extremities: BLT tremors, worse at rest. peripheral pulses palpable, no pedal edema or calf pain with palpation  Psych: normal affect    Investigations:      Laboratory Testing:  Recent Results (from the past 24 hour(s))   TOX SCR, BLD, ED    Collection Time: 10/30/22 11:21 PM   Result Value Ref Range    Acetaminophen Level <5 (L) 10 - 30 ug/mL    Ethanol <10 <10 mg/dL    Ethanol percent <7.793 <6.475 %    Salicylate Lvl <1 (L) 3 - 10 mg/dL    Toxic Tricyclic Sc,Blood NEGATIVE NEGATIVE   CBC with Auto Differential    Collection Time: 10/30/22 11:21 PM   Result Value Ref Range    WBC 20.7 (H) 3.5 - 11.3 k/uL    RBC 4.44 4.21 - 5.77 m/uL    Hemoglobin 15.0 13.0 - 17.0 g/dL    Hematocrit 44.0 40.7 - 50.3 %    MCV 99.1 82.6 - 102.9 fL    MCH 33.8 (H) 25.2 - 33.5 pg    MCHC 34.1 28.4 - 34.8 g/dL    RDW 13.3 11.8 - 14.4 %    Platelets 000 (H) 482 - 453 k/uL    MPV 9.5 8.1 - 13.5 fL    NRBC Automated 0.0 0.0 per 100 WBC    Immature Granulocytes 1 (H) 0 %    Seg Neutrophils 90 (H) 36 - 66 %    Lymphocytes 1 (L) 24 - 44 %    Monocytes 6 1 - 7 %    Eosinophils % 0 (L) 1 - 4 %    Basophils 2 0 - 2 %    Absolute Immature Granulocyte 0.21 0.00 - 0.30 k/uL    Segs Absolute 18.63 (H) 1.8 - 7.7 k/uL    Absolute Lymph # 0.21 (L) 1.0 - 4.8 k/uL    Absolute Mono # 1.24 (H) 0.1 - 0.8 k/uL    Absolute Eos # 0.00 0.0 - 0.4 k/uL    Basophils Absolute 0.41 (H) 0.0 - 0.2 k/uL    Morphology Normal    Comprehensive Metabolic Panel    Collection Time: 10/30/22 11:21 PM   Result Value Ref Range    Glucose 127 (H) 70 - 99 mg/dL    BUN anxious but stable   -On admission, Na 123-->126-->rate set in ER reduced from 125 to 50.   -on lasix at [facility]  -On sodium 1g tabs to be started tomorrow after finishes this bag of saline. -BMP Q6, neuro checks, no more then 8-10 mmol over 26 hours, home lasix on hold       #myloproliferative Disorder   -CBC showing elevated cell counts in all cell lineages. -on hydroxyurea 500mg daily, odansatron , plans to resume. Appear stable at this time. Outpatient follow up. Concern that [facility] is secondary to disorder? #HTN  -suboptimal, autonomic hyperactivity due to withdrawal effects but BP stabilizing well.   -elevated on arrival but not better controlled  -on lisinopril 10mg at home, furosemide 20mg oral, resume ace. Hold lasix given concern for intravascular dry. Resume when inducated.   -No echo in system      #Hypothryoidism  -TSH wnml, resume home meds.        #Depression  -on zoloft 100mg, seroqiel 300mg nighty  -Qtc monitoring    #BPH  -On prazocin 1mg BID  -PRN bladder scans      VTE ppx via lovenox sub Q

## 2022-10-31 NOTE — ED NOTES
Pt was found to have wet and soiled himself. Cleaned patient, and bed and put new gown and sheet on.   Pt was then provided with more ativan to help with his symptoms     Denis Fox RN  10/31/22 5301

## 2022-10-31 NOTE — PROGRESS NOTES
[address]   INPATIENT OCCUPATIONAL THERAPY  PROGRESS NOTE  Date: 2021  Patient Name: Mandi Hall        MRN: [id number]    : 1960  (62 y.o.)  Gender: male   Referring Practitioner: Matt Ely MD  Diagnosis: Alcohol abuse with intoxication    Restrictions  Restrictions/Precautions: General Precautions, Seizure, Fall Risk  Implants present? : Metal implants (cervical fusion)  Other position/activity restrictions: PT OT eval and treat       Subjective  Subjective: Pt sitting edge of bed upon arrival. Needed bathroom  Patient Currently in Pain: Denies  Overall Orientation Status: Within Functional Limits    Objective           Balance  Sitting Balance: Supervision  Standing Balance: Contact guard assistance     Functional Mobility  Functional - Mobility Device: No device  Activity: To/from bathroom  Assist Level: Stand by assistance  Functional Mobility Comments: Pt demonstrated unsteadiness during functional mobility.     ADL  Toileting: Stand by assistance                      Assessment     Activity Tolerance: Patient limited by fatigue             G CODE       Patient Education:     Learner:patient  Method: demonstration and explanation       Outcome: verbalized concerns     Plan  Continue current plan of care    Goals  Short term goals  Time Frame for Short term goals: By 1-2 visits  Short term goal 1: Pt will complete BADLs with independence and good safety  Short term goal 2: Pt will complete functional transfers/mobility during self care tasks with independence and good safety  Short term goal 3: Pt will tolerate standing 5+ minutes during functional activity of choice with good safety  Short term goal 4: Pt will verbalize/demonstrate good understanding of home safety/fall prevention to increase safety and independence with self care tasks  Short term goal 5: Pt will participate in 15+ minutes of therapeutic exercises/functional activities to increase safety and independence with self care and mobility       OT Individual Minutes  Time In: [address]  Time Out: [address]   Minutes: [address] [place], [place] Xeljanz Counseling: I discussed with the patient the risks of Xeljanz therapy including increased risk of infection, liver issues, headache, diarrhea, or cold symptoms. Live vaccines should be avoided. They were instructed to call if they have any problems.

## 2022-10-31 NOTE — CARE COORDINATION
Received social work consult for consideration of rehab. Met with pt to complete assessment and offer resources. Pt known to SW from previous admissions. Pt states that prior to admission he was drinking 10 beers daily. Pt was at [address] [facility] 3 months ago for detox but states his insurance only allowed him to stay for three days. Pt has been to [place] [id number] in the past but is not interested in returning and is not interested in outpatient treatment. Pt states he does not have a support system and only receives a call once a week from [address] [place] [address] to check on him. He states his brother and daughter live in the [facility] and he does not have much contact with them. Pt states that prior to admission he was struggling caring for himself. RN to have PT/OT evaluate. Provided pt with treatment center list to review. He is unsure if he wants to go to inpatient treatment. Pt will need to be independent with ADL's in order to be considered for alcohol tx. In past pt has been discharged to SNF due to inability to care for self. Will follow.

## 2022-10-31 NOTE — ED PROVIDER NOTES
[facility] ED  Emergency Department Encounter  EmergencyMedicine Resident     Pt Name:Danny Puga  MRN: [id number]  [place] 1960  Date of evaluation: 10/30/22  PCP:  SIMONE Hayden NP    CHIEF COMPLAINT       Chief Complaint   Patient presents with    Withdrawal     Last drink was yesterday    Nausea       HISTORY OF PRESENT ILLNESS  (Location/Symptom, Timing/Onset, Context/Setting, Quality, Duration, Modifying Factors, Severity.)      Burak Mendez is a 65 y.o. male with history of alcohol abuse, myeloproliferative disorder and neuropathy. Patient is a known alcoholic, states that he drinks 6-15 beers daily and has for multiple years now. Has gone through withdrawal multiple times and is done rehab programs. Has never had seizures due to withdrawal or hallucinations due to withdrawal.  Patient's last drink was sometime yesterday, was unable to purchase more alcohol today and began feeling significantly ill. States that his withdrawal symptoms 5. Has been nauseous and vomiting multiple times throughout the day. Is tremulous. States that he is having some difficulty breathing and chest discomfort. Denies any abdominal pain. Denies any fevers, chills. Did not receive any medication per EMS during transport. PAST MEDICAL / SURGICAL / SOCIAL / FAMILY HISTORY      has a past medical history of Alcohol abuse, Anemia, Cancer ([address] Cervical stenosis of spine, Chronic left-sided low back pain with left-sided sciatica, Insomnia, Intentional drug overdose ([address] Left arm numbness, Left lumbar radiculitis, Macrocytosis, Major depressive disorder, recurrent severe without psychotic features ([address] Myeloproliferative disorder ([address] Osteoarthritis of spine with radiculopathy, lumbar region, Severe recurrent major depression without psychotic features ([address] Spondylosis of lumbar region without myelopathy or radiculopathy, and Urinary retention.        has a past surgical history that includes cervical fusion; Appendectomy; Tonsillectomy; Carpal tunnel release; epidural steroid injection (Left, 5/15/2019); Endoscopy, colon, diagnostic; cervical fusion (09/05/2019); and cervical fusion (N/A, 9/5/2019). Social History     Socioeconomic History    Marital status:      Spouse name: Not on file    Number of children: 3    Years of education: Not on file    Highest education level: Not on file   Occupational History    Not on file   Tobacco Use    Smoking status: Every Day     Packs/day: 1.50     Years: 43.00     Pack years: 64.50     Types: Cigarettes     Start date: 9/23/1978    Smokeless tobacco: Never   Vaping Use    Vaping Use: Never used   Substance and Sexual Activity    Alcohol use: Yes     Alcohol/week: 14.0 standard drinks     Types: 14 Cans of beer per week     Comment: heavy alcohol user/ states drinks a pint of vodka occassionaly    Drug use: Not Currently     Types: Marijuana Terrance Goldberg)    Sexual activity: Not Currently   Other Topics Concern    Not on file   Social History Narrative    Not on file     Social Determinants of Health     Financial Resource Strain: Not on file   Food Insecurity: Not on file   Transportation Needs: Not on file   Physical Activity: Not on file   Stress: Not on file   Social Connections: Not on file   Intimate Partner Violence: Not on file   Housing Stability: Not on file       Family History   Problem Relation Age of Onset    Breast Cancer Mother         s/p surgical complications    Diabetes Mother     Heart Failure Father     Other Brother         \"bad back\"       Allergies:  Patient has no known allergies. Home Medications:  Prior to Admission medications    Medication Sig Start Date End Date Taking?  Authorizing Provider   hydroxyurea (HYDREA) 500 MG chemo capsule Take 1 capsule by mouth daily 9/21/22   Marcin Lyman MD   acamprosate (CAMPRAL) 333 MG tablet Take 2 tablets by mouth in the morning and 2 tablets at noon and 2 release capsule Take 40 mg by mouth Daily with supper     Historical Provider, MD   Multiple Vitamins-Minerals (THERAPEUTIC MULTIVITAMIN-MINERALS) tablet Take 1 tablet by mouth daily  Patient not taking: No sig reported 6/3/21 6/3/22  Raquel Rosen MD       REVIEW OF SYSTEMS    (2-9 systems for level 4, 10 or more for level 5)      Review of Systems   Constitutional:  Positive for activity change and appetite change. Negative for chills and fever. HENT:  Negative for facial swelling and trouble swallowing. Eyes:  Negative for visual disturbance. Respiratory:  Positive for shortness of breath. Negative for chest tightness. Cardiovascular:  Positive for chest pain. Gastrointestinal:  Positive for nausea and vomiting. Negative for abdominal distention, abdominal pain and diarrhea. Genitourinary:  Negative for difficulty urinating. Musculoskeletal:  Negative for back pain and neck stiffness. Neurological:  Negative for dizziness, seizures, syncope, numbness and headaches. Psychiatric/Behavioral:  Negative for agitation, confusion and hallucinations. PHYSICAL EXAM   (up to 7 for level 4, 8 or more for level 5)      INITIAL VITALS:   BP (!) 151/89   Pulse 94   Temp 98.3 °F (36.8 °C) (Oral)   Resp 16   Wt 205 lb (93 kg)   SpO2 94%   BMI 27.05 kg/m²     Physical Exam  Constitutional:       General: He is awake. Comments: Patient has tremors   HENT:      Head: Normocephalic and atraumatic. Right Ear: External ear normal.      Left Ear: External ear normal.      Nose: Nose normal.   Eyes:      General: Lids are normal.      Extraocular Movements: Extraocular movements intact. Pupils: Pupils are equal, round, and reactive to light. Cardiovascular:      Rate and Rhythm: Tachycardia present. Pulses: Normal pulses. Heart sounds: Normal heart sounds. Pulmonary:      Effort: Pulmonary effort is normal.      Breath sounds: Normal breath sounds.    Abdominal: Palpations: Abdomen is soft. Tenderness: There is no abdominal tenderness. Musculoskeletal:         General: Normal range of motion. Cervical back: Normal range of motion. Comments: Normal range of motion, strength and sensation intact in all extremities. Skin:     Capillary Refill: Capillary refill takes less than 2 seconds. Neurological:      General: No focal deficit present. Mental Status: He is alert and oriented to person, place, and time. Sensory: Sensation is intact. Motor: Motor function is intact. Psychiatric:         Mood and Affect: Mood normal.         Behavior: Behavior is cooperative. DIFFERENTIAL  DIAGNOSIS     PLAN (LABS / IMAGING / EKG):  Orders Placed This Encounter   Procedures    XR CHEST (2 VW)    TOX SCR, BLD, ED    CBC with Auto Differential    Comprehensive Metabolic Panel    Lipase    Troponin    Brain Natriuretic Peptide    D-Dimer, Quantitative    Comprehensive Metabolic Panel w/ Reflex to MG    Magnesium    Calcium, Ionized    Phosphorus    TSH without Reflex    Hemoglobin A1C    Protime-INR    Blood alcohol level    ADULT DIET;  Regular    Vital signs per unit routine    Notify physician    Notify physician    Bedrest with bathroom privileges    Daily weights    Intake and output    Elevate Head of Bed     Telemetry monitoring - 72 hour duration    Full Code    Inpatient consult to Hospitalist    Inpatient consult to Social Work    Initiate Oxygen Therapy Protocol    Pulse oximetry, continuous    EKG 12 Lead    EKG 12 lead    ADMIT TO INPATIENT    Alcohol and or drug assessment    Seizure precautions    Fall precautions       MEDICATIONS ORDERED:  Orders Placed This Encounter   Medications    0.9 % sodium chloride bolus    DISCONTD: midazolam PF (VERSED) injection 2 mg    LORazepam (ATIVAN) tablet 1 mg    ondansetron (ZOFRAN) injection 4 mg    thiamine tablet 100 mg    DISCONTD: folic acid injection 1 mg    LORazepam (ATIVAN) tablet 2 mg folic acid 1 mg in sodium chloride 0.9 % 50 mL IVPB    hydroxyurea (HYDREA) chemo capsule 500 mg    levothyroxine (SYNTHROID) tablet 50 mcg    lisinopril (PRINIVIL;ZESTRIL) tablet 10 mg    pantoprazole (PROTONIX) tablet 40 mg    0.9 % sodium chloride infusion    sodium chloride flush 0.9 % injection 5-40 mL    sodium chloride flush 0.9 % injection 10 mL    0.9 % sodium chloride infusion    OR Linked Order Group     potassium chloride (KLOR-CON M) extended release tablet 40 mEq     potassium bicarb-citric acid (EFFER-K) effervescent tablet 40 mEq     potassium chloride 10 mEq/100 mL IVPB (Peripheral Line)    enoxaparin (LOVENOX) injection 40 mg     Order Specific Question:   Indication of Use     Answer:   Prophylaxis-DVT/PE    OR Linked Order Group     ondansetron (ZOFRAN-ODT) disintegrating tablet 4 mg     ondansetron (ZOFRAN) injection 4 mg    polyethylene glycol (GLYCOLAX) packet 17 g    OR Linked Order Group     acetaminophen (TYLENOL) tablet 650 mg     acetaminophen (TYLENOL) suppository 650 mg    thiamine (B-1) injection 100 mg    OR Linked Order Group     LORazepam (ATIVAN) tablet 1 mg     LORazepam (ATIVAN) injection 1 mg     LORazepam (ATIVAN) tablet 2 mg     LORazepam (ATIVAN) injection 2 mg     LORazepam (ATIVAN) tablet 3 mg     LORazepam (ATIVAN) injection 3 mg     LORazepam (ATIVAN) tablet 4 mg     LORazepam (ATIVAN) injection 4 mg       DDX: Alcohol withdrawal, pulmonary embolism, ACS, pneumonia    MDM: 65 y.o. male presents today with mass. Patient is a known alcoholic with a last drink sometime yesterday. Patient ran out of money today and was unable to purchase more alcohol. Will obtain cardiac work-up, D-dimer and chest x-ray. Patient has known myeloproliferative disorder. Labs are significant for hyponatremia. There is no concern for infection at this time. Symptoms are consistent with alcohol withdrawal and stress reaction.   Will admit patient to [facility] for further treatment.       EMERGENCY DEPARTMENT COURSE:  ED Course as of 10/31/22 0550   Mon Oct 31, 2022   0000 Heart Rate(!): 106 [SS]   0001 WBC(!): 20.7 [SS]   0001 Tachycardic and elevated WBC likely due to stress reaction from withdrawal and myelodysplastic syndrome.  [SS]   0018 Years neg, Wells neg  [SS]   0025 CXR neg [SS]      ED Course User Index  [SS] Khurram Herrera MD            [place] Coma Scale  Eye Opening: Spontaneous  Best Verbal Response: Oriented  Best Motor Response: Obeys commands  [place] Coma Scale Score: 15  DIAGNOSTIC RESULTS / EMERGENCY DEPARTMENT COURSE / MDM   LAB RESULTS:  Results for orders placed or performed during the hospital encounter of 10/30/22   TOX SCR, BLD, ED   Result Value Ref Range    Acetaminophen Level <5 (L) 10 - 30 ug/mL    Ethanol <10 <10 mg/dL    Ethanol percent <0.071 <1.110 %    Salicylate Lvl <1 (L) 3 - 10 mg/dL    Toxic Tricyclic Sc,Blood NEGATIVE NEGATIVE   CBC with Auto Differential   Result Value Ref Range    WBC 20.7 (H) 3.5 - 11.3 k/uL    RBC 4.44 4.21 - 5.77 m/uL    Hemoglobin 15.0 13.0 - 17.0 g/dL    Hematocrit 44.0 40.7 - 50.3 %    MCV 99.1 82.6 - 102.9 fL    MCH 33.8 (H) 25.2 - 33.5 pg    MCHC 34.1 28.4 - 34.8 g/dL    RDW 13.3 11.8 - 14.4 %    Platelets 229 (H) 115 - 453 k/uL    MPV 9.5 8.1 - 13.5 fL    NRBC Automated 0.0 0.0 per 100 WBC    Immature Granulocytes 1 (H) 0 %    Seg Neutrophils 90 (H) 36 - 66 %    Lymphocytes 1 (L) 24 - 44 %    Monocytes 6 1 - 7 %    Eosinophils % 0 (L) 1 - 4 %    Basophils 2 0 - 2 %    Absolute Immature Granulocyte 0.21 0.00 - 0.30 k/uL    Segs Absolute 18.63 (H) 1.8 - 7.7 k/uL    Absolute Lymph # 0.21 (L) 1.0 - 4.8 k/uL    Absolute Mono # 1.24 (H) 0.1 - 0.8 k/uL    Absolute Eos # 0.00 0.0 - 0.4 k/uL    Basophils Absolute 0.41 (H) 0.0 - 0.2 k/uL    Morphology Normal    Comprehensive Metabolic Panel   Result Value Ref Range    Glucose 127 (H) 70 - 99 mg/dL    BUN 4 (L) 8 - 23 mg/dL    Creatinine 0.59 (L) 0.70 - 1.20 mg/dL    Est, Glom Filt Rate >60 >60 mL/min/1.73m2    Calcium 9.4 8.6 - 10.4 mg/dL    Sodium 123 (L) 135 - 144 mmol/L    Potassium 4.0 3.7 - 5.3 mmol/L    Chloride 88 (L) 98 - 107 mmol/L    CO2 12 (L) 20 - 31 mmol/L    Anion Gap 23 (H) 9 - 17 mmol/L    Alkaline Phosphatase 139 (H) 40 - 129 U/L    ALT 56 (H) 5 - 41 U/L     (H) <40 U/L    Total Bilirubin 1.1 0.3 - 1.2 mg/dL    Total Protein 8.0 6.4 - 8.3 g/dL    Albumin 4.4 3.5 - 5.2 g/dL    Albumin/Globulin Ratio 1.2 1.0 - 2.5   Lipase   Result Value Ref Range    Lipase 29 13 - 60 U/L   Troponin   Result Value Ref Range    Troponin, High Sensitivity 25 (H) 0 - 22 ng/L   Troponin   Result Value Ref Range    Troponin, High Sensitivity 24 (H) 0 - 22 ng/L   Brain Natriuretic Peptide   Result Value Ref Range    Pro- (H) <300 pg/mL   D-Dimer, Quantitative   Result Value Ref Range    D-Dimer, Quant 0.53 mg/L FEU   Comprehensive Metabolic Panel w/ Reflex to MG   Result Value Ref Range    Glucose 114 (H) 70 - 99 mg/dL    BUN 5 (L) 8 - 23 mg/dL    Creatinine 0.47 (L) 0.70 - 1.20 mg/dL    Est, Glom Filt Rate >60 >60 mL/min/1.73m2    Calcium 8.8 8.6 - 10.4 mg/dL    Sodium 126 (L) 135 - 144 mmol/L    Potassium 4.1 3.7 - 5.3 mmol/L    Chloride 94 (L) 98 - 107 mmol/L    CO2 17 (L) 20 - 31 mmol/L    Anion Gap 15 9 - 17 mmol/L    Alkaline Phosphatase 125 40 - 129 U/L    ALT 50 (H) 5 - 41 U/L     (H) <40 U/L    Total Bilirubin 1.0 0.3 - 1.2 mg/dL    Total Protein 7.3 6.4 - 8.3 g/dL    Albumin 3.9 3.5 - 5.2 g/dL    Albumin/Globulin Ratio 1.1 1.0 - 2.5   Calcium, Ionized   Result Value Ref Range    Calcium, Ionized 1.06 (L) 1.13 - 1.33 mmol/L   Phosphorus   Result Value Ref Range    Phosphorus 2.8 2.5 - 4.5 mg/dL   TSH without Reflex   Result Value Ref Range    TSH 1.29 0.30 - 5.00 uIU/mL   Blood alcohol level   Result Value Ref Range    Ethanol 10 (H) <10 mg/dL    Ethanol percent 0.010 (H) <0.010 %           RADIOLOGY:  XR CHEST (2 VW)   Final Result   No acute process. PROCEDURES:  None    CONSULTS:  IP CONSULT TO HOSPITALIST  IP CONSULT TO SOCIAL WORK    CRITICAL CARE:  5 minutes    FINAL IMPRESSION      1. Alcohol withdrawal syndrome without complication ([facility]    2. Hyponatremia          DISPOSITION / PLAN     DISPOSITION Admitted 10/31/2022 02:38:02 AM      PATIENT REFERRED TO:  No follow-up provider specified.     DISCHARGE MEDICATIONS:  New Prescriptions    No medications on file       Danisha Rob MD  Emergency Medicine Resident    (Please note that portions of thisnote were completed with a voice recognition program.  Efforts were made to edit the dictations but occasionally words are mis-transcribed.)        Danisha Rob MD  Resident  10/31/22 8680

## 2022-10-31 NOTE — ED NOTES
The following labs were labeled with appropriate pt sticker and tubed to lab:     [] Blue     [] Lavender   [] on ice  [x] Green/yellow  [] Green/black [] on ice  [] Nicole Tanisha  [] on ice  [] Yellow  [] Red  [] Type/ Screen  [] ABG  [] VBG    [] COVID-19 swab    [] Rapid  [] PCR  [] Flu swab  [] Peds Viral Panel     [] Urine Sample  [] Pelvic Cultures  [] Blood Cultures  [] X 2  [] STREP Cultures         Adrienne Coombs RN  10/31/22 0586

## 2022-11-01 LAB
ANION GAP SERPL CALCULATED.3IONS-SCNC: 11 MMOL/L (ref 9–17)
ANION GAP SERPL CALCULATED.3IONS-SCNC: 14 MMOL/L (ref 9–17)
ANION GAP SERPL CALCULATED.3IONS-SCNC: 15 MMOL/L (ref 9–17)
BUN BLDV-MCNC: 6 MG/DL (ref 8–23)
BUN BLDV-MCNC: 7 MG/DL (ref 8–23)
BUN BLDV-MCNC: 9 MG/DL (ref 8–23)
CALCIUM SERPL-MCNC: 8.5 MG/DL (ref 8.6–10.4)
CALCIUM SERPL-MCNC: 8.9 MG/DL (ref 8.6–10.4)
CALCIUM SERPL-MCNC: 9.2 MG/DL (ref 8.6–10.4)
CHLORIDE BLD-SCNC: 100 MMOL/L (ref 98–107)
CHLORIDE BLD-SCNC: 100 MMOL/L (ref 98–107)
CHLORIDE BLD-SCNC: 101 MMOL/L (ref 98–107)
CO2: 21 MMOL/L (ref 20–31)
CO2: 21 MMOL/L (ref 20–31)
CO2: 22 MMOL/L (ref 20–31)
CREAT SERPL-MCNC: 0.48 MG/DL (ref 0.7–1.2)
CREAT SERPL-MCNC: 0.52 MG/DL (ref 0.7–1.2)
CREAT SERPL-MCNC: 0.66 MG/DL (ref 0.7–1.2)
GFR SERPL CREATININE-BSD FRML MDRD: >60 ML/MIN/1.73M2
GLUCOSE BLD-MCNC: 103 MG/DL (ref 70–99)
GLUCOSE BLD-MCNC: 61 MG/DL (ref 70–99)
GLUCOSE BLD-MCNC: 82 MG/DL (ref 70–99)
HCT VFR BLD CALC: 41.4 % (ref 40.7–50.3)
HEMOGLOBIN: 14 G/DL (ref 13–17)
INR BLD: 1.4
MAGNESIUM: 1.9 MG/DL (ref 1.6–2.6)
MAGNESIUM: 1.9 MG/DL (ref 1.6–2.6)
MCH RBC QN AUTO: 35.4 PG (ref 25.2–33.5)
MCHC RBC AUTO-ENTMCNC: 33.8 G/DL (ref 28.4–34.8)
MCV RBC AUTO: 104.5 FL (ref 82.6–102.9)
NRBC AUTOMATED: 0 PER 100 WBC
PDW BLD-RTO: 13.3 % (ref 11.8–14.4)
PLATELET # BLD: 508 K/UL (ref 138–453)
PMV BLD AUTO: 10.4 FL (ref 8.1–13.5)
POTASSIUM SERPL-SCNC: 3.2 MMOL/L (ref 3.7–5.3)
POTASSIUM SERPL-SCNC: 3.3 MMOL/L (ref 3.7–5.3)
POTASSIUM SERPL-SCNC: 3.7 MMOL/L (ref 3.7–5.3)
PROTHROMBIN TIME: 14.7 SEC (ref 9.1–12.3)
RBC # BLD: 3.96 M/UL (ref 4.21–5.77)
SODIUM BLD-SCNC: 132 MMOL/L (ref 135–144)
SODIUM BLD-SCNC: 135 MMOL/L (ref 135–144)
SODIUM BLD-SCNC: 138 MMOL/L (ref 135–144)
WBC # BLD: 12.4 K/UL (ref 3.5–11.3)

## 2022-11-01 PROCEDURE — 6370000000 HC RX 637 (ALT 250 FOR IP): Performed by: NURSE PRACTITIONER

## 2022-11-01 PROCEDURE — 80307 DRUG TEST PRSMV CHEM ANLYZR: CPT

## 2022-11-01 PROCEDURE — 85610 PROTHROMBIN TIME: CPT

## 2022-11-01 PROCEDURE — 85027 COMPLETE CBC AUTOMATED: CPT

## 2022-11-01 PROCEDURE — 2060000000 HC ICU INTERMEDIATE R&B

## 2022-11-01 PROCEDURE — 2580000003 HC RX 258: Performed by: NURSE PRACTITIONER

## 2022-11-01 PROCEDURE — 80048 BASIC METABOLIC PNL TOTAL CA: CPT

## 2022-11-01 PROCEDURE — 83735 ASSAY OF MAGNESIUM: CPT

## 2022-11-01 PROCEDURE — 6370000000 HC RX 637 (ALT 250 FOR IP): Performed by: INTERNAL MEDICINE

## 2022-11-01 PROCEDURE — 36415 COLL VENOUS BLD VENIPUNCTURE: CPT

## 2022-11-01 PROCEDURE — 6370000000 HC RX 637 (ALT 250 FOR IP): Performed by: FAMILY MEDICINE

## 2022-11-01 PROCEDURE — 6360000002 HC RX W HCPCS: Performed by: NURSE PRACTITIONER

## 2022-11-01 PROCEDURE — 99232 SBSQ HOSP IP/OBS MODERATE 35: CPT | Performed by: NURSE PRACTITIONER

## 2022-11-01 RX ORDER — CHLORDIAZEPOXIDE HYDROCHLORIDE 5 MG/1
10 CAPSULE, GELATIN COATED ORAL 4 TIMES DAILY
Status: DISCONTINUED | OUTPATIENT
Start: 2022-11-01 | End: 2022-11-03 | Stop reason: HOSPADM

## 2022-11-01 RX ADMIN — ACAMPROSATE CALCIUM 666 MG: 333 TABLET, DELAYED RELEASE ORAL at 08:02

## 2022-11-01 RX ADMIN — SODIUM CHLORIDE TAB 1 GM 1 G: 1 TAB at 08:01

## 2022-11-01 RX ADMIN — FOLIC ACID 1 MG: 1 TABLET ORAL at 08:02

## 2022-11-01 RX ADMIN — ENOXAPARIN SODIUM 40 MG: 100 INJECTION SUBCUTANEOUS at 08:01

## 2022-11-01 RX ADMIN — LISINOPRIL 10 MG: 10 TABLET ORAL at 08:01

## 2022-11-01 RX ADMIN — SODIUM CHLORIDE TAB 1 GM 1 G: 1 TAB at 14:04

## 2022-11-01 RX ADMIN — GABAPENTIN 300 MG: 300 CAPSULE ORAL at 20:50

## 2022-11-01 RX ADMIN — QUETIAPINE FUMARATE 300 MG: 300 TABLET ORAL at 20:50

## 2022-11-01 RX ADMIN — GABAPENTIN 300 MG: 300 CAPSULE ORAL at 08:02

## 2022-11-01 RX ADMIN — PRAZOSIN HYDROCHLORIDE 1 MG: 1 CAPSULE ORAL at 08:02

## 2022-11-01 RX ADMIN — PRAZOSIN HYDROCHLORIDE 1 MG: 1 CAPSULE ORAL at 20:50

## 2022-11-01 RX ADMIN — SODIUM CHLORIDE TAB 1 GM 1 G: 1 TAB at 18:21

## 2022-11-01 RX ADMIN — LEVOTHYROXINE SODIUM 50 MCG: 50 TABLET ORAL at 08:02

## 2022-11-01 RX ADMIN — SERTRALINE 100 MG: 50 TABLET, FILM COATED ORAL at 08:02

## 2022-11-01 RX ADMIN — SODIUM CHLORIDE, PRESERVATIVE FREE 10 ML: 5 INJECTION INTRAVENOUS at 08:06

## 2022-11-01 RX ADMIN — CHLORDIAZEPOXIDE HYDROCHLORIDE 10 MG: 5 CAPSULE ORAL at 14:04

## 2022-11-01 RX ADMIN — SODIUM CHLORIDE, PRESERVATIVE FREE 10 ML: 5 INJECTION INTRAVENOUS at 20:50

## 2022-11-01 RX ADMIN — CHLORDIAZEPOXIDE HYDROCHLORIDE 10 MG: 5 CAPSULE ORAL at 20:50

## 2022-11-01 RX ADMIN — HYDROXYUREA 500 MG: 500 CAPSULE ORAL at 08:01

## 2022-11-01 RX ADMIN — THIAMINE HYDROCHLORIDE 100 MG: 100 INJECTION, SOLUTION INTRAMUSCULAR; INTRAVENOUS at 08:02

## 2022-11-01 RX ADMIN — PANTOPRAZOLE SODIUM 40 MG: 40 TABLET, DELAYED RELEASE ORAL at 08:02

## 2022-11-01 NOTE — PROGRESS NOTES
[facility]  Office: [address], DO, Cat Pride, DO, Cecilia White, DO, Franklyn Alana Casey, DO, Emiel Estrada MD, Octavio Erickson MD, Bg Norwood MD, Luvenia Duverney, MD,  Kali Norton MD, Ludmila Bryan MD, Rogerio Kim, DO, Pastora Rangel MD,  Renee Mott MD, Lian Lockwood MD, lAma Vo DO, Brice Cardoso MD, Asael Lozada MD, Christine Cook DO, Julio Cesar Rob MD, Hola Veras MD, Ayesha Tripathi MD, Brian Ivy MD, Karyna Johnston, DO, Winter Zamora MD, Leah Shaffer MD, Camilo Enciso, CNP,  Dinorah Zaragoza, CNP, Silver Cordon, CNP, Veronica Leger, CNP,  Ayad Pascal, [facility], Gopi Escalante, CNP, Robyn Huston, CNP, Stephanie Sy, CNP, Kendy Whitehead, CNP, Fanny Ayers, CNP, CHIVO KennedyC, Ezequiel Santos, [facility], [place] , [facility], Nitin King, CNP, Silver Naqvi, CNP, Rodrick Kessler, CNP         Louise Shields [address]    Progress Note    11/1/2022    1:12 PM    Name:   Josephine Malik  MRN:     [id number]     Acct:      [de-identified]   Room:   [facility] [place] [address] Day:  1  Admit Date:  10/30/2022 11:10 PM    PCP:   SIMONE Estrada NP  Code Status:  Full Code    Subjective:     C/C:   Chief Complaint   Patient presents with    Withdrawal     Last drink was yesterday    Nausea     Interval History Status: improved. Patient evaluated in room resting in bed, no events overnight, still having resting tremors, states he does not want to quit drinking    Brief History:     Josephine Malik is a 65 y.o. male with underlying Etoh abuse and dependence, myloprliferative disorder, chronic lower back pain w/ radiculopathy, left arm numbness and polysubstance abuse presents to hospital with request to help him quit drinking/ previous attempts in past have been unsuccessful.  Patient drinks 6-10 beers per day with last drink was last night as he doesn't have funds or energy to continue Problem: Discharge Planning  Goal: Discharge Planning (Adult, OB, Behavioral, Peds)  Outpatient/Observation goals to be met before discharge home:     1.  Diagnostic tests and consults completed and resulted:  NO, AM labs scheduled   2.  Vital signs normal or at pt baseline:  YES  Nurse to notify provider when observation goals have been met and pt is ready for discharge.           drinking today, thus came in for assistance. Review of Systems:     Constitutional:  negative for chills, fevers, sweats  Respiratory:  negative for cough, dyspnea on exertion, shortness of breath, wheezing  Cardiovascular:  negative for chest pain, chest pressure/discomfort, lower extremity edema, palpitations  Gastrointestinal:  negative for abdominal pain, constipation, diarrhea, nausea, vomiting  Neurological:  negative for dizziness, headache, + tremor     Medications:      Allergies:  No Known Allergies    Current Meds:   Scheduled Meds:    chlordiazePOXIDE  10 mg Oral 4x Daily    hydroxyurea  500 mg Oral Daily    levothyroxine  50 mcg Oral Daily    lisinopril  10 mg Oral Daily    pantoprazole  40 mg Oral QAM AC    sodium chloride flush  5-40 mL IntraVENous 2 times per day    enoxaparin  40 mg SubCUTAneous Daily    thiamine  100 mg IntraVENous Daily    folic acid  1 mg Oral Daily    gabapentin  300 mg Oral BID    prazosin  1 mg Oral BID    QUEtiapine  300 mg Oral Nightly    sertraline  100 mg Oral Daily    sodium chloride  1 g Oral TID WC    nicotine  1 patch TransDERmal Daily     Continuous Infusions:    sodium chloride       PRN Meds: sodium chloride flush, sodium chloride, potassium chloride **OR** potassium alternative oral replacement **OR** potassium chloride, ondansetron **OR** ondansetron, polyethylene glycol, acetaminophen **OR** acetaminophen, dicyclomine, hydrOXYzine    Data:     Past Medical History:   has a past medical history of Alcohol abuse, Anemia, Cancer (HCC), Cervical stenosis of spine, Chronic left-sided low back pain with left-sided sciatica, Insomnia, Intentional drug overdose ([address] Left arm numbness, Left lumbar radiculitis, Macrocytosis, Major depressive disorder, recurrent severe without psychotic features ([address] Myeloproliferative disorder ([address] Osteoarthritis of spine with radiculopathy, lumbar region, Severe recurrent major depression without psychotic features ([address] Spondylosis of lumbar region without myelopathy or radiculopathy, and Urinary retention. Social History:   reports that he has been smoking cigarettes. He started smoking about 44 years ago. He has a 64.50 pack-year smoking history. He has never used smokeless tobacco. He reports current alcohol use of about 14.0 standard drinks per week. He reports that he does not currently use drugs after having used the following drugs: Marijuana Jeovanny Bell). Family History:   Family History   Problem Relation Age of Onset    Breast Cancer Mother         s/p surgical complications    Diabetes Mother     Heart Failure Father     Other Brother         \"bad back\"       Vitals:  BP 93/71   Pulse (!) 105   Temp 97.4 °F (36.3 °C) (Oral)   Resp 22   Wt 205 lb (93 kg)   SpO2 91%   BMI 27.05 kg/m²   Temp (24hrs), Av.8 °F (36.6 °C), Min:97.4 °F (36.3 °C), Max:98.7 °F (37.1 °C)    No results for input(s): POCGLU in the last 72 hours. I/O (24Hr):   No intake or output data in the 24 hours ending 22 1312    Labs:  Hematology:  Recent Labs     10/30/22  2321 11/01/22  0554   WBC 20.7* 12.4*   RBC 4.44 3.96*   HGB 15.0 14.0   HCT 44.0 41.4   MCV 99.1 104.5*   MCH 33.8* 35.4*   MCHC 34.1 33.8   RDW 13.3 13.3   * 508*   MPV 9.5 10.4   INR  --  1.4   DDIMER 0.53  --      Chemistry:  Recent Labs     10/30/22  2321 10/31/22  0034 10/31/22  0344 10/31/22  1922 22  0027   *  --  126* 129* 132*   K 4.0  --  4.1 3.9 3.3*   CL 88*  --  94* 96* 100   CO2 12*  --  17* 20 21   GLUCOSE 127*  --  114* 88 82   BUN 4*  --  5* 6* 6*   CREATININE 0.59*  --  0.47* 0.53* 0.48*   MG  --   --   --   --  1.9   ANIONGAP 23*  --  15 13 11   LABGLOM >60  --  >60 >60 >60   CALCIUM 9.4  --  8.8 8.9 8.5*   CAION  --   --  1.06*  --   --    PHOS  --   --  2.8  --   --    PROBNP 486*  --   --   --   --    TROPHS 25* 24*  --   --   --      Recent Labs     10/30/22  0097 10/31/22  0344   PROT 8.0 7.3   LABALBU 4.4 3.9   LABA1C  --  4.3 TSH  --  1.29   * 126*   ALT 56* 50*   ALKPHOS 139* 125   BILITOT 1.1 1.0   LIPASE 29  --      ABG:  Lab Results   Component Value Date/Time    PH 8.0 01/03/2022 08:23 PM     Lab Results   Component Value Date/Time    SPECIAL NOT REPORTED 12/03/2021 02:38 PM     Lab Results   Component Value Date/Time    CULTURE NORMAL RESPIRATORY MAXIMINO LIGHT GROWTH 07/23/2022 12:20 PM       Radiology:  XR CHEST (2 VW)    Result Date: 10/30/2022  No acute process. Physical Examination:        General appearance:  alert, cooperative and in mild distress, slightly diaphoretic  Mental Status:  oriented to person, place and time and normal affect  Lungs:  clear to auscultation bilaterally, normal effort  Heart: Intermittently tachycardic regular rhythm with unifocal PVCs on bedside telemetry, no murmur  Abdomen:  soft, nontender, nondistended, normal bowel sounds, no masses, hepatomegaly, splenomegaly  Extremities:  no edema, redness, tenderness in the calves, resting tremor bilateral upper extremity  Skin:  no gross lesions, rashes, induration    Assessment:        Hospital Problems             Last Modified POA    * (Principal) Alcohol withdrawal syndrome with complication, with unspecified complication ([facility] 78/33/5756 Yes    Alcohol abuse 11/1/2022 Yes    Substance abuse ([facility] [address] (Chronic) 11/1/2022 Yes    Chronic hyponatremia 11/1/2022 Yes    MDD (major depressive disorder) 11/1/2022 Yes    Hypocalcemia 11/1/2022 Yes    Hypokalemia 11/1/2022 Yes       Plan:        EtOH withdrawal: Discontinue CIWA scale, continue Campral, start Librium for breakthrough WD continue MVI, folic acid. IV fluids. Chronic hyponatremia: From poor solute intake secondary to #1. Improving.   Continue sodium chloride 1 g 3 times daily, home medication  Hypokalemia: Replete today, recheck at 1400  Hypocalcemia: From #1  Elevated ALT/AST: As above  Elevated INR: Off anticoagulation, secondary from EtOH abuse, platelet count reviewed  History of polysubstance abuse:  Including nicotine, urine drug screen added on  GI/DVT prophylaxis: Protonix, Lovenox  Dispo: Home after social work states patient, PT/OT     SIMONE Stokes NP  11/1/2022  1:12 PM

## 2022-11-01 NOTE — CARE COORDINATION
11/01/22 1152   Service Assessment   Patient Orientation Alert and Oriented   Cognition Alert   History Provided By Patient   Primary [address]  Family Members   Patient's Healthcare Decision Maker is: Named in [facility]   PCP Verified by CM Yes   Last Visit to PCP Within last 3 months   Prior Functional Level Independent in ADLs/IADLs   Current Functional Level Independent in ADLs/IADLs   Can patient return to prior living arrangement Yes   Ability to make needs known: Fair   Family able to assist with home care needs: No   Would you like for me to discuss the discharge plan with any other family members/significant others, and if so, who? No   Financial Resources Medicare   Community Resources Transportation   CM/SW Referral Abuse or neglect concerns   Social/Functional History   Lives With Alone   Type of Home Senior housing apartment   Home Layout Multi-level;Performs ADL's on one level   [address]   Bathroom Toilet Handicap height   [address] [facility] bars in shower;Hand-held shower;Built-in shower seat   Bathroom Accessibility Wheelchair accessible   Home Equipment Cane;Walker, standard; [address] Help From   (aides through [facility])   [address] Responsibilities Yes   Ambulation Assistance Independent   Transfer Assistance Independent   Active  No   Mode of Transportation   (apartment has bus for transport services)   Occupation On disability   Discharge Planning   Type of [address] Prior To Admission Home Care;Transportation   Potential Assistance Needed Transportation   DME Ordered?  No   Type of Home Care Services None   Patient expects to be discharged to: [facility] Discharge   Transition of Care Consult (CM Consult) Discharge Planning;Transportation Assistance   Services At/After Discharge None   [employer] Information Provided? No   Confirm Follow Up Transport Wheelchair Kitty Molly   Condition of Participation: Discharge Planning   The Plan for Transition of Care is related to the following treatment goals: safety, pain control   The Patient and/or Patient Representative was provided with a Choice of Provider? Patient   The Patient and/Or Patient Representative agree with the Discharge Plan? Yes   Freedom of Choice list was provided with basic dialogue that supports the patient's individualized plan of care/goals, treatment preferences, and shares the quality data associated with the providers?   Yes   Demographics verified and reviewed,Patient plans to return home, has no needs , may need transportation , has all DME, has HHAs through [facility]

## 2022-11-01 NOTE — PLAN OF CARE
Problem: Discharge Planning  Goal: Discharge to home or other facility with appropriate resources  11/1/2022 0037 by Gabriella Patino RN  Outcome: Progressing     Problem: Skin/Tissue Integrity  Goal: Absence of new skin breakdown  Description: 1. Monitor for areas of redness and/or skin breakdown  2. Assess vascular access sites hourly  3. Every 4-6 hours minimum:  Change oxygen saturation probe site  4. Every 4-6 hours:  If on nasal continuous positive airway pressure, respiratory therapy assess nares and determine need for appliance change or resting period.   11/1/2022 0037 by Gabriella Patino RN  Outcome: Progressing

## 2022-11-01 NOTE — CARE COORDINATION
Met with pt to f/u on alcohol tx options. Pt stated he has not thought too much about it yet and is uncertain at this time what he wants to do. Pt went on to state that he lives alone, has had recent falls at home, and difficulty taking care of himself. Explained to pt that PT/OT have been ordered. Pt stated he has been to a SNF in the past at [facility] and liked it there. Pt stated he would like a SNF (if he qualifies). CM updated.   SW will cont to follow - he would need to be independent with ADLs/ambulation for alcohol tx

## 2022-11-01 NOTE — PLAN OF CARE
Problem: Discharge Planning  Goal: Discharge to home or other facility with appropriate resources  11/1/2022 1048 by Donna Fitzgerald RN  Outcome: Progressing  Flowsheets (Taken 11/1/2022 0800)  Discharge to home or other facility with appropriate resources: Identify barriers to discharge with patient and caregiver  11/1/2022 0037 by Usman Moreau RN  Outcome: Progressing  11/1/2022 0037 by Usman Moreau RN  Outcome: Progressing  Flowsheets  Taken 10/31/2022 2030 by Usman Moreau RN  Discharge to home or other facility with appropriate resources: Identify barriers to discharge with patient and caregiver  Taken 10/31/2022 1338 by Clair Butt RN  Discharge to home or other facility with appropriate resources:   Identify barriers to discharge with patient and caregiver   Arrange for needed discharge resources and transportation as appropriate   Identify discharge learning needs (meds, wound care, etc)   Refer to discharge planning if patient needs post-hospital services based on physician order or complex needs related to functional status, cognitive ability or social support system     Problem: Skin/Tissue Integrity  Goal: Absence of new skin breakdown  Description: 1. Monitor for areas of redness and/or skin breakdown  2. Assess vascular access sites hourly  3. Every 4-6 hours minimum:  Change oxygen saturation probe site  4. Every 4-6 hours:  If on nasal continuous positive airway pressure, respiratory therapy assess nares and determine need for appliance change or resting period.   11/1/2022 1048 by Donna Fitzgerald RN  Outcome: Progressing  11/1/2022 0037 by Usman Moreau RN  Outcome: Progressing  11/1/2022 0037 by Usman Moreau RN  Outcome: Progressing

## 2022-11-01 NOTE — PROGRESS NOTES
Patient was on bedside commode when he leaned forward ad slumped onto his knees and hands. Patient did not hit head. Patient caught his fall and denies any pain. No visible injuries noted.

## 2022-11-02 PROBLEM — E87.6 HYPOKALEMIA: Status: RESOLVED | Noted: 2021-12-03 | Resolved: 2022-11-02

## 2022-11-02 PROBLEM — E83.51 HYPOCALCEMIA: Status: RESOLVED | Noted: 2021-11-29 | Resolved: 2022-11-02

## 2022-11-02 LAB
AMPHETAMINE SCREEN URINE: NEGATIVE
ANION GAP SERPL CALCULATED.3IONS-SCNC: 12 MMOL/L (ref 9–17)
BARBITURATE SCREEN URINE: NEGATIVE
BENZODIAZEPINE SCREEN, URINE: NEGATIVE
BUN BLDV-MCNC: 8 MG/DL (ref 8–23)
CALCIUM SERPL-MCNC: 8.7 MG/DL (ref 8.6–10.4)
CANNABINOID SCREEN URINE: POSITIVE
CHLORIDE BLD-SCNC: 101 MMOL/L (ref 98–107)
CO2: 22 MMOL/L (ref 20–31)
COCAINE METABOLITE, URINE: NEGATIVE
CREAT SERPL-MCNC: 0.61 MG/DL (ref 0.7–1.2)
FENTANYL URINE: POSITIVE
GFR SERPL CREATININE-BSD FRML MDRD: >60 ML/MIN/1.73M2
GLUCOSE BLD-MCNC: 109 MG/DL (ref 70–99)
HCT VFR BLD CALC: 40.1 % (ref 40.7–50.3)
HEMOGLOBIN: 13.8 G/DL (ref 13–17)
MAGNESIUM: 1.9 MG/DL (ref 1.6–2.6)
MCH RBC QN AUTO: 33.5 PG (ref 25.2–33.5)
MCHC RBC AUTO-ENTMCNC: 34.4 G/DL (ref 28.4–34.8)
MCV RBC AUTO: 97.3 FL (ref 82.6–102.9)
METHADONE SCREEN, URINE: NEGATIVE
NRBC AUTOMATED: 0 PER 100 WBC
OPIATES, URINE: NEGATIVE
OXYCODONE SCREEN URINE: POSITIVE
PDW BLD-RTO: 13.1 % (ref 11.8–14.4)
PHENCYCLIDINE, URINE: NEGATIVE
PLATELET # BLD: 401 K/UL (ref 138–453)
PMV BLD AUTO: 10.2 FL (ref 8.1–13.5)
POTASSIUM SERPL-SCNC: 3.1 MMOL/L (ref 3.7–5.3)
POTASSIUM SERPL-SCNC: 3.1 MMOL/L (ref 3.7–5.3)
RBC # BLD: 4.12 M/UL (ref 4.21–5.77)
SODIUM BLD-SCNC: 135 MMOL/L (ref 135–144)
TEST INFORMATION: ABNORMAL
WBC # BLD: 12.2 K/UL (ref 3.5–11.3)

## 2022-11-02 PROCEDURE — 97162 PT EVAL MOD COMPLEX 30 MIN: CPT

## 2022-11-02 PROCEDURE — 6370000000 HC RX 637 (ALT 250 FOR IP): Performed by: NURSE PRACTITIONER

## 2022-11-02 PROCEDURE — 85027 COMPLETE CBC AUTOMATED: CPT

## 2022-11-02 PROCEDURE — 2580000003 HC RX 258: Performed by: NURSE PRACTITIONER

## 2022-11-02 PROCEDURE — 6360000002 HC RX W HCPCS: Performed by: NURSE PRACTITIONER

## 2022-11-02 PROCEDURE — 36415 COLL VENOUS BLD VENIPUNCTURE: CPT

## 2022-11-02 PROCEDURE — 83735 ASSAY OF MAGNESIUM: CPT

## 2022-11-02 PROCEDURE — 97116 GAIT TRAINING THERAPY: CPT

## 2022-11-02 PROCEDURE — 99232 SBSQ HOSP IP/OBS MODERATE 35: CPT | Performed by: NURSE PRACTITIONER

## 2022-11-02 PROCEDURE — 97530 THERAPEUTIC ACTIVITIES: CPT

## 2022-11-02 PROCEDURE — 6370000000 HC RX 637 (ALT 250 FOR IP): Performed by: INTERNAL MEDICINE

## 2022-11-02 PROCEDURE — 80048 BASIC METABOLIC PNL TOTAL CA: CPT

## 2022-11-02 PROCEDURE — 6370000000 HC RX 637 (ALT 250 FOR IP): Performed by: FAMILY MEDICINE

## 2022-11-02 PROCEDURE — 97535 SELF CARE MNGMENT TRAINING: CPT

## 2022-11-02 PROCEDURE — 97166 OT EVAL MOD COMPLEX 45 MIN: CPT

## 2022-11-02 PROCEDURE — 84132 ASSAY OF SERUM POTASSIUM: CPT

## 2022-11-02 PROCEDURE — 2060000000 HC ICU INTERMEDIATE R&B

## 2022-11-02 RX ORDER — CALCIUM CARBONATE 200(500)MG
1000 TABLET,CHEWABLE ORAL 3 TIMES DAILY PRN
Status: DISCONTINUED | OUTPATIENT
Start: 2022-11-02 | End: 2022-11-03 | Stop reason: HOSPADM

## 2022-11-02 RX ORDER — NICOTINE 21 MG/24HR
1 PATCH, TRANSDERMAL 24 HOURS TRANSDERMAL DAILY
Qty: 30 PATCH | Refills: 0 | DISCHARGE
Start: 2022-11-03

## 2022-11-02 RX ORDER — SODIUM CHLORIDE AND POTASSIUM CHLORIDE .9; .15 G/100ML; G/100ML
SOLUTION INTRAVENOUS CONTINUOUS
Status: DISCONTINUED | OUTPATIENT
Start: 2022-11-02 | End: 2022-11-03 | Stop reason: HOSPADM

## 2022-11-02 RX ADMIN — FOLIC ACID 1 MG: 1 TABLET ORAL at 09:38

## 2022-11-02 RX ADMIN — ENOXAPARIN SODIUM 40 MG: 100 INJECTION SUBCUTANEOUS at 09:39

## 2022-11-02 RX ADMIN — SODIUM CHLORIDE TAB 1 GM 1 G: 1 TAB at 13:00

## 2022-11-02 RX ADMIN — SODIUM CHLORIDE, PRESERVATIVE FREE 10 ML: 5 INJECTION INTRAVENOUS at 10:06

## 2022-11-02 RX ADMIN — PANTOPRAZOLE SODIUM 40 MG: 40 TABLET, DELAYED RELEASE ORAL at 09:39

## 2022-11-02 RX ADMIN — ANTACID TABLETS 1000 MG: 500 TABLET, CHEWABLE ORAL at 21:05

## 2022-11-02 RX ADMIN — CHLORDIAZEPOXIDE HYDROCHLORIDE 10 MG: 5 CAPSULE ORAL at 13:01

## 2022-11-02 RX ADMIN — LISINOPRIL 10 MG: 10 TABLET ORAL at 09:39

## 2022-11-02 RX ADMIN — SODIUM CHLORIDE, PRESERVATIVE FREE 10 ML: 5 INJECTION INTRAVENOUS at 21:07

## 2022-11-02 RX ADMIN — SODIUM CHLORIDE TAB 1 GM 1 G: 1 TAB at 16:09

## 2022-11-02 RX ADMIN — HYDROXYUREA 500 MG: 500 CAPSULE ORAL at 09:39

## 2022-11-02 RX ADMIN — POTASSIUM CHLORIDE AND SODIUM CHLORIDE: 900; 150 INJECTION, SOLUTION INTRAVENOUS at 23:32

## 2022-11-02 RX ADMIN — SODIUM CHLORIDE TAB 1 GM 1 G: 1 TAB at 09:38

## 2022-11-02 RX ADMIN — THIAMINE HYDROCHLORIDE 100 MG: 100 INJECTION, SOLUTION INTRAMUSCULAR; INTRAVENOUS at 09:39

## 2022-11-02 RX ADMIN — SERTRALINE 100 MG: 50 TABLET, FILM COATED ORAL at 09:39

## 2022-11-02 RX ADMIN — CHLORDIAZEPOXIDE HYDROCHLORIDE 10 MG: 5 CAPSULE ORAL at 10:06

## 2022-11-02 RX ADMIN — GABAPENTIN 300 MG: 300 CAPSULE ORAL at 21:05

## 2022-11-02 RX ADMIN — CHLORDIAZEPOXIDE HYDROCHLORIDE 10 MG: 5 CAPSULE ORAL at 21:05

## 2022-11-02 RX ADMIN — PRAZOSIN HYDROCHLORIDE 1 MG: 1 CAPSULE ORAL at 09:38

## 2022-11-02 RX ADMIN — GABAPENTIN 300 MG: 300 CAPSULE ORAL at 09:38

## 2022-11-02 RX ADMIN — PRAZOSIN HYDROCHLORIDE 1 MG: 1 CAPSULE ORAL at 21:05

## 2022-11-02 RX ADMIN — CHLORDIAZEPOXIDE HYDROCHLORIDE 10 MG: 5 CAPSULE ORAL at 16:09

## 2022-11-02 RX ADMIN — QUETIAPINE FUMARATE 300 MG: 300 TABLET ORAL at 21:05

## 2022-11-02 RX ADMIN — LEVOTHYROXINE SODIUM 50 MCG: 50 TABLET ORAL at 09:39

## 2022-11-02 NOTE — CARE COORDINATION
Transitional planning    Writer to room to discuss d/c plan, referral to [address] per request, has been there in the past, provided list to make other choices, currently working with PT.      1450 call to [facility], left  on Lillian (admissions) both provided numbers with request for call back. 1405 call back from Mabel Diego, can accept and will start precert, patient updated and primary RN.

## 2022-11-02 NOTE — PROGRESS NOTES
Occupational Therapy  Facility/Department: [facility]  Occupational Therapy Initial Assessment    Name: Ricardo Deleon  : 1960  MRN: [id number]  Date of Service: 2022    Chief Complaint   Patient presents with    Withdrawal     Last drink was yesterday    Nausea        Discharge Recommendations:   Further therapy recommended at discharge. Patient Diagnosis(es): The primary encounter diagnosis was Alcohol withdrawal syndrome without complication ([address] A diagnosis of Hyponatremia was also pertinent to this visit. Past Medical History:  has a past medical history of Alcohol abuse, Anemia, Cancer ([address] Cervical stenosis of spine, Chronic left-sided low back pain with left-sided sciatica, Insomnia, Intentional drug overdose ([address] Left arm numbness, Left lumbar radiculitis, Macrocytosis, Major depressive disorder, recurrent severe without psychotic features ([address] Myeloproliferative disorder ([address] Osteoarthritis of spine with radiculopathy, lumbar region, Severe recurrent major depression without psychotic features ([address] Spondylosis of lumbar region without myelopathy or radiculopathy, and Urinary retention. Past Surgical History:  has a past surgical history that includes cervical fusion; Appendectomy; Tonsillectomy; Carpal tunnel release; epidural steroid injection (Left, 5/15/2019); Endoscopy, colon, diagnostic; cervical fusion (2019); and cervical fusion (N/A, 2019). Assessment   Performance deficits / Impairments: Decreased functional mobility ; Decreased endurance;Decreased ADL status; Decreased balance;Decreased strength;Decreased safe awareness;Decreased high-level IADLs;Decreased cognition  Assessment: Prior to hospitlization, pt was independent in ADLs. Pt currently displaying decreased ADL status due to above listed deficits, primarily being decreased activity tolerance, safety awareness, and balance.  Pt expected to be unsafe to return to prior living arranagments and would benefit from continued skilled therapy services to promote participation and safety in ADLs. Prognosis: Good  Decision Making: Medium Complexity  REQUIRES OT FOLLOW-UP: Yes  Activity Tolerance  Activity Tolerance: Patient Tolerated treatment well;Patient limited by fatigue      Safety Devices  Type of Devices: Bed alarm in place;Gait belt;Patient at risk for falls; Left in bed  Restraints  Restraints Initially in Place: No    Plan   Occupational Therapy Plan  Times Per Week: 3-4x/wk  Current Treatment Recommendations: Strengthening, Balance training, Endurance training, Functional mobility training, Safety education & training, Patient/Caregiver education & training, Pain management, Equipment evaluation, education, & procurement, Self-Care / ADL, Home management training     Restrictions  Restrictions/Precautions  Restrictions/Precautions: Fall Risk, Seizure, Bedrest with Bathroom Privileges  Required Braces or Orthoses?: No    Subjective   General  Patient assessed for rehabilitation services?: Yes  Family / Caregiver Present: No  Subjective  Subjective: RN ok'd pt for therapy this AM. Pt agreeable for therapy and was cooperative throughout.       Social/Functional History  Social/Functional History  Lives With: Alone  Type of Home: Apartment (4th floor with elevator)  Home Layout: One level  Home Access: Elevator, Level entry  Bathroom Shower/Tub: Tub/Shower unit  Bathroom Toilet: Standard  Bathroom Equipment: Grab bars in shower, Grab bars around toilet  Bathroom Accessibility: Walker accessible  Home Equipment: Rollator (pt reports using rollator occasionally at a baseline for community mobility.)  Has the patient had two or more falls in the past year or any fall with injury in the past year?: Yes (3)  Receives Help From:  (aides through [facility])  ADL Assistance: [address] [place] [address]: Independent  Homemaking Responsibilities: Yes  Ambulation Assistance: Independent  Transfer Assistance: Independent  Active : No  Patient's  Info: cab or living group transportation. Mode of Transportation:  (apartment has bus for transport services)  Occupation: On [address]: watching TV  Additional Comments: Pt reports having limited support       Objective   Vision  Vision: Impaired  Vision Exceptions: Wears glasses for reading  Hearing  Hearing: Within functional limits       Balance  Sitting: Without support (Pt sat EOB unsupported ~10 minutes in total throughout session with CGA d/t slight unsteadiness and dizziness. Pt sat dynamically to don socks for ~4 minutes with CGA d/t SOB. Mod VC's required for breathing techniques.)  Standing: Without support (Pt stood statically with CGA for ~1 minute throughout session and dynamically for ~2 minutes throughout session with CGA due unsteadiness and dizziness.)    Functional Mobility  Overall Level of Assistance: Contact-guard assistance (Pt demo'd functional mobility through hospital room, bathroom, and hospital hallway with CGA d/t decreased activity tolerance and unsteadiness. Pt required Mod VC for safety awareness and RW managment.)    Toilet Transfers  Toilet - Technique: Ambulating (With RW)  Equipment Used: Standard toilet  Toilet Transfer: Moderate assistance  Toilet Transfers Comments: Pt required mod A with toilet transfer d/t decreased strength to stand upright and fatigue. AROM: Within functional limits (BUE.)  Strength:  Within functional limits (4-/5 strength in BUE.)  Coordination: Within functional limits (Appropriate speed/accuracy in bilateral hands.)  Tone: Normal  Sensation: Impaired (Pt reports neuropathy in BLE.)    ADL  Feeding: Independent (Upon arrival, pt independently feeding self.)  Grooming: Stand by assistance  UE Bathing: Contact guard assistance  LE Bathing: Minimal assistance  UE Dressing: Stand by assistance (Pt required SBA to don hospital gown d/t decreased activity tolerance.)  LE Dressing: Minimal assistance (Pt required Min A to don hospital socks d/t dizziness and fatigue. Pt utilized figure 4 method for donning socks.)  Toileting: Moderate assistance (Pt required CGA transfer to toilet, Mod A for transfer off toilet, Min A for LB clothing managment, and SBA for toileting hygiene. Pt demo'd decreased activity tolerance and decreased strength. Max VC's required for RW management and safety awareness.)       Bed mobility  Supine to Sit: Contact guard assistance  Sit to Supine: Minimal assistance  Scooting: Contact guard assistance  Bed Mobility Comments: HOB elevated ~55 degrees with use of bed rails, pt required CGA d/t decreased activity tolerance. Increased time/effort required for task. Transfers  Sit to stand: Contact guard assistance  Stand to sit: Contact guard assistance  Transfer Comments: Pt required CGA for transfers d/t decreased activity tolerance and unsteadness. VC required to push off of bed for sit to stand transfer. Cognition  Overall Cognitive Status: Exceptions  Arousal/Alertness: Delayed responses to stimuli  Following Commands: Follows one step commands with increased time; Follows multistep commands with increased time  Attention Span: Attends with cues to redirect  Safety Judgement: Decreased awareness of need for assistance;Decreased awareness of need for safety  Problem Solving: Assistance required to identify errors made;Assistance required to generate solutions;Assistance required to implement solutions;Decreased awareness of errors  Insights: Decreased awareness of deficits  Initiation: Requires cues for some  Sequencing: Requires cues for some       Education Given To: Patient  Education Provided: Role of Therapy;Plan of Care;ADL Adaptive Strategies;Transfer Training;Energy Conservation; Fall Prevention Strategies; Equipment  Education Method: Demonstration;Verbal  Barriers to Learning: Cognition  Education Outcome: Continued education needed;Verbalized understanding      AM-PAC Score  AM-PAC Inpatient Daily Activity Raw Score: 18 (11/02/22 1042)  AM-PAC Inpatient ADL T-Scale Score : 38.66 (11/02/22 1042)  ADL Inpatient CMS 0-100% Score: 46.65 (11/02/22 1042)  ADL Inpatient CMS G-Code Modifier : CK (11/02/22 1042)    Goals  Short Term Goals  Time Frame for Short Term Goals: By discharge, pt will  Short Term Goal 1: demo UB ADLs with SUP and AE/DME PRN  Short Term Goal 2: demo LB ADLs/toileting with SBA and AE/DME PRN  Short Term Goal 3: demo functional transfers/mobility with SUP and least restrictive AD to engage in ADLs  Short Term Goal 4: demo dynamic standing tolerance of 15+ minutes with SUP to engage in functional tasks  Short Term Goal 5: demo/ID 2 EC/WS techniques with less than 1 VC       Therapy Time   Individual Concurrent Group Co-treatment   Time In 0828         Time Out 0917         Minutes 49         Timed Code Treatment Minutes: 24 Minutes       Mike Benson, S/OT

## 2022-11-02 NOTE — PROGRESS NOTES
Physical Therapy  Facility/Department: [facility]  Physical Therapy Initial Assessment    Name: Carissa Rodriguez  : 1960  MRN: [id number]  Date of Service: 2022  Chief Complaint   Patient presents with    Withdrawal     Last drink was yesterday    Nausea      Discharge Recommendations:  Patient would benefit from continued therapy after discharge   PT Equipment Recommendations  Equipment Needed: Yes  Mobility Devices: Cynthea Erick: Rolling      Patient Diagnosis(es): The primary encounter diagnosis was Alcohol withdrawal syndrome without complication ([address] A diagnosis of Hyponatremia was also pertinent to this visit. Past Medical History:  has a past medical history of Alcohol abuse, Anemia, Cancer ([address] Cervical stenosis of spine, Chronic left-sided low back pain with left-sided sciatica, Insomnia, Intentional drug overdose ([address] Left arm numbness, Left lumbar radiculitis, Macrocytosis, Major depressive disorder, recurrent severe without psychotic features ([address] Myeloproliferative disorder ([address] Osteoarthritis of spine with radiculopathy, lumbar region, Severe recurrent major depression without psychotic features ([address] Spondylosis of lumbar region without myelopathy or radiculopathy, and Urinary retention. Past Surgical History:  has a past surgical history that includes cervical fusion; Appendectomy; Tonsillectomy; Carpal tunnel release; epidural steroid injection (Left, 5/15/2019); Endoscopy, colon, diagnostic; cervical fusion (2019); and cervical fusion (N/A, 2019). Assessment   Body Structures, Functions, Activity Limitations Requiring Skilled Therapeutic Intervention: Decreased functional mobility ; Decreased strength;Decreased cognition;Decreased endurance;Decreased safe awareness;Decreased balance  Assessment: Pt with mobility deficits requiring mod-a to perform sit<>stand transfer, CGA to ambulate 55 feet with a RW .  Pt mildly unsteady with ambulation this date, requires close guarding for all aspects of mobility. Pt is a high fall risk this date secondary to impaired safety awareness, decreased standing balance, and decreased coordination. Pt would benefit from additional PT upon discharge to maximize functional independence. Pt will require 24 hour assistance with mobility upon discharge. Therapy Prognosis: Good  Decision Making: Medium Complexity  Requires PT Follow-Up: Yes  Activity Tolerance  Activity Tolerance: Patient limited by fatigue;Treatment limited secondary to decreased cognition;Patient limited by endurance     Plan   Physcial Therapy Plan  General Plan:  (5-6x/week)  Current Treatment Recommendations: Strengthening, Balance training, Functional mobility training, Transfer training, Gait training, Patient/Caregiver education & training, Safety education & training, Home exercise program, Equipment evaluation, education, & procurement, Therapeutic activities, Endurance training  Safety Devices  Type of Devices: Bed alarm in place, Gait belt, Patient at risk for falls, Left in bed, Call light within reach, Nurse notified, All fall risk precautions in place  Restraints  Restraints Initially in Place: No     Restrictions  Restrictions/Precautions  Restrictions/Precautions: Fall Risk, Seizure, Bedrest with Bathroom Privileges  Required Braces or Orthoses?: No     Subjective   General  Patient assessed for rehabilitation services?: Yes  Response To Previous Treatment: Not applicable  Family / Caregiver Present: No  Follows Commands: Within Functional Limits  Subjective  Subjective: Pt supine in bed and agreeable to therapy, RN agreeable to therapy. Pt pleasant and cooperative throughout today's session. Pt complains of 6/10 pain in the BLEs.          Social/Functional History  Social/Functional History  Lives With: Alone  Type of Home: Apartment (4th floor with elevator)  Home Layout: One level  Home Access: Elevator, Level entry  Bathroom Shower/Tub: Tub/Shower unit  Bathroom Toilet: Standard  Bathroom Equipment: Grab bars in shower, Grab bars around toilet  Bathroom Accessibility: Walker accessible  Home Equipment: Rollator (pt reports using rollator occasionally at a baseline for community mobility.)  Has the patient had two or more falls in the past year or any fall with injury in the past year?: Yes (3)  Receives Help From:  (aides through [facility])  ADL Assistance: [address]: Independent  Homemaking Responsibilities: Yes  Ambulation Assistance: Independent  Transfer Assistance: Independent  Active : No  Patient's  Info: cab or living group transportation. Mode of Transportation:  (apartment has bus for transport services)  Occupation: [address]: watching TV  Additional Comments: Pt reports having limited support  Vision/Hearing  Vision  Vision: Impaired  Vision Exceptions: Wears glasses for reading  Hearing  Hearing: Within functional limits    Cognition   Cognition  Overall Cognitive Status: Exceptions  Arousal/Alertness: Delayed responses to stimuli  Following Commands: Follows one step commands with increased time; Follows multistep commands with increased time  Attention Span: Attends with cues to redirect  Safety Judgement: Decreased awareness of need for assistance;Decreased awareness of need for safety  Problem Solving: Assistance required to identify errors made;Assistance required to generate solutions;Assistance required to implement solutions;Decreased awareness of errors  Insights: Decreased awareness of deficits  Initiation: Requires cues for some  Sequencing: Requires cues for some     Objective                 AROM RLE (degrees)  RLE AROM: WFL  AROM LLE (degrees)  LLE AROM : WFL  AROM RUE (degrees)  RUE AROM : WFL  AROM LUE (degrees)  LUE AROM : WFL  Strength RLE  Strength RLE: WFL  Comment: Grossly 4/5  Strength LLE  Strength LLE: WFL  Comment: Grossly 4/5  Strength RUE  Comment: Co-eval with OT, see OT note for UE detail. Strength LUE  Comment: Co-eval with OT, see OT note for UE detail. Bed mobility  Supine to Sit: Contact guard assistance  Sit to Supine: Minimal assistance (for BLE progression.)  Scooting: Contact guard assistance  Bed Mobility Comments: HOB elevated ~55 degrees with use of bed rails. Increased time/effort required. Transfers  Sit to Stand: Moderate Assistance  Stand to Sit: Contact guard assistance  Comment: Transfers performed 5x this date, pt requires mod-a to perform sit<>stand transfer off toilet, CGA to perform sit<>stand transfer from the bedside. Verbal cues for hand placement. Ambulation  Surface: Level tile  Device: Rolling Walker  Assistance: Contact guard assistance  Quality of Gait: mildly unsteady, increased ZHAO, decreased stride length, no LOB. Gait Deviations: Slow Christy;Decreased step length;Decreased step height  Distance: 55 feet, seated rest break, 15 feet, seated rest break, 15 feet. More Ambulation?: No  Stairs/Curb  Stairs?: No     Balance  Posture: Fair  Sitting - Static: Good;-  Sitting - Dynamic: Fair;+  Standing - Static: Fair  Standing - Dynamic: Fair;-  Comments: standing balance assessed while using a RW                                                        AM-PAC Score  AM-PAC Inpatient Mobility Raw Score : 16 (11/02/22 1435)  AM-PAC Inpatient T-Scale Score : 40.78 (11/02/22 1435)  Mobility Inpatient CMS 0-100% Score: 54.16 (11/02/22 1435)  Mobility Inpatient CMS G-Code Modifier : CK (11/02/22 1435)            Goals  Short Term Goals  Time Frame for Short Term Goals: 14 visits  Short Term Goal 1: Pt will perform sit<>stand transfer with supervision. Short Term Goal 2: Pt will demonstrate good- standing balance to decrease fall risk. Short Term Goal 3: Pt will ambulate 300 feet with least restrictive device and supervision to increase functional independence.   Short Term Goal 4: Pt will perform bed mobility with supervision. Short Term Goal 5: Pt will tolerate a 35 minute therapy session to promote increased endurance. Additional Goals?: No       Education  Patient Education  Education Given To: Patient  Education Provided: Role of Therapy;Transfer Training;Equipment;Plan of Care; Fall Prevention Strategies  Education Method: Verbal  Barriers to Learning: Cognition  Education Outcome: Verbalized understanding;Continued education needed      Therapy Time   Individual Concurrent Group Co-treatment   Time In 0828         Time Out 0918         Minutes 50         Timed Code Treatment Minutes: LEFTY [address], PT

## 2022-11-02 NOTE — PROGRESS NOTES
[facility]  Office: [address], DO, Waqas Keita, DO, Jackson Caballero, DO, [place] Theresa Blood, DO, Velton Habermann, MD, Niya Jeffrey MD, Timmy Ross MD, Amanda Black MD,  Jada Matos MD, Craig Sullivan MD, Christal Cook, DO, Marcelo Foley MD,  Twin Johnson MD, Randi Cain MD, Zan Barakat DO, Marcus Rodriguez MD, Samara Barros MD, Kevin Wilkins DO, Lucille Rutledge MD, Eduardo Mason MD, Mihai Cates MD, Nacho Cagle MD, Herminio Whitt, DO, Terra Magallon MD, Vika Wynn MD, Ata Ortega, CNP,  Samara Chairez, CNP, Iris Juarez, CNP, Lisseth Garza, CNP,  Keri Gil, [facility], Ashleigh Gamble, CNP, Catrachito Liu, CNP, Jd Waldrop, CNP, Ivet Dyer, CNP, Ramon Albrecht, CNP, Tay Hayden, PA-C, Tracey Cruz, CNS, Mary Bonilla, [facility], Toni Drew, CNP, Chang Miller, CNP, Adebayo Padilla, CNP         Louise T[address]    Progress Note    11/2/2022    12:33 PM    Name:   Anirudh Hercules  MRN:     [id number]     Acct:      [de-identified]   Room:   [address] [place] [address] Day:  2  Admit Date:  10/30/2022 11:10 PM    PCP:   SIMONE Reardon NP  Code Status:  Full Code    Subjective:     C/C:   Chief Complaint   Patient presents with    Withdrawal     Last drink was yesterday    Nausea     Interval History Status: improved. Patient evaluated in room resting in bed, no events overnight, still having resting tremors, states he does not want to quit drinking    Brief History:     Anirudh Hercules is a 65 y.o. male with underlying Etoh abuse and dependence, myloprliferative disorder, chronic lower back pain w/ radiculopathy, left arm numbness and polysubstance abuse presents to hospital with request to help him quit drinking/ previous attempts in past have been unsuccessful.  Patient drinks 6-10 beers per day with last drink was last night as he doesn't have funds or energy to continue drinking today, thus came in for assistance. Review of Systems:     Constitutional:  negative for chills, fevers, sweats  Respiratory:  negative for cough, dyspnea on exertion, shortness of breath, wheezing  Cardiovascular:  negative for chest pain, chest pressure/discomfort, lower extremity edema, palpitations  Gastrointestinal:  negative for abdominal pain, constipation, diarrhea, nausea, vomiting  Neurological:  negative for dizziness, headache, + tremor     Medications:      Allergies:  No Known Allergies    Current Meds:   Scheduled Meds:    chlordiazePOXIDE  10 mg Oral 4x Daily    hydroxyurea  500 mg Oral Daily    levothyroxine  50 mcg Oral Daily    lisinopril  10 mg Oral Daily    pantoprazole  40 mg Oral QAM AC    sodium chloride flush  5-40 mL IntraVENous 2 times per day    enoxaparin  40 mg SubCUTAneous Daily    thiamine  100 mg IntraVENous Daily    folic acid  1 mg Oral Daily    gabapentin  300 mg Oral BID    prazosin  1 mg Oral BID    QUEtiapine  300 mg Oral Nightly    sertraline  100 mg Oral Daily    sodium chloride  1 g Oral TID WC    nicotine  1 patch TransDERmal Daily     Continuous Infusions:    sodium chloride       PRN Meds: sodium chloride flush, sodium chloride, potassium chloride **OR** potassium alternative oral replacement **OR** potassium chloride, ondansetron **OR** ondansetron, polyethylene glycol, acetaminophen **OR** acetaminophen, dicyclomine, hydrOXYzine    Data:     Past Medical History:   has a past medical history of Alcohol abuse, Anemia, Cancer (HCC), Cervical stenosis of spine, Chronic left-sided low back pain with left-sided sciatica, Insomnia, Intentional drug overdose ([address] Left arm numbness, Left lumbar radiculitis, Macrocytosis, Major depressive disorder, recurrent severe without psychotic features ([address] Myeloproliferative disorder ([address] Osteoarthritis of spine with radiculopathy, lumbar region, Severe recurrent major depression without psychotic features ([address] Spondylosis of lumbar region without myelopathy or radiculopathy, and Urinary retention. Social History:   reports that he has been smoking cigarettes. He started smoking about 44 years ago. He has a 64.50 pack-year smoking history. He has never used smokeless tobacco. He reports current alcohol use of about 14.0 standard drinks per week. He reports that he does not currently use drugs after having used the following drugs: Marijuana [place]). Family History:   Family History   Problem Relation Age of Onset    Breast Cancer Mother         s/p surgical complications    Diabetes Mother     Heart Failure Father     Other Brother         \"bad back\"       Vitals:  BP 98/75   Pulse 84   Temp 98 °F (36.7 °C) (Temporal)   Resp 20   Wt 205 lb (93 kg)   SpO2 91%   BMI 27.05 kg/m²   Temp (24hrs), Av.2 °F (36.8 °C), Min:98 °F (36.7 °C), Max:98.6 °F (37 °C)    No results for input(s): POCGLU in the last 72 hours. I/O (24Hr):   No intake or output data in the 24 hours ending 22 1233    Labs:  Hematology:  Recent Labs     10/30/22  2321 11/01/22  0554 22  1142   WBC 20.7* 12.4* 12.2*   RBC 4.44 3.96* 4.12*   HGB 15.0 14.0 13.8   HCT 44.0 41.4 40.1*   MCV 99.1 104.5* 97.3   MCH 33.8* 35.4* 33.5   MCHC 34.1 33.8 34.4   RDW 13.3 13.3 13.1   * 508* 401   MPV 9.5 10.4 10.2   INR  --  1.4  --    DDIMER 0.53  --   --      Chemistry:  Recent Labs     10/30/22  2321 10/31/22  0034 10/31/22  0344 10/31/22  1922 22  0027 22  0554 22  1240 22  1142   *  --  126*   < > 132* 135 138 135   K 4.0  --  4.1   < > 3.3* 3.7 3.2* PENDING   CL 88*  --  94*   < > 100 100 101 101   CO2 12*  --  17*   < > 21 21 22 22   GLUCOSE 127*  --  114*   < > 82 61* 103* 109*   BUN 4*  --  5*   < > 6* 7* 9 8   CREATININE 0.59*  --  0.47*   < > 0.48* 0.52* 0.66* 0.61*   MG  --   --   --   --  1.9  --  1.9  --    ANIONGAP 23*  --  15   < > 11 14 15 12   LABGLOM >60  --  >60   < > >60 >60 >60 >60 CALCIUM 9.4  --  8.8   < > 8.5* 8.9 9.2 8.7   CAION  --   --  1.06*  --   --   --   --   --    PHOS  --   --  2.8  --   --   --   --   --    PROBNP 486*  --   --   --   --   --   --   --    TROPHS 25* 24*  --   --   --   --   --   --     < > = values in this interval not displayed. Recent Labs     10/30/22  2321 10/31/22  0344   PROT 8.0 7.3   LABALBU 4.4 3.9   LABA1C  --  4.3   TSH  --  1.29   * 126*   ALT 56* 50*   ALKPHOS 139* 125   BILITOT 1.1 1.0   LIPASE 29  --      ABG:  Lab Results   Component Value Date/Time    PH 8.0 01/03/2022 08:23 PM     Lab Results   Component Value Date/Time    SPECIAL NOT REPORTED 12/03/2021 02:38 PM     Lab Results   Component Value Date/Time    CULTURE NORMAL RESPIRATORY MAXIMINO LIGHT GROWTH 07/23/2022 12:20 PM       Radiology:  XR CHEST (2 VW)    Result Date: 10/30/2022  No acute process. Physical Examination:        General appearance:  alert, cooperative and in mild distress, slightly diaphoretic  Mental Status:  oriented to person, place and time and normal affect  Lungs:  clear to auscultation bilaterally, normal effort  Heart: Intermittently tachycardic regular rhythm with unifocal PVCs on bedside telemetry, no murmur  Abdomen:  soft, nontender, nondistended, normal bowel sounds, no masses, hepatomegaly, splenomegaly  Extremities:  no edema, redness, tenderness in the calves, resting tremor bilateral upper extremity  Skin:  no gross lesions, rashes, induration    Assessment:        Hospital Problems             Last Modified POA    * (Principal) Alcohol withdrawal syndrome with complication, with unspecified complication ([facility] 37/2/0303 Yes    Alcohol abuse 11/2/2022 Yes    Substance abuse ([facility] (Chronic) 11/2/2022 Yes    Chronic hyponatremia 11/2/2022 Yes    MDD (major depressive disorder) 11/2/2022 Yes       Plan:        EtOH withdrawal: continue Librium for breakthrough WD continue MVI, folic acid. IV fluids.   Chronic hyponatremia: From poor solute intake secondary to #1. Improving. Continue sodium chloride 1 g 3 times daily, home medication, labs from today pending   Hypokalemia: Replete today, recheck at 1900  Elevated ALT/AST: As above  Elevated INR: Off anticoagulation, secondary from EtOH abuse  History of polysubstance abuse:  Including nicotine, urine drug screen positive for cannabis, fentanyl and oxycodone  GI/DVT prophylaxis: Protonix, Lovenox  Dispo: SNF, discussed with SIMONE Cano NP  11/2/2022  12:33 PM

## 2022-11-02 NOTE — PLAN OF CARE
Problem: Discharge Planning  Goal: Discharge to home or other facility with appropriate resources  11/2/2022 1016 by Robbie Bolanos RN  Outcome: Progressing  Flowsheets (Taken 11/2/2022 0800)  Discharge to home or other facility with appropriate resources: Identify barriers to discharge with patient and caregiver  11/2/2022 0145 by Darcie Morejon RN  Outcome: Flor Cano (Taken 11/1/2022 1925)  Discharge to home or other facility with appropriate resources: Identify barriers to discharge with patient and caregiver     Problem: Skin/Tissue Integrity  Goal: Absence of new skin breakdown  Description: 1. Monitor for areas of redness and/or skin breakdown  2. Assess vascular access sites hourly  3. Every 4-6 hours minimum:  Change oxygen saturation probe site  4. Every 4-6 hours:  If on nasal continuous positive airway pressure, respiratory therapy assess nares and determine need for appliance change or resting period.   11/2/2022 1016 by Robbie Bolanos RN  Outcome: Progressing  11/2/2022 0145 by Darcie Morejon RN  Outcome: Progressing

## 2022-11-02 NOTE — PLAN OF CARE
Problem: Discharge Planning  Goal: Discharge to home or other facility with appropriate resources  Outcome: Progressing  Flowsheets (Taken 11/1/2022 1925)  Discharge to home or other facility with appropriate resources: Identify barriers to discharge with patient and caregiver     Problem: Skin/Tissue Integrity  Goal: Absence of new skin breakdown  Description: 1. Monitor for areas of redness and/or skin breakdown  2. Assess vascular access sites hourly  3. Every 4-6 hours minimum:  Change oxygen saturation probe site  4. Every 4-6 hours:  If on nasal continuous positive airway pressure, respiratory therapy assess nares and determine need for appliance change or resting period.   Outcome: Progressing

## 2022-11-02 NOTE — ED PROVIDER NOTES
[address] [facility] [facility]   [facility]  Faculty Attestation       I performed a history and physical examination of the patient and discussed management with the resident. I reviewed the residents note and agree with the documented findings including all diagnostic interpretations and plan of care. Any areas of disagreement are noted on the chart. I was personally present for the key portions of any procedures. I have documented in the chart those procedures where I was not present during the key portions. I have reviewed the emergency nurses triage note. I agree with the chief complaint, past medical history, past surgical history, allergies, medications, social and family history as documented unless otherwise noted below. Documentation of the HPI, Physical Exam and Medical Decision Making performed by scribjameel is based on my personal performance of the HPI, PE and MDM. For Physician Assistant/ Nurse Practitioner cases/documentation I have personally evaluated this patient and have completed at least one if not all key elements of the E/M (history, physical exam, and MDM). Additional findings are as noted. Pertinent Comments     Primary Care Physician: SIMONE Novoa - NP      ED Triage Vitals   BP Temp Temp Source Heart Rate Resp SpO2 Height Weight   10/30/22 2313 10/30/22 2316 10/30/22 2316 10/30/22 2316 10/30/22 2316 10/30/22 2313 -- 10/30/22 2316   (!) 167/89 98.3 °F (36.8 °C) Oral (!) 106 24 98 %  205 lb (93 kg)          This is a 65 y.o. male who presents to the Emergency Department h/o alcohol abuse w/ typical 6-15 beers daily who  stopped drinking yesterday prersenting with n/v and tremors. No hallucination. On exam he is awake and alert but is tremulous. Alcohol withdrawal   Requiring benzos for symptom control  Basic labs   Plan for admission.        Critical Care: None     Christopher Flaherty MD  Attending Emergency Physician         Christopher Flaherty MD  11/02/22 5917

## 2022-11-03 VITALS
WEIGHT: 205 LBS | DIASTOLIC BLOOD PRESSURE: 84 MMHG | OXYGEN SATURATION: 94 % | RESPIRATION RATE: 18 BRPM | TEMPERATURE: 98.4 F | BODY MASS INDEX: 27.05 KG/M2 | HEART RATE: 79 BPM | SYSTOLIC BLOOD PRESSURE: 124 MMHG

## 2022-11-03 LAB
ANION GAP SERPL CALCULATED.3IONS-SCNC: 11 MMOL/L (ref 9–17)
BUN BLDV-MCNC: 5 MG/DL (ref 8–23)
CALCIUM SERPL-MCNC: 8.8 MG/DL (ref 8.6–10.4)
CHLORIDE BLD-SCNC: 103 MMOL/L (ref 98–107)
CO2: 22 MMOL/L (ref 20–31)
CREAT SERPL-MCNC: 0.62 MG/DL (ref 0.7–1.2)
GFR SERPL CREATININE-BSD FRML MDRD: >60 ML/MIN/1.73M2
GLUCOSE BLD-MCNC: 121 MG/DL (ref 70–99)
HCT VFR BLD CALC: 43.2 % (ref 40.7–50.3)
HEMOGLOBIN: 14.8 G/DL (ref 13–17)
MAGNESIUM: 1.8 MG/DL (ref 1.6–2.6)
MCH RBC QN AUTO: 33.9 PG (ref 25.2–33.5)
MCHC RBC AUTO-ENTMCNC: 34.3 G/DL (ref 28.4–34.8)
MCV RBC AUTO: 98.9 FL (ref 82.6–102.9)
NRBC AUTOMATED: 0 PER 100 WBC
PDW BLD-RTO: 13 % (ref 11.8–14.4)
PLATELET # BLD: 463 K/UL (ref 138–453)
PMV BLD AUTO: 10.1 FL (ref 8.1–13.5)
POTASSIUM SERPL-SCNC: 3.3 MMOL/L (ref 3.7–5.3)
POTASSIUM SERPL-SCNC: 3.6 MMOL/L (ref 3.7–5.3)
RBC # BLD: 4.37 M/UL (ref 4.21–5.77)
SODIUM BLD-SCNC: 136 MMOL/L (ref 135–144)
WBC # BLD: 12.9 K/UL (ref 3.5–11.3)

## 2022-11-03 PROCEDURE — 97112 NEUROMUSCULAR REEDUCATION: CPT

## 2022-11-03 PROCEDURE — 99239 HOSP IP/OBS DSCHRG MGMT >30: CPT | Performed by: NURSE PRACTITIONER

## 2022-11-03 PROCEDURE — 6370000000 HC RX 637 (ALT 250 FOR IP): Performed by: NURSE PRACTITIONER

## 2022-11-03 PROCEDURE — 85027 COMPLETE CBC AUTOMATED: CPT

## 2022-11-03 PROCEDURE — 97530 THERAPEUTIC ACTIVITIES: CPT

## 2022-11-03 PROCEDURE — 6370000000 HC RX 637 (ALT 250 FOR IP): Performed by: INTERNAL MEDICINE

## 2022-11-03 PROCEDURE — 80048 BASIC METABOLIC PNL TOTAL CA: CPT

## 2022-11-03 PROCEDURE — 83735 ASSAY OF MAGNESIUM: CPT

## 2022-11-03 PROCEDURE — 6360000002 HC RX W HCPCS: Performed by: NURSE PRACTITIONER

## 2022-11-03 PROCEDURE — 36415 COLL VENOUS BLD VENIPUNCTURE: CPT

## 2022-11-03 PROCEDURE — 97116 GAIT TRAINING THERAPY: CPT

## 2022-11-03 PROCEDURE — 84132 ASSAY OF SERUM POTASSIUM: CPT

## 2022-11-03 RX ADMIN — POTASSIUM CHLORIDE AND SODIUM CHLORIDE: 900; 150 INJECTION, SOLUTION INTRAVENOUS at 08:45

## 2022-11-03 RX ADMIN — PANTOPRAZOLE SODIUM 40 MG: 40 TABLET, DELAYED RELEASE ORAL at 06:01

## 2022-11-03 RX ADMIN — LEVOTHYROXINE SODIUM 50 MCG: 50 TABLET ORAL at 08:40

## 2022-11-03 RX ADMIN — SODIUM CHLORIDE TAB 1 GM 1 G: 1 TAB at 08:42

## 2022-11-03 RX ADMIN — GABAPENTIN 300 MG: 300 CAPSULE ORAL at 08:40

## 2022-11-03 RX ADMIN — CHLORDIAZEPOXIDE HYDROCHLORIDE 10 MG: 5 CAPSULE ORAL at 08:38

## 2022-11-03 RX ADMIN — FOLIC ACID 1 MG: 1 TABLET ORAL at 08:40

## 2022-11-03 RX ADMIN — ENOXAPARIN SODIUM 40 MG: 100 INJECTION SUBCUTANEOUS at 08:38

## 2022-11-03 RX ADMIN — HYDROXYUREA 500 MG: 500 CAPSULE ORAL at 08:40

## 2022-11-03 RX ADMIN — SERTRALINE 100 MG: 50 TABLET, FILM COATED ORAL at 08:40

## 2022-11-03 NOTE — DISCHARGE SUMMARY
[facility]  Office: [address], DO, Hill Patella, DO, Kiersten Avilez, DO, Delma Barnes Blood, DO, Benjamin Russo MD, Yuki Ibrahim MD, Kaley Can MD, Vicky Key MD,  Nichole Arguelles MD, Tiny Monroe MD, Melissa Ram DO, Narcisa Luna MD,  Melissa Rodriguez DO, Seema Garcia MD, Helen Hollis MD, Dieter Dsouza DO, Areli Chacko MD, Mason Maldonado MD, Katia Dozier DO, Eileen Feliz MD, Cat Allred MD, Lee Jaime MD, Varsha Inman MD, Leidy Avalos DO, Wali Gonzalez MD, Dorothy Feldman MD, Brooke Mayorga, CNP,  Abraham Dietrich, CNP, Clemente Denton, CNP, Merwyn Cooks, CNP,  Ella Parra DNP, Nicolasa Almaraz, CNP, Aman Riddle, CNP, Mike Raza, CNP, Torey Walker, CNP, Tushar Espinoza, [facility], Medardo Bird PACristobalC, Ann Marie Swanson, CNS, Faiza Flores, DNP, Tereza Guerrero, CNP, Brian Alexis CNP, Willam Sheldon, [facility]    Discharge Summary     Patient ID: Felicia Fuentes  :  1960   MRN: [id number]     ACCOUNT:  [de-identified]   Patient's PCP: SIMONE Allred NP  Admit Date: 10/30/2022   Discharge Date: 11/3/2022   Length of Stay: 3  Code Status:  Full Code  Admitting Physician: Vicky Key MD  Discharge Physician: SIMONE Davis NP     Active Discharge Diagnoses:     Hospital Problem Lists:  Principal Problem:    Alcohol withdrawal syndrome with complication, with unspecified complication [facility])  Active Problems:    Alcohol abuse    Substance abuse ([facility]    Chronic hyponatremia    MDD (major depressive disorder)  Resolved Problems:    Hypocalcemia    Hypokalemia      Admission Condition:  fair     Discharged Condition: stable    Hospital Stay:     Hospital Course:  Felicia Fuentes is a 65 y.o. male who was admitted for the management of   Alcohol withdrawal syndrome with complication, with unspecified complication ([facility] [address] , presented to ER with Withdrawal (Last drink was yesterday) and Nausea    Josephine Malik is a 65 y.o. male with underlying Etoh abuse and dependence, myloprliferative disorder, chronic lower back pain w/ radiculopathy, left arm numbness and polysubstance abuse presents to hospital with request to help him quit drinking/ previous attempts in past have been unsuccessful. Patient drinks 6-10 beers per day with last drink was last night as he doesn't have funds or energy to continue drinking today, thus came in for assistance. Significant therapeutic interventions:     EtOH withdrawal: continue Librium for breakthrough WD continue MVI, folic acid. IV fluids. Chronic hyponatremia: From poor solute intake secondary to #1. Improving. Continue sodium chloride 1 g 3 times daily, home medication, labs from today pending   Elevated ALT/AST: As above  Elevated INR: Off anticoagulation, secondary from EtOH abuse  History of polysubstance abuse:  Including nicotine, urine drug screen positive for cannabis, fentanyl and oxycodone  GI/DVT prophylaxis: Protonix, Lovenox  Dispo: SNF, discussed with CM, going today     Significant Diagnostic Studies:   Labs / Micro:  CBC:   Lab Results   Component Value Date/Time    WBC 12.9 11/03/2022 08:43 AM    RBC 4.37 11/03/2022 08:43 AM    RBC 0-2 01/03/2022 08:23 PM    HGB 14.8 11/03/2022 08:43 AM    HCT 43.2 11/03/2022 08:43 AM    MCV 98.9 11/03/2022 08:43 AM    MCH 33.9 11/03/2022 08:43 AM    MCHC 34.3 11/03/2022 08:43 AM    RDW 13.0 11/03/2022 08:43 AM     11/03/2022 08:43 AM     BMP:    Lab Results   Component Value Date/Time    GLUCOSE 121 11/03/2022 08:43 AM     11/03/2022 08:43 AM    K 3.6 11/03/2022 10:44 AM     11/03/2022 08:43 AM    CO2 22 11/03/2022 08:43 AM    ANIONGAP 11 11/03/2022 08:43 AM    BUN 5 11/03/2022 08:43 AM    CREATININE 0.62 11/03/2022 08:43 AM    BUNCRER 11 04/21/2022 10:13 AM    CALCIUM 8.8 11/03/2022 08:43 AM    LABGLOM >60 11/03/2022 08:43 AM    GFRAA >60 07/25/2022 05:25 AM    GFR      07/25/2022 05:25 AM     HFP:    Lab Results   Component Value Date/Time    PROT 7.3 10/31/2022 03:44 AM     CMP:    Lab Results   Component Value Date/Time    GLUCOSE 121 11/03/2022 08:43 AM     11/03/2022 08:43 AM    K 3.6 11/03/2022 10:44 AM     11/03/2022 08:43 AM    CO2 22 11/03/2022 08:43 AM    BUN 5 11/03/2022 08:43 AM    CREATININE 0.62 11/03/2022 08:43 AM    ANIONGAP 11 11/03/2022 08:43 AM    ALKPHOS 125 10/31/2022 03:44 AM    ALT 50 10/31/2022 03:44 AM     10/31/2022 03:44 AM    BILITOT 1.0 10/31/2022 03:44 AM    LABALBU 3.9 10/31/2022 03:44 AM    ALBUMIN 1.1 10/31/2022 03:44 AM    LABGLOM >60 11/03/2022 08:43 AM    GFRAA >60 07/25/2022 05:25 AM    GFR      07/25/2022 05:25 AM    PROT 7.3 10/31/2022 03:44 AM    CALCIUM 8.8 11/03/2022 08:43 AM     PT/INR:    Lab Results   Component Value Date/Time    PROTIME 14.7 11/01/2022 05:54 AM    INR 1.4 11/01/2022 05:54 AM     PTT:   Lab Results   Component Value Date/Time    APTT 22.8 06/02/2021 11:52 AM     FLP:    Lab Results   Component Value Date/Time    CHOL 107 07/24/2022 06:00 AM    TRIG 51 07/24/2022 06:00 AM    HDL 54 07/24/2022 06:00 AM     U/A:    Lab Results   Component Value Date/Time    COLORU YELLOW 01/03/2022 08:23 PM    TURBIDITY Clear 11/28/2021 07:47 PM    SPECGRAV 1.014 11/28/2021 07:47 PM    HGBUR NEGATIVE 11/28/2021 07:47 PM    PHUR 6.5 11/28/2021 07:47 PM    PROTEINU NEGATIVE 11/28/2021 07:47 PM    GLUCOSEU NEGATIVE 01/03/2022 08:23 PM    KETUA TRACE 01/03/2022 08:23 PM    BILIRUBINUR NEGATIVE 01/03/2022 08:23 PM    UROBILINOGEN Normal 11/28/2021 07:47 PM    NITRU NEGATIVE 01/03/2022 08:23 PM    LEUKOCYTESUR NEGATIVE 01/03/2022 08:23 PM     TSH:    Lab Results   Component Value Date/Time    TSH 1.29 10/31/2022 03:44 AM        Radiology:  XR CHEST (2 VW)    Result Date: 10/30/2022  No acute process.        Consultations:    Consults:     Final Specialist Recommendations/Findings:   IP CONSULT TO HOSPITALIST  IP CONSULT TO SOCIAL WORK      The patient was seen and examined on day of discharge and this discharge summary is in conjunction with any daily progress note from day of discharge. Discharge plan:     Disposition: skilled nursing facility     Physician Follow Up:     SIMONE Reina NP  [address]  [phone]    Follow up         Requiring Further Evaluation/Follow Up POST HOSPITALIZATION/Incidental Findings: Continue to monitor blood pressure with current antihypertensive regimen    Diet: regular diet alcohol cessation strongly encouraged    Activity: As tolerated    Instructions to Patient: Alcohol and drug cessation strongly encouraged    Discharge Medications:      Medication List        START taking these medications      nicotine 14 MG/24HR  Commonly known as: NICODERM CQ  Place 1 patch onto the skin daily  Replaces: nicotine 21 MG/24HR     sodium chloride 1 g tablet  Take 1 tablet by mouth in the morning and 1 tablet at noon and 1 tablet in the evening. Take with meals.             CONTINUE taking these medications      folic acid 1 MG tablet  Commonly known as: FOLVITE  Take 1 tablet by mouth daily     furosemide 20 MG tablet  Commonly known as: LASIX     hydroxyurea 500 MG chemo capsule  Commonly known as: HYDREA  Take 1 capsule by mouth daily     ipratropium-albuterol 0.5-2.5 (3) MG/3ML Soln nebulizer solution  Commonly known as: DUONEB  Inhale 3 mLs into the lungs every 6 hours as needed for Shortness of Breath     levothyroxine 50 MCG tablet  Commonly known as: SYNTHROID  Take 1 tablet by mouth daily Take in the morning on an empty stomach 1 hour before any food or other medications     lisinopril 10 MG tablet  Commonly known as: PRINIVIL;ZESTRIL     naltrexone 50 MG tablet  Commonly known as: DEPADE     omeprazole 40 MG delayed release capsule  Commonly known as: PRILOSEC     prazosin 1 MG capsule  Commonly known as: MINIPRESS     QUEtiapine 300 MG tablet  Commonly known as: SEROQUEL  Take 1 tablet by mouth nightly     sertraline 100 MG tablet  Commonly known as: ZOLOFT  Take 1 tablet by mouth daily     therapeutic multivitamin-minerals tablet  Take 1 tablet by mouth daily            STOP taking these medications      acamprosate 333 MG tablet  Commonly known as: CAMPRAL     gabapentin 300 MG capsule  Commonly known as: NEURONTIN     midodrine 5 MG tablet  Commonly known as: PROAMATINE     nicotine 21 MG/24HR  Commonly known as: NICODERM CQ  Replaced by: nicotine 14 MG/24HR               Where to Get Your Medications        Information about where to get these medications is not yet available    Ask your nurse or doctor about these medications  nicotine 14 MG/24HR         No discharge procedures on file. Time Spent on discharge is  32 mins in patient examination, evaluation, counseling as well as medication reconciliation, prescriptions for required medications, discharge plan and follow up. Electronically signed by   Mccoy Baumgarten, APRN - NP  11/3/2022  12:59 PM      Thank you SIMONE Galvan NP for the opportunity to be involved in this patient's care.

## 2022-11-03 NOTE — PROGRESS NOTES
Admitting team perfect served pt complaining of gastric reflux. A dose of Tums was requested. See orders.

## 2022-11-03 NOTE — DISCHARGE INSTR - COC
Continuity of Care Form    Patient Name: Anirudh Hercules   :  1960  MRN:  [id number]    Admit date:  10/30/2022  Discharge date:  11/3/22    Code Status Order: Full Code   Advance Directives:     Admitting Physician:  Amanda Black MD  PCP: SIMONE Reardon NP    Discharging Nurse: [facility] Unit/Room#: 5291/9334-81  Discharging Unit Phone Number: [phone]    Emergency Contact:   Extended Emergency Contact Information  Primary Emergency Contact: Consuelo Gilmore  Address: N/A  Home Phone: [phone]  Work Phone: [phone]  Mobile Phone: [phone]  Relation: Brother/Sister  Preferred language: English   needed?  No  Secondary Emergency Contact: Medina Lindsay  Home Phone: [phone]  Work Phone: [phone]  Mobile Phone: [phone]  Relation: Child    Past Surgical History:  Past Surgical History:   Procedure Laterality Date    APPENDECTOMY      CARPAL TUNNEL RELEASE      bilateral     CERVICAL FUSION      s/p trampoline accident    CERVICAL FUSION  2019     ANTERIOR CERVICAL DECOMPRESSION FUSION C3-4     CERVICAL FUSION N/A 2019    ANTERIOR CERVICAL DECOMPRESSION FUSION C3-4 performed by Bismark Estrada DO at [facility], DIAGNOSTIC      EPIDURAL STEROID INJECTION Left 5/15/2019    EPIDURAL STEROID INJECTION LEFT L5S1 performed by Brijesh Hidalgo MD at [address] [place] [address]         Immunization History:   Immunization History   Administered Date(s) Administered    COVID-19, MODERNA BLUE border, Primary or Immunocompromised, (age 12y+), IM, 100 mcg/0.5mL 2021, 2021    Hepatitis A Adult (Havrix, Vaqta) 2019    Influenza, FLUARIX, FLULAVAL, FLUZONE (age 10 mo+) AND AFLURIA, (age 1 y+), PF, 0.5mL 2019    Pneumococcal Polysaccharide ([id number]) 2020    Tdap (Boostrix, Adacel) 10/02/2018, 2019       Active Problems:  Patient Active Problem List   Diagnosis Code    Myeloproliferative disease ([facility] [address] D47.1 Chronic left-sided low back pain with left-sided sciatica M54.42, G89.29    Insomnia G47.00    Osteoarthritis of spine with radiculopathy, lumbar region M47.26    Spondylosis of lumbar region without myelopathy or radiculopathy M47.816    Left lumbar radiculitis M54.16    Hx of fusion of cervical spine Z98.1    Cervical stenosis of spine M48.02    Left arm numbness R20.0    Intentional drug overdose ([facility] [address] T50.902A    Alcohol abuse F10.10    Major depressive disorder, recurrent severe without psychotic features ([facility] [address] F33.2    Severe recurrent major depression without psychotic features ([facility]) F33.2    Anemia D64.9    Macrocytosis D75.89    Substance abuse ([facility]) F19.10    Stenosis of cervical spine with myelopathy ([facility]) M48.02, G99.2    Chronic hyponatremia L28.6    Uncomplicated alcohol dependence ([facility]) F10.20    Alcohol abuse with intoxication ([facility]) F10.129    Hypothyroidism E03.9    MDD (major depressive disorder) F32.9    High blood pressure I10    Septicemia ([facility]) A41.9    Transaminitis R74.01    Enteritis K52.9    Acute respiratory failure with hypoxia ([facility]) J96.01    Moderate protein-calorie malnutrition ([facility]) E44.0    Pneumonia of both lungs due to Mycoplasma pneumoniae J15.7    Neuropathy involving both lower extremities G57.93    Acquired hemolytic anemia, unspecified ([facility]) D59.9    Alcohol intoxication in active alcoholic without complication ([facility] [address] S20.374    Suicide ideation R45.851    Delirium due to another medical condition F05    Alcohol withdrawal syndrome with complication, with unspecified complication ([facility] [address] G98.355       Isolation/Infection:   Isolation            No Isolation          Patient Infection Status       Infection Onset Added Last Indicated Last Indicated By Review Planned Expiration Resolved Resolved By    None active    Resolved    Mycoplasma pneumonia 22 Mycoplasma Ab,IgM   22     C-diff Rule Out 21 Gastrointestinal Panel, Molecular (Ordered)   12/04/21 Rule-Out Test Resulted    C-diff Rule Out 11/29/21 11/29/21 11/30/21 Gastrointestinal Panel, Molecular (Ordered)   12/01/21 Sarah Holbrook RN    COVID-19 (Rule Out) 11/28/21 11/28/21 11/28/21 COVID-19, Rapid (Ordered)   11/28/21 Rule-Out Test Resulted            Nurse Assessment:  Last Vital Signs: /80   Pulse 84   Temp 98.4 °F (36.9 °C) (Oral)   Resp 16   Wt 205 lb (93 kg)   SpO2 97%   BMI 27.05 kg/m²     Last documented pain score (0-10 scale):    Last Weight:   Wt Readings from Last 1 Encounters:   10/30/22 205 lb (93 kg)     Mental Status:  oriented, alert, coherent, logical, thought processes intact, and able to concentrate and follow conversation    IV Access:  - None    Nursing Mobility/ADLs:  Walking   Assisted  Transfer  Independent  Bathing  Independent  Dressing  Independent  [address] Delivery   whole    Wound Care Documentation and Therapy:        Elimination:  Continence: Bowel: Yes  Bladder: Yes  Urinary Catheter: None   Colostomy/Ileostomy/Ileal Conduit: No       Date of Last BM: 11/2/22    Intake/Output Summary (Last 24 hours) at 11/3/2022 1032  Last data filed at 11/3/2022 0708  Gross per 24 hour   Intake 739.4 ml   Output 905 ml   Net -165.6 ml     I/O last 3 completed shifts: In: 739.4 [P.O.:120; I.V.:619.4]  Out: 90 [Urine:90]    Safety Concerns: At Risk for Falls    Impairments/Disabilities:      None    Nutrition Therapy:  Current Nutrition Therapy:   - Oral Diet:  General    Routes of Feeding: Oral  Liquids: Thin Liquids  Daily Fluid Restriction: no  Last Modified Barium Swallow with Video (Video Swallowing Test): not done    Treatments at the Time of Hospital Discharge:   Respiratory Treatments: none  Oxygen Therapy:  is not on home oxygen therapy.   Ventilator:    - No ventilator support    Rehab Therapies: Physical Therapy and Occupational Therapy  Weight Bearing Status/Restrictions: No weight bearing restrictions  Other Medical Equipment (for information only, NOT a DME order):  none    Other Treatments: none    Patient's personal belongings (please select all that are sent with patient):  None    RN SIGNATURE:  Electronically signed by Jethro Dubon RN on 11/3/22 at 11:11 AM EDT    CASE MANAGEMENT/SOCIAL WORK SECTION    Inpatient Status Date: 10/31/22    Readmission Risk Assessment Score:  Readmission Risk              Risk of Unplanned Readmission:  28           Discharging to Facility/ Agency   Name: Norma Villavicencio  Address:  Phone:  Fax:    Dialysis Facility (if applicable)   Name:  Address:  Dialysis Schedule:  Phone:  Fax:    / signature: Electronically signed by Luis Antonio Gutiérrez RN on 11/3/22 at 10:34 AM EDT    PHYSICIAN SECTION    Prognosis: Good    Condition at Discharge: Stable    Rehab Potential (if transferring to Rehab): Good    Recommended Labs or Other Treatments After Discharge:     Physician Certification: I certify the above information and transfer of Doraine Officer  is necessary for the continuing treatment of the diagnosis listed and that he requires [facility] for less 30 days.      Update Admission H&P: See discharge summary    PHYSICIAN SIGNATURE:  Electronically signed by Florian Munguia MD on 11/3/22 at 10:38 AM EDT

## 2022-11-03 NOTE — CARE COORDINATION
Transitional planning    Writer received call from Fawn with [facility] approved, patient agreeable and primary RN updated , provided number to call report [phone] and requested to complete CHAVEZ, PS sent to MD , same request. Faxed transport request to [address] for first available. [facility] scheduled for 12/1230 , patient and primary RN updated. Call to Fawn and updated, patient provided clothing.

## 2022-11-03 NOTE — PROGRESS NOTES
[facility]  Office: [address], DO, Leandro Ards, DO, Tameka Harney, DO, Lenny Meier Blood, DO, Amadou Lacey MD, Meghan Stanford MD, Sheron Lakhani MD, Sam Hernandez MD,  Dipti Solis MD, Nori Peterson MD, Hannah Vance, DO, Lisa Lauren MD,  Haley Ramirez MD, Norma Deshpande MD, Tori Moritz, DO, Brady Michelle MD, Cuca Montes MD, Cecil Buerger, DO, Manjit Sams MD, Ángela Painting MD, Arsen Bey MD, Abbie Carmen MD, Sagar Morrow DO, Jc Lau MD, Radha West MD, Rumalda Snellen, CNP,  Lalita Hanley, CNP, Jeffrey Black, CNP, Noy Swan, CNP,  Laurence Fontenot, [facility], Lashay Hancock, CNP, Mounika Zee, CNP, Janee Sy, CNP, Soha Ambrose, CNP, Ashley Choi, CNP, Arvind Tavarez, PA-C, Hosea Lombardi, CNS, Maria E Prince, [facility], Andie Doan, CNP, Stacey Mercedes, CNP, Esperanza West, CNP         Louise Shields 19    Progress Note    11/3/2022    12:15 PM    Name:   Sirisha Mata  MRN:     [id number]     Acct:      [de-identified]   Room:   [address] [place] [address] Day:  3  Admit Date:  10/30/2022 11:10 PM    PCP:   SIMONE Barbour NP  Code Status:  Full Code    Subjective:     C/C:   Chief Complaint   Patient presents with    Withdrawal     Last drink was yesterday    Nausea     Interval History Status: improved. Patient evaluated in room resting in bed, no events overnight, still having resting tremors, states he does not want to quit drinking    Brief History:     Sirisha Mata is a 65 y.o. male with underlying Etoh abuse and dependence, myloprliferative disorder, chronic lower back pain w/ radiculopathy, left arm numbness and polysubstance abuse presents to hospital with request to help him quit drinking/ previous attempts in past have been unsuccessful.  Patient drinks 6-10 beers per day with last drink was last night as he doesn't have funds or energy to continue drinking today, thus came in for assistance. Review of Systems:     Constitutional:  negative for chills, fevers, sweats  Respiratory:  negative for cough, dyspnea on exertion, shortness of breath, wheezing  Cardiovascular:  negative for chest pain, chest pressure/discomfort, lower extremity edema, palpitations  Gastrointestinal:  negative for abdominal pain, constipation, diarrhea, nausea, vomiting  Neurological:  negative for dizziness, headache, + tremor     Medications:      Allergies:  No Known Allergies    Current Meds:   Scheduled Meds:    chlordiazePOXIDE  10 mg Oral 4x Daily    hydroxyurea  500 mg Oral Daily    levothyroxine  50 mcg Oral Daily    [Held by provider] lisinopril  10 mg Oral Daily    pantoprazole  40 mg Oral QAM AC    sodium chloride flush  5-40 mL IntraVENous 2 times per day    enoxaparin  40 mg SubCUTAneous Daily    thiamine  100 mg IntraVENous Daily    folic acid  1 mg Oral Daily    gabapentin  300 mg Oral BID    [Held by provider] prazosin  1 mg Oral BID    QUEtiapine  300 mg Oral Nightly    sertraline  100 mg Oral Daily    sodium chloride  1 g Oral TID WC    nicotine  1 patch TransDERmal Daily     Continuous Infusions:    0.9% NaCl with KCl 20 mEq Stopped (11/03/22 1204)    sodium chloride       PRN Meds: calcium carbonate, sodium chloride flush, sodium chloride, potassium chloride **OR** potassium alternative oral replacement **OR** potassium chloride, ondansetron **OR** ondansetron, polyethylene glycol, acetaminophen **OR** acetaminophen, dicyclomine, hydrOXYzine    Data:     Past Medical History:   has a past medical history of Alcohol abuse, Anemia, Cancer ([facility] [address] Cervical stenosis of spine, Chronic left-sided low back pain with left-sided sciatica, Insomnia, Intentional drug overdose ([address] Left arm numbness, Left lumbar radiculitis, Macrocytosis, Major depressive disorder, recurrent severe without psychotic features ([address] Myeloproliferative disorder ([address] Osteoarthritis of spine with radiculopathy, lumbar region, Severe recurrent major depression without psychotic features ([address] Spondylosis of lumbar region without myelopathy or radiculopathy, and Urinary retention. Social History:   reports that he has been smoking cigarettes. He started smoking about 44 years ago. He has a 64.50 pack-year smoking history. He has never used smokeless tobacco. He reports current alcohol use of about 14.0 standard drinks per week. He reports that he does not currently use drugs after having used the following drugs: Marijuana Mary Bull). Family History:   Family History   Problem Relation Age of Onset    Breast Cancer Mother         s/p surgical complications    Diabetes Mother     Heart Failure Father     Other Brother         \"bad back\"       Vitals:  /84   Pulse 79   Temp 98.4 °F (36.9 °C) (Oral)   Resp 18   Wt 205 lb (93 kg)   SpO2 94%   BMI 27.05 kg/m²   Temp (24hrs), Av.4 °F (36.9 °C), Min:97.6 °F (36.4 °C), Max:98.8 °F (37.1 °C)    No results for input(s): POCGLU in the last 72 hours. I/O (24Hr):     Intake/Output Summary (Last 24 hours) at 11/3/2022 1215  Last data filed at 11/3/2022 0708  Gross per 24 hour   Intake 739.4 ml   Output 905 ml   Net -165.6 ml       Labs:  Hematology:  Recent Labs     22  0554 22  1142 22  0843   WBC 12.4* 12.2* 12.9*   RBC 3.96* 4.12* 4.37   HGB 14.0 13.8 14.8   HCT 41.4 40.1* 43.2   .5* 97.3 98.9   MCH 35.4* 33.5 33.9*   MCHC 33.8 34.4 34.3   RDW 13.3 13.1 13.0   * 401 463*   MPV 10.4 10.2 10.1   INR 1.4  --   --      Chemistry:  Recent Labs     22  1240 22  1142 22  1821 22  0843 22  1044    135  --  136  --    K 3.2* 3.1* 3.1* 3.3* 3.6*    101  --  103  --    CO2   --    --    GLUCOSE 103* 109*  --  121*  --    BUN 9 8  --  5*  --    CREATININE 0.66* 0.61*  --  0.62*  --    MG 1.9 1.9  --  1.8  --    ANIONGAP 15 12  --  11  --    LABGLOM >60 >60  --  >60  -- CALCIUM 9.2 8.7  --  8.8  --      No results for input(s): PROT, LABALBU, LABA1C, M5ZPUCX, I5OHTGI, FT4, TSH, AST, ALT, LDH, GGT, ALKPHOS, LABGGT, BILITOT, BILIDIR, AMMONIA, AMYLASE, LIPASE, LACTATE, CHOL, HDL, LDLCHOLESTEROL, CHOLHDLRATIO, TRIG, VLDL, RAZ02FY, PHENYTOIN, PHENYF, URICACID, POCGLU in the last 72 hours. ABG:  Lab Results   Component Value Date/Time    PH 8.0 01/03/2022 08:23 PM     Lab Results   Component Value Date/Time    SPECIAL NOT REPORTED 12/03/2021 02:38 PM     Lab Results   Component Value Date/Time    CULTURE NORMAL RESPIRATORY MAXIMINO LIGHT GROWTH 07/23/2022 12:20 PM       Radiology:  XR CHEST (2 VW)    Result Date: 10/30/2022  No acute process. Physical Examination:        General appearance:  alert, cooperative and in mild distress, slightly diaphoretic  Mental Status:  oriented to person, place and time and normal affect  Lungs:  clear to auscultation bilaterally, normal effort  Heart: Intermittently tachycardic regular rhythm with unifocal PVCs on bedside telemetry, no murmur  Abdomen:  soft, nontender, nondistended, normal bowel sounds, no masses, hepatomegaly, splenomegaly  Extremities:  no edema, redness, tenderness in the calves, resting tremor bilateral upper extremity  Skin:  no gross lesions, rashes, induration    Assessment:        Hospital Problems             Last Modified POA    * (Principal) Alcohol withdrawal syndrome with complication, with unspecified complication ([facility] [address] 72/0/1840 Yes    Alcohol abuse 11/2/2022 Yes    Substance abuse ([facility] [address] (Chronic) 11/2/2022 Yes    Chronic hyponatremia 11/2/2022 Yes    MDD (major depressive disorder) 11/2/2022 Yes       Plan:        EtOH withdrawal: continue Librium for breakthrough WD continue MVI, folic acid. IV fluids. Chronic hyponatremia: From poor solute intake secondary to #1. Improving.   Continue sodium chloride 1 g 3 times daily, home medication, labs from today pending   Elevated ALT/AST: As above  Elevated INR: Off anticoagulation, secondary from EtOH abuse  History of polysubstance abuse:  Including nicotine, urine drug screen positive for cannabis, fentanyl and oxycodone  GI/DVT prophylaxis: Protonix, Lovenox  Dispo: SNF, discussed with CM, going today     SIMONE Saini NP  11/3/2022  12:15 PM

## 2022-11-03 NOTE — PLAN OF CARE
Problem: Discharge Planning  Goal: Discharge to home or other facility with appropriate resources  11/3/2022 1011 by Gorge Cordon RN  Outcome: Progressing  11/3/2022 0230 by Gigi Medrano RN  Outcome: Progressing     Problem: Skin/Tissue Integrity  Goal: Absence of new skin breakdown  Description: 1. Monitor for areas of redness and/or skin breakdown  2. Assess vascular access sites hourly  3. Every 4-6 hours minimum:  Change oxygen saturation probe site  4. Every 4-6 hours:  If on nasal continuous positive airway pressure, respiratory therapy assess nares and determine need for appliance change or resting period.   11/3/2022 1011 by Gorge Cordon RN  Outcome: Progressing  11/3/2022 0230 by Gigi Medrano RN  Outcome: Progressing     Problem: ABCDS Injury Assessment  Goal: Absence of physical injury  Outcome: Progressing

## 2022-11-03 NOTE — PROGRESS NOTES
Physical Therapy  Facility/Department: [facility] 4A STEPDOWN  Daily Treatment Note  NAME: Malena Gonzalez  : 1960  MRN: [id number]    Date of Service: 11/3/2022    Discharge Recommendations:  Patient would benefit from continued therapy after discharge   PT Equipment Recommendations  Equipment Needed: Yes  Walker: Rolling    Patient Diagnosis(es): The primary encounter diagnosis was Alcohol withdrawal syndrome without complication ([address] A diagnosis of Hyponatremia was also pertinent to this visit. Assessment   Activity Tolerance: Patient limited by fatigue;Treatment limited secondary to decreased cognition;Patient limited by endurance  Equipment Needed: Yes     Plan    Physcial Therapy Plan  General Plan:  (5-6x/week)  Current Treatment Recommendations: Strengthening;Balance training;Functional mobility training;Transfer training;Gait training;Patient/Caregiver education & training; Safety education & training;Home exercise program;Equipment evaluation, education, & procurement; Therapeutic activities; Endurance training     Restrictions  Restrictions/Precautions  Restrictions/Precautions: Fall Risk, Seizure, Bedrest with Bathroom Privileges  Required Braces or Orthoses?: No     Subjective    Subjective  Subjective: awake alert sitting at EOB upon arrival  Pain: 7/10 bilateral feet and legs, neuropathy  Orientation  Overall Orientation Status: Within Normal Limits  Cognition  Overall Cognitive Status: Exceptions  Arousal/Alertness: Appropriate responses to stimuli  Following Commands:  Follows one step commands consistently  Attention Span: Attends with cues to redirect     Objective   Vitals     Bed Mobility Training  Bed Mobility Training: Yes  Overall Level of Assistance: Independent  Interventions: Demonstration  Rolling: Independent  Supine to Sit: Independent  Sit to Supine: Independent  Scooting: Independent  Balance  Sitting: Without support  Standing: With support  Transfer Training  Transfer Training: Yes  Overall Level of Assistance: Modified independent  Interventions: Demonstration;Verbal cues  Sit to Stand: Modified independent;Contact-guard assistance  Stand to Sit: Modified independent  Stand Pivot Transfers: Modified independent  Bed to Chair: Modified independent  Gait Training  Gait Training: Yes  Gait  Overall Level of Assistance: Contact-guard assistance  Distance (ft): 100 Feet  Assistive Device: Walker  Stairs - Level of Assistance: Contact-guard assistance  Neuromuscular Education  Neuromuscular Education: Yes  Functional Movement Patterns: standing balance activity with focus on clearing obsticals and managing walker        Safety Devices  Type of Devices: Bed alarm in place;Gait belt;Patient at risk for falls; Left in bed;Call light within reach;Nurse notified; All fall risk precautions in place; Telesitter in use  Restraints  Restraints Initially in Place: No       Goals  Short Term Goals  Time Frame for Short Term Goals: 14 visits  Short Term Goal 1: Pt will perform sit<>stand transfer with supervision. Short Term Goal 2: Pt will demonstrate good- standing balance to decrease fall risk. Short Term Goal 3: Pt will ambulate 300 feet with least restrictive device and supervision to increase functional independence. Short Term Goal 4: Pt will perform bed mobility with supervision. Short Term Goal 5: Pt will tolerate a 35 minute therapy session to promote increased endurance. Additional Goals?: No  AM-PAC Inpatient Mobility Raw Score   16  Education  Patient Education  Education Given To: Patient  Education Provided: Role of Therapy;Transfer Training;Equipment;Plan of Care; Fall Prevention Strategies  Education Method: Verbal  Barriers to Learning: Cognition  Education Outcome: Verbalized understanding;Continued education needed    Therapy Time   Individual Concurrent Group Co-treatment   Time In 0835         Time Out 0923         Minutes 48         Timed Code Treatment Minutes: 48 Minutes Adela Ozuna, PT, DPt

## 2022-11-14 ENCOUNTER — HOSPITAL ENCOUNTER (EMERGENCY)
Facility: CLINIC | Age: 62
Discharge: HOME | End: 2022-11-14
Attending: EMERGENCY MEDICINE
Payer: MEDICARE

## 2022-11-14 ENCOUNTER — APPOINTMENT (OUTPATIENT)
Dept: ULTRASOUND IMAGING | Facility: CLINIC | Age: 62
End: 2022-11-14
Payer: MEDICARE

## 2022-11-14 VITALS
BODY MASS INDEX: 27.05 KG/M2 | DIASTOLIC BLOOD PRESSURE: 75 MMHG | OXYGEN SATURATION: 93 % | WEIGHT: 205 LBS | TEMPERATURE: 98.2 F | RESPIRATION RATE: 16 BRPM | SYSTOLIC BLOOD PRESSURE: 115 MMHG | HEART RATE: 79 BPM

## 2022-11-14 DIAGNOSIS — R60.9 PERIPHERAL EDEMA: Primary | ICD-10-CM

## 2022-11-14 PROCEDURE — 99284 EMERGENCY DEPT VISIT MOD MDM: CPT

## 2022-11-14 PROCEDURE — 93971 EXTREMITY STUDY: CPT

## 2022-11-14 PROCEDURE — 6370000000 HC RX 637 (ALT 250 FOR IP): Performed by: EMERGENCY MEDICINE

## 2022-11-14 RX ORDER — CEPHALEXIN 500 MG/1
500 CAPSULE ORAL 4 TIMES DAILY
Qty: 40 CAPSULE | Refills: 0 | Status: SHIPPED | OUTPATIENT
Start: 2022-11-14 | End: 2022-11-24

## 2022-11-14 RX ORDER — CEPHALEXIN 250 MG/1
500 CAPSULE ORAL ONCE
Status: COMPLETED | OUTPATIENT
Start: 2022-11-14 | End: 2022-11-14

## 2022-11-14 RX ADMIN — CEPHALEXIN 500 MG: 250 CAPSULE ORAL at 16:09

## 2022-11-14 NOTE — DISCHARGE INSTRUCTIONS
Take medications as prescribed    Return immediately if any worsening symptoms or any other concerns    Tell us how we did visit: [email or web address]   and let us know about your experience

## 2022-11-14 NOTE — ED PROVIDER NOTES
[facility]  [facility]  Phone: [address] Bela Clarke      Pt Name: Janel Deras  MRN: [id number]  [place] 1960  Date of evaluation: 11/14/2022    CHIEF COMPLAINT     Right leg swelling    HISTORY OF PRESENT ILLNESS    Janel Deras is a 65 y.o. male who presents to the emergency department with right lower extremity swelling. Ultrasound done 6 months ago was negative for DVT. He has had swelling for many months. He denies any injuries. He is not on his feet very much. No chest pain has chronic shortness of breath. Currently on a treatment center for alcoholism. Was seen last month and treated for alcohol withdrawal.  History of myeloproliferative disease    REVIEW OF SYSTEMS       Constitutional: No fevers or chills   HEENT: No sore throat, rhinorrhea, or earache   Eyes: No blurry vision or double vision no drainage   Cardiovascular: No chest pain or tachycardia   Respiratory: No wheezing chronic shortness of breath no cough   Gastrointestinal: No nausea, vomiting, diarrhea, constipation, or abdominal pain   : No hematuria or dysuria   Musculoskeletal: Right leg swelling no pain  Skin: No rash   Neurological: No focal neurologic complaints, paresthesias, weakness, or headache     PAST MEDICAL HISTORY    has a past medical history of Alcohol abuse, Anemia, Cancer (HCC), Cervical stenosis of spine, Chronic left-sided low back pain with left-sided sciatica, Insomnia, Intentional drug overdose ([address] Left arm numbness, Left lumbar radiculitis, Macrocytosis, Major depressive disorder, recurrent severe without psychotic features ([address] Myeloproliferative disorder ([address] Osteoarthritis of spine with radiculopathy, lumbar region, Severe recurrent major depression without psychotic features ([address] Spondylosis of lumbar region without myelopathy or radiculopathy, and Urinary retention.     SURGICAL HISTORY      has a past surgical history that includes cervical fusion; Appendectomy; Tonsillectomy; Carpal tunnel release; epidural steroid injection (Left, 5/15/2019); Endoscopy, colon, diagnostic; cervical fusion (2019); and cervical fusion (N/A, 2019). CURRENT MEDICATIONS       Previous Medications    FOLIC ACID (FOLVITE) 1 MG TABLET    Take 1 tablet by mouth daily    FUROSEMIDE (LASIX) 20 MG TABLET    Take 20 mg by mouth daily    HYDROXYUREA (HYDREA) 500 MG CHEMO CAPSULE    Take 1 capsule by mouth daily    IPRATROPIUM-ALBUTEROL (DUONEB) 0.5-2.5 (3) MG/3ML SOLN NEBULIZER SOLUTION    Inhale 3 mLs into the lungs every 6 hours as needed for Shortness of Breath    LEVOTHYROXINE (SYNTHROID) 50 MCG TABLET    Take 1 tablet by mouth daily Take in the morning on an empty stomach 1 hour before any food or other medications    LISINOPRIL (PRINIVIL;ZESTRIL) 10 MG TABLET    Take 10 mg by mouth daily    MULTIPLE VITAMINS-MINERALS (THERAPEUTIC MULTIVITAMIN-MINERALS) TABLET    Take 1 tablet by mouth daily    NALTREXONE (DEPADE) 50 MG TABLET    Take 50 mg by mouth in the morning. NICOTINE (NICODERM CQ) 14 MG/24HR    Place 1 patch onto the skin daily    OMEPRAZOLE (PRILOSEC) 40 MG DELAYED RELEASE CAPSULE    Take 40 mg by mouth Daily with supper     PRAZOSIN (MINIPRESS) 1 MG CAPSULE    Take 1 mg by mouth 2 times daily    QUETIAPINE (SEROQUEL) 300 MG TABLET    Take 1 tablet by mouth nightly    SERTRALINE (ZOLOFT) 100 MG TABLET    Take 1 tablet by mouth daily    SODIUM CHLORIDE 1 G TABLET    Take 1 tablet by mouth in the morning and 1 tablet at noon and 1 tablet in the evening. Take with meals. ALLERGIES     has No Known Allergies. FAMILY HISTORY     He indicated that his mother is . He indicated that his father is . He indicated that the status of his brother is unknown.     family history includes Breast Cancer in his mother; Diabetes in his mother; Heart Failure in his father; Other in his brother.     SOCIAL HISTORY      reports that he has been smoking cigarettes. He started smoking about 44 years ago. He has a 64.50 pack-year smoking history. He has never used smokeless tobacco. He reports current alcohol use of about 14.0 standard drinks per week. He reports that he does not currently use drugs after having used the following drugs: Marijuana Garcharisma Bowden). PHYSICAL EXAM     /75   Pulse 79   Temp 98.2 °F (36.8 °C)   Resp 16   Wt 93 kg (205 lb)   SpO2 93%   BMI 27.05 kg/m²     Constitutional: Alert, oriented x3, nontoxic, answering questions appropriately, acting properly for age, in no acute distress   HEENT: Extraocular muscles intact,   Neck: Trachea midline   Cardiovascular: Regular rhythm and rate no murmurs   Respiratory: Diminished to auscultation bilaterally no wheezes, rhonchi, rales, no respiratory distress no tachypnea no retractions no hypoxia  Gastrointestinal: Soft, nontender, nondistended, positive bowel sounds. No rebound, rigidity, or guarding. Musculoskeletal: Right lower extremity swelling greater than left mild erythema no increased warmth to the anterior distal tibia. Pedal pulses intact and capillary refill less than 2 seconds. Neurologic: Moving all 4 extremities  Skin: Warm and dry     DIFFERENTIAL DIAGNOSIS/ MDM:     Ultrasound rule out DVT. DIAGNOSTIC RESULTS     EKG: All EKG's are interpreted by the Emergency Department Physician who either signs or Co-signs this chart in the absence of a cardiologist.        Not indicated unless otherwise documented above    LABS:  No results found for this visit on 11/14/22. Not indicated unless otherwise documented above    RADIOLOGY:   I reviewed the radiologist interpretations:    US DUP LOWER EXTREMITY RIGHT CHARLEEN   Final Result   No evidence of DVT in the right lower extremity.              Not indicated unless otherwise documented above    EMERGENCY DEPARTMENT COURSE:     The patient was given the following medications:  Orders Placed This Encounter Medications    cephALEXin (KEFLEX) 500 MG capsule     Sig: Take 1 capsule by mouth 4 times daily for 10 days     Dispense:  40 capsule     Refill:  0    cephALEXin (KEFLEX) capsule 500 mg     Order Specific Question:   Antimicrobial Indications     Answer:   Skin and Soft Tissue Infection        Vitals:   -------------------------  /75   Pulse 79   Temp 98.2 °F (36.8 °C)   Resp 16   Wt 93 kg (205 lb)   SpO2 93%   BMI 27.05 kg/m²     3:55 PM ultrasound negative for DVT. With slight erythema will cover for possibility of infection. Patient states his symptoms been ongoing for the past 6 months however he is not very clear if this is worsening over the past couple days or not. Prescription for Keflex. Follow-up with family physician return if worsening symptoms or any other concerns. The patient understands that at this time there is no evidence for a more malignant underlying process, but also understands that early in the process of an illness or injury, an emergency department workup can be falsely reassuring. Routine discharge counseling was given, and it is understood that worsening, changing or persistent symptoms should prompt an immediate call or follow up with their primary physician or return to the emergency department. The importance of appropriate follow up was also discussed. I have reviewed the disposition diagnosis. I have answered the questions and given discharge instructions. There was voiced understanding of these instructions and no further questions or complaints. CRITICAL CARE:    None    CONSULTS:    None    PROCEDURES:    None      OARRS Report if indicated             FINAL IMPRESSION      1.  Peripheral edema          DISPOSITION/PLAN   DISPOSITION Discharge - Pending Orders Complete 11/14/2022 03:55:36 PM        CONDITION ON DISPOSITION: STABLE       PATIENT REFERRED TO:  SIMONE Krishna NP  [address]  [phone]    Schedule an appointment as soon as possible for a visit in 1 week      DISCHARGE MEDICATIONS:  New Prescriptions    CEPHALEXIN (KEFLEX) 500 MG CAPSULE    Take 1 capsule by mouth 4 times daily for 10 days       (Please note that portions of this note were completed with a voice recognition program.  Efforts were made to edit the dictations but occasionally words are mis-transcribed.)    Howie Álvarez DO   Attending Emergency Physician      Howie Álvarez DO  11/14/22 8290

## 2022-12-12 DIAGNOSIS — D47.1 MYELOPROLIFERATIVE DISEASE (HCC): ICD-10-CM

## 2022-12-12 NOTE — TELEPHONE ENCOUNTER
Electronic refill request per pharmacy. Last prescribed 9/21/22. Per 10/13/22 MD note continue with Hydroxyurea. Routed to MD for further review.

## 2022-12-14 RX ORDER — HYDROXYUREA 500 MG/1
500 CAPSULE ORAL DAILY
Qty: 30 CAPSULE | Refills: 2 | Status: SHIPPED | OUTPATIENT
Start: 2022-12-14

## 2023-01-17 ENCOUNTER — HOSPITAL ENCOUNTER (EMERGENCY)
Age: 63
Discharge: HOME | End: 2023-01-17
Attending: EMERGENCY MEDICINE
Payer: MEDICARE

## 2023-01-17 VITALS
SYSTOLIC BLOOD PRESSURE: 176 MMHG | OXYGEN SATURATION: 94 % | TEMPERATURE: 98.3 F | DIASTOLIC BLOOD PRESSURE: 109 MMHG | RESPIRATION RATE: 15 BRPM | HEART RATE: 95 BPM

## 2023-01-17 DIAGNOSIS — F10.930 ALCOHOL WITHDRAWAL SYNDROME WITHOUT COMPLICATION (HCC): Primary | ICD-10-CM

## 2023-01-17 LAB
ABSOLUTE EOS #: 0.54 K/UL (ref 0–0.4)
ABSOLUTE IMMATURE GRANULOCYTE: 0.36 K/UL (ref 0–0.3)
ABSOLUTE LYMPH #: 0.91 K/UL (ref 1–4.8)
ABSOLUTE MONO #: 1.09 K/UL (ref 0.1–0.8)
ALBUMIN SERPL-MCNC: 4.4 G/DL (ref 3.5–5.2)
ALBUMIN/GLOBULIN RATIO: 1 (ref 1–2.5)
ALP BLD-CCNC: 116 U/L (ref 40–129)
ALT SERPL-CCNC: 18 U/L (ref 5–41)
AMPHETAMINE SCREEN URINE: NEGATIVE
ANION GAP SERPL CALCULATED.3IONS-SCNC: 19 MMOL/L (ref 9–17)
AST SERPL-CCNC: 41 U/L
BARBITURATE SCREEN URINE: NEGATIVE
BASOPHILS # BLD: 2 % (ref 0–2)
BASOPHILS ABSOLUTE: 0.36 K/UL (ref 0–0.2)
BENZODIAZEPINE SCREEN, URINE: NEGATIVE
BILIRUB SERPL-MCNC: 0.7 MG/DL (ref 0.3–1.2)
BILIRUBIN DIRECT: 0.2 MG/DL
BILIRUBIN, INDIRECT: 0.5 MG/DL (ref 0–1)
BUN BLDV-MCNC: 4 MG/DL (ref 8–23)
CALCIUM SERPL-MCNC: 9 MG/DL (ref 8.6–10.4)
CANNABINOID SCREEN URINE: NEGATIVE
CHLORIDE BLD-SCNC: 97 MMOL/L (ref 98–107)
CO2: 18 MMOL/L (ref 20–31)
COCAINE METABOLITE, URINE: NEGATIVE
CREAT SERPL-MCNC: 0.65 MG/DL (ref 0.7–1.2)
EOSINOPHILS RELATIVE PERCENT: 3 % (ref 1–4)
ETHANOL PERCENT: <0.01 %
ETHANOL: <10 MG/DL
FENTANYL URINE: NEGATIVE
GFR SERPL CREATININE-BSD FRML MDRD: >60 ML/MIN/1.73M2
GLUCOSE BLD-MCNC: 110 MG/DL (ref 70–99)
HCT VFR BLD CALC: 50.7 % (ref 40.7–50.3)
HEMOGLOBIN: 17 G/DL (ref 13–17)
IMMATURE GRANULOCYTES: 2 %
LIPASE: 27 U/L (ref 13–60)
LYMPHOCYTES # BLD: 5 % (ref 24–44)
MCH RBC QN AUTO: 29.2 PG (ref 25.2–33.5)
MCHC RBC AUTO-ENTMCNC: 33.5 G/DL (ref 28.4–34.8)
MCV RBC AUTO: 87 FL (ref 82.6–102.9)
METHADONE SCREEN, URINE: NEGATIVE
MONOCYTES # BLD: 6 % (ref 1–7)
MORPHOLOGY: NORMAL
NRBC AUTOMATED: 0 PER 100 WBC
OPIATES, URINE: NEGATIVE
OXYCODONE SCREEN URINE: NEGATIVE
PDW BLD-RTO: 13.7 % (ref 11.8–14.4)
PHENCYCLIDINE, URINE: NEGATIVE
PLATELET # BLD: 792 K/UL (ref 138–453)
PMV BLD AUTO: 9.2 FL (ref 8.1–13.5)
POTASSIUM SERPL-SCNC: 4.4 MMOL/L (ref 3.7–5.3)
RBC # BLD: 5.83 M/UL (ref 4.21–5.77)
SEG NEUTROPHILS: 82 % (ref 36–66)
SEGMENTED NEUTROPHILS ABSOLUTE COUNT: 14.84 K/UL (ref 1.8–7.7)
SODIUM BLD-SCNC: 134 MMOL/L (ref 135–144)
TEST INFORMATION: NORMAL
TOTAL PROTEIN: 8.6 G/DL (ref 6.4–8.3)
WBC # BLD: 18.1 K/UL (ref 3.5–11.3)

## 2023-01-17 PROCEDURE — 93005 ELECTROCARDIOGRAM TRACING: CPT | Performed by: STUDENT IN AN ORGANIZED HEALTH CARE EDUCATION/TRAINING PROGRAM

## 2023-01-17 PROCEDURE — 83690 ASSAY OF LIPASE: CPT

## 2023-01-17 PROCEDURE — 6360000002 HC RX W HCPCS: Performed by: STUDENT IN AN ORGANIZED HEALTH CARE EDUCATION/TRAINING PROGRAM

## 2023-01-17 PROCEDURE — 80076 HEPATIC FUNCTION PANEL: CPT

## 2023-01-17 PROCEDURE — 80048 BASIC METABOLIC PNL TOTAL CA: CPT

## 2023-01-17 PROCEDURE — 96374 THER/PROPH/DIAG INJ IV PUSH: CPT

## 2023-01-17 PROCEDURE — 99284 EMERGENCY DEPT VISIT MOD MDM: CPT

## 2023-01-17 PROCEDURE — 96375 TX/PRO/DX INJ NEW DRUG ADDON: CPT

## 2023-01-17 PROCEDURE — 80307 DRUG TEST PRSMV CHEM ANLYZR: CPT

## 2023-01-17 PROCEDURE — 2580000003 HC RX 258: Performed by: STUDENT IN AN ORGANIZED HEALTH CARE EDUCATION/TRAINING PROGRAM

## 2023-01-17 PROCEDURE — 85025 COMPLETE CBC W/AUTO DIFF WBC: CPT

## 2023-01-17 PROCEDURE — G0480 DRUG TEST DEF 1-7 CLASSES: HCPCS

## 2023-01-17 RX ORDER — ONDANSETRON 2 MG/ML
4 INJECTION INTRAMUSCULAR; INTRAVENOUS ONCE
Status: COMPLETED | OUTPATIENT
Start: 2023-01-17 | End: 2023-01-17

## 2023-01-17 RX ORDER — LORAZEPAM 2 MG/ML
1 INJECTION INTRAMUSCULAR ONCE
Status: COMPLETED | OUTPATIENT
Start: 2023-01-17 | End: 2023-01-17

## 2023-01-17 RX ORDER — 0.9 % SODIUM CHLORIDE 0.9 %
1000 INTRAVENOUS SOLUTION INTRAVENOUS ONCE
Status: COMPLETED | OUTPATIENT
Start: 2023-01-17 | End: 2023-01-17

## 2023-01-17 RX ADMIN — SODIUM CHLORIDE 1000 ML: 9 INJECTION, SOLUTION INTRAVENOUS at 11:49

## 2023-01-17 RX ADMIN — LORAZEPAM 1 MG: 2 INJECTION INTRAMUSCULAR at 11:47

## 2023-01-17 RX ADMIN — ONDANSETRON 4 MG: 2 INJECTION INTRAMUSCULAR; INTRAVENOUS at 11:47

## 2023-01-17 ASSESSMENT — ENCOUNTER SYMPTOMS
BACK PAIN: 0
SHORTNESS OF BREATH: 0
ABDOMINAL PAIN: 0

## 2023-01-17 ASSESSMENT — LIFESTYLE VARIABLES
HOW MANY STANDARD DRINKS CONTAINING ALCOHOL DO YOU HAVE ON A TYPICAL DAY: 10 OR MORE
HOW OFTEN DO YOU HAVE A DRINK CONTAINING ALCOHOL: 4 OR MORE TIMES A WEEK

## 2023-01-17 NOTE — ED NOTES
Writer contacted [facility] regarding potential admission. Packet faxed for review. [facility] to follow up with decision.       [address]  01/17/23 1412

## 2023-01-17 NOTE — DISCHARGE INSTRUCTIONS
Patient is medically clear at this time. Please go directly to [facility] to continue your alcohol withdrawal.    Please follow-up with your primary care provider. If you develop any worsening or concerning symptoms please return the emergency department for further evaluation.

## 2023-01-17 NOTE — ED NOTES
[facility] accepted patient under Dr. Gertrude Christian.       Valentino Ross, MUKESH  01/17/23 7340

## 2023-01-17 NOTE — ED PROVIDER NOTES
[facility] ED     Emergency Department     Faculty Attestation    I performed a history and physical examination of the patient and discussed management with the resident. I reviewed the residents note and agree with the documented findings and plan of care. Any areas of disagreement are noted on the chart. I was personally present for the key portions of any procedures. I have documented in the chart those procedures where I was not present during the key portions. I have reviewed the emergency nurses triage note. I agree with the chief complaint, past medical history, past surgical history, allergies, medications, social and family history as documented unless otherwise noted below. For Physician Assistant/ Nurse Practitioner cases/documentation I have personally evaluated this patient and have completed at least one if not all key elements of the E/M (history, physical exam, and MDM). Additional findings are as noted. Patient here with alcohol withdrawal history of daily use 15 beers a day plus vodka. Has been trying to taper his drinking use. Has been admitted in the past.  On exam tremulous but nontoxic normal mental status normal pupils hyperreflexic with tongue fasciculations.   Will check labs EKG fluids meds possible admit social work to see    EKG interpretation sinus rhythm 96 normal intervals normal axis no acute ST or T changes no acute findings      Critical Care     none    Sophy Woody MD, Ricardo Adena  Attending Emergency  Physician           Sophy Woody MD  01/17/23 4111

## 2023-01-17 NOTE — ED NOTES
Writer met with patient at bedside to complete the SBIRT assessment. The results can be located in the ED navigator under screening questions. Patient scored as following:    AUDIT (Alcohol Screening Questionnaire): 12  DAST (Drug Screening Questionnaire): 1  PHQ-9 (Depression Screening Questionnaire): 0      The patient was provided with the following resources/interventions: Patient reports currently in withdraw from alcohol use and is requesting assistance with a detox admission. Social work to assist with coordination of detox facility.      Start Time 1211  End Time 1229  Diagnosis Z71.41; Z71.51      MUKESH Ramirez  01/17/23 1242

## 2023-01-17 NOTE — ED PROVIDER NOTES
[facility]  ED  Emergency Department Encounter  Emergency Medicine Resident     Pt Name:Danny Roche  MRN: [id number]  [place] 1960  Date of evaluation: 1/17/23  PCP:  SIMONE May NP  Note Started: 11:30 AM EST      CHIEF COMPLAINT       Chief Complaint   Patient presents with    Delirium Tremens (DTS)       HISTORY OF PRESENT ILLNESS  (Location/Symptom, Timing/Onset, Context/Setting, Quality, Duration, Modifying Factors, Severity.)      Eleni Hendrickson is a 65 y.o. male who presents with concern for alcohol withdrawal.  Patient states he typically drinks 15 beers per day, has done this for years. States he knows it is time for him to quit so has been trying to quit on his own. Has been cutting down on the number. Has been drinking, drink only 5 beers yesterday. Has been feeling terrible for the last 5 days, unable to keep anything down, nauseous, dry heaving. Has been having intermittent drinks to try to curb the withdrawal.  States he had approximately 1 beer this morning to try to help with shaking however it did not help. Patient states he has gone through withdrawal before, has never had any alcohol withdrawal seizures. No other complaints at this time. Would like to stop drinking. PAST MEDICAL / SURGICAL / SOCIAL / FAMILY HISTORY      has a past medical history of Alcohol abuse, Anemia, Cancer ([address] Cervical stenosis of spine, Chronic left-sided low back pain with left-sided sciatica, Insomnia, Intentional drug overdose ([address] Left arm numbness, Left lumbar radiculitis, Macrocytosis, Major depressive disorder, recurrent severe without psychotic features ([address] Myeloproliferative disorder ([address] Osteoarthritis of spine with radiculopathy, lumbar region, Severe recurrent major depression without psychotic features ([address] Spondylosis of lumbar region without myelopathy or radiculopathy, and Urinary retention.        has a past surgical history that includes cervical fusion; Appendectomy; Tonsillectomy; Carpal tunnel release; epidural steroid injection (Left, 5/15/2019); Endoscopy, colon, diagnostic; cervical fusion (09/05/2019); and cervical fusion (N/A, 9/5/2019). Social History     Socioeconomic History    Marital status:      Spouse name: Not on file    Number of children: 3    Years of education: Not on file    Highest education level: Not on file   Occupational History    Not on file   Tobacco Use    Smoking status: Every Day     Packs/day: 1.50     Years: 43.00     Pack years: 64.50     Types: Cigarettes     Start date: 9/23/1978    Smokeless tobacco: Never   Vaping Use    Vaping Use: Never used   Substance and Sexual Activity    Alcohol use: Yes     Alcohol/week: 14.0 standard drinks     Types: 14 Cans of beer per week     Comment: heavy alcohol user/ states drinks a pint of vodka occassionaly    Drug use: Not Currently     Types: Marijuana Jerelyn November)    Sexual activity: Not Currently   Other Topics Concern    Not on file   Social History Narrative    Not on file     Social Determinants of Health     Financial Resource Strain: Not on file   Food Insecurity: Not on file   Transportation Needs: Not on file   Physical Activity: Not on file   Stress: Not on file   Social Connections: Not on file   Intimate Partner Violence: Not on file   Housing Stability: Not on file       Family History   Problem Relation Age of Onset    Breast Cancer Mother         s/p surgical complications    Diabetes Mother     Heart Failure Father     Other Brother         \"bad back\"       Allergies:  Patient has no known allergies. Home Medications:  Prior to Admission medications    Medication Sig Start Date End Date Taking?  Authorizing Provider   hydroxyurea (HYDREA) 500 MG chemo capsule TAKE 1 CAPSULE BY MOUTH DAILY 12/14/22   Gaby Nicholson MD   nicotine (NICODERM CQ) 14 MG/24HR Place 1 patch onto the skin daily 11/3/22   SIMONE Landa NP   QUEtiapine (SEROQUEL) 300 MG tablet Take 1 tablet by mouth nightly 7/25/22   Maya Thomas MD   sodium chloride 1 g tablet Take 1 tablet by mouth in the morning and 1 tablet at noon and 1 tablet in the evening. Take with meals. 7/25/22   Maya Thomas MD   ipratropium-albuterol (DUONEB) 0.5-2.5 (3) MG/3ML SOLN nebulizer solution Inhale 3 mLs into the lungs every 6 hours as needed for Shortness of Breath  Patient not taking: Reported on 10/13/2022 7/25/22 8/4/22  Maya Thomas MD   furosemide (LASIX) 20 MG tablet Take 20 mg by mouth daily 4/1/22   Historical Provider, MD   lisinopril (PRINIVIL;ZESTRIL) 10 MG tablet Take 10 mg by mouth daily 4/7/22   Historical Provider, MD   sertraline (ZOLOFT) 100 MG tablet Take 1 tablet by mouth daily 7/17/21   Sam Medina MD   folic acid (FOLVITE) 1 MG tablet Take 1 tablet by mouth daily 6/24/21   Loris Dancer, MD   levothyroxine (SYNTHROID) 50 MCG tablet Take 1 tablet by mouth daily Take in the morning on an empty stomach 1 hour before any food or other medications 6/24/21   Loris Dancer, MD   naltrexone (DEPADE) 50 MG tablet Take 50 mg by mouth in the morning. Historical Provider, MD   prazosin (MINIPRESS) 1 MG capsule Take 1 mg by mouth 2 times daily    Historical Provider, MD   omeprazole (PRILOSEC) 40 MG delayed release capsule Take 40 mg by mouth Daily with supper     Historical Provider, MD   Multiple Vitamins-Minerals (THERAPEUTIC MULTIVITAMIN-MINERALS) tablet Take 1 tablet by mouth daily  Patient not taking: No sig reported 6/3/21 6/3/22  Sharona Simpson MD         REVIEW OF SYSTEMS       Review of Systems   Constitutional:  Negative for chills and fever. HENT:  Negative for congestion. Respiratory:  Negative for shortness of breath. Cardiovascular:  Negative for chest pain. Gastrointestinal:  Negative for abdominal pain. Musculoskeletal:  Negative for back pain and neck pain. Skin:  Negative for rash. Neurological:  Positive for tremors.    Psychiatric/Behavioral: Negative for confusion. PHYSICAL EXAM      INITIAL VITALS:   BP (!) 176/109   Pulse 95   Temp 98.3 °F (36.8 °C) (Oral)   Resp 15   SpO2 94%     Physical Exam  Vitals reviewed. Constitutional:       Appearance: He is ill-appearing. Comments: Very tremulous   HENT:      Head: Normocephalic and atraumatic. Right Ear: External ear normal.      Left Ear: External ear normal.      Nose: Nose normal.      Mouth/Throat:      Mouth: Mucous membranes are moist.      Comments: Tongue fasciculations present  Eyes:      General:         Right eye: No discharge. Left eye: No discharge. Cardiovascular:      Rate and Rhythm: Normal rate and regular rhythm. Pulmonary:      Effort: Pulmonary effort is normal. No respiratory distress. Abdominal:      General: There is no distension. Palpations: Abdomen is soft. Tenderness: There is no abdominal tenderness. Musculoskeletal:      Cervical back: Normal range of motion. Comments: Moving all 4 extremities. Tremulous   Skin:     General: Skin is warm. Capillary Refill: Capillary refill takes less than 2 seconds. Neurological:      General: No focal deficit present. Mental Status: He is alert. Psychiatric:         Mood and Affect: Mood normal.         DDX/DIAGNOSTIC RESULTS / EMERGENCY DEPARTMENT COURSE / MDM     Medical Decision Making  DDx: Alcohol withdrawal, DTs, electrolyte abnormalities, dehydration, pancreatitis    61-year-old male presents with concern for alcohol withdrawal.  States he recently started cutting down on his drinking, had only 1 beer today, previously drank 15 beers per day. Has gone through withdrawals previously, has never had any alcohol withdrawal seizures. Patient appears chronically ill, very tremulous on initial exam, afebrile, stable vital signs. Patient has generalized tremors, has tongue fasciculations, borderline tachycardic. We will plan to obtain laboratory work-up.   Will dose with Zofran as well as Ativan. Will provide fluids. Will have social work evaluate patient    Amount and/or Complexity of Data Reviewed  Labs: ordered. Decision-making details documented in ED Course. ECG/medicine tests: ordered. Risk  Prescription drug management. EKG  None    All EKG's are interpreted by the Emergency Department Physician who either signs or Co-signs this chart in the absence of a cardiologist.    EMERGENCY DEPARTMENT COURSE:      ED Course as of 01/17/23 1902 Tue Jan 17, 2023   1155 CBC appears hemoconcentrated. Receiving fluids. [AB]   6755 ETHANOL,ETHA: <10 [AB]   1216 Lipase: 27 [AB]   1216 Creatinine(!): 0.65 [AB]   1216 Patient medically clear at this time. Will discuss with social work. [AB]   6211 Patient accepted to [facility]. Will go by cab. Patient agreed with this plan. Advise close follow-up with PCP. Given strict return precautions. [AB]      ED Course User Index  [AB] Jonathan Vicente DO       PROCEDURES:  None    CONSULTS:  None    CRITICAL CARE:  There was significant risk of life threatening deterioration of patient's condition requiring my direct management. Critical care time 0 minutes, excluding any documented procedures. FINAL IMPRESSION      1.  Alcohol withdrawal syndrome without complication [facility])          DISPOSITION / PLAN     DISPOSITION Decision To Discharge 01/17/2023 03:32:12 PM      PATIENT REFERRED TO:  [facility] ED  [facility]  [phone]  Go to   If symptoms worsen    SIMONE Joseph NP  [address]  [phone]    Schedule an appointment as soon as possible for a visit in 3 days      DISCHARGE MEDICATIONS:  Discharge Medication List as of 1/17/2023  3:33 PM          Flower Holm DO  Emergency Medicine Resident    (Please note that portions of thisnote were completed with a voice recognition program.  Efforts were made to edit the dictations but occasionally words are mis-transcribed.)        Laura Pierce,   Resident  01/17/23 2226

## 2023-01-17 NOTE — ED NOTES
Per , pt will be provided with asha pringle to [facility], will send pt to results waiting room for arrival of cab      Brendon Peterson RN  01/17/23 7173

## 2023-01-17 NOTE — ED NOTES
Social work at [facility]      Jim Norman, [address] [place] [address]  01/17/23 1211 no

## 2023-01-17 NOTE — ED NOTES
Pt to ED via EMS   Pt c/o concerns for detoxing from alcohol  Pt normally drinks 15 beers daily  Pt states yesterday he drank only 5  Woke up early this AM with tremors, nausea, and vomiting  Attempted to drink a beer but threw it up  Pt has hx of DT, denies seizures  Denies hallucinations, agitation, anxiety  Shaking noted   Admits to wanting to stop drinking alcohol, seen PCP recently and was prescribed medication  Pt is a/ox4, ambulatory, RR even and non labored, attached to monitor, call light in reach  Seizure precautions in place      Governor SENDY Abbott  01/17/23 3083

## 2023-01-17 NOTE — ED NOTES
The following labs were labeled with appropriate pt sticker and tubed to lab:     [] Blue     [] Lavender   [] on ice  [] Green/yellow  [] Green/black [] on ice  [] Chrystine Schlatter  [] on ice  [] Yellow  [] Red  [] Type/ Screen  [] ABG  [] VBG    [] COVID-19 swab    [] Rapid  [] PCR  [] Flu swab  [] Peds Viral Panel     [x] Urine Sample  [] Fecal Sample  [] Pelvic Cultures  [] Blood Cultures  [] X 2  [] STREP Cultures         Philip Cross RN  01/17/23 9503

## 2023-01-17 NOTE — ED NOTES
Writer met with patient at bedside regarding concerns for alcohol withdraw. Patient states that he is a daily drinker and has been trying to decrease the amount of alcohol he consumes over the past 5 days. He states that his withdraw symptoms became unmanageable today so he came to the ED. He reports that he typically drinks 15 12-16oz beers a day with some intermittent vodka consumption. Patient reports that his withdraw symptoms are tremors, nausea and vomiting, hot and cold sweats and did experience hallucinations this morning. He denies pervious history with seizures. Patient reports that his longest period of sobriety was 5 years. He relapsed in 2017 following the death of his spouse, he has been drinking daily since. He reports previous treatment experiences but is unable to recall places at this time. Patient states that he occasionally smokes Marijuana, last use was 2 days ago. He reports depressive symptoms surrounding his alcohol use and the passing of his wife. He denies thoughts of harm. He has stable housing and denies legal issues. Patient is requesting assistance with detox and is open to being admitted to a detox facility. Writer explored options with patient, he is in agreement to attempt admission through [facility].       MUKESH Irving  01/17/23 4053

## 2023-01-18 LAB
EKG ATRIAL RATE: 96 BPM
EKG P AXIS: 19 DEGREES
EKG P-R INTERVAL: 132 MS
EKG Q-T INTERVAL: 370 MS
EKG QRS DURATION: 60 MS
EKG QTC CALCULATION (BAZETT): 467 MS
EKG R AXIS: 58 DEGREES
EKG T AXIS: 67 DEGREES
EKG VENTRICULAR RATE: 96 BPM

## 2023-01-18 PROCEDURE — 93010 ELECTROCARDIOGRAM REPORT: CPT | Performed by: INTERNAL MEDICINE

## 2023-02-13 DIAGNOSIS — D47.1 MYELOPROLIFERATIVE DISEASE (HCC): Primary | ICD-10-CM

## 2023-03-03 ENCOUNTER — TELEPHONE (OUTPATIENT)
Dept: INFUSION THERAPY | Facility: MEDICAL CENTER | Age: 63
End: 2023-03-03

## 2023-03-03 NOTE — TELEPHONE ENCOUNTER
Patient called triage office twice and left a message stating that his PCP told him his labs are all elevated. Patient is worried and states that he is out of Hydrea. Labs have not been completed since January. Writer returned call to patient, he states that he was incorrect, his pharmacy had a refill on file and delivered the hydrea to him today. Patient missed February follow up, writer reminded patient of March follow up scheduled. Patient voiced understanding.

## 2023-03-14 ENCOUNTER — TELEPHONE (OUTPATIENT)
Dept: ONCOLOGY | Age: 63
End: 2023-03-14

## 2023-03-14 NOTE — TELEPHONE ENCOUNTER
PATIENT CALLED AND STATED HE FORGOT ABOUT HIS APPOINTMENT, BUT HE IS IN THE HOSPITAL AND HAS BEEN THERE FOR ABOUT A WEEK ([facility]). PATIENT WILL CALL BACK TO GET RESCHEDULED (if he has not been dismissed/per patient).

## 2023-04-06 DIAGNOSIS — D47.1 MYELOPROLIFERATIVE DISEASE (HCC): ICD-10-CM

## 2023-04-07 RX ORDER — HYDROXYUREA 500 MG/1
500 CAPSULE ORAL DAILY
Qty: 28 CAPSULE | Refills: 2 | Status: SHIPPED | OUTPATIENT
Start: 2023-04-07

## 2023-04-24 ENCOUNTER — HOSPITAL ENCOUNTER (OUTPATIENT)
Dept: VASCULAR LAB | Age: 63
Discharge: HOME | End: 2023-04-24
Payer: MEDICARE

## 2023-04-24 DIAGNOSIS — R60.0 LOWER LEG EDEMA: ICD-10-CM

## 2023-04-24 PROCEDURE — 93970 EXTREMITY STUDY: CPT

## 2023-08-24 ENCOUNTER — OFFICE VISIT (OUTPATIENT)
Dept: ONCOLOGY | Age: 63
End: 2023-08-24
Payer: MEDICARE

## 2023-08-24 ENCOUNTER — TELEPHONE (OUTPATIENT)
Dept: ONCOLOGY | Age: 63
End: 2023-08-24

## 2023-08-24 ENCOUNTER — HOSPITAL ENCOUNTER (OUTPATIENT)
Age: 63
Setting detail: SPECIMEN
Discharge: HOME | End: 2023-08-24
Payer: MEDICARE

## 2023-08-24 VITALS
SYSTOLIC BLOOD PRESSURE: 136 MMHG | RESPIRATION RATE: 18 BRPM | TEMPERATURE: 97.7 F | HEART RATE: 80 BPM | BODY MASS INDEX: 25.7 KG/M2 | DIASTOLIC BLOOD PRESSURE: 82 MMHG | WEIGHT: 194.8 LBS

## 2023-08-24 DIAGNOSIS — D47.1 MYELOPROLIFERATIVE DISEASE (HCC): Primary | ICD-10-CM

## 2023-08-24 DIAGNOSIS — D47.1 MYELOPROLIFERATIVE DISEASE (HCC): ICD-10-CM

## 2023-08-24 LAB
BASOPHILS # BLD: 0 K/UL (ref 0–0.2)
BASOPHILS NFR BLD: 0 %
EOSINOPHIL # BLD: 0.39 K/UL (ref 0–0.4)
EOSINOPHILS RELATIVE PERCENT: 2 % (ref 1–4)
ERYTHROCYTE [DISTWIDTH] IN BLOOD BY AUTOMATED COUNT: 15.7 % (ref 11.8–14.4)
HCT VFR BLD AUTO: 40.9 % (ref 40.7–50.3)
HGB BLD-MCNC: 13.4 G/DL (ref 13–17)
IMM GRANULOCYTES # BLD AUTO: 0.59 K/UL (ref 0–0.3)
IMM GRANULOCYTES NFR BLD: 3 %
LYMPHOCYTES NFR BLD: 1.95 K/UL (ref 1–4.8)
LYMPHOCYTES RELATIVE PERCENT: 10 % (ref 24–44)
MCH RBC QN AUTO: 28.3 PG (ref 25.2–33.5)
MCHC RBC AUTO-ENTMCNC: 32.8 G/DL (ref 28.4–34.8)
MCV RBC AUTO: 86.3 FL (ref 82.6–102.9)
MONOCYTES NFR BLD: 1.76 K/UL (ref 0.2–0.8)
MONOCYTES NFR BLD: 9 % (ref 1–7)
NEUTROPHILS NFR BLD: 76 % (ref 36–66)
NEUTS SEG NFR BLD: 14.81 K/UL (ref 1.8–7.7)
NRBC BLD-RTO: 0 PER 100 WBC
PLATELET # BLD AUTO: 634 K/UL (ref 138–453)
PMV BLD AUTO: 9.2 FL (ref 8.1–13.5)
RBC # BLD AUTO: 4.74 M/UL (ref 4.21–5.77)
WBC OTHER # BLD: 19.5 K/UL (ref 3.5–11.3)

## 2023-08-24 PROCEDURE — 99211 OFF/OP EST MAY X REQ PHY/QHP: CPT

## 2023-08-24 PROCEDURE — 99214 OFFICE O/P EST MOD 30 MIN: CPT | Performed by: INTERNAL MEDICINE

## 2023-08-24 PROCEDURE — G8419 CALC BMI OUT NRM PARAM NOF/U: HCPCS | Performed by: INTERNAL MEDICINE

## 2023-08-24 PROCEDURE — 85025 COMPLETE CBC W/AUTO DIFF WBC: CPT

## 2023-08-24 PROCEDURE — 4004F PT TOBACCO SCREEN RCVD TLK: CPT | Performed by: INTERNAL MEDICINE

## 2023-08-24 PROCEDURE — 36415 COLL VENOUS BLD VENIPUNCTURE: CPT

## 2023-08-24 PROCEDURE — 3079F DIAST BP 80-89 MM HG: CPT | Performed by: INTERNAL MEDICINE

## 2023-08-24 PROCEDURE — G8428 CUR MEDS NOT DOCUMENT: HCPCS | Performed by: INTERNAL MEDICINE

## 2023-08-24 PROCEDURE — 3017F COLORECTAL CA SCREEN DOC REV: CPT | Performed by: INTERNAL MEDICINE

## 2023-08-24 PROCEDURE — 3075F SYST BP GE 130 - 139MM HG: CPT | Performed by: INTERNAL MEDICINE

## 2023-08-24 RX ORDER — GABAPENTIN 600 MG/1
TABLET ORAL
COMMUNITY
Start: 2023-08-02

## 2023-08-24 RX ORDER — HYDROXYUREA 500 MG/1
500 CAPSULE ORAL DAILY
Qty: 30 CAPSULE | Refills: 5 | Status: SHIPPED | OUTPATIENT
Start: 2023-08-24

## 2023-08-24 RX ORDER — LORATADINE 10 MG/1
TABLET ORAL
COMMUNITY
Start: 2023-08-15

## 2023-08-24 RX ORDER — MELOXICAM 7.5 MG/1
7.5 TABLET ORAL DAILY
COMMUNITY
Start: 2023-08-15

## 2023-08-24 RX ORDER — NALTREXONE 380 MG
KIT INTRAMUSCULAR
COMMUNITY
Start: 2023-08-22

## 2023-08-24 NOTE — TELEPHONE ENCOUNTER
Shanna Watson MD VISIT  Hydrea once daily  RV 4-6 months with CBC at RV  LABS CDP 1/25/24  MD VISIT 1/25/24 @ 2PM  AVS PRINTED W/ INSTRUCTIONS AND GIVEN TO PT ON EXIT

## 2023-08-24 NOTE — PROGRESS NOTES
without myelopathy or radiculopathy, and Urinary retention. PAST SURGICAL HISTORY: has a past surgical history that includes cervical fusion; Appendectomy; Tonsillectomy; Carpal tunnel release; epidural steroid injection (Left, 5/15/2019); Endoscopy, colon, diagnostic; cervical fusion (09/05/2019); and cervical fusion (N/A, 9/5/2019). CURRENT MEDICATIONS:  has a current medication list which includes the following prescription(s): gabapentin, hydroxyurea, quetiapine, furosemide, lisinopril, folic acid, omeprazole, therapeutic multivitamin-minerals, vivitrol, meloxicam, loratadine, nicotine, sodium chloride, ipratropium 0.5 mg-albuterol 2.5 mg, levothyroxine, naltrexone, and prazosin. ALLERGIES:  has No Known Allergies. FAMILY HISTORY: Negative for any hematological or oncological conditions. SOCIAL HISTORY:  reports that he has been smoking cigarettes. He started smoking about 44 years ago. He has a 64.50 pack-year smoking history. He has never used smokeless tobacco. He reports current alcohol use of about 14.0 standard drinks per week. He reports that he does not currently use drugs after having used the following drugs: Marijuana Yaniv Gonzalez). REVIEW OF SYSTEMS:     General: No weakness or fatigue. No unanticipated weight loss or decreased appetite. No fever or chills. Eyes: No blurred vision, eye pain or double vision. Ears: No hearing problems or drainage. No tinnitus. Throat: No sore throat, problems with swallowing or dysphagia. Respiratory: Positive for cough. No sputum or hemoptysis. No shortness of breath. Positive for pleuritic chest pain. Cardiovascular: No chest pain, orthopnea or PND. No lower extremity edema. No palpitation. Gastrointestinal: No problems with swallowing. No abdominal pain or bloating. No nausea or vomiting. No diarrhea or constipation. No GI bleeding. Genitourinary: No dysuria, hematuria, frequency or urgency.      Musculoskeletal: No muscle aches or

## 2023-08-25 DIAGNOSIS — D47.1 MYELOPROLIFERATIVE DISEASE (HCC): Primary | ICD-10-CM

## 2023-08-29 ENCOUNTER — HOSPITAL ENCOUNTER (OUTPATIENT)
Dept: MRI IMAGING | Age: 63
Discharge: HOME | End: 2023-08-31
Payer: MEDICARE

## 2023-08-29 DIAGNOSIS — R20.2 LEFT HAND PARESTHESIA: ICD-10-CM

## 2023-08-29 PROCEDURE — 6360000004 HC RX CONTRAST MEDICATION: Performed by: NURSE PRACTITIONER

## 2023-08-29 PROCEDURE — 72141 MRI NECK SPINE W/O DYE: CPT

## 2023-08-29 PROCEDURE — 82565 ASSAY OF CREATININE: CPT

## 2023-08-29 PROCEDURE — 2580000003 HC RX 258: Performed by: NURSE PRACTITIONER

## 2023-08-29 PROCEDURE — 70553 MRI BRAIN STEM W/O & W/DYE: CPT

## 2023-08-29 PROCEDURE — A9579 GAD-BASE MR CONTRAST NOS,1ML: HCPCS | Performed by: NURSE PRACTITIONER

## 2023-08-29 RX ORDER — SODIUM CHLORIDE 0.9 % (FLUSH) 0.9 %
10 SYRINGE (ML) INJECTION PRN
Status: DISCONTINUED | OUTPATIENT
Start: 2023-08-29 | End: 2023-09-01 | Stop reason: HOSPADM

## 2023-08-29 RX ADMIN — SODIUM CHLORIDE, PRESERVATIVE FREE 10 ML: 5 INJECTION INTRAVENOUS at 17:05

## 2023-08-29 RX ADMIN — GADOTERIDOL 17 ML: 279.3 INJECTION, SOLUTION INTRAVENOUS at 17:05

## 2023-08-30 LAB
EGFR, POC: >60 ML/MIN/1.73M2
POC CREATININE: 0.7 MG/DL (ref 0.51–1.19)

## 2023-10-27 ENCOUNTER — OFFICE VISIT (OUTPATIENT)
Dept: VASCULAR SURGERY | Age: 63
End: 2023-10-27
Payer: MEDICARE

## 2023-10-27 VITALS
SYSTOLIC BLOOD PRESSURE: 134 MMHG | RESPIRATION RATE: 18 BRPM | TEMPERATURE: 97.3 F | DIASTOLIC BLOOD PRESSURE: 79 MMHG | WEIGHT: 198 LBS | BODY MASS INDEX: 26.24 KG/M2 | HEART RATE: 83 BPM | HEIGHT: 73 IN | OXYGEN SATURATION: 98 %

## 2023-10-27 DIAGNOSIS — I89.0 LYMPHEDEMA: Primary | ICD-10-CM

## 2023-10-27 PROCEDURE — G8427 DOCREV CUR MEDS BY ELIG CLIN: HCPCS | Performed by: SURGERY

## 2023-10-27 PROCEDURE — 99213 OFFICE O/P EST LOW 20 MIN: CPT | Performed by: SURGERY

## 2023-10-27 PROCEDURE — 3017F COLORECTAL CA SCREEN DOC REV: CPT | Performed by: SURGERY

## 2023-10-27 PROCEDURE — 4004F PT TOBACCO SCREEN RCVD TLK: CPT | Performed by: SURGERY

## 2023-10-27 PROCEDURE — G8484 FLU IMMUNIZE NO ADMIN: HCPCS | Performed by: SURGERY

## 2023-10-27 PROCEDURE — 3078F DIAST BP <80 MM HG: CPT | Performed by: SURGERY

## 2023-10-27 PROCEDURE — G8419 CALC BMI OUT NRM PARAM NOF/U: HCPCS | Performed by: SURGERY

## 2023-10-27 PROCEDURE — 3075F SYST BP GE 130 - 139MM HG: CPT | Performed by: SURGERY

## 2023-10-27 RX ORDER — FERROUS SULFATE 325(65) MG
325 TABLET ORAL
COMMUNITY

## 2023-10-27 RX ORDER — DULOXETIN HYDROCHLORIDE 60 MG/1
60 CAPSULE, DELAYED RELEASE ORAL DAILY
COMMUNITY

## 2023-11-07 VITALS — HEIGHT: 73 IN | BODY MASS INDEX: 26.64 KG/M2 | WEIGHT: 201 LBS

## 2023-11-20 ENCOUNTER — HOSPITAL ENCOUNTER (OUTPATIENT)
Age: 63
Discharge: HOME | End: 2023-11-22
Payer: MEDICARE

## 2023-11-20 DIAGNOSIS — R06.02 SHORTNESS OF BREATH: ICD-10-CM

## 2023-11-20 DIAGNOSIS — I10 ESSENTIAL HYPERTENSION, BENIGN: ICD-10-CM

## 2023-11-20 DIAGNOSIS — Z87.891 PERSONAL HISTORY OF TOBACCO USE, PRESENTING HAZARDS TO HEALTH: ICD-10-CM

## 2023-11-20 DIAGNOSIS — F17.210 CIGARETTE SMOKER: ICD-10-CM

## 2023-11-20 DIAGNOSIS — R93.89 ABNORMAL RADIOLOGICAL FINDINGS IN SKIN AND SUBCUTANEOUS TISSUE: ICD-10-CM

## 2023-11-20 LAB
ECHO AO ASC DIAM: 3.2 CM
ECHO AO ROOT DIAM: 3.1 CM
ECHO AR MAX VEL PISA: 1.9 M/S
ECHO AV AREA PEAK VELOCITY: 3.4 CM2
ECHO AV AREA VTI: 3.4 CM2
ECHO AV MEAN GRADIENT: 4 MMHG
ECHO AV MEAN VELOCITY: 0.9 M/S
ECHO AV PEAK GRADIENT: 8 MMHG
ECHO AV PEAK VELOCITY: 1.4 M/S
ECHO AV REGURGITANT PHT: 386 MS
ECHO AV VELOCITY RATIO: 1.07
ECHO AV VTI: 29.4 CM
ECHO LA AREA 2C: 15.6 CM2
ECHO LA AREA 4C: 8.7 CM2
ECHO LA DIAMETER: 3.5 CM
ECHO LA MAJOR AXIS: 3.8 CM
ECHO LA MINOR AXIS: 4.6 CM
ECHO LA TO AORTIC ROOT RATIO: 1.13
ECHO LA VOL BP: 29 ML (ref 18–58)
ECHO LA VOL MOD A2C: 43 ML (ref 18–58)
ECHO LA VOL MOD A4C: 16 ML (ref 18–58)
ECHO LV E' LATERAL VELOCITY: 6 CM/S
ECHO LV E' SEPTAL VELOCITY: 7 CM/S
ECHO LV EDV A2C: 81 ML
ECHO LV EDV A4C: 104 ML
ECHO LV EJECTION FRACTION A2C: 66 %
ECHO LV EJECTION FRACTION A4C: 46 %
ECHO LV EJECTION FRACTION BIPLANE: 54 % (ref 55–100)
ECHO LV ESV A2C: 27 ML
ECHO LV ESV A4C: 56 ML
ECHO LV FRACTIONAL SHORTENING: 23 % (ref 28–44)
ECHO LV INTERNAL DIMENSION DIASTOLIC: 4.4 CM (ref 4.2–5.9)
ECHO LV INTERNAL DIMENSION SYSTOLIC: 3.4 CM
ECHO LV IVSD: 1.2 CM (ref 0.6–1)
ECHO LV MASS 2D: 191.3 G (ref 88–224)
ECHO LV POSTERIOR WALL DIASTOLIC: 1.2 CM (ref 0.6–1)
ECHO LV RELATIVE WALL THICKNESS RATIO: 0.55
ECHO LVOT AREA: 3.1 CM2
ECHO LVOT AV VTI INDEX: 1.08
ECHO LVOT DIAM: 2 CM
ECHO LVOT MEAN GRADIENT: 5 MMHG
ECHO LVOT PEAK GRADIENT: 9 MMHG
ECHO LVOT PEAK VELOCITY: 1.5 M/S
ECHO LVOT SV: 99.9 ML
ECHO LVOT VTI: 31.8 CM
ECHO RV TAPSE: 2.6 CM (ref 1.7–?)

## 2023-11-20 PROCEDURE — 93306 TTE W/DOPPLER COMPLETE: CPT

## 2023-11-20 PROCEDURE — 71271 CT THORAX LUNG CANCER SCR C-: CPT

## 2023-11-20 PROCEDURE — 93306 TTE W/DOPPLER COMPLETE: CPT | Performed by: INTERNAL MEDICINE

## 2023-12-06 ENCOUNTER — OFFICE VISIT (OUTPATIENT)
Dept: PULMONOLOGY | Age: 63
End: 2023-12-06

## 2023-12-06 VITALS
WEIGHT: 207 LBS | DIASTOLIC BLOOD PRESSURE: 90 MMHG | HEIGHT: 73 IN | BODY MASS INDEX: 27.43 KG/M2 | SYSTOLIC BLOOD PRESSURE: 147 MMHG | HEART RATE: 86 BPM | OXYGEN SATURATION: 98 % | RESPIRATION RATE: 20 BRPM

## 2023-12-06 DIAGNOSIS — J44.9 CHRONIC OBSTRUCTIVE PULMONARY DISEASE, UNSPECIFIED COPD TYPE (HCC): ICD-10-CM

## 2023-12-06 DIAGNOSIS — F17.200 SMOKING: ICD-10-CM

## 2023-12-06 DIAGNOSIS — I82.493 ACUTE EMBOLISM AND THROMBOSIS OF OTHER SPECIFIED DEEP VEIN OF LOWER EXTREMITY, BILATERAL (HCC): ICD-10-CM

## 2023-12-06 DIAGNOSIS — R91.1 LUNG NODULE SEEN ON IMAGING STUDY: Primary | ICD-10-CM

## 2023-12-06 NOTE — PROGRESS NOTES
[address] [facility],[address] RESPIRATORY [place], [place].  [address] [place] [id number]  Dept: [phone]  Dept Fax: [phone]      12/6/23    Patient: Eliud Holland  YOB: 1960    Dear Reyes Quince, MD,    I had the pleasure of seeing one of your patients, Eliud Holland today in the office today. Please find attached my note with the assessment and plan of care. Thank you for allowing me to participate in the care of this patient. I will keep you updated on this patient's follow up and I look forward to serving you and your patients again in the future.     Krzysztof Cristina MD  12/6/2023 10:25 AM
Completed and except as mentioned above were negative  Cardiovascular ROS:  Completed and except as mentioned above were negative  Gastrointestinal ROS: Completed and except as mentioned above were negative  Genito-Urinary ROS:  Completed and except as mentioned above were negative  Musculoskeletal ROS:  Completed and except as mentioned above were negative  Neurological ROS:  Completed and except as mentioned above were negative  Dermatological ROS:  Completed and except as mentioned above were negative    SLEEP  No epistaxis sor sore throat. does not have fatigue, sleepiness ortiredness in am.   No MVA. has bilateral edema. PHYSICAL EXAMINATION:  Vitals:    12/06/23 1021   BP: (!) 147/90   Site: Left Upper Arm   Position: Sitting   Cuff Size: Medium Adult   Pulse: 86   Resp: 20   SpO2: 98%   Weight: 93.9 kg (207 lb)   Height: 1.854 m (6' 1\")     PHYSICAL EXAMINATION:  Vitals:    12/06/23 1021   BP: (!) 147/90   Site: Left Upper Arm   Position: Sitting   Cuff Size: Medium Adult   Pulse: 86   Resp: 20   SpO2: 98%   Weight: 93.9 kg (207 lb)   Height: 1.854 m (6' 1\")     Constitutional: This is a well developed, well nourished, 25-29.9 - Overweight 59y.o. year old male who is alert, oriented, cooperative and in no apparent distress. Head:normocephalic and atraumatic. EENT:  FREDRICK. No conjunctival injections. Septum was midline, mucosa was without erythema, exudates or cobblestoning. No thrush was noted. MallampatiII (soft palate, uvula, fauces visible)  Neck: Supple without thyromegaly. No elevated JVP. Trachea was midline. Respiratory: Chest was symmetrical without dullness to percussion. Breath sounds bilaterally were clear to auscultation. There were no wheezes, rhonchi or rales. There is no intercostal retraction or use of accessory muscles. No egophony noted. Cardiovascular: Regular without murmur, clicks, gallops or rubs. Abdomen: Slightly rounded and soft without organomegaly.  No rebound,

## 2023-12-27 ENCOUNTER — TELEPHONE (OUTPATIENT)
Dept: PULMONOLOGY | Age: 63
End: 2023-12-27

## 2024-01-04 ENCOUNTER — OFFICE VISIT (OUTPATIENT)
Dept: PULMONOLOGY | Age: 64
End: 2024-01-04
Payer: MEDICARE

## 2024-01-04 VITALS
WEIGHT: 200 LBS | HEIGHT: 71 IN | DIASTOLIC BLOOD PRESSURE: 74 MMHG | SYSTOLIC BLOOD PRESSURE: 105 MMHG | HEART RATE: 97 BPM | BODY MASS INDEX: 28 KG/M2 | OXYGEN SATURATION: 99 % | RESPIRATION RATE: 15 BRPM

## 2024-01-04 DIAGNOSIS — J44.9 CHRONIC OBSTRUCTIVE PULMONARY DISEASE, UNSPECIFIED COPD TYPE (HCC): ICD-10-CM

## 2024-01-04 DIAGNOSIS — I82.493 ACUTE EMBOLISM AND THROMBOSIS OF OTHER SPECIFIED DEEP VEIN OF LOWER EXTREMITY, BILATERAL (HCC): ICD-10-CM

## 2024-01-04 DIAGNOSIS — F17.200 SMOKING: ICD-10-CM

## 2024-01-04 DIAGNOSIS — R91.1 LUNG NODULE SEEN ON IMAGING STUDY: Primary | ICD-10-CM

## 2024-01-04 PROCEDURE — 99214 OFFICE O/P EST MOD 30 MIN: CPT | Performed by: INTERNAL MEDICINE

## 2024-01-04 PROCEDURE — 3017F COLORECTAL CA SCREEN DOC REV: CPT | Performed by: INTERNAL MEDICINE

## 2024-01-04 PROCEDURE — 94726 PLETHYSMOGRAPHY LUNG VOLUMES: CPT | Performed by: INTERNAL MEDICINE

## 2024-01-04 PROCEDURE — 3023F SPIROM DOC REV: CPT | Performed by: INTERNAL MEDICINE

## 2024-01-04 PROCEDURE — 3074F SYST BP LT 130 MM HG: CPT | Performed by: INTERNAL MEDICINE

## 2024-01-04 PROCEDURE — 94729 DIFFUSING CAPACITY: CPT | Performed by: INTERNAL MEDICINE

## 2024-01-04 PROCEDURE — 94060 EVALUATION OF WHEEZING: CPT | Performed by: INTERNAL MEDICINE

## 2024-01-04 PROCEDURE — 4004F PT TOBACCO SCREEN RCVD TLK: CPT | Performed by: INTERNAL MEDICINE

## 2024-01-04 PROCEDURE — G8484 FLU IMMUNIZE NO ADMIN: HCPCS | Performed by: INTERNAL MEDICINE

## 2024-01-04 PROCEDURE — G8427 DOCREV CUR MEDS BY ELIG CLIN: HCPCS | Performed by: INTERNAL MEDICINE

## 2024-01-04 PROCEDURE — 3078F DIAST BP <80 MM HG: CPT | Performed by: INTERNAL MEDICINE

## 2024-01-04 PROCEDURE — G8419 CALC BMI OUT NRM PARAM NOF/U: HCPCS | Performed by: INTERNAL MEDICINE

## 2024-01-05 NOTE — PROGRESS NOTES
PFTs were done on 1/4/2024-    Spirometry shows stage II obstructive ventilatory defect with an FEV1 of 2.74 L and FVC of 3.94 L.  Postbronchodilator study does not show significant change and has an FEV1 of 2.91 L and FVC of 4.20 L  Total lung capacity is normal at 117% predicted  Diffusing capacity is mildly decreased at 72% predicted  Flow volume loop shows an obstructive ventilatory pattern  Oxygen saturation on room air is 99%    Impression:    Patient had stage II COPD with an FEV1 of 2.91 L and FVC of 4.20 L.  There is concomitant emphysema.  Even though there is lack of response to bronchodilators in the past, a clinical response to bronchodilator cannot be excluded.    Electronically signed by-  Bakari Catherine MD  1/4/2024 7:56 PM    
Patient performed PFT with good effort and understanding. 2puffs Albuterol was given   
taking: Reported on 1/4/2024)      nicotine (NICODERM CQ) 14 MG/24HR Place 1 patch onto the skin daily (Patient not taking: Reported on 1/4/2024) 30 patch 0    sodium chloride 1 g tablet Take 1 tablet by mouth in the morning and 1 tablet at noon and 1 tablet in the evening. Take with meals. (Patient not taking: Reported on 1/4/2024) 90 tablet 3    ipratropium-albuterol (DUONEB) 0.5-2.5 (3) MG/3ML SOLN nebulizer solution Inhale 3 mLs into the lungs every 6 hours as needed for Shortness of Breath (Patient not taking: Reported on 10/13/2022) 360 mL 0    lisinopril (PRINIVIL;ZESTRIL) 10 MG tablet Take 1 tablet by mouth daily (Patient not taking: Reported on 1/4/2024)      folic acid (FOLVITE) 1 MG tablet Take 1 tablet by mouth daily (Patient not taking: Reported on 1/4/2024) 30 tablet 3    naltrexone (DEPADE) 50 MG tablet Take 50 mg by mouth in the morning. (Patient not taking: Reported on 1/4/2024)      omeprazole (PRILOSEC) 40 MG delayed release capsule Take 1 capsule by mouth Daily with supper (Patient not taking: Reported on 1/4/2024)      Multiple Vitamins-Minerals (THERAPEUTIC MULTIVITAMIN-MINERALS) tablet Take 1 tablet by mouth daily 30 tablet 1     No current facility-administered medications for this visit.       FAMILY HISTORY: family history includes Breast Cancer in his mother; Diabetes in his mother; Heart Failure in his father; Other in his brother.    SOCIAL AND OCCUPATIONAL HEALTH:  The patient is a Current smoker of 69 pack years.  There  is not history of TB or TB exposure.  There is not asbestos or silica dust exposure.  The patient reports does not have coal, foundry, quarry or cotton mill exposure.  Travel history reveals no significant history of risk factors for pulm disease.  There is not  history of recreational or IV drug use. The patient does not have pets, dogs, cats turtles or exotic birds.        Review of Systems:  Review of Systems -   General ROS: Completed and except as mentioned above

## 2024-01-09 ENCOUNTER — TELEPHONE (OUTPATIENT)
Dept: PULMONOLOGY | Age: 64
End: 2024-01-09

## 2024-01-09 DIAGNOSIS — R91.1 LUNG NODULE SEEN ON IMAGING STUDY: Primary | ICD-10-CM

## 2024-01-09 DIAGNOSIS — R59.0 MEDIASTINAL LYMPHADENOPATHY: ICD-10-CM

## 2024-01-09 NOTE — TELEPHONE ENCOUNTER
Patient called, wondering what your thoughts are on the following from Dr Catherine?:     PLAN:        Reviewed the PET scan, pulmonary function test and venous Dopplers of bilateral lower extremities  Discussed with interventional radiology regarding the feasibility of biopsy of the left humeral lesion or any other lesion.  They would want navigational bronchoscopy/EBUS to be tried first as the humeral lesion was not evident on the CT scan of the chest.  Dr. Brian was sent information of the PET scan for his evaluation and consideration for navigational bronchoscopy/EBUS.  I await his response

## 2024-01-10 NOTE — TELEPHONE ENCOUNTER
Patient was informed, questions were answered regarding biopsy of humerus vs bronch/EBUS vs ION bronch, etc.   Patient was transferred to scheduling, writer will track closely.     Patient complained of daily significant pain in his shoulder affecting his ADL's.

## 2024-01-10 NOTE — TELEPHONE ENCOUNTER
Herminio Brian MD  You22 hours ago (12:03 PM)       Please schedule ct chest for ion bronchoscopy protocol at [facility]  Once scheduled please inform me so I can give dates for procedure    Electronically signed by HERMINIO BRIAN MD on 1/9/2024 at 12:03 PM

## 2024-01-15 ENCOUNTER — HOSPITAL ENCOUNTER (OUTPATIENT)
Dept: CT IMAGING | Age: 64
Discharge: HOME | End: 2024-01-17
Attending: INTERNAL MEDICINE
Payer: MEDICARE

## 2024-01-15 DIAGNOSIS — R59.0 MEDIASTINAL LYMPHADENOPATHY: ICD-10-CM

## 2024-01-15 DIAGNOSIS — R91.1 LUNG NODULE SEEN ON IMAGING STUDY: Primary | ICD-10-CM

## 2024-01-15 DIAGNOSIS — R91.1 LUNG NODULE SEEN ON IMAGING STUDY: ICD-10-CM

## 2024-01-15 PROCEDURE — 71250 CT THORAX DX C-: CPT

## 2024-01-15 NOTE — RESULT ENCOUNTER NOTE
Please schedule for ION robotic bronchoscopy and EBUS- [facility] at [facility] OR on 1/25/24 - 9 am or after .  With general anesthesia and pathology and c arm     Order placed

## 2024-01-16 NOTE — TELEPHONE ENCOUNTER
They are able to do 2/8/24 at 1:00 pm. Pt to arrive 11:00 am with a . NPO after 5:00 am.       Patient states he has medical transport but doesn't have anyone to go with him. He is aware that they wont release him to a  that is a stranger. He is going to work on having some one with him or someone else drive him that is able to stay with him.     Dr Brian, patient is asking about the lymph node, the pain its producing in his shoulder and if that is going to be biopsied at any time? He might benefit from a phone call to explain all of this.

## 2024-01-17 NOTE — TELEPHONE ENCOUNTER
Dr Catherine, any thoughts on the pain he is having in his shoulder? Should he follow up with another doctor for the lesion or wait until after his Ion Bronch?

## 2024-01-18 NOTE — TELEPHONE ENCOUNTER
Bakari Catherine MD  You11 hours ago (10:05 PM)       Please have him wait until the biopsy is done.  It is possible patient may have a metastatic lesion not seen on the CT scan but seen on the PET scan.  Thank you

## 2024-01-22 ENCOUNTER — TELEPHONE (OUTPATIENT)
Dept: PULMONOLOGY | Age: 64
End: 2024-01-22

## 2024-01-22 NOTE — TELEPHONE ENCOUNTER
Pt called needs to r/s his appt that's on 2/8/2024 to another soon appt because he has his broncho test that day

## 2024-01-22 NOTE — TELEPHONE ENCOUNTER
To clarify, patient is having a bronchoscopy done 2/8 and his office appt needs to be moved to a week after that. Writer will track.

## 2024-01-23 ENCOUNTER — HOSPITAL ENCOUNTER (INPATIENT)
Age: 64
LOS: 3 days | Discharge: HOME HEALTH | End: 2024-01-27
Attending: EMERGENCY MEDICINE | Admitting: STUDENT IN AN ORGANIZED HEALTH CARE EDUCATION/TRAINING PROGRAM
Payer: MEDICARE

## 2024-01-23 ENCOUNTER — APPOINTMENT (OUTPATIENT)
Dept: CT IMAGING | Age: 64
End: 2024-01-23
Payer: MEDICARE

## 2024-01-23 DIAGNOSIS — S22.32XA CLOSED FRACTURE OF ONE RIB OF LEFT SIDE, INITIAL ENCOUNTER: ICD-10-CM

## 2024-01-23 DIAGNOSIS — R91.1 LUNG NODULE SEEN ON IMAGING STUDY: ICD-10-CM

## 2024-01-23 DIAGNOSIS — F10.10 ETOH ABUSE: ICD-10-CM

## 2024-01-23 DIAGNOSIS — F10.20 UNCOMPLICATED ALCOHOL DEPENDENCE (HCC): ICD-10-CM

## 2024-01-23 DIAGNOSIS — E87.1 HYPONATREMIA: Primary | ICD-10-CM

## 2024-01-23 DIAGNOSIS — J40 BRONCHITIS: ICD-10-CM

## 2024-01-23 LAB
ANION GAP SERPL CALCULATED.3IONS-SCNC: 22 MMOL/L (ref 9–17)
BASOPHILS # BLD: 0.21 K/UL (ref 0–0.2)
BASOPHILS NFR BLD: 1 % (ref 0–2)
BNP SERPL-MCNC: 78 PG/ML
BUN SERPL-MCNC: 8 MG/DL (ref 8–23)
CALCIUM SERPL-MCNC: 9.2 MG/DL (ref 8.6–10.4)
CHLORIDE SERPL-SCNC: 86 MMOL/L (ref 98–107)
CO2 SERPL-SCNC: 14 MMOL/L (ref 20–31)
CREAT SERPL-MCNC: 0.7 MG/DL (ref 0.7–1.2)
EOSINOPHIL # BLD: 0.21 K/UL (ref 0–0.4)
EOSINOPHILS RELATIVE PERCENT: 1 % (ref 1–4)
ERYTHROCYTE [DISTWIDTH] IN BLOOD BY AUTOMATED COUNT: 19.8 % (ref 11.8–14.4)
FLUAV AG SPEC QL: NEGATIVE
FLUBV AG SPEC QL: NEGATIVE
GFR SERPL CREATININE-BSD FRML MDRD: >60 ML/MIN/1.73M2
GLUCOSE SERPL-MCNC: 98 MG/DL (ref 70–99)
HCT VFR BLD AUTO: 40.7 % (ref 40.7–50.3)
HGB BLD-MCNC: 13.6 G/DL (ref 13–17)
IMM GRANULOCYTES # BLD AUTO: 0.42 K/UL (ref 0–0.3)
IMM GRANULOCYTES NFR BLD: 2 %
L PNEUMO1 AG UR QL IA.RAPID: NEGATIVE
LYMPHOCYTES NFR BLD: 1.69 K/UL (ref 1–4.8)
LYMPHOCYTES RELATIVE PERCENT: 8 % (ref 24–44)
MCH RBC QN AUTO: 26.8 PG (ref 25.2–33.5)
MCHC RBC AUTO-ENTMCNC: 33.4 G/DL (ref 28.4–34.8)
MCV RBC AUTO: 80.1 FL (ref 82.6–102.9)
MONOCYTES NFR BLD: 1.48 K/UL (ref 0.1–0.8)
MONOCYTES NFR BLD: 7 % (ref 1–7)
MORPHOLOGY: ABNORMAL
MORPHOLOGY: ABNORMAL
NEUTROPHILS NFR BLD: 81 % (ref 36–66)
NEUTS SEG NFR BLD: 17.09 K/UL (ref 1.8–7.7)
NRBC BLD-RTO: 0 PER 100 WBC
PLATELET # BLD AUTO: 587 K/UL (ref 138–453)
PMV BLD AUTO: 9.6 FL (ref 8.1–13.5)
POTASSIUM SERPL-SCNC: 4.5 MMOL/L (ref 3.7–5.3)
RBC # BLD AUTO: 5.08 M/UL (ref 4.21–5.77)
SARS-COV-2 RDRP RESP QL NAA+PROBE: NOT DETECTED
SODIUM SERPL-SCNC: 122 MMOL/L (ref 135–144)
SPECIMEN DESCRIPTION: NORMAL
TROPONIN I SERPL HS-MCNC: 17 NG/L (ref 0–22)
TROPONIN I SERPL HS-MCNC: 17 NG/L (ref 0–22)
WBC OTHER # BLD: 21.1 K/UL (ref 3.5–11.3)

## 2024-01-23 PROCEDURE — 83935 ASSAY OF URINE OSMOLALITY: CPT

## 2024-01-23 PROCEDURE — 80048 BASIC METABOLIC PNL TOTAL CA: CPT

## 2024-01-23 PROCEDURE — 93005 ELECTROCARDIOGRAM TRACING: CPT

## 2024-01-23 PROCEDURE — 84484 ASSAY OF TROPONIN QUANT: CPT

## 2024-01-23 PROCEDURE — 2580000003 HC RX 258

## 2024-01-23 PROCEDURE — 87205 SMEAR GRAM STAIN: CPT

## 2024-01-23 PROCEDURE — 6360000002 HC RX W HCPCS

## 2024-01-23 PROCEDURE — 71275 CT ANGIOGRAPHY CHEST: CPT

## 2024-01-23 PROCEDURE — 87804 INFLUENZA ASSAY W/OPTIC: CPT

## 2024-01-23 PROCEDURE — 87070 CULTURE OTHR SPECIMN AEROBIC: CPT

## 2024-01-23 PROCEDURE — 84300 ASSAY OF URINE SODIUM: CPT

## 2024-01-23 PROCEDURE — 96365 THER/PROPH/DIAG IV INF INIT: CPT

## 2024-01-23 PROCEDURE — 96368 THER/DIAG CONCURRENT INF: CPT

## 2024-01-23 PROCEDURE — 83880 ASSAY OF NATRIURETIC PEPTIDE: CPT

## 2024-01-23 PROCEDURE — 96375 TX/PRO/DX INJ NEW DRUG ADDON: CPT

## 2024-01-23 PROCEDURE — 99285 EMERGENCY DEPT VISIT HI MDM: CPT

## 2024-01-23 PROCEDURE — 87635 SARS-COV-2 COVID-19 AMP PRB: CPT

## 2024-01-23 PROCEDURE — 85025 COMPLETE CBC W/AUTO DIFF WBC: CPT

## 2024-01-23 PROCEDURE — 6370000000 HC RX 637 (ALT 250 FOR IP)

## 2024-01-23 PROCEDURE — 2580000003 HC RX 258: Performed by: STUDENT IN AN ORGANIZED HEALTH CARE EDUCATION/TRAINING PROGRAM

## 2024-01-23 PROCEDURE — 6360000004 HC RX CONTRAST MEDICATION

## 2024-01-23 PROCEDURE — 87449 NOS EACH ORGANISM AG IA: CPT

## 2024-01-23 PROCEDURE — 80307 DRUG TEST PRSMV CHEM ANLYZR: CPT

## 2024-01-23 PROCEDURE — 6360000002 HC RX W HCPCS: Performed by: STUDENT IN AN ORGANIZED HEALTH CARE EDUCATION/TRAINING PROGRAM

## 2024-01-23 RX ORDER — LORAZEPAM 2 MG/1
2 TABLET ORAL
Status: DISCONTINUED | OUTPATIENT
Start: 2024-01-23 | End: 2024-01-25

## 2024-01-23 RX ORDER — MORPHINE SULFATE 4 MG/ML
4 INJECTION INTRAVENOUS ONCE
Status: COMPLETED | OUTPATIENT
Start: 2024-01-23 | End: 2024-01-23

## 2024-01-23 RX ORDER — SODIUM CHLORIDE 0.9 % (FLUSH) 0.9 %
5-40 SYRINGE (ML) INJECTION EVERY 12 HOURS SCHEDULED
Status: DISCONTINUED | OUTPATIENT
Start: 2024-01-23 | End: 2024-01-27 | Stop reason: HOSPADM

## 2024-01-23 RX ORDER — LORAZEPAM 2 MG/ML
2 INJECTION INTRAMUSCULAR
Status: DISCONTINUED | OUTPATIENT
Start: 2024-01-23 | End: 2024-01-25

## 2024-01-23 RX ORDER — SODIUM CHLORIDE 0.9 % (FLUSH) 0.9 %
5-40 SYRINGE (ML) INJECTION PRN
Status: DISCONTINUED | OUTPATIENT
Start: 2024-01-23 | End: 2024-01-27 | Stop reason: HOSPADM

## 2024-01-23 RX ORDER — SODIUM CHLORIDE 9 MG/ML
INJECTION, SOLUTION INTRAVENOUS PRN
Status: DISCONTINUED | OUTPATIENT
Start: 2024-01-23 | End: 2024-01-27 | Stop reason: HOSPADM

## 2024-01-23 RX ORDER — LORAZEPAM 2 MG/1
4 TABLET ORAL
Status: DISCONTINUED | OUTPATIENT
Start: 2024-01-23 | End: 2024-01-25

## 2024-01-23 RX ORDER — LORAZEPAM 2 MG/ML
1 INJECTION INTRAMUSCULAR
Status: DISCONTINUED | OUTPATIENT
Start: 2024-01-23 | End: 2024-01-25

## 2024-01-23 RX ORDER — LANOLIN ALCOHOL/MO/W.PET/CERES
100 CREAM (GRAM) TOPICAL DAILY
Status: DISCONTINUED | OUTPATIENT
Start: 2024-01-23 | End: 2024-01-24

## 2024-01-23 RX ORDER — LORAZEPAM 1 MG/1
1 TABLET ORAL
Status: DISCONTINUED | OUTPATIENT
Start: 2024-01-23 | End: 2024-01-25

## 2024-01-23 RX ORDER — LORAZEPAM 2 MG/ML
4 INJECTION INTRAMUSCULAR
Status: DISCONTINUED | OUTPATIENT
Start: 2024-01-23 | End: 2024-01-25

## 2024-01-23 RX ORDER — LORAZEPAM 2 MG/ML
3 INJECTION INTRAMUSCULAR
Status: DISCONTINUED | OUTPATIENT
Start: 2024-01-23 | End: 2024-01-25

## 2024-01-23 RX ORDER — SODIUM CHLORIDE 9 MG/ML
INJECTION, SOLUTION INTRAVENOUS
Status: DISPENSED
Start: 2024-01-23 | End: 2024-01-24

## 2024-01-23 RX ADMIN — CEFTRIAXONE SODIUM 1000 MG: 1 INJECTION, POWDER, FOR SOLUTION INTRAMUSCULAR; INTRAVENOUS at 21:14

## 2024-01-23 RX ADMIN — MORPHINE SULFATE 4 MG: 4 INJECTION INTRAVENOUS at 21:13

## 2024-01-23 RX ADMIN — MORPHINE SULFATE 4 MG: 4 INJECTION INTRAVENOUS at 18:20

## 2024-01-23 RX ADMIN — SODIUM CHLORIDE, PRESERVATIVE FREE 10 ML: 5 INJECTION INTRAVENOUS at 20:16

## 2024-01-23 RX ADMIN — AZITHROMYCIN DIHYDRATE 500 MG: 500 INJECTION, POWDER, LYOPHILIZED, FOR SOLUTION INTRAVENOUS at 21:57

## 2024-01-23 RX ADMIN — Medication 100 MG: at 20:15

## 2024-01-23 RX ADMIN — IOPAMIDOL 100 ML: 755 INJECTION, SOLUTION INTRAVENOUS at 20:19

## 2024-01-23 RX ADMIN — LORAZEPAM 1 MG: 2 INJECTION INTRAMUSCULAR; INTRAVENOUS at 23:06

## 2024-01-23 RX ADMIN — SODIUM CHLORIDE, PRESERVATIVE FREE 10 ML: 5 INJECTION INTRAVENOUS at 22:13

## 2024-01-23 ASSESSMENT — PAIN DESCRIPTION - LOCATION: LOCATION: CHEST

## 2024-01-23 ASSESSMENT — PAIN SCALES - GENERAL
PAINLEVEL_OUTOF10: 10
PAINLEVEL_OUTOF10: 10

## 2024-01-23 ASSESSMENT — ENCOUNTER SYMPTOMS: SHORTNESS OF BREATH: 1

## 2024-01-23 ASSESSMENT — PAIN DESCRIPTION - ORIENTATION: ORIENTATION: LEFT

## 2024-01-23 NOTE — ED PROVIDER NOTES
[facility] ED  Emergency Department Encounter  Emergency Medicine Resident     Pt Name:Danny Guevara  MRN: [id number]  Birthdate 1960  Date of evaluation: 1/23/24  PCP:  Roberto Donaldson MD  Note Started: 5:59 PM EST      CHIEF COMPLAINT       Chief Complaint   Patient presents with    Chest Pain    Shortness of Breath       HISTORY OF PRESENT ILLNESS  (Location/Symptom, Timing/Onset, Context/Setting, Quality, Duration, Modifying Factors, Severity.)      Danny Guevara is a 63 y.o. male who presents with chest pain.  Patient states he has had constant chest pain for 1 week now.  States he is also been short of breath with a productive cough with yellow sputum.  States that this chest pain goes into his back and into his left shoulder.  Denies any nausea vomiting or diaphoresis.  States that he was recently diagnosed with a lung nodule in the left upper lobe but is unsure if this is what is causing him to have pain.  States that he uses alcohol daily and had beer earlier today.  States that he has previously gone through withdrawal.  Denies any lower extremity swelling or numbness or tingling.  Denies a prior history of DVTs or PEs.    PAST MEDICAL / SURGICAL / SOCIAL / FAMILY HISTORY      has a past medical history of Alcohol abuse, Anemia, Cancer (HCC), Cervical stenosis of spine, Chronic left-sided low back pain with left-sided sciatica, Insomnia, Intentional drug overdose (HCC), Left arm numbness, Left lumbar radiculitis, Macrocytosis, Major depressive disorder, recurrent severe without psychotic features (HCC), Myeloproliferative disorder (HCC), Osteoarthritis of spine with radiculopathy, lumbar region, Severe recurrent major depression without psychotic features (HCC), Spondylosis of lumbar region without myelopathy or radiculopathy, and Urinary retention.       has a past surgical history that includes cervical fusion; Appendectomy; Tonsillectomy; Carpal tunnel release;

## 2024-01-23 NOTE — ED TRIAGE NOTES
Pt presents to ED room 01 via EMS from home c/o chest pain that radiates to his L shoulder and into his back that started a week ago and has been continuous. Pt states that he was recently Dx with a lung tumor and has been experiencing SOB. Pt states that he does not use oxygen at home, however he has stage 2 COPD. Pt rates his pain a 10/10 at this time. Bed bugs found on patients bag when pt transferred from EMS cot to ED cot.

## 2024-01-23 NOTE — ED PROVIDER NOTES
[facility]     Emergency Department     Faculty Attestation    I performed a history and physical examination of the patient and discussed management with the resident. I reviewed the resident´s note and agree with the documented findings and plan of care. Any areas of disagreement are noted on the chart. I was personally present for the key portions of any procedures. I have documented in the chart those procedures where I was not present during the key portions. I have reviewed the emergency nurses triage note. I agree with the chief complaint, past medical history, past surgical history, allergies, medications, social and family history as documented unless otherwise noted below. For Physician Assistant/ Nurse Practitioner cases/documentation I have personally evaluated this patient and have completed at least one if not all key elements of the E/M (history, physical exam, and MDM). Additional findings are as noted.    No nodule left lung which is being worked up, pleuritic chest pain rating to the left shoulder for the past week, equal breath sounds, heart tones normal, abdomen is nontender, bilateral pretibial pitting without calf pain.       EKG Interpretation    Interpreted by emergency department physician    Rhythm: normal sinus   Rate: normal/84  Axis: normal 66  Ectopy: none  Conduction: normal  ST Segments: no acute change  T Waves: no acute change  Q Waves: none    Clinical Impression: Normal EKG    Garret Smith, III     Garret Smith MD  01/23/24 [id number]

## 2024-01-24 PROBLEM — D75.839 THROMBOCYTOSIS: Status: ACTIVE | Noted: 2024-01-24

## 2024-01-24 PROBLEM — J40 BRONCHITIS: Status: ACTIVE | Noted: 2024-01-24

## 2024-01-24 PROBLEM — J44.9 COPD (CHRONIC OBSTRUCTIVE PULMONARY DISEASE) (HCC): Status: ACTIVE | Noted: 2024-01-24

## 2024-01-24 PROBLEM — E87.1 HYPONATREMIA: Status: ACTIVE | Noted: 2024-01-24

## 2024-01-24 PROBLEM — R91.1 LUNG NODULE SEEN ON IMAGING STUDY: Status: ACTIVE | Noted: 2024-01-24

## 2024-01-24 LAB
AMPHET UR QL SCN: NEGATIVE
ANION GAP SERPL CALCULATED.3IONS-SCNC: 11 MMOL/L (ref 9–17)
BARBITURATES UR QL SCN: NEGATIVE
BENZODIAZ UR QL: NEGATIVE
BUN SERPL-MCNC: 7 MG/DL (ref 8–23)
CALCIUM SERPL-MCNC: 8.8 MG/DL (ref 8.6–10.4)
CANNABINOIDS UR QL SCN: POSITIVE
CHLORIDE SERPL-SCNC: 95 MMOL/L (ref 98–107)
CO2 SERPL-SCNC: 22 MMOL/L (ref 20–31)
COCAINE UR QL SCN: NEGATIVE
CREAT SERPL-MCNC: 0.6 MG/DL (ref 0.7–1.2)
EKG ATRIAL RATE: 84 BPM
EKG P AXIS: 57 DEGREES
EKG P-R INTERVAL: 168 MS
EKG Q-T INTERVAL: 388 MS
EKG QRS DURATION: 82 MS
EKG QTC CALCULATION (BAZETT): 458 MS
EKG R AXIS: 66 DEGREES
EKG T AXIS: 66 DEGREES
EKG VENTRICULAR RATE: 84 BPM
ERYTHROCYTE [DISTWIDTH] IN BLOOD BY AUTOMATED COUNT: 20.1 % (ref 11.8–14.4)
ETHANOL PERCENT: <0.01 %
ETHANOLAMINE SERPL-MCNC: <10 MG/DL
FENTANYL UR QL: NEGATIVE
GFR SERPL CREATININE-BSD FRML MDRD: >60 ML/MIN/1.73M2
GLUCOSE SERPL-MCNC: 114 MG/DL (ref 70–99)
HCT VFR BLD AUTO: 42.4 % (ref 40.7–50.3)
HGB BLD-MCNC: 13.5 G/DL (ref 13–17)
MCH RBC QN AUTO: 26.8 PG (ref 25.2–33.5)
MCHC RBC AUTO-ENTMCNC: 31.8 G/DL (ref 28.4–34.8)
MCV RBC AUTO: 84.3 FL (ref 82.6–102.9)
METHADONE UR QL: NEGATIVE
MICROORGANISM SPEC CULT: ABNORMAL
MICROORGANISM/AGENT SPEC: ABNORMAL
NRBC BLD-RTO: 0 PER 100 WBC
OPIATES UR QL SCN: POSITIVE
OSMOLALITY SERPL: 287 MOSM/KG (ref 275–295)
OSMOLALITY UR: 139 MOSM/KG (ref 80–1300)
OXYCODONE UR QL SCN: NEGATIVE
PCP UR QL SCN: NEGATIVE
PLATELET # BLD AUTO: 522 K/UL (ref 138–453)
PMV BLD AUTO: 9.6 FL (ref 8.1–13.5)
POTASSIUM SERPL-SCNC: 4.4 MMOL/L (ref 3.7–5.3)
PROCALCITONIN SERPL-MCNC: 0.04 NG/ML
RBC # BLD AUTO: 5.03 M/UL (ref 4.21–5.77)
SODIUM SERPL-SCNC: 123 MMOL/L (ref 135–144)
SODIUM SERPL-SCNC: 128 MMOL/L (ref 135–144)
SODIUM SERPL-SCNC: 129 MMOL/L (ref 135–144)
SODIUM SERPL-SCNC: 129 MMOL/L (ref 135–144)
SODIUM UR-SCNC: <20 MMOL/L
SPECIMEN DESCRIPTION: ABNORMAL
TEST INFORMATION: ABNORMAL
WBC OTHER # BLD: 21.9 K/UL (ref 3.5–11.3)

## 2024-01-24 PROCEDURE — 6370000000 HC RX 637 (ALT 250 FOR IP): Performed by: STUDENT IN AN ORGANIZED HEALTH CARE EDUCATION/TRAINING PROGRAM

## 2024-01-24 PROCEDURE — 94761 N-INVAS EAR/PLS OXIMETRY MLT: CPT

## 2024-01-24 PROCEDURE — 6370000000 HC RX 637 (ALT 250 FOR IP): Performed by: NURSE PRACTITIONER

## 2024-01-24 PROCEDURE — 99222 1ST HOSP IP/OBS MODERATE 55: CPT | Performed by: INTERNAL MEDICINE

## 2024-01-24 PROCEDURE — 84145 PROCALCITONIN (PCT): CPT

## 2024-01-24 PROCEDURE — 2700000000 HC OXYGEN THERAPY PER DAY

## 2024-01-24 PROCEDURE — 6360000002 HC RX W HCPCS: Performed by: NURSE PRACTITIONER

## 2024-01-24 PROCEDURE — 36415 COLL VENOUS BLD VENIPUNCTURE: CPT

## 2024-01-24 PROCEDURE — 84295 ASSAY OF SERUM SODIUM: CPT

## 2024-01-24 PROCEDURE — 83930 ASSAY OF BLOOD OSMOLALITY: CPT

## 2024-01-24 PROCEDURE — 99222 1ST HOSP IP/OBS MODERATE 55: CPT | Performed by: PHYSICIAN ASSISTANT

## 2024-01-24 PROCEDURE — 85027 COMPLETE CBC AUTOMATED: CPT

## 2024-01-24 PROCEDURE — G0480 DRUG TEST DEF 1-7 CLASSES: HCPCS

## 2024-01-24 PROCEDURE — 6360000002 HC RX W HCPCS: Performed by: STUDENT IN AN ORGANIZED HEALTH CARE EDUCATION/TRAINING PROGRAM

## 2024-01-24 PROCEDURE — 6370000000 HC RX 637 (ALT 250 FOR IP): Performed by: PHYSICIAN ASSISTANT

## 2024-01-24 PROCEDURE — 80048 BASIC METABOLIC PNL TOTAL CA: CPT

## 2024-01-24 PROCEDURE — 2580000003 HC RX 258: Performed by: STUDENT IN AN ORGANIZED HEALTH CARE EDUCATION/TRAINING PROGRAM

## 2024-01-24 PROCEDURE — 2580000003 HC RX 258

## 2024-01-24 PROCEDURE — 93010 ELECTROCARDIOGRAM REPORT: CPT | Performed by: INTERNAL MEDICINE

## 2024-01-24 PROCEDURE — 2060000000 HC ICU INTERMEDIATE R&B

## 2024-01-24 PROCEDURE — 2580000003 HC RX 258: Performed by: NURSE PRACTITIONER

## 2024-01-24 PROCEDURE — 99223 1ST HOSP IP/OBS HIGH 75: CPT | Performed by: INTERNAL MEDICINE

## 2024-01-24 RX ORDER — SODIUM CHLORIDE 0.9 % (FLUSH) 0.9 %
10 SYRINGE (ML) INJECTION PRN
Status: DISCONTINUED | OUTPATIENT
Start: 2024-01-24 | End: 2024-01-27 | Stop reason: HOSPADM

## 2024-01-24 RX ORDER — IPRATROPIUM BROMIDE AND ALBUTEROL SULFATE 2.5; .5 MG/3ML; MG/3ML
1 SOLUTION RESPIRATORY (INHALATION)
Status: DISCONTINUED | OUTPATIENT
Start: 2024-01-24 | End: 2024-01-24

## 2024-01-24 RX ORDER — LOSARTAN POTASSIUM 25 MG/1
TABLET ORAL
COMMUNITY
Start: 2023-11-29

## 2024-01-24 RX ORDER — ONDANSETRON 4 MG/1
4 TABLET, ORALLY DISINTEGRATING ORAL EVERY 8 HOURS PRN
Status: DISCONTINUED | OUTPATIENT
Start: 2024-01-24 | End: 2024-01-27 | Stop reason: HOSPADM

## 2024-01-24 RX ORDER — LOSARTAN POTASSIUM 50 MG/1
50 TABLET ORAL DAILY
Status: ON HOLD | COMMUNITY
End: 2024-01-27 | Stop reason: HOSPADM

## 2024-01-24 RX ORDER — GUAIFENESIN 600 MG/1
600 TABLET, EXTENDED RELEASE ORAL EVERY 12 HOURS PRN
Status: DISCONTINUED | OUTPATIENT
Start: 2024-01-24 | End: 2024-01-25

## 2024-01-24 RX ORDER — ALBUTEROL SULFATE 90 UG/1
AEROSOL, METERED RESPIRATORY (INHALATION)
COMMUNITY
Start: 2024-01-10

## 2024-01-24 RX ORDER — TRAMADOL HYDROCHLORIDE 50 MG/1
TABLET ORAL
Status: ON HOLD | COMMUNITY
Start: 2024-01-11 | End: 2024-01-27 | Stop reason: HOSPADM

## 2024-01-24 RX ORDER — PREGABALIN 75 MG/1
CAPSULE ORAL
Status: ON HOLD | COMMUNITY
Start: 2023-10-18 | End: 2024-01-27 | Stop reason: HOSPADM

## 2024-01-24 RX ORDER — LIDOCAINE 4 G/G
1 PATCH TOPICAL ONCE
Status: COMPLETED | OUTPATIENT
Start: 2024-01-24 | End: 2024-01-24

## 2024-01-24 RX ORDER — LORAZEPAM 2 MG/1
2 TABLET ORAL
Status: DISCONTINUED | OUTPATIENT
Start: 2024-01-24 | End: 2024-01-27 | Stop reason: HOSPADM

## 2024-01-24 RX ORDER — AZITHROMYCIN 250 MG/1
500 TABLET, FILM COATED ORAL DAILY
Status: COMPLETED | OUTPATIENT
Start: 2024-01-24 | End: 2024-01-26

## 2024-01-24 RX ORDER — QUETIAPINE FUMARATE 300 MG/1
300 TABLET, FILM COATED ORAL NIGHTLY
Status: DISCONTINUED | OUTPATIENT
Start: 2024-01-24 | End: 2024-01-27 | Stop reason: HOSPADM

## 2024-01-24 RX ORDER — QUETIAPINE FUMARATE 100 MG/1
100 TABLET, FILM COATED ORAL DAILY
Status: ON HOLD | COMMUNITY
End: 2024-01-27 | Stop reason: HOSPADM

## 2024-01-24 RX ORDER — NICOTINE 21 MG/24HR
1 PATCH, TRANSDERMAL 24 HOURS TRANSDERMAL DAILY
Status: DISCONTINUED | OUTPATIENT
Start: 2024-01-24 | End: 2024-01-27 | Stop reason: HOSPADM

## 2024-01-24 RX ORDER — ALBUTEROL SULFATE 2.5 MG/3ML
2.5 SOLUTION RESPIRATORY (INHALATION)
Status: DISCONTINUED | OUTPATIENT
Start: 2024-01-24 | End: 2024-01-27 | Stop reason: HOSPADM

## 2024-01-24 RX ORDER — LORAZEPAM 2 MG/1
4 TABLET ORAL
Status: DISCONTINUED | OUTPATIENT
Start: 2024-01-24 | End: 2024-01-27 | Stop reason: HOSPADM

## 2024-01-24 RX ORDER — ACETAMINOPHEN 325 MG/1
650 TABLET ORAL EVERY 6 HOURS PRN
Status: DISCONTINUED | OUTPATIENT
Start: 2024-01-24 | End: 2024-01-27 | Stop reason: HOSPADM

## 2024-01-24 RX ORDER — POTASSIUM CHLORIDE 7.45 MG/ML
10 INJECTION INTRAVENOUS PRN
Status: DISCONTINUED | OUTPATIENT
Start: 2024-01-24 | End: 2024-01-27 | Stop reason: HOSPADM

## 2024-01-24 RX ORDER — LANOLIN ALCOHOL/MO/W.PET/CERES
100 CREAM (GRAM) TOPICAL DAILY
Status: DISCONTINUED | OUTPATIENT
Start: 2024-01-24 | End: 2024-01-27 | Stop reason: HOSPADM

## 2024-01-24 RX ORDER — LEVOTHYROXINE SODIUM 0.05 MG/1
50 TABLET ORAL DAILY
Status: CANCELLED | OUTPATIENT
Start: 2024-01-24

## 2024-01-24 RX ORDER — ASPIRIN 81 MG/1
81 TABLET ORAL DAILY
Status: DISCONTINUED | OUTPATIENT
Start: 2024-01-24 | End: 2024-01-27 | Stop reason: HOSPADM

## 2024-01-24 RX ORDER — LORAZEPAM 1 MG/1
1 TABLET ORAL
Status: DISCONTINUED | OUTPATIENT
Start: 2024-01-24 | End: 2024-01-27 | Stop reason: HOSPADM

## 2024-01-24 RX ORDER — LEVOTHYROXINE SODIUM 0.05 MG/1
25 TABLET ORAL EVERY MORNING
Status: DISCONTINUED | OUTPATIENT
Start: 2024-01-25 | End: 2024-01-27 | Stop reason: HOSPADM

## 2024-01-24 RX ORDER — HYDROXYZINE HYDROCHLORIDE 25 MG/1
25 TABLET, FILM COATED ORAL 3 TIMES DAILY PRN
Status: DISCONTINUED | OUTPATIENT
Start: 2024-01-24 | End: 2024-01-27 | Stop reason: HOSPADM

## 2024-01-24 RX ORDER — HYDROXYZINE HYDROCHLORIDE 25 MG/1
TABLET, FILM COATED ORAL
COMMUNITY
Start: 2024-01-15

## 2024-01-24 RX ORDER — LIDOCAINE 4 G/G
1 PATCH TOPICAL DAILY
Status: DISCONTINUED | OUTPATIENT
Start: 2024-01-24 | End: 2024-01-24

## 2024-01-24 RX ORDER — LORAZEPAM 2 MG/ML
3 INJECTION INTRAMUSCULAR
Status: DISCONTINUED | OUTPATIENT
Start: 2024-01-24 | End: 2024-01-27 | Stop reason: HOSPADM

## 2024-01-24 RX ORDER — TIOTROPIUM BROMIDE 18 UG/1
CAPSULE ORAL; RESPIRATORY (INHALATION)
COMMUNITY
Start: 2023-10-18

## 2024-01-24 RX ORDER — LORAZEPAM 2 MG/ML
1 INJECTION INTRAMUSCULAR
Status: DISCONTINUED | OUTPATIENT
Start: 2024-01-24 | End: 2024-01-27 | Stop reason: HOSPADM

## 2024-01-24 RX ORDER — QUETIAPINE FUMARATE 300 MG/1
300 TABLET, FILM COATED ORAL NIGHTLY
Status: CANCELLED | OUTPATIENT
Start: 2024-01-24

## 2024-01-24 RX ORDER — SODIUM CHLORIDE 0.9 % (FLUSH) 0.9 %
5-40 SYRINGE (ML) INJECTION EVERY 12 HOURS SCHEDULED
Status: DISCONTINUED | OUTPATIENT
Start: 2024-01-24 | End: 2024-01-27 | Stop reason: HOSPADM

## 2024-01-24 RX ORDER — QUETIAPINE FUMARATE 100 MG/1
100 TABLET, FILM COATED ORAL NIGHTLY
Status: DISCONTINUED | OUTPATIENT
Start: 2024-01-24 | End: 2024-01-24

## 2024-01-24 RX ORDER — ROSUVASTATIN CALCIUM 5 MG/1
5 TABLET, COATED ORAL NIGHTLY
Status: DISCONTINUED | OUTPATIENT
Start: 2024-01-24 | End: 2024-01-27 | Stop reason: HOSPADM

## 2024-01-24 RX ORDER — LOSARTAN POTASSIUM 50 MG/1
25 TABLET ORAL DAILY
Status: DISCONTINUED | OUTPATIENT
Start: 2024-01-24 | End: 2024-01-27 | Stop reason: HOSPADM

## 2024-01-24 RX ORDER — LEVOTHYROXINE SODIUM 0.03 MG/1
25 TABLET ORAL EVERY MORNING
COMMUNITY

## 2024-01-24 RX ORDER — ENOXAPARIN SODIUM 100 MG/ML
40 INJECTION SUBCUTANEOUS DAILY
Status: DISCONTINUED | OUTPATIENT
Start: 2024-01-24 | End: 2024-01-27 | Stop reason: HOSPADM

## 2024-01-24 RX ORDER — LORAZEPAM 2 MG/ML
2 INJECTION INTRAMUSCULAR
Status: DISCONTINUED | OUTPATIENT
Start: 2024-01-24 | End: 2024-01-27 | Stop reason: HOSPADM

## 2024-01-24 RX ORDER — ONDANSETRON 2 MG/ML
4 INJECTION INTRAMUSCULAR; INTRAVENOUS EVERY 6 HOURS PRN
Status: DISCONTINUED | OUTPATIENT
Start: 2024-01-24 | End: 2024-01-27 | Stop reason: HOSPADM

## 2024-01-24 RX ORDER — IPRATROPIUM BROMIDE AND ALBUTEROL SULFATE 2.5; .5 MG/3ML; MG/3ML
1 SOLUTION RESPIRATORY (INHALATION) EVERY 4 HOURS PRN
Status: DISCONTINUED | OUTPATIENT
Start: 2024-01-24 | End: 2024-01-27 | Stop reason: HOSPADM

## 2024-01-24 RX ORDER — ROSUVASTATIN CALCIUM 5 MG/1
5 TABLET, COATED ORAL NIGHTLY
COMMUNITY
Start: 2024-01-11

## 2024-01-24 RX ORDER — POTASSIUM CHLORIDE 20 MEQ/1
40 TABLET, EXTENDED RELEASE ORAL PRN
Status: DISCONTINUED | OUTPATIENT
Start: 2024-01-24 | End: 2024-01-27 | Stop reason: HOSPADM

## 2024-01-24 RX ORDER — OXYCODONE HYDROCHLORIDE AND ACETAMINOPHEN 5; 325 MG/1; MG/1
1 TABLET ORAL EVERY 6 HOURS PRN
Status: DISCONTINUED | OUTPATIENT
Start: 2024-01-24 | End: 2024-01-25

## 2024-01-24 RX ORDER — ASPIRIN 81 MG/1
TABLET ORAL
COMMUNITY
Start: 2024-01-11

## 2024-01-24 RX ORDER — POLYETHYLENE GLYCOL 3350 17 G/17G
17 POWDER, FOR SOLUTION ORAL DAILY PRN
Status: DISCONTINUED | OUTPATIENT
Start: 2024-01-24 | End: 2024-01-27 | Stop reason: HOSPADM

## 2024-01-24 RX ORDER — SODIUM CHLORIDE 9 MG/ML
INJECTION, SOLUTION INTRAVENOUS PRN
Status: DISCONTINUED | OUTPATIENT
Start: 2024-01-24 | End: 2024-01-27 | Stop reason: HOSPADM

## 2024-01-24 RX ORDER — LORAZEPAM 2 MG/ML
4 INJECTION INTRAMUSCULAR
Status: DISCONTINUED | OUTPATIENT
Start: 2024-01-24 | End: 2024-01-27 | Stop reason: HOSPADM

## 2024-01-24 RX ADMIN — ENOXAPARIN SODIUM 40 MG: 100 INJECTION SUBCUTANEOUS at 08:49

## 2024-01-24 RX ADMIN — DEXTROSE MONOHYDRATE: 50 INJECTION, SOLUTION INTRAVENOUS at 11:36

## 2024-01-24 RX ADMIN — OXYCODONE HYDROCHLORIDE AND ACETAMINOPHEN 1 TABLET: 5; 325 TABLET ORAL at 09:25

## 2024-01-24 RX ADMIN — SODIUM CHLORIDE, PRESERVATIVE FREE 10 ML: 5 INJECTION INTRAVENOUS at 08:51

## 2024-01-24 RX ADMIN — LORAZEPAM 1 MG: 1 TABLET ORAL at 21:46

## 2024-01-24 RX ADMIN — ACETAMINOPHEN 650 MG: 325 TABLET ORAL at 06:40

## 2024-01-24 RX ADMIN — ROSUVASTATIN CALCIUM 5 MG: 5 TABLET, COATED ORAL at 22:10

## 2024-01-24 RX ADMIN — OXYCODONE HYDROCHLORIDE AND ACETAMINOPHEN 1 TABLET: 5; 325 TABLET ORAL at 21:46

## 2024-01-24 RX ADMIN — CEFTRIAXONE SODIUM 1000 MG: 1 INJECTION, POWDER, FOR SOLUTION INTRAMUSCULAR; INTRAVENOUS at 08:54

## 2024-01-24 RX ADMIN — Medication 100 MG: at 08:49

## 2024-01-24 RX ADMIN — AZITHROMYCIN 500 MG: 250 TABLET, FILM COATED ORAL at 08:49

## 2024-01-24 RX ADMIN — SODIUM CHLORIDE, PRESERVATIVE FREE 10 ML: 5 INJECTION INTRAVENOUS at 21:46

## 2024-01-24 RX ADMIN — HYDROMORPHONE HYDROCHLORIDE 1 MG: 1 INJECTION, SOLUTION INTRAMUSCULAR; INTRAVENOUS; SUBCUTANEOUS at 02:21

## 2024-01-24 RX ADMIN — QUETIAPINE FUMARATE 300 MG: 300 TABLET ORAL at 23:05

## 2024-01-24 RX ADMIN — OXYCODONE HYDROCHLORIDE AND ACETAMINOPHEN 1 TABLET: 5; 325 TABLET ORAL at 15:28

## 2024-01-24 RX ADMIN — LORAZEPAM 1 MG: 2 INJECTION INTRAMUSCULAR; INTRAVENOUS at 06:39

## 2024-01-24 RX ADMIN — DEXTROSE MONOHYDRATE 250 ML: 50 INJECTION, SOLUTION INTRAVENOUS at 09:28

## 2024-01-24 RX ADMIN — GUAIFENESIN 600 MG: 600 TABLET, EXTENDED RELEASE ORAL at 21:46

## 2024-01-24 ASSESSMENT — PAIN DESCRIPTION - PAIN TYPE: TYPE: ACUTE PAIN

## 2024-01-24 ASSESSMENT — PAIN DESCRIPTION - FREQUENCY: FREQUENCY: CONTINUOUS

## 2024-01-24 ASSESSMENT — PAIN SCALES - GENERAL
PAINLEVEL_OUTOF10: 10
PAINLEVEL_OUTOF10: 6
PAINLEVEL_OUTOF10: 10
PAINLEVEL_OUTOF10: 10

## 2024-01-24 ASSESSMENT — PAIN - FUNCTIONAL ASSESSMENT: PAIN_FUNCTIONAL_ASSESSMENT: PREVENTS OR INTERFERES SOME ACTIVE ACTIVITIES AND ADLS

## 2024-01-24 ASSESSMENT — PAIN DESCRIPTION - ORIENTATION
ORIENTATION: LEFT

## 2024-01-24 ASSESSMENT — PAIN DESCRIPTION - LOCATION
LOCATION: CHEST

## 2024-01-24 ASSESSMENT — PAIN DESCRIPTION - ONSET: ONSET: ON-GOING

## 2024-01-24 ASSESSMENT — PAIN DESCRIPTION - DIRECTION: RADIATING_TOWARDS: BACK

## 2024-01-24 ASSESSMENT — PAIN DESCRIPTION - DESCRIPTORS: DESCRIPTORS: ACHING;THROBBING

## 2024-01-24 NOTE — ED PROVIDER NOTES
[facility] ED  Emergency Department  Emergency Medicine Resident Sign-out     Care of Danny Guevara was assumed from Dr. Leon and is being seen for Chest Pain and Shortness of Breath  .  The patient's initial evaluation and plan have been discussed with the prior provider who initially evaluated the patient.     EMERGENCY DEPARTMENT COURSE / MEDICAL DECISION MAKING:       MEDICATIONS GIVEN:  Orders Placed This Encounter   Medications    morphine injection 4 mg    sodium chloride flush 0.9 % injection 5-40 mL    sodium chloride flush 0.9 % injection 5-40 mL    0.9 % sodium chloride infusion    thiamine tablet 100 mg    DISCONTD: iopamidol (ISOVUE-370) 76 % injection 75 mL    iopamidol (ISOVUE-370) 76 % injection 100 mL    morphine injection 4 mg    cefTRIAXone (ROCEPHIN) 1,000 mg in sodium chloride 0.9 % 50 mL IVPB (mini-bag)     Order Specific Question:   Antimicrobial Indications     Answer:   Pneumonia (CAP)    azithromycin (ZITHROMAX) 500 mg in sodium chloride 0.9 % 250 mL IVPB ([id number])     Order Specific Question:   Antimicrobial Indications     Answer:   Pneumonia (CAP)    sodium chloride 0.9 % infusion     Sarwat Castro: cabinet override    sodium chloride flush 0.9 % injection 5-40 mL    sodium chloride flush 0.9 % injection 5-40 mL    0.9 % sodium chloride infusion    OR Linked Order Group     LORazepam (ATIVAN) tablet 1 mg     LORazepam (ATIVAN) injection 1 mg     LORazepam (ATIVAN) tablet 2 mg     LORazepam (ATIVAN) injection 2 mg     LORazepam (ATIVAN) tablet 3 mg     LORazepam (ATIVAN) injection 3 mg     LORazepam (ATIVAN) tablet 4 mg     LORazepam (ATIVAN) injection 4 mg       LABS / RADIOLOGY:     Labs Reviewed   CULTURE, RESPIRATORY - Abnormal; Notable for the following components:       Result Value    Direct Exam FEW GRAM POSITIVE COCCI IN PAIRS IN CLUSTERS (*)     All other components within normal limits   BASIC METABOLIC PANEL - Abnormal; Notable for the following

## 2024-01-24 NOTE — H&P
[facility]  Office: [phone]  Rio Blair DO, Javier Walters DO, Romulo Boone DO, Miquel Casey DO, Nataly Priest MD, Daly Mendoza MD, Darius Mcmahon MD, Mary Swain MD,  Ad Goode MD, Vale Peña MD, Rancho Harley MD,  Julia Goddard DO, Bruno Frank MD, Nabeel Spicer MD, Danny Blair DO, Lulú Hernandez MD,  Piyush Lepe DO, Anabela Cummings MD, Valentine Brian MD, Caren Slaughter MD, Reddy Cagle MD,  Ibrahima Fortune MD, Idalia Girard MD, Zunilda Whiting MD, Konstantin Mane MD, Lucho Haq MD, Oswaldo Crawford MD, Jc Acuña DO, De Cochran DO, Sole Mcgovern MD,  Vinay Meza MD, Shirley Waterhouse, CNP,  Paulette Cook, CNP, Ruslan Ward, CNP,  Doreen Corcoran, DNP, Twyla Pacheco, CNP, Ashley Mesa, CNP, Linn Azul CNP, Karma Sage, CNP, Tanvi Jim, CNP, Lizzie Esqueda, PA-C, Tova Romano PA-C, Danielle Gonzalez, CNP, Shwetha León, CNS, Estrella Bledsoe, CNP, Sera Kruger, CNP, Tracy Schwab, CNP         [facility]   IN-PATIENT SERVICE   [facility]    HISTORY AND PHYSICAL EXAMINATION            Date:   1/24/2024  Patient name:  Danny Guveara  Date of admission:  1/23/2024  5:58 PM  MRN:   [id number]  Account:  [id number]  YOB: 1960  PCP:    Roberto Donaldson MD  Room:   01/01  Code Status:    Full Code    Chief Complaint:     Chief Complaint   Patient presents with    Chest Pain    Shortness of Breath       History Obtained From:     patient    History of Present Illness:     Danny Guevara is a 63 y.o. Non- / non  male who presented with chest pain and SOB. Patient has a history significant for MDD, hypothyroidism, myeloproliferative disorder, COPD, and lung nodule. Patient is admitted to the hospital for the management of Hyponatremia.    Patient reports he has had ongoing left chest pain radiating to his back for the last couple months.  He also reports new onset shortness of     Phencyclidine, Urine NEGATIVE NEGATIVE    Cannabinoid Scrn, Ur POSITIVE (A) NEGATIVE    Oxycodone Screen, Ur NEGATIVE NEGATIVE    Fentanyl, Ur NEGATIVE NEGATIVE    Test Information       Assay provides medical screening only.  The absence of expected drug(s) and/or metabolite(s) may indicate diluted or adulterated urine, limitations of testing or timing of collection.   Blood alcohol level    Collection Time: 01/24/24  3:54 AM   Result Value Ref Range    Ethanol <10 <10 mg/dL    Ethanol percent <0.010 <0.010 %   Basic Metabolic Panel w/ Reflex to MG    Collection Time: 01/24/24  7:30 AM   Result Value Ref Range    Sodium 128 (L) 135 - 144 mmol/L    Potassium 4.4 3.7 - 5.3 mmol/L    Chloride 95 (L) 98 - 107 mmol/L    CO2 22 20 - 31 mmol/L    Anion Gap 11 9 - 17 mmol/L    Glucose 114 (H) 70 - 99 mg/dL    BUN 7 (L) 8 - 23 mg/dL    Creatinine 0.6 (L) 0.7 - 1.2 mg/dL    Est, Glom Filt Rate >60 >60 mL/min/1.73m2    Calcium 8.8 8.6 - 10.4 mg/dL       Imaging/Diagnostics:  CTA CHEST W WO CONTRAST    Result Date: 1/23/2024  1. Acute or subacute fracture is demonstrated anterolateral left rib 4 with associated lucent lesion concerning for pathologic fracture (concerning for metastasis).  Consider bone scan correlation and correlate with clinical history. 2. Lucent lesion left humeral head corresponds to PET-CT findings on prior study concerning for metastasis. 3. Stable spiculated soft tissue pulmonary nodule posteromedial left upper lobe. 4. Right paratracheal and left hilar adenopathy evident pre since PET-CT imaging 12/19/2023. 5.  Pulmonary sequela typical of that seen with smoking, including COPD. 6. Small amount of inspissated mucus is evident at the right mainstem bronchus. 7. Findings of acute and/or chronic bronchitis.       Assessment :      Hospital Problems             Last Modified POA    * (Principal) Hyponatremia 1/24/2024 Yes    Myeloproliferative disease (HCC) 1/24/2024 Yes    Major depressive disorder,

## 2024-01-24 NOTE — ED PROVIDER NOTES
Faculty Sign-Out Attestation  Handoff taken on the following patient from prior Attending Physician: Luis  Note Started: 12:00 AM EST    I was available and discussed any additional care issues that arose and coordinated the management plans with the resident(s) caring for the patient during my duty period. Any areas of disagreement with resident’s documentation of care or procedures are noted on the chart. I was personally present for the key portions of any/all procedures during my duty period. I have documented in the chart those procedures where I was not present during the key portions.    Lung ca with mets, low Na, etoh withdrawal,   admitted    Omkar Singh DO  Attending Physician       Omkar Singh DO  01/24/24 0000

## 2024-01-24 NOTE — PROGRESS NOTES
[facility]    Admission Medication Reconciliation       The patient's list of current home medications has been reviewed.     Source(s) of information: Dispense report and [facility] pharmacy    Based on information provided by the above source(s), I have updated the patient's home med list as described below.     Please review the ACTION REQUESTED section of this note below for any discrepancies on current hospital orders.      I changed or updated the following medications on the patient's home medication list:  Removed None     Added Losartan 50 mg  Quetiapine 100 mg  Levothyroxine 25 mcg     Adjusted   None   Other Notes Contacted [facility] to clarify Levothyroxine and Quetiapine.   Patient levothyroxine dose has decreased to 25 mcg since early 2023.   Patient is on Quetiapine 300 mg nightly and Quetiapine 100 mg once daily as needed for anxiety.           Please feel free to call me with any questions about this encounter. Thank you.    Thank you  Ashley Gray, PharmD, [facility]  Inpatient Clinical Pharmacist  [phone]      Electronically signed by Alvaro Weir on 1/24/2024 at 12:38 PM

## 2024-01-24 NOTE — ED NOTES
Admitting Resident bedside for evaluation   
Admitting team at bedside.   
Bed bugs found on patient and patient changed into paper scrubs and belongings placed in appropriate bags.   
ED to inpatient nurses report    Chief Complaint   Patient presents with    Chest Pain    Shortness of Breath      Present to ED from home  LOC: alert and orientated to name, place, date  Vital signs   Vitals:    01/24/24 0836 01/24/24 0845 01/24/24 0945 01/24/24 1000   BP: (!) 145/89 (!) 145/89 (!) 141/89 132/89   Pulse: 78 80 80 76   Resp: 13 17 13 14   Temp:       TempSrc:       SpO2: 94% 94% 95% 95%   Weight:          Oxygen Baseline RA    Current needs required 2L/min NC   LDAs:   Peripheral IV 01/23/24 Left;Proximal Forearm (Active)     Mobility: Independent  Pending ED orders: none  Present condition: stable  Code Status: [unfilled]   Consults:  [x]  Hospitalist  Completed  [x] yes [] no  []  Medicine  Completed  [] yes [] No  []  Cardiology  Completed  [] yes [] No  []  GI   Completed  [] yes [] No  []  Neurology  Completed  [] yes [] No  [x]  Nephrology Completed  [x] yes [] No  []  Vascular  Completed  [] yes [] No   []  Surgery  Completed  [] yes [] No   []  Urology  Completed  [] yes [] No   []  Plastics  Completed  [] yes [] No   []  ENT  Completed  [] yes [] No   [x]  Other oncology  Completed  [] yes [] No  Pertinent event(s) see blank notes  Pertinent event(s) morning meds given. D5W bolus 250mL given due to sodium being 128 after redraw. Next sodium level check at noon today. Pt being placed on continuous D5W at 75mL/hr to come down from main pharmacy. Pt stable at this time.   Electronically signed by Camryn Huerta RN on 1/24/2024 at 11:19 AM    
ED to inpatient nurses report    Chief Complaint   Patient presents with    Chest Pain    Shortness of Breath      Present to ED from home  LOC: alert and orientated to name, place, date  Vital signs   Vitals:    01/24/24 1330 01/24/24 1400 01/24/24 1530 01/24/24 1545   BP: (!) 138/92 (!) 160/96 (!) 155/92 (!) 145/85   Pulse: 74 77 83 78   Resp: 13 13 22 12   Temp:       TempSrc:       SpO2: 96% 96% 94% 90%   Weight:          Oxygen Baseline RA    Current needs required RA   LDAs:   Peripheral IV 01/23/24 Left;Proximal Forearm (Active)     Mobility: Independent  Pending ED orders: None  Present condition: stable  Code Status: [unfilled]   Consults:  [x]  Hospitalist  Completed  [x] yes [] no  []  Medicine  Completed  [] yes [] No  []  Cardiology  Completed  [] yes [] No  []  GI   Completed  [] yes [] No  []  Neurology  Completed  [] yes [] No  [x]  Nephrology Completed  [x] yes [] No  []  Vascular  Completed  [] yes [] No   []  Surgery  Completed  [] yes [] No   []  Urology  Completed  [] yes [] No   []  Plastics  Completed  [] yes [] No   []  ENT  Completed  [] yes [] No   [x]  Other oncology  Completed  [x] yes [] No  Pertinent event(s) see blank notes  Pertinent event(s) repeat sodium 123 at noon redraw. Nephro resident states to hold D5W at this time. Pt stjaneth. Given meals. Awaiting bed placement.   Electronically signed by Camryn Huerta RN on 1/24/2024 at 4:04 PM    
NP notified about patient c/o ongoing chest pain, 10/10, requesting pain meds.  
NP notified no maintenance fluids have been ordered for patient with sodium of 122, per NP nephro stated they will revaluate in the morning.  
Nephro resident notified of sodium redraw of 123.   
Nephro resident states to hold D5W infusion.   
Patient back from CT, placed back on full monitor.   
Patient placed on 2L NC due to O2 desat to 90%, O2 WNL after O2 admin.   
Patient to CT.   
Pt resting on stretcher. A&Ox4. RR even and unlabored. No distress noted. Pt denies any needs at this time. Call light within reach.     
Pulmonology at bedside.   
Report given to Camryn KABA, all questions asked and answered.    
Respiratory culture labeled and sent down to lab by writer.   
The following labs were labeled with appropriate pt sticker and tubed to lab:     [] Blue     [] Lavender   [] on ice  [] Green/yellow  [] Green/black [] on ice  [] Grey  [] on ice  [] Yellow  [] Red  [] Pink  [] Type/ Screen  [] ABG  [] VBG    [] COVID-19 swab    [] Rapid  [] PCR  [] Flu swab  [] Peds Viral Panel     [x] Urine Sample  [] Fecal Sample  [] Pelvic Cultures  [] Blood Cultures  [] X 2  [] STREP Cultures  [] Wound Cultures    
The following labs were labeled with appropriate pt sticker and tubed to lab:     [] Blue     [] Lavender   [] on ice  [x] Green/yellow  [] Green/black [] on ice  [] Grey  [] on ice  [] Yellow  [] Red  [] Pink  [] Type/ Screen  [] ABG  [] VBG    [] COVID-19 swab    [] Rapid  [] PCR  [] Flu swab  [] Peds Viral Panel     [] Urine Sample  [] Fecal Sample  [] Pelvic Cultures  [] Blood Cultures  [] X 2  [] STREP Cultures  [] Wound Cultures    
The following labs were labeled with appropriate pt sticker and tubed to lab:     [] Blue     [] Lavender   [] on ice  [x] Green/yellow  [] Green/black [] on ice  [] Grey  [] on ice  [] Yellow  [] Red  [] Pink  [] Type/ Screen  [] ABG  [] VBG    [] COVID-19 swab    [] Rapid  [] PCR  [] Flu swab  [] Peds Viral Panel     [] Urine Sample  [] Fecal Sample  [] Pelvic Cultures  [] Blood Cultures  [] X 2  [] STREP Cultures  [] Wound Cultures    
The following labs were labeled with appropriate pt sticker and tubed to lab:     [] Blue     [x] Lavender   [] on ice  [] Green/yellow  [] Green/black [] on ice  [] Grey  [] on ice  [] Yellow  [] Red  [] Pink  [] Type/ Screen  [] ABG  [] VBG    [] COVID-19 swab    [] Rapid  [] PCR  [] Flu swab  [] Peds Viral Panel     [] Urine Sample  [] Fecal Sample  [] Pelvic Cultures  [] Blood Cultures  [] X 2  [] STREP Cultures  [] Wound Cultures    
The following labs were labeled with appropriate pt sticker and tubed to lab:     [x] Blue     [x] Lavender   [] on ice  [x] Green/yellow  [x] Green/black [] on ice  [] Grey  [] on ice  [] Yellow  [] Red  [] Pink  [] Type/ Screen  [] ABG  [] VBG    [x] COVID-19 swab    [x] Rapid  [] PCR  [x] Flu swab  [] Peds Viral Panel     [] Urine Sample  [] Fecal Sample  [] Pelvic Cultures  [] Blood Cultures  [] X 2  [] STREP Cultures  [] Wound Cultures    
CO2 14 (*)     Anion Gap 22 (*)     All other components within normal limits   CBC WITH AUTO DIFFERENTIAL - Abnormal; Notable for the following components:    WBC 21.1 (*)     MCV 80.1 (*)     RDW 19.8 (*)     Platelets 587 (*)     Immature Granulocytes 2 (*)     Neutrophils % 81 (*)     Lymphocytes % 8 (*)     Absolute Immature Granulocyte 0.42 (*)     Neutrophils Absolute 17.09 (*)     Monocytes Absolute 1.48 (*)     Basophils Absolute 0.21 (*)     All other components within normal limits   COVID-19, RAPID   RAPID INFLUENZA A/B ANTIGENS   LEGIONELLA ANTIGEN, URINE   GRAM STAIN   CULTURE, RESPIRATORY   BRAIN NATRIURETIC PEPTIDE   TROPONIN   TROPONIN   ETHANOL   URINE DRUG SCREEN       Electronically signed by Sarwat Castro RN on 1/24/2024 at 4:11 AM

## 2024-01-24 NOTE — CONSULTS
Today's Date: 1/24/2024  Patient Name: Danny Guevara  Date of admission: 1/23/2024  5:58 PM  Patient's age: 63 y.o., 1960  Admission Dx: Hyponatremia [E87.1]    Reason for Consult: management recommendations  Requesting Physician: Rancho Harley MD    Chief Complaint:  shortness of breath, chest pain    History Obtained From:  patient, electronic medical record    History of Present Illness:      The patient is a 63 y.o. male with PMH - myeloproliferative disorder, known lung nodule, G2 COPD, smoker, alcohol use disorder, MDD who is admitted to the hospital for chest pain, shortness of breath, productive cough. Chest pain radiating to back and L shoulder. Found to have bronchitis, alcohol withdrawal, started on CIWA, and hyponatremia. CTA chest showed - no PE, enlarged paratracheal and L hilar LN. L anterior para hilar spiculated mass 1.7 x 2 cm. Acute/subacute L 45th rib fracture with associated lucent lesion; - focal lesion L humeral head concerning for metastasis. Patient respiratory culture shows gram positive cocci in pairs in clusters; tx with azithromycin and rocephin, thiamine    Patient f/u with Dr. Catherine out patient - patient has known lung nodule and L humeral lesion. D/w IR - recommended Bronchoscopy/EBUS as humeral lesion was not seen on CT chest. Details were sent over to Dr. Brian for further work up. Scheduled for EBUS 02/08/24    Consulted for \"lung cancer\"    Oncological history:  F/u with dr. Song  Myeloproliferative disorder, NOS, positive VINICIO 2 gene mutation;   Negative for CML  On hydroxyrea 500 bid, asa     Past Medical History:   has a past medical history of Alcohol abuse, Anemia, Cancer (HCC), Cervical stenosis of spine, Chronic left-sided low back pain with left-sided sciatica, Insomnia, Intentional drug overdose (HCC), Left arm numbness, Left lumbar radiculitis, Macrocytosis, Major depressive disorder, recurrent severe without psychotic features (HCC),  Vitamins-Minerals (THERAPEUTIC MULTIVITAMIN-MINERALS) tablet Take 1 tablet by mouth daily 30 tablet 1       Allergies:  Patient has no known allergies.    Social History:   reports that he has been smoking cigarettes. He started smoking about 45 years ago. He has a 69.1 pack-year smoking history. He has never used smokeless tobacco. He reports current alcohol use of about 14.0 standard drinks of alcohol per week. He reports that he does not currently use drugs after having used the following drugs: Marijuana (Weed).     Family History: family history includes Breast Cancer in his mother; Diabetes in his mother; Heart Failure in his father; Other in his brother.    Review of Symptoms:    Constitutional: + weight loss  Eyes: No eye discharge, double vision, or eye pain   HEENT: negative for sore mouth, sore throat, hoarseness and voice change   Respiratory: + cough, SOB  Cardiovascular: + Chest pain and cough  Gastrointestinal: negative for nausea, vomiting, diarrhea, constipation, abdominal pain, Dysphagia, hematemesis and hematochezia   Genitourinary: negative for frequency, dysuria, nocturia, urinary incontinence, and hematuria   Integument: negative for rash, skin lesions, bruises.   Hematologic/Lymphatic: negative for easy bruising, bleeding, lymphadenopathy, or petechiae   Endocrine: negative for heat or cold intolerance,weight changes, change in bowel habits and hair loss   Musculoskeletal: + myalgia and bodyache  Neurological: negative for headaches, dizziness, seizures, weakness, numbness    PHYSICAL EXAM:      BP (!) 145/89   Pulse 80   Temp 98.3 °F (36.8 °C) (Oral)   Resp 17   Wt 90.7 kg (200 lb)   SpO2 94%   BMI 27.89 kg/m²    Temp (24hrs), Av.3 °F (36.8 °C), Min:98.3 °F (36.8 °C), Max:98.3 °F (36.8 °C)      General appearance - well appearing, no in pain or distress   Mental status - alert and cooperative   Eyes - extraocular eye movements intact   Mouth - mucous membranes moist, pharynx normal

## 2024-01-24 NOTE — CONSULTS
Renal Consult Note    Patient :  Danny Guevara; 63 y.o. MRN# [id number]  Location:  01/01  Attending:  Rancho Harley MD  Admit Date:  1/23/2024   Hospital Day: 0    Reason for Consult:     Asked by Rancho Boo MD to see for hyponatremia.    History Obtained From:     patient, electronic medical record    History of Present Illness:     Danny Guevara; 63 y.o. male with past medical history of alcohol abuse w/ h/o withdrawals, COPD, left lung nodule, myeloproliferative disorder, and chronic low back pain presented to the hospital with the chief complaint of chest pain that radiates to back and left arm, associated with SOB and cough productive of yellowish sputum x1 week. He was noted to have bed bugs and he was placed in paper gown and his clothes were bagged appropriately.    On discussion today, the patient is doing well. He continues to talk about his chest/shoulder pain, sharp in nature. He is making good urine. Still drink 3-4 beers a day. Continues to smoke. He also stated that he has not had a bowel movement in 4 days.    Vitals:  Afebrile, Some HTN which is better this AM, hemodynamically stable, saturating well on 2L nasal cannula    Labs and Imaging Reviewed:  Na 122->128, K 4.4, Cl 95, bicarb 14->22, anion gap 22->11, BUN 7, Cr 0.6, gluc 114, Ca 8.8  WBC 21.1, Hgb 13.6, PLTs 587, proBNP 78, Troponin 17->17  1/24/2024 Serum osmolarity 287. 1/23/24: Ur Osm 139, urine sodium <20.  Flu negative, Covid negative, legionella negative  Resp culture showing strep on direct exam, further culture pending.  UDS positive for THC and opiates, did receive morphine prior to UDS.  CTA Chest W/WO contrast: Acute/sub acute Fx of Lt anterolateral 4th rib concerning for pathologic Fx(concerning for mets), Lucent lesion left humeral head corresponding with prior PET-CT findings(concerning for mets), stable spiculated pulm nodule of posteromedial RICHIE, Rt paratracheal and Lt hilar adenopathy, findings  08:23 PM    GLUCOSEU NEGATIVE 01/03/2022 08:23 PM    KETUA TRACE 01/03/2022 08:23 PM    AMORPHOUS NOT REPORTED 06/23/2021 01:03 PM     Urine Sodium:     Lab Results   Component Value Date/Time    KENNA <20 07/23/2022 07:23 AM     Urine Osmolarity:   Lab Results   Component Value Date/Time    OSMOU 231 07/23/2022 07:23 AM     Radiology:     Reviewed.      Assessment:     Chronic hyponatremia with sodium values running around high 120s to low 130s. 122 on presentation, 128 today, likely 2/2 beer potomania in the setting of alcohol use. 1/24/2024 Serum osmolarity 287. 1/23/24: Ur Osm 139, urine sodium <20.  Acute bronchitis  Bandemia  Thrombocytosis likely 2/2 Myeloproliferative disorder.  Follows up with heme-onc Dr. Ruiz.  Alcohol abuse, H/o withdrawals, on CIWA  Left 4th rib Fx, pathologic, concerning for Mets  Recently diagnosed RICHIE pulm nodule  COPD, not in exacerbation  Myeloproliferative disorder on hydroxyurea  Chronic Low back pain     Plan:     Will give D5W bolus 250cc to help prevent any overcorrection and then start D5W @ 75cc/h and will adjust fluids depending on sodium levels.  Check Na q4hrs.  Goal to increase sodium by 6-8 in next 24 hours.  Start fluid restrictions at 1200 cc/day.  Ur Na and Ur osm already ordered   Strict intake and Output.  Alcohol abuse management per primary, watch for withdrawals.  Will follow.    Nutrition   Please ensure that patient is on a renal diet/TF. Avoid nephrotoxic drugs/contrast exposure.    Thank you for the consultation. Please do not hesitate to contact us for any further questions/concerns.     Alberto Wiley MD  Internal Medicine Resident, PGY-2  [facility]  01/24/24  9:38 AM    [facility]     This note is created with the assistance of a speech-recognition program. While intending to generate a document that actually reflects the content of the visit, no guarantees can be provided that every mistake has

## 2024-01-25 ENCOUNTER — APPOINTMENT (OUTPATIENT)
Dept: GENERAL RADIOLOGY | Age: 64
End: 2024-01-25
Payer: MEDICARE

## 2024-01-25 PROBLEM — J44.1 CHRONIC OBSTRUCTIVE PULMONARY DISEASE WITH ACUTE EXACERBATION (HCC): Status: ACTIVE | Noted: 2024-01-24

## 2024-01-25 PROBLEM — E43 SEVERE PROTEIN-CALORIE MALNUTRITION (HCC): Status: ACTIVE | Noted: 2021-12-03

## 2024-01-25 LAB
ALBUMIN SERPL-MCNC: 3.6 G/DL (ref 3.5–5.2)
ALBUMIN/GLOB SERPL: 1 {RATIO} (ref 1–2.5)
ALP SERPL-CCNC: 101 U/L (ref 40–129)
ALT SERPL-CCNC: 10 U/L (ref 5–41)
ANION GAP SERPL CALCULATED.3IONS-SCNC: 10 MMOL/L (ref 9–17)
AST SERPL-CCNC: 22 U/L
BASOPHILS # BLD: 0 K/UL (ref 0–0.2)
BASOPHILS NFR BLD: 0 % (ref 0–2)
BILIRUB SERPL-MCNC: 0.4 MG/DL (ref 0.3–1.2)
BUN SERPL-MCNC: 6 MG/DL (ref 8–23)
CA-I BLD-SCNC: 1.15 MMOL/L (ref 1.13–1.33)
CALCIUM SERPL-MCNC: 8.9 MG/DL (ref 8.6–10.4)
CHLORIDE SERPL-SCNC: 96 MMOL/L (ref 98–107)
CO2 SERPL-SCNC: 24 MMOL/L (ref 20–31)
CREAT SERPL-MCNC: 0.6 MG/DL (ref 0.7–1.2)
EOSINOPHIL # BLD: 0.22 K/UL (ref 0–0.4)
EOSINOPHILS RELATIVE PERCENT: 1 % (ref 1–4)
ERYTHROCYTE [DISTWIDTH] IN BLOOD BY AUTOMATED COUNT: 19.5 % (ref 11.8–14.4)
EST. AVERAGE GLUCOSE BLD GHB EST-MCNC: 97 MG/DL
GFR SERPL CREATININE-BSD FRML MDRD: >60 ML/MIN/1.73M2
GLUCOSE SERPL-MCNC: 104 MG/DL (ref 70–99)
HBA1C MFR BLD: 5 % (ref 4–6)
HCT VFR BLD AUTO: 39.1 % (ref 40.7–50.3)
HGB BLD-MCNC: 13.3 G/DL (ref 13–17)
IMM GRANULOCYTES # BLD AUTO: 0.22 K/UL (ref 0–0.3)
IMM GRANULOCYTES NFR BLD: 1 %
INR PPP: 1.3
LYMPHOCYTES NFR BLD: 1.56 K/UL (ref 1–4.8)
LYMPHOCYTES RELATIVE PERCENT: 7 % (ref 24–44)
MAGNESIUM SERPL-MCNC: 2.1 MG/DL (ref 1.6–2.6)
MCH RBC QN AUTO: 27 PG (ref 25.2–33.5)
MCHC RBC AUTO-ENTMCNC: 34 G/DL (ref 28.4–34.8)
MCV RBC AUTO: 79.5 FL (ref 82.6–102.9)
MONOCYTES NFR BLD: 10 % (ref 1–7)
MONOCYTES NFR BLD: 2.23 K/UL (ref 0.1–0.8)
MORPHOLOGY: ABNORMAL
MORPHOLOGY: ABNORMAL
NEUTROPHILS NFR BLD: 81 % (ref 36–66)
NEUTS SEG NFR BLD: 18.07 K/UL (ref 1.8–7.7)
NRBC BLD-RTO: 0 PER 100 WBC
PHOSPHATE SERPL-MCNC: 3.5 MG/DL (ref 2.5–4.5)
PLATELET # BLD AUTO: 505 K/UL (ref 138–453)
PMV BLD AUTO: 9.2 FL (ref 8.1–13.5)
POTASSIUM SERPL-SCNC: 4 MMOL/L (ref 3.7–5.3)
PROT SERPL-MCNC: 7.1 G/DL (ref 6.4–8.3)
PROTHROMBIN TIME: 16.2 SEC (ref 11.7–14.9)
RBC # BLD AUTO: 4.92 M/UL (ref 4.21–5.77)
SODIUM SERPL-SCNC: 127 MMOL/L (ref 135–144)
SODIUM SERPL-SCNC: 129 MMOL/L (ref 135–144)
SODIUM SERPL-SCNC: 129 MMOL/L (ref 135–144)
SODIUM SERPL-SCNC: 130 MMOL/L (ref 135–144)
TSH SERPL DL<=0.05 MIU/L-ACNC: 6.55 UIU/ML (ref 0.3–5)
WBC OTHER # BLD: 22.3 K/UL (ref 3.5–11.3)

## 2024-01-25 PROCEDURE — 2580000003 HC RX 258: Performed by: NURSE PRACTITIONER

## 2024-01-25 PROCEDURE — 6360000002 HC RX W HCPCS: Performed by: NURSE PRACTITIONER

## 2024-01-25 PROCEDURE — 2060000000 HC ICU INTERMEDIATE R&B

## 2024-01-25 PROCEDURE — 80053 COMPREHEN METABOLIC PANEL: CPT

## 2024-01-25 PROCEDURE — 6370000000 HC RX 637 (ALT 250 FOR IP): Performed by: STUDENT IN AN ORGANIZED HEALTH CARE EDUCATION/TRAINING PROGRAM

## 2024-01-25 PROCEDURE — 6370000000 HC RX 637 (ALT 250 FOR IP): Performed by: INTERNAL MEDICINE

## 2024-01-25 PROCEDURE — 83036 HEMOGLOBIN GLYCOSYLATED A1C: CPT

## 2024-01-25 PROCEDURE — 85610 PROTHROMBIN TIME: CPT

## 2024-01-25 PROCEDURE — 6370000000 HC RX 637 (ALT 250 FOR IP)

## 2024-01-25 PROCEDURE — 6370000000 HC RX 637 (ALT 250 FOR IP): Performed by: NURSE PRACTITIONER

## 2024-01-25 PROCEDURE — 71045 X-RAY EXAM CHEST 1 VIEW: CPT

## 2024-01-25 PROCEDURE — 84100 ASSAY OF PHOSPHORUS: CPT

## 2024-01-25 PROCEDURE — 99231 SBSQ HOSP IP/OBS SF/LOW 25: CPT | Performed by: INTERNAL MEDICINE

## 2024-01-25 PROCEDURE — 85025 COMPLETE CBC W/AUTO DIFF WBC: CPT

## 2024-01-25 PROCEDURE — 36415 COLL VENOUS BLD VENIPUNCTURE: CPT

## 2024-01-25 PROCEDURE — 2580000003 HC RX 258: Performed by: STUDENT IN AN ORGANIZED HEALTH CARE EDUCATION/TRAINING PROGRAM

## 2024-01-25 PROCEDURE — 6370000000 HC RX 637 (ALT 250 FOR IP): Performed by: PHYSICIAN ASSISTANT

## 2024-01-25 PROCEDURE — 84443 ASSAY THYROID STIM HORMONE: CPT

## 2024-01-25 PROCEDURE — 99232 SBSQ HOSP IP/OBS MODERATE 35: CPT | Performed by: STUDENT IN AN ORGANIZED HEALTH CARE EDUCATION/TRAINING PROGRAM

## 2024-01-25 PROCEDURE — 84295 ASSAY OF SERUM SODIUM: CPT

## 2024-01-25 PROCEDURE — 83735 ASSAY OF MAGNESIUM: CPT

## 2024-01-25 PROCEDURE — 99232 SBSQ HOSP IP/OBS MODERATE 35: CPT | Performed by: INTERNAL MEDICINE

## 2024-01-25 PROCEDURE — 82330 ASSAY OF CALCIUM: CPT

## 2024-01-25 RX ORDER — SODIUM CHLORIDE 1 G/1
2 TABLET ORAL 2 TIMES DAILY WITH MEALS
Status: DISCONTINUED | OUTPATIENT
Start: 2024-01-25 | End: 2024-01-27 | Stop reason: HOSPADM

## 2024-01-25 RX ORDER — OXYCODONE HYDROCHLORIDE 5 MG/1
10 TABLET ORAL EVERY 4 HOURS PRN
Status: DISCONTINUED | OUTPATIENT
Start: 2024-01-25 | End: 2024-01-26

## 2024-01-25 RX ORDER — HYDROXYUREA 500 MG/1
500 CAPSULE ORAL DAILY
Status: DISCONTINUED | OUTPATIENT
Start: 2024-01-25 | End: 2024-01-27 | Stop reason: HOSPADM

## 2024-01-25 RX ORDER — BENZONATATE 100 MG/1
100 CAPSULE ORAL 3 TIMES DAILY PRN
Status: DISCONTINUED | OUTPATIENT
Start: 2024-01-25 | End: 2024-01-27 | Stop reason: HOSPADM

## 2024-01-25 RX ORDER — FOLIC ACID 1 MG/1
1 TABLET ORAL DAILY
Status: DISCONTINUED | OUTPATIENT
Start: 2024-01-25 | End: 2024-01-27 | Stop reason: HOSPADM

## 2024-01-25 RX ORDER — MULTIVITAMIN WITH IRON
1 TABLET ORAL DAILY
Status: DISCONTINUED | OUTPATIENT
Start: 2024-01-25 | End: 2024-01-27 | Stop reason: HOSPADM

## 2024-01-25 RX ORDER — MORPHINE SULFATE 2 MG/ML
2 INJECTION, SOLUTION INTRAMUSCULAR; INTRAVENOUS EVERY 4 HOURS PRN
Status: DISCONTINUED | OUTPATIENT
Start: 2024-01-25 | End: 2024-01-27 | Stop reason: HOSPADM

## 2024-01-25 RX ORDER — LIDOCAINE 4 G/G
1 PATCH TOPICAL DAILY
Status: DISCONTINUED | OUTPATIENT
Start: 2024-01-25 | End: 2024-01-27 | Stop reason: HOSPADM

## 2024-01-25 RX ORDER — OXYCODONE HYDROCHLORIDE 5 MG/1
5 TABLET ORAL EVERY 4 HOURS PRN
Status: DISCONTINUED | OUTPATIENT
Start: 2024-01-25 | End: 2024-01-26

## 2024-01-25 RX ORDER — GUAIFENESIN 600 MG/1
600 TABLET, EXTENDED RELEASE ORAL 2 TIMES DAILY
Status: DISCONTINUED | OUTPATIENT
Start: 2024-01-25 | End: 2024-01-27 | Stop reason: HOSPADM

## 2024-01-25 RX ADMIN — FOLIC ACID 1 MG: 1 TABLET ORAL at 15:55

## 2024-01-25 RX ADMIN — OXYCODONE HYDROCHLORIDE AND ACETAMINOPHEN 1 TABLET: 5; 325 TABLET ORAL at 09:03

## 2024-01-25 RX ADMIN — OXYCODONE 10 MG: 5 TABLET ORAL at 23:17

## 2024-01-25 RX ADMIN — ASPIRIN 81 MG: 81 TABLET, COATED ORAL at 09:03

## 2024-01-25 RX ADMIN — Medication 100 MG: at 09:03

## 2024-01-25 RX ADMIN — SODIUM CHLORIDE TAB 1 GM 2 G: 1 TAB at 12:01

## 2024-01-25 RX ADMIN — GUAIFENESIN 600 MG: 600 TABLET, EXTENDED RELEASE ORAL at 20:53

## 2024-01-25 RX ADMIN — SODIUM CHLORIDE, PRESERVATIVE FREE 10 ML: 5 INJECTION INTRAVENOUS at 09:05

## 2024-01-25 RX ADMIN — SODIUM CHLORIDE, PRESERVATIVE FREE 10 ML: 5 INJECTION INTRAVENOUS at 09:04

## 2024-01-25 RX ADMIN — LEVOTHYROXINE SODIUM 25 MCG: 50 TABLET ORAL at 06:38

## 2024-01-25 RX ADMIN — ENOXAPARIN SODIUM 40 MG: 100 INJECTION SUBCUTANEOUS at 09:04

## 2024-01-25 RX ADMIN — QUETIAPINE FUMARATE 300 MG: 300 TABLET ORAL at 20:53

## 2024-01-25 RX ADMIN — HYDROXYUREA 500 MG: 500 CAPSULE ORAL at 18:04

## 2024-01-25 RX ADMIN — SODIUM CHLORIDE TAB 1 GM 2 G: 1 TAB at 18:05

## 2024-01-25 RX ADMIN — SODIUM CHLORIDE, PRESERVATIVE FREE 10 ML: 5 INJECTION INTRAVENOUS at 20:53

## 2024-01-25 RX ADMIN — OXYCODONE 10 MG: 5 TABLET ORAL at 18:55

## 2024-01-25 RX ADMIN — OXYCODONE 10 MG: 5 TABLET ORAL at 14:51

## 2024-01-25 RX ADMIN — CEFTRIAXONE SODIUM 1000 MG: 1 INJECTION, POWDER, FOR SOLUTION INTRAMUSCULAR; INTRAVENOUS at 09:05

## 2024-01-25 RX ADMIN — ALCOHOL 1 TABLET: 70.47 GEL TOPICAL at 15:55

## 2024-01-25 RX ADMIN — AZITHROMYCIN 500 MG: 250 TABLET, FILM COATED ORAL at 09:03

## 2024-01-25 RX ADMIN — ROSUVASTATIN CALCIUM 5 MG: 5 TABLET, COATED ORAL at 20:52

## 2024-01-25 RX ADMIN — LOSARTAN POTASSIUM 25 MG: 50 TABLET, FILM COATED ORAL at 09:04

## 2024-01-25 ASSESSMENT — PAIN SCALES - GENERAL
PAINLEVEL_OUTOF10: 9
PAINLEVEL_OUTOF10: 10
PAINLEVEL_OUTOF10: 10
PAINLEVEL_OUTOF10: 9
PAINLEVEL_OUTOF10: 7

## 2024-01-25 ASSESSMENT — PAIN DESCRIPTION - DESCRIPTORS
DESCRIPTORS: ACHING;THROBBING

## 2024-01-25 ASSESSMENT — PAIN DESCRIPTION - ONSET
ONSET: ON-GOING
ONSET: ON-GOING

## 2024-01-25 ASSESSMENT — PAIN DESCRIPTION - ORIENTATION
ORIENTATION: LEFT

## 2024-01-25 ASSESSMENT — PAIN DESCRIPTION - DIRECTION
RADIATING_TOWARDS: BACK.
RADIATING_TOWARDS: BACK.

## 2024-01-25 ASSESSMENT — PAIN DESCRIPTION - PAIN TYPE
TYPE: ACUTE PAIN
TYPE: ACUTE PAIN

## 2024-01-25 ASSESSMENT — PAIN DESCRIPTION - LOCATION
LOCATION: CHEST

## 2024-01-25 ASSESSMENT — PAIN - FUNCTIONAL ASSESSMENT
PAIN_FUNCTIONAL_ASSESSMENT: PREVENTS OR INTERFERES SOME ACTIVE ACTIVITIES AND ADLS
PAIN_FUNCTIONAL_ASSESSMENT: PREVENTS OR INTERFERES SOME ACTIVE ACTIVITIES AND ADLS

## 2024-01-25 ASSESSMENT — PAIN DESCRIPTION - FREQUENCY
FREQUENCY: CONTINUOUS
FREQUENCY: CONTINUOUS

## 2024-01-25 NOTE — PROGRESS NOTES
Comprehensive Nutrition Assessment    Type and Reason for Visit:  Positive Nutrition Screen (wt loss, poor po)    Nutrition Recommendations/Plan:   Continue current diet, Regular with 1.2 L FR  Will send Frozen ONS BID to trial  Monitor/encourage Po intake  Pt meets ASPEN criteria for Severe Protein-calorie malnutrition, dx added     Malnutrition Assessment:  Malnutrition Status:  Severe malnutrition (01/25/24 1324)    Context:  Acute Illness     Findings of the 6 clinical characteristics of malnutrition:  Energy Intake:  50% or less of estimated energy requirements for 5 or more days  Weight Loss:  Greater than 5% over 1 month     Body Fat Loss:  Mild body fat loss Orbital, Buccal region   Muscle Mass Loss:  Unable to assess    Fluid Accumulation:  No significant fluid accumulation     Strength:  Not Performed    Nutrition Assessment:    62 yo M adm hponatremia. PMH significant for cx, alcohol use disorder, COPD, myeloproliferative disorder. Pt reports poor appetite, \"a couple weeks\" pta, was eating < 50% during that time. Pt states he would go several days at a time not eating anything d/t poor appetite. Pt endorses drinking orange juice, a \"few\" beers, and milk daily during that time. Per pt, UBW ~200 lbs. Pt noted with 7% wt loss x 1.5 mos (significant). Pt reports disliking liquid ONS, agreeable to trial Magic Cup. No BM noted. No edema noted.    Nutrition Related Findings:    labs/meds reviewed Wound Type: None       Current Nutrition Intake & Therapies:    Average Meal Intake: 1-25% (per pt at breakfast)  Average Supplements Intake: None Ordered  ADULT DIET; Regular; 1200 ml  ADULT ORAL NUTRITION SUPPLEMENT; Lunch, Dinner; Frozen Oral Supplement    Anthropometric Measures:  Height: 180.3 cm (5' 10.98\")  Ideal Body Weight (IBW): 172 lbs (78 kg)       Current Body Weight: 87.3 kg (192 lb 7.4 oz) (1/25/24), 111.9 % IBW.    Current BMI (kg/m2): 26.9  Usual Body Weight: 93.9 kg (207 lb 0.2 oz) (12/6/23)  %

## 2024-01-25 NOTE — PROGRESS NOTES
L 4th rib and L humerus concerning for metastatic disease  Myeloproliferative disorder   Smoker  Alcohol use disorder    Acute bronchitis  Alcohol withdrawal  Acute COPD exacerbation  Hyponatremia    PLAN  Patient scheduled for bronchoscopy/EBUS on 02/08/24  Resume hydroxyurea   Rest management per primary team  We will follow    Thank you for asking us to see this patient.    Larry Welch MD  Internal Medicine Resident, PGY-3  [facility] [place]         1/25/2024, 1:48 PM    Attending Physician Statement  The patient was seen and examined during rounds, I have discussed the care of Danny Guevara, including pertinent history and exam findings with the resident. I have reviewed the key elements of all parts of the encounter with the resident.  I agree with the assessment, and status of the problem list as documented.   Additional assessment/ plan       Electronically signed by   LISANDRO BRYANT MD    on 1/25/2024 at 10:28 PM              This note is created with the assistance of a speech recognition program.  While intending to generate a document that actually reflects the content of the visit, the document can still have some errors including those of syntax and sound a like substitutions which may escape proof reading.  It such instances, actual meaning can be extrapolated by contextual diversion.

## 2024-01-25 NOTE — CARE COORDINATION
Consult received for consideration of rehab.  Pt has been seen in the past for the same.  Met with pt who stated he lives alone at [facility]. Pt has Mcare/Mcaid in place.   Pt reports he drinks alcohol daily, 5-6 beers (12oz).  Stated he has cut back as he used to drink 15 beers/day. He denies all drug use.  Pt stated he is not concerned about his drinking. Stated he has been thru tx in the past at [facility] (2yrs ago) and [facility] (several months ago). He reports no sobriety after each tx.  No prior AA involvement. Pt stated he is not sure if he wants to quit.  He was not interested in any tx and declined any tx resources, stating he has them at home.  Pt stated that if he decided he ever wanted tx again in the future, he would go back to [facility].

## 2024-01-25 NOTE — PLAN OF CARE
Problem: Safety - Adult  Goal: Free from fall injury  Outcome: Progressing  Flowsheets (Taken 1/25/2024 1104)  Free From Fall Injury:   Instruct family/caregiver on patient safety   Based on caregiver fall risk screen, instruct family/caregiver to ask for assistance with transferring infant if caregiver noted to have fall risk factors     Problem: Pain  Goal: Verbalizes/displays adequate comfort level or baseline comfort level  Outcome: Progressing     Problem: Nutrition Deficit:  Goal: Optimize nutritional status  Outcome: Progressing

## 2024-01-25 NOTE — PROGRESS NOTES
Nephrology Progress Note      SUBJECTIVE      - Pt was seen and examined. No acute issues overnight.   - Stable hemodynamics.  - Pt complaining left sided pain.  - serum Sodium 24 is 127.  - No edema noted on lower legs.     Lab and chart review:     Brief history  Following for hyponatremia.    The patient is a 63 y.o. male with PMH - myeloproliferative disorder, known lung nodule, G2 COPD, smoker, alcohol use disorder, MDD who is admitted to the hospital for chest pain, shortness of breath, productive cough. Found to have bronchitis, alcohol withdrawal, started on CIWA, and hyponatremia .    Nephrology was consulted for hyponatremia.        OBJECTIVE      CURRENT TEMPERATURE:  Temp: 98.4 °F (36.9 °C)  MAXIMUM TEMPERATURE OVER 24HRS:  Temp (24hrs), Av.3 °F (36.8 °C), Min:97.7 °F (36.5 °C), Max:98.7 °F (37.1 °C)    CURRENT RESPIRATORY RATE:  Respirations: 20  CURRENT PULSE:  Pulse: 80  CURRENT BLOOD PRESSURE:  BP: 118/72  24HR BLOOD PRESSURE RANGE:  Systolic (24hrs), Av , Min:91 , Max:160   ; Diastolic (24hrs), Av, Min:68, Max:96    24HR INTAKE/OUTPUT:    Intake/Output Summary (Last 24 hours) at 2024 1116  Last data filed at 2024 0637  Gross per 24 hour   Intake 601.46 ml   Output 300 ml   Net 301.46 ml     WEIGHT :Patient Vitals for the past 96 hrs (Last 3 readings):   Weight   24 0600 87.3 kg (192 lb 7.4 oz)   24 1815 90.7 kg (200 lb)   24 1803 90.7 kg (200 lb)     PHYSICAL EXAM      GENERAL APPEARANCE:Awake, alert, in no acute distress  SKIN: warm and dry, no rash or erythema  EYES: conjunctivae normal and sclera anicteric  ENT: no thrush no pharyngeal congestion   NECK:   No JVD. No carotid bruits and no carotid lymphadenopathy .  PULMONARY: lungs are clear to auscultation. No Wheezing, no ronchi .   CADRDIOVASCULAR: S1 and S2 normal NO S3 and NO S4 . No rubs , no murmur.   ABDOMEN: soft nontender, bowel sounds present, no organomegaly, no ascites.       EXTREMITIES:

## 2024-01-25 NOTE — PROGRESS NOTES
[facility]  Office: [phone]  Rio Blair DO, Javier Walters DO, Romulo Boone DO, Miquel Casey DO, Nataly Priest MD, Daly Mendoza MD, Darius Mcmahon MD, Mary Swain MD,  Ad Goode MD, Vale Peña MD, Rancho Harley MD,  Julia Goddard DO, Bruno Frank MD, Nabeel Spicer MD, Danny Blair DO, Lulú Hernandez MD,  Piyush Lepe DO, Anabela Cummnigs MD, Valentine Brian MD, Caren Slaughter MD, Reddy Cagle MD,  Ibrahima Fortune MD, Idalia Girard MD, Zunilda Whiting MD, Konstantin Mane MD, Lucho Haq MD, Oswaldo Crawford MD, Jc Acuña DO, De Cochran DO, Sole Mcgovern MD,  Vinay Meza MD, Shirley Waterhouse, CNP,  Paulette Cook, CNP, Ruslan Ward, CNP,  Doreen Corcoran, DNP, Twyla Pacheco, CNP, Ashley Mesa, CNP, Linn Azul CNP, Karma Sage, CNP, Tanvi Jim, CNP, Lizzie Esqueda, PA-C, Tova Romano, PA-C, Danielle Gonzalez, CNP, Shwetha León, CNS, Estrella Bledsoe, CNP, Sera Kruger, CNP, Tracy Schwab, CNP         [facility]   IN-PATIENT SERVICE   [facility]    Progress Note    1/25/2024    5:10 PM    Name:   Danny Guevara  MRN:     [id number]     Acct:      [id number]   Room:   0444/0444-01   Day:  1  Admit Date:  1/23/2024  5:58 PM    PCP:   Roberto Donaldson MD  Code Status:  Full Code    Subjective:     C/C:   Chief Complaint   Patient presents with    Chest Pain    Shortness of Breath     Interval History Status: not changed.     Vitals reviewed, afebrile and hemodynamically stable.  Saturating well on 2 L nasal cannula.  Labs reviewed, sodium increased to 130 today, leukocytosis 22.3.  Chest x-ray with right basilar effusion with adjacent infiltrate.  Overnight patient continued to complain of pain.    On examination patient resting comfortably in bed.  Complains of left-sided chest pain worse with movement and deep inhalation.  Known fourth rib fracture on left side suspected due to metastasis.  Medications  adjusted.    Brief History:     This is a 63-year-old male with a significant past medical history of COPD, hypothyroidism, EtOH abuse, tobacco use disorder and lung nodule who initially presented emergency room with a chief complaint of chest pain and shortness of breath and was admitted for COPD exacerbation with hyponatremia.  He was evaluated by nephrology and initially given D5W bolus followed by D5W at 75 mL/h due to concern for rapid correction of hyponatremia however following sodium correction trend stable. Evaluated by hematology/oncology as well with recommendations to keep scheduled bronchoscopy/EBUS on 2/8/2024.  Pain medications were adjusted and patient was started on salt tabs with a fluid restriction of 1200 mL/day.    Review of Systems:     Constitutional: Positive for intermittent chills.  Negative for  fevers, sweats  Respiratory: Positive for cough, dyspnea on exertion, shortness of breath, wheezing  Cardiovascular: Positive for chest pressure/discomfort.  Negative for lower extremity edema, palpitations  Gastrointestinal:  negative for abdominal pain, constipation, diarrhea, nausea, vomiting  Neurological:  negative for dizziness, headache    Medications:     Allergies:  No Known Allergies    Current Meds:   Scheduled Meds:    sodium chloride  2 g Oral BID WC    lidocaine  1 patch TransDERmal Daily    multivitamin  1 tablet Oral Daily    folic acid  1 mg Oral Daily    guaiFENesin  600 mg Oral BID    hydroxyurea  500 mg Oral Daily    thiamine  100 mg Oral Daily    enoxaparin  40 mg SubCUTAneous Daily    sodium chloride flush  5-40 mL IntraVENous 2 times per day    cefTRIAXone (ROCEPHIN) IV  1,000 mg IntraVENous Q24H    azithromycin  500 mg Oral Daily    nicotine  1 patch TransDERmal Daily    aspirin  81 mg Oral Daily    levothyroxine  25 mcg Oral QAM    rosuvastatin  5 mg Oral Nightly    losartan  25 mg Oral Daily    QUEtiapine  300 mg Oral Nightly    sodium chloride flush  5-40 mL IntraVENous 2  Pt c/o needle stick while performing central line insertion , pt is a medical resident , denies any pain

## 2024-01-25 NOTE — CARE COORDINATION
.Case Management Assessment  Initial Evaluation    Date/Time of Evaluation: 1/25/2024 12:29 PM  Assessment Completed by: Karma Bledsoe RN    If patient is discharged prior to next notation, then this note serves as note for discharge by case management.    Patient Name: Danny Guevara                   YOB: 1960  Diagnosis: Hyponatremia [E87.1]  Bronchitis [J40]  Uncomplicated alcohol dependence (HCC) [F10.20]  Closed fracture of one rib of left side, initial encounter [S22.32XA]                   Date / Time: 1/23/2024  5:58 PM    Patient Admission Status: Inpatient   Readmission Risk (Low < 19, Mod (19-27), High > 27): Readmission Risk Score: 15.3    Current PCP: Roberto Donaldson MD  PCP verified by CM? (P) Yes    Chart Reviewed: Yes      History Provided by: (P) Patient  Patient Orientation: (P) Alert and Oriented    Patient Cognition: (P) Alert    Hospitalization in the last 30 days (Readmission):  No    If yes, Readmission Assessment in CM Navigator will be completed.    Advance Directives:      Code Status: Full Code   Patient's Primary Decision Maker is: (P) Named in Scanned ACP Document    Primary Decision Maker: Daquan Guevara - Brother/Sister - [phone]    Discharge Planning:    Patient lives with: (P) Alone Type of Home: (P) Apartment  Primary Care Giver: (P) Self  Patient Support Systems include: (P) Family Members, /   Current Financial resources:    Current community resources: (P) Transportation  Current services prior to admission: (P) None            Current DME:              Type of Home Care services:  (P) None    ADLS  Prior functional level: (P) Independent in ADLs/IADLs  Current functional level: (P) Independent in ADLs/IADLs    PT AM-PAC:   /24  OT AM-PAC:   /24    Family can provide assistance at DC: (P) Yes  Would you like Case Management to discuss the discharge plan with any other family members/significant others, and if so, who? (P) No  Plans

## 2024-01-26 LAB
ANION GAP SERPL CALCULATED.3IONS-SCNC: 12 MMOL/L (ref 9–17)
BASOPHILS # BLD: 0.35 K/UL (ref 0–0.2)
BASOPHILS NFR BLD: 2 % (ref 0–2)
BUN SERPL-MCNC: 7 MG/DL (ref 8–23)
CALCIUM SERPL-MCNC: 8.7 MG/DL (ref 8.6–10.4)
CHLORIDE SERPL-SCNC: 98 MMOL/L (ref 98–107)
CO2 SERPL-SCNC: 22 MMOL/L (ref 20–31)
CREAT SERPL-MCNC: 0.7 MG/DL (ref 0.7–1.2)
EOSINOPHIL # BLD: 0.35 K/UL (ref 0–0.44)
EOSINOPHILS RELATIVE PERCENT: 2 % (ref 1–4)
ERYTHROCYTE [DISTWIDTH] IN BLOOD BY AUTOMATED COUNT: 19.6 % (ref 11.8–14.4)
GFR SERPL CREATININE-BSD FRML MDRD: >60 ML/MIN/1.73M2
GLUCOSE SERPL-MCNC: 117 MG/DL (ref 70–99)
HCT VFR BLD AUTO: 38.8 % (ref 40.7–50.3)
HGB BLD-MCNC: 12.5 G/DL (ref 13–17)
IMM GRANULOCYTES # BLD AUTO: 0.7 K/UL (ref 0–0.3)
IMM GRANULOCYTES NFR BLD: 4 %
LYMPHOCYTES NFR BLD: 1.22 K/UL (ref 1.1–3.7)
LYMPHOCYTES RELATIVE PERCENT: 7 % (ref 24–43)
MAGNESIUM SERPL-MCNC: 2 MG/DL (ref 1.6–2.6)
MCH RBC QN AUTO: 26.7 PG (ref 25.2–33.5)
MCHC RBC AUTO-ENTMCNC: 32.2 G/DL (ref 28.4–34.8)
MCV RBC AUTO: 82.9 FL (ref 82.6–102.9)
MONOCYTES NFR BLD: 1.91 K/UL (ref 0.1–1.2)
MONOCYTES NFR BLD: 11 % (ref 3–12)
MORPHOLOGY: ABNORMAL
NEUTROPHILS NFR BLD: 74 % (ref 36–65)
NEUTS SEG NFR BLD: 12.87 K/UL (ref 1.5–8.1)
NRBC BLD-RTO: 0 PER 100 WBC
PLATELET # BLD AUTO: 546 K/UL (ref 138–453)
PMV BLD AUTO: 9.4 FL (ref 8.1–13.5)
POTASSIUM SERPL-SCNC: 3.6 MMOL/L (ref 3.7–5.3)
RBC # BLD AUTO: 4.68 M/UL (ref 4.21–5.77)
SODIUM SERPL-SCNC: 129 MMOL/L (ref 135–144)
SODIUM SERPL-SCNC: 130 MMOL/L (ref 135–144)
SODIUM SERPL-SCNC: 131 MMOL/L (ref 135–144)
SODIUM SERPL-SCNC: 132 MMOL/L (ref 135–144)
SODIUM SERPL-SCNC: 132 MMOL/L (ref 135–144)
WBC OTHER # BLD: 17.4 K/UL (ref 3.5–11.3)

## 2024-01-26 PROCEDURE — 6370000000 HC RX 637 (ALT 250 FOR IP): Performed by: INTERNAL MEDICINE

## 2024-01-26 PROCEDURE — 84295 ASSAY OF SERUM SODIUM: CPT

## 2024-01-26 PROCEDURE — 99232 SBSQ HOSP IP/OBS MODERATE 35: CPT | Performed by: INTERNAL MEDICINE

## 2024-01-26 PROCEDURE — 80048 BASIC METABOLIC PNL TOTAL CA: CPT

## 2024-01-26 PROCEDURE — 2060000000 HC ICU INTERMEDIATE R&B

## 2024-01-26 PROCEDURE — 6360000002 HC RX W HCPCS: Performed by: NURSE PRACTITIONER

## 2024-01-26 PROCEDURE — 6360000002 HC RX W HCPCS: Performed by: STUDENT IN AN ORGANIZED HEALTH CARE EDUCATION/TRAINING PROGRAM

## 2024-01-26 PROCEDURE — 99232 SBSQ HOSP IP/OBS MODERATE 35: CPT | Performed by: STUDENT IN AN ORGANIZED HEALTH CARE EDUCATION/TRAINING PROGRAM

## 2024-01-26 PROCEDURE — 6370000000 HC RX 637 (ALT 250 FOR IP): Performed by: PHYSICIAN ASSISTANT

## 2024-01-26 PROCEDURE — 36415 COLL VENOUS BLD VENIPUNCTURE: CPT

## 2024-01-26 PROCEDURE — 85025 COMPLETE CBC W/AUTO DIFF WBC: CPT

## 2024-01-26 PROCEDURE — 6370000000 HC RX 637 (ALT 250 FOR IP): Performed by: STUDENT IN AN ORGANIZED HEALTH CARE EDUCATION/TRAINING PROGRAM

## 2024-01-26 PROCEDURE — 2580000003 HC RX 258: Performed by: NURSE PRACTITIONER

## 2024-01-26 PROCEDURE — 6370000000 HC RX 637 (ALT 250 FOR IP): Performed by: NURSE PRACTITIONER

## 2024-01-26 PROCEDURE — 6370000000 HC RX 637 (ALT 250 FOR IP)

## 2024-01-26 PROCEDURE — 83735 ASSAY OF MAGNESIUM: CPT

## 2024-01-26 RX ORDER — OXYCODONE HYDROCHLORIDE 5 MG/1
10 TABLET ORAL EVERY 6 HOURS
Status: DISCONTINUED | OUTPATIENT
Start: 2024-01-26 | End: 2024-01-27 | Stop reason: HOSPADM

## 2024-01-26 RX ADMIN — OXYCODONE 10 MG: 5 TABLET ORAL at 06:29

## 2024-01-26 RX ADMIN — LOSARTAN POTASSIUM 25 MG: 50 TABLET, FILM COATED ORAL at 08:20

## 2024-01-26 RX ADMIN — SODIUM CHLORIDE, PRESERVATIVE FREE 10 ML: 5 INJECTION INTRAVENOUS at 21:01

## 2024-01-26 RX ADMIN — LEVOTHYROXINE SODIUM 25 MCG: 50 TABLET ORAL at 06:28

## 2024-01-26 RX ADMIN — SODIUM CHLORIDE, PRESERVATIVE FREE 10 ML: 5 INJECTION INTRAVENOUS at 02:36

## 2024-01-26 RX ADMIN — HYDROXYUREA 500 MG: 500 CAPSULE ORAL at 08:19

## 2024-01-26 RX ADMIN — MORPHINE SULFATE 2 MG: 2 INJECTION, SOLUTION INTRAMUSCULAR; INTRAVENOUS at 02:35

## 2024-01-26 RX ADMIN — AZITHROMYCIN 500 MG: 250 TABLET, FILM COATED ORAL at 08:19

## 2024-01-26 RX ADMIN — OXYCODONE 10 MG: 5 TABLET ORAL at 11:58

## 2024-01-26 RX ADMIN — GUAIFENESIN 600 MG: 600 TABLET, EXTENDED RELEASE ORAL at 21:00

## 2024-01-26 RX ADMIN — SODIUM CHLORIDE, PRESERVATIVE FREE 10 ML: 5 INJECTION INTRAVENOUS at 03:04

## 2024-01-26 RX ADMIN — Medication 1000 MG: at 03:03

## 2024-01-26 RX ADMIN — ROSUVASTATIN CALCIUM 5 MG: 5 TABLET, COATED ORAL at 21:00

## 2024-01-26 RX ADMIN — QUETIAPINE FUMARATE 300 MG: 300 TABLET ORAL at 21:00

## 2024-01-26 RX ADMIN — GUAIFENESIN 600 MG: 600 TABLET, EXTENDED RELEASE ORAL at 08:20

## 2024-01-26 RX ADMIN — Medication 100 MG: at 08:20

## 2024-01-26 RX ADMIN — OXYCODONE 10 MG: 5 TABLET ORAL at 17:06

## 2024-01-26 RX ADMIN — FOLIC ACID 1 MG: 1 TABLET ORAL at 08:20

## 2024-01-26 RX ADMIN — OXYCODONE 10 MG: 5 TABLET ORAL at 23:26

## 2024-01-26 RX ADMIN — MORPHINE SULFATE 2 MG: 2 INJECTION, SOLUTION INTRAMUSCULAR; INTRAVENOUS at 08:19

## 2024-01-26 RX ADMIN — ENOXAPARIN SODIUM 40 MG: 100 INJECTION SUBCUTANEOUS at 08:18

## 2024-01-26 RX ADMIN — ASPIRIN 81 MG: 81 TABLET, COATED ORAL at 09:24

## 2024-01-26 RX ADMIN — SODIUM CHLORIDE TAB 1 GM 2 G: 1 TAB at 17:06

## 2024-01-26 RX ADMIN — ALCOHOL 1 TABLET: 70.47 GEL TOPICAL at 08:20

## 2024-01-26 RX ADMIN — SODIUM CHLORIDE TAB 1 GM 2 G: 1 TAB at 08:19

## 2024-01-26 ASSESSMENT — PAIN DESCRIPTION - ONSET
ONSET: ON-GOING
ONSET: ON-GOING

## 2024-01-26 ASSESSMENT — PAIN DESCRIPTION - DESCRIPTORS
DESCRIPTORS: SHARP
DESCRIPTORS: SHARP

## 2024-01-26 ASSESSMENT — PAIN DESCRIPTION - ORIENTATION
ORIENTATION: LEFT
ORIENTATION: LEFT

## 2024-01-26 ASSESSMENT — PAIN DESCRIPTION - DIRECTION
RADIATING_TOWARDS: BACK.
RADIATING_TOWARDS: BACK.

## 2024-01-26 ASSESSMENT — PAIN DESCRIPTION - LOCATION
LOCATION: CHEST
LOCATION: CHEST

## 2024-01-26 ASSESSMENT — PAIN DESCRIPTION - FREQUENCY
FREQUENCY: CONTINUOUS
FREQUENCY: CONTINUOUS

## 2024-01-26 ASSESSMENT — PAIN SCALES - GENERAL
PAINLEVEL_OUTOF10: 10
PAINLEVEL_OUTOF10: 8
PAINLEVEL_OUTOF10: 8

## 2024-01-26 ASSESSMENT — PAIN DESCRIPTION - PAIN TYPE
TYPE: ACUTE PAIN
TYPE: ACUTE PAIN

## 2024-01-26 NOTE — PLAN OF CARE
Problem: Safety - Adult  Goal: Free from fall injury  1/26/2024 0533 by Toshia Hunter RN  Outcome: Progressing  1/25/2024 1822 by Ca Hoyos RN  Outcome: Progressing  Flowsheets (Taken 1/25/2024 1104)  Free From Fall Injury:   Instruct family/caregiver on patient safety   Based on caregiver fall risk screen, instruct family/caregiver to ask for assistance with transferring infant if caregiver noted to have fall risk factors     Problem: Pain  Goal: Verbalizes/displays adequate comfort level or baseline comfort level  1/26/2024 0533 by Toshia Hunter RN  Outcome: Progressing  1/25/2024 1822 by Ca Hoyos RN  Outcome: Progressing     Problem: Nutrition Deficit:  Goal: Optimize nutritional status  1/26/2024 0533 by Toshia Hunter RN  Outcome: Progressing  1/25/2024 1822 by Ca Hoyos RN  Outcome: Progressing

## 2024-01-26 NOTE — PROGRESS NOTES
[facility]  PROGRESS NOTE    Shift date: 1/25/2024  Shift day: Thursday   Shift # 2    Room # 0444/0444-01   Name: Danny Guevara                Jain: Hinduism   Place of Lutheran:     Referral: Routine Visit    Admit Date & Time: 1/23/2024  5:58 PM    Assessment:  Danny Guevara is a 63 y.o. male in the hospital.      Intervention:  Writer introduced self and title as . Patient appeared receptive to  visit and engaged in conversation. Patient appeared anxious, coping, and hopeful when discussing his health, a recent diagnosis, and the road ahead. Patient also discussed the passing of his wife of seventeen years. Patient appeared to be emotional when talking about her.  provide da supportive presence through active listening and words of affirmation.  offered prayer which was accepted by patient.    Outcome:  Patient continues processing his diagnosis and road ahead. Patient expressed gratitude to  for visit.    Plan:  Chaplains will remain available to offer spiritual and emotional support as needed.      Electronically signed by Chaplain Palomo, on 1/25/2024 at 9:14 PM.  \[facility] [place] [facility]\"" Health  [facility]  [phone]

## 2024-01-26 NOTE — PROGRESS NOTES
[facility]  Office: [phone]  Rio Blair DO, Javier Walters DO, Romulo Boone DO, Miquel Casey DO, Nataly Priest MD, Daly Mendoza MD, Darius Mcmahon MD, Mary Swain MD,  Ad Goode MD, Vale Peña MD, Rancho Harley MD,  Julia Goddard DO, Bruno Frank MD, Nabeel Spicer MD, Danny Blair DO, Lulú Hernandez MD,  Piyush Lepe DO, Anabela Cummings MD, Valentine Brian MD, Caren Slaughter MD, Reddy Cagle MD,  Ibrahima Fortune MD, Idalia Girard MD, Zunilda Whiting MD, Konstantin Mane MD, Lucho Haq MD, Oswaldo Crawford MD, Jc Acuña DO, De Cochran DO, Sole Mcgovern MD,  Vinay Meza MD, Shirley Waterhouse, CNP,  Paulette Cook, CNP, Ruslan Ward, CNP,  Doreen Corcoran, DNP, Twyla Pacheco, CNP, Ashley Mesa, CNP, Linn Azul CNP, Karma Sage, CNP, Tanvi Jim, CNP, Lizzie Esqueda, PA-C, Tova Romano, PA-C, Danielle Gonzalez, CNP, Shwetha León, CNS, Estrella Bledsoe, CNP, Sera Kruger, CNP, Tracy Schwab, CNP         [facility]   IN-PATIENT SERVICE   [facility]    Progress Note    1/26/2024    7:58 AM    Name:   Danny Guevara  MRN:     [id number]     Acct:      [id number]   Room:   [facility] Day:  2  Admit Date:  1/23/2024  5:58 PM    PCP:   Roberto Donaldson MD  Code Status:  Full Code    Subjective:     C/C:   Chief Complaint   Patient presents with    Chest Pain    Shortness of Breath     Interval History Status: not changed.     Vitals reviewed, afebrile and hemodynamically stable.  Saturating well on room air.  Labs reviewed, sodium 129 today, leukocytosis 22.3 > 17.4.  Chest x-ray with right basilar effusion with adjacent infiltrate.  Overnight patient continued to complain of pain.    On examination patient resting comfortably in bed. Continues to complain of left rib pain with movement and cough. Will scheduled Roxicodone 10 mg q 6 hours.    Brief History:     This is a 63-year-old male with a   Wt 88 kg (194 lb 0.1 oz)   SpO2 91%   BMI 27.07 kg/m²   Temp (24hrs), Av.1 °F (36.7 °C), Min:97.5 °F (36.4 °C), Max:99.1 °F (37.3 °C)    No results for input(s): \"POCGLU\" in the last 72 hours.    I/O (24Hr):    Intake/Output Summary (Last 24 hours) at 2024 0758  Last data filed at 2024 0631  Gross per 24 hour   Intake 1220 ml   Output 600 ml   Net 620 ml         Labs:  Hematology:  Recent Labs     24  0854 24  0357 24  0353   WBC 21.9* 22.3* 17.4*   RBC 5.03 4.92 4.68   HGB 13.5 13.3 12.5*   HCT 42.4 39.1* 38.8*   MCV 84.3 79.5* 82.9   MCH 26.8 27.0 26.7   MCHC 31.8 34.0 32.2   RDW 20.1* 19.5* 19.6*   * 505* 546*   MPV 9.6 9.2 9.4   INR  --  1.3  --        Chemistry:  Recent Labs     24  1821 24  1936 24  0730 24  1254 24  0357 24  0824 24  2336 24  0353 24  0723   *  --  128*   < > 130*   < > 132* 132* 129*   K 4.5  --  4.4  --  4.0  --   --  3.6*  --    CL 86*  --  95*  --  96*  --   --  98  --    CO2 14*  --  22  --  24  --   --  22  --    GLUCOSE 98  --  114*  --  104*  --   --  117*  --    BUN 8  --  7*  --  6*  --   --  7*  --    CREATININE 0.7  --  0.6*  --  0.6*  --   --  0.7  --    MG  --   --   --   --  2.1  --   --  2.0  --    ANIONGAP 22*  --  11  --  10  --   --  12  --    LABGLOM >60  --  >60  --  >60  --   --  >60  --    CALCIUM 9.2  --  8.8  --  8.9  --   --  8.7  --    CAION  --   --   --   --  1.15  --   --   --   --    PHOS  --   --   --   --  3.5  --   --   --   --    PROBNP 78  --   --   --   --   --   --   --   --    TROPHS 17 17  --   --   --   --   --   --   --     < > = values in this interval not displayed.       Recent Labs     24  0357   PROT 7.1   LABALBU 3.6   LABA1C 5.0   TSH 6.55*   AST 22   ALT 10   ALKPHOS 101   BILITOT 0.4       ABG:  Lab Results   Component Value Date/Time    PH 8.0 2022 08:23 PM     Lab Results   Component Value Date/Time    SPECIAL NOT REPORTED

## 2024-01-26 NOTE — PROGRESS NOTES
Today's Date: 1/26/2024  Patient Name: Danny Guevara  Date of admission: 1/23/2024  5:58 PM  Patient's age: 63 y.o., 1960  Admission Dx: Hyponatremia [E87.1]  Bronchitis [J40]  Uncomplicated alcohol dependence (HCC) [F10.20]  Closed fracture of one rib of left side, initial encounter [S22.32XA]    Reason for Consult: management recommendations  Requesting Physician: Piyush Lepe DO    Chief Complaint:  shortness of breath, chest pain    Subjective:  Interval change    Patient seen and examined bedside  Labs and vitals reviewed  Complained of body ache and pain  Wbc 22 > 17, platelets 546, hb 12.5    CXR yesterday: R basilar effusion with adjacent infiltrate    History of Present Illness:      The patient is a 63 y.o. male with PMH - myeloproliferative disorder, known lung nodule, G2 COPD, smoker, alcohol use disorder, MDD who is admitted to the hospital for chest pain, shortness of breath, productive cough. Chest pain radiating to back and L shoulder. Found to have bronchitis, alcohol withdrawal, started on CIWA, and hyponatremia. CTA chest showed - no PE, enlarged paratracheal and L hilar LN. L anterior para hilar spiculated mass 1.7 x 2 cm. Acute/subacute L 45th rib fracture with associated lucent lesion; - focal lesion L humeral head concerning for metastasis. Patient respiratory culture shows gram positive cocci in pairs in clusters; tx with azithromycin and rocephin, thiamine    Patient f/u with Dr. Catherine out patient - patient has known lung nodule and L humeral lesion. D/w IR - recommended Bronchoscopy/EBUS as humeral lesion was not seen on CT chest. Details were sent over to Dr. Brian for further work up. Scheduled for EBUS 02/08/24    Consulted for \"lung cancer\"    Oncological history:  F/u with dr. Song  Myeloproliferative disorder, NOS, positive VINICIO 2 gene mutation;   Negative for CML  On hydroxyrea 500 bid, asa     Past Medical History:   has a past medical history of Alcohol   22  --   --     < > = values in this interval not displayed.      LFTs  Recent Labs     01/25/24  0357   ALKPHOS 101   ALT 10   AST 22   BILITOT 0.4   LABALBU 3.6       Imaging:  CTA CHEST W WO CONTRAST    Result Date: 1/23/2024  1. Acute or subacute fracture is demonstrated anterolateral left rib 4 with associated lucent lesion concerning for pathologic fracture (concerning for metastasis).  Consider bone scan correlation and correlate with clinical history. 2. Lucent lesion left humeral head corresponds to PET-CT findings on prior study concerning for metastasis. 3. Stable spiculated soft tissue pulmonary nodule posteromedial left upper lobe. 4. Right paratracheal and left hilar adenopathy evident pre since PET-CT imaging 12/19/2023. 5.  Pulmonary sequela typical of that seen with smoking, including COPD. 6. Small amount of inspissated mucus is evident at the right mainstem bronchus. 7. Findings of acute and/or chronic bronchitis.                 Impression:   Primary Problem  Acute hypoxic respiratory failure (HCC)  Active Hospital Problems    Diagnosis Date Noted    Hyponatremia [E87.1] 01/24/2024    Chronic obstructive pulmonary disease with acute exacerbation (HCC) [J44.1] 01/24/2024    Lung nodule seen on imaging study [R91.1] 01/24/2024    Thrombocytosis [D75.839] 01/24/2024    Bronchitis [J40] 01/24/2024    Severe protein-calorie malnutrition (HCC) [E43] 12/03/2021    Acute hypoxic respiratory failure (HCC) [J96.01] 12/03/2021    Hypothyroidism [E03.9] 07/15/2021    Major depressive disorder, recurrent severe without psychotic features (HCC) [F33.2] 08/12/2019    ETOH abuse [F10.10] 08/12/2019    Myeloproliferative disease (HCC) [D47.1]          Assessment and Plan:    RICHIE Lung nodule with paratracheal and L hilar LAD with bone lesions L 4th rib and L humerus concerning for metastatic disease  Myeloproliferative disorder   Smoker  Alcohol use disorder    Acute bronchitis  Alcohol withdrawal  Acute COPD

## 2024-01-27 VITALS
WEIGHT: 194 LBS | RESPIRATION RATE: 16 BRPM | SYSTOLIC BLOOD PRESSURE: 140 MMHG | OXYGEN SATURATION: 96 % | BODY MASS INDEX: 27.16 KG/M2 | HEIGHT: 71 IN | TEMPERATURE: 97.6 F | HEART RATE: 94 BPM | DIASTOLIC BLOOD PRESSURE: 88 MMHG

## 2024-01-27 LAB
ANION GAP SERPL CALCULATED.3IONS-SCNC: 9 MMOL/L (ref 9–17)
BASOPHILS # BLD: 0.18 K/UL (ref 0–0.2)
BASOPHILS NFR BLD: 1 % (ref 0–2)
BUN SERPL-MCNC: 6 MG/DL (ref 8–23)
CALCIUM SERPL-MCNC: 8.8 MG/DL (ref 8.6–10.4)
CHLORIDE SERPL-SCNC: 100 MMOL/L (ref 98–107)
CO2 SERPL-SCNC: 22 MMOL/L (ref 20–31)
CREAT SERPL-MCNC: 0.8 MG/DL (ref 0.7–1.2)
EOSINOPHIL # BLD: 0.18 K/UL (ref 0–0.4)
EOSINOPHILS RELATIVE PERCENT: 1 % (ref 1–4)
ERYTHROCYTE [DISTWIDTH] IN BLOOD BY AUTOMATED COUNT: 19.1 % (ref 11.8–14.4)
GFR SERPL CREATININE-BSD FRML MDRD: >60 ML/MIN/1.73M2
GLUCOSE SERPL-MCNC: 102 MG/DL (ref 70–99)
HCT VFR BLD AUTO: 38.3 % (ref 40.7–50.3)
HGB BLD-MCNC: 12.4 G/DL (ref 13–17)
IMM GRANULOCYTES # BLD AUTO: 0.54 K/UL (ref 0–0.3)
IMM GRANULOCYTES NFR BLD: 3 %
LYMPHOCYTES NFR BLD: 2.69 K/UL (ref 1–4.8)
LYMPHOCYTES RELATIVE PERCENT: 15 % (ref 24–44)
MAGNESIUM SERPL-MCNC: 2 MG/DL (ref 1.6–2.6)
MCH RBC QN AUTO: 26.7 PG (ref 25.2–33.5)
MCHC RBC AUTO-ENTMCNC: 32.4 G/DL (ref 28.4–34.8)
MCV RBC AUTO: 82.5 FL (ref 82.6–102.9)
MONOCYTES NFR BLD: 1.43 K/UL (ref 0.1–0.8)
MONOCYTES NFR BLD: 8 % (ref 1–7)
MORPHOLOGY: ABNORMAL
MORPHOLOGY: ABNORMAL
NEUTROPHILS NFR BLD: 72 % (ref 36–66)
NEUTS SEG NFR BLD: 12.88 K/UL (ref 1.8–7.7)
NRBC BLD-RTO: 0 PER 100 WBC
PLATELET # BLD AUTO: 614 K/UL (ref 138–453)
PMV BLD AUTO: 9.6 FL (ref 8.1–13.5)
POTASSIUM SERPL-SCNC: 3.8 MMOL/L (ref 3.7–5.3)
RBC # BLD AUTO: 4.64 M/UL (ref 4.21–5.77)
SODIUM SERPL-SCNC: 131 MMOL/L (ref 135–144)
WBC OTHER # BLD: 17.9 K/UL (ref 3.5–11.3)

## 2024-01-27 PROCEDURE — 6360000002 HC RX W HCPCS: Performed by: NURSE PRACTITIONER

## 2024-01-27 PROCEDURE — 36415 COLL VENOUS BLD VENIPUNCTURE: CPT

## 2024-01-27 PROCEDURE — 6370000000 HC RX 637 (ALT 250 FOR IP): Performed by: STUDENT IN AN ORGANIZED HEALTH CARE EDUCATION/TRAINING PROGRAM

## 2024-01-27 PROCEDURE — 99231 SBSQ HOSP IP/OBS SF/LOW 25: CPT | Performed by: STUDENT IN AN ORGANIZED HEALTH CARE EDUCATION/TRAINING PROGRAM

## 2024-01-27 PROCEDURE — 99232 SBSQ HOSP IP/OBS MODERATE 35: CPT | Performed by: INTERNAL MEDICINE

## 2024-01-27 PROCEDURE — 6370000000 HC RX 637 (ALT 250 FOR IP): Performed by: NURSE PRACTITIONER

## 2024-01-27 PROCEDURE — 6370000000 HC RX 637 (ALT 250 FOR IP)

## 2024-01-27 PROCEDURE — 6370000000 HC RX 637 (ALT 250 FOR IP): Performed by: PHYSICIAN ASSISTANT

## 2024-01-27 PROCEDURE — 6360000002 HC RX W HCPCS: Performed by: STUDENT IN AN ORGANIZED HEALTH CARE EDUCATION/TRAINING PROGRAM

## 2024-01-27 PROCEDURE — 85025 COMPLETE CBC W/AUTO DIFF WBC: CPT

## 2024-01-27 PROCEDURE — 2580000003 HC RX 258: Performed by: NURSE PRACTITIONER

## 2024-01-27 PROCEDURE — 80048 BASIC METABOLIC PNL TOTAL CA: CPT

## 2024-01-27 PROCEDURE — 83735 ASSAY OF MAGNESIUM: CPT

## 2024-01-27 PROCEDURE — 6370000000 HC RX 637 (ALT 250 FOR IP): Performed by: INTERNAL MEDICINE

## 2024-01-27 RX ORDER — LIDOCAINE 4 G/G
1 PATCH TOPICAL DAILY
Qty: 7 EACH | Refills: 0 | Status: SHIPPED | OUTPATIENT
Start: 2024-01-28 | End: 2024-02-04

## 2024-01-27 RX ORDER — LANOLIN ALCOHOL/MO/W.PET/CERES
100 CREAM (GRAM) TOPICAL DAILY
Qty: 30 TABLET | Refills: 3 | Status: SHIPPED | OUTPATIENT
Start: 2024-01-28

## 2024-01-27 RX ORDER — BENZONATATE 100 MG/1
100 CAPSULE ORAL 3 TIMES DAILY PRN
Qty: 21 CAPSULE | Refills: 0 | Status: SHIPPED | OUTPATIENT
Start: 2024-01-27 | End: 2024-02-03

## 2024-01-27 RX ORDER — AMOXICILLIN AND CLAVULANATE POTASSIUM 875; 125 MG/1; MG/1
1 TABLET, FILM COATED ORAL EVERY 12 HOURS SCHEDULED
Status: DISCONTINUED | OUTPATIENT
Start: 2024-01-27 | End: 2024-01-27 | Stop reason: HOSPADM

## 2024-01-27 RX ORDER — IBUPROFEN 800 MG/1
800 TABLET ORAL 2 TIMES DAILY PRN
Qty: 14 TABLET | Refills: 0 | Status: SHIPPED | OUTPATIENT
Start: 2024-01-27 | End: 2024-02-03

## 2024-01-27 RX ORDER — MULTIVITAMIN WITH IRON
1 TABLET ORAL DAILY
Qty: 365 TABLET | Refills: 0 | Status: SHIPPED | OUTPATIENT
Start: 2024-01-28

## 2024-01-27 RX ORDER — AMOXICILLIN AND CLAVULANATE POTASSIUM 875; 125 MG/1; MG/1
1 TABLET, FILM COATED ORAL EVERY 12 HOURS SCHEDULED
Qty: 13 TABLET | Refills: 0 | Status: SHIPPED | OUTPATIENT
Start: 2024-01-27 | End: 2024-02-03

## 2024-01-27 RX ORDER — SODIUM CHLORIDE 1 G/1
2 TABLET ORAL 2 TIMES DAILY WITH MEALS
Qty: 90 TABLET | Refills: 3 | Status: SHIPPED | OUTPATIENT
Start: 2024-01-27

## 2024-01-27 RX ORDER — GUAIFENESIN 600 MG/1
600 TABLET, EXTENDED RELEASE ORAL 2 TIMES DAILY
Qty: 14 TABLET | Refills: 0 | Status: SHIPPED | OUTPATIENT
Start: 2024-01-27 | End: 2024-02-03

## 2024-01-27 RX ORDER — OXYCODONE HYDROCHLORIDE 10 MG/1
10 TABLET ORAL EVERY 8 HOURS PRN
Qty: 9 TABLET | Refills: 0 | Status: SHIPPED | OUTPATIENT
Start: 2024-01-27 | End: 2024-01-29 | Stop reason: SDUPTHER

## 2024-01-27 RX ADMIN — OXYCODONE 10 MG: 5 TABLET ORAL at 17:09

## 2024-01-27 RX ADMIN — Medication 1000 MG: at 04:16

## 2024-01-27 RX ADMIN — LEVOTHYROXINE SODIUM 25 MCG: 50 TABLET ORAL at 05:26

## 2024-01-27 RX ADMIN — OXYCODONE 10 MG: 5 TABLET ORAL at 05:26

## 2024-01-27 RX ADMIN — Medication 100 MG: at 08:26

## 2024-01-27 RX ADMIN — AMOXICILLIN AND CLAVULANATE POTASSIUM 1 TABLET: 875; 125 TABLET, FILM COATED ORAL at 11:48

## 2024-01-27 RX ADMIN — ALCOHOL 1 TABLET: 70.47 GEL TOPICAL at 08:26

## 2024-01-27 RX ADMIN — MORPHINE SULFATE 2 MG: 2 INJECTION, SOLUTION INTRAMUSCULAR; INTRAVENOUS at 14:35

## 2024-01-27 RX ADMIN — LOSARTAN POTASSIUM 25 MG: 50 TABLET, FILM COATED ORAL at 08:25

## 2024-01-27 RX ADMIN — OXYCODONE 10 MG: 5 TABLET ORAL at 11:48

## 2024-01-27 RX ADMIN — ASPIRIN 81 MG: 81 TABLET, COATED ORAL at 08:26

## 2024-01-27 RX ADMIN — HYDROXYUREA 500 MG: 500 CAPSULE ORAL at 08:27

## 2024-01-27 RX ADMIN — SODIUM CHLORIDE TAB 1 GM 2 G: 1 TAB at 08:26

## 2024-01-27 RX ADMIN — GUAIFENESIN 600 MG: 600 TABLET, EXTENDED RELEASE ORAL at 08:26

## 2024-01-27 RX ADMIN — ENOXAPARIN SODIUM 40 MG: 100 INJECTION SUBCUTANEOUS at 08:25

## 2024-01-27 RX ADMIN — SODIUM CHLORIDE, PRESERVATIVE FREE 10 ML: 5 INJECTION INTRAVENOUS at 08:24

## 2024-01-27 RX ADMIN — MORPHINE SULFATE 2 MG: 2 INJECTION, SOLUTION INTRAMUSCULAR; INTRAVENOUS at 08:24

## 2024-01-27 RX ADMIN — FOLIC ACID 1 MG: 1 TABLET ORAL at 08:26

## 2024-01-27 NOTE — PLAN OF CARE
Problem: Safety - Adult  Goal: Free from fall injury  Outcome: Progressing  Flowsheets (Taken 1/25/2024 1104 by Ca Hoyos RN)  Free From Fall Injury:   Instruct family/caregiver on patient safety   Based on caregiver fall risk screen, instruct family/caregiver to ask for assistance with transferring infant if caregiver noted to have fall risk factors     Problem: Pain  Goal: Verbalizes/displays adequate comfort level or baseline comfort level  Outcome: Progressing  Flowsheets (Taken 1/27/2024 0020)  Verbalizes/displays adequate comfort level or baseline comfort level:   Assess pain using appropriate pain scale   Encourage patient to monitor pain and request assistance   Administer analgesics based on type and severity of pain and evaluate response   Implement non-pharmacological measures as appropriate and evaluate response     Problem: Nutrition Deficit:  Goal: Optimize nutritional status  Outcome: Progressing  Flowsheets (Taken 1/27/2024 0020)  Nutrient intake appropriate for improving, restoring, or maintaining nutritional needs:   Assess nutritional status and recommend course of action   Monitor oral intake, labs, and treatment plans   Recommend appropriate diets, oral nutritional supplements, and vitamin/mineral supplements   Order, calculate, and assess calorie counts as needed

## 2024-01-27 NOTE — DISCHARGE SUMMARY
needed.     DVT prophylaxis: Lovenox.  GI prophylaxis: None currently.     DC Planning: DC today.     Significant Diagnostic Studies:   Labs / Micro:  CBC:   Lab Results   Component Value Date/Time    WBC 17.9 01/27/2024 06:06 AM    RBC 4.64 01/27/2024 06:06 AM    RBC 0-2 01/03/2022 08:23 PM    HGB 12.4 01/27/2024 06:06 AM    HCT 38.3 01/27/2024 06:06 AM    MCV 82.5 01/27/2024 06:06 AM    MCH 26.7 01/27/2024 06:06 AM    MCHC 32.4 01/27/2024 06:06 AM    RDW 19.1 01/27/2024 06:06 AM     01/27/2024 06:06 AM     BMP:    Lab Results   Component Value Date/Time    GLUCOSE 102 01/27/2024 06:06 AM     01/27/2024 06:06 AM    K 3.8 01/27/2024 06:06 AM     01/27/2024 06:06 AM    CO2 22 01/27/2024 06:06 AM    ANIONGAP 9 01/27/2024 06:06 AM    BUN 6 01/27/2024 06:06 AM    CREATININE 0.8 01/27/2024 06:06 AM    BUNCRER 11 04/21/2022 10:13 AM    CALCIUM 8.8 01/27/2024 06:06 AM    LABGLOM >60 01/27/2024 06:06 AM    GFRAA >60 07/25/2022 05:25 AM    GFR      07/25/2022 05:25 AM     U/A:    Lab Results   Component Value Date/Time    COLORU YELLOW 01/03/2022 08:23 PM    TURBIDITY Clear 11/28/2021 07:47 PM    SPECGRAV 1.014 11/28/2021 07:47 PM    HGBUR NEGATIVE 11/28/2021 07:47 PM    PHUR 6.5 11/28/2021 07:47 PM    PROTEINU NEGATIVE 11/28/2021 07:47 PM    GLUCOSEU NEGATIVE 01/03/2022 08:23 PM    KETUA TRACE 01/03/2022 08:23 PM    BILIRUBINUR NEGATIVE 01/03/2022 08:23 PM    UROBILINOGEN Normal 11/28/2021 07:47 PM    NITRU NEGATIVE 01/03/2022 08:23 PM    LEUKOCYTESUR NEGATIVE 01/03/2022 08:23 PM     TSH:    Lab Results   Component Value Date/Time    TSH 6.55 01/25/2024 03:57 AM     Radiology:  XR CHEST PORTABLE    Result Date: 1/25/2024  Right basilar effusion with adjacent infiltrate.     CTA CHEST W WO CONTRAST    Result Date: 1/23/2024  1. Acute or subacute fracture is demonstrated anterolateral left rib 4 with associated lucent lesion concerning for pathologic fracture (concerning for metastasis).  Consider bone scan  well as medication reconciliation, prescriptions for required medications, discharge plan and follow up.    Electronically signed by   Piyush Lepe DO  1/27/2024  12:17 PM      Thank you Roberto Abraham MD for the opportunity to be involved in this patient's care.

## 2024-01-27 NOTE — PROGRESS NOTES
[facility]  Office: [phone]  Rio Blair DO, Javier Walters DO, Romulo Boone DO, Miquel Casey DO, Nataly Priest MD, Daly Mendoza MD, Darius Mcmahon MD, Mary Swain MD,  Ad Goode MD, Vale Peña MD, Rancho Harley MD,  Julia Goddard DO, Bruno Frank MD, Nabeel Spicer MD, Danny Blair DO, Lulú Hernandez MD,  Piyush Lepe DO, Anabela Cummings MD, Valentine Brian MD, Caren Slaughter MD, Reddy Cagle MD,  Ibrahima Fortune MD, Idalia Girard MD, Zunilda Whiting MD, Konstantin Mane MD, Lucho Haq MD, Oswaldo Crawford MD, Jc Acuña DO, De Cochran DO, Sole Mcgovern MD,  Vinay Meza MD, Shirley Waterhouse, CNP,  Paulette Cook, CNP, Ruslan Ward, CNP,  Doreen Corcoran, DNP, Twyla Pacheco, CNP, Ashley Mesa, CNP, Linn Azul CNP, Karma Sage, CNP, Tanvi Jim, CNP, Lizzie Esqueda, PA-C, Tova Romano, PA-C, Danielle Gonzalez, CNP, Shwetha León, CNS, Estrella Bledsoe, CNP, Sera Kruger, CNP, Tracy Schwab, CNP         [facility]   IN-PATIENT SERVICE   [facility]    Progress Note    1/27/2024    8:21 AM    Name:   Danny Guevara  MRN:     [id number]     Acct:      [id number]   Room:   [facility] Day:  3  Admit Date:  1/23/2024  5:58 PM    PCP:   Roberto Donaldson MD  Code Status:  Full Code    Subjective:     C/C:   Chief Complaint   Patient presents with    Chest Pain    Shortness of Breath     Interval History Status: not changed.     Vitals reviewed, afebrile and hemodynamically stable. Saturating well on room air.  Labs reviewed, sodium 129 today, leukocytosis 22.3 > 17.4.  Chest x-ray with right basilar effusion with adjacent infiltrate.  Overnight patient continued to complain of pain.    On examination patient resting comfortably in bed. Pain improved but still significant. Plan to discharge today on Augmentin with continued follow-up for bronchoscopy/EBUS on February 8, 2024. Palliative care consulted for

## 2024-01-27 NOTE — DISCHARGE INSTRUCTIONS
Follow up with your PCP in 3 days. Call for an appointment as soon as possible.  Follow-up with specialist as instructed.  Call for an appointment as soon as possible.  -Follow up for Bronch/EBUS as instructed.   Medications as instructed.  - Roxicodone as instructed.   Return to the emergency department immediately for any new or worsening concerns.

## 2024-01-27 NOTE — CARE COORDINATION
Discharge Report    [facility]  Clinical Case Management Department  Written by: Yamile Aguilar RN    Patient Name: Danny Guevara  Attending Provider: Piyush Lepe DO  Admit Date: 2024  5:58 PM  MRN: [id number]  Account: [id number]                     : 1960  Discharge Date: 24      Disposition: home    Met with patient to discuss transitional planning. Plan is home independently. Patient will need cab ride home. Patient agreeable to plan.     Yamile Aguilar RN

## 2024-01-27 NOTE — PROGRESS NOTES
Today's Date: 1/27/2024  Patient Name: Danny Guevara  Date of admission: 1/23/2024  5:58 PM  Patient's age: 63 y.o., 1960  Admission Dx: Hyponatremia [E87.1]  Bronchitis [J40]  Uncomplicated alcohol dependence (HCC) [F10.20]  Closed fracture of one rib of left side, initial encounter [S22.32XA]    Reason for Consult: management recommendations  Requesting Physician: Piyush Lepe DO    Chief Complaint:  shortness of breath, chest pain    Subjective:  Interval change    Patient seen and examined bedside  No complaints  Na 131  Wbc 17.9, hb 12.4, platelets 614  MCV 82.5  On hydroxyurea     History of Present Illness:      The patient is a 63 y.o. male with PMH - myeloproliferative disorder, known lung nodule, G2 COPD, smoker, alcohol use disorder, MDD who is admitted to the hospital for chest pain, shortness of breath, productive cough. Chest pain radiating to back and L shoulder. Found to have bronchitis, alcohol withdrawal, started on CIWA, and hyponatremia. CTA chest showed - no PE, enlarged paratracheal and L hilar LN. L anterior para hilar spiculated mass 1.7 x 2 cm. Acute/subacute L 45th rib fracture with associated lucent lesion; - focal lesion L humeral head concerning for metastasis. Patient respiratory culture shows gram positive cocci in pairs in clusters; tx with azithromycin and rocephin, thiamine    Patient f/u with Dr. Catherine out patient - patient has known lung nodule and L humeral lesion. D/w IR - recommended Bronchoscopy/EBUS as humeral lesion was not seen on CT chest. Details were sent over to Dr. Brian for further work up. Scheduled for EBUS 02/08/24    Consulted for \"lung cancer\"    Oncological history:  F/u with dr. Song  Myeloproliferative disorder, NOS, positive VINICIO 2 gene mutation;   Negative for CML  On hydroxyrea 500 bid, asa     Past Medical History:   has a past medical history of Alcohol abuse, Anemia, Cancer (HCC), Cervical stenosis of spine, Chronic left-sided

## 2024-01-28 NOTE — PROGRESS NOTES
CLINICAL PHARMACY NOTE: MEDS TO BEDS    Total # of Prescriptions Filled: 9   The following medications were delivered to the patient:  Sodium chloride 1gm tabs  Thiamine 100mg tabs  Benzonatate 100mg   Oxycodone 10mg tabs  Daily-abundio tabs  Lidocaine 4% patch  Mucus relief 600mg tabs  Amoxicillin-pot clav 875-125mg tabs  Ibuprofen 800mg tabs    Additional Documentation:  Delivered to pt in room 444 on 1/27 at 2:29P. Copay was $37.52 paid with [employer]

## 2024-01-29 DIAGNOSIS — R91.1 LUNG NODULE SEEN ON IMAGING STUDY: ICD-10-CM

## 2024-01-29 DIAGNOSIS — S22.32XA CLOSED FRACTURE OF ONE RIB OF LEFT SIDE, INITIAL ENCOUNTER: ICD-10-CM

## 2024-02-02 RX ORDER — OXYCODONE HYDROCHLORIDE 10 MG/1
10 TABLET ORAL EVERY 8 HOURS PRN
Qty: 21 TABLET | Refills: 0 | Status: SHIPPED | OUTPATIENT
Start: 2024-02-02 | End: 2024-02-09

## 2024-02-07 ENCOUNTER — TELEPHONE (OUTPATIENT)
Dept: PULMONOLOGY | Age: 64
End: 2024-02-07

## 2024-02-07 NOTE — TELEPHONE ENCOUNTER
Pre admission testing called, states patient has absolutely no one to stay with him after his procedure tomorrow with you. His ride is through insurance with one of their nurses. The nurse will accompany him in the vehicle but not while in the hospital in recovery.  This was an issue that the patient was trying to work on back on 1/16/24 but he says he has no one.     They are asking if he can stay as a \"short stay\" or overnight? How do you want to proceed with that?

## 2024-02-07 NOTE — TELEPHONE ENCOUNTER
Spoke with Stephanie in pre op scheduling and she stated yes the patient has no one to go home with and its hospital policy after bronch procedure to be monitored for 24 hrs.  They would like for patient to be admitted.  Another issue we are having is that patient never stopped his baby Asprin and they would like to know if you want this rescheduled. Please advise and ill call and make sure they have a bed for the patient

## 2024-02-07 NOTE — PROGRESS NOTES
Pre-op call completed. Pt states he has not stopped his asa for 2/8/24 procedure. He is unsure of other meds that he is taking. Dr. Brian's office notified.

## 2024-02-08 ENCOUNTER — ANESTHESIA EVENT (OUTPATIENT)
Dept: OPERATING ROOM | Age: 64
End: 2024-02-08
Payer: MEDICARE

## 2024-02-08 ENCOUNTER — APPOINTMENT (OUTPATIENT)
Dept: GENERAL RADIOLOGY | Age: 64
End: 2024-02-08
Attending: INTERNAL MEDICINE
Payer: MEDICARE

## 2024-02-08 ENCOUNTER — HOSPITAL ENCOUNTER (OUTPATIENT)
Age: 64
Setting detail: OBSERVATION
Discharge: HOME | End: 2024-02-09
Attending: INTERNAL MEDICINE | Admitting: INTERNAL MEDICINE
Payer: MEDICARE

## 2024-02-08 ENCOUNTER — ANESTHESIA (OUTPATIENT)
Dept: OPERATING ROOM | Age: 64
End: 2024-02-08
Payer: MEDICARE

## 2024-02-08 DIAGNOSIS — R91.1 LUNG NODULE: ICD-10-CM

## 2024-02-08 DIAGNOSIS — R59.0 MEDIASTINAL LYMPHADENOPATHY: ICD-10-CM

## 2024-02-08 DIAGNOSIS — M54.42 CHRONIC LEFT-SIDED LOW BACK PAIN WITH LEFT-SIDED SCIATICA: ICD-10-CM

## 2024-02-08 DIAGNOSIS — R91.1 LUNG NODULE SEEN ON IMAGING STUDY: Primary | ICD-10-CM

## 2024-02-08 DIAGNOSIS — G89.29 CHRONIC LEFT-SIDED LOW BACK PAIN WITH LEFT-SIDED SCIATICA: ICD-10-CM

## 2024-02-08 LAB
CASE NUMBER:: NORMAL
CASE NUMBER:: NORMAL
GLUCOSE BLD-MCNC: 93 MG/DL (ref 74–100)
POTASSIUM BLD-SCNC: 4.4 MMOL/L (ref 3.5–4.5)

## 2024-02-08 PROCEDURE — 2580000003 HC RX 258: Performed by: ANESTHESIOLOGY

## 2024-02-08 PROCEDURE — 3700000000 HC ANESTHESIA ATTENDED CARE: Performed by: INTERNAL MEDICINE

## 2024-02-08 PROCEDURE — 3609011300 HC BRONCHOSCOPY BRONCHIAL/ENDOBRNCL BX 1+ SITES: Performed by: INTERNAL MEDICINE

## 2024-02-08 PROCEDURE — 2720000010 HC SURG SUPPLY STERILE: Performed by: INTERNAL MEDICINE

## 2024-02-08 PROCEDURE — 88177 CYTP FNA EVAL EA ADDL: CPT

## 2024-02-08 PROCEDURE — 2709999900 HC NON-CHARGEABLE SUPPLY: Performed by: INTERNAL MEDICINE

## 2024-02-08 PROCEDURE — G0378 HOSPITAL OBSERVATION PER HR: HCPCS

## 2024-02-08 PROCEDURE — 31627 NAVIGATIONAL BRONCHOSCOPY: CPT | Performed by: INTERNAL MEDICINE

## 2024-02-08 PROCEDURE — 6360000002 HC RX W HCPCS: Performed by: INTERNAL MEDICINE

## 2024-02-08 PROCEDURE — 6360000002 HC RX W HCPCS

## 2024-02-08 PROCEDURE — 3609010800 HC BRONCHOSCOPY ALVEOLAR LAVAGE: Performed by: INTERNAL MEDICINE

## 2024-02-08 PROCEDURE — 88305 TISSUE EXAM BY PATHOLOGIST: CPT

## 2024-02-08 PROCEDURE — 84132 ASSAY OF SERUM POTASSIUM: CPT

## 2024-02-08 PROCEDURE — 88341 IMHCHEM/IMCYTCHM EA ADD ANTB: CPT

## 2024-02-08 PROCEDURE — 88342 IMHCHEM/IMCYTCHM 1ST ANTB: CPT

## 2024-02-08 PROCEDURE — 7100000000 HC PACU RECOVERY - FIRST 15 MIN: Performed by: INTERNAL MEDICINE

## 2024-02-08 PROCEDURE — 6370000000 HC RX 637 (ALT 250 FOR IP): Performed by: INTERNAL MEDICINE

## 2024-02-08 PROCEDURE — 2500000003 HC RX 250 WO HCPCS

## 2024-02-08 PROCEDURE — 94640 AIRWAY INHALATION TREATMENT: CPT

## 2024-02-08 PROCEDURE — 31629 BRONCHOSCOPY/NEEDLE BX EACH: CPT | Performed by: INTERNAL MEDICINE

## 2024-02-08 PROCEDURE — 88112 CYTOPATH CELL ENHANCE TECH: CPT

## 2024-02-08 PROCEDURE — 31624 DX BRONCHOSCOPE/LAVAGE: CPT | Performed by: INTERNAL MEDICINE

## 2024-02-08 PROCEDURE — 31654 BRONCH EBUS IVNTJ PERPH LES: CPT | Performed by: INTERNAL MEDICINE

## 2024-02-08 PROCEDURE — 7100000001 HC PACU RECOVERY - ADDTL 15 MIN: Performed by: INTERNAL MEDICINE

## 2024-02-08 PROCEDURE — 3700000001 HC ADD 15 MINUTES (ANESTHESIA): Performed by: INTERNAL MEDICINE

## 2024-02-08 PROCEDURE — 88173 CYTOPATH EVAL FNA REPORT: CPT

## 2024-02-08 PROCEDURE — 31628 BRONCHOSCOPY/LUNG BX EACH: CPT | Performed by: INTERNAL MEDICINE

## 2024-02-08 PROCEDURE — 3603165200 HC BRNCHSC EBUS GUIDED SAMPL 1/2 NODE STATION/STRUX: Performed by: INTERNAL MEDICINE

## 2024-02-08 PROCEDURE — 82947 ASSAY GLUCOSE BLOOD QUANT: CPT

## 2024-02-08 PROCEDURE — 88172 CYTP DX EVAL FNA 1ST EA SITE: CPT

## 2024-02-08 PROCEDURE — 31652 BRONCH EBUS SAMPLNG 1/2 NODE: CPT | Performed by: INTERNAL MEDICINE

## 2024-02-08 RX ORDER — SODIUM CHLORIDE 1 G/1
2 TABLET ORAL 2 TIMES DAILY WITH MEALS
Status: DISCONTINUED | OUTPATIENT
Start: 2024-02-08 | End: 2024-02-09 | Stop reason: HOSPADM

## 2024-02-08 RX ORDER — SODIUM CHLORIDE 9 MG/ML
INJECTION, SOLUTION INTRAVENOUS PRN
Status: DISCONTINUED | OUTPATIENT
Start: 2024-02-08 | End: 2024-02-09 | Stop reason: HOSPADM

## 2024-02-08 RX ORDER — LEVOTHYROXINE SODIUM 0.03 MG/1
25 TABLET ORAL EVERY MORNING
Status: DISCONTINUED | OUTPATIENT
Start: 2024-02-09 | End: 2024-02-09 | Stop reason: HOSPADM

## 2024-02-08 RX ORDER — MIDAZOLAM HYDROCHLORIDE 1 MG/ML
INJECTION INTRAMUSCULAR; INTRAVENOUS PRN
Status: DISCONTINUED | OUTPATIENT
Start: 2024-02-08 | End: 2024-02-08 | Stop reason: SDUPTHER

## 2024-02-08 RX ORDER — FOLIC ACID 1 MG/1
1 TABLET ORAL DAILY
Status: DISCONTINUED | OUTPATIENT
Start: 2024-02-08 | End: 2024-02-09 | Stop reason: HOSPADM

## 2024-02-08 RX ORDER — FUROSEMIDE 20 MG/1
20 TABLET ORAL DAILY
Status: DISCONTINUED | OUTPATIENT
Start: 2024-02-08 | End: 2024-02-08

## 2024-02-08 RX ORDER — HYDROXYUREA 500 MG/1
500 CAPSULE ORAL DAILY
Status: DISCONTINUED | OUTPATIENT
Start: 2024-02-08 | End: 2024-02-09 | Stop reason: HOSPADM

## 2024-02-08 RX ORDER — OMEPRAZOLE 40 MG/1
40 CAPSULE, DELAYED RELEASE ORAL DAILY
COMMUNITY

## 2024-02-08 RX ORDER — NICOTINE 21 MG/24HR
1 PATCH, TRANSDERMAL 24 HOURS TRANSDERMAL DAILY
Status: DISCONTINUED | OUTPATIENT
Start: 2024-02-08 | End: 2024-02-09 | Stop reason: HOSPADM

## 2024-02-08 RX ORDER — ALBUTEROL SULFATE 90 UG/1
2 AEROSOL, METERED RESPIRATORY (INHALATION) EVERY 6 HOURS PRN
Status: DISCONTINUED | OUTPATIENT
Start: 2024-02-08 | End: 2024-02-09 | Stop reason: HOSPADM

## 2024-02-08 RX ORDER — DEXAMETHASONE SODIUM PHOSPHATE 10 MG/ML
INJECTION INTRAMUSCULAR; INTRAVENOUS PRN
Status: DISCONTINUED | OUTPATIENT
Start: 2024-02-08 | End: 2024-02-08 | Stop reason: SDUPTHER

## 2024-02-08 RX ORDER — PREDNISONE 20 MG/1
20 TABLET ORAL DAILY
Status: DISCONTINUED | OUTPATIENT
Start: 2024-02-08 | End: 2024-02-09 | Stop reason: HOSPADM

## 2024-02-08 RX ORDER — SODIUM CHLORIDE 0.9 % (FLUSH) 0.9 %
5-40 SYRINGE (ML) INJECTION PRN
Status: DISCONTINUED | OUTPATIENT
Start: 2024-02-08 | End: 2024-02-09 | Stop reason: HOSPADM

## 2024-02-08 RX ORDER — QUETIAPINE FUMARATE 300 MG/1
300 TABLET, FILM COATED ORAL NIGHTLY
Status: DISCONTINUED | OUTPATIENT
Start: 2024-02-08 | End: 2024-02-09 | Stop reason: HOSPADM

## 2024-02-08 RX ORDER — FUROSEMIDE 20 MG/1
20 TABLET ORAL DAILY
Status: DISCONTINUED | OUTPATIENT
Start: 2024-02-09 | End: 2024-02-09 | Stop reason: HOSPADM

## 2024-02-08 RX ORDER — LOSARTAN POTASSIUM 25 MG/1
25 TABLET ORAL DAILY
Status: DISCONTINUED | OUTPATIENT
Start: 2024-02-09 | End: 2024-02-09 | Stop reason: HOSPADM

## 2024-02-08 RX ORDER — LIDOCAINE HYDROCHLORIDE 10 MG/ML
INJECTION, SOLUTION EPIDURAL; INFILTRATION; INTRACAUDAL; PERINEURAL PRN
Status: DISCONTINUED | OUTPATIENT
Start: 2024-02-08 | End: 2024-02-08 | Stop reason: SDUPTHER

## 2024-02-08 RX ORDER — LANOLIN ALCOHOL/MO/W.PET/CERES
325 CREAM (GRAM) TOPICAL
Status: DISCONTINUED | OUTPATIENT
Start: 2024-02-09 | End: 2024-02-09 | Stop reason: HOSPADM

## 2024-02-08 RX ORDER — OXYCODONE HYDROCHLORIDE 5 MG/1
10 TABLET ORAL EVERY 6 HOURS PRN
Status: DISCONTINUED | OUTPATIENT
Start: 2024-02-08 | End: 2024-02-09 | Stop reason: HOSPADM

## 2024-02-08 RX ORDER — CEFAZOLIN SODIUM 1 G/3ML
INJECTION, POWDER, FOR SOLUTION INTRAMUSCULAR; INTRAVENOUS PRN
Status: DISCONTINUED | OUTPATIENT
Start: 2024-02-08 | End: 2024-02-08 | Stop reason: SDUPTHER

## 2024-02-08 RX ORDER — FENTANYL CITRATE 50 UG/ML
INJECTION, SOLUTION INTRAMUSCULAR; INTRAVENOUS PRN
Status: DISCONTINUED | OUTPATIENT
Start: 2024-02-08 | End: 2024-02-08 | Stop reason: SDUPTHER

## 2024-02-08 RX ORDER — IPRATROPIUM BROMIDE AND ALBUTEROL SULFATE 2.5; .5 MG/3ML; MG/3ML
1 SOLUTION RESPIRATORY (INHALATION)
Status: DISCONTINUED | OUTPATIENT
Start: 2024-02-08 | End: 2024-02-09 | Stop reason: HOSPADM

## 2024-02-08 RX ORDER — PHENYLEPHRINE HCL IN 0.9% NACL 1 MG/10 ML
SYRINGE (ML) INTRAVENOUS PRN
Status: DISCONTINUED | OUTPATIENT
Start: 2024-02-08 | End: 2024-02-08 | Stop reason: SDUPTHER

## 2024-02-08 RX ORDER — SODIUM CHLORIDE 0.9 % (FLUSH) 0.9 %
5-40 SYRINGE (ML) INJECTION PRN
Status: DISCONTINUED | OUTPATIENT
Start: 2024-02-08 | End: 2024-02-08 | Stop reason: HOSPADM

## 2024-02-08 RX ORDER — SODIUM CHLORIDE, SODIUM LACTATE, POTASSIUM CHLORIDE, CALCIUM CHLORIDE 600; 310; 30; 20 MG/100ML; MG/100ML; MG/100ML; MG/100ML
INJECTION, SOLUTION INTRAVENOUS CONTINUOUS
Status: DISCONTINUED | OUTPATIENT
Start: 2024-02-08 | End: 2024-02-08

## 2024-02-08 RX ORDER — ONDANSETRON 2 MG/ML
4 INJECTION INTRAMUSCULAR; INTRAVENOUS EVERY 6 HOURS PRN
Status: DISCONTINUED | OUTPATIENT
Start: 2024-02-08 | End: 2024-02-09 | Stop reason: HOSPADM

## 2024-02-08 RX ORDER — ACETAMINOPHEN 650 MG/1
650 SUPPOSITORY RECTAL EVERY 6 HOURS PRN
Status: DISCONTINUED | OUTPATIENT
Start: 2024-02-08 | End: 2024-02-09 | Stop reason: HOSPADM

## 2024-02-08 RX ORDER — ACETAMINOPHEN 325 MG/1
650 TABLET ORAL EVERY 6 HOURS PRN
Status: DISCONTINUED | OUTPATIENT
Start: 2024-02-08 | End: 2024-02-09 | Stop reason: HOSPADM

## 2024-02-08 RX ORDER — ROSUVASTATIN CALCIUM 5 MG/1
5 TABLET, COATED ORAL NIGHTLY
Status: DISCONTINUED | OUTPATIENT
Start: 2024-02-08 | End: 2024-02-09 | Stop reason: HOSPADM

## 2024-02-08 RX ORDER — FENTANYL CITRATE 50 UG/ML
25 INJECTION, SOLUTION INTRAMUSCULAR; INTRAVENOUS EVERY 5 MIN PRN
Status: DISCONTINUED | OUTPATIENT
Start: 2024-02-08 | End: 2024-02-08 | Stop reason: HOSPADM

## 2024-02-08 RX ORDER — SODIUM CHLORIDE 9 MG/ML
INJECTION, SOLUTION INTRAVENOUS PRN
Status: DISCONTINUED | OUTPATIENT
Start: 2024-02-08 | End: 2024-02-08 | Stop reason: HOSPADM

## 2024-02-08 RX ORDER — GUAIFENESIN 600 MG/1
600 TABLET, EXTENDED RELEASE ORAL 2 TIMES DAILY
Status: ON HOLD | COMMUNITY
End: 2024-02-16

## 2024-02-08 RX ORDER — ONDANSETRON 4 MG/1
4 TABLET, ORALLY DISINTEGRATING ORAL EVERY 8 HOURS PRN
Status: DISCONTINUED | OUTPATIENT
Start: 2024-02-08 | End: 2024-02-09 | Stop reason: HOSPADM

## 2024-02-08 RX ORDER — PRAZOSIN HYDROCHLORIDE 1 MG/1
1 CAPSULE ORAL 2 TIMES DAILY
COMMUNITY

## 2024-02-08 RX ORDER — POLYETHYLENE GLYCOL 3350 17 G/17G
17 POWDER, FOR SOLUTION ORAL DAILY PRN
Status: DISCONTINUED | OUTPATIENT
Start: 2024-02-08 | End: 2024-02-09 | Stop reason: HOSPADM

## 2024-02-08 RX ORDER — PROPOFOL 10 MG/ML
INJECTION, EMULSION INTRAVENOUS PRN
Status: DISCONTINUED | OUTPATIENT
Start: 2024-02-08 | End: 2024-02-08 | Stop reason: SDUPTHER

## 2024-02-08 RX ORDER — OXYCODONE HYDROCHLORIDE 5 MG/1
5 TABLET ORAL EVERY 6 HOURS PRN
Status: DISCONTINUED | OUTPATIENT
Start: 2024-02-08 | End: 2024-02-09 | Stop reason: HOSPADM

## 2024-02-08 RX ORDER — SODIUM CHLORIDE 0.9 % (FLUSH) 0.9 %
5-40 SYRINGE (ML) INJECTION EVERY 12 HOURS SCHEDULED
Status: DISCONTINUED | OUTPATIENT
Start: 2024-02-08 | End: 2024-02-08 | Stop reason: HOSPADM

## 2024-02-08 RX ORDER — ROCURONIUM BROMIDE 10 MG/ML
INJECTION, SOLUTION INTRAVENOUS PRN
Status: DISCONTINUED | OUTPATIENT
Start: 2024-02-08 | End: 2024-02-08 | Stop reason: SDUPTHER

## 2024-02-08 RX ORDER — SODIUM CHLORIDE 0.9 % (FLUSH) 0.9 %
5-40 SYRINGE (ML) INJECTION EVERY 12 HOURS SCHEDULED
Status: DISCONTINUED | OUTPATIENT
Start: 2024-02-08 | End: 2024-02-09 | Stop reason: HOSPADM

## 2024-02-08 RX ADMIN — Medication 100 MCG: at 14:15

## 2024-02-08 RX ADMIN — PROPOFOL 50 MG: 10 INJECTION, EMULSION INTRAVENOUS at 15:03

## 2024-02-08 RX ADMIN — ROCURONIUM BROMIDE 50 MG: 10 INJECTION, SOLUTION INTRAVENOUS at 13:14

## 2024-02-08 RX ADMIN — PROPOFOL 200 MG: 10 INJECTION, EMULSION INTRAVENOUS at 13:14

## 2024-02-08 RX ADMIN — PROPOFOL 50 MG: 10 INJECTION, EMULSION INTRAVENOUS at 14:55

## 2024-02-08 RX ADMIN — SODIUM CHLORIDE, POTASSIUM CHLORIDE, SODIUM LACTATE AND CALCIUM CHLORIDE: 600; 310; 30; 20 INJECTION, SOLUTION INTRAVENOUS at 11:33

## 2024-02-08 RX ADMIN — Medication 150 MCG: at 14:27

## 2024-02-08 RX ADMIN — SODIUM CHLORIDE TAB 1 GM 2 G: 1 TAB at 18:01

## 2024-02-08 RX ADMIN — FENTANYL CITRATE 50 MCG: 50 INJECTION, SOLUTION INTRAMUSCULAR; INTRAVENOUS at 13:42

## 2024-02-08 RX ADMIN — ROCURONIUM BROMIDE 10 MG: 10 INJECTION, SOLUTION INTRAVENOUS at 13:49

## 2024-02-08 RX ADMIN — FENTANYL CITRATE 25 MCG: 50 INJECTION, SOLUTION INTRAMUSCULAR; INTRAVENOUS at 14:45

## 2024-02-08 RX ADMIN — Medication 100 MCG: at 14:06

## 2024-02-08 RX ADMIN — FENTANYL CITRATE 100 MCG: 50 INJECTION, SOLUTION INTRAMUSCULAR; INTRAVENOUS at 13:14

## 2024-02-08 RX ADMIN — PREDNISONE 20 MG: 20 TABLET ORAL at 18:01

## 2024-02-08 RX ADMIN — Medication 150 MCG: at 14:18

## 2024-02-08 RX ADMIN — DEXAMETHASONE SODIUM PHOSPHATE 10 MG: 10 INJECTION INTRAMUSCULAR; INTRAVENOUS at 13:20

## 2024-02-08 RX ADMIN — LIDOCAINE HYDROCHLORIDE 50 MG: 10 INJECTION, SOLUTION EPIDURAL; INFILTRATION; INTRACAUDAL; PERINEURAL at 13:14

## 2024-02-08 RX ADMIN — MIDAZOLAM 2 MG: 1 INJECTION INTRAMUSCULAR; INTRAVENOUS at 13:06

## 2024-02-08 RX ADMIN — ONDANSETRON 4 MG: 2 INJECTION INTRAMUSCULAR; INTRAVENOUS at 14:40

## 2024-02-08 RX ADMIN — OXYCODONE 10 MG: 5 TABLET ORAL at 20:43

## 2024-02-08 RX ADMIN — ROCURONIUM BROMIDE 20 MG: 10 INJECTION, SOLUTION INTRAVENOUS at 14:20

## 2024-02-08 RX ADMIN — Medication 100 MCG: at 14:03

## 2024-02-08 RX ADMIN — SUGAMMADEX 200 MG: 100 INJECTION, SOLUTION INTRAVENOUS at 15:16

## 2024-02-08 RX ADMIN — SODIUM CHLORIDE, POTASSIUM CHLORIDE, SODIUM LACTATE AND CALCIUM CHLORIDE: 600; 310; 30; 20 INJECTION, SOLUTION INTRAVENOUS at 14:13

## 2024-02-08 RX ADMIN — ROSUVASTATIN CALCIUM 5 MG: 5 TABLET, COATED ORAL at 20:45

## 2024-02-08 RX ADMIN — QUETIAPINE FUMARATE 300 MG: 300 TABLET ORAL at 20:45

## 2024-02-08 RX ADMIN — CEFAZOLIN 2 G: 1 INJECTION, POWDER, FOR SOLUTION INTRAMUSCULAR; INTRAVENOUS at 13:20

## 2024-02-08 RX ADMIN — IPRATROPIUM BROMIDE AND ALBUTEROL SULFATE 1 DOSE: .5; 2.5 SOLUTION RESPIRATORY (INHALATION) at 21:22

## 2024-02-08 RX ADMIN — HYDROXYUREA 500 MG: 500 CAPSULE ORAL at 18:01

## 2024-02-08 RX ADMIN — FOLIC ACID 1 MG: 1 TABLET ORAL at 18:01

## 2024-02-08 RX ADMIN — FENTANYL CITRATE 25 MCG: 50 INJECTION, SOLUTION INTRAMUSCULAR; INTRAVENOUS at 14:49

## 2024-02-08 RX ADMIN — ROCURONIUM BROMIDE 30 MG: 10 INJECTION, SOLUTION INTRAVENOUS at 13:31

## 2024-02-08 RX ADMIN — Medication 100 MCG: at 14:11

## 2024-02-08 RX ADMIN — ROCURONIUM BROMIDE 10 MG: 10 INJECTION, SOLUTION INTRAVENOUS at 13:57

## 2024-02-08 ASSESSMENT — PAIN SCALES - GENERAL
PAINLEVEL_OUTOF10: 5
PAINLEVEL_OUTOF10: 8

## 2024-02-08 ASSESSMENT — PAIN DESCRIPTION - LOCATION: LOCATION: BACK;CHEST

## 2024-02-08 ASSESSMENT — PAIN - FUNCTIONAL ASSESSMENT: PAIN_FUNCTIONAL_ASSESSMENT: 0-10

## 2024-02-08 ASSESSMENT — PAIN DESCRIPTION - DESCRIPTORS: DESCRIPTORS: ACHING

## 2024-02-08 NOTE — ANESTHESIA PRE PROCEDURE
06:06 AM       CMP:   Lab Results   Component Value Date/Time     01/27/2024 06:06 AM    K 3.8 01/27/2024 06:06 AM     01/27/2024 06:06 AM    CO2 22 01/27/2024 06:06 AM    BUN 6 01/27/2024 06:06 AM    CREATININE 0.8 01/27/2024 06:06 AM    GFRAA >60 07/25/2022 05:25 AM    AGRATIO 1.0 01/25/2024 03:57 AM    LABGLOM >60 01/27/2024 06:06 AM    GLUCOSE 102 01/27/2024 06:06 AM    PROT 7.1 01/25/2024 03:57 AM    CALCIUM 8.8 01/27/2024 06:06 AM    BILITOT 0.4 01/25/2024 03:57 AM    ALKPHOS 101 01/25/2024 03:57 AM    AST 22 01/25/2024 03:57 AM    ALT 10 01/25/2024 03:57 AM       POC Tests: No results for input(s): \"POCGLU\", \"POCNA\", \"POCK\", \"POCCL\", \"POCBUN\", \"POCHEMO\", \"POCHCT\" in the last 72 hours.    Coags:   Lab Results   Component Value Date/Time    PROTIME 16.2 01/25/2024 03:57 AM    INR 1.3 01/25/2024 03:57 AM    APTT 22.8 06/02/2021 11:52 AM       HCG (If Applicable): No results found for: \"PREGTESTUR\", \"PREGSERUM\", \"HCG\", \"HCGQUANT\"     ABGs: No results found for: \"PHART\", \"PO2ART\", \"TMI6AKK\", \"TPQ7OOF\", \"BEART\", \"C8CVZYOS\"     Type & Screen (If Applicable):  No results found for: \"LABABO\", \"LABRH\"    Anesthesia Evaluation  Patient summary reviewed and Nursing notes reviewed   no history of anesthetic complications:   Airway: Mallampati: II  TM distance: >3 FB   Neck ROM: limited  Mouth opening: > = 3 FB   Dental:    (+) edentulous      Pulmonary:normal exam    (+) pneumonia:  COPD:                                     Cardiovascular:  Exercise tolerance: good (>4 METS)  (+) hypertension:    (-) past MI, CAD, CABG/stent, dysrhythmias,  angina,  CHF, orthopnea and PND    ECG reviewed               Beta Blocker:  Not on Beta Blocker         Neuro/Psych:   (+) neuromuscular disease:, psychiatric history:depression/anxiety             GI/Hepatic/Renal:   (+) liver disease:          Endo/Other:    (+) hypothyroidism::., malignancy/cancer.                 Abdominal:             Vascular:          Other

## 2024-02-08 NOTE — OP NOTE
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    11 L lymph node was not biopsied because of endobronchial mucosal infiltration at the secondary left chase.       Procedure(s):  ION NAVIGATIONAL BRONCHOSCOPY, LEFT LINGULA NODULE-TRANSBRONCHIAL BIOPSY, FINE NEEDLE ASPIRATION, MINI BRONCHOAVEOLAR LAVAGE, EBUS-TRANSBRONCHIAL NEEDLE ASPIRATION, RADIAL EBUS    Surgeon(s):  Herminio Brian MD                  Assistant:   * No surgical staff found *    Anesthesia: General    Estimated Blood Loss (mL): 5 ml     Complications: None    Specimens:   ID Type Source Tests Collected by Time Destination   A : LEFT LINGULA NODULE FNA Tissue Lung SURGICAL PATHOLOGY Herminio Brian MD 2/8/2024 1358    B : LEFT LINGULA NODULE FNA (2ND PASS) Tissue Lung SURGICAL PATHOLOGY Herminio Brian MD 2/8/2024 1401    C : LEFT LINGULA NODULE TRANSBRONCHIAL BIOPSY Respiratory Bronchial Biopsy SURGICAL PATHOLOGY Herminio Brian MD 2/8/2024 1424    D : LEFT LINGULA NODULE (BAL) Body Fluid Lung CYTOLOGY, NON-GYN Herminio Brian MD 2/8/2024 1433    E : 4R LYMPH NODE Tissue Lymph Node SURGICAL PATHOLOGY Herminio Brian MD 2/8/2024 1442    F : 10R LYMPH NODE Tissue Lymph Node CYTOLOGY, NON-GYN Herminio Brian MD 2/8/2024 1458        Implants:  * No implants in log *      Drains: * No LDAs found *        DESCRIPTION OF PROCEDURE: Informed consent was obtained from the patient after explaining the risks and benefits. A time out was taken. Total intravenous anesthesia was kindly provided by the anesthetist.     The scope was passed with ease via size 8.5 ET tube.       A standard bronchoscope was used to perform a  nodule (20 cc)  Transbronchial biopsies were obtained from left lingula nodule (6 biopsies)  Needle aspiration with 21 gauge needle from left lingula nodule (6 passes)    CANDACE (Rapid On-Site Examination) preliminary report atypical cells    The scope was passed with ease via the ET. Direct visualization of the lymph nodes was obtained using endobronchial ultrasound (EBUS) guidance. A complete ultrasound lymph node exam was performed for lymph node stations 4, 10 and 11.  The following lymph nodes were subjected to EBUS guided biopsy using standard technique and in the following order:    1.  4 R (approximately 19 mm x 14 mm in short axis)  21-gauge EBUS needle  Past 1-Candace -blood cells  Past 7-Ukko-yrewinjq cells  Past 3 and 4 in CytoLyt  2.  10 R (approximately 12.7 mm in short axis)  21-gauge EBUS needle  Past 3-Gsjh-vtdylnpq cells  Past 2 in CytoLyt  3.  11 R not visualized       The patient tolerated the procedure well. Recovery will be per the anesthesia team.      FOLLOW UP:    Discharge in am since helder lives alone   Follow up with DR Catherine in office as scheduled     Electronically signed by ROBERT DIKC MD on 2/8/2024 at 4:00 PM

## 2024-02-08 NOTE — TELEPHONE ENCOUNTER
Spoke to Carina in Pre-admission. Confirmed with her that doctor said overnight admission and is aware of baby aspirin issue.     I advised her to make sure the staff tells the doctor before the procedure of the above just to be clear since this is complicated.     Forwarded to Dr Brian as well.

## 2024-02-08 NOTE — TELEPHONE ENCOUNTER
Called pre-op spoke to Cherie who transferred me \"upstairs\" since the case is today. Had to leave a message with my name and phone number of #[phone] asking for a call back to clear up any mis-coummunications.

## 2024-02-08 NOTE — H&P
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  Past Surgical History   has a past surgical history that includes cervical fusion; Appendectomy; Tonsillectomy; Carpal tunnel release; epidural steroid injection (Left, 05/15/2019); Endoscopy, colon, diagnostic; and cervical fusion (N/A, 2019).  Social History   reports that he has been smoking cigarettes. He started smoking about 45 years ago. He has a 69.1 pack-year smoking history. He has never used smokeless tobacco.   reports current alcohol use of about 14.0 standard drinks of alcohol per week.   reports that he does not currently use drugs after having used the following drugs: Marijuana (Weed).  Marital Status    Occupation disability   Family History  Family Status   Relation Name Status    Mother      Father      Brother  (Not Specified)     family history includes Breast Cancer in his mother; Diabetes in his mother; Heart Failure in his father; Other in his brother.    Review of Systems:  CONSTITUTIONAL:   negative for fevers, chills, fatigue and malaise    EYES:   negative for double vision, blurred vision and photophobia    HEENT:   negative for tinnitus, epistaxis and sore throat     RESPIRATORY:   Productive cough with white sputum production, dyspnea   CARDIOVASCULAR:   negative for chest pain, palpitations, syncope, edema     GASTROINTESTINAL:   negative for nausea, vomiting     GENITOURINARY:   negative for incontinence     MUSCULOSKELETAL:   negative for neck or back pain     NEUROLOGICAL:   Negative for weakness and tingling  negative for headaches and dizziness     PSYCHIATRIC:   negative for anxiety       OBJECTIVE:   VITALS:  height is 1.854 m (6' 1\") and weight is 90.7 kg (200 lb). His temporal temperature is 98.4 °F (36.9 °C). His blood pressure is 143/88 (abnormal) and his pulse is 92.  His respiration is 18 and oxygen saturation is 95%.   CONSTITUTIONAL:alert & oriented x 3, no acute distress. Calm and pleasant.    SKIN:  Warm and dry, no rashes to exposed areas of skin.   HEAD:  Normocephalic, atraumatic.   EYES: PERRL.  EOMs intact.   EARS:  Intact and equal bilaterally.  No edema or thickening, without lumps, lesions, or discharge. Hearing grossly WNL.    NOSE:  Nares patent.  No rhinorrhea.   MOUTH/THROAT:  Mucous membranes pink and moist, edentulous.   NECK: Supple, no lymphadenopathy.  LUNGS: Respirations even and non-labored. Clear to auscultation bilaterally, no wheezes, overall diminished.   CARDIOVASCULAR: Regular rate and rhythm, no murmurs/rubs/gallops.   ABDOMEN: soft, non-tender and non-distended, bowel sounds active x 4.   EXTREMITIES: right lower extremity 3+ edema, erythematous. No varicosities to bilateral lower extremities.   NEUROLOGIC: CN II-XII are grossly intact. Gait not assessed.   IMPRESSIONS:   Lung nodule, mediastinal lymphadenopathy.   PLANS:   ION NAVIGATIONAL BRONCHOSCOPY, C-ARM, PATHOLOGY -GI SCHEDULED.    Notified Dr. Oscar regarding patient with unilateral lower extremity edema and erythema. Patient his right extremity has been this way for months and has been evaluated, most recent venous doppler 12/2023 negative.     SIMONE Ivan CNP   Electronically signed 2/8/2024 at 12:12 PM   Attending Physician Statement  I have discussed the care of Danny Guevara, including pertinent history and exam findings,  with the resident. I have seen and examined the patient and the key elements of all parts of the encounter have been performed by me.  I agree with the assessment, plan and orders as documented by the resident with additions .  ION Bronchoscopy and EBUS-TBNA     I reviewed the complications of the procedure which could be pneumothorax, mediastinitis, injury to blood vessels, nondiagnostic biopsy and cardiorespiratory arrest.  Patient is agreeable to

## 2024-02-08 NOTE — ANESTHESIA POSTPROCEDURE EVALUATION
Department of Anesthesiology  Postprocedure Note    Patient: Danny Guevara  MRN: [id number]  YOB: 1960  Date of evaluation: 2/8/2024    Procedure Summary       Date: 02/08/24 Room / Location: [address]    Anesthesia Start: 1306 Anesthesia Stop: 1529    Procedure: ION NAVIGATIONAL BRONCHOSCOPY, LEFT LINGULA NODULE-TRANSBRONCHIAL BIOPSY, FINE NEEDLE ASPIRATION, MINI BRONCHOAVEOLAR LAVAGE, EBUS-TRANSBRONCHIAL NEEDLE ASPIRATION, RADIAL EBUS Diagnosis:       Lung nodule      Mediastinal lymphadenopathy      (Lung nodule [R91.1])      (Mediastinal lymphadenopathy [R59.0])    Surgeons: Herminio Brian MD Responsible Provider: Linda Oscar MD    Anesthesia Type: general, TIVA ASA Status: 3            Anesthesia Type: No value filed.    Talia Phase I: Talia Score: 9    Talia Phase II:      Anesthesia Post Evaluation    Patient location during evaluation: PACU  Patient participation: complete - patient participated  Level of consciousness: awake and alert  Pain score: 3  Airway patency: patent  Nausea & Vomiting: no nausea and no vomiting  Cardiovascular status: hemodynamically stable  Respiratory status: acceptable  Hydration status: euvolemic  Pain management: adequate    No notable events documented.

## 2024-02-09 VITALS
SYSTOLIC BLOOD PRESSURE: 104 MMHG | HEIGHT: 73 IN | DIASTOLIC BLOOD PRESSURE: 66 MMHG | BODY MASS INDEX: 26.51 KG/M2 | WEIGHT: 200 LBS | HEART RATE: 90 BPM | TEMPERATURE: 97.8 F | RESPIRATION RATE: 16 BRPM | OXYGEN SATURATION: 96 %

## 2024-02-09 DIAGNOSIS — R91.1 LUNG NODULE SEEN ON IMAGING STUDY: ICD-10-CM

## 2024-02-09 DIAGNOSIS — D47.1 MYELOPROLIFERATIVE DISEASE (HCC): ICD-10-CM

## 2024-02-09 DIAGNOSIS — S22.32XA CLOSED FRACTURE OF ONE RIB OF LEFT SIDE, INITIAL ENCOUNTER: ICD-10-CM

## 2024-02-09 PROCEDURE — 2580000003 HC RX 258: Performed by: INTERNAL MEDICINE

## 2024-02-09 PROCEDURE — 6370000000 HC RX 637 (ALT 250 FOR IP): Performed by: INTERNAL MEDICINE

## 2024-02-09 PROCEDURE — G0378 HOSPITAL OBSERVATION PER HR: HCPCS

## 2024-02-09 PROCEDURE — 94640 AIRWAY INHALATION TREATMENT: CPT

## 2024-02-09 PROCEDURE — 99239 HOSP IP/OBS DSCHRG MGMT >30: CPT | Performed by: INTERNAL MEDICINE

## 2024-02-09 RX ORDER — OXYCODONE HYDROCHLORIDE 10 MG/1
10 TABLET ORAL EVERY 6 HOURS PRN
Qty: 12 TABLET | Refills: 0 | Status: SHIPPED | OUTPATIENT
Start: 2024-02-09 | End: 2024-02-12 | Stop reason: SDUPTHER

## 2024-02-09 RX ORDER — HYDROXYUREA 500 MG/1
500 CAPSULE ORAL DAILY
Qty: 28 CAPSULE | Refills: 5 | Status: SHIPPED | OUTPATIENT
Start: 2024-02-09

## 2024-02-09 RX ADMIN — LOSARTAN POTASSIUM 25 MG: 25 TABLET, FILM COATED ORAL at 08:13

## 2024-02-09 RX ADMIN — SODIUM CHLORIDE, PRESERVATIVE FREE 10 ML: 5 INJECTION INTRAVENOUS at 08:12

## 2024-02-09 RX ADMIN — FUROSEMIDE 20 MG: 20 TABLET ORAL at 08:13

## 2024-02-09 RX ADMIN — FOLIC ACID 1 MG: 1 TABLET ORAL at 08:13

## 2024-02-09 RX ADMIN — FERROUS SULFATE TAB EC 325 MG (65 MG FE EQUIVALENT) 325 MG: 325 (65 FE) TABLET DELAYED RESPONSE at 08:13

## 2024-02-09 RX ADMIN — OXYCODONE 10 MG: 5 TABLET ORAL at 02:50

## 2024-02-09 RX ADMIN — LEVOTHYROXINE SODIUM 25 MCG: 25 TABLET ORAL at 08:13

## 2024-02-09 RX ADMIN — PREDNISONE 20 MG: 20 TABLET ORAL at 08:13

## 2024-02-09 RX ADMIN — SODIUM CHLORIDE TAB 1 GM 2 G: 1 TAB at 08:13

## 2024-02-09 RX ADMIN — IPRATROPIUM BROMIDE AND ALBUTEROL SULFATE 1 DOSE: .5; 2.5 SOLUTION RESPIRATORY (INHALATION) at 02:28

## 2024-02-09 RX ADMIN — SODIUM CHLORIDE, PRESERVATIVE FREE 10 ML: 5 INJECTION INTRAVENOUS at 08:14

## 2024-02-09 ASSESSMENT — PAIN SCALES - GENERAL
PAINLEVEL_OUTOF10: 3
PAINLEVEL_OUTOF10: 8
PAINLEVEL_OUTOF10: 3
PAINLEVEL_OUTOF10: 8

## 2024-02-09 ASSESSMENT — PAIN - FUNCTIONAL ASSESSMENT: PAIN_FUNCTIONAL_ASSESSMENT: ACTIVITIES ARE NOT PREVENTED

## 2024-02-09 NOTE — PROGRESS NOTES
PULMONARY PROGRESS NOTE      Patient:  Danny Guevara  YOB: 1960    MRN: [id number]     Acct: [id number]     Admit date: 2/8/2024    Pt seen and Chart reviewed.    Subjective: patient doing well. No complaints post biopsy. Await biopsy results.       Review of Systems -   CONSTITUTIONAL:  negative  EYES:  negative  HEENT:  negative  RESPIRATORY:  negative  CARDIOVASCULAR:  negative  GASTROINTESTINAL:  negative  GENITOURINARY:  negative  HEMATOLOGIC/LYMPHATIC:  negative  ALLERGIC/IMMUNOLOGIC:  negative  ENDOCRINE:  negative  MUSCULOSKELETAL:  positive for L shoulder pain  NEUROLOGICAL:  negative  BEHAVIOR/PSYCH:  negative        Physical Exam:  Vitals: /66   Pulse 90   Temp 97.8 °F (36.6 °C) (Oral)   Resp 16   Ht 1.854 m (6' 1\")   Wt 90.7 kg (200 lb)   SpO2 96%   BMI 26.39 kg/m²   24 hour intake/output:  Intake/Output Summary (Last 24 hours) at 2/9/2024 0856  Last data filed at 2/9/2024 0819  Gross per 24 hour   Intake 1500 ml   Output 1250 ml   Net 250 ml     Last 3 weights:  Wt Readings from Last 3 Encounters:   02/08/24 90.7 kg (200 lb)   01/26/24 88 kg (194 lb 0.1 oz)   01/04/24 90.7 kg (200 lb)       General appearance: alert and cooperative with exam  Physical Examination:   General appearance - alert, well appearing, and in no distress  Mental status - alert, oriented to person, place, and time  Eyes - pupils equal and reactive, extraocular eye movements intact  Ears - not examined  Nose - not examined  Mouth - mucous membranes moist, pharynx normal without lesions  Neck - supple, no significant adenopathy  Chest - clear to auscultation, no wheezes, rales or rhonchi, symmetric air entry  Heart - normal rate, regular rhythm, normal S1, S2, no murmurs, rubs, clicks or gallops  Abdomen - soft, nontender, nondistended, no masses or organomegaly  Neurological - alert, oriented, normal speech, no focal findings or movement disorder noted}  Extremities - peripheral pulses normal, no  hemolytic anemia, unspecified (HCC)    Alcohol intoxication in active alcoholic without complication (HCC)    Suicide ideation    Delirium due to another medical condition    Alcohol withdrawal syndrome with complication, with unspecified complication (HCC)    Hyponatremia    Chronic obstructive pulmonary disease with acute exacerbation (HCC)    Lung nodule seen on imaging study    Thrombocytosis    Bronchitis     Patient seen and examined bedside. S/p bronchoscopy, EBUS, BAL. Pending biopsy results. Discharged. O/p f/u with pulmonology.    Larry Welch  Internal Medicine Residency Program, PGY - 3  [place], [place]      Pulmonary & Critical Care

## 2024-02-09 NOTE — DISCHARGE SUMMARY
anybody to stay at home with him.    Discharge Medications:         Medication List        CHANGE how you take these medications      * oxyCODONE HCl 10 MG immediate release tablet  Commonly known as: OXY-IR  Take 1 tablet by mouth every 8 hours as needed for Pain for up to 7 days. Max Daily Amount: 30 mg  What changed: Another medication with the same name was added. Make sure you understand how and when to take each.     * oxyCODONE HCl 10 MG immediate release tablet  Commonly known as: OXY-IR  Take 1 tablet by mouth every 6 hours as needed for Pain for up to 5 days. Intended supply: 3 days. Take lowest dose possible to manage pain Max Daily Amount: 40 mg  What changed: You were already taking a medication with the same name, and this prescription was added. Make sure you understand how and when to take each.           * This list has 2 medication(s) that are the same as other medications prescribed for you. Read the directions carefully, and ask your doctor or other care provider to review them with you.                CONTINUE taking these medications      albuterol sulfate  (90 Base) MCG/ACT inhaler  Commonly known as: PROVENTIL;VENTOLIN;PROAIR     aspirin 81 MG EC tablet     ferrous sulfate 325 (65 Fe) MG tablet  Commonly known as: IRON 325     folic acid 1 MG tablet  Commonly known as: FOLVITE  Take 1 tablet by mouth daily     furosemide 20 MG tablet  Commonly known as: LASIX     guaiFENesin 600 MG extended release tablet  Commonly known as: MUCINEX     ipratropium 0.5 mg-albuterol 2.5 mg 0.5-2.5 (3) MG/3ML Soln nebulizer solution  Commonly known as: DUONEB  Inhale 3 mLs into the lungs every 6 hours as needed for Shortness of Breath     levothyroxine 25 MCG tablet  Commonly known as: SYNTHROID     loratadine 10 MG tablet  Commonly known as: CLARITIN     losartan 25 MG tablet  Commonly known as: COZAAR     multivitamin Tabs tablet  Take 1 tablet by mouth daily     nicotine 14 MG/24HR  Commonly known as:  NICODERM CQ  Place 1 patch onto the skin daily     omeprazole 20 MG delayed release capsule  Commonly known as: PRILOSEC     prazosin 1 MG capsule  Commonly known as: MINIPRESS     QUEtiapine 300 MG tablet  Commonly known as: SEROQUEL  Take 1 tablet by mouth nightly     rosuvastatin 5 MG tablet  Commonly known as: CRESTOR     sodium chloride 1 g tablet  Take 2 tablets by mouth 2 times daily (with meals)     Spiriva HandiHaler 18 MCG inhalation capsule  Generic drug: tiotropium     thiamine 100 MG tablet  Take 1 tablet by mouth daily            ASK your doctor about these medications      hydroxyurea 500 MG chemo capsule  Commonly known as: HYDREA  TAKE 1 CAPSULE BY MOUTH DAILY  Ask about: Which instructions should I use?               Where to Get Your Medications        These medications were sent to [address] [phone] - F [phone]  [facility] [address]      Phone: [phone]   hydroxyurea 500 MG chemo capsule  oxyCODONE HCl 10 MG immediate release tablet         Consults:  non    Significant Diagnostic Studies:  lung nodule     Treatments:   ION robotic bronchoscopy , ebus - tbna       Disposition:   don  Follow up with Dr Catherine as scheduled     Signed:  ROBERT DICK MD  2/9/2024, 1:41 PM

## 2024-02-09 NOTE — CARE COORDINATION
DISCHARGE PLANNING EVALUATION: OP/OBSERVATION        2/9/24, 9:37 AM EST    Danny Guevara         Location: 0318/0318-01   Reason for hospitalization: Lung nodule [R91.1]  Mediastinal lymphadenopathy [R59.0]  Lung nodule seen on imaging study [R91.1]     CM Services requested for transitional needs.     PCP: Roberto Donaldson MD    Transportation/Food Security/Housekeeping Addressed:  No issues identified.     Equipment needs: no identified needs    Transition plan: home independently

## 2024-02-09 NOTE — CARE COORDINATION
02/09/24 0943   Readmission Assessment   Number of Days since last admission? 1-7 days  (obs admission)   Previous Disposition Home with Family   Who is being Interviewed Patient   What was the patient's/caregiver's perception as to why they think they needed to return back to the hospital? Other (Comment)  (obs admission)   Did you visit your Primary Care Physician after you left the hospital, before you returned this time? No   Why weren't you able to visit your PCP? Did not have an appointment   Did you see a specialist, such as Cardiac, Pulmonary, Orthopedic Physician, etc. after you left the hospital? Yes   Who advised the patient to return to the hospital? Self-referral   Does the patient report anything that got in the way of taking their medications? No   In our efforts to provide the best possible care to you and others like you, can you think of anything that we could have done to help you after you left the hospital the first time, so that you might not have needed to return so soon? Other (Comment)  (scheduled surgery)

## 2024-02-09 NOTE — CARE COORDINATION
Patient kept overnight as he had no one to stay with him. Patient called his ride and ride cannot pick him up.    Called [employer] transportation.  Recording stating that they no longer provide transportation for patient's.   Called insurance went through prompts the was disconnected.  Tried again disconnected again.    Called [employer] to  patient.

## 2024-02-09 NOTE — PLAN OF CARE
Problem: Discharge Planning  Goal: Discharge to home or other facility with appropriate resources  Outcome: Completed     Problem: Pain  Goal: Verbalizes/displays adequate comfort level or baseline comfort level  Outcome: Completed     Problem: Safety - Adult  Goal: Free from fall injury  Outcome: Completed     Problem: ABCDS Injury Assessment  Goal: Absence of physical injury  Outcome: Completed     Problem: Respiratory - Adult  Goal: Achieves optimal ventilation and oxygenation  2/9/2024 1101 by Rocío George RN  Outcome: Completed  2/9/2024 0915 by Cecile Osborne RCP  Outcome: Progressing

## 2024-02-12 ENCOUNTER — TELEPHONE (OUTPATIENT)
Dept: PULMONOLOGY | Age: 64
End: 2024-02-12

## 2024-02-12 DIAGNOSIS — R91.1 LUNG NODULE SEEN ON IMAGING STUDY: ICD-10-CM

## 2024-02-12 DIAGNOSIS — G89.29 CHRONIC LEFT-SIDED LOW BACK PAIN WITH LEFT-SIDED SCIATICA: ICD-10-CM

## 2024-02-12 DIAGNOSIS — M54.42 CHRONIC LEFT-SIDED LOW BACK PAIN WITH LEFT-SIDED SCIATICA: ICD-10-CM

## 2024-02-12 LAB
SURGICAL PATHOLOGY REPORT: NORMAL
SURGICAL PATHOLOGY REPORT: NORMAL

## 2024-02-12 RX ORDER — OXYCODONE HYDROCHLORIDE 10 MG/1
10 TABLET ORAL EVERY 8 HOURS PRN
Qty: 42 TABLET | Refills: 0 | Status: ON HOLD | OUTPATIENT
Start: 2024-02-12 | End: 2024-02-16

## 2024-02-12 RX ORDER — IBUPROFEN 800 MG/1
800 TABLET ORAL 3 TIMES DAILY PRN
Qty: 90 TABLET | Refills: 0 | Status: SHIPPED | OUTPATIENT
Start: 2024-02-12

## 2024-02-12 RX ORDER — OXYCODONE HYDROCHLORIDE 10 MG/1
10 TABLET ORAL EVERY 6 HOURS PRN
Qty: 28 TABLET | Refills: 0 | Status: ON HOLD | OUTPATIENT
Start: 2024-02-12 | End: 2024-02-16

## 2024-02-12 NOTE — TELEPHONE ENCOUNTER
Patient called, anxious about his results. Patient also inquired about pain medication as he is almost out, states that he wont be able to tolerate being off of them.     Writer informed him that we dont prescribe pain medication and confirmed that he was sent home from the hospital on them.     Patient is also going to reach out to his oncologist to request meds as well.

## 2024-02-13 ENCOUNTER — TELEPHONE (OUTPATIENT)
Dept: ONCOLOGY | Age: 64
End: 2024-02-13

## 2024-02-13 NOTE — TELEPHONE ENCOUNTER
PT IS CALLING ABOUT  HIS TRANSPORTATION FOR HIS APPT ON 2-15-24. PER DR SUAREZ'S OFFICE PT'S APPT NEEDED TO BE MOVED TO 2-15-24 D/T [facility]. I CALLED AND SCHEDULED PT. HE NOW IS CALLING STATING THAT HE IS HAVING TROUBLE SETTING UP TRANSPORTATION FOR THIS APPT. PT STATES THE INSURANCE COMPANY CALLED US TO VERY HOW IMPORTANT THIS APPT IS. WE DID NOT RECEIVE A CALL AND NO VM LEFT. I TRIED TO CALL THE NUMBER  [phone] TO SEE IF I COULD TALK TO SOMEONE AND THE PHONE TREE WOULD NOT LET ME. I THEN CALLED THE  AND LEFT THE ABOVE MESSAGE FOR HER TO SEE IF SHE CAN GET  THE PT TRANSPORTATION FOR THIS APPT. I WILL CALL AND LET THE PT KNOW OF THE ABOVE AND GIVE HIM THE SOCIAL WORKERS NUMBER IF HE NEEDS ANY MORE HELP.

## 2024-02-13 NOTE — TELEPHONE ENCOUNTER
Received phone call from [facility] staff. Pt appt has been pushed up to 02/15. Called and scheduled transportation through [facility].  time is 8:45 am. Return  to transport him home is scheduled for 12:00 pm. Updated pt.     Call [phone] if you have any issues.

## 2024-02-13 NOTE — TELEPHONE ENCOUNTER
Name: Danny Guevara  : 1960  MRN: [id number]    Oncology Navigation- Initial Note:  (Unknown)  Intake-  Contact Type: Medical Oncology    Continuum of Care: Diagnosis/Active Treatment    Smoking hx:     Notes:   Navigator received assignment and reviewing chart, noting pt. To see Dr. Song 2/15 and writer will F/U at MO consult.    Electronically signed by Lucille Holguin RN on 2024 at 1:42 PM

## 2024-02-15 ENCOUNTER — HOSPITAL ENCOUNTER (INPATIENT)
Age: 64
LOS: 3 days | Discharge: HOME | DRG: 872 | End: 2024-02-18
Attending: EMERGENCY MEDICINE | Admitting: FAMILY MEDICINE
Payer: MEDICARE

## 2024-02-15 ENCOUNTER — APPOINTMENT (OUTPATIENT)
Dept: VASCULAR LAB | Age: 64
DRG: 872 | End: 2024-02-15
Payer: MEDICARE

## 2024-02-15 ENCOUNTER — TELEPHONE (OUTPATIENT)
Dept: ONCOLOGY | Age: 64
End: 2024-02-15

## 2024-02-15 ENCOUNTER — OFFICE VISIT (OUTPATIENT)
Dept: ONCOLOGY | Age: 64
End: 2024-02-15
Payer: MEDICARE

## 2024-02-15 VITALS
HEART RATE: 93 BPM | WEIGHT: 210.1 LBS | TEMPERATURE: 98.3 F | DIASTOLIC BLOOD PRESSURE: 83 MMHG | RESPIRATION RATE: 20 BRPM | BODY MASS INDEX: 27.72 KG/M2 | SYSTOLIC BLOOD PRESSURE: 138 MMHG

## 2024-02-15 DIAGNOSIS — J44.1 CHRONIC OBSTRUCTIVE PULMONARY DISEASE WITH ACUTE EXACERBATION (HCC): ICD-10-CM

## 2024-02-15 DIAGNOSIS — D47.1 MYELOPROLIFERATIVE DISEASE (HCC): ICD-10-CM

## 2024-02-15 DIAGNOSIS — D47.1 MYELOPROLIFERATIVE DISEASE (HCC): Primary | ICD-10-CM

## 2024-02-15 DIAGNOSIS — L03.115 CELLULITIS OF RIGHT LOWER EXTREMITY: Primary | ICD-10-CM

## 2024-02-15 DIAGNOSIS — C34.90 MALIGNANT NEOPLASM OF LUNG, UNSPECIFIED LATERALITY, UNSPECIFIED PART OF LUNG (HCC): ICD-10-CM

## 2024-02-15 PROBLEM — M25.512 ACUTE PAIN OF LEFT SHOULDER: Status: ACTIVE | Noted: 2024-02-15

## 2024-02-15 PROBLEM — A41.9 SEPSIS WITHOUT ACUTE ORGAN DYSFUNCTION (HCC): Status: ACTIVE | Noted: 2024-02-15

## 2024-02-15 PROBLEM — Z72.0 TOBACCO USE: Status: ACTIVE | Noted: 2024-02-15

## 2024-02-15 LAB
ANION GAP SERPL CALCULATED.3IONS-SCNC: 14 MMOL/L (ref 9–17)
BASOPHILS # BLD: 0.42 K/UL (ref 0–0.2)
BASOPHILS NFR BLD: 2 % (ref 0–2)
BUN SERPL-MCNC: 6 MG/DL (ref 8–23)
BUN/CREAT SERPL: 9 (ref 9–20)
CALCIUM SERPL-MCNC: 9.3 MG/DL (ref 8.6–10.4)
CHLORIDE SERPL-SCNC: 96 MMOL/L (ref 98–107)
CO2 SERPL-SCNC: 23 MMOL/L (ref 20–31)
CREAT SERPL-MCNC: 0.7 MG/DL (ref 0.7–1.2)
EOSINOPHIL # BLD: 0.42 K/UL (ref 0–0.44)
EOSINOPHILS RELATIVE PERCENT: 2 % (ref 1–4)
ERYTHROCYTE [DISTWIDTH] IN BLOOD BY AUTOMATED COUNT: 19.9 % (ref 11.8–14.4)
GFR SERPL CREATININE-BSD FRML MDRD: >60 ML/MIN/1.73M2
GLUCOSE SERPL-MCNC: 101 MG/DL (ref 70–99)
HCT VFR BLD AUTO: 40.4 % (ref 40.7–50.3)
HGB BLD-MCNC: 13.5 G/DL (ref 13–17)
IMM GRANULOCYTES # BLD AUTO: 1.04 K/UL (ref 0–0.3)
IMM GRANULOCYTES NFR BLD: 5 %
LACTATE BLDV-SCNC: 1 MMOL/L (ref 0.5–1.9)
LYMPHOCYTES NFR BLD: 1.25 K/UL (ref 1.1–3.7)
LYMPHOCYTES RELATIVE PERCENT: 6 % (ref 24–43)
MCH RBC QN AUTO: 28.3 PG (ref 25.2–33.5)
MCHC RBC AUTO-ENTMCNC: 33.4 G/DL (ref 28.4–34.8)
MCV RBC AUTO: 84.7 FL (ref 82.6–102.9)
MONOCYTES NFR BLD: 11 % (ref 3–12)
MONOCYTES NFR BLD: 2.29 K/UL (ref 0.1–1.2)
NEUTROPHILS NFR BLD: 74 % (ref 36–65)
NEUTS SEG NFR BLD: 15.38 K/UL (ref 1.5–8.1)
NRBC BLD-RTO: 0 PER 100 WBC
PLATELET # BLD AUTO: 704 K/UL (ref 138–453)
PMV BLD AUTO: 9.3 FL (ref 8.1–13.5)
POTASSIUM SERPL-SCNC: 4.3 MMOL/L (ref 3.7–5.3)
RBC # BLD AUTO: 4.77 M/UL (ref 4.21–5.77)
SODIUM SERPL-SCNC: 133 MMOL/L (ref 135–144)
WBC OTHER # BLD: 20.8 K/UL (ref 3.5–11.3)

## 2024-02-15 PROCEDURE — 96365 THER/PROPH/DIAG IV INF INIT: CPT

## 2024-02-15 PROCEDURE — 2580000003 HC RX 258: Performed by: NURSE PRACTITIONER

## 2024-02-15 PROCEDURE — 83605 ASSAY OF LACTIC ACID: CPT

## 2024-02-15 PROCEDURE — 99211 OFF/OP EST MAY X REQ PHY/QHP: CPT | Performed by: INTERNAL MEDICINE

## 2024-02-15 PROCEDURE — 6360000002 HC RX W HCPCS: Performed by: FAMILY MEDICINE

## 2024-02-15 PROCEDURE — 99214 OFFICE O/P EST MOD 30 MIN: CPT | Performed by: INTERNAL MEDICINE

## 2024-02-15 PROCEDURE — 1111F DSCHRG MED/CURRENT MED MERGE: CPT | Performed by: INTERNAL MEDICINE

## 2024-02-15 PROCEDURE — G8419 CALC BMI OUT NRM PARAM NOF/U: HCPCS | Performed by: INTERNAL MEDICINE

## 2024-02-15 PROCEDURE — 96375 TX/PRO/DX INJ NEW DRUG ADDON: CPT

## 2024-02-15 PROCEDURE — 93971 EXTREMITY STUDY: CPT

## 2024-02-15 PROCEDURE — 2580000003 HC RX 258: Performed by: PHYSICIAN ASSISTANT

## 2024-02-15 PROCEDURE — 6370000000 HC RX 637 (ALT 250 FOR IP): Performed by: PHYSICIAN ASSISTANT

## 2024-02-15 PROCEDURE — 3017F COLORECTAL CA SCREEN DOC REV: CPT | Performed by: INTERNAL MEDICINE

## 2024-02-15 PROCEDURE — 80048 BASIC METABOLIC PNL TOTAL CA: CPT

## 2024-02-15 PROCEDURE — G8484 FLU IMMUNIZE NO ADMIN: HCPCS | Performed by: INTERNAL MEDICINE

## 2024-02-15 PROCEDURE — 87040 BLOOD CULTURE FOR BACTERIA: CPT

## 2024-02-15 PROCEDURE — 6370000000 HC RX 637 (ALT 250 FOR IP): Performed by: NURSE PRACTITIONER

## 2024-02-15 PROCEDURE — 36415 COLL VENOUS BLD VENIPUNCTURE: CPT

## 2024-02-15 PROCEDURE — 4004F PT TOBACCO SCREEN RCVD TLK: CPT | Performed by: INTERNAL MEDICINE

## 2024-02-15 PROCEDURE — 85025 COMPLETE CBC W/AUTO DIFF WBC: CPT

## 2024-02-15 PROCEDURE — 99223 1ST HOSP IP/OBS HIGH 75: CPT | Performed by: FAMILY MEDICINE

## 2024-02-15 PROCEDURE — 99285 EMERGENCY DEPT VISIT HI MDM: CPT

## 2024-02-15 PROCEDURE — 3079F DIAST BP 80-89 MM HG: CPT | Performed by: INTERNAL MEDICINE

## 2024-02-15 PROCEDURE — 2580000003 HC RX 258: Performed by: FAMILY MEDICINE

## 2024-02-15 PROCEDURE — 93971 EXTREMITY STUDY: CPT | Performed by: SURGERY

## 2024-02-15 PROCEDURE — G8427 DOCREV CUR MEDS BY ELIG CLIN: HCPCS | Performed by: INTERNAL MEDICINE

## 2024-02-15 PROCEDURE — 6360000002 HC RX W HCPCS: Performed by: PHYSICIAN ASSISTANT

## 2024-02-15 PROCEDURE — 3075F SYST BP GE 130 - 139MM HG: CPT | Performed by: INTERNAL MEDICINE

## 2024-02-15 PROCEDURE — 1200000000 HC SEMI PRIVATE

## 2024-02-15 PROCEDURE — 6360000002 HC RX W HCPCS: Performed by: NURSE PRACTITIONER

## 2024-02-15 RX ORDER — FUROSEMIDE 20 MG/1
20 TABLET ORAL DAILY
Status: DISCONTINUED | OUTPATIENT
Start: 2024-02-15 | End: 2024-02-18 | Stop reason: HOSPADM

## 2024-02-15 RX ORDER — MORPHINE SULFATE 4 MG/ML
4 INJECTION, SOLUTION INTRAMUSCULAR; INTRAVENOUS
Status: DISCONTINUED | OUTPATIENT
Start: 2024-02-15 | End: 2024-02-18 | Stop reason: HOSPADM

## 2024-02-15 RX ORDER — POLYETHYLENE GLYCOL 3350 17 G/17G
17 POWDER, FOR SOLUTION ORAL DAILY PRN
Status: DISCONTINUED | OUTPATIENT
Start: 2024-02-15 | End: 2024-02-18 | Stop reason: HOSPADM

## 2024-02-15 RX ORDER — LORAZEPAM 2 MG/ML
1 INJECTION INTRAMUSCULAR EVERY 6 HOURS PRN
Status: DISCONTINUED | OUTPATIENT
Start: 2024-02-15 | End: 2024-02-18 | Stop reason: HOSPADM

## 2024-02-15 RX ORDER — LEVOTHYROXINE SODIUM 0.03 MG/1
25 TABLET ORAL EVERY MORNING
Status: DISCONTINUED | OUTPATIENT
Start: 2024-02-16 | End: 2024-02-18 | Stop reason: HOSPADM

## 2024-02-15 RX ORDER — SODIUM CHLORIDE 1 G/1
2 TABLET ORAL 2 TIMES DAILY WITH MEALS
Status: DISCONTINUED | OUTPATIENT
Start: 2024-02-15 | End: 2024-02-18 | Stop reason: HOSPADM

## 2024-02-15 RX ORDER — SODIUM CHLORIDE 0.9 % (FLUSH) 0.9 %
5-40 SYRINGE (ML) INJECTION EVERY 12 HOURS SCHEDULED
Status: DISCONTINUED | OUTPATIENT
Start: 2024-02-15 | End: 2024-02-18 | Stop reason: HOSPADM

## 2024-02-15 RX ORDER — PANTOPRAZOLE SODIUM 40 MG/1
40 TABLET, DELAYED RELEASE ORAL
Status: DISCONTINUED | OUTPATIENT
Start: 2024-02-16 | End: 2024-02-18 | Stop reason: HOSPADM

## 2024-02-15 RX ORDER — GUAIFENESIN 600 MG/1
600 TABLET, EXTENDED RELEASE ORAL 2 TIMES DAILY
Status: DISCONTINUED | OUTPATIENT
Start: 2024-02-15 | End: 2024-02-18 | Stop reason: HOSPADM

## 2024-02-15 RX ORDER — SODIUM CHLORIDE 0.9 % (FLUSH) 0.9 %
10 SYRINGE (ML) INJECTION PRN
Status: DISCONTINUED | OUTPATIENT
Start: 2024-02-15 | End: 2024-02-18 | Stop reason: HOSPADM

## 2024-02-15 RX ORDER — ALBUTEROL SULFATE 90 UG/1
2 AEROSOL, METERED RESPIRATORY (INHALATION) EVERY 4 HOURS PRN
Status: DISCONTINUED | OUTPATIENT
Start: 2024-02-15 | End: 2024-02-18 | Stop reason: HOSPADM

## 2024-02-15 RX ORDER — MULTIVITAMIN WITH IRON
1 TABLET ORAL DAILY
Status: DISCONTINUED | OUTPATIENT
Start: 2024-02-15 | End: 2024-02-18 | Stop reason: HOSPADM

## 2024-02-15 RX ORDER — SODIUM CHLORIDE 0.9 % (FLUSH) 0.9 %
5-40 SYRINGE (ML) INJECTION PRN
Status: DISCONTINUED | OUTPATIENT
Start: 2024-02-15 | End: 2024-02-18 | Stop reason: HOSPADM

## 2024-02-15 RX ORDER — FOLIC ACID 1 MG/1
1 TABLET ORAL DAILY
Status: DISCONTINUED | OUTPATIENT
Start: 2024-02-15 | End: 2024-02-18 | Stop reason: HOSPADM

## 2024-02-15 RX ORDER — SODIUM CHLORIDE 9 MG/ML
INJECTION, SOLUTION INTRAVENOUS PRN
Status: DISCONTINUED | OUTPATIENT
Start: 2024-02-15 | End: 2024-02-18 | Stop reason: HOSPADM

## 2024-02-15 RX ORDER — HYDROXYUREA 500 MG/1
500 CAPSULE ORAL DAILY
Status: DISCONTINUED | OUTPATIENT
Start: 2024-02-15 | End: 2024-02-18 | Stop reason: HOSPADM

## 2024-02-15 RX ORDER — ACETAMINOPHEN 650 MG/1
650 SUPPOSITORY RECTAL EVERY 6 HOURS PRN
Status: DISCONTINUED | OUTPATIENT
Start: 2024-02-15 | End: 2024-02-18 | Stop reason: HOSPADM

## 2024-02-15 RX ORDER — OXYCODONE HYDROCHLORIDE 5 MG/1
10 TABLET ORAL EVERY 8 HOURS PRN
Status: DISCONTINUED | OUTPATIENT
Start: 2024-02-15 | End: 2024-02-16

## 2024-02-15 RX ORDER — ASPIRIN 81 MG/1
81 TABLET ORAL DAILY
Status: DISCONTINUED | OUTPATIENT
Start: 2024-02-15 | End: 2024-02-18 | Stop reason: HOSPADM

## 2024-02-15 RX ORDER — MORPHINE SULFATE 2 MG/ML
2 INJECTION, SOLUTION INTRAMUSCULAR; INTRAVENOUS
Status: DISCONTINUED | OUTPATIENT
Start: 2024-02-15 | End: 2024-02-18 | Stop reason: HOSPADM

## 2024-02-15 RX ORDER — MAGNESIUM SULFATE 1 G/100ML
1000 INJECTION INTRAVENOUS PRN
Status: DISCONTINUED | OUTPATIENT
Start: 2024-02-15 | End: 2024-02-18 | Stop reason: HOSPADM

## 2024-02-15 RX ORDER — PRAZOSIN HYDROCHLORIDE 1 MG/1
1 CAPSULE ORAL NIGHTLY
Status: DISCONTINUED | OUTPATIENT
Start: 2024-02-15 | End: 2024-02-16

## 2024-02-15 RX ORDER — ONDANSETRON 2 MG/ML
4 INJECTION INTRAMUSCULAR; INTRAVENOUS EVERY 6 HOURS PRN
Status: DISCONTINUED | OUTPATIENT
Start: 2024-02-15 | End: 2024-02-18 | Stop reason: HOSPADM

## 2024-02-15 RX ORDER — POTASSIUM CHLORIDE 7.45 MG/ML
10 INJECTION INTRAVENOUS PRN
Status: DISCONTINUED | OUTPATIENT
Start: 2024-02-15 | End: 2024-02-18 | Stop reason: HOSPADM

## 2024-02-15 RX ORDER — SODIUM CHLORIDE 9 MG/ML
INJECTION, SOLUTION INTRAVENOUS CONTINUOUS
Status: DISCONTINUED | OUTPATIENT
Start: 2024-02-15 | End: 2024-02-18 | Stop reason: HOSPADM

## 2024-02-15 RX ORDER — LOSARTAN POTASSIUM 25 MG/1
25 TABLET ORAL DAILY
Status: DISCONTINUED | OUTPATIENT
Start: 2024-02-15 | End: 2024-02-16

## 2024-02-15 RX ORDER — ROSUVASTATIN CALCIUM 10 MG/1
5 TABLET, COATED ORAL NIGHTLY
Status: DISCONTINUED | OUTPATIENT
Start: 2024-02-15 | End: 2024-02-18 | Stop reason: HOSPADM

## 2024-02-15 RX ORDER — POTASSIUM CHLORIDE 20 MEQ/1
40 TABLET, EXTENDED RELEASE ORAL PRN
Status: DISCONTINUED | OUTPATIENT
Start: 2024-02-15 | End: 2024-02-18 | Stop reason: HOSPADM

## 2024-02-15 RX ORDER — IBUPROFEN 800 MG/1
800 TABLET ORAL 3 TIMES DAILY PRN
Status: DISCONTINUED | OUTPATIENT
Start: 2024-02-15 | End: 2024-02-18 | Stop reason: HOSPADM

## 2024-02-15 RX ORDER — LANOLIN ALCOHOL/MO/W.PET/CERES
325 CREAM (GRAM) TOPICAL
Status: DISCONTINUED | OUTPATIENT
Start: 2024-02-16 | End: 2024-02-18 | Stop reason: HOSPADM

## 2024-02-15 RX ORDER — NICOTINE 21 MG/24HR
1 PATCH, TRANSDERMAL 24 HOURS TRANSDERMAL DAILY
Status: DISCONTINUED | OUTPATIENT
Start: 2024-02-15 | End: 2024-02-18 | Stop reason: HOSPADM

## 2024-02-15 RX ORDER — CETIRIZINE HYDROCHLORIDE 10 MG/1
10 TABLET ORAL DAILY
Status: DISCONTINUED | OUTPATIENT
Start: 2024-02-15 | End: 2024-02-18 | Stop reason: HOSPADM

## 2024-02-15 RX ORDER — MORPHINE SULFATE 4 MG/ML
4 INJECTION, SOLUTION INTRAMUSCULAR; INTRAVENOUS ONCE
Status: COMPLETED | OUTPATIENT
Start: 2024-02-15 | End: 2024-02-15

## 2024-02-15 RX ORDER — GAUZE BANDAGE 2" X 2"
100 BANDAGE TOPICAL DAILY
Status: DISCONTINUED | OUTPATIENT
Start: 2024-02-15 | End: 2024-02-18 | Stop reason: HOSPADM

## 2024-02-15 RX ORDER — ACETAMINOPHEN 325 MG/1
650 TABLET ORAL EVERY 6 HOURS PRN
Status: DISCONTINUED | OUTPATIENT
Start: 2024-02-15 | End: 2024-02-18 | Stop reason: HOSPADM

## 2024-02-15 RX ORDER — ONDANSETRON 2 MG/ML
4 INJECTION INTRAMUSCULAR; INTRAVENOUS ONCE
Status: COMPLETED | OUTPATIENT
Start: 2024-02-15 | End: 2024-02-15

## 2024-02-15 RX ORDER — ONDANSETRON 4 MG/1
4 TABLET, ORALLY DISINTEGRATING ORAL EVERY 8 HOURS PRN
Status: DISCONTINUED | OUTPATIENT
Start: 2024-02-15 | End: 2024-02-18 | Stop reason: HOSPADM

## 2024-02-15 RX ORDER — ENOXAPARIN SODIUM 100 MG/ML
40 INJECTION SUBCUTANEOUS DAILY
Status: DISCONTINUED | OUTPATIENT
Start: 2024-02-15 | End: 2024-02-18 | Stop reason: HOSPADM

## 2024-02-15 RX ORDER — HYDROMORPHONE HYDROCHLORIDE 1 MG/ML
1 INJECTION, SOLUTION INTRAMUSCULAR; INTRAVENOUS; SUBCUTANEOUS ONCE
Status: COMPLETED | OUTPATIENT
Start: 2024-02-15 | End: 2024-02-15

## 2024-02-15 RX ADMIN — MORPHINE SULFATE 4 MG: 4 INJECTION, SOLUTION INTRAMUSCULAR; INTRAVENOUS at 23:17

## 2024-02-15 RX ADMIN — ONDANSETRON 4 MG: 2 INJECTION INTRAMUSCULAR; INTRAVENOUS at 11:48

## 2024-02-15 RX ADMIN — QUETIAPINE FUMARATE 300 MG: 100 TABLET ORAL at 21:52

## 2024-02-15 RX ADMIN — OXYCODONE HYDROCHLORIDE 10 MG: 5 TABLET ORAL at 20:01

## 2024-02-15 RX ADMIN — Medication 2 G: at 21:51

## 2024-02-15 RX ADMIN — MORPHINE SULFATE 4 MG: 4 INJECTION, SOLUTION INTRAMUSCULAR; INTRAVENOUS at 11:48

## 2024-02-15 RX ADMIN — FUROSEMIDE 20 MG: 20 TABLET ORAL at 21:52

## 2024-02-15 RX ADMIN — LOSARTAN POTASSIUM 25 MG: 25 TABLET, FILM COATED ORAL at 21:52

## 2024-02-15 RX ADMIN — VANCOMYCIN HYDROCHLORIDE 2500 MG: 1 INJECTION, POWDER, LYOPHILIZED, FOR SOLUTION INTRAVENOUS at 13:59

## 2024-02-15 RX ADMIN — CEFEPIME 2000 MG: 2 INJECTION, POWDER, FOR SOLUTION INTRAVENOUS at 19:52

## 2024-02-15 RX ADMIN — Medication 100 MG: at 21:52

## 2024-02-15 RX ADMIN — HYDROMORPHONE HYDROCHLORIDE 1 MG: 1 INJECTION, SOLUTION INTRAMUSCULAR; INTRAVENOUS; SUBCUTANEOUS at 13:19

## 2024-02-15 RX ADMIN — GUAIFENESIN 600 MG: 600 TABLET ORAL at 21:52

## 2024-02-15 RX ADMIN — CEFEPIME 2000 MG: 2 INJECTION, POWDER, FOR SOLUTION INTRAVENOUS at 13:23

## 2024-02-15 RX ADMIN — ENOXAPARIN SODIUM 40 MG: 100 INJECTION SUBCUTANEOUS at 21:52

## 2024-02-15 RX ADMIN — SODIUM CHLORIDE: 9 INJECTION, SOLUTION INTRAVENOUS at 19:49

## 2024-02-15 RX ADMIN — MULTIVITAMIN TABLET 1 TABLET: TABLET at 21:52

## 2024-02-15 RX ADMIN — FOLIC ACID 1 MG: 1 TABLET ORAL at 21:52

## 2024-02-15 RX ADMIN — ROSUVASTATIN CALCIUM 5 MG: 10 TABLET, FILM COATED ORAL at 21:52

## 2024-02-15 ASSESSMENT — PAIN DESCRIPTION - DESCRIPTORS
DESCRIPTORS: STABBING
DESCRIPTORS: STABBING
DESCRIPTORS: PRESSURE
DESCRIPTORS: SHARP

## 2024-02-15 ASSESSMENT — PAIN - FUNCTIONAL ASSESSMENT: PAIN_FUNCTIONAL_ASSESSMENT: 0-10

## 2024-02-15 ASSESSMENT — PAIN SCALES - GENERAL
PAINLEVEL_OUTOF10: 8
PAINLEVEL_OUTOF10: 10

## 2024-02-15 ASSESSMENT — PAIN DESCRIPTION - LOCATION
LOCATION: SHOULDER
LOCATION: ABDOMEN
LOCATION: LEG
LOCATION: SHOULDER

## 2024-02-15 ASSESSMENT — PAIN DESCRIPTION - ORIENTATION
ORIENTATION: LEFT
ORIENTATION: RIGHT

## 2024-02-15 NOTE — ED NOTES
ED to inpatient nurses report     Chief Complaint   Patient presents with    Leg Swelling     Pt reports swelling/redness to RLE for the past week. Was in the cancer center and told to come to ED for cultures/possible admit. Denies injury.      Present to ED from Oncology Floor/Dr. Ruiz  LOC: alert and orientated to name, place, date  Vital signs   Vitals:    02/15/24 1107 02/15/24 1109   BP:  (!) 146/82   Pulse: 88    Resp: 22    Temp: 97.3 °F (36.3 °C)    TempSrc: Oral    SpO2: 97%    Weight: 95.3 kg (210 lb)       Oxygen Baseline None    Current needs required None  SEPSIS: NO  [N] Lactate X 2 ordered (Yes or No)  [Y] Antibiotics given (Yes or No)  [N] IV Fluids ordered (Yes or No)             [N] 2nd IV completed (Yes or No)  [N] Hourly Vital Signs (Validated)  [N] Outstanding Orders:     LDAs:   Peripheral IV 02/15/24 Right Forearm (Active)   Site Assessment Clean, dry & intact 02/15/24 1125   Line Status Blood return noted;Flushed;Normal saline locked;Specimen collected 02/15/24 1125   Dressing Status New dressing applied;Clean, dry & intact 02/15/24 1125   Dressing Type Transparent 02/15/24 1125     Mobility: Independent  Fall Risk:    Pending ED orders: None  Present condition: Stable  Code Status: Full  Consults: PHARMACY TO DOSE VANCOMYCIN  IP CONSULT TO HOSPITALIST  IP CONSULT TO ONCOLOGY  [x]  Hospitalist  Completed  [x] yes [] no Who: Intermed  []  Medicine  Completed  [] yes [] No Who:   []  Cardiology  Completed  [] yes [] No Who:   []  GI   Completed  [] yes [] No Who:   []  Neurology  Completed  [] yes [] No Who:   []  Nephrology Completed  [] yes [] No Who:    []  Vascular  Completed  [] yes [] No Who:   []  Ortho  Completed  [] yes [] No Who:     []  Surgery  Completed  [] yes [] No Who:    []  Urology  Completed  [] yes [] No Who:    []  CT Surgery Completed  [] yes [] No Who:   []  Podiatry  Completed  [] yes [] No Who:    [x]  Other    Completed  [x] yes [] No Who: Dr. Ruiz,

## 2024-02-15 NOTE — ACP (ADVANCE CARE PLANNING)
Advance Care Planning     Advance Care Planning Activator (Inpatient)  Conversation Note      Date of ACP Conversation: 2/15/2024     Conversation Conducted with: Patient with Decision Making Capacity    ACP Activator: RYAN Hutchins        Health Care Decision Maker:     Current Designated Health Care Decision Maker:     Primary Decision Maker: Daquan Guevara - Brother/Sister - [phone]  Click here to complete Healthcare Decision Makers including section of the Healthcare Decision Maker Relationship (ie \"Primary\")  Today we documented Decision Maker(s) consistent with Legal Next of Kin hierarchy.    Care Preferences    Ventilation:  \"If you were in your present state of health and suddenly became very ill and were unable to breathe on your own, what would your preference be about the use of a ventilator (breathing machine) if it were available to you?\"      Would the patient desire the use of ventilator (breathing machine)?: yes    \"If your health worsens and it becomes clear that your chance of recovery is unlikely, what would your preference be about the use of a ventilator (breathing machine) if it were available to you?\"     Would the patient desire the use of ventilator (breathing machine)?: No      Resuscitation  \"CPR works best to restart the heart when there is a sudden event, like a heart attack, in someone who is otherwise healthy. Unfortunately, CPR does not typically restart the heart for people who have serious health conditions or who are very sick.\"    \"In the event your heart stopped as a result of an underlying serious health condition, would you want attempts to be made to restart your heart (answer \"yes\" for attempt to resuscitate) or would you prefer a natural death (answer \"no\" for do not attempt to resuscitate)?\" yes       [] Yes   [x] No   Educated Patient / Decision Maker regarding differences between Advance Directives and portable DNR orders.    Length of ACP Conversation in

## 2024-02-15 NOTE — CARE COORDINATION
Case Management Assessment  Initial Evaluation    Date/Time of Evaluation: 2/15/2024 6:01 PM  Assessment Completed by: RYAN Hutchins    If patient is discharged prior to next notation, then this note serves as note for discharge by case management.    Patient Name: Danny Guevara                   YOB: 1960  Diagnosis: Cellulitis of right lower extremity [L03.115]                   Date / Time: 2/15/2024 11:09 AM    Patient Admission Status: Inpatient   Readmission Risk (Low < 19, Mod (19-27), High > 27): Readmission Risk Score: 20.4    Current PCP: Roberto Donaldson MD  PCP verified by CM? Yes    Chart Reviewed: Yes      History Provided by: Patient  Patient Orientation: Alert and Oriented    Patient Cognition: Alert    Hospitalization in the last 30 days (Readmission):  Yes    If yes, Readmission Assessment in  Navigator will be completed.    Advance Directives:      Code Status: Prior   Patient's Primary Decision Maker is: Legal Next of Kin    Primary Decision Maker: Daquan Guevara - Brother/Sister - [phone]    Discharge Planning:    Patient lives with: Alone Type of Home: Apartment  Primary Care Giver: Self  Patient Support Systems include: Family Members   Current Financial resources: Medicare  Current community resources: None  Current services prior to admission: Durable Medical Equipment            Current DME: Walker, Other (Comment) (BP cuff, pulse ox)            Type of Home Care services:  Aide Services, Nursing Services (has NP visiting from [facility])    ADLS  Prior functional level: Independent in ADLs/IADLs  Current functional level: Assistance with the following:, Bathing, Dressing, Cooking, Housework    PT AM-PAC:   /24  OT AM-PAC:   /24    Family can provide assistance at DC: No  Would you like Case Management to discuss the discharge plan with any other family members/significant others, and if so, who? No  Plans to Return to Present Housing: Yes  Other Identified

## 2024-02-15 NOTE — PROGRESS NOTES
bone mets  LE cellulitis.     PLAN: records and labs were reviewed and explained to patient. I explained to the patient the nature of this problem with myeloproliferative disorder and management plan. Based on Bone marrow and cytogenetics no CML was seen. Diagnosis is made with myeloproliferative disorder NOS.  Labs reviewed.   I will continue the hydroxyurea once a day and will make further recommendation based on results of the labs.  If needed we may need to do repeated bone marrow test.  Patient will be seen by neurology for further management of peripheral neuropathy.    Discussed the tests related to the lung cancer. Possible bone mets.  He has rt LE cellulitis.  Will refer to ER for possible admission  Will do tempus test. Will evaluate the bone with bone scan.  Recommendations based on results.

## 2024-02-15 NOTE — TELEPHONE ENCOUNTER
FIDEL HERE FOR MD VISIT  ER for rt leg cellulitis  Brain MRI, Bone scan, tempus test  RV after discharge from hospital with above tests  TEMPUS TESTING WAS DONE TODAY W/TEMPUS ORDERS  LABS DEMOGRAPHICS IMAGING PATHOLOGY  REPORT  & MD NOTES TO BE FAXED ONCE COMPLETED TO   [phone]  MRI & BONE SCAN WILL BE SCHEDULED ONCE ORDER  IS PLACED IN PT'S CHART  PT WENT TO ER ON EXIT  MD VISIT TBD  AVS PRINTED W/ INSTRUCTIONS AND GIVEN  TO PT ON EXIT

## 2024-02-15 NOTE — H&P
Linn Azul APRN - CNP  Nurse Practitioner  Internal Medicine     Progress Notes      Cosign Needed     Date of Service: 2/15/2024  2:31 PM     Cosign Needed       Expand All Collapse All    [facility]  Office: [phone]  Rio Blair DO, Javier Walters DO, Romulo Boone DO, Miquel Casey DO, Nataly Priest MD, Daly Mendoza MD, Darius Mcmahon MD, Mary Swain MD,  Ad Goode MD, Vale Peña MD, Rancho Harley MD,  Julia Goddard DO, Bruno Frank MD, Nabeel Spicer MD, Danny Blair DO, Lulú Hernandez MD,  Piyush Lepe DO, Anabela Cummings MD, Valentine Brian MD, Caren Slaughter MD, Reddy Cagle MD,  Ibrahima oFrtune MD, Idalia Girard MD, Zunilda Whiting MD, Konstantin Mane MD, Lucho Haq MD, Oswaldo Crawford MD, Jc Acuña DO, De Cochran DO, Sole Mcgovern MD,  Vinay Meza MD, Shirley Waterhouse, CNP,  Paulette Cook CNP, Ruslan Ward CNP,  Doreen Corcoran DNP, Twyla Pacheco, JENNIFER, Ashley Mesa, JENNIFER, Linn Azul CNP, Karma Sage, JENNIFER, Tanvi Jim CNP, Lizzie Esqueda PA-C, Tova Romano PA-C, Deb Crespo CNP, Danielle Gonzalez CNP, Shwetha León, CNS, Estrella Bledsoe, CNP, Sera Kruger CNP, Tracy Schwab, CNP            [facility]   IN-PATIENT SERVICE   [facility]     HISTORY AND PHYSICAL EXAMINATION            Date:                            2/15/2024  Patient name:              Danny uGevara  Date of admission:      2/15/2024 11:09 AM  MRN:                           [id number]  Account:                      [id number]  YOB: 1960  PCP:                            Roberto Donaldson MD  Room:                          STA15/15  Code Status:               Full     Chief Complaint:           Chief Complaint   Patient presents with    Leg Swelling       Pt reports swelling/redness to RLE for the past week. Was in the cancer center and told to come to ED for cultures/possible admit. Denies

## 2024-02-15 NOTE — TELEPHONE ENCOUNTER
Name: Danny Guevara  : 1960  MRN: [id number]    Oncology Navigation- Initial Note:    Intake-  Contact Type: Medical Oncology    Continuum of Care: Diagnosis/Active Treatment    Smoking hx: Current smoker. Writer offering smoking cessation and pt. Declines at this time. Smoking cessation assistance and resources provided.    Notes:   Navigator met with pt. Face to face and introducing self. Navigation packet given to pt. Along with Tempus hand out. Pt. Also given [place] hand out. Pt. Using Medicare for  rides. Resources offered and all questions answered. Pt. Has cellulitis to right lower leg and instructed to go to ED, pt. Planning to go to [facility] now.   Electronically signed by uLcille Holguin RN on 2/15/2024 at 10:30 AM

## 2024-02-15 NOTE — ED PROVIDER NOTES
EMERGENCY DEPARTMENT ENCOUNTER   ATTENDING ATTESTATION     Pt Name: Danny Guevara  MRN: [id number]  Birthdate 1960  Date of evaluation: 2/15/24   Danny Guevara is a 63 y.o. male with CC: Leg Swelling (Pt reports swelling/redness to RLE for the past week. Was in the cancer center and told to come to ED for cultures/possible admit. Denies injury.)    MDM:   I performed a substantive part of the MDM during the patient's E/M visit. I personally evaluated and examined the patient. I personally made or approved the documented management plan and acknowledge its risk of complications.    Independent Interpretation: My (EKG/X-Ray/US/CT) interpretation     Discussion: Management/test interpretation discussed with        CRITICAL CARE:       EKG: All EKG's are interpreted by the Emergency Department Physician who either signs or Co-signs this chart in the absence of a cardiologist.      RADIOLOGY:All plain film, CT, MRI, and formal ultrasound images (except ED bedside ultrasound) are read by the radiologist, see reports below, unless otherwise noted in MDM or here.  Vascular duplex lower extremity venous right   Final Result        LABS: All lab results were reviewed by myself, and all abnormals are listed below.  Labs Reviewed   CBC WITH AUTO DIFFERENTIAL - Abnormal; Notable for the following components:       Result Value    WBC 20.8 (*)     Hematocrit 40.4 (*)     RDW 19.9 (*)     Platelets 704 (*)     Neutrophils % 74 (*)     Lymphocytes % 6 (*)     Immature Granulocytes 5 (*)     Neutrophils Absolute 15.38 (*)     Monocytes Absolute 2.29 (*)     Basophils Absolute 0.42 (*)     Absolute Immature Granulocyte 1.04 (*)     All other components within normal limits   BASIC METABOLIC PANEL - Abnormal; Notable for the following components:    Sodium 133 (*)     Chloride 96 (*)     Glucose 101 (*)     BUN 6 (*)     All other components within normal limits   BASIC METABOLIC PANEL W/ REFLEX TO MG FOR LOW K - Abnormal; 
TABLET    Take 1 tablet by mouth every morning  On an empty stomach 30 minutes before breakfast    LORATADINE (CLARITIN) 10 MG TABLET        LOSARTAN (COZAAR) 25 MG TABLET    TAKE 1 TABLET BY MOUTH DAILY (DISCONTINUE LISINOPRIL)    MULTIPLE VITAMIN (MULTIVITAMIN) TABS TABLET    Take 1 tablet by mouth daily    NICOTINE (NICODERM CQ) 14 MG/24HR    Place 1 patch onto the skin daily    OMEPRAZOLE (PRILOSEC) 20 MG DELAYED RELEASE CAPSULE    Take 2 capsules by mouth daily    OXYCODONE HCL (OXY-IR) 10 MG IMMEDIATE RELEASE TABLET    Take 1 tablet by mouth every 8 hours as needed for Pain for up to 14 days. Max Daily Amount: 30 mg    OXYCODONE HCL (OXY-IR) 10 MG IMMEDIATE RELEASE TABLET    Take 1 tablet by mouth every 6 hours as needed for Pain for up to 7 days. Intended supply: 3 days. Take lowest dose possible to manage pain Max Daily Amount: 40 mg    PRAZOSIN (MINIPRESS) 1 MG CAPSULE    Take 1 capsule by mouth nightly    QUETIAPINE (SEROQUEL) 300 MG TABLET    Take 1 tablet by mouth nightly    ROSUVASTATIN (CRESTOR) 5 MG TABLET    Take 1 tablet by mouth nightly    SODIUM CHLORIDE 1 G TABLET    Take 2 tablets by mouth 2 times daily (with meals)    SPIRIVA HANDIHALER 18 MCG INHALATION CAPSULE    INHALE THE CONTENTS OF 1 BISTER DAILY    THIAMINE 100 MG TABLET    Take 1 tablet by mouth daily       PAST MEDICAL HISTORY         Diagnosis Date    Alcohol abuse 08/12/2019    Anemia     Bronchitis     Cancer ([facility])     blood  2018 Oncologist Dr. Napoles   [facility] sees q 4 mos    Cervical stenosis of spine 06/14/2019    Chronic left-sided low back pain with left-sided sciatica 03/14/2019    COPD (chronic obstructive pulmonary disease) ([facility])     Hypertension     Insomnia 03/14/2019    Intentional drug overdose ([facility]) 08/11/2019    Left arm numbness 06/14/2019    Left lumbar radiculitis 05/03/2019    Macrocytosis     Major depressive disorder, recurrent severe without psychotic features ([facility]) 08/12/2019    no specialist

## 2024-02-15 NOTE — PATIENT INSTRUCTIONS
ER for rt leg cellulitis  Brain MRI, Bone scan, tempus test  RV after discharge from hospital with above tests

## 2024-02-15 NOTE — PROGRESS NOTES
[facility]  Office: [phone]  Rio Blair DO, Javier Walters DO, Romulo Boone DO, Miquel Casey DO, Nataly Priest MD, Daly Mendoza MD, Darius Mcmahon MD, Mary Swain MD,  Ad Goode MD, Vale Peña MD, Rancho Harley MD,  Julia Goddard DO, Bruno Frank MD, Nabeel Spicer MD, Danny Blair DO, Lulú Hernandez MD,  Piyush Lepe DO, Anabela Cummings MD, Valentine Brian MD, Caren Slaughter MD, Reddy Cagle MD,  Ibrahima Fortune MD, Idalia Girard MD, Zunilda Whiting MD, Konstantin Mane MD, Lucho Haq MD, Oswaldo Crawford MD, Jc Acuña DO, De Cochran DO, Sole Mcgovern MD,  Vinay Meza MD, Shirley Waterhouse, CNP,  Paulette Cook, CNP, Ruslan Ward, CNP,  Doreen Corcoran, DNP, Twyla Pacheco, CNP, Ashley Mesa, CNP, Linn Azul CNP, Karma Sage, CNP, Tanvi Jim, CNP, Lizzie Esqueda, PA-C, Tova Romano, PA-C, Deb Crespo, CNP, Danielle Gonzalez, CNP, Shwetha León, CNS, Estrella Bledsoe, CNP, Sera Kruger, CNP, Tracy Schwab, CNP         [facility]   IN-PATIENT SERVICE   [facility]    HISTORY AND PHYSICAL EXAMINATION            Date:   2/15/2024  Patient name:  Danny Guevara  Date of admission:  2/15/2024 11:09 AM  MRN:   [id number]  Account:  [id number]  YOB: 1960  PCP:    Roberto Donaldson MD  Room:   [place] [address]  Code Status:    Full    Chief Complaint:     Chief Complaint   Patient presents with    Leg Swelling     Pt reports swelling/redness to RLE for the past week. Was in the cancer center and told to come to ED for cultures/possible admit. Denies injury.     History Obtained From:     patient, electronic medical record    History of Present Illness:     Patient presents to the ED with complaints of left shoulder pain as well as right lower extremity redness and edema. Patient has gotten a recent lung cancer diagnosis and was at his oncology appointment this morning to discuss next steps. Patient's leg was

## 2024-02-16 PROBLEM — K59.03 DRUG-INDUCED CONSTIPATION: Status: ACTIVE | Noted: 2024-02-16

## 2024-02-16 PROBLEM — Z71.89 GOALS OF CARE, COUNSELING/DISCUSSION: Status: ACTIVE | Noted: 2024-02-16

## 2024-02-16 PROBLEM — Z51.5 PALLIATIVE CARE ENCOUNTER: Status: ACTIVE | Noted: 2024-02-16

## 2024-02-16 PROBLEM — Z71.89 DNR (DO NOT RESUSCITATE) DISCUSSION: Status: ACTIVE | Noted: 2024-02-16

## 2024-02-16 PROBLEM — Z71.89 ACP (ADVANCE CARE PLANNING): Status: ACTIVE | Noted: 2024-02-16

## 2024-02-16 PROBLEM — C34.90 MALIGNANT NEOPLASM OF LUNG (HCC): Status: ACTIVE | Noted: 2024-02-16

## 2024-02-16 LAB
ANION GAP SERPL CALCULATED.3IONS-SCNC: 10 MMOL/L (ref 9–17)
BASOPHILS # BLD: 0.39 K/UL (ref 0–0.2)
BASOPHILS NFR BLD: 2 % (ref 0–2)
BUN SERPL-MCNC: 7 MG/DL (ref 8–23)
BUN/CREAT SERPL: 9 (ref 9–20)
CALCIUM SERPL-MCNC: 9.1 MG/DL (ref 8.6–10.4)
CHLORIDE SERPL-SCNC: 100 MMOL/L (ref 98–107)
CO2 SERPL-SCNC: 26 MMOL/L (ref 20–31)
CREAT SERPL-MCNC: 0.8 MG/DL (ref 0.7–1.2)
EOSINOPHIL # BLD: 0.39 K/UL (ref 0–0.44)
EOSINOPHILS RELATIVE PERCENT: 2 % (ref 1–4)
ERYTHROCYTE [DISTWIDTH] IN BLOOD BY AUTOMATED COUNT: 19.9 % (ref 11.8–14.4)
GFR SERPL CREATININE-BSD FRML MDRD: >60 ML/MIN/1.73M2
GLUCOSE SERPL-MCNC: 109 MG/DL (ref 70–99)
HCT VFR BLD AUTO: 37.9 % (ref 40.7–50.3)
HGB BLD-MCNC: 12.4 G/DL (ref 13–17)
IMM GRANULOCYTES # BLD AUTO: 0.59 K/UL (ref 0–0.3)
IMM GRANULOCYTES NFR BLD: 3 %
LYMPHOCYTES NFR BLD: 1.18 K/UL (ref 1.1–3.7)
LYMPHOCYTES RELATIVE PERCENT: 6 % (ref 24–43)
MCH RBC QN AUTO: 28.1 PG (ref 25.2–33.5)
MCHC RBC AUTO-ENTMCNC: 32.7 G/DL (ref 28.4–34.8)
MCV RBC AUTO: 85.7 FL (ref 82.6–102.9)
MONOCYTES NFR BLD: 13 % (ref 3–12)
MONOCYTES NFR BLD: 2.56 K/UL (ref 0.1–1.2)
NEUTROPHILS NFR BLD: 74 % (ref 36–65)
NEUTS SEG NFR BLD: 14.59 K/UL (ref 1.5–8.1)
NRBC BLD-RTO: 0 PER 100 WBC
PLATELET # BLD AUTO: 580 K/UL (ref 138–453)
PMV BLD AUTO: 9.4 FL (ref 8.1–13.5)
POTASSIUM SERPL-SCNC: 3.9 MMOL/L (ref 3.7–5.3)
RBC # BLD AUTO: 4.42 M/UL (ref 4.21–5.77)
SODIUM SERPL-SCNC: 136 MMOL/L (ref 135–144)
VANCOMYCIN SERPL-MCNC: 24.8 UG/ML
WBC OTHER # BLD: 19.7 K/UL (ref 3.5–11.3)

## 2024-02-16 PROCEDURE — 6360000002 HC RX W HCPCS: Performed by: FAMILY MEDICINE

## 2024-02-16 PROCEDURE — 97116 GAIT TRAINING THERAPY: CPT

## 2024-02-16 PROCEDURE — 97535 SELF CARE MNGMENT TRAINING: CPT

## 2024-02-16 PROCEDURE — 6370000000 HC RX 637 (ALT 250 FOR IP): Performed by: NURSE PRACTITIONER

## 2024-02-16 PROCEDURE — 80048 BASIC METABOLIC PNL TOTAL CA: CPT

## 2024-02-16 PROCEDURE — 6360000002 HC RX W HCPCS: Performed by: NURSE PRACTITIONER

## 2024-02-16 PROCEDURE — 80202 ASSAY OF VANCOMYCIN: CPT

## 2024-02-16 PROCEDURE — 97162 PT EVAL MOD COMPLEX 30 MIN: CPT

## 2024-02-16 PROCEDURE — 6370000000 HC RX 637 (ALT 250 FOR IP): Performed by: PHYSICIAN ASSISTANT

## 2024-02-16 PROCEDURE — 1200000000 HC SEMI PRIVATE

## 2024-02-16 PROCEDURE — 2580000003 HC RX 258: Performed by: FAMILY MEDICINE

## 2024-02-16 PROCEDURE — 2580000003 HC RX 258: Performed by: NURSE PRACTITIONER

## 2024-02-16 PROCEDURE — 99232 SBSQ HOSP IP/OBS MODERATE 35: CPT | Performed by: NURSE PRACTITIONER

## 2024-02-16 PROCEDURE — 36415 COLL VENOUS BLD VENIPUNCTURE: CPT

## 2024-02-16 PROCEDURE — 85025 COMPLETE CBC W/AUTO DIFF WBC: CPT

## 2024-02-16 PROCEDURE — 97166 OT EVAL MOD COMPLEX 45 MIN: CPT

## 2024-02-16 PROCEDURE — 99222 1ST HOSP IP/OBS MODERATE 55: CPT | Performed by: INTERNAL MEDICINE

## 2024-02-16 PROCEDURE — 99222 1ST HOSP IP/OBS MODERATE 55: CPT | Performed by: NURSE PRACTITIONER

## 2024-02-16 RX ORDER — IBUPROFEN 800 MG/1
800 TABLET ORAL EVERY 8 HOURS PRN
Status: DISCONTINUED | OUTPATIENT
Start: 2024-02-16 | End: 2024-02-16 | Stop reason: SDUPTHER

## 2024-02-16 RX ORDER — LOSARTAN POTASSIUM 50 MG/1
50 TABLET ORAL DAILY
Status: DISCONTINUED | OUTPATIENT
Start: 2024-02-17 | End: 2024-02-18 | Stop reason: HOSPADM

## 2024-02-16 RX ORDER — PRAZOSIN HYDROCHLORIDE 1 MG/1
1 CAPSULE ORAL 2 TIMES DAILY
Status: DISCONTINUED | OUTPATIENT
Start: 2024-02-16 | End: 2024-02-18 | Stop reason: HOSPADM

## 2024-02-16 RX ORDER — QUETIAPINE FUMARATE 100 MG/1
100 TABLET, FILM COATED ORAL PRN
COMMUNITY

## 2024-02-16 RX ORDER — OXYCODONE HYDROCHLORIDE 10 MG/1
10 TABLET ORAL EVERY 8 HOURS PRN
COMMUNITY
End: 2024-02-21 | Stop reason: SDUPTHER

## 2024-02-16 RX ORDER — DOCUSATE SODIUM 100 MG/1
100 CAPSULE, LIQUID FILLED ORAL 2 TIMES DAILY
Status: DISCONTINUED | OUTPATIENT
Start: 2024-02-16 | End: 2024-02-17

## 2024-02-16 RX ORDER — OXYCODONE HYDROCHLORIDE 5 MG/1
10 TABLET ORAL EVERY 6 HOURS PRN
Status: DISCONTINUED | OUTPATIENT
Start: 2024-02-16 | End: 2024-02-18 | Stop reason: HOSPADM

## 2024-02-16 RX ADMIN — Medication 1250 MG: at 01:43

## 2024-02-16 RX ADMIN — FOLIC ACID 1 MG: 1 TABLET ORAL at 08:28

## 2024-02-16 RX ADMIN — OXYCODONE HYDROCHLORIDE 10 MG: 5 TABLET ORAL at 17:25

## 2024-02-16 RX ADMIN — MORPHINE SULFATE 4 MG: 4 INJECTION, SOLUTION INTRAMUSCULAR; INTRAVENOUS at 19:39

## 2024-02-16 RX ADMIN — GUAIFENESIN 600 MG: 600 TABLET ORAL at 21:18

## 2024-02-16 RX ADMIN — GUAIFENESIN 600 MG: 600 TABLET ORAL at 08:28

## 2024-02-16 RX ADMIN — ENOXAPARIN SODIUM 40 MG: 100 INJECTION SUBCUTANEOUS at 08:31

## 2024-02-16 RX ADMIN — LEVOTHYROXINE SODIUM 25 MCG: 25 TABLET ORAL at 08:28

## 2024-02-16 RX ADMIN — IBUPROFEN 800 MG: 800 TABLET ORAL at 04:09

## 2024-02-16 RX ADMIN — Medication 2 G: at 17:18

## 2024-02-16 RX ADMIN — Medication 2 G: at 08:28

## 2024-02-16 RX ADMIN — OXYCODONE HYDROCHLORIDE 10 MG: 5 TABLET ORAL at 11:38

## 2024-02-16 RX ADMIN — SODIUM CHLORIDE: 9 INJECTION, SOLUTION INTRAVENOUS at 11:34

## 2024-02-16 RX ADMIN — PANTOPRAZOLE SODIUM 40 MG: 40 TABLET, DELAYED RELEASE ORAL at 05:18

## 2024-02-16 RX ADMIN — PRAZOSIN HYDROCHLORIDE 1 MG: 1 CAPSULE ORAL at 21:17

## 2024-02-16 RX ADMIN — CEFEPIME 2000 MG: 2 INJECTION, POWDER, FOR SOLUTION INTRAVENOUS at 19:42

## 2024-02-16 RX ADMIN — HYDROXYUREA 500 MG: 500 CAPSULE ORAL at 08:28

## 2024-02-16 RX ADMIN — MULTIVITAMIN TABLET 1 TABLET: TABLET at 08:28

## 2024-02-16 RX ADMIN — FUROSEMIDE 20 MG: 20 TABLET ORAL at 08:28

## 2024-02-16 RX ADMIN — Medication 1250 MG: at 13:13

## 2024-02-16 RX ADMIN — LOSARTAN POTASSIUM 25 MG: 25 TABLET, FILM COATED ORAL at 08:28

## 2024-02-16 RX ADMIN — MORPHINE SULFATE 4 MG: 4 INJECTION, SOLUTION INTRAMUSCULAR; INTRAVENOUS at 08:18

## 2024-02-16 RX ADMIN — Medication 100 MG: at 08:28

## 2024-02-16 RX ADMIN — FERROUS SULFATE TAB EC 325 MG (65 MG FE EQUIVALENT) 325 MG: 325 (65 FE) TABLET DELAYED RESPONSE at 17:18

## 2024-02-16 RX ADMIN — QUETIAPINE FUMARATE 300 MG: 100 TABLET ORAL at 21:17

## 2024-02-16 RX ADMIN — CETIRIZINE HYDROCHLORIDE 10 MG: 10 TABLET, FILM COATED ORAL at 08:28

## 2024-02-16 RX ADMIN — ROSUVASTATIN CALCIUM 5 MG: 10 TABLET, FILM COATED ORAL at 21:18

## 2024-02-16 RX ADMIN — CEFEPIME 2000 MG: 2 INJECTION, POWDER, FOR SOLUTION INTRAVENOUS at 08:26

## 2024-02-16 RX ADMIN — DOCUSATE SODIUM 100 MG: 100 CAPSULE, LIQUID FILLED ORAL at 21:18

## 2024-02-16 RX ADMIN — MORPHINE SULFATE 4 MG: 4 INJECTION, SOLUTION INTRAMUSCULAR; INTRAVENOUS at 13:28

## 2024-02-16 RX ADMIN — OXYCODONE HYDROCHLORIDE 10 MG: 5 TABLET ORAL at 04:05

## 2024-02-16 ASSESSMENT — PAIN DESCRIPTION - LOCATION
LOCATION: BACK
LOCATION: SHOULDER;CHEST;BACK
LOCATION: CHEST;BACK
LOCATION: SHOULDER;CHEST
LOCATION: SHOULDER;BACK;CHEST
LOCATION: SHOULDER;CHEST;BACK

## 2024-02-16 ASSESSMENT — PAIN SCALES - GENERAL
PAINLEVEL_OUTOF10: 4
PAINLEVEL_OUTOF10: 8
PAINLEVEL_OUTOF10: 4
PAINLEVEL_OUTOF10: 4
PAINLEVEL_OUTOF10: 5
PAINLEVEL_OUTOF10: 8
PAINLEVEL_OUTOF10: 9
PAINLEVEL_OUTOF10: 10

## 2024-02-16 ASSESSMENT — PAIN DESCRIPTION - ONSET
ONSET: ON-GOING

## 2024-02-16 ASSESSMENT — PAIN DESCRIPTION - ORIENTATION
ORIENTATION: LEFT
ORIENTATION: MID

## 2024-02-16 ASSESSMENT — PAIN DESCRIPTION - DESCRIPTORS
DESCRIPTORS: SHARP;STABBING
DESCRIPTORS: STABBING
DESCRIPTORS: STABBING;THROBBING
DESCRIPTORS: THROBBING

## 2024-02-16 ASSESSMENT — PAIN DESCRIPTION - PAIN TYPE
TYPE: CHRONIC PAIN;INTRACTABLE PAIN
TYPE: INTRACTABLE PAIN;CHRONIC PAIN

## 2024-02-16 ASSESSMENT — PAIN DESCRIPTION - FREQUENCY
FREQUENCY: CONTINUOUS

## 2024-02-16 ASSESSMENT — PAIN DESCRIPTION - DIRECTION: RADIATING_TOWARDS: BACK

## 2024-02-16 NOTE — PROGRESS NOTES
Transitions of Care Pharmacy Service   Medication Review    The patient's list of current home medications has been reviewed.     Source(s) of information: patient, [facility]    Based on information provided by the above source(s), I have updated the patient's home med list as described below.     Please review the ACTION REQUESTED section of this note below for any discrepancies on current hospital orders.      I changed or updated the following medications on the patient's home medication list:  Removed Ferrous sulfate  Mucinex  Duoneb  Nicotine patch  Oxy IR duplicate  Spiriva     Added Seroquel 100mg PRN     Adjusted   Aspirin  Claritin  Losartan  Prilosec         PROVIDER ACTION REQUESTED  Medications that need to be addressed by a physician/nurse practitioner:    Action Requested   Losartan 50mg daily   Prazosin 1mg BID - pt states once daily but gets a bubble pack that is BID         Please feel free to call me with any questions about this encounter. Thank you.    Darnell Guillory RPH   Transitions of Care Pharmacy Service  Phone:  [phone]  Fax: [phone]      Electronically signed by Darnell Guillory [facility] on 2/16/2024 at 5:59 PM         Medications Prior to Admission:   oxyCODONE HCl (OXY-IR) 10 MG immediate release tablet, Take 1 tablet by mouth every 8 hours as needed for Pain. Max Daily Amount: 30 mg  QUEtiapine (SEROQUEL) 100 MG tablet, Take 1 tablet by mouth as needed (anxiety) Indications: in addition to 300mg daily dose  ibuprofen (ADVIL;MOTRIN) 800 MG tablet, Take 1 tablet by mouth 3 times daily as needed for Pain  hydroxyurea (HYDREA) 500 MG chemo capsule, TAKE 1 CAPSULE BY MOUTH DAILY  prazosin (MINIPRESS) 1 MG capsule, Take 1 capsule by mouth in the morning and at bedtime  omeprazole (PRILOSEC) 40 MG delayed release capsule, Take 1 capsule by mouth daily  sodium chloride 1 g tablet, Take 2 tablets by mouth 2 times daily (with meals)  Multiple Vitamin (MULTIVITAMIN) TABS tablet, Take 1

## 2024-02-16 NOTE — PROGRESS NOTES
[facility]   Pharmacy Pharmacokinetic Monitoring Service - Vancomycin     Danny Guevara is a 63 y.o. male starting on vancomycin therapy for Skin and Soft Tissue Infection. Pharmacy consulted by Linn Azul NP for monitoring and adjustment.    Target Concentration: Goal AUC/FREDERIC 400-600 mg*hr/L    Additional Antimicrobials: Cefepime    Pertinent Laboratory Values:   Wt Readings from Last 1 Encounters:   02/15/24 95.4 kg (210 lb 6.4 oz)     Temp Readings from Last 1 Encounters:   02/15/24 98.4 °F (36.9 °C) (Oral)     Estimated Creatinine Clearance: 124 mL/min (based on SCr of 0.7 mg/dL).  Recent Labs     02/15/24  1120   CREATININE 0.7   BUN 6*   WBC 20.8*     Procalcitonin: None    Pertinent Cultures:  Procedure Component Value Units Date/Time   Culture, Blood 1 [[id number]] Collected: 02/15/24 1145   Order Status: Completed Specimen: Blood Updated: 02/15/24 1416    Specimen Description .BLOOD    Special Requests LAC 10ML    Culture NO GROWTH <24 HRS   Culture, Blood 2 [[id number]] Collected: 02/15/24 1120   Order Status: Completed Specimen: Blood Updated: 02/15/24 1413    Specimen Description .BLOOD    Special Requests RAC 10ML    Culture NO GROWTH <24 HRS       MRSA Nasal Swab: N/A. Non-respiratory infection.    Plan:  Dosing recommendations based on Bayesian software  Start vancomycin 2500mg x1, then Vancomycin 1250mg every 12hours  Anticipated AUC of 432 and trough concentration of 11.2 at steady state  Renal labs as indicated   Vancomycin concentration ordered for 02/16 @ 1800   Pharmacy will continue to monitor patient and adjust therapy as indicated    Thank you for the consult,  Duke Chan RPH  2/15/2024 8:35 PM

## 2024-02-16 NOTE — PROGRESS NOTES
Advance Care Planning   Healthcare Decision Maker:    Primary Decision Maker: Daquan Guevara - Brother/Sister - [phone]    Today we documented decision maker as patient's brother Daquan. Patient does not wish to include children at this time.       If the relationship to the patient does NOT follow our state's Next of Kin hierarchy, the patient MUST complete an ACP Document to allow him/her to act on the patient's behalf. :    All documents signed, witnessed and completed.     02/16/24 1822   Encounter Summary   Service Provided For: Patient   Referral/Consult From: Multi-disciplinary team   Support System Family members   Last Encounter  02/16/24   Complexity of Encounter Moderate   Begin Time 1700   End Time  1800   Total Time Calculated 60 min   Spiritual/Emotional needs   Type Spiritual Support   Advance Care Planning   Type Completed AD/ACP document(s)   Assessment/Intervention/Outcome   Assessment Anxious;Compromised coping;Impaired resilience;Interrupted family processes;Loneliness;Sad   Intervention Active listening;Explored/Affirmed feelings, thoughts, concerns;Explored Coping Skills/Resources;Nurtured Hope;Sustaining Presence/Ministry of presence   Outcome Comfort;Connection/Belonging;Engaged in conversation;Expressed feelings, needs, and concerns;Receptive;Venting emotion

## 2024-02-16 NOTE — CARE COORDINATION
[place] Quality Flow/Interdisciplinary Rounds Progress Note    Quality Flow Rounds held on February 16, 2024 at 0930    Disciplines Attending:  Bedside Nurse, , , and Nursing Unit Leadership    Barriers to Discharge: Clinical Status    Anticipated Discharge Date:   2/17/24    Anticipated Discharge Disposition: Home    Readmission Risk              Risk of Unplanned Readmission:  29           Discussed patient goal for the day, patient clinical progression, and barriers to discharge. IV cefepime and vanco for RLE cellulitis.  Await palliative care and oncology consults.      Alessandra Ruano RN  February 16, 2024

## 2024-02-16 NOTE — PROGRESS NOTES
Physical Therapy  Facility/Department: [facility] MED SURG  Physical Therapy Initial Assessment    Name: Danny Guevara  : 1960  MRN: [id number]  Date of Service: 2024    Discharge Recommendations:  Patient would benefit from continued therapy after discharge    Due to recent hospitalization and medical condition, pt would benefit from additional intermittent skilled therapy at time of discharge.  Please refer to the AM-PAC score for current functional status.     PER HPI: Patient presents to the ED with complaints of left shoulder pain as well as right lower extremity redness and edema. Patient has gotten a recent lung cancer diagnosis and was at his oncology appointment this morning to discuss next steps. Patient's leg was noticed by the oncologist and the patient was sent to the ED for further evaluation and treatment. Patient states that the redness and swelling as been there for the past week. Patient reports subsequent left shoulder/upper chest pain as well as intermittent shortness of breath. Patient reports daily alcohol and tobacco use.        Patient is being admitted for right lower extremity cellulitis with consult to Hem/onc and palliative care. IV antibiotics have been started.         Patient Diagnosis(es): The encounter diagnosis was Cellulitis of right lower extremity.  Past Medical History:  has a past medical history of Alcohol abuse, Anemia, Bronchitis, Cancer (HCC), Cervical stenosis of spine, Chronic left-sided low back pain with left-sided sciatica, COPD (chronic obstructive pulmonary disease) ([facility]), Hypertension, Insomnia, Intentional drug overdose (HCC), Left arm numbness, Left lumbar radiculitis, Macrocytosis, Major depressive disorder, recurrent severe without psychotic features (HCC), Myeloproliferative disorder (HCC), Osteoarthritis of spine with radiculopathy, lumbar region, Pneumonia, Severe recurrent major depression without psychotic features ([facility]), Spondylosis of lumbar

## 2024-02-16 NOTE — PROGRESS NOTES
Patient receives Sacrament of the Sick (anointing) from [employer].    Spiritual Care will follow as needed.  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

## 2024-02-16 NOTE — PROGRESS NOTES
SPIRITUAL CARE DEPARTMENT [facility]  PROGRESS NOTE    Room # 2024/2024-01   Name: Danny Guevara            Hindu: Anabaptist     Reason for visit: Advanced Directives Consult    I visited the patient.    Admit Date & Time: 2/15/2024 11:09 AM    Assessment:  Danny Guevara is a 63 y.o. male in the hospital because he was at cancer center today and found to have cellulitis and instructed to come to ED. Upon entering the room,the patient has just been moved from the ED to an inpatient room on Med Surg unit. Patient awake, alert and getting acclimated.       Intervention:  I introduced myself and my title as  I offered space for patient  to express feelings, needs, and concerns and provided a ministry presence. Patient reports that he is considering options for Medical Power of  and would like  to return tomorrow after he has had more time to think.  will revisit in the morning.     Outcome:  Patient calm and coping.    Plan:  Chaplains will remain available to offer spiritual and emotional support as needed.    Electronically signed by Chaplain Neisha, on 2/15/2024 at 10:04 PM.  Spiritual Care Department  [facility]     02/15/24 2202   Encounter Summary   Service Provided For: Patient   Referral/Consult From: Multi-disciplinary team   Support System Children;Family members   Last Encounter  02/15/24   Complexity of Encounter Low   Begin Time 1845   End Time  1910   Total Time Calculated 25 min   Spiritual/Emotional needs   Type Spiritual Support   Grief, Loss, and Adjustments   Type Life Adjustments   Assessment/Intervention/Outcome   Assessment Calm;Coping   Intervention Active listening;Explored/Affirmed feelings, thoughts, concerns   Outcome Connection/Belonging

## 2024-02-16 NOTE — CONSULTS
Palliative Care Inpatient Consult    NAME:  Danny Guevara  MEDICAL RECORD NUMBER:  [id number]  AGE: 63 y.o.   GENDER: male  : 1960  TODAY'S DATE:  2024    Reasons for Consultation:    Symptom and/or pain management  Provision of information regarding PC and/or hospice philosophies  Complex, time-intensive communication and interdisciplinary psychosocial support  Clarification of goals of care and/or assistance with difficult decision-making  Guidance in regards to resources and transition(s)    Members of PC team contributing to this consultation are : Lucinda Linder, Palliative Care APRN-CNP    Plan      Palliative Interaction: I went to see patient and he was lying in bed awake. I sat down and explained the palliative role to him. He is A/O x4. No family was present.     We discussed his cancer journey thus far. He tells me that he has met with his Oncologist at [facility] and was told her has stage IV cancer. He reports further testing being sent off to see what type of treatment he can have. He reports knowing that treatment is palliative in nature. He also take an oral chemo for his myeloproliferative disorder.     I discussed patients goals and he reports being unsure about treatment. He reports wanting to finish the work-up and testing to see what treatment would look like. He also reports wanting to talk with Oncologist about prognosis with and without treatment so he can decide what he wants to do. He reports QOL being important to him.     I discussed patients history and he reports being a drinker for many years. He reports 15 beers a day being typical for him. He reports Dx that he is down to a couple a day now. He reports planning cessation and reports accomplishing this a few years back but states once his wife , he went back to drinking. He reports smoking cigarettes as well. He denies any illegal substance abuse and reports this was his history many years ago and gave estimate of over 
Pharmacy dosing of initial vancomycin ordered by ED provider.      2500 mg ordered x 1. (95 kg)     Pharmacy is waiting to see if vancomycin therapy is continued or stopped/changed by the admitting physician.    
Myeloproliferative disease (HCC) [D47.1]          IMPRESSION:   Right lower extremity cellulitis  Myeloproliferative disorder  Left upper lobe lung nodule with paratracheal and hilar lymphadenopathy and lucent bone lesion in the fourth rib and left humerus suspicious for metastatic disease.  Patient was planning to get bone scan as outpatient.  His bronchoscopic biopsy from the lung showed squamous cell carcinoma  Leukocytosis thrombocytosis likely from myeloproliferative disorder currently on hydroxyurea  COPD  History of alcohol and tobacco abuse    RECOMMENDATIONS:  I reviewed the laboratory data, imaging studies, prior records, biopsy results, discussed diagnosis prognosis and treatment recommendations  He has a recent diagnosis of lung cancer based on a bronchoscopic biopsy on 2/8/2024 which showed  squamous cell carcinoma and appears to have lymph node as well as bone metastasis suggesting stage IV disease  Continue management with IV antibiotics as per primary team for his cellulitis  Okay to continue hydroxyurea  Will add ibuprofen 800 mg as requested by patient  Further management for his lung cancer will be done as outpatient after appropriate next-generation sequencing and likely will involve palliative chemoimmunotherapy    Discussed with patient and Nurse.    Thank you for asking us to see this patient.  Og Wagoner MD  Hematologist/Medical Oncologist      This note is created with the assistance of a speech recognition program.  While intending to generate a document that actually reflects the content of the visit, the document can still have some errors including those of syntax and sound a like substitutions which may escape proof reading.  It such instances, actual meaning can be extrapolated by contextual diversion.

## 2024-02-16 NOTE — PROGRESS NOTES
Occupational Therapy  Facility/Department: [facility] MED SURG  Occupational Therapy Initial Assessment    Name: Danny Guevara  : 1960  MRN: [id number]  Date of Service: 2024    Discharge Recommendations:  Patient would benefit from continued therapy after discharge  OT Equipment Recommendations  Equipment Needed:  (CTA)      RN reports patient is medically stable for therapy treatment this date.    Chart reviewed prior to treatment and patient is agreeable for therapy.  All lines intact and patient positioned comfortably at end of treatment.  All patient needs addressed prior to ending therapy session.           Patient Diagnosis(es): The encounter diagnosis was Cellulitis of right lower extremity.  Past Medical History:  has a past medical history of Alcohol abuse, Anemia, Bronchitis, Cancer (HCC), Cervical stenosis of spine, Chronic left-sided low back pain with left-sided sciatica, COPD (chronic obstructive pulmonary disease) (HCC), Hypertension, Insomnia, Intentional drug overdose (HCC), Left arm numbness, Left lumbar radiculitis, Macrocytosis, Major depressive disorder, recurrent severe without psychotic features (HCC), Myeloproliferative disorder (HCC), Osteoarthritis of spine with radiculopathy, lumbar region, Pneumonia, Severe recurrent major depression without psychotic features (HCC), Spondylosis of lumbar region without myelopathy or radiculopathy, Urinary retention, Wears dentures, and Wellness examination.  Past Surgical History:  has a past surgical history that includes cervical fusion; Appendectomy; Tonsillectomy; Carpal tunnel release; epidural steroid injection (Left, 05/15/2019); Endoscopy, colon, diagnostic; cervical fusion (N/A, 2019); bronchoscopy (2024); bronchoscopy (2024); bronchoscopy (2024); and bronchoscopy (2024).       PER HPI: Patient presents to the ED with complaints of left shoulder pain as well as right lower extremity redness and edema. Patient has

## 2024-02-16 NOTE — PROGRESS NOTES
[facility]  Office: [phone]  Rio Blair DO, Javier Walters DO, Romulo Boone DO, Miquel Casey DO, Nataly Priest MD, Daly Mendoza MD, Darius Mcmahon MD, Mary Swain MD,  Ad Goode MD, Vale Peña MD, Rancho Harley MD,  Julia Goddard DO, Bruno Frank MD, Nabeel Spicer MD, Danny Blair DO, Lulú Hernandez MD,  Piyush Lepe DO, Anabela Cummings MD, Valentine Brian MD, Caren Slaughter MD, Reddy Cagle MD,  Ibrahima Fortune MD, Idalia Girard MD, Zunilda Whiting MD, Konstantin Mane MD, Lucho Haq MD, Oswaldo Crawford MD, Jc Acuña DO, De Cochran DO, Sole Mcgovern MD,  Vinay Meza MD, Shirley Waterhouse, CNP,  Paulette Cook, CNP, Ruslan Ward, CNP,  Doreen Corcoran, DNP, Twyla Pacheco, CNP, Ashley Mesa, CNP, Linn Azul CNP, Karma Sage, CNP, Tanvi Jim, CNP, Lizzie Esqueda, PA-C, Tova Romano, PA-C, Deb Crespo, CNP, Danielle Gonzalez, CNP, Shwetha León, CNS, Estrella Bledsoe, CNP, Sera Kruger, CNP, Tracy Schwab, CNP         [facility]   IN-PATIENT SERVICE   [facility]    Progress Note    2/16/2024    9:27 AM    Name:   Danny Guevara  MRN:     [id number]     Acct:      [id number]   Room:   2024/2024-01  IP Day:  1  Admit Date:  2/15/2024 11:09 AM    PCP:   Roberto Donaldson MD  Code Status:  Full Code    Subjective:     C/C:   Chief Complaint   Patient presents with    Leg Swelling     Pt reports swelling/redness to RLE for the past week. Was in the cancer center and told to come to ED for cultures/possible admit. Denies injury.     Interval History Status: not changed.     Patient resting in bed. He reports that his pain is continued. His leg has not changed. Patient denies any fevers, chills, nausea, vomiting and diarrhea.    Brief History:     Patient presents to the ED with complaints of left shoulder pain as well as right lower extremity redness and edema. Patient has gotten a recent lung cancer diagnosis and

## 2024-02-17 LAB
ANION GAP SERPL CALCULATED.3IONS-SCNC: 10 MMOL/L (ref 9–17)
BASOPHILS # BLD: 0.19 K/UL (ref 0–0.2)
BASOPHILS NFR BLD: 1 %
BUN SERPL-MCNC: 5 MG/DL (ref 8–23)
BUN/CREAT SERPL: 6 (ref 9–20)
CALCIUM SERPL-MCNC: 9.1 MG/DL (ref 8.6–10.4)
CHLORIDE SERPL-SCNC: 101 MMOL/L (ref 98–107)
CO2 SERPL-SCNC: 23 MMOL/L (ref 20–31)
CREAT SERPL-MCNC: 0.8 MG/DL (ref 0.7–1.2)
EOSINOPHIL # BLD: 0.38 K/UL (ref 0–0.4)
EOSINOPHILS RELATIVE PERCENT: 2 % (ref 1–4)
ERYTHROCYTE [DISTWIDTH] IN BLOOD BY AUTOMATED COUNT: 19.4 % (ref 11.8–14.4)
GFR SERPL CREATININE-BSD FRML MDRD: >60 ML/MIN/1.73M2
GLUCOSE SERPL-MCNC: 120 MG/DL (ref 70–99)
HCT VFR BLD AUTO: 37.4 % (ref 40.7–50.3)
HGB BLD-MCNC: 12.5 G/DL (ref 13–17)
IMM GRANULOCYTES # BLD AUTO: 0.56 K/UL (ref 0–0.3)
IMM GRANULOCYTES NFR BLD: 3 %
LYMPHOCYTES NFR BLD: 1.13 K/UL (ref 1–4.8)
LYMPHOCYTES RELATIVE PERCENT: 6 % (ref 24–44)
MCH RBC QN AUTO: 28.4 PG (ref 25.2–33.5)
MCHC RBC AUTO-ENTMCNC: 33.4 G/DL (ref 28.4–34.8)
MCV RBC AUTO: 85 FL (ref 82.6–102.9)
MONOCYTES NFR BLD: 11 % (ref 1–7)
MONOCYTES NFR BLD: 2.07 K/UL (ref 0.2–0.8)
NEUTROPHILS NFR BLD: 77 % (ref 36–66)
NEUTS SEG NFR BLD: 14.47 K/UL (ref 1.8–7.7)
NRBC BLD-RTO: 0 PER 100 WBC
PLATELET # BLD AUTO: 478 K/UL (ref 138–453)
PMV BLD AUTO: 9.5 FL (ref 8.1–13.5)
POTASSIUM SERPL-SCNC: 4 MMOL/L (ref 3.7–5.3)
RBC # BLD AUTO: 4.4 M/UL (ref 4.21–5.77)
SODIUM SERPL-SCNC: 134 MMOL/L (ref 135–144)
WBC OTHER # BLD: 18.8 K/UL (ref 3.5–11.3)

## 2024-02-17 PROCEDURE — 6360000002 HC RX W HCPCS: Performed by: NURSE PRACTITIONER

## 2024-02-17 PROCEDURE — 1200000000 HC SEMI PRIVATE

## 2024-02-17 PROCEDURE — 6370000000 HC RX 637 (ALT 250 FOR IP): Performed by: PHYSICIAN ASSISTANT

## 2024-02-17 PROCEDURE — 85025 COMPLETE CBC W/AUTO DIFF WBC: CPT

## 2024-02-17 PROCEDURE — 2580000003 HC RX 258: Performed by: FAMILY MEDICINE

## 2024-02-17 PROCEDURE — 36415 COLL VENOUS BLD VENIPUNCTURE: CPT

## 2024-02-17 PROCEDURE — 2580000003 HC RX 258: Performed by: NURSE PRACTITIONER

## 2024-02-17 PROCEDURE — 6360000002 HC RX W HCPCS: Performed by: STUDENT IN AN ORGANIZED HEALTH CARE EDUCATION/TRAINING PROGRAM

## 2024-02-17 PROCEDURE — 6370000000 HC RX 637 (ALT 250 FOR IP): Performed by: NURSE PRACTITIONER

## 2024-02-17 PROCEDURE — 6360000002 HC RX W HCPCS: Performed by: FAMILY MEDICINE

## 2024-02-17 PROCEDURE — 6370000000 HC RX 637 (ALT 250 FOR IP): Performed by: STUDENT IN AN ORGANIZED HEALTH CARE EDUCATION/TRAINING PROGRAM

## 2024-02-17 PROCEDURE — 99232 SBSQ HOSP IP/OBS MODERATE 35: CPT | Performed by: STUDENT IN AN ORGANIZED HEALTH CARE EDUCATION/TRAINING PROGRAM

## 2024-02-17 PROCEDURE — 99232 SBSQ HOSP IP/OBS MODERATE 35: CPT | Performed by: INTERNAL MEDICINE

## 2024-02-17 PROCEDURE — 80048 BASIC METABOLIC PNL TOTAL CA: CPT

## 2024-02-17 RX ORDER — SENNA AND DOCUSATE SODIUM 50; 8.6 MG/1; MG/1
2 TABLET, FILM COATED ORAL 2 TIMES DAILY
Status: DISCONTINUED | OUTPATIENT
Start: 2024-02-17 | End: 2024-02-18 | Stop reason: HOSPADM

## 2024-02-17 RX ADMIN — PRAZOSIN HYDROCHLORIDE 1 MG: 1 CAPSULE ORAL at 09:39

## 2024-02-17 RX ADMIN — MORPHINE SULFATE 4 MG: 4 INJECTION, SOLUTION INTRAMUSCULAR; INTRAVENOUS at 21:10

## 2024-02-17 RX ADMIN — ROSUVASTATIN CALCIUM 5 MG: 10 TABLET, FILM COATED ORAL at 21:13

## 2024-02-17 RX ADMIN — Medication 100 MG: at 09:39

## 2024-02-17 RX ADMIN — PRAZOSIN HYDROCHLORIDE 1 MG: 1 CAPSULE ORAL at 21:13

## 2024-02-17 RX ADMIN — VANCOMYCIN HYDROCHLORIDE 750 MG: 750 INJECTION, POWDER, LYOPHILIZED, FOR SOLUTION INTRAVENOUS at 16:23

## 2024-02-17 RX ADMIN — VANCOMYCIN HYDROCHLORIDE 750 MG: 750 INJECTION, POWDER, LYOPHILIZED, FOR SOLUTION INTRAVENOUS at 00:11

## 2024-02-17 RX ADMIN — FUROSEMIDE 20 MG: 20 TABLET ORAL at 09:40

## 2024-02-17 RX ADMIN — PANTOPRAZOLE SODIUM 40 MG: 40 TABLET, DELAYED RELEASE ORAL at 09:40

## 2024-02-17 RX ADMIN — CEFEPIME 2000 MG: 2 INJECTION, POWDER, FOR SOLUTION INTRAVENOUS at 09:34

## 2024-02-17 RX ADMIN — OXYCODONE HYDROCHLORIDE 10 MG: 5 TABLET ORAL at 17:12

## 2024-02-17 RX ADMIN — Medication 2 G: at 09:40

## 2024-02-17 RX ADMIN — IBUPROFEN 800 MG: 800 TABLET ORAL at 17:11

## 2024-02-17 RX ADMIN — OXYCODONE HYDROCHLORIDE 10 MG: 5 TABLET ORAL at 04:37

## 2024-02-17 RX ADMIN — LEVOTHYROXINE SODIUM 25 MCG: 25 TABLET ORAL at 09:40

## 2024-02-17 RX ADMIN — CEFEPIME 2000 MG: 2 INJECTION, POWDER, FOR SOLUTION INTRAVENOUS at 21:13

## 2024-02-17 RX ADMIN — SODIUM CHLORIDE: 9 INJECTION, SOLUTION INTRAVENOUS at 21:17

## 2024-02-17 RX ADMIN — QUETIAPINE FUMARATE 300 MG: 100 TABLET ORAL at 21:13

## 2024-02-17 RX ADMIN — HYDROXYUREA 500 MG: 500 CAPSULE ORAL at 09:41

## 2024-02-17 RX ADMIN — MORPHINE SULFATE 4 MG: 4 INJECTION, SOLUTION INTRAMUSCULAR; INTRAVENOUS at 09:27

## 2024-02-17 RX ADMIN — OXYCODONE HYDROCHLORIDE 10 MG: 5 TABLET ORAL at 10:59

## 2024-02-17 RX ADMIN — SODIUM CHLORIDE: 9 INJECTION, SOLUTION INTRAVENOUS at 04:39

## 2024-02-17 RX ADMIN — GUAIFENESIN 600 MG: 600 TABLET ORAL at 21:13

## 2024-02-17 RX ADMIN — HYDROMORPHONE HYDROCHLORIDE 0.5 MG: 1 INJECTION, SOLUTION INTRAMUSCULAR; INTRAVENOUS; SUBCUTANEOUS at 12:20

## 2024-02-17 RX ADMIN — DOCUSATE SODIUM 50MG AND SENNOSIDES 8.6MG 2 TABLET: 8.6; 5 TABLET, FILM COATED ORAL at 21:13

## 2024-02-17 RX ADMIN — VANCOMYCIN HYDROCHLORIDE 750 MG: 750 INJECTION, POWDER, LYOPHILIZED, FOR SOLUTION INTRAVENOUS at 09:32

## 2024-02-17 RX ADMIN — HYDROMORPHONE HYDROCHLORIDE 0.5 MG: 1 INJECTION, SOLUTION INTRAMUSCULAR; INTRAVENOUS; SUBCUTANEOUS at 22:41

## 2024-02-17 RX ADMIN — FOLIC ACID 1 MG: 1 TABLET ORAL at 09:40

## 2024-02-17 RX ADMIN — CETIRIZINE HYDROCHLORIDE 10 MG: 10 TABLET, FILM COATED ORAL at 09:40

## 2024-02-17 RX ADMIN — Medication 2 G: at 16:17

## 2024-02-17 RX ADMIN — ENOXAPARIN SODIUM 40 MG: 100 INJECTION SUBCUTANEOUS at 09:40

## 2024-02-17 RX ADMIN — GUAIFENESIN 600 MG: 600 TABLET ORAL at 09:40

## 2024-02-17 RX ADMIN — MULTIVITAMIN TABLET 1 TABLET: TABLET at 09:39

## 2024-02-17 RX ADMIN — DOCUSATE SODIUM 50MG AND SENNOSIDES 8.6MG 2 TABLET: 8.6; 5 TABLET, FILM COATED ORAL at 10:58

## 2024-02-17 RX ADMIN — MORPHINE SULFATE 4 MG: 4 INJECTION, SOLUTION INTRAMUSCULAR; INTRAVENOUS at 03:39

## 2024-02-17 RX ADMIN — LOSARTAN POTASSIUM 50 MG: 50 TABLET, FILM COATED ORAL at 09:40

## 2024-02-17 RX ADMIN — FERROUS SULFATE TAB EC 325 MG (65 MG FE EQUIVALENT) 325 MG: 325 (65 FE) TABLET DELAYED RESPONSE at 09:40

## 2024-02-17 RX ADMIN — HYDROMORPHONE HYDROCHLORIDE 0.5 MG: 1 INJECTION, SOLUTION INTRAMUSCULAR; INTRAVENOUS; SUBCUTANEOUS at 18:41

## 2024-02-17 ASSESSMENT — PAIN DESCRIPTION - DESCRIPTORS
DESCRIPTORS: SHARP;STABBING
DESCRIPTORS: ACHING;SHOOTING
DESCRIPTORS: ACHING;SORE
DESCRIPTORS: ACHING;SORE
DESCRIPTORS: SHARP;STABBING
DESCRIPTORS: ACHING
DESCRIPTORS: ACHING;SHOOTING

## 2024-02-17 ASSESSMENT — PAIN - FUNCTIONAL ASSESSMENT
PAIN_FUNCTIONAL_ASSESSMENT: PREVENTS OR INTERFERES SOME ACTIVE ACTIVITIES AND ADLS

## 2024-02-17 ASSESSMENT — PAIN DESCRIPTION - PAIN TYPE
TYPE: INTRACTABLE PAIN
TYPE: ACUTE PAIN;INTRACTABLE PAIN
TYPE: CHRONIC PAIN;INTRACTABLE PAIN
TYPE: CHRONIC PAIN;INTRACTABLE PAIN
TYPE: ACUTE PAIN;INTRACTABLE PAIN

## 2024-02-17 ASSESSMENT — PAIN SCALES - GENERAL
PAINLEVEL_OUTOF10: 5
PAINLEVEL_OUTOF10: 7
PAINLEVEL_OUTOF10: 5
PAINLEVEL_OUTOF10: 8
PAINLEVEL_OUTOF10: 7
PAINLEVEL_OUTOF10: 10
PAINLEVEL_OUTOF10: 10
PAINLEVEL_OUTOF10: 2
PAINLEVEL_OUTOF10: 2
PAINLEVEL_OUTOF10: 0
PAINLEVEL_OUTOF10: 9
PAINLEVEL_OUTOF10: 5
PAINLEVEL_OUTOF10: 10
PAINLEVEL_OUTOF10: 8
PAINLEVEL_OUTOF10: 8
PAINLEVEL_OUTOF10: 10
PAINLEVEL_OUTOF10: 8

## 2024-02-17 ASSESSMENT — PAIN DESCRIPTION - FREQUENCY
FREQUENCY: CONTINUOUS

## 2024-02-17 ASSESSMENT — PAIN DESCRIPTION - ORIENTATION
ORIENTATION: RIGHT;LEFT
ORIENTATION: LEFT;RIGHT
ORIENTATION: LEFT;RIGHT
ORIENTATION: RIGHT;LEFT
ORIENTATION: RIGHT;LEFT

## 2024-02-17 ASSESSMENT — PAIN DESCRIPTION - LOCATION
LOCATION: CHEST;SHOULDER
LOCATION: RIB CAGE;SHOULDER
LOCATION: SHOULDER
LOCATION: CHEST;BACK;SHOULDER
LOCATION: SHOULDER;CHEST
LOCATION: CHEST;BACK;SHOULDER
LOCATION: CHEST;BACK;SHOULDER
LOCATION: SHOULDER
LOCATION: CHEST;BACK;SHOULDER

## 2024-02-17 ASSESSMENT — PAIN DESCRIPTION - ONSET
ONSET: ON-GOING

## 2024-02-17 ASSESSMENT — PAIN DESCRIPTION - DIRECTION
RADIATING_TOWARDS: BACK
RADIATING_TOWARDS: BACK

## 2024-02-17 NOTE — PROGRESS NOTES
[facility]  Office: [phone]  Rio Blair DO, Javier Walters DO, Romulo Boone DO, Miquel Casey DO, Nataly Priest MD, Daly Mendoza MD, Darius Mcmahon MD, Mary Swain MD,  Ad Goode MD, Vale Peña MD, Rancho Harley MD,  Julia Goddard DO, Bruno Frank MD, Nabeel Spicer MD, Danny Blair DO, Lulú Hernandez MD,  Piyush Lepe DO, Anabela Cummings MD, Valentine Brian MD, Caren Slaughter MD, Reddy Cagle MD,  Ibrahima Fortune MD, Idalia Girard MD, Zunilda Whiting MD, Konstantin Mane MD, Lucho Haq MD, Oswaldo Crawford MD, Jc Acuña DO, De Cochran DO, Sole Mcgovern MD,  Vinay Meza MD, Shirley Waterhouse, CNP,  Paulette Cook, CNP, Ruslan aWrd, CNP,  Doreen Corcoran, DNP, Twyla Pacheco, CNP, Ashley Mesa, CNP, Linn Azul CNP, Karma Sage, CNP, Tanvi Jim, CNP, Lizzie Esqueda, PA-C, Tova Romano, PA-C, Deb Crespo, CNP, Danielle Gonzalez, CNP, Shwetha León, CNS, Estrella Bledsoe, CNP, Sera Kruger, CNP, Tracy Schwab, CNP         [facility]   IN-PATIENT SERVICE   [facility]    Progress Note    2/17/2024    2:05 PM    Name:   Danny Guevara  MRN:     [id number]     Acct:      [id number]   Room:   2024/2024-01  IP Day:  2  Admit Date:  2/15/2024 11:09 AM    PCP:   Roberto Donaldson MD  Code Status:  Full Code    Subjective:     C/C:   Chief Complaint   Patient presents with    Leg Swelling     Pt reports swelling/redness to RLE for the past week. Was in the cancer center and told to come to ED for cultures/possible admit. Denies injury.     Interval History Status: not changed.     Patient seen examined at bedside this morning.  No acute events overnight.  Resting comfortably but patient does complain of left shoulder pain    Brief History:     Patient presents to the ED with complaints of left shoulder pain as well as right lower extremity redness and edema. Patient has gotten a recent lung cancer diagnosis and was at his

## 2024-02-17 NOTE — PROGRESS NOTES
Today's Date: 2/17/2024  Patient Name: Danny Guevara  Date of admission: 2/15/2024 11:09 AM  Patient's age: 63 y.o., 1960  Admission Dx: Cellulitis of right lower extremity [L03.115]    Reason for Consult: Squamous cell carcinoma nonpatient  Requesting Physician: Mary Swain MD    CHIEF COMPLAINT:    Chief Complaint   Patient presents with    Leg Swelling     Pt reports swelling/redness to RLE for the past week. Was in the cancer center and told to come to ED for cultures/possible admit. Denies injury.       History Obtained From:  patient and chart    Interval history  No bleed  Celulitis  improved    HISTORY OF PRESENT ILLNESS:      Danny Guevara is a 63 y.o. male who is admitted to the hospital on 2/15/2024  for right leg swelling, and cellulitis  Patient was seen by Dr. Song yesterday in the office and was sent to ER for further management of right lower extremity cellulitis.  He was recently in the hospital and discharged in January at that time had a CT of the chest CTA chest showed - no PE, enlarged paratracheal and L hilar LN. L anterior para hilar spiculated mass 1.7 x 2 cm. Acute/subacute L 45th rib fracture with associated lucent lesion; - focal lesion L humeral head concerning for metastasis.    Patient had a EBUS on 2/8/2024 which showed squamous cell carcinoma in the lung as well as mediastinal lymph node.  He is complaining of pain in his left shoulder blade.    Patient has history of myeloproliferative disorder and has been following with Dr. Song as outpatient and brief oncologic history as follows  DIAGNOSIS:       Myeloproliferative disorder, NOS, positive VINICIO 2 gene mutation.   Negative for CML.      CURRENT THERAPY:         Hydroxyurea 500 mg twice daily.  Allopurinol, discontinued  Aspirin  Therapeutic phlebotomy as needed  Past Medical History:   has a past medical history of Alcohol abuse, Anemia, Bronchitis, Cancer (HCC), Cervical stenosis of spine, Chronic

## 2024-02-17 NOTE — PROGRESS NOTES
[facility]   Pharmacy Pharmacokinetic Monitoring Service - Vancomycin    Consulting Provider: Linn Azul CNP   Indication: cellulitis of legs  Target Concentration: Goal AUC/FREDERIC 400-600 mg*hr/L  Day of Therapy: 3  Additional Antimicrobials: Cefepime    Pertinent Laboratory Values:   Wt Readings from Last 1 Encounters:   02/15/24 95.4 kg (210 lb 6.4 oz)     Temp Readings from Last 1 Encounters:   02/16/24 98.1 °F (36.7 °C) (Oral)     Estimated Creatinine Clearance: 108 mL/min (based on SCr of 0.8 mg/dL).  Recent Labs     02/15/24  1120 02/16/24  0533   CREATININE 0.7 0.8   BUN 6* 7*   WBC 20.8* 19.7*     Procalcitonin: -    Pertinent Cultures:  Culture Date Source Results   2/15 Blood x 2 NG x 2 (24 hrs)   MRSA Nasal Swab: N/A. Non-respiratory infection.    Recent vancomycin administrations                     vancomycin (VANCOCIN) 750 mg in sodium chloride 0.9 % 250 mL IVPB (mg) 750 mg New Bag 02/17/24 0011    vancomycin (VANCOCIN) 1250 mg in sodium chloride 0.9% 250 mL IVPB (mg) 1,250 mg New Bag 02/16/24 1313     1,250 mg New Bag  0143    vancomycin (VANCOCIN) 2,500 mg in sodium chloride 0.9 % 500 mL IVPB (mg) 2,500 mg New Bag 02/15/24 1359                    Plan:  Current dosing regimen is  Vancomycin 750mg IV q 8 hours  Continue current dose  Repeat vancomycin concentration ordered for 2/18 @ 2100  Pharmacy will continue to monitor patient and adjust therapy as indicated    Thank you for the consult,  Estrella Quiles RPH  2/17/2024 7:39 AM     follow up with cardiology outpatient in 1 week to discuss this new diagnosis and treatment options. Pt was found to have hypertensive urgency (/122) and tachycardia () on presentation. Pt endorsed taking his hydralazine only once per day and wished to only take medications once daily. Pt's hydralazine was discontinued, his lasix dose remained unchanged, metoprolol dose was doubled to 25 mg daily and amlodipine 10 mg was added with appropriate response in BP. He should continue this regimen on discharge. On the day of discharge, pt's SOB had resolved and he felt back to his baseline. He denied fevers, chills, HA, cough, WILFRED, leg swelling, orthopnea, PND or chest pain abdominal pain, constipation, diarrhea, nausea, vomiting. His hypertensive urgency and tachycardia had resolved. Cardiology cleared pt for discharge and pt was determined to be medically stable to return home. He should follow up with his PCP and cardiology within a week. Disposition:  Home    Physical Exam Performed:     BP (!) 149/93   Pulse 80   Temp 97.6 °F (36.4 °C) (Oral)   Resp 16   Ht 5' 11\" (1.803 m)   Wt 167 lb (75.8 kg)   SpO2 100%   BMI 23.29 kg/m²     Physical Exam   Constitutional:       General: He is not in acute distress. Appearance: Normal appearance. He is not ill-appearing, toxic-appearing or diaphoretic. HENT:      Head: Normocephalic and atraumatic. Nose: Nose normal. No rhinorrhea. Mouth/Throat:      Mouth: Mucous membranes are moist.      Pharynx: Oropharynx is clear. Eyes:      General:         Right eye: No discharge. Left eye: No discharge. Conjunctiva/sclera: Conjunctivae normal.   Neck:      Musculoskeletal: Normal range of motion. No muscular tenderness. Cardiovascular:      Rate and Rhythm: Normal rate and regular rhythm. Heart sounds: No murmur. No friction rub. No gallop.        Comments: No JVD  Pulmonary:      Effort: Pulmonary effort is normal. No Take 1 tablet by mouth daily, Disp-30 tablet, R-3Normal              Details   isosorbide mononitrate (IMDUR) 30 MG extended release tablet Take 1 tablet by mouth once daily, Disp-30 tablet, R-0Normal      furosemide (LASIX) 40 MG tablet Take 1 tablet by mouth daily, Disp-30 tablet, R-5Normal      glipiZIDE (GLUCOTROL) 5 MG tablet Take 1 tablet by mouth 2 times daily (before meals), Disp-60 tablet, R-5Please consider 90 day supplies to promote better adherenceNormal      clopidogrel (PLAVIX) 75 MG tablet Take 1 tablet by mouth daily, Disp-90 tablet, R-1Normal      atorvastatin (LIPITOR) 40 MG tablet Take 1 tablet by mouth daily, Disp-90 tablet, R-1Normal      SITagliptin (JANUVIA) 50 MG tablet Take 1 tablet by mouth daily, Disp-30 tablet, R-5Normal      insulin glargine (LANTUS SOLOSTAR) 100 UNIT/ML injection pen Inject 15 Units into the skin nightly, Disp-5 pen, R-3Normal      Insulin Pen Needle (PEN NEEDLES 3/16\") 31G X 5 MM MISC DAILY Starting Tue 8/20/2019, Disp-100 each, R-3, Normal      aspirin 81 MG EC tablet Take 1 tablet by mouth daily, Disp-30 tablet, R-3Print             Time Spent on discharge is more than 45 minutes in the examination, evaluation, counseling and review of medications and discharge plan. Signed:    Ann Marie Kyle MD   Internal Medicine Resident, PGY-1  3/13/2020      Thank you Marry Peterson MD for the opportunity to be involved in this patient's care. If you have any questions or concerns please feel free to contact me.

## 2024-02-17 NOTE — PROGRESS NOTES
Patient called out for RN to come to room because he scratched his arm. RN to patient's bedside. Patient stated he scratched his left forearm on his bedside table. Skin tear noted to left forearm. Mepilex dressing put over skin tear.

## 2024-02-17 NOTE — PROGRESS NOTES
[facility]   Pharmacy Pharmacokinetic Monitoring Service - Vancomycin    Consulting Provider: Linn Azul NP   Indication: Skin and Soft Tissue Infection  Target Concentration: Goal AUC/FREDERIC 400-600 mg*hr/L  Day of Therapy: 2  Additional Antimicrobials: Cefepime    Pertinent Laboratory Values:   Wt Readings from Last 1 Encounters:   02/15/24 95.4 kg (210 lb 6.4 oz)     Temp Readings from Last 1 Encounters:   02/16/24 97.5 °F (36.4 °C) (Oral)     Estimated Creatinine Clearance: 108 mL/min (based on SCr of 0.8 mg/dL).  Recent Labs     02/15/24  1120 02/16/24  0533   CREATININE 0.7 0.8   BUN 6* 7*   WBC 20.8* 19.7*     Procalcitonin: none    Pertinent Cultures:  Procedure Component Value Units Date/Time   Culture, Blood 1 [[id number]] Collected: 02/15/24 1145   Order Status: Completed Specimen: Blood Updated: 02/16/24 1415    Specimen Description .BLOOD    Special Requests LAC 10ML    Culture NO GROWTH 1 DAY   Culture, Blood 2 [[id number]] Collected: 02/15/24 1120   Order Status: Completed Specimen: Blood Updated: 02/16/24 1413    Specimen Description .BLOOD    Special Requests RAC 10ML    Culture NO GROWTH 1 DAY       MRSA Nasal Swab: N/A. Non-respiratory infection.    Recent vancomycin administrations                     vancomycin (VANCOCIN) 1250 mg in sodium chloride 0.9% 250 mL IVPB (mg) 1,250 mg New Bag 02/16/24 1313     1,250 mg New Bag  0143    vancomycin (VANCOCIN) 2,500 mg in sodium chloride 0.9 % 500 mL IVPB (mg) 2,500 mg New Bag 02/15/24 1359                    Assessment:  Date/Time Current Dose Concentration Timing of Concentration (h) AUC   02/16/24 1250mg q12hrs 578 5h13m 578   Note: Serum concentrations collected for AUC dosing may appear elevated if collected in close proximity to the dose administered, this is not necessarily an indication of toxicity    Plan:  Current dosing regimen is therapeutic, but is on the higher end.  \"Decrease dose to Vancomycin 750mg q8hrs         A.

## 2024-02-18 VITALS
BODY MASS INDEX: 28.36 KG/M2 | HEART RATE: 78 BPM | DIASTOLIC BLOOD PRESSURE: 79 MMHG | WEIGHT: 214 LBS | HEIGHT: 73 IN | SYSTOLIC BLOOD PRESSURE: 136 MMHG | OXYGEN SATURATION: 94 % | RESPIRATION RATE: 18 BRPM | TEMPERATURE: 98 F

## 2024-02-18 LAB
ANION GAP SERPL CALCULATED.3IONS-SCNC: 11 MMOL/L (ref 9–17)
BASOPHILS # BLD: 0.32 K/UL (ref 0–0.2)
BASOPHILS NFR BLD: 2 %
BUN SERPL-MCNC: 4 MG/DL (ref 8–23)
BUN SERPL-MCNC: 5 MG/DL (ref 8–23)
BUN/CREAT SERPL: 5 (ref 9–20)
CALCIUM SERPL-MCNC: 8.9 MG/DL (ref 8.6–10.4)
CHLORIDE SERPL-SCNC: 103 MMOL/L (ref 98–107)
CO2 SERPL-SCNC: 23 MMOL/L (ref 20–31)
CREAT SERPL-MCNC: 0.8 MG/DL (ref 0.7–1.2)
CREAT SERPL-MCNC: 0.8 MG/DL (ref 0.7–1.2)
EOSINOPHIL # BLD: 0.32 K/UL (ref 0–0.4)
EOSINOPHILS RELATIVE PERCENT: 2 % (ref 1–4)
ERYTHROCYTE [DISTWIDTH] IN BLOOD BY AUTOMATED COUNT: 19.6 % (ref 11.8–14.4)
GFR SERPL CREATININE-BSD FRML MDRD: >60 ML/MIN/1.73M2
GFR SERPL CREATININE-BSD FRML MDRD: >60 ML/MIN/1.73M2
GLUCOSE SERPL-MCNC: 97 MG/DL (ref 70–99)
HCT VFR BLD AUTO: 36.8 % (ref 40.7–50.3)
HGB BLD-MCNC: 12.3 G/DL (ref 13–17)
IMM GRANULOCYTES # BLD AUTO: 0.49 K/UL (ref 0–0.3)
IMM GRANULOCYTES NFR BLD: 3 %
LYMPHOCYTES NFR BLD: 0.97 K/UL (ref 1–4.8)
LYMPHOCYTES RELATIVE PERCENT: 6 % (ref 24–44)
MCH RBC QN AUTO: 28.7 PG (ref 25.2–33.5)
MCHC RBC AUTO-ENTMCNC: 33.4 G/DL (ref 28.4–34.8)
MCV RBC AUTO: 86 FL (ref 82.6–102.9)
MONOCYTES NFR BLD: 13 % (ref 1–7)
MONOCYTES NFR BLD: 2.11 K/UL (ref 0.2–0.8)
NEUTROPHILS NFR BLD: 74 % (ref 36–66)
NEUTS SEG NFR BLD: 11.99 K/UL (ref 1.8–7.7)
NRBC BLD-RTO: 0 PER 100 WBC
PLATELET # BLD AUTO: 459 K/UL (ref 138–453)
PMV BLD AUTO: 9.9 FL (ref 8.1–13.5)
POTASSIUM SERPL-SCNC: 3.6 MMOL/L (ref 3.7–5.3)
RBC # BLD AUTO: 4.28 M/UL (ref 4.21–5.77)
SODIUM SERPL-SCNC: 137 MMOL/L (ref 135–144)
WBC OTHER # BLD: 16.2 K/UL (ref 3.5–11.3)

## 2024-02-18 PROCEDURE — 6360000002 HC RX W HCPCS: Performed by: FAMILY MEDICINE

## 2024-02-18 PROCEDURE — 82565 ASSAY OF CREATININE: CPT

## 2024-02-18 PROCEDURE — 6370000000 HC RX 637 (ALT 250 FOR IP): Performed by: NURSE PRACTITIONER

## 2024-02-18 PROCEDURE — 2580000003 HC RX 258: Performed by: FAMILY MEDICINE

## 2024-02-18 PROCEDURE — 6360000002 HC RX W HCPCS: Performed by: STUDENT IN AN ORGANIZED HEALTH CARE EDUCATION/TRAINING PROGRAM

## 2024-02-18 PROCEDURE — 36415 COLL VENOUS BLD VENIPUNCTURE: CPT

## 2024-02-18 PROCEDURE — 99239 HOSP IP/OBS DSCHRG MGMT >30: CPT | Performed by: STUDENT IN AN ORGANIZED HEALTH CARE EDUCATION/TRAINING PROGRAM

## 2024-02-18 PROCEDURE — 6370000000 HC RX 637 (ALT 250 FOR IP): Performed by: STUDENT IN AN ORGANIZED HEALTH CARE EDUCATION/TRAINING PROGRAM

## 2024-02-18 PROCEDURE — 2580000003 HC RX 258: Performed by: NURSE PRACTITIONER

## 2024-02-18 PROCEDURE — 84520 ASSAY OF UREA NITROGEN: CPT

## 2024-02-18 PROCEDURE — 97530 THERAPEUTIC ACTIVITIES: CPT | Performed by: NURSE PRACTITIONER

## 2024-02-18 PROCEDURE — 6360000002 HC RX W HCPCS: Performed by: NURSE PRACTITIONER

## 2024-02-18 PROCEDURE — 85025 COMPLETE CBC W/AUTO DIFF WBC: CPT

## 2024-02-18 PROCEDURE — 6370000000 HC RX 637 (ALT 250 FOR IP): Performed by: PHYSICIAN ASSISTANT

## 2024-02-18 PROCEDURE — 80048 BASIC METABOLIC PNL TOTAL CA: CPT

## 2024-02-18 RX ORDER — LANOLIN ALCOHOL/MO/W.PET/CERES
325 CREAM (GRAM) TOPICAL
Qty: 90 TABLET | Refills: 3 | Status: SHIPPED | OUTPATIENT
Start: 2024-02-19

## 2024-02-18 RX ORDER — SENNA AND DOCUSATE SODIUM 50; 8.6 MG/1; MG/1
2 TABLET, FILM COATED ORAL 2 TIMES DAILY PRN
Qty: 40 TABLET | Refills: 0 | Status: SHIPPED | OUTPATIENT
Start: 2024-02-18 | End: 2024-02-28

## 2024-02-18 RX ORDER — OXYCODONE HYDROCHLORIDE 5 MG/1
5 TABLET ORAL EVERY 6 HOURS PRN
Qty: 12 TABLET | Refills: 0 | Status: SHIPPED | OUTPATIENT
Start: 2024-02-18 | End: 2024-02-25

## 2024-02-18 RX ORDER — DOXYCYCLINE HYCLATE 100 MG
100 TABLET ORAL 2 TIMES DAILY
Qty: 14 TABLET | Refills: 0 | Status: SHIPPED | OUTPATIENT
Start: 2024-02-18 | End: 2024-02-25

## 2024-02-18 RX ADMIN — VANCOMYCIN HYDROCHLORIDE 750 MG: 750 INJECTION, POWDER, LYOPHILIZED, FOR SOLUTION INTRAVENOUS at 00:42

## 2024-02-18 RX ADMIN — FERROUS SULFATE TAB EC 325 MG (65 MG FE EQUIVALENT) 325 MG: 325 (65 FE) TABLET DELAYED RESPONSE at 08:53

## 2024-02-18 RX ADMIN — OXYCODONE HYDROCHLORIDE 10 MG: 5 TABLET ORAL at 06:19

## 2024-02-18 RX ADMIN — Medication 100 MG: at 08:54

## 2024-02-18 RX ADMIN — Medication 2 G: at 08:53

## 2024-02-18 RX ADMIN — FUROSEMIDE 20 MG: 20 TABLET ORAL at 08:53

## 2024-02-18 RX ADMIN — HYDROMORPHONE HYDROCHLORIDE 0.5 MG: 1 INJECTION, SOLUTION INTRAMUSCULAR; INTRAVENOUS; SUBCUTANEOUS at 07:56

## 2024-02-18 RX ADMIN — CETIRIZINE HYDROCHLORIDE 10 MG: 10 TABLET, FILM COATED ORAL at 08:53

## 2024-02-18 RX ADMIN — FOLIC ACID 1 MG: 1 TABLET ORAL at 08:54

## 2024-02-18 RX ADMIN — HYDROXYUREA 500 MG: 500 CAPSULE ORAL at 08:54

## 2024-02-18 RX ADMIN — OXYCODONE HYDROCHLORIDE 10 MG: 5 TABLET ORAL at 11:44

## 2024-02-18 RX ADMIN — LEVOTHYROXINE SODIUM 25 MCG: 25 TABLET ORAL at 08:53

## 2024-02-18 RX ADMIN — MULTIVITAMIN TABLET 1 TABLET: TABLET at 08:53

## 2024-02-18 RX ADMIN — GUAIFENESIN 600 MG: 600 TABLET ORAL at 08:53

## 2024-02-18 RX ADMIN — MORPHINE SULFATE 4 MG: 4 INJECTION, SOLUTION INTRAMUSCULAR; INTRAVENOUS at 09:05

## 2024-02-18 RX ADMIN — VANCOMYCIN HYDROCHLORIDE 750 MG: 750 INJECTION, POWDER, LYOPHILIZED, FOR SOLUTION INTRAVENOUS at 08:51

## 2024-02-18 RX ADMIN — LOSARTAN POTASSIUM 50 MG: 50 TABLET, FILM COATED ORAL at 08:53

## 2024-02-18 RX ADMIN — ENOXAPARIN SODIUM 40 MG: 100 INJECTION SUBCUTANEOUS at 08:53

## 2024-02-18 RX ADMIN — PANTOPRAZOLE SODIUM 40 MG: 40 TABLET, DELAYED RELEASE ORAL at 06:19

## 2024-02-18 RX ADMIN — PRAZOSIN HYDROCHLORIDE 1 MG: 1 CAPSULE ORAL at 08:53

## 2024-02-18 RX ADMIN — DOCUSATE SODIUM 50MG AND SENNOSIDES 8.6MG 2 TABLET: 8.6; 5 TABLET, FILM COATED ORAL at 08:53

## 2024-02-18 RX ADMIN — OXYCODONE HYDROCHLORIDE 10 MG: 5 TABLET ORAL at 00:16

## 2024-02-18 RX ADMIN — CEFEPIME 2000 MG: 2 INJECTION, POWDER, FOR SOLUTION INTRAVENOUS at 08:53

## 2024-02-18 ASSESSMENT — PAIN DESCRIPTION - FREQUENCY
FREQUENCY: CONTINUOUS
FREQUENCY: CONTINUOUS

## 2024-02-18 ASSESSMENT — PAIN DESCRIPTION - DESCRIPTORS
DESCRIPTORS: SHARP
DESCRIPTORS: ACHING;SORE

## 2024-02-18 ASSESSMENT — PAIN DESCRIPTION - LOCATION
LOCATION: SHOULDER

## 2024-02-18 ASSESSMENT — PAIN SCALES - GENERAL
PAINLEVEL_OUTOF10: 8
PAINLEVEL_OUTOF10: 9
PAINLEVEL_OUTOF10: 8
PAINLEVEL_OUTOF10: 9
PAINLEVEL_OUTOF10: 7
PAINLEVEL_OUTOF10: 8

## 2024-02-18 ASSESSMENT — PAIN DESCRIPTION - ORIENTATION
ORIENTATION: RIGHT;LEFT
ORIENTATION: LEFT;RIGHT

## 2024-02-18 NOTE — PROGRESS NOTES
Occupational Therapy  Facility/Department: [facility]  Daily Treatment Note  NAME: Danny Guevara  : 1960  MRN: [id number]    Date of Service: 2024    Discharge Recommendations:  DC OT at this time   Pt demo understanding of edu provided and has reached PLOF and safety in occupational performance. Discharge pt from OT services, will resume skilled OT services if needed.      Patient Diagnosis(es): The primary encounter diagnosis was Cellulitis of right lower extremity. Diagnoses of Chronic obstructive pulmonary disease with acute exacerbation (HCC), Myeloproliferative disease (HCC), and Malignant neoplasm of lung, unspecified laterality, unspecified part of lung (HCC) were also pertinent to this visit.     Assessment    Assessment: Pt has demo I/MI in ADLs, transfers, and functional mobility, as well as good understanding of all edu provided.  Activity Tolerance: Patient tolerated treatment well  Discharge Recommendations: Defer OT at this time      Plan   Occupational Therapy Plan  Times Per Week: 4-5x per week  Current Treatment Recommendations: Strengthening;Functional mobility training;Balance training;Endurance training;Patient/Caregiver education & training;Safety education & training;Equipment evaluation, education, & procurement;Self-Care / ADL;Positioning     Subjective   Subjective  Subjective: Pt agreeable to edu  Pain: C/o pain in L shoulder  Orientation  Overall Orientation Status: Within Functional Limits  Cognition  Overall Cognitive Status: WFL        Objective       Safety Devices  Type of Devices: All fall risk precautions in place;Call light within reach;Nurse notified;Left in bed     Patient Education  Education Given To: Patient  Education Provided: Role of Therapy;Plan of Care;Precautions;ADL Adaptive Strategies;Energy Conservation;Transfer Training;Fall Prevention Strategies;Equipment  Education Provided Comments:   - Understanding Energy Conservation  - Energy Conservation

## 2024-02-18 NOTE — DISCHARGE SUMMARY
[facility]  Office: [phone]  Rio Blair DO, Javier Walters DO, Romulo Boone DO, Miquel Casey DO, Nataly Priest MD, Daly Mendoza MD, Darius Mcmahon MD, Mary Swain MD,  Ad Goode MD, Vale Peña MD, Rancho Harley MD,  Julia Goddard DO, Bruno Frank MD, Nabeel Spicer MD, Danny Blair DO, Lulú Hernandez MD,  Piyush Lepe DO, Anabela Cummings MD, Valentine Brian MD, Caren Slaughter MD, Reddy Cagle MD,  Ibrahima Fortune MD, Iadlia Girard MD, Zunilda Whiting MD, Konstantni Mane MD, Lucho Haq MD, Oswaldo Crawford MD, Jc Acuña DO, De Cochran DO, Sole Mcgovern MD,  Vinay Meza MD, Shirley Waterhouse, CNP,  Paulette Cook, CNP, Ruslan Ward, CNP,  Doreen Corcoran, DNP, Twyla Pacheco, CNP, Ashley Mesa, CNP, Linn Azul CNP, Karma Sage, CNP, Tanvi Jim, CNP, Lizzie Esqueda, PA-C, Tova Romano, PA-C, Deb Crespo, CNP, Danielle Gonzalez, CNP, Shwetha León, CNS, Estrella Bledsoe, CNP, Sera Kruger, CNP, Tracy Schwab, CNP         [facility]   IN-PATIENT SERVICE   [facility]    Discharge Summary     Patient ID: Danny Guevara  :  1960   MRN: [id number]     ACCOUNT:  [id number]   Patient's PCP: Roberto Donaldson MD  Admit Date: 2/15/2024   Discharge Date: 2024     Length of Stay: 3  Code Status:  Full Code  Admitting Physician: Mary Swain MD  Discharge Physician: Oswaldo Crawford MD     Active Discharge Diagnoses:     Hospital Problem Lists:  Principal Problem:    Cellulitis of right lower extremity  Active Problems:    Myeloproliferative disease (HCC)    Chronic hyponatremia    Uncomplicated alcohol dependence (HCC)    Hypothyroidism    High blood pressure    Chronic obstructive pulmonary disease with acute exacerbation (HCC)    Lung nodule seen on imaging study    Thrombocytosis    Sepsis without acute organ dysfunction (HCC)    Acute pain of left shoulder    Tobacco use    Malignant neoplasm of lung

## 2024-02-18 NOTE — PLAN OF CARE
Problem: Discharge Planning  Goal: Discharge to home or other facility with appropriate resources  2/18/2024 0151 by Karma Boudreaux RN  Outcome: Progressing     Problem: Pain  Goal: Verbalizes/displays adequate comfort level or baseline comfort level  2/18/2024 0151 by Karma Boudreaux RN  Outcome: Progressing     Problem: Safety - Adult  Goal: Free from fall injury  2/18/2024 0151 by Karma Boudreaux RN  Outcome: Progressing     Problem: Neurosensory - Adult  Goal: Achieves maximal functionality and self care  2/18/2024 0151 by Karma Boudreaux RN  Outcome: Progressing     Problem: Skin/Tissue Integrity - Adult  Goal: Skin integrity remains intact  2/18/2024 0151 by Karma Boudreaux RN  Outcome: Progressing     Problem: Musculoskeletal - Adult  Goal: Return mobility to safest level of function  2/18/2024 0151 by Karma Boudreaux RN  Outcome: Progressing     Problem: Gastrointestinal - Adult  Goal: Maintains or returns to baseline bowel function  2/18/2024 0151 by Karma Boudreaux RN  Outcome: Progressing     
  Problem: Discharge Planning  Goal: Discharge to home or other facility with appropriate resources  2/18/2024 1342 by Colette Grijalva, RN  Outcome: Completed  2/18/2024 0151 by Karma Boudreaux RN  Outcome: Progressing     Problem: Pain  Goal: Verbalizes/displays adequate comfort level or baseline comfort level  2/18/2024 1342 by Colette Grijalva RN  Outcome: Completed  2/18/2024 0151 by Karma Boudreaux RN  Outcome: Progressing     Problem: Safety - Adult  Goal: Free from fall injury  2/18/2024 1342 by Colette Grijalva, RN  Outcome: Completed  2/18/2024 0151 by Karma Boudreaux RN  Outcome: Progressing     Problem: Neurosensory - Adult  Goal: Achieves maximal functionality and self care  2/18/2024 1342 by Colette Grijalva, RN  Outcome: Completed  2/18/2024 0151 by Karma Boudreaux RN  Outcome: Progressing     Problem: Respiratory - Adult  Goal: Achieves optimal ventilation and oxygenation  Outcome: Completed     Problem: Skin/Tissue Integrity - Adult  Goal: Skin integrity remains intact  2/18/2024 1342 by Colette Grijalva, RN  Outcome: Completed  2/18/2024 0151 by Karma Boudreaux RN  Outcome: Progressing     Problem: Musculoskeletal - Adult  Goal: Return mobility to safest level of function  2/18/2024 1342 by Colette Grijalva, RN  Outcome: Completed  2/18/2024 0151 by Karma Boudreaux RN  Outcome: Progressing     Problem: Gastrointestinal - Adult  Goal: Maintains or returns to baseline bowel function  2/18/2024 1342 by Colette Grijalva, RN  Outcome: Completed  2/18/2024 0151 by Karma Boudreaux RN  Outcome: Progressing  Goal: Maintains adequate nutritional intake  Outcome: Completed     
  Problem: Discharge Planning  Goal: Discharge to home or other facility with appropriate resources  Outcome: Progressing  Flowsheets (Taken 2/17/2024 0900)  Discharge to home or other facility with appropriate resources:   Identify barriers to discharge with patient and caregiver   Arrange for needed discharge resources and transportation as appropriate   Identify discharge learning needs (meds, wound care, etc)   Refer to discharge planning if patient needs post-hospital services based on physician order or complex needs related to functional status, cognitive ability or social support system     Problem: Pain  Goal: Verbalizes/displays adequate comfort level or baseline comfort level  Outcome: Progressing  Flowsheets (Taken 2/17/2024 0927)  Verbalizes/displays adequate comfort level or baseline comfort level:   Encourage patient to monitor pain and request assistance   Assess pain using appropriate pain scale   Administer analgesics based on type and severity of pain and evaluate response   Implement non-pharmacological measures as appropriate and evaluate response   Notify Licensed Independent Practitioner if interventions unsuccessful or patient reports new pain     Problem: Safety - Adult  Goal: Free from fall injury  Outcome: Progressing     Problem: ABCDS Injury Assessment  Goal: Absence of physical injury  Outcome: Progressing     Problem: Neurosensory - Adult  Goal: Achieves maximal functionality and self care  Outcome: Progressing  Flowsheets (Taken 2/17/2024 0900)  Achieves maximal functionality and self care: Encourage and assist patient to increase activity and self care with guidance from physical therapy/occupational therapy     Problem: Respiratory - Adult  Goal: Achieves optimal ventilation and oxygenation  Outcome: Progressing  Flowsheets (Taken 2/17/2024 0900)  Achieves optimal ventilation and oxygenation:   Assess for changes in respiratory status   Assess for changes in mentation and behavior   
  Problem: Pain  Goal: Verbalizes/displays adequate comfort level or baseline comfort level  2/17/2024 0309 by Laura Owens RN  Outcome: Progressing  Flowsheets (Taken 2/17/2024 0309)  Verbalizes/displays adequate comfort level or baseline comfort level:   Encourage patient to monitor pain and request assistance   Assess pain using appropriate pain scale   Administer analgesics based on type and severity of pain and evaluate response   Implement non-pharmacological measures as appropriate and evaluate response  2/16/2024 1802 by Hannah Clarke RN  Outcome: Progressing  Flowsheets (Taken 2/16/2024 0818)  Verbalizes/displays adequate comfort level or baseline comfort level:   Encourage patient to monitor pain and request assistance   Assess pain using appropriate pain scale   Administer analgesics based on type and severity of pain and evaluate response   Implement non-pharmacological measures as appropriate and evaluate response   Notify Licensed Independent Practitioner if interventions unsuccessful or patient reports new pain     Problem: Safety - Adult  Goal: Free from fall injury  2/17/2024 0309 by Laura Owens RN  Outcome: Progressing  Flowsheets (Taken 2/17/2024 0309)  Free From Fall Injury: Instruct family/caregiver on patient safety  2/16/2024 1802 by Hannah Clarke RN  Outcome: Progressing  Flowsheets (Taken 2/16/2024 1802)  Free From Fall Injury: Instruct family/caregiver on patient safety     Problem: Neurosensory - Adult  Goal: Achieves maximal functionality and self care  2/17/2024 0309 by Laura Owens RN  Outcome: Progressing  Flowsheets (Taken 2/17/2024 0309)  Achieves maximal functionality and self care: Encourage and assist patient to increase activity and self care with guidance from physical therapy/occupational therapy  2/16/2024 1802 by Hannah Clarke RN  Outcome: Progressing  Flowsheets (Taken 2/16/2024 1802)  Achieves maximal functionality and self care: Encourage and assist patient to 
ventilation and oxygenation  Outcome: Progressing  Flowsheets (Taken 2/16/2024 0843)  Achieves optimal ventilation and oxygenation:   Assess for changes in respiratory status   Assess for changes in mentation and behavior   Position to facilitate oxygenation and minimize respiratory effort   Oxygen supplementation based on oxygen saturation or arterial blood gases   Initiate smoking cessation protocol as indicated   Encourage broncho-pulmonary hygiene including cough, deep breathe, incentive spirometry   Assess and instruct to report shortness of breath or any respiratory difficulty   Respiratory therapy support as indicated     Problem: Skin/Tissue Integrity - Adult  Goal: Skin integrity remains intact  Outcome: Progressing  Flowsheets (Taken 2/16/2024 1802)  Skin Integrity Remains Intact:   Assess vascular access sites hourly   Monitor for areas of redness and/or skin breakdown     Problem: Musculoskeletal - Adult  Goal: Return mobility to safest level of function  Outcome: Progressing  Flowsheets (Taken 2/16/2024 1802)  Return Mobility to Safest Level of Function:   Assess patient stability and activity tolerance for standing, transferring and ambulating with or without assistive devices   Assist with transfers and ambulation using safe patient handling equipment as needed   Ensure adequate protection for wounds/incisions during mobilization   Obtain physical therapy/occupational therapy consults as needed   Instruct patient/family in ordered activity level     Problem: Gastrointestinal - Adult  Goal: Maintains or returns to baseline bowel function  Outcome: Progressing  Flowsheets (Taken 2/16/2024 1802)  Maintains or returns to baseline bowel function:   Assess bowel function   Administer IV fluids as ordered to ensure adequate hydration   Encourage oral fluids to ensure adequate hydration   Encourage mobilization and activity   Administer ordered medications as needed   Nutrition consult to assist patient with

## 2024-02-18 NOTE — PROGRESS NOTES
The pt was discharged to home. The discharge instructions were given and reviewed with the pt. A cab was called and he was escorted to the exit in stable condition

## 2024-02-19 NOTE — DISCHARGE INSTR - COC
Continuity of Care Form    Patient Name: Danny Guevara   :  1960  MRN:  [id number]    Admit date:  2/15/2024  Discharge date:  ***    Code Status Order: Prior   Advance Directives:     Admitting Physician:  Mary Swain MD  PCP: Roberto Donaldson MD    Discharging Nurse: ***  Discharging Hospital Unit/Room#:   Discharging Unit Phone Number: ***    Emergency Contact:   Extended Emergency Contact Information  Primary Emergency Contact: Daquan Guevara  Address: N/A  Home Phone: [phone]  Work Phone: [phone]  Mobile Phone: [phone]  Relation: Brother/Sister  Preferred language: English   needed? No  Secondary Emergency Contact: Marni Scott  Home Phone: [phone]  Work Phone: [phone]  Mobile Phone: [phone]  Relation: Child    Past Surgical History:  Past Surgical History:   Procedure Laterality Date    APPENDECTOMY      BRONCHOSCOPY  2024    BRONCHOSCOPY, LEFT LINGULA NODULE-TRANSBRONCHIAL BIOPSY, FINE NEEDLE ASPIRATION, MINI BRONCHOAVEOLAR LAVAGE, EBUS-TRANSBRONCHIAL NEEDLE ASPIRATION, RADIAL EBUS    BRONCHOSCOPY  2024    BRONCHOSCOPY ENDOBRONCHIAL ULTRASOUND performed by Herminio Brian MD at [facility] OR    BRONCHOSCOPY  2024    BRONCHOSCOPY BIOPSY BRONCHUS ROBOTIC performed by Herminio Brian MD at [facility] OR    BRONCHOSCOPY  2024    BRONCHOSCOPY ALVEOLAR LAVAGE ROBOTIC performed by Herminio Brian MD at [facility] OR    CARPAL TUNNEL RELEASE      bilateral     CERVICAL FUSION      s/p trampoline accident    CERVICAL FUSION N/A 2019    ANTERIOR CERVICAL DECOMPRESSION FUSION C3-4 performed by Tae Gates DO at [facility] OR    ENDOSCOPY, COLON, DIAGNOSTIC      EPIDURAL STEROID INJECTION Left 05/15/2019    EPIDURAL STEROID INJECTION LEFT L5S1 performed by Jayson Huizar MD at [facility] OR    TONSILLECTOMY         Immunization History:   Immunization History   Administered Date(s) Administered    COVID-19, MODERNA BLUE border, Primary or

## 2024-02-20 ENCOUNTER — TELEPHONE (OUTPATIENT)
Dept: ONCOLOGY | Age: 64
End: 2024-02-20

## 2024-02-20 DIAGNOSIS — C34.00 MALIGNANT NEOPLASM OF HILUS OF LUNG, UNSPECIFIED LATERALITY (HCC): Primary | ICD-10-CM

## 2024-02-20 LAB
MICROORGANISM SPEC CULT: NORMAL
MICROORGANISM SPEC CULT: NORMAL
SERVICE CMNT-IMP: NORMAL
SERVICE CMNT-IMP: NORMAL
SPECIMEN DESCRIPTION: NORMAL
SPECIMEN DESCRIPTION: NORMAL

## 2024-02-20 NOTE — TELEPHONE ENCOUNTER
Name: Danny Guevara  : 1960  MRN: [id number]    Oncology Navigation Follow-Up Note    Contact Type:  Telephone    Notes:   Navigator received call from pts. Archie, Dashr unable to speak with Bill regarding brothers care.  1:42 Writer spoke with pt. And pt. Will allow writer to speak with LukeDaquan about his care.       Electronically signed by Lucille Holguin RN on 2024 at 1:36 PM

## 2024-02-21 DIAGNOSIS — D47.1 MYELOPROLIFERATIVE DISEASE (HCC): Primary | ICD-10-CM

## 2024-02-22 ENCOUNTER — TELEPHONE (OUTPATIENT)
Dept: ONCOLOGY | Age: 64
End: 2024-02-22

## 2024-02-22 NOTE — TELEPHONE ENCOUNTER
Name: Danny Guevara  : 1960  MRN: [id number]    Oncology Navigation Follow-Up Note    Contact Type:  Telephone    Notes:   Navigator received call from brother Daquan and brother concerned about pts/ pain control and who would be handling this down the road? Daquan informed writer would need to look in to this and will keep pt. Updated. All other questions answered.  Navigator reviewing chart and external referral for palliative care placed per Trevon. , however unsure where referral was sent?Referral stating pt. Cannot be accepted due to out of network? Writer will route message to Gill ACEVES NP. Unsure if Triage is handling this?        Electronically signed by Lucille Holguin RN on 2024 at 1:30 PM

## 2024-02-25 RX ORDER — ALPRAZOLAM 0.5 MG/1
0.5 TABLET ORAL 3 TIMES DAILY PRN
Qty: 90 TABLET | Refills: 0 | Status: SHIPPED | OUTPATIENT
Start: 2024-02-25 | End: 2024-03-26

## 2024-02-25 RX ORDER — OXYCODONE HYDROCHLORIDE 10 MG/1
15 TABLET ORAL EVERY 8 HOURS PRN
Qty: 42 TABLET | Refills: 0 | Status: SHIPPED | OUTPATIENT
Start: 2024-02-25 | End: 2024-03-10

## 2024-02-26 ENCOUNTER — TELEPHONE (OUTPATIENT)
Dept: ONCOLOGY | Age: 64
End: 2024-02-26

## 2024-02-26 NOTE — TELEPHONE ENCOUNTER
Name: Danny Guevara  : 1960  MRN: [id number]    Oncology Navigation Follow-Up Note    Contact Type:  Telephone    Notes:   Navigator responding to message fromGill STARK and [facility] not accepting pts. Insurance for Palliative services. Triage will be forwarding referral to another option, but in the meantime MO to order pain medication.   9:16 Writer spoke with pt. And informed him of above and pt. Needing something more for pain, script recently pended however may not be enough relief for pt.? If this is the case pt. Encouraged to call triage before picking up script. Pt. Sharing with writer cellulitis of LE worse this morning along with swelling. Writer recommending pt. Discuss with Triage. Pt. Wanting to go to ED tomorrow when at [facility] for Bone Scan, however writer suggesting he talk with Triage first, but more than likely would recommend he not wait to go to ED.       Electronically signed by Lucille Holguin RN on 2024 at 9:06 AM

## 2024-02-26 NOTE — TELEPHONE ENCOUNTER
Name: Danny Guevara  : 1960  MRN: [id number]    Oncology Navigation Follow-Up Note    Contact Type:  Telephone    Notes:   Navigator spoke with Patricia MEYERS for her assistance in finding palliative Care services within pts. Network (([facility] is a no)      Electronically signed by Lucille Holguin RN on 2024 at 10:48 AM

## 2024-02-27 ENCOUNTER — HOSPITAL ENCOUNTER (OUTPATIENT)
Dept: NUCLEAR MEDICINE | Age: 64
Discharge: HOME | End: 2024-02-29
Attending: INTERNAL MEDICINE
Payer: MEDICARE

## 2024-02-27 ENCOUNTER — TELEPHONE (OUTPATIENT)
Dept: ONCOLOGY | Age: 64
End: 2024-02-27

## 2024-02-27 DIAGNOSIS — C34.00 MALIGNANT NEOPLASM OF HILUS OF LUNG, UNSPECIFIED LATERALITY (HCC): ICD-10-CM

## 2024-02-27 PROCEDURE — 3430000000 HC RX DIAGNOSTIC RADIOPHARMACEUTICAL: Performed by: INTERNAL MEDICINE

## 2024-02-27 PROCEDURE — 78306 BONE IMAGING WHOLE BODY: CPT | Performed by: INTERNAL MEDICINE

## 2024-02-27 PROCEDURE — A9503 TC99M MEDRONATE: HCPCS | Performed by: INTERNAL MEDICINE

## 2024-02-27 RX ORDER — TC 99M MEDRONATE 20 MG/10ML
26 INJECTION, POWDER, LYOPHILIZED, FOR SOLUTION INTRAVENOUS
Status: COMPLETED | OUTPATIENT
Start: 2024-02-27 | End: 2024-02-27

## 2024-02-27 RX ADMIN — TC 99M MEDRONATE 26 MILLICURIE: 20 INJECTION, POWDER, LYOPHILIZED, FOR SOLUTION INTRAVENOUS at 10:55

## 2024-02-27 NOTE — TELEPHONE ENCOUNTER
Name: Danny Guevara  : 1960  MRN: [id number]    Oncology Navigation Follow-Up Note    Contact Type:  Telephone    Notes:   Navigator left VM checking on status of pts. LE cellulitis symptoms and if pt. Went to ED for evaluation?      Electronically signed by Lucille Holguin RN on 2024 at 3:56 PM

## 2024-02-28 ENCOUNTER — TELEPHONE (OUTPATIENT)
Dept: ONCOLOGY | Age: 64
End: 2024-02-28

## 2024-02-28 NOTE — TELEPHONE ENCOUNTER
Name: Danny Guevara  : 1960  MRN: [id number]    Oncology Navigation Follow-Up Note    Contact Type:  Telephone    Notes:   Navigator left VM offering assistance and checking on status of LE cellulitis?  201 Pt. Returning call and does not recall navigator, but has reached out to his PCP for ATB. Pt. Would rather not go to ED if he can avoid it. Pt. Sharing nurse to his home today-company called \"[employer]\"? Writer Unsure what advice was given by HC?      Electronically signed by Lucille Holguin RN on 2024 at 1:57 PM

## 2024-02-29 DIAGNOSIS — D47.1 MYELOPROLIFERATIVE DISEASE (HCC): Primary | ICD-10-CM

## 2024-02-29 RX ORDER — OXYCODONE HYDROCHLORIDE 10 MG/1
15 TABLET ORAL EVERY 8 HOURS PRN
Qty: 42 TABLET | Refills: 0 | Status: SHIPPED | OUTPATIENT
Start: 2024-02-29 | End: 2024-03-14

## 2024-03-12 ENCOUNTER — TELEPHONE (OUTPATIENT)
Dept: ONCOLOGY | Age: 64
End: 2024-03-12

## 2024-03-12 ENCOUNTER — OFFICE VISIT (OUTPATIENT)
Dept: ONCOLOGY | Age: 64
End: 2024-03-12
Payer: MEDICARE

## 2024-03-12 VITALS
WEIGHT: 206.2 LBS | DIASTOLIC BLOOD PRESSURE: 89 MMHG | BODY MASS INDEX: 27.2 KG/M2 | RESPIRATION RATE: 18 BRPM | HEART RATE: 82 BPM | SYSTOLIC BLOOD PRESSURE: 126 MMHG | TEMPERATURE: 97.4 F

## 2024-03-12 DIAGNOSIS — R91.1 LUNG NODULE SEEN ON IMAGING STUDY: ICD-10-CM

## 2024-03-12 DIAGNOSIS — D47.1 MYELOPROLIFERATIVE DISEASE (HCC): Primary | ICD-10-CM

## 2024-03-12 PROCEDURE — 3074F SYST BP LT 130 MM HG: CPT | Performed by: INTERNAL MEDICINE

## 2024-03-12 PROCEDURE — 3079F DIAST BP 80-89 MM HG: CPT | Performed by: INTERNAL MEDICINE

## 2024-03-12 PROCEDURE — 3017F COLORECTAL CA SCREEN DOC REV: CPT | Performed by: INTERNAL MEDICINE

## 2024-03-12 PROCEDURE — 99211 OFF/OP EST MAY X REQ PHY/QHP: CPT | Performed by: INTERNAL MEDICINE

## 2024-03-12 PROCEDURE — 1111F DSCHRG MED/CURRENT MED MERGE: CPT | Performed by: INTERNAL MEDICINE

## 2024-03-12 PROCEDURE — G8484 FLU IMMUNIZE NO ADMIN: HCPCS | Performed by: INTERNAL MEDICINE

## 2024-03-12 PROCEDURE — G8419 CALC BMI OUT NRM PARAM NOF/U: HCPCS | Performed by: INTERNAL MEDICINE

## 2024-03-12 PROCEDURE — 99214 OFFICE O/P EST MOD 30 MIN: CPT | Performed by: INTERNAL MEDICINE

## 2024-03-12 PROCEDURE — G8427 DOCREV CUR MEDS BY ELIG CLIN: HCPCS | Performed by: INTERNAL MEDICINE

## 2024-03-12 PROCEDURE — 4004F PT TOBACCO SCREEN RCVD TLK: CPT | Performed by: INTERNAL MEDICINE

## 2024-03-12 RX ORDER — LEVOFLOXACIN 500 MG/1
500 TABLET, FILM COATED ORAL DAILY
Qty: 10 TABLET | Refills: 0 | Status: ON HOLD | OUTPATIENT
Start: 2024-03-12 | End: 2024-03-22

## 2024-03-12 RX ORDER — AMOXICILLIN AND CLAVULANATE POTASSIUM 875; 125 MG/1; MG/1
TABLET, FILM COATED ORAL
Status: ON HOLD | COMMUNITY
Start: 2024-02-29 | End: 2024-03-22

## 2024-03-12 NOTE — TELEPHONE ENCOUNTER
Name: Danny Guevara  : 1960  MRN: [id number]    Oncology Navigation Follow-Up Note    Contact Type:  Telephone    Notes:   Navigator met with pt. And daughter today and pt. Undecided about Tx? Palliative care and lymph edema needed. Pt. Reminded of ATB sent to pharmacy for LE cellulitis. Writer will continue to follow.       Electronically signed by Lucille Holguin RN on 3/12/2024 at 1:15 PM

## 2024-03-12 NOTE — TELEPHONE ENCOUNTER
FIDEL HERE FOR MD VISIT  Refer to palliative care  [facility]  Lymphedema clinic  Offered chemo. Will call us  REFERRAL FOR PALLIATIVE WAS CALLED  LEFT A VM MESSAGE TO CALL PT FOR AN APPT  LYMPHEDEMA  ORDER WAS FAXED [phone]  THEY WILL CALL PT FOR AN APPT  PT WILL CALL US BACK ONCE HE DECIDES   IF HE WANTS CHEMO  AVS PRINTED W/INSTRUCTIONS AND GIVEN  TO PT ON EXIT

## 2024-03-12 NOTE — PATIENT INSTRUCTIONS
Refer to palliative care  [facility]  Lymphedema clinic  Offered chemo. Will call us

## 2024-03-14 ENCOUNTER — TELEPHONE (OUTPATIENT)
Dept: ONCOLOGY | Age: 64
End: 2024-03-14

## 2024-03-14 NOTE — TELEPHONE ENCOUNTER
Name: Danny Guevara  : 1960  MRN: [id number]    Oncology Navigation Follow-Up Note    Contact Type:  Telephone    Notes:   Navigator received call from Jeremy Hernández NP and practitioner sees pt. Weekly [phone]. Pt. Follows with Suzanna MEYERS at [phone]. Pt. Resides in [facility]. [facility]. Has bedbug infestation at this time per LINCOLN Luna.  Jeremy STARK reports Pt. Is not capable of maintaining activities of daily living. Pt. Has intake of Alcohol  daily. Writer has concerns if pt. Should choose to proceed with Tx if pt. Would require placement in SNF? Pt. Would benefit from palliative care coming to his home. Pt. Has upcoming MRI 3/21.  Patricia, may need to work on having pt. Placed if he would agree, especially if Tx given. Then would we be able to Tx due to cost absorption by facility? Writer will plan to discuss external palliative care referral for pt.         Electronically signed by Lucille Holguin RN on 3/14/2024 at 11:34 AM

## 2024-03-18 NOTE — TELEPHONE ENCOUNTER
Name: Danny Guevara  : 1960  MRN: [id number]    Oncology Navigation Follow-Up Note    Contact Type:  Telephone    Notes:   Writer sent PS message to Dr. Song.       Electronically signed by Lucille Holguin RN on 3/18/2024 at 12:10 PM

## 2024-03-19 ENCOUNTER — TELEPHONE (OUTPATIENT)
Dept: ONCOLOGY | Age: 64
End: 2024-03-19

## 2024-03-19 DIAGNOSIS — F33.2 MAJOR DEPRESSIVE DISORDER, RECURRENT SEVERE WITHOUT PSYCHOTIC FEATURES (HCC): Primary | Chronic | ICD-10-CM

## 2024-03-19 DIAGNOSIS — C34.00 MALIGNANT NEOPLASM OF HILUS OF LUNG, UNSPECIFIED LATERALITY (HCC): ICD-10-CM

## 2024-03-19 DIAGNOSIS — F10.20 UNCOMPLICATED ALCOHOL DEPENDENCE (HCC): ICD-10-CM

## 2024-03-19 DIAGNOSIS — L03.115 CELLULITIS OF RIGHT LOWER EXTREMITY: ICD-10-CM

## 2024-03-19 DIAGNOSIS — F19.10 SUBSTANCE ABUSE (HCC): Chronic | ICD-10-CM

## 2024-03-19 NOTE — TELEPHONE ENCOUNTER
Name: Danny Guevara  : 1960  MRN: [id number]    Oncology Navigation Follow-Up Note    Contact Type:  Telephone    Notes:   Navigator placing referral for external palliative care services per pt. Request and okay with Dr. Song.    Writer sending email to Hannah for assistance faxing referral and clinicals to [facility].   Writer will reach out to pt. In AM to expect a call.       Electronically signed by Lucille Holguin RN on 3/19/2024 at 4:28 PM

## 2024-03-20 ENCOUNTER — TELEPHONE (OUTPATIENT)
Dept: ONCOLOGY | Age: 64
End: 2024-03-20

## 2024-03-20 NOTE — TELEPHONE ENCOUNTER
Name: Danny Guevara  : 1960  MRN: [id number]    Oncology Navigation Follow-Up Note    Contact Type:  Telephone    Notes:   Navigator spoke with pt. And should anticipate call from [facility]. Pt. Has received call from Caitlin and pt. Trying to sleep and Caitlin will call pt. Back this afternoon.    225 received call from  and sharing that pt has had new onset of mental changes. Caller noting pt. Having trouble expressing his thoughts and confused? Writer sharing with  did not notice this earlier today, however if this is an acute onset would recommend ED or calling 911.  will keep navigator updated.   Electronically signed by Lucille Holguin RN on 3/20/2024 at 10:09 AM

## 2024-03-21 ENCOUNTER — APPOINTMENT (OUTPATIENT)
Dept: VASCULAR LAB | Age: 64
DRG: 603 | End: 2024-03-21
Attending: INTERNAL MEDICINE
Payer: MEDICARE

## 2024-03-21 ENCOUNTER — APPOINTMENT (OUTPATIENT)
Dept: CT IMAGING | Age: 64
DRG: 603 | End: 2024-03-21
Payer: MEDICARE

## 2024-03-21 ENCOUNTER — HOSPITAL ENCOUNTER (INPATIENT)
Age: 64
LOS: 3 days | Discharge: LEFT BEFORE DISCHARGE | DRG: 603 | End: 2024-03-24
Attending: EMERGENCY MEDICINE | Admitting: STUDENT IN AN ORGANIZED HEALTH CARE EDUCATION/TRAINING PROGRAM
Payer: MEDICARE

## 2024-03-21 ENCOUNTER — HOSPITAL ENCOUNTER (OUTPATIENT)
Dept: MRI IMAGING | Age: 64
Discharge: HOME | End: 2024-03-23
Attending: INTERNAL MEDICINE
Payer: MEDICARE

## 2024-03-21 DIAGNOSIS — C34.00 MALIGNANT NEOPLASM OF HILUS OF LUNG, UNSPECIFIED LATERALITY (HCC): ICD-10-CM

## 2024-03-21 DIAGNOSIS — L03.116 CELLULITIS AND ABSCESS OF LEFT LOWER EXTREMITY: ICD-10-CM

## 2024-03-21 DIAGNOSIS — C34.90 MALIGNANT NEOPLASM OF LUNG, UNSPECIFIED LATERALITY, UNSPECIFIED PART OF LUNG (HCC): ICD-10-CM

## 2024-03-21 DIAGNOSIS — L02.416 CELLULITIS AND ABSCESS OF LEFT LOWER EXTREMITY: ICD-10-CM

## 2024-03-21 DIAGNOSIS — L03.119 CELLULITIS OF LOWER EXTREMITY, UNSPECIFIED LATERALITY: Primary | ICD-10-CM

## 2024-03-21 DIAGNOSIS — M84.48XA PATHOLOGICAL FRACTURE OF RIB, INITIAL ENCOUNTER: ICD-10-CM

## 2024-03-21 PROBLEM — R47.9 DIFFICULTY WITH SPEECH: Status: ACTIVE | Noted: 2024-03-21

## 2024-03-21 PROBLEM — E83.52 HYPERCALCEMIA OF MALIGNANCY: Status: ACTIVE | Noted: 2024-03-21

## 2024-03-21 PROBLEM — C34.92: Status: ACTIVE | Noted: 2024-03-21

## 2024-03-21 LAB
ALBUMIN SERPL-MCNC: 4.1 G/DL (ref 3.5–5.2)
ALBUMIN/GLOB SERPL: 1 {RATIO} (ref 1–2.5)
ALP SERPL-CCNC: 147 U/L (ref 40–129)
ALT SERPL-CCNC: 14 U/L (ref 10–50)
ANION GAP SERPL CALCULATED.3IONS-SCNC: 14 MMOL/L (ref 9–16)
AST SERPL-CCNC: 49 U/L (ref 10–50)
BASOPHILS # BLD: 0.3 K/UL (ref 0–0.2)
BASOPHILS NFR BLD: 1 % (ref 0–2)
BILIRUB SERPL-MCNC: 0.5 MG/DL (ref 0–1.2)
BILIRUB UR QL STRIP: NEGATIVE
BNP SERPL-MCNC: 981 PG/ML (ref 0–300)
BODY TEMPERATURE: 37
BUN SERPL-MCNC: 10 MG/DL (ref 8–23)
CALCIUM SERPL-MCNC: 11.5 MG/DL (ref 8.6–10.4)
CHLORIDE SERPL-SCNC: 95 MMOL/L (ref 98–107)
CLARITY UR: CLEAR
CO2 SERPL-SCNC: 22 MMOL/L (ref 20–31)
COHGB MFR BLD: 3 % (ref 0–5)
COLOR UR: YELLOW
COMMENT: ABNORMAL
CREAT SERPL-MCNC: 0.8 MG/DL (ref 0.7–1.2)
CRP SERPL HS-MCNC: 34.6 MG/L (ref 0–5)
EOSINOPHIL # BLD: 0 K/UL (ref 0–0.4)
EOSINOPHILS RELATIVE PERCENT: 0 % (ref 1–4)
ERYTHROCYTE [DISTWIDTH] IN BLOOD BY AUTOMATED COUNT: 17.3 % (ref 11.8–14.4)
FIO2 ON VENT: ABNORMAL %
GFR SERPL CREATININE-BSD FRML MDRD: >60 ML/MIN/1.73M2
GLUCOSE SERPL-MCNC: 91 MG/DL (ref 74–99)
GLUCOSE UR STRIP-MCNC: NEGATIVE MG/DL
HCO3 VENOUS: 24.2 MMOL/L (ref 24–30)
HCT VFR BLD AUTO: 42.1 % (ref 40.7–50.3)
HGB BLD-MCNC: 14 G/DL (ref 13–17)
HGB UR QL STRIP.AUTO: NEGATIVE
IMM GRANULOCYTES # BLD AUTO: 0 K/UL (ref 0–0.3)
IMM GRANULOCYTES NFR BLD: 0 %
KETONES UR STRIP-MCNC: ABNORMAL MG/DL
LACTIC ACID, SEPSIS WHOLE BLOOD: 1.5 MMOL/L (ref 0.5–1.9)
LACTIC ACID, WHOLE BLOOD: 1.3 MMOL/L (ref 0.7–2.1)
LEUKOCYTE ESTERASE UR QL STRIP: NEGATIVE
LIPASE SERPL-CCNC: 12 U/L (ref 13–60)
LYMPHOCYTES NFR BLD: 1.2 K/UL (ref 1–4.8)
LYMPHOCYTES RELATIVE PERCENT: 4 % (ref 24–44)
MCH RBC QN AUTO: 29.7 PG (ref 25.2–33.5)
MCHC RBC AUTO-ENTMCNC: 33.3 G/DL (ref 28.4–34.8)
MCV RBC AUTO: 89.2 FL (ref 82.6–102.9)
MONOCYTES NFR BLD: 2.09 K/UL (ref 0.1–0.8)
MONOCYTES NFR BLD: 7 % (ref 1–7)
MORPHOLOGY: ABNORMAL
NEGATIVE BASE EXCESS, VEN: 0.7 MMOL/L (ref 0–2)
NEUTROPHILS NFR BLD: 88 % (ref 36–66)
NEUTS SEG NFR BLD: 26.31 K/UL (ref 1.8–7.7)
NITRITE UR QL STRIP: NEGATIVE
NRBC BLD-RTO: 0 PER 100 WBC
O2 SAT, VEN: 37.9 % (ref 60–85)
OSMOLALITY SERPL: 275 MOSM/KG (ref 275–295)
OSMOLALITY UR: 293 MOSM/KG (ref 80–1300)
PCO2, VEN: 43 MM HG (ref 39–55)
PH UR STRIP: 6 [PH] (ref 5–8)
PH VENOUS: 7.37 (ref 7.32–7.42)
PLATELET # BLD AUTO: 704 K/UL (ref 138–453)
PMV BLD AUTO: 9.4 FL (ref 8.1–13.5)
PO2, VEN: 24.9 MM HG (ref 30–50)
POTASSIUM SERPL-SCNC: 4.6 MMOL/L (ref 3.7–5.3)
PROT SERPL-MCNC: 8.7 G/DL (ref 6.6–8.7)
PROT UR STRIP-MCNC: NEGATIVE MG/DL
RBC # BLD AUTO: 4.72 M/UL (ref 4.21–5.77)
SODIUM SERPL-SCNC: 131 MMOL/L (ref 136–145)
SP GR UR STRIP: 1.02 (ref 1–1.03)
TROPONIN I SERPL HS-MCNC: 23 NG/L (ref 0–22)
TROPONIN I SERPL HS-MCNC: 37 NG/L (ref 0–22)
TSH SERPL DL<=0.05 MIU/L-ACNC: 1.42 UIU/ML (ref 0.27–4.2)
UROBILINOGEN UR STRIP-ACNC: NORMAL EU/DL (ref 0–1)
WBC OTHER # BLD: 29.9 K/UL (ref 3.5–11.3)

## 2024-03-21 PROCEDURE — 94761 N-INVAS EAR/PLS OXIMETRY MLT: CPT

## 2024-03-21 PROCEDURE — 96375 TX/PRO/DX INJ NEW DRUG ADDON: CPT

## 2024-03-21 PROCEDURE — 84443 ASSAY THYROID STIM HORMONE: CPT

## 2024-03-21 PROCEDURE — 85025 COMPLETE CBC W/AUTO DIFF WBC: CPT

## 2024-03-21 PROCEDURE — 80053 COMPREHEN METABOLIC PANEL: CPT

## 2024-03-21 PROCEDURE — 6360000002 HC RX W HCPCS: Performed by: INTERNAL MEDICINE

## 2024-03-21 PROCEDURE — 93970 EXTREMITY STUDY: CPT

## 2024-03-21 PROCEDURE — 96365 THER/PROPH/DIAG IV INF INIT: CPT

## 2024-03-21 PROCEDURE — 81003 URINALYSIS AUTO W/O SCOPE: CPT

## 2024-03-21 PROCEDURE — 93005 ELECTROCARDIOGRAM TRACING: CPT

## 2024-03-21 PROCEDURE — 6370000000 HC RX 637 (ALT 250 FOR IP): Performed by: INTERNAL MEDICINE

## 2024-03-21 PROCEDURE — 86041 ACETYLCHOLN RCPTR BNDNG ANTB: CPT

## 2024-03-21 PROCEDURE — 83930 ASSAY OF BLOOD OSMOLALITY: CPT

## 2024-03-21 PROCEDURE — 2580000003 HC RX 258: Performed by: INTERNAL MEDICINE

## 2024-03-21 PROCEDURE — A9576 INJ PROHANCE MULTIPACK: HCPCS | Performed by: INTERNAL MEDICINE

## 2024-03-21 PROCEDURE — 86042 ACETYLCHOLN RCPTR BLCKG ANTB: CPT

## 2024-03-21 PROCEDURE — 36415 COLL VENOUS BLD VENIPUNCTURE: CPT

## 2024-03-21 PROCEDURE — 70450 CT HEAD/BRAIN W/O DYE: CPT

## 2024-03-21 PROCEDURE — 6370000000 HC RX 637 (ALT 250 FOR IP)

## 2024-03-21 PROCEDURE — 6360000004 HC RX CONTRAST MEDICATION

## 2024-03-21 PROCEDURE — 99223 1ST HOSP IP/OBS HIGH 75: CPT | Performed by: INTERNAL MEDICINE

## 2024-03-21 PROCEDURE — 2580000003 HC RX 258

## 2024-03-21 PROCEDURE — 99222 1ST HOSP IP/OBS MODERATE 55: CPT | Performed by: PSYCHIATRY & NEUROLOGY

## 2024-03-21 PROCEDURE — 1200000000 HC SEMI PRIVATE

## 2024-03-21 PROCEDURE — 83690 ASSAY OF LIPASE: CPT

## 2024-03-21 PROCEDURE — 70496 CT ANGIOGRAPHY HEAD: CPT

## 2024-03-21 PROCEDURE — 6360000004 HC RX CONTRAST MEDICATION: Performed by: INTERNAL MEDICINE

## 2024-03-21 PROCEDURE — 83880 ASSAY OF NATRIURETIC PEPTIDE: CPT

## 2024-03-21 PROCEDURE — 82805 BLOOD GASES W/O2 SATURATION: CPT

## 2024-03-21 PROCEDURE — 83605 ASSAY OF LACTIC ACID: CPT

## 2024-03-21 PROCEDURE — 83516 IMMUNOASSAY NONANTIBODY: CPT

## 2024-03-21 PROCEDURE — 84484 ASSAY OF TROPONIN QUANT: CPT

## 2024-03-21 PROCEDURE — 71260 CT THORAX DX C+: CPT

## 2024-03-21 PROCEDURE — 87040 BLOOD CULTURE FOR BACTERIA: CPT

## 2024-03-21 PROCEDURE — 86255 FLUORESCENT ANTIBODY SCREEN: CPT

## 2024-03-21 PROCEDURE — 86140 C-REACTIVE PROTEIN: CPT

## 2024-03-21 PROCEDURE — 6360000002 HC RX W HCPCS

## 2024-03-21 PROCEDURE — 83935 ASSAY OF URINE OSMOLALITY: CPT

## 2024-03-21 PROCEDURE — 99285 EMERGENCY DEPT VISIT HI MDM: CPT

## 2024-03-21 PROCEDURE — 70553 MRI BRAIN STEM W/O & W/DYE: CPT

## 2024-03-21 RX ORDER — SODIUM CHLORIDE 0.9 % (FLUSH) 0.9 %
5-40 SYRINGE (ML) INJECTION EVERY 12 HOURS SCHEDULED
Status: DISCONTINUED | OUTPATIENT
Start: 2024-03-21 | End: 2024-03-24 | Stop reason: HOSPADM

## 2024-03-21 RX ORDER — MAGNESIUM SULFATE IN WATER 40 MG/ML
2000 INJECTION, SOLUTION INTRAVENOUS PRN
Status: DISCONTINUED | OUTPATIENT
Start: 2024-03-21 | End: 2024-03-24 | Stop reason: HOSPADM

## 2024-03-21 RX ORDER — LOSARTAN POTASSIUM 50 MG/1
50 TABLET ORAL DAILY
Status: DISCONTINUED | OUTPATIENT
Start: 2024-03-21 | End: 2024-03-24 | Stop reason: HOSPADM

## 2024-03-21 RX ORDER — LANOLIN ALCOHOL/MO/W.PET/CERES
100 CREAM (GRAM) TOPICAL DAILY
Status: DISCONTINUED | OUTPATIENT
Start: 2024-03-21 | End: 2024-03-24 | Stop reason: HOSPADM

## 2024-03-21 RX ORDER — ONDANSETRON 4 MG/1
4 TABLET, ORALLY DISINTEGRATING ORAL EVERY 8 HOURS PRN
Status: DISCONTINUED | OUTPATIENT
Start: 2024-03-21 | End: 2024-03-24 | Stop reason: HOSPADM

## 2024-03-21 RX ORDER — OXYCODONE HYDROCHLORIDE 5 MG/1
5 TABLET ORAL EVERY 4 HOURS PRN
Status: DISCONTINUED | OUTPATIENT
Start: 2024-03-21 | End: 2024-03-22

## 2024-03-21 RX ORDER — ACETAMINOPHEN 325 MG/1
650 TABLET ORAL EVERY 6 HOURS PRN
Status: DISCONTINUED | OUTPATIENT
Start: 2024-03-21 | End: 2024-03-24 | Stop reason: HOSPADM

## 2024-03-21 RX ORDER — SODIUM CHLORIDE 0.9 % (FLUSH) 0.9 %
10 SYRINGE (ML) INJECTION PRN
Status: DISCONTINUED | OUTPATIENT
Start: 2024-03-21 | End: 2024-03-24 | Stop reason: HOSPADM

## 2024-03-21 RX ORDER — ROSUVASTATIN CALCIUM 5 MG/1
5 TABLET, COATED ORAL NIGHTLY
Status: DISCONTINUED | OUTPATIENT
Start: 2024-03-21 | End: 2024-03-24 | Stop reason: HOSPADM

## 2024-03-21 RX ORDER — OXYCODONE HYDROCHLORIDE 5 MG/1
10 TABLET ORAL EVERY 4 HOURS PRN
Status: DISCONTINUED | OUTPATIENT
Start: 2024-03-21 | End: 2024-03-22

## 2024-03-21 RX ORDER — POTASSIUM CHLORIDE 20 MEQ/1
40 TABLET, EXTENDED RELEASE ORAL PRN
Status: DISCONTINUED | OUTPATIENT
Start: 2024-03-21 | End: 2024-03-24 | Stop reason: HOSPADM

## 2024-03-21 RX ORDER — MORPHINE SULFATE 4 MG/ML
4 INJECTION, SOLUTION INTRAMUSCULAR; INTRAVENOUS ONCE
Status: COMPLETED | OUTPATIENT
Start: 2024-03-21 | End: 2024-03-21

## 2024-03-21 RX ORDER — POTASSIUM CHLORIDE 7.45 MG/ML
10 INJECTION INTRAVENOUS PRN
Status: DISCONTINUED | OUTPATIENT
Start: 2024-03-21 | End: 2024-03-24 | Stop reason: HOSPADM

## 2024-03-21 RX ORDER — FOLIC ACID 1 MG/1
1 TABLET ORAL DAILY
Status: DISCONTINUED | OUTPATIENT
Start: 2024-03-21 | End: 2024-03-24 | Stop reason: HOSPADM

## 2024-03-21 RX ORDER — ASPIRIN 81 MG/1
324 TABLET, CHEWABLE ORAL ONCE
Status: COMPLETED | OUTPATIENT
Start: 2024-03-21 | End: 2024-03-21

## 2024-03-21 RX ORDER — SENNOSIDES A AND B 8.6 MG/1
1 TABLET, FILM COATED ORAL NIGHTLY
Status: DISCONTINUED | OUTPATIENT
Start: 2024-03-21 | End: 2024-03-24 | Stop reason: HOSPADM

## 2024-03-21 RX ORDER — SODIUM CHLORIDE 9 MG/ML
INJECTION, SOLUTION INTRAVENOUS CONTINUOUS
Status: DISCONTINUED | OUTPATIENT
Start: 2024-03-21 | End: 2024-03-24 | Stop reason: HOSPADM

## 2024-03-21 RX ORDER — SODIUM CHLORIDE 0.9 % (FLUSH) 0.9 %
5-40 SYRINGE (ML) INJECTION PRN
Status: DISCONTINUED | OUTPATIENT
Start: 2024-03-21 | End: 2024-03-24 | Stop reason: HOSPADM

## 2024-03-21 RX ORDER — QUETIAPINE FUMARATE 300 MG/1
300 TABLET, FILM COATED ORAL NIGHTLY
Status: DISCONTINUED | OUTPATIENT
Start: 2024-03-22 | End: 2024-03-24 | Stop reason: HOSPADM

## 2024-03-21 RX ORDER — IPRATROPIUM BROMIDE AND ALBUTEROL SULFATE 2.5; .5 MG/3ML; MG/3ML
1 SOLUTION RESPIRATORY (INHALATION)
Status: DISCONTINUED | OUTPATIENT
Start: 2024-03-21 | End: 2024-03-24 | Stop reason: HOSPADM

## 2024-03-21 RX ORDER — ONDANSETRON 2 MG/ML
4 INJECTION INTRAMUSCULAR; INTRAVENOUS EVERY 6 HOURS PRN
Status: DISCONTINUED | OUTPATIENT
Start: 2024-03-21 | End: 2024-03-24 | Stop reason: HOSPADM

## 2024-03-21 RX ORDER — LEVOTHYROXINE SODIUM 0.03 MG/1
25 TABLET ORAL EVERY MORNING
Status: DISCONTINUED | OUTPATIENT
Start: 2024-03-22 | End: 2024-03-24 | Stop reason: HOSPADM

## 2024-03-21 RX ORDER — POLYETHYLENE GLYCOL 3350 17 G/17G
17 POWDER, FOR SOLUTION ORAL DAILY PRN
Status: DISCONTINUED | OUTPATIENT
Start: 2024-03-21 | End: 2024-03-22

## 2024-03-21 RX ORDER — ENOXAPARIN SODIUM 100 MG/ML
40 INJECTION SUBCUTANEOUS DAILY
Status: DISCONTINUED | OUTPATIENT
Start: 2024-03-21 | End: 2024-03-24 | Stop reason: HOSPADM

## 2024-03-21 RX ORDER — LORAZEPAM 0.5 MG/1
0.5 TABLET ORAL EVERY 4 HOURS PRN
Status: DISCONTINUED | OUTPATIENT
Start: 2024-03-21 | End: 2024-03-24 | Stop reason: HOSPADM

## 2024-03-21 RX ORDER — ACETAMINOPHEN 650 MG/1
650 SUPPOSITORY RECTAL EVERY 6 HOURS PRN
Status: DISCONTINUED | OUTPATIENT
Start: 2024-03-21 | End: 2024-03-24 | Stop reason: HOSPADM

## 2024-03-21 RX ORDER — SODIUM CHLORIDE 9 MG/ML
INJECTION, SOLUTION INTRAVENOUS PRN
Status: DISCONTINUED | OUTPATIENT
Start: 2024-03-21 | End: 2024-03-24 | Stop reason: HOSPADM

## 2024-03-21 RX ADMIN — SODIUM CHLORIDE: 9 INJECTION, SOLUTION INTRAVENOUS at 19:00

## 2024-03-21 RX ADMIN — ASPIRIN 81 MG 324 MG: 81 TABLET ORAL at 14:29

## 2024-03-21 RX ADMIN — OXYCODONE 10 MG: 5 TABLET ORAL at 19:37

## 2024-03-21 RX ADMIN — SODIUM CHLORIDE, PRESERVATIVE FREE 10 ML: 5 INJECTION INTRAVENOUS at 11:16

## 2024-03-21 RX ADMIN — IOPAMIDOL 150 ML: 755 INJECTION, SOLUTION INTRAVENOUS at 15:15

## 2024-03-21 RX ADMIN — MORPHINE SULFATE 4 MG: 4 INJECTION INTRAVENOUS at 14:29

## 2024-03-21 RX ADMIN — GADOTERIDOL 18 ML: 279.3 INJECTION, SOLUTION INTRAVENOUS at 11:14

## 2024-03-21 RX ADMIN — ACETAMINOPHEN 325MG 650 MG: 325 TABLET ORAL at 18:06

## 2024-03-21 RX ADMIN — ENOXAPARIN SODIUM 40 MG: 100 INJECTION SUBCUTANEOUS at 19:38

## 2024-03-21 RX ADMIN — CEFTRIAXONE SODIUM 1000 MG: 1 INJECTION, POWDER, FOR SOLUTION INTRAMUSCULAR; INTRAVENOUS at 13:51

## 2024-03-21 RX ADMIN — VANCOMYCIN HYDROCHLORIDE 1000 MG: 1 INJECTION, POWDER, LYOPHILIZED, FOR SOLUTION INTRAVENOUS at 19:37

## 2024-03-21 ASSESSMENT — PAIN DESCRIPTION - LOCATION: LOCATION: SHOULDER;GENERALIZED

## 2024-03-21 ASSESSMENT — PAIN - FUNCTIONAL ASSESSMENT: PAIN_FUNCTIONAL_ASSESSMENT: 0-10

## 2024-03-21 ASSESSMENT — PAIN SCALES - GENERAL
PAINLEVEL_OUTOF10: 10
PAINLEVEL_OUTOF10: 10

## 2024-03-21 ASSESSMENT — PAIN DESCRIPTION - ORIENTATION: ORIENTATION: LEFT

## 2024-03-21 NOTE — ED PROVIDER NOTES
[facility] ED     Emergency Department     Faculty Attestation        I performed a history and physical examination of the patient and discussed management with the resident. I reviewed the resident’s note and agree with the documented findings and plan of care. Any areas of disagreement are noted on the chart. I was personally present for the key portions of any procedures. I have documented in the chart those procedures where I was not present during the key portions. I have reviewed the emergency nurses triage note. I agree with the chief complaint, past medical history, past surgical history, allergies, medications, social and family history as documented unless otherwise noted below.  For Physician Assistant/ Nurse Practitioner cases/documentation I have personally evaluated this patient and have completed at least one if not all key elements of the E/M (history, physical exam, and MDM). Additional findings are as noted.      Vital Signs: BP: (!) 149/90  Pulse: 83  Respirations: 18  Temp: 97.2 °F (36.2 °C) SpO2: 94 %  PCP:  Roberto Donaldson MD  Note Started: 3/21/24, 12:23 PM EDT    Pertinent Comments:     Patient is 63-year-old male with relatively recent diagnosis of stage IV lung cancer and lung cancer with worsening some shortness of breath as well as 2 weeks off-and-on of expressive aphasia.   Also chronic left shoulder pain with what appears to be metastatic lesion and likely pathologic fracture of the humeral head.    Chronic right lower extremity cellulitis not improving on outpatient antibiotic as well as now some left lower extremity cellulitis.   Patient is here for evaluation and treatment and likely admission    Critical Care  None      (Please note that portions of this note were completed with a voice recognition program. Efforts were made to edit the dictations but occasionally words are mis-transcribed. Whenever words are used in 
Days of Exercise per Week: 3 days     Minutes of Exercise per Session: 10 min   Stress: Stress Concern Present (2/26/2020)    [employer] [place] [employer] - Occupational Stress Questionnaire     Feeling of Stress : To some extent   Social Connections: Moderately Integrated (2/26/2020)    Social Connection and Isolation Panel [NHANES]     Frequency of Communication with Friends and Family: More than three times a week     Frequency of Social Gatherings with Friends and Family: More than three times a week     Attends Sikhism Services: 1 to 4 times per year     Active Member of Clubs or Organizations: Yes     Attends Club or Organization Meetings: 1 to 4 times per year     Marital Status:    Intimate Partner Violence: Not on file   Housing Stability: Low Risk  (1/24/2024)    Housing Stability Vital Sign     Unable to Pay for Housing in the Last Year: No     Number of Places Lived in the Last Year: 1     Unstable Housing in the Last Year: No       Family History   Problem Relation Age of Onset    Breast Cancer Mother         s/p surgical complications    Diabetes Mother     Heart Failure Father     Other Brother         \"bad back\"       Allergies:  Patient has no known allergies.    Home Medications:  Prior to Admission medications    Medication Sig Start Date End Date Taking? Authorizing Provider   amoxicillin-clavulanate (AUGMENTIN) 875-125 MG per tablet TAKE 1 TABLET BY MOUTH EVERY 12 HOURS FOR TEN DAYS 2/29/24   Provider, MD Aileen   levoFLOXacin (LEVAQUIN) 500 MG tablet Take 1 tablet by mouth daily for 10 days 3/12/24 3/22/24  Greta Ruiz MD   ALPRAZolam (XANAX) 0.5 MG tablet Take 1 tablet by mouth 3 times daily as needed for Sleep or Anxiety for up to 30 days. Max Daily Amount: 1.5 mg 2/25/24 3/26/24  Greta Ruiz MD   ferrous sulfate (FE TABS 325) 325 (65 Fe) MG EC tablet Take 1 tablet by mouth daily (with breakfast) 2/19/24   Oswaldo Crawford MD   QUEtiapine (SEROQUEL)

## 2024-03-21 NOTE — H&P
[facility]  Office: [phone]  Rio Blair DO, Javier Walters DO, Romulo Boone DO, Miquel Casey DO, Nataly Priest MD, Daly Mendoza MD, Darius Mcmahon MD, Mary Swain MD,  Ad Goode MD, Vale Peña MD, Rancho Harley MD,  Julia Goddard DO, Bruno Frank MD, Nabeel Spicer MD, Danny Blair DO, Lulú Hernandez MD,  Piyush Lepe DO, Anabela Cummings MD, Valentine Brian MD, Caren Slaughter MD, Reddy Cagle MD,  Ibrahima Fortune MD, Idalia Girard MD, Zunilda Whiting MD, Konstantin Mane MD, Lucho Haq MD, Oswaldo Crawford MD, Jc Acuña DO, De Cochran DO, Sole Mcgovern MD,  Vinay Meza MD, Shirley Waterhouse, CNP,  Paulette Cook, CNP, Ruslan Ward, CNP,  Doreen Corcoran, DNP, Twyla Pacheco, CNP, Ashley Mesa, CNP, Linn Azul CNP, Karma Sage, CNP, Tanvi Jim, CNP, Lizzie Esqueda, PA-C, Tova Romano, PA-C, Deb Crespo, CNP, Ronda Mchugh, CNP, Danielle Gonzalez, CNP, Shwetha León, CNS, Estrella Bledsoe, CNP, Sera Kruger, CNP, Tracy Schwab, CNP         [facility]   IN-PATIENT SERVICE   [facility]    HISTORY AND PHYSICAL EXAMINATION            Date:   3/21/2024  Patient name:  Danny Guevara  Date of admission:  3/21/2024 12:12 PM  MRN:   [id number]  Account:  [id number]  YOB: 1960  PCP:    Roberto Donaldson MD  Room:   29/29  Code Status:    Full Code    Chief Complaint:     Chief Complaint   Patient presents with    Shortness of Breath    Shoulder Pain    Altered Mental Status       History Obtained From:     patient    History of Present Illness:     Danny Guevara is a 63 y.o. Non- / non  male who presents with Shortness of Breath, Shoulder Pain, and Altered Mental Status   and is admitted to the hospital for the management of Cellulitis and abscess of left lower extremity.    Patient comes in for complaints of difficulty and word finding for the last few days along with leg swelling

## 2024-03-21 NOTE — CARE COORDINATION
Case Management Assessment  Initial Evaluation    Date/Time of Evaluation: 3/21/2024 5:44 PM  Assessment Completed by: Judith Ferrer RN    If patient is discharged prior to next notation, then this note serves as note for discharge by case management.    Patient Name: Danny Guevara                   YOB: 1960  Diagnosis: Cellulitis and abscess of left lower extremity [L03.116, L02.416]                   Date / Time: 3/21/2024 12:12 PM    Patient Admission Status: Inpatient   Readmission Risk (Low < 19, Mod (19-27), High > 27): Readmission Risk Score: 21    Current PCP: Roberto Donaldson MD  PCP verified by CM? (P) Yes    Chart Reviewed: Yes      History Provided by: (P) Patient  Patient Orientation: (P) Alert and Oriented    Patient Cognition: (P) Alert    Hospitalization in the last 30 days (Readmission):  No    If yes, Readmission Assessment in CM Navigator will be completed.    Advance Directives:      Code Status: Full Code   Patient's Primary Decision Maker is: (P) Legal Next of Kin    Primary Decision Maker: Daquan Guevara - Brother/Sister - [phone]    Discharge Planning:    Patient lives with: (P) Alone Type of Home: (P) Apartment  Primary Care Giver: (P) Self  Patient Support Systems include: (P) Family Members, Friends/Neighbors   Current Financial resources: (P) Medicaid, Medicare  Current community resources:    Current services prior to admission: (P) Other (Comment) (nurse visits from Medicare)            Current DME:              Type of Home Care services:  (P) None    ADLS  Prior functional level: (P) Independent in ADLs/IADLs  Current functional level: (P) Independent in ADLs/IADLs    PT AM-PAC:   /24  OT AM-PAC:   /24    Family can provide assistance at DC:    Would you like Case Management to discuss the discharge plan with any other family members/significant others, and if so, who?    Plans to Return to Present Housing: (P) Yes  Other Identified Issues/Barriers to

## 2024-03-21 NOTE — PROGRESS NOTES
_           Chief Complaint   Patient presents with    Follow-up    Results     Bone Scan    Other     Would like pain medication increased      DIAGNOSIS:       Myeloproliferative disorder, NOS, positive VINICIO 2 gene mutation.   Negative for CML.    Newly diagnosed squamous cell carcinoma of the lung.  Bone mets  LE cellulitis.   CURRENT THERAPY:         Hydroxyurea 500 mg twice daily.  Allopurinol, discontinued  Aspirin  Therapeutic phlebotomy as needed    Management of recent diagnosed lung cancer.  Of her palliative chemotherapy treatment.    BRIEF CASE HISTORY:      Mr. Danny Guevara is a very pleasant 63 y.o. male with history of recently diagnosed myeloproliferative disorder is referred for further management. He has Chronic back pain.  Old injury in the cervical spine after trampoline fall.  Surgery C-spine long time ago  The patient is having frequent headaches and left sided numbness. No chest pains  In October 2018 he was seen in [facility] and had elevated white blood cells and red blood cells and platelets  Diagnosis myeloproliferative disorder with positive JAK2 gene mutation.   Bone marrow showed no specific type and cytogenetics showed no CML  He was started on Hydroxyurea twice daily and allopurinol. Treatment tolerated well so far with no side effects.     INTERIM HISTORY:   The patient seen for follow-up myeloproliferative disorder.  Patient is maintained on hydroxyurea but treatment was interrupted.  Recently he was maintained on Hydrea. Tolerated well with no side effects.     Recently had screening chest CT scan. IMPRESSION:  New 1.5 cm spiculated nodule posteromedial lingula with small abutment of the  left major fissure.  Highly worrisome for malignancy.     New consolidation in the lateral segment middle lobe with ground-glass  attenuation and septal thickening along is free superior

## 2024-03-21 NOTE — ED TRIAGE NOTES
Patient presented to the ED today with complaints of shoulder pain, confusion, loss of speech (a few days ago), and SOB. Patient has been declining for the past week. Patient has a history of lung and bone cancer. Patient has not started cancer treatment for lung cancer at this time, but takes medication for leukemia.

## 2024-03-22 ENCOUNTER — TELEPHONE (OUTPATIENT)
Dept: ONCOLOGY | Age: 64
End: 2024-03-22

## 2024-03-22 PROBLEM — R29.90 STROKE-LIKE SYMPTOMS: Status: ACTIVE | Noted: 2024-03-22

## 2024-03-22 LAB
25(OH)D3 SERPL-MCNC: 15 NG/ML (ref 30–100)
ANION GAP SERPL CALCULATED.3IONS-SCNC: 15 MMOL/L (ref 9–16)
BASOPHILS # BLD: 0.27 K/UL (ref 0–0.2)
BASOPHILS NFR BLD: 1 % (ref 0–2)
BUN SERPL-MCNC: 8 MG/DL (ref 8–23)
CA-I BLD-SCNC: 1.47 MMOL/L (ref 1.13–1.33)
CALCIUM SERPL-MCNC: 10.9 MG/DL (ref 8.6–10.4)
CHLORIDE SERPL-SCNC: 99 MMOL/L (ref 98–107)
CO2 SERPL-SCNC: 19 MMOL/L (ref 20–31)
CREAT SERPL-MCNC: 0.8 MG/DL (ref 0.7–1.2)
ECHO BSA: 2.17 M2
EKG ATRIAL RATE: 81 BPM
EKG P AXIS: 63 DEGREES
EKG P-R INTERVAL: 156 MS
EKG Q-T INTERVAL: 374 MS
EKG QRS DURATION: 84 MS
EKG QTC CALCULATION (BAZETT): 434 MS
EKG R AXIS: 58 DEGREES
EKG T AXIS: 65 DEGREES
EKG VENTRICULAR RATE: 81 BPM
EOSINOPHIL # BLD: 0.54 K/UL (ref 0–0.4)
EOSINOPHILS RELATIVE PERCENT: 2 % (ref 1–4)
ERYTHROCYTE [DISTWIDTH] IN BLOOD BY AUTOMATED COUNT: 17.2 % (ref 11.8–14.4)
GFR SERPL CREATININE-BSD FRML MDRD: >60 ML/MIN/1.73M2
GLUCOSE SERPL-MCNC: 79 MG/DL (ref 74–99)
HCT VFR BLD AUTO: 39.2 % (ref 40.7–50.3)
HGB BLD-MCNC: 13.4 G/DL (ref 13–17)
IMM GRANULOCYTES # BLD AUTO: 0.27 K/UL (ref 0–0.3)
IMM GRANULOCYTES NFR BLD: 1 %
LYMPHOCYTES NFR BLD: 0.27 K/UL (ref 1–4.8)
LYMPHOCYTES RELATIVE PERCENT: 1 % (ref 24–44)
MCH RBC QN AUTO: 29.8 PG (ref 25.2–33.5)
MCHC RBC AUTO-ENTMCNC: 34.2 G/DL (ref 28.4–34.8)
MCV RBC AUTO: 87.1 FL (ref 82.6–102.9)
MONOCYTES NFR BLD: 12 % (ref 1–7)
MONOCYTES NFR BLD: 3.26 K/UL (ref 0.1–0.8)
MORPHOLOGY: ABNORMAL
NEUTROPHILS NFR BLD: 83 % (ref 36–66)
NEUTS SEG NFR BLD: 22.59 K/UL (ref 1.8–7.7)
NRBC BLD-RTO: 0 PER 100 WBC
PLATELET # BLD AUTO: 650 K/UL (ref 138–453)
PMV BLD AUTO: 9.7 FL (ref 8.1–13.5)
POTASSIUM SERPL-SCNC: 3.7 MMOL/L (ref 3.7–5.3)
PTH-INTACT SERPL-MCNC: 6 PG/ML (ref 15–65)
RBC # BLD AUTO: 4.5 M/UL (ref 4.21–5.77)
SODIUM SERPL-SCNC: 129 MMOL/L (ref 136–145)
SODIUM SERPL-SCNC: 130 MMOL/L (ref 136–145)
SODIUM SERPL-SCNC: 132 MMOL/L (ref 136–145)
SODIUM SERPL-SCNC: 133 MMOL/L (ref 136–145)
WBC OTHER # BLD: 27.2 K/UL (ref 3.5–11.3)

## 2024-03-22 PROCEDURE — 99222 1ST HOSP IP/OBS MODERATE 55: CPT | Performed by: INTERNAL MEDICINE

## 2024-03-22 PROCEDURE — 6360000002 HC RX W HCPCS: Performed by: INTERNAL MEDICINE

## 2024-03-22 PROCEDURE — 97162 PT EVAL MOD COMPLEX 30 MIN: CPT

## 2024-03-22 PROCEDURE — 85025 COMPLETE CBC W/AUTO DIFF WBC: CPT

## 2024-03-22 PROCEDURE — 6370000000 HC RX 637 (ALT 250 FOR IP): Performed by: INTERNAL MEDICINE

## 2024-03-22 PROCEDURE — 84295 ASSAY OF SERUM SODIUM: CPT

## 2024-03-22 PROCEDURE — 94640 AIRWAY INHALATION TREATMENT: CPT

## 2024-03-22 PROCEDURE — 82330 ASSAY OF CALCIUM: CPT

## 2024-03-22 PROCEDURE — 82306 VITAMIN D 25 HYDROXY: CPT

## 2024-03-22 PROCEDURE — 93010 ELECTROCARDIOGRAM REPORT: CPT | Performed by: INTERNAL MEDICINE

## 2024-03-22 PROCEDURE — 99232 SBSQ HOSP IP/OBS MODERATE 35: CPT | Performed by: PSYCHIATRY & NEUROLOGY

## 2024-03-22 PROCEDURE — 2580000003 HC RX 258: Performed by: INTERNAL MEDICINE

## 2024-03-22 PROCEDURE — 99223 1ST HOSP IP/OBS HIGH 75: CPT | Performed by: NURSE PRACTITIONER

## 2024-03-22 PROCEDURE — 97530 THERAPEUTIC ACTIVITIES: CPT

## 2024-03-22 PROCEDURE — 83970 ASSAY OF PARATHORMONE: CPT

## 2024-03-22 PROCEDURE — 99232 SBSQ HOSP IP/OBS MODERATE 35: CPT | Performed by: STUDENT IN AN ORGANIZED HEALTH CARE EDUCATION/TRAINING PROGRAM

## 2024-03-22 PROCEDURE — 6360000002 HC RX W HCPCS: Performed by: STUDENT IN AN ORGANIZED HEALTH CARE EDUCATION/TRAINING PROGRAM

## 2024-03-22 PROCEDURE — 1200000000 HC SEMI PRIVATE

## 2024-03-22 PROCEDURE — 6370000000 HC RX 637 (ALT 250 FOR IP): Performed by: NURSE PRACTITIONER

## 2024-03-22 PROCEDURE — 93970 EXTREMITY STUDY: CPT | Performed by: SURGERY

## 2024-03-22 PROCEDURE — APPSS30 APP SPLIT SHARED TIME 16-30 MINUTES: Performed by: NURSE PRACTITIONER

## 2024-03-22 PROCEDURE — 80048 BASIC METABOLIC PNL TOTAL CA: CPT

## 2024-03-22 PROCEDURE — 36415 COLL VENOUS BLD VENIPUNCTURE: CPT

## 2024-03-22 PROCEDURE — 6370000000 HC RX 637 (ALT 250 FOR IP): Performed by: STUDENT IN AN ORGANIZED HEALTH CARE EDUCATION/TRAINING PROGRAM

## 2024-03-22 RX ORDER — OXYCODONE HYDROCHLORIDE 5 MG/1
10 TABLET ORAL EVERY 8 HOURS
Status: DISCONTINUED | OUTPATIENT
Start: 2024-03-22 | End: 2024-03-23

## 2024-03-22 RX ORDER — MORPHINE SULFATE 4 MG/ML
4 INJECTION, SOLUTION INTRAMUSCULAR; INTRAVENOUS EVERY 4 HOURS PRN
Status: DISCONTINUED | OUTPATIENT
Start: 2024-03-22 | End: 2024-03-24 | Stop reason: HOSPADM

## 2024-03-22 RX ORDER — CETIRIZINE HYDROCHLORIDE 10 MG/1
10 TABLET ORAL DAILY
Status: DISCONTINUED | OUTPATIENT
Start: 2024-03-22 | End: 2024-03-24 | Stop reason: HOSPADM

## 2024-03-22 RX ORDER — MORPHINE SULFATE 2 MG/ML
2 INJECTION, SOLUTION INTRAMUSCULAR; INTRAVENOUS
Status: DISCONTINUED | OUTPATIENT
Start: 2024-03-22 | End: 2024-03-22

## 2024-03-22 RX ORDER — LANOLIN ALCOHOL/MO/W.PET/CERES
325 CREAM (GRAM) TOPICAL
Status: DISCONTINUED | OUTPATIENT
Start: 2024-03-23 | End: 2024-03-24 | Stop reason: HOSPADM

## 2024-03-22 RX ORDER — SENNA AND DOCUSATE SODIUM 50; 8.6 MG/1; MG/1
2 TABLET, FILM COATED ORAL DAILY PRN
Status: DISCONTINUED | OUTPATIENT
Start: 2024-03-22 | End: 2024-03-24 | Stop reason: HOSPADM

## 2024-03-22 RX ORDER — VITAMIN B COMPLEX
1000 TABLET ORAL DAILY
Status: DISCONTINUED | OUTPATIENT
Start: 2024-03-22 | End: 2024-03-22

## 2024-03-22 RX ORDER — SODIUM CHLORIDE 1 G/1
2 TABLET ORAL 2 TIMES DAILY WITH MEALS
Status: DISCONTINUED | OUTPATIENT
Start: 2024-03-22 | End: 2024-03-24 | Stop reason: HOSPADM

## 2024-03-22 RX ORDER — HYDROXYUREA 500 MG/1
500 CAPSULE ORAL DAILY
Status: DISCONTINUED | OUTPATIENT
Start: 2024-03-22 | End: 2024-03-24 | Stop reason: HOSPADM

## 2024-03-22 RX ORDER — POLYETHYLENE GLYCOL 3350 17 G/17G
17 POWDER, FOR SOLUTION ORAL DAILY
Status: DISCONTINUED | OUTPATIENT
Start: 2024-03-22 | End: 2024-03-24 | Stop reason: HOSPADM

## 2024-03-22 RX ORDER — NICOTINE 21 MG/24HR
1 PATCH, TRANSDERMAL 24 HOURS TRANSDERMAL DAILY
Status: DISCONTINUED | OUTPATIENT
Start: 2024-03-22 | End: 2024-03-24 | Stop reason: HOSPADM

## 2024-03-22 RX ORDER — MULTIVITAMIN WITH IRON
1 TABLET ORAL DAILY
Status: DISCONTINUED | OUTPATIENT
Start: 2024-03-22 | End: 2024-03-24 | Stop reason: HOSPADM

## 2024-03-22 RX ORDER — MORPHINE SULFATE 4 MG/ML
4 INJECTION, SOLUTION INTRAMUSCULAR; INTRAVENOUS
Status: DISCONTINUED | OUTPATIENT
Start: 2024-03-22 | End: 2024-03-22

## 2024-03-22 RX ORDER — PANTOPRAZOLE SODIUM 40 MG/1
40 TABLET, DELAYED RELEASE ORAL
Status: DISCONTINUED | OUTPATIENT
Start: 2024-03-23 | End: 2024-03-24 | Stop reason: HOSPADM

## 2024-03-22 RX ADMIN — SODIUM CHLORIDE TAB 1 GM 2 G: 1 TAB at 17:11

## 2024-03-22 RX ADMIN — LOSARTAN POTASSIUM 50 MG: 50 TABLET, FILM COATED ORAL at 00:15

## 2024-03-22 RX ADMIN — ACETAMINOPHEN 325MG 650 MG: 325 TABLET ORAL at 08:13

## 2024-03-22 RX ADMIN — OXYCODONE 10 MG: 5 TABLET ORAL at 06:09

## 2024-03-22 RX ADMIN — ACETAMINOPHEN 325MG 650 MG: 325 TABLET ORAL at 15:51

## 2024-03-22 RX ADMIN — MORPHINE SULFATE 4 MG: 4 INJECTION INTRAVENOUS at 21:37

## 2024-03-22 RX ADMIN — SODIUM CHLORIDE, PRESERVATIVE FREE 10 ML: 5 INJECTION INTRAVENOUS at 08:14

## 2024-03-22 RX ADMIN — IPRATROPIUM BROMIDE AND ALBUTEROL SULFATE 1 DOSE: .5; 3 SOLUTION RESPIRATORY (INHALATION) at 12:33

## 2024-03-22 RX ADMIN — Medication 100 MG: at 00:15

## 2024-03-22 RX ADMIN — MORPHINE SULFATE 2 MG: 2 INJECTION, SOLUTION INTRAMUSCULAR; INTRAVENOUS at 11:18

## 2024-03-22 RX ADMIN — SENNOSIDES 8.6 MG: 8.6 TABLET, FILM COATED ORAL at 20:35

## 2024-03-22 RX ADMIN — ROSUVASTATIN CALCIUM 5 MG: 5 TABLET, COATED ORAL at 20:34

## 2024-03-22 RX ADMIN — OXYCODONE 10 MG: 5 TABLET ORAL at 00:15

## 2024-03-22 RX ADMIN — SENNOSIDES 8.6 MG: 8.6 TABLET, FILM COATED ORAL at 00:15

## 2024-03-22 RX ADMIN — ENOXAPARIN SODIUM 40 MG: 100 INJECTION SUBCUTANEOUS at 08:13

## 2024-03-22 RX ADMIN — POLYETHYLENE GLYCOL 3350 17 G: 17 POWDER, FOR SOLUTION ORAL at 13:17

## 2024-03-22 RX ADMIN — ALCOHOL 1 TABLET: 70.47 GEL TOPICAL at 13:18

## 2024-03-22 RX ADMIN — LEVOTHYROXINE SODIUM 25 MCG: 25 TABLET ORAL at 06:08

## 2024-03-22 RX ADMIN — FOLIC ACID 1 MG: 1 TABLET ORAL at 08:13

## 2024-03-22 RX ADMIN — VANCOMYCIN HYDROCHLORIDE 1000 MG: 1 INJECTION, POWDER, LYOPHILIZED, FOR SOLUTION INTRAVENOUS at 17:16

## 2024-03-22 RX ADMIN — OXYCODONE 10 MG: 5 TABLET ORAL at 20:35

## 2024-03-22 RX ADMIN — IPRATROPIUM BROMIDE AND ALBUTEROL SULFATE 1 DOSE: .5; 3 SOLUTION RESPIRATORY (INHALATION) at 09:45

## 2024-03-22 RX ADMIN — LORAZEPAM 0.5 MG: 0.5 TABLET ORAL at 15:51

## 2024-03-22 RX ADMIN — QUETIAPINE FUMARATE 300 MG: 300 TABLET ORAL at 00:15

## 2024-03-22 RX ADMIN — LOSARTAN POTASSIUM 50 MG: 50 TABLET, FILM COATED ORAL at 08:13

## 2024-03-22 RX ADMIN — IPRATROPIUM BROMIDE AND ALBUTEROL SULFATE 1 DOSE: .5; 3 SOLUTION RESPIRATORY (INHALATION) at 20:52

## 2024-03-22 RX ADMIN — VANCOMYCIN HYDROCHLORIDE 1000 MG: 1 INJECTION, POWDER, LYOPHILIZED, FOR SOLUTION INTRAVENOUS at 06:07

## 2024-03-22 RX ADMIN — MORPHINE SULFATE 4 MG: 4 INJECTION INTRAVENOUS at 17:11

## 2024-03-22 RX ADMIN — MORPHINE SULFATE 4 MG: 4 INJECTION INTRAVENOUS at 13:21

## 2024-03-22 RX ADMIN — FOLIC ACID 1 MG: 1 TABLET ORAL at 00:15

## 2024-03-22 RX ADMIN — QUETIAPINE FUMARATE 300 MG: 300 TABLET ORAL at 20:35

## 2024-03-22 RX ADMIN — OXYCODONE 10 MG: 5 TABLET ORAL at 13:18

## 2024-03-22 RX ADMIN — CETIRIZINE HYDROCHLORIDE 10 MG: 10 TABLET ORAL at 13:17

## 2024-03-22 RX ADMIN — Medication 100 MG: at 08:13

## 2024-03-22 RX ADMIN — IPRATROPIUM BROMIDE AND ALBUTEROL SULFATE 1 DOSE: .5; 3 SOLUTION RESPIRATORY (INHALATION) at 15:32

## 2024-03-22 RX ADMIN — OXYCODONE 10 MG: 5 TABLET ORAL at 10:14

## 2024-03-22 RX ADMIN — LORAZEPAM 0.5 MG: 0.5 TABLET ORAL at 08:13

## 2024-03-22 RX ADMIN — HYDROXYUREA 500 MG: 500 CAPSULE ORAL at 13:21

## 2024-03-22 ASSESSMENT — PAIN SCALES - GENERAL
PAINLEVEL_OUTOF10: 9
PAINLEVEL_OUTOF10: 10
PAINLEVEL_OUTOF10: 9
PAINLEVEL_OUTOF10: 8
PAINLEVEL_OUTOF10: 9
PAINLEVEL_OUTOF10: 10
PAINLEVEL_OUTOF10: 8
PAINLEVEL_OUTOF10: 10
PAINLEVEL_OUTOF10: 10

## 2024-03-22 ASSESSMENT — PAIN DESCRIPTION - DESCRIPTORS
DESCRIPTORS: ACHING;BURNING
DESCRIPTORS: STABBING

## 2024-03-22 ASSESSMENT — PAIN DESCRIPTION - LOCATION
LOCATION: SHOULDER
LOCATION: BACK
LOCATION: BACK;CHEST
LOCATION: SHOULDER
LOCATION: SHOULDER

## 2024-03-22 ASSESSMENT — PAIN DESCRIPTION - ORIENTATION
ORIENTATION: LEFT

## 2024-03-22 NOTE — ED NOTES
Pt presents to the ED via triage with c/o shortness of breath, left shoulder pain, and confusion.  Pt states he's recently diagnosed with stage 4 lung cancer that spread to his bones and states pt had an MRI of his brain this morning to r/o tumors in the brain. Pt  is at bedside and states he's been declining over the week.  also states they've been trying to determine if he want palliative are or not. Pt states also wasn't able to speak this past weekend but regained ability.   Pt denies injury or fall. Pt denies chest pain. Pt present with chronic swelling of the right leg but new onset of redness and swelling to left leg.  VSS, NAD, AOx4. Pt connected to telemetry, all alarms on and audible.  Patient resting in bed, respirations even and unlabored. Call light in reach.   
Pt taken to shower room and changed out and bathed, linens changed, belongings bagged up per bedbug protocol.   
Pt transported to floor via stretcher by Tech.   Ticket to ride printed and signed.   Floor ntfd of pt's departure by ED coordinator  
Question:   Antimicrobial Indications     Answer:   Skin and Soft Tissue Infection     Order Specific Question:   Skin duration of therapy     Answer:   7 days    0.9 % sodium chloride infusion    vancomycin (VANCOCIN) intermittent dosing (placeholder)     Order Specific Question:   Antimicrobial Indications     Answer:   Skin and Soft Tissue Infection     Order Specific Question:   Skin duration of therapy     Answer:   7 days    OR Linked Order Group     oxyCODONE (ROXICODONE) immediate release tablet 5 mg     oxyCODONE (ROXICODONE) immediate release tablet 10 mg    senna (SENOKOT) tablet 8.6 mg    ipratropium 0.5 mg-albuterol 2.5 mg (DUONEB) nebulizer solution 1 Dose     Order Specific Question:   Initiate RT Bronchodilator Protocol     Answer:   Yes - Inpatient Protocol    levothyroxine (SYNTHROID) tablet 25 mcg    rosuvastatin (CRESTOR) tablet 5 mg    losartan (COZAAR) tablet 50 mg    folic acid (FOLVITE) tablet 1 mg    thiamine tablet 100 mg    LORazepam (ATIVAN) tablet 0.5 mg       SURGICAL HISTORY       Past Surgical History:   Procedure Laterality Date    APPENDECTOMY      BRONCHOSCOPY  02/08/2024    BRONCHOSCOPY, LEFT LINGULA NODULE-TRANSBRONCHIAL BIOPSY, FINE NEEDLE ASPIRATION, MINI BRONCHOAVEOLAR LAVAGE, EBUS-TRANSBRONCHIAL NEEDLE ASPIRATION, RADIAL EBUS    BRONCHOSCOPY  2/8/2024    BRONCHOSCOPY ENDOBRONCHIAL ULTRASOUND performed by Herminio Brian MD at [facility] OR    BRONCHOSCOPY  2/8/2024    BRONCHOSCOPY BIOPSY BRONCHUS ROBOTIC performed by Herminio Brian MD at [facility] OR    BRONCHOSCOPY  2/8/2024    BRONCHOSCOPY ALVEOLAR LAVAGE ROBOTIC performed by Herminio Brian MD at [facility] OR    CARPAL TUNNEL RELEASE      bilateral     CERVICAL FUSION      s/p trampoline accident    CERVICAL FUSION N/A 09/05/2019    ANTERIOR CERVICAL DECOMPRESSION FUSION C3-4 performed by Tae Gates DO at [facility] OR    ENDOSCOPY, COLON, DIAGNOSTIC      EPIDURAL STEROID INJECTION Left 05/15/2019    EPIDURAL STEROID INJECTION LEFT L5S1

## 2024-03-22 NOTE — CARE COORDINATION
Spoke with pt about discharge plan, brother and NP have concerns about living conditions. Pt states that he feels overwhelmed and is not sure what he wants at this time. Pt wanted SNF list and was given list. States that he will review his choices tomorrow with his brother.

## 2024-03-22 NOTE — TELEPHONE ENCOUNTER
Name: Danny Guevara  : 1960  MRN: [id number]    Oncology Navigation Follow-Up Note    Contact Type:  Telephone    Notes:   Navigator reviewing chart and pt. Remains inpt. Writer had referred pt. To paliiative Care prior to admission, unsure if pts. Case was opened. Pt. Is hospice appropriate.      Electronically signed by Lucille Holguin RN on 3/22/2024 at 3:29 PM

## 2024-03-22 NOTE — PLAN OF CARE
Problem: Respiratory - Adult  Goal: Achieves optimal ventilation and oxygenation  Outcome: Progressing  Flowsheets (Taken 3/22/2024 1535)  Achieves optimal ventilation and oxygenation:   Assess for changes in respiratory status   Respiratory therapy support as indicated   Assess the need for suctioning and aspirate as needed   Assess and instruct to report shortness of breath or any respiratory difficulty   Assess for changes in mentation and behavior   Encourage broncho-pulmonary hygiene including cough, deep breathe, incentive spirometry

## 2024-03-23 ENCOUNTER — APPOINTMENT (OUTPATIENT)
Dept: GENERAL RADIOLOGY | Age: 64
DRG: 603 | End: 2024-03-23
Payer: MEDICARE

## 2024-03-23 PROBLEM — R47.9 SPEECH DISTURBANCE: Status: ACTIVE | Noted: 2024-03-23

## 2024-03-23 PROBLEM — R49.0 DYSPHONIA: Status: ACTIVE | Noted: 2024-03-23

## 2024-03-23 PROBLEM — J38.01 PARALYSIS OF LEFT VOCAL FOLD: Status: ACTIVE | Noted: 2024-03-23

## 2024-03-23 LAB
ANION GAP SERPL CALCULATED.3IONS-SCNC: 10 MMOL/L (ref 9–16)
BASOPHILS # BLD: 0 K/UL (ref 0–0.2)
BASOPHILS NFR BLD: 0 % (ref 0–2)
BUN SERPL-MCNC: 6 MG/DL (ref 8–23)
CA-I BLD-SCNC: 1.36 MMOL/L (ref 1.13–1.33)
CALCIUM SERPL-MCNC: 10.2 MG/DL (ref 8.6–10.4)
CHLORIDE SERPL-SCNC: 101 MMOL/L (ref 98–107)
CO2 SERPL-SCNC: 20 MMOL/L (ref 20–31)
CREAT SERPL-MCNC: 0.7 MG/DL (ref 0.7–1.2)
CRP SERPL HS-MCNC: 61.3 MG/L (ref 0–5)
EOSINOPHIL # BLD: 0.24 K/UL (ref 0–0.4)
EOSINOPHILS RELATIVE PERCENT: 1 % (ref 1–4)
ERYTHROCYTE [DISTWIDTH] IN BLOOD BY AUTOMATED COUNT: 17 % (ref 11.8–14.4)
GFR SERPL CREATININE-BSD FRML MDRD: >60 ML/MIN/1.73M2
GLUCOSE SERPL-MCNC: 114 MG/DL (ref 74–99)
HCT VFR BLD AUTO: 36.6 % (ref 40.7–50.3)
HGB BLD-MCNC: 11.7 G/DL (ref 13–17)
IMM GRANULOCYTES # BLD AUTO: 0.71 K/UL (ref 0–0.3)
IMM GRANULOCYTES NFR BLD: 3 %
LYMPHOCYTES NFR BLD: 0.47 K/UL (ref 1–4.8)
LYMPHOCYTES RELATIVE PERCENT: 2 % (ref 24–44)
MAGNESIUM SERPL-MCNC: 1.7 MG/DL (ref 1.6–2.4)
MCH RBC QN AUTO: 29.5 PG (ref 25.2–33.5)
MCHC RBC AUTO-ENTMCNC: 32 G/DL (ref 28.4–34.8)
MCV RBC AUTO: 92.4 FL (ref 82.6–102.9)
MONOCYTES NFR BLD: 10 % (ref 1–7)
MONOCYTES NFR BLD: 2.35 K/UL (ref 0.1–0.8)
MORPHOLOGY: ABNORMAL
NEUTROPHILS NFR BLD: 84 % (ref 36–66)
NEUTS SEG NFR BLD: 19.73 K/UL (ref 1.8–7.7)
NRBC BLD-RTO: 0 PER 100 WBC
PLATELET # BLD AUTO: 581 K/UL (ref 138–453)
PMV BLD AUTO: 9.5 FL (ref 8.1–13.5)
POTASSIUM SERPL-SCNC: 3.3 MMOL/L (ref 3.7–5.3)
RBC # BLD AUTO: 3.96 M/UL (ref 4.21–5.77)
SODIUM SERPL-SCNC: 131 MMOL/L (ref 136–145)
SODIUM SERPL-SCNC: 134 MMOL/L (ref 136–145)
WBC OTHER # BLD: 23.5 K/UL (ref 3.5–11.3)

## 2024-03-23 PROCEDURE — 85025 COMPLETE CBC W/AUTO DIFF WBC: CPT

## 2024-03-23 PROCEDURE — 6370000000 HC RX 637 (ALT 250 FOR IP): Performed by: INTERNAL MEDICINE

## 2024-03-23 PROCEDURE — 80048 BASIC METABOLIC PNL TOTAL CA: CPT

## 2024-03-23 PROCEDURE — 99233 SBSQ HOSP IP/OBS HIGH 50: CPT | Performed by: INTERNAL MEDICINE

## 2024-03-23 PROCEDURE — 74018 RADEX ABDOMEN 1 VIEW: CPT

## 2024-03-23 PROCEDURE — 94761 N-INVAS EAR/PLS OXIMETRY MLT: CPT

## 2024-03-23 PROCEDURE — 6360000002 HC RX W HCPCS: Performed by: STUDENT IN AN ORGANIZED HEALTH CARE EDUCATION/TRAINING PROGRAM

## 2024-03-23 PROCEDURE — 82330 ASSAY OF CALCIUM: CPT

## 2024-03-23 PROCEDURE — 94640 AIRWAY INHALATION TREATMENT: CPT

## 2024-03-23 PROCEDURE — 36415 COLL VENOUS BLD VENIPUNCTURE: CPT

## 2024-03-23 PROCEDURE — 0CJS8ZZ INSPECTION OF LARYNX, VIA NATURAL OR ARTIFICIAL OPENING ENDOSCOPIC: ICD-10-PCS | Performed by: OTOLARYNGOLOGY

## 2024-03-23 PROCEDURE — 86140 C-REACTIVE PROTEIN: CPT

## 2024-03-23 PROCEDURE — APPSS30 APP SPLIT SHARED TIME 16-30 MINUTES: Performed by: NURSE PRACTITIONER

## 2024-03-23 PROCEDURE — 1200000000 HC SEMI PRIVATE

## 2024-03-23 PROCEDURE — 6360000002 HC RX W HCPCS: Performed by: INTERNAL MEDICINE

## 2024-03-23 PROCEDURE — 6370000000 HC RX 637 (ALT 250 FOR IP): Performed by: NURSE PRACTITIONER

## 2024-03-23 PROCEDURE — 6370000000 HC RX 637 (ALT 250 FOR IP): Performed by: STUDENT IN AN ORGANIZED HEALTH CARE EDUCATION/TRAINING PROGRAM

## 2024-03-23 PROCEDURE — 99232 SBSQ HOSP IP/OBS MODERATE 35: CPT | Performed by: PSYCHIATRY & NEUROLOGY

## 2024-03-23 PROCEDURE — 6360000002 HC RX W HCPCS: Performed by: NURSE PRACTITIONER

## 2024-03-23 PROCEDURE — 99232 SBSQ HOSP IP/OBS MODERATE 35: CPT | Performed by: STUDENT IN AN ORGANIZED HEALTH CARE EDUCATION/TRAINING PROGRAM

## 2024-03-23 PROCEDURE — 84295 ASSAY OF SERUM SODIUM: CPT

## 2024-03-23 PROCEDURE — 2580000003 HC RX 258: Performed by: INTERNAL MEDICINE

## 2024-03-23 PROCEDURE — 83735 ASSAY OF MAGNESIUM: CPT

## 2024-03-23 RX ORDER — MAGNESIUM SULFATE IN WATER 40 MG/ML
2000 INJECTION, SOLUTION INTRAVENOUS ONCE
Status: COMPLETED | OUTPATIENT
Start: 2024-03-23 | End: 2024-03-23

## 2024-03-23 RX ORDER — OXYCODONE HYDROCHLORIDE 5 MG/1
10 TABLET ORAL EVERY 6 HOURS
Status: DISCONTINUED | OUTPATIENT
Start: 2024-03-23 | End: 2024-03-24 | Stop reason: HOSPADM

## 2024-03-23 RX ORDER — KETOROLAC TROMETHAMINE 30 MG/ML
30 INJECTION, SOLUTION INTRAMUSCULAR; INTRAVENOUS ONCE
Status: COMPLETED | OUTPATIENT
Start: 2024-03-23 | End: 2024-03-23

## 2024-03-23 RX ORDER — LACTULOSE 10 G/15ML
20 SOLUTION ORAL DAILY PRN
Status: DISCONTINUED | OUTPATIENT
Start: 2024-03-23 | End: 2024-03-24 | Stop reason: HOSPADM

## 2024-03-23 RX ADMIN — SODIUM CHLORIDE, PRESERVATIVE FREE 10 ML: 5 INJECTION INTRAVENOUS at 22:21

## 2024-03-23 RX ADMIN — FERROUS SULFATE TAB EC 325 MG (65 MG FE EQUIVALENT) 325 MG: 325 (65 FE) TABLET DELAYED RESPONSE at 07:55

## 2024-03-23 RX ADMIN — LEVOTHYROXINE SODIUM 25 MCG: 25 TABLET ORAL at 07:55

## 2024-03-23 RX ADMIN — ROSUVASTATIN CALCIUM 5 MG: 5 TABLET, COATED ORAL at 20:09

## 2024-03-23 RX ADMIN — POLYETHYLENE GLYCOL 3350 17 G: 17 POWDER, FOR SOLUTION ORAL at 07:55

## 2024-03-23 RX ADMIN — SODIUM CHLORIDE TAB 1 GM 2 G: 1 TAB at 17:17

## 2024-03-23 RX ADMIN — VANCOMYCIN HYDROCHLORIDE 1000 MG: 1 INJECTION, POWDER, LYOPHILIZED, FOR SOLUTION INTRAVENOUS at 17:17

## 2024-03-23 RX ADMIN — Medication 100 MG: at 07:55

## 2024-03-23 RX ADMIN — MORPHINE SULFATE 4 MG: 4 INJECTION INTRAVENOUS at 22:21

## 2024-03-23 RX ADMIN — MORPHINE SULFATE 4 MG: 4 INJECTION INTRAVENOUS at 09:43

## 2024-03-23 RX ADMIN — QUETIAPINE FUMARATE 300 MG: 300 TABLET ORAL at 20:09

## 2024-03-23 RX ADMIN — IPRATROPIUM BROMIDE AND ALBUTEROL SULFATE 1 DOSE: .5; 3 SOLUTION RESPIRATORY (INHALATION) at 12:45

## 2024-03-23 RX ADMIN — MAGNESIUM SULFATE HEPTAHYDRATE 2000 MG: 40 INJECTION, SOLUTION INTRAVENOUS at 10:02

## 2024-03-23 RX ADMIN — VANCOMYCIN HYDROCHLORIDE 1000 MG: 1 INJECTION, POWDER, LYOPHILIZED, FOR SOLUTION INTRAVENOUS at 08:02

## 2024-03-23 RX ADMIN — ENOXAPARIN SODIUM 40 MG: 100 INJECTION SUBCUTANEOUS at 07:55

## 2024-03-23 RX ADMIN — SENNOSIDES 8.6 MG: 8.6 TABLET, FILM COATED ORAL at 20:09

## 2024-03-23 RX ADMIN — OXYCODONE 10 MG: 5 TABLET ORAL at 11:47

## 2024-03-23 RX ADMIN — KETOROLAC TROMETHAMINE 30 MG: 30 INJECTION, SOLUTION INTRAMUSCULAR; INTRAVENOUS at 04:21

## 2024-03-23 RX ADMIN — ALCOHOL 1 TABLET: 70.47 GEL TOPICAL at 07:55

## 2024-03-23 RX ADMIN — OXYCODONE 10 MG: 5 TABLET ORAL at 16:07

## 2024-03-23 RX ADMIN — MORPHINE SULFATE 4 MG: 4 INJECTION INTRAVENOUS at 13:42

## 2024-03-23 RX ADMIN — MORPHINE SULFATE 4 MG: 4 INJECTION INTRAVENOUS at 18:33

## 2024-03-23 RX ADMIN — FOLIC ACID 1 MG: 1 TABLET ORAL at 07:54

## 2024-03-23 RX ADMIN — OXYCODONE 10 MG: 5 TABLET ORAL at 21:12

## 2024-03-23 RX ADMIN — LACTULOSE 20 G: 20 SOLUTION ORAL at 16:07

## 2024-03-23 RX ADMIN — HYDROXYUREA 500 MG: 500 CAPSULE ORAL at 07:54

## 2024-03-23 RX ADMIN — PANTOPRAZOLE SODIUM 40 MG: 40 TABLET, DELAYED RELEASE ORAL at 07:54

## 2024-03-23 RX ADMIN — SODIUM CHLORIDE, PRESERVATIVE FREE 10 ML: 5 INJECTION INTRAVENOUS at 20:09

## 2024-03-23 RX ADMIN — LOSARTAN POTASSIUM 50 MG: 50 TABLET, FILM COATED ORAL at 07:55

## 2024-03-23 RX ADMIN — OXYCODONE 10 MG: 5 TABLET ORAL at 04:21

## 2024-03-23 RX ADMIN — SODIUM CHLORIDE TAB 1 GM 2 G: 1 TAB at 07:54

## 2024-03-23 RX ADMIN — LORAZEPAM 0.5 MG: 0.5 TABLET ORAL at 01:08

## 2024-03-23 RX ADMIN — LORAZEPAM 0.5 MG: 0.5 TABLET ORAL at 07:54

## 2024-03-23 RX ADMIN — MORPHINE SULFATE 4 MG: 4 INJECTION INTRAVENOUS at 01:08

## 2024-03-23 RX ADMIN — MORPHINE SULFATE 4 MG: 4 INJECTION INTRAVENOUS at 05:29

## 2024-03-23 RX ADMIN — IPRATROPIUM BROMIDE AND ALBUTEROL SULFATE 1 DOSE: .5; 3 SOLUTION RESPIRATORY (INHALATION) at 09:16

## 2024-03-23 RX ADMIN — CETIRIZINE HYDROCHLORIDE 10 MG: 10 TABLET ORAL at 07:55

## 2024-03-23 RX ADMIN — IPRATROPIUM BROMIDE AND ALBUTEROL SULFATE 1 DOSE: .5; 3 SOLUTION RESPIRATORY (INHALATION) at 16:55

## 2024-03-23 ASSESSMENT — PAIN SCALES - GENERAL
PAINLEVEL_OUTOF10: 9
PAINLEVEL_OUTOF10: 10
PAINLEVEL_OUTOF10: 8
PAINLEVEL_OUTOF10: 9

## 2024-03-23 NOTE — PLAN OF CARE
Problem: Discharge Planning  Goal: Discharge to home or other facility with appropriate resources  Outcome: Progressing     Problem: Pain  Goal: Verbalizes/displays adequate comfort level or baseline comfort level  Outcome: Progressing     Problem: Safety - Adult  Goal: Free from fall injury  Outcome: Progressing     Problem: Respiratory - Adult  Goal: Achieves optimal ventilation and oxygenation  Outcome: Progressing     Problem: Skin/Tissue Integrity  Goal: Absence of new skin breakdown  Description: 1.  Monitor for areas of redness and/or skin breakdown  2.  Assess vascular access sites hourly  3.  Every 4-6 hours minimum:  Change oxygen saturation probe site  4.  Every 4-6 hours:  If on nasal continuous positive airway pressure, respiratory therapy assess nares and determine need for appliance change or resting period.  Outcome: Progressing

## 2024-03-23 NOTE — CONSULTS
Otolaryngology-Head and Neck Surgery  [facility]    [facility]    Office  ph# [phone]    Also available in [employer]      
disorder with osseous mets follows with Dr.Al Carlin and history of cervical fusion and tonsillectomy presented with complaint of shortness of breath shoulder pain and confusion.  Neurology consulted for speech difficulty.    1.  Speech difficulty intermittent; rule out seizures  2.  History of myeloproliferative disorder with osseous mets  3.  History of hypertension, hyperlipidemia and peripheral arterial disease    Plan:     MRI brain with and without contrast was unremarkable for any DWI changes or mets.  Plan to get EEG awake and drowsy to rule out seizures and also plan to get CTA head and neck with contrast to assess vasculatures given chronic history of hypertension hyperlipidemia and peripheral arterial disease.  Neurology will follow along.    Consultations:   IP CONSULT TO NEUROLOGY      Follow-up further recommendations after discussing the case with attending  The plan was discussed with the patient, patient's family and the medical staff.     Patient is admitted as inpatient status because of co-morbidities listed above, severity of signs and symptoms as outlined, requirement for current medical therapies and most importantly because of direct risk to patient if care not provided in a hospital setting.    Jasvir Jamison MD  Neurology Resident PGY-4  3/21/2024  3:49 PM    Copy sent to Roberto Abraham MD    
structures including the cerebellopontine  angles and internal auditory canals are unremarkable.  There is no abnormal  restricted diffusion.  There is no abnormal blooming artifact on  susceptibility weighted imaging.  No abnormal postcontrast enhancement.    ORBITS: The visualized portion of the orbits demonstrate no acute abnormality.    SINUSES: There is chronic sinusitis scattered throughout the paranasal  sinuses.  The mastoid air cells are normally aerated.    BONES/SOFT TISSUES: The bone marrow signal intensity appears normal. The soft  tissues demonstrate no acute abnormality.    Impression  Chronic microvascular disease without acute intracranial abnormality.    No abnormal postcontrast enhancement.      11/20/23    ECHO (TTE) COMPLETE (PRN CONTRAST/BUBBLE/STRAIN/3D) 11/20/2023  4:15 PM (Final)    Interpretation Summary    Left Ventricle: Normal left ventricular systolic function. EF by 2D Simpsons Biplane is 54%. Left ventricle size is normal. Normal wall thickness. Normal wall motion.    Aortic Valve: Trileaflet valve. Mild regurgitation.    Left Atrium: Left atrium is mildly dilated. Left atrial volume index is mildly increased (35-41 mL/m2).    Signed by: Houston Hernandez DO on 11/20/2023  4:15 PM       Encounter Date: 03/21/24   EKG 12 Lead   Result Value    Ventricular Rate 81    Atrial Rate 81    P-R Interval 156    QRS Duration 84    Q-T Interval 374    QTc Calculation (Bazett) 434    P [place] 63    R Axis 58    T Axis 65    Narrative    Normal sinus rhythm  Normal ECG  When compared with ECG of 23-JAN-2024 18:01,  No significant change was found        Code Status: Full Code     ADVANCED CARE PLANNING:  Patient has capacity for medical decisions: yes  Health Care Power of : yes  Living Will: not asked     Personal, Social, and Family History  Marital Status: single  Living situation:alone  Importance of lesley/Sikh/spiritual beliefs: [] Very [] Somewhat [] Not   Psychological Distress:

## 2024-03-23 NOTE — PLAN OF CARE
Problem: Respiratory - Adult  Goal: Achieves optimal ventilation and oxygenation  3/23/2024 0918 by Chelsey Mendoza, CAROLINE  Outcome: Progressing   BRONCHOSPASM/BRONCHOCONSTRICTION     [x]         IMPROVE AERATION/BREATH SOUNDS  [x]   ADMINISTER BRONCHODILATOR THERAPY AS APPROPRIATE  [x]   ASSESS BREATH SOUNDS  []   IMPLEMENT AEROSOL/MDI PROTOCOL  [x]   PATIENT EDUCATION AS NEEDED

## 2024-03-24 ENCOUNTER — APPOINTMENT (OUTPATIENT)
Dept: GENERAL RADIOLOGY | Age: 64
DRG: 603 | End: 2024-03-24
Payer: MEDICARE

## 2024-03-24 VITALS
DIASTOLIC BLOOD PRESSURE: 98 MMHG | RESPIRATION RATE: 15 BRPM | TEMPERATURE: 98.3 F | HEIGHT: 73 IN | OXYGEN SATURATION: 95 % | SYSTOLIC BLOOD PRESSURE: 158 MMHG | WEIGHT: 203.93 LBS | BODY MASS INDEX: 27.03 KG/M2 | HEART RATE: 88 BPM

## 2024-03-24 LAB
ANION GAP SERPL CALCULATED.3IONS-SCNC: 13 MMOL/L (ref 9–16)
BASOPHILS # BLD: 0 K/UL (ref 0–0.2)
BASOPHILS NFR BLD: 0 % (ref 0–2)
BUN SERPL-MCNC: 4 MG/DL (ref 8–23)
CALCIUM SERPL-MCNC: 10.4 MG/DL (ref 8.6–10.4)
CHLORIDE SERPL-SCNC: 101 MMOL/L (ref 98–107)
CO2 SERPL-SCNC: 19 MMOL/L (ref 20–31)
CREAT SERPL-MCNC: 0.7 MG/DL (ref 0.7–1.2)
CRP SERPL HS-MCNC: 95.9 MG/L (ref 0–5)
EOSINOPHIL # BLD: 0.52 K/UL (ref 0–0.4)
EOSINOPHILS RELATIVE PERCENT: 2 % (ref 1–4)
ERYTHROCYTE [DISTWIDTH] IN BLOOD BY AUTOMATED COUNT: 16.7 % (ref 11.8–14.4)
GFR SERPL CREATININE-BSD FRML MDRD: >60 ML/MIN/1.73M2
GLUCOSE SERPL-MCNC: 104 MG/DL (ref 74–99)
HCT VFR BLD AUTO: 38.8 % (ref 40.7–50.3)
HGB BLD-MCNC: 12.7 G/DL (ref 13–17)
IMM GRANULOCYTES # BLD AUTO: 0.26 K/UL (ref 0–0.3)
IMM GRANULOCYTES NFR BLD: 1 %
LYMPHOCYTES NFR BLD: 0.52 K/UL (ref 1–4.8)
LYMPHOCYTES RELATIVE PERCENT: 2 % (ref 24–44)
MCH RBC QN AUTO: 29.3 PG (ref 25.2–33.5)
MCHC RBC AUTO-ENTMCNC: 32.7 G/DL (ref 28.4–34.8)
MCV RBC AUTO: 89.4 FL (ref 82.6–102.9)
MONOCYTES NFR BLD: 2.35 K/UL (ref 0.1–0.8)
MONOCYTES NFR BLD: 9 % (ref 1–7)
MORPHOLOGY: ABNORMAL
NEUTROPHILS NFR BLD: 86 % (ref 36–66)
NEUTS SEG NFR BLD: 22.45 K/UL (ref 1.8–7.7)
NRBC BLD-RTO: 0 PER 100 WBC
PLATELET # BLD AUTO: 820 K/UL (ref 138–453)
PMV BLD AUTO: 9.4 FL (ref 8.1–13.5)
POTASSIUM SERPL-SCNC: 3.7 MMOL/L (ref 3.7–5.3)
RBC # BLD AUTO: 4.34 M/UL (ref 4.21–5.77)
SODIUM SERPL-SCNC: 133 MMOL/L (ref 136–145)
WBC OTHER # BLD: 26.1 K/UL (ref 3.5–11.3)

## 2024-03-24 PROCEDURE — 6360000002 HC RX W HCPCS: Performed by: INTERNAL MEDICINE

## 2024-03-24 PROCEDURE — 99231 SBSQ HOSP IP/OBS SF/LOW 25: CPT | Performed by: NURSE PRACTITIONER

## 2024-03-24 PROCEDURE — 6360000002 HC RX W HCPCS: Performed by: STUDENT IN AN ORGANIZED HEALTH CARE EDUCATION/TRAINING PROGRAM

## 2024-03-24 PROCEDURE — 71045 X-RAY EXAM CHEST 1 VIEW: CPT

## 2024-03-24 PROCEDURE — 80048 BASIC METABOLIC PNL TOTAL CA: CPT

## 2024-03-24 PROCEDURE — 85025 COMPLETE CBC W/AUTO DIFF WBC: CPT

## 2024-03-24 PROCEDURE — 2580000003 HC RX 258: Performed by: INTERNAL MEDICINE

## 2024-03-24 PROCEDURE — 36415 COLL VENOUS BLD VENIPUNCTURE: CPT

## 2024-03-24 PROCEDURE — 99232 SBSQ HOSP IP/OBS MODERATE 35: CPT | Performed by: STUDENT IN AN ORGANIZED HEALTH CARE EDUCATION/TRAINING PROGRAM

## 2024-03-24 PROCEDURE — 6370000000 HC RX 637 (ALT 250 FOR IP): Performed by: INTERNAL MEDICINE

## 2024-03-24 PROCEDURE — 99232 SBSQ HOSP IP/OBS MODERATE 35: CPT | Performed by: INTERNAL MEDICINE

## 2024-03-24 PROCEDURE — 74230 X-RAY XM SWLNG FUNCJ C+: CPT

## 2024-03-24 PROCEDURE — 86140 C-REACTIVE PROTEIN: CPT

## 2024-03-24 PROCEDURE — 92611 MOTION FLUOROSCOPY/SWALLOW: CPT

## 2024-03-24 PROCEDURE — 6370000000 HC RX 637 (ALT 250 FOR IP): Performed by: STUDENT IN AN ORGANIZED HEALTH CARE EDUCATION/TRAINING PROGRAM

## 2024-03-24 RX ADMIN — MORPHINE SULFATE 4 MG: 4 INJECTION INTRAVENOUS at 06:29

## 2024-03-24 RX ADMIN — Medication 100 MG: at 10:25

## 2024-03-24 RX ADMIN — OXYCODONE 10 MG: 5 TABLET ORAL at 10:24

## 2024-03-24 RX ADMIN — VANCOMYCIN HYDROCHLORIDE 1000 MG: 1 INJECTION, POWDER, LYOPHILIZED, FOR SOLUTION INTRAVENOUS at 04:13

## 2024-03-24 RX ADMIN — HYDROXYUREA 500 MG: 500 CAPSULE ORAL at 10:41

## 2024-03-24 RX ADMIN — LOSARTAN POTASSIUM 50 MG: 50 TABLET, FILM COATED ORAL at 10:24

## 2024-03-24 RX ADMIN — PANTOPRAZOLE SODIUM 40 MG: 40 TABLET, DELAYED RELEASE ORAL at 06:29

## 2024-03-24 RX ADMIN — SODIUM CHLORIDE TAB 1 GM 2 G: 1 TAB at 10:23

## 2024-03-24 RX ADMIN — POLYETHYLENE GLYCOL 3350 17 G: 17 POWDER, FOR SOLUTION ORAL at 10:23

## 2024-03-24 RX ADMIN — FERROUS SULFATE TAB EC 325 MG (65 MG FE EQUIVALENT) 325 MG: 325 (65 FE) TABLET DELAYED RESPONSE at 10:32

## 2024-03-24 RX ADMIN — MORPHINE SULFATE 4 MG: 4 INJECTION INTRAVENOUS at 02:31

## 2024-03-24 RX ADMIN — LEVOTHYROXINE SODIUM 25 MCG: 25 TABLET ORAL at 06:28

## 2024-03-24 RX ADMIN — ALCOHOL 1 TABLET: 70.47 GEL TOPICAL at 10:24

## 2024-03-24 RX ADMIN — OXYCODONE 10 MG: 5 TABLET ORAL at 04:05

## 2024-03-24 RX ADMIN — CETIRIZINE HYDROCHLORIDE 10 MG: 10 TABLET ORAL at 10:24

## 2024-03-24 RX ADMIN — FOLIC ACID 1 MG: 1 TABLET ORAL at 10:24

## 2024-03-24 ASSESSMENT — PAIN DESCRIPTION - LOCATION: LOCATION: GENERALIZED

## 2024-03-24 ASSESSMENT — PAIN - FUNCTIONAL ASSESSMENT: PAIN_FUNCTIONAL_ASSESSMENT: ACTIVITIES ARE NOT PREVENTED

## 2024-03-24 ASSESSMENT — PAIN DESCRIPTION - DESCRIPTORS: DESCRIPTORS: ACHING

## 2024-03-24 ASSESSMENT — PAIN SCALES - GENERAL
PAINLEVEL_OUTOF10: 2
PAINLEVEL_OUTOF10: 8

## 2024-03-24 ASSESSMENT — PAIN DESCRIPTION - ORIENTATION: ORIENTATION: RIGHT;LEFT

## 2024-03-24 NOTE — CASE COMMUNICATION
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

## 2024-03-24 NOTE — DISCHARGE SUMMARY
[facility]  Office: [phone]  Rio Blair DO, Javier Walters DO, Romulo Boone DO, Miquel Casey DO, Nataly Priest MD, Daly Mendoza MD, Darius Mcmahon MD, Mary Swain MD,  Ad Goode MD, Vale Peña MD, Rancho Harley MD,  Julia Goddard DO, Bruno Frank MD, Nabeel Spicer MD, Danny Blair DO, Lulú Hernandez MD,  Piyush Lepe DO, Anabela Cummings MD, Valentine Brian MD, Caren Slaughter MD, Reddy Cagle MD,  Ibrahima Fortune MD, Idalia Girard MD, Zunilda Whiting MD, Konstantin Mane MD, Lucho Haq MD, Oswaldo Crawford MD, Jc Acuña DO, De Cochran DO, Sole Mcgovern MD,  Vinay Meza MD, Shirley Waterhouse, CNP,  Paulette Cook, CNP, Ruslan Ward, CNP,  Doreen Corcoran, DNP, Twyla Pacheco, CNP, Ashley Mesa, CNP, Linn Azul CNP, Karma Sage, CNP, Tanvi Jim, CNP, Lizzie Esqueda, PA-C, Tova Romano, PA-C, Deb Crespo, CNP, Ronda Mchugh, CNP, Danielle Gonzalez, CNP, Shwetha León, CNS, Estrella Bledsoe, CNP, Sera Kruger, CNP, Tracy Schwab, CNP         [facility]   IN-PATIENT SERVICE   [facility]    Discharge Summary     Patient ID: Danny Guevara  :  1960   MRN: [id number]     ACCOUNT:  [id number]   Patient's PCP: Roberto Donaldson MD  Admit Date: 3/21/2024   Discharge Date: 3/24/2024    Length of Stay: 3  Code Status:  DNR-CCA  Admitting Physician: Piyush Lepe DO  Discharge Physician: Piyush Lepe DO     Active Discharge Diagnoses:     Hospital Problem Lists:  Principal Problem:    Cellulitis of lower extremity  Active Problems:    Myeloproliferative disease (HCC)    ETOH abuse    Major depressive disorder, recurrent severe without psychotic features (HCC)    Substance abuse (HCC)    Chronic hyponatremia    Uncomplicated alcohol dependence (HCC)    Hypothyroidism    Hypertension    Tobacco use    Drug-induced constipation    Pathological fracture of rib    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,

## 2024-03-24 NOTE — PLAN OF CARE
Problem: Respiratory - Adult  Goal: Achieves optimal ventilation and oxygenation  3/24/2024 0443 by Meagan Buckley RCP  Outcome: Progressing

## 2024-03-25 ENCOUNTER — TELEPHONE (OUTPATIENT)
Dept: ONCOLOGY | Age: 64
End: 2024-03-25

## 2024-03-25 LAB
ACHR BIND AB SER-SCNC: 0 NMOL/L (ref 0–0.4)
ACHR BLOCK AB/ACHR TOTAL SFR SER: 14 % (ref 0–26)
STRIATED MUSCLE AB, IGG SCREEN: NORMAL
TITIN ANTIBODY: 0.2 IV (ref 0–0.45)

## 2024-03-25 NOTE — TELEPHONE ENCOUNTER
Name: Danny Guevara  : 1960  MRN: [id number]    Oncology Navigation Follow-Up Note    Contact Type:  Medical Oncology    Notes:   Navigator Reviewing chart and pt. Needing post Hospital F/U   Please schedule pt. For MO F/U if pt. Wanting to discuss treatment?      Electronically signed by Lucille Holguin RN on 3/25/2024 at 3:05 PM

## 2024-03-25 NOTE — PROGRESS NOTES
Today's Date: 3/24/2024  Patient Name: Danny Guevara  Date of admission: 3/21/2024 12:12 PM  Patient's age: 63 y.o., 1960  Admission Dx: Pathological fracture of rib, initial encounter [M84.48XA]  Malignant neoplasm of lung, unspecified laterality, unspecified part of lung (HCC) [C34.90]  Cellulitis of lower extremity, unspecified laterality [L03.119]  Cellulitis and abscess of left lower extremity [L03.116, L02.416]    Reason for Consult: Lung cancer with mets  Requesting Physician: Piyush Lepe DO    Chief Complaint: Shortness of breath, shoulder pain, altered mental status    Interval history  Hoarseness  Family in the room  Did have chest pain and required higher pain medication    History of Present Illness:    This is a 63-year-old male who presented to the ED with complaints of shoulder pain, confusion, loss of speech, and shortness of breath.  He had an MRI brain done earlier in the day which was unremarkable.  Neurology was consulted for intermittent expressive aphasia for the last 2 weeks  Additionally he was found to have cellulitis and an abscess to the left lower extremity for which he is being treated.    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    He is a known patient of Dr. Ruiz and was seen 3/21/2024 in the office.  Brief oncology history is as follows:    BRIEF CASE HISTORY:   Mr. Danny Guevara is a 63 y.o. male with history of myeloproliferative disorder. In October 2018 he was seen in [facility] and had elevated white blood cells and red blood cells and platelets, diagnosed with myeloproliferative disorder with positive JAK2 gene mutation.   Bone marrow showed no specific type and cytogenetics showed no CML  He was started on Hydroxyurea twice daily and 
  [facility]     Emergency/Trauma Note    PATIENT NAME: Danny Guevara    Shift date: 03/21/2024  Shift day: Thursday   Shift # 1    Room # 29/29     Name: Danny Guevara            Age: 63 y.o.  Gender: male          Zoroastrian: Religion   Place of Spiritism:     Trauma/Incident type: Stroke Consult  Admit Date & Time: 3/21/2024 12:12 PM  TRAUMA NAME: None    PATIENT/EVENT DESCRIPTION:  Danny Guevara is a 63 y.o. male who arrived ER as stroke consult. Patient was conscious and responsive. Patient to be admitted to 29/29.       SPIRITUAL ASSESSMENT-INTERVENTION-OUTCOME:  Patient said he was raised Religion but not affiliated with any [place]. Patient was receptive to pastoral presence and open to prayer. Patient received sacrament of anointing of the sick. Family was not present at the time. Patient said family knew he came to [facility].  provided ministry of presence, offered support, prayed with patient and reassured him that he was in good hands. Patient expressed appreciation for the anointing and blessing he received.      PATIENT BELONGINGS:  This  did not handle patient's belongings.     ANY BELONGINGS OF SIGNIFICANT VALUE NOTED:  Unknown    REGISTRATION STAFF NOTIFIED?  Yes    WHAT IS YOUR SPIRITUAL CARE PLAN FOR THIS PATIENT?:  Follow up visits recommended for ongoing assessment of patient's condition and for more spiritual and emotional support.     Electronically signed by Chaplain Dunia, on 3/21/2024 at 2:01 PM.  [facility]  [phone]    
[facility]  Office: [phone]  Rio Blair DO, Javier Walters DO, Romulo Boone DO, Miquel Casey DO, Nataly Priest MD, Daly Mendoza MD, Darius Mcmahon MD, Mary Swain MD,  Ad Goode MD, Vale Peña MD, Rancho Harley MD,  Julia Goddard DO, Bruno Frank MD, Nabeel Spicer MD, Danny Blair DO, Lulú Hernandez MD,  Piyush Lepe DO, Anabela Cummings MD, Valentine Brian MD, Caren Slaughter MD, Reddy Cagle MD,  Ibrahima Fortune MD, Idalia Girard MD, Zunilda Whiting MD, Konstantin Mane MD, Lucho Haq MD, Oswaldo Crawford MD, Jc Acuña DO, De Cochran DO, Sole Mcgovern MD,  Vinay Meza MD, Shirley Waterhouse, CNP,  Paulette Cook, CNP, Ruslan Ward, CNP,  Doreen Corcoran, DNP, Twyla Pacheco, CNP, Ashley Mesa, CNP, Linn Azul CNP, Karma Sage, CNP, Tanvi Jim, CNP, Lizzie Esqueda, PA-C, Tova Romano, PA-C, Deb Crespo, CNP, Ronda Mchugh, CNP, Danielle Gonzalez, CNP, Shwetha León, CNS, Estrella Bledsoe, CNP, Sera Kruger, CNP, Tracy Schwab, CNP         [facility]   IN-PATIENT SERVICE   [facility]    Progress Note    3/24/2024    8:05 AM    Name:   Danny Guevara  MRN:     [id number]     Acct:      [id number]   Room:   0319/0319-01   Day:  3  Admit Date:  3/21/2024 12:12 PM    PCP:   Roberto Donaldson MD  Code Status:  DNR-CCA    Subjective:     C/C:   Chief Complaint   Patient presents with    Shortness of Breath    Shoulder Pain    Altered Mental Status     Interval History Status: not changed.     Vitals reviewed, afebrile and hemodynamically stable. Saturating well on room air.  Labs reviewed, mild hyponatremia but improved 133, leukocytosis improving 26.1 with known MDS, hemoglobin and platelets stable.  Urinalysis not consistent with UTI.  Overnight patient continued to complain of pain.    On examination patient resting comfortably in bed. Eager for DC. States feeling significant better. Cellulitis improving 
[facility]   Pharmacy Pharmacokinetic Monitoring Service - Vancomycin     Danny Guevara is a 63 y.o. male starting on vancomycin therapy for skin/soft tissue. Pharmacy consulted by Anabela Cummings  for monitoring and adjustment.    Target Concentration: Goal AUC/FREDERIC 400-600 mg*hr/L    Additional Antimicrobials: ceftriaxone    Pertinent Laboratory Values:   Wt Readings from Last 1 Encounters:   03/21/24 91.3 kg (201 lb 4.5 oz)     Temp Readings from Last 1 Encounters:   03/21/24 97.2 °F (36.2 °C) (Oral)     Estimated Creatinine Clearance: 108 mL/min (based on SCr of 0.8 mg/dL).  Recent Labs     03/21/24  1302   CREATININE 0.8   BUN 10   WBC 29.9*     Procalcitonin: na    Pertinent Cultures:  Culture Date Source Results   na na na   MRSA Nasal Swab: N/A. Non-respiratory infection.    Plan:  Dosing recommendations based on Bayesian software  Start vancomycin 1000 mg IVPB q12h  Anticipated AUC of 460 and trough concentration of 13 at steady state  Renal labs as indicated   Vancomycin concentration not ordered yet  Pharmacy will continue to monitor patient and adjust therapy as indicated    Thank you for the consult,  Kim Quintana RPH  3/21/2024 5:54 PM    
Dr. Alvarado messaged and inform the patient left AMA  message read  
Dr. Loyd informed that patient left AMA message read  
Lashon Hernández NP from [facility], patient's home health care, called for update and to express concerns for discharge. Patient gave verbal okay for writer to update Lashon. Lashon expressed patient has an unsafe living situation at the apartment he resides in, there's a bed bug infestation, he's not been eating, he chronically drinks, it's difficult for him to get around, and he has no support. Lashon states she's been working with Albert KABA who is a care navigator for oncology over at [facility], they have been working to place patient in a long term care facility with his CA diagnosis for pain control and also to address his drinking. Lashon requested that case management be updated with home care plan.     Lashon Hernández -[phone]  Albert KABA Care Navigator [phone]  
NEUROLOGY INPATIENT PROGRESS NOTE    3/22/2024         Current Exam:     Chart reviewed. Discussed with RN. Overall patient feels about the same today. RN denies any problems with his speech. Patient reports he does still get winded and his voice will increase in pitch when he is fatigued. No word finding difficulties today. He denies any headache, vision changes, numbness, tingling, or weakness.         Brief History:    Danny Guevara is a  63 y.o. male with H/O alcohol abuse, COPD, HTN, depression, myeloproliferative disorder with osseous mets who follows with Dr. Landa, who was admitted on 3/21/2024 with shortness of breath, shoulder pain, and confusion.  On arrival stroke team was initially consulted for reports of aphasia on and off for the past 2 weeks however MRI brain with and without contrast was done earlier that day and was negative for any acute findings, there is no stroke.  On initial neurology examination the patient was awake, alert, oriented x 3 with no focal neurologic deficits.  Patient reported the speech changes happen when he gets out of breath and he has been exhausted while speaking.  CTA head and neck was done showing no LVO or aneurysm with moderate atherosclerosis within both proximal cervical carotid arteries without any hemodynamically significant stenosis.  EEG was ordered as well as myasthenia gravis panel.       Current Facility-Administered Medications on File Prior to Encounter   Medication Dose Route Frequency Provider Last Rate Last Admin    sodium chloride flush 0.9 % injection 10 mL  10 mL IntraVENous PRN Greta Ruiz MD   10 mL at 03/21/24 1116     Current Outpatient Medications on File Prior to Encounter   Medication Sig Dispense Refill    amoxicillin-clavulanate (AUGMENTIN) 875-125 MG per tablet TAKE 1 TABLET BY MOUTH EVERY 12 HOURS FOR TEN DAYS (Patient not taking: Reported on 3/21/2024)      levoFLOXacin (LEVAQUIN) 500 MG tablet Take 1 tablet by mouth daily 
Patient called out wants to AMA  Dr. Lepe messaged via perfect serve  to inform message read with response AMA paper signed . All  lines removed  
Physical Therapy  Facility/Department: [facility] RENAL//MED SURG  Physical Therapy Initial Assessment    Name: Danny Guevara  : 1960  MRN: [id number]  Date of Service: 3/22/2024    Discharge Recommendations: Further therapy recommended at discharge.  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 Stroke alert was called in ER due to stroke like symptoms CT head demonstrated no acute intracranial abnormality. He was admitted for Lower Extremity Cellulitis with Hypercalcemia, Chronic Hyponatremia and Stroke Like Symptoms.        PT Equipment Recommendations  Equipment Needed: No (pt owns 4WW)      Patient Diagnosis(es): The primary encounter diagnosis was Cellulitis of lower extremity, unspecified laterality. Diagnoses of Malignant neoplasm of lung, unspecified laterality, unspecified part of lung (HCC), Pathological fracture of rib, initial encounter, and Cellulitis and abscess of left lower extremity were also pertinent to this visit.  Past Medical History:  has a past medical history of Alcohol abuse, Anemia, Bronchitis, Cancer (HCC), Cervical stenosis of spine, Chronic left-sided low back pain with left-sided sciatica, COPD (chronic obstructive pulmonary disease) (HCC), Hypertension, Insomnia, Intentional drug overdose (HCC), Left arm numbness, Left lumbar radiculitis, Macrocytosis, Major depressive disorder, recurrent severe without psychotic features (HCC), Myeloproliferative disorder (HCC), 
continued to complain of pain.    On examination patient resting comfortably in bed. Continues to complain of pain in ribs and left shoulder. No BM with little flatulence. KUB ordered. Pain meds adjusted. RLE redness appears mildly improved. CRP pending today. Palliative following for family discussion and pain medication assistance.     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 Stroke alert was called in ER due to stroke like symptoms CT head demonstrated no acute intracranial abnormality. He was admitted for Lower Extremity Cellulitis with Hypercalcemia, Chronic Hyponatremia and Stroke Like Symptoms.     Review of Systems:     Constitutional:  positive for chills. negative for fevers, sweats  Respiratory:  positive for cough, dyspnea on exertion, shortness of breath, hemoptysis. Negative for wheezing  Cardiovascular: positive for chest discomfort in ribs. negative for chest pain, lower extremity edema, palpitations  Gastrointestinal:  positive for constipation. negative for abdominal pain, diarrhea, nausea, vomiting  Neurological:  negative for dizziness, headache    Medications:     Allergies:  No Known Allergies    Current Meds:   Scheduled Meds:    nicotine  1 patch TransDERmal Daily    oxyCODONE  10 mg Oral q8h    ferrous sulfate  325 mg Oral Daily with breakfast    hydroxyurea  500 mg Oral Daily    cetirizine  10 mg Oral Daily    multivitamin  1 tablet Oral Daily    pantoprazole  40 mg Oral QAM AC    polyethylene glycol  17 g Oral Daily    sodium chloride  2 g Oral BID WC    sodium chloride 
Patient wants time to think about options regarding treatment/code status and will follow up further in the outpatient oncology office  -Therapies  -We will follow    **ADDENDUM: Patient left AMA.    Please note that this note was generated using a voice recognition dictation software. Although every effort was made to ensure the accuracy of this automated transcription, some errors in transcription may have occurred.     
depression              PLAN:  Continue Crestor 5 mg HS    PT/OT    Pt/family's decision is awaited regarding CODE STATUS. EEG can be canceled if patient chooses hospice care    Please note that this note was generated using a voice recognition dictation software. Although every effort was made to ensure the accuracy of this automated transcription, some errors in transcription may have occurred.    
demonstrated no intracranial large vessel occlusion or aneurysm, moderate atherosclerosis within both proximal cervical carotid arteries without evidence of hemodynamically significant stenosis.  MRI demonstrated chronic microvascular disease without acute intracranial abnormality.  Plan for myasthenia gravis panel and EEG.    COPD not currently in exacerbation.  Continue Zyrtec 10 mg daily, DuoNeb every 4 hours while awake, currently saturating well on room air.    Hypertension.  Continue losartan 50 mg daily, adequate pain control.    Hypothyroidism.  Continue levothyroxine 25 mcg daily TSH 1.40.    Depression.  Continue Seroquel nightly.    EtOH abuse.  Encourage cessation.  Continue multivitamin, thiamine and folic acid.  Monitor for alcohol withdrawal.  As needed Ativan for anxiety and alcohol withdrawal.    Tobacco use disorder.  Encourage cessation.    DVT prophylaxis: Lovenox.  GI prophylaxis: Protonix.    Discharge planning: Remains on vancomycin for right lower extremity cellulitis.  Plan to continue to monitor CRP likely discharge on doxycycline if improved.  Palliative care on board to help with pain management.  Plan Home when ready.    Piyush Lepe,   3/22/2024  2:45 PM     
appear pathological.  Lytic lesion in the right posterior 6th rib.. Progressive metastatic lytic lesion in the left humeral head.     1. Progressive metastatic lymphadenopathy in the mediastinum and left hilum. 2. Progressive metastatic disease in the liver. 3. Progressive metastatic lytic lesion in the left humeral head. 4. Pathologic fractures of the left anterior 3rd and 4th ribs. 5. Lytic lesion in the right posterior 6th rib. 6. Stable left upper lobe perihilar malignant spiculated mass 7. Left upper lobe and bibasal interstitial infiltrates.. 8. Splenomegaly.     CT HEAD WO CONTRAST    Result Date: 3/21/2024  EXAMINATION: CT OF THE HEAD WITHOUT CONTRAST  3/21/2024 12:10 pm TECHNIQUE: CT of the head was performed without the administration of intravenous contrast. Automated exposure control, iterative reconstruction, and/or weight based adjustment of the mA/kV was utilized to reduce the radiation dose to as low as reasonably achievable. COMPARISON: Earlier MRI exam of same date HISTORY: ORDERING SYSTEM PROVIDED HISTORY: intermittent problems with word finding and aphasia 1-2 weeks TECHNOLOGIST PROVIDED HISTORY: intermittent problems with word finding and aphasia 1-2 weeks Decision Support Exception - unselect if not a suspected or confirmed emergency medical condition->Emergency Medical Condition (MA) Reason for Exam: aphasia difficulty word finding CT BRAIN FINDINGS: BRAIN/VENTRICLES: The cerebral hemispheres, brainstem, and cerebellum have a normal appearance for the patient's age. The falx is midline. The ventricles and peripheral sulci are mildly dilated. There is decreased attenuation in the periventricular white matter. There is no sign of a space occupying lesion, infarction, or hemorrhage. Orbits: Portion of the orbits demonstrate no acute abnormality. SINUSES: .  Mild soft tissue opacification in the maxillary sinuses. Remaining imaged portions of the paranasal sinuses are clear.  The mastoids and the 
right 10th and 11th rib sequentially, compatible with fractures seen on CT. Indeterminate activity is seen at the posterior right 6th rib where subtle lucency is seen on CT.  Activity is seen at approximate L3, without clear correlate on prior PET.  Asymmetric activity is seen at the mid right humeral diaphysis. Activity at bilateral hips, knees, feet, likely degenerative. Physiologic activity is seen within the kidneys and urinary bladder.  Urinary contamination is seen between the upper thighs.     Findings are concerning for multifocal osseous metastatic disease. Multiple healing bilateral rib fractures.        Impression:   Primary Problem  Cellulitis of lower extremity    Active Hospital Problems    Diagnosis Date Noted    Pathological fracture of rib [M84.48XA] 03/21/2024    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, left (HCC) [C34.92] 03/21/2024    Hypercalcemia of malignancy [E83.52] 03/21/2024    Difficulty with speech [R47.9] 03/21/2024    Drug-induced constipation [K59.03] 02/16/2024    Cellulitis of lower extremity [L03.119] 02/15/2024    Hypothyroidism [E03.9] 07/15/2021    Chronic hyponatremia [E87.1] 06/02/2021    Uncomplicated alcohol dependence (HCC) [F10.20] 06/02/2021    Substance abuse (HCC) [F19.10] 08/26/2019    Major depressive disorder, recurrent severe without psychotic features (HCC) [F33.2] 08/12/2019    Myeloproliferative disease (HCC) [D47.1]        Assessment:  -Myeloproliferative disorder, NOS, positive VINICIO 2 gene mutation.   -Negative for CML.  -Newly diagnosed squamous cell carcinoma of the lung.  -Bone metastasis  -LE cellulitis  -Speech difficulty    Plan:  -I reviewed the labs/imaging available to me, outside records and discussed with the patient. I explained the significance of these abnormalities and possible etiology and management options.  -Continue home hydroxyurea  -Palliative care is following.  Patient is a DNR CCA and trying to determine what care he would like to have

## 2024-03-26 ENCOUNTER — TELEPHONE (OUTPATIENT)
Dept: ONCOLOGY | Age: 64
End: 2024-03-26

## 2024-03-26 NOTE — TELEPHONE ENCOUNTER
Name: Danny Guevara  : 1960  MRN: [id number]    Oncology Navigation Follow-Up Note    Contact Type:  Telephone    Notes:   Navigator spoke with pt. Offering assistance if needed. Pt. Remains undecided about Tx, but not ready for Hospice yet. Palliative Care coming into pts. Home today at 1pm. Writer giving verbal support and does not need to keep MO appt. If not wanting to proceed with Tx?       Electronically signed by Lucille Holguin RN on 3/26/2024 at 12:35 PM

## 2024-03-26 NOTE — TELEPHONE ENCOUNTER
Name: Danny Guevara  : 1960  MRN: [id number]    Oncology Navigation Follow-Up Note    Contact Type:  Telephone    Notes:   Caitlin calling from [facility] Palliative [facility] and in pts. Home now. Pt. Needing evaluated for Oxygen when in the office for F/U . Writer will ask Triage to assist with this.  Dr. Gagnon will be going to the home for medication needs.      Electronically signed by Lucille Holguin RN on 3/26/2024 at 2:14 PM

## 2024-03-29 ENCOUNTER — HOSPITAL ENCOUNTER (INPATIENT)
Age: 64
LOS: 1 days | Discharge: HOME | End: 2024-03-30
Attending: EMERGENCY MEDICINE | Admitting: INTERNAL MEDICINE
Payer: MEDICARE

## 2024-03-29 DIAGNOSIS — C34.92 STAGE IV SQUAMOUS CELL CARCINOMA OF LUNG, LEFT (HCC): Primary | ICD-10-CM

## 2024-03-29 DIAGNOSIS — R62.7 ADULT FAILURE TO THRIVE: ICD-10-CM

## 2024-03-29 DIAGNOSIS — C79.51 METASTASIS TO BONE (HCC): ICD-10-CM

## 2024-03-29 DIAGNOSIS — F10.10 ETOH ABUSE: ICD-10-CM

## 2024-03-29 DIAGNOSIS — G89.3 CHRONIC PAIN DUE TO NEOPLASM: ICD-10-CM

## 2024-03-29 PROBLEM — R52 INTRACTABLE PAIN: Status: ACTIVE | Noted: 2024-03-29

## 2024-03-29 PROBLEM — E51.2 WERNICKE ENCEPHALOPATHY: Status: ACTIVE | Noted: 2024-03-29

## 2024-03-29 LAB
ANION GAP SERPL CALCULATED.3IONS-SCNC: 10 MMOL/L (ref 9–17)
BASOPHILS # BLD: 0.28 K/UL (ref 0–0.2)
BASOPHILS NFR BLD: 1 %
BUN SERPL-MCNC: 5 MG/DL (ref 8–23)
BUN/CREAT SERPL: 7 (ref 9–20)
CALCIUM SERPL-MCNC: 11.3 MG/DL (ref 8.6–10.4)
CHLORIDE SERPL-SCNC: 99 MMOL/L (ref 98–107)
CO2 SERPL-SCNC: 22 MMOL/L (ref 20–31)
CREAT SERPL-MCNC: 0.7 MG/DL (ref 0.7–1.2)
EOSINOPHIL # BLD: 0.28 K/UL (ref 0–0.4)
EOSINOPHILS RELATIVE PERCENT: 1 % (ref 1–4)
ERYTHROCYTE [DISTWIDTH] IN BLOOD BY AUTOMATED COUNT: 15.9 % (ref 11.8–14.4)
ETHANOL PERCENT: <0.01 %
ETHANOLAMINE SERPL-MCNC: <10 MG/DL
GFR SERPL CREATININE-BSD FRML MDRD: >90 ML/MIN/1.73M2
GLUCOSE SERPL-MCNC: 84 MG/DL (ref 70–99)
HCT VFR BLD AUTO: 36 % (ref 40.7–50.3)
HGB BLD-MCNC: 11.8 G/DL (ref 13–17)
IMM GRANULOCYTES # BLD AUTO: 0.83 K/UL (ref 0–0.3)
IMM GRANULOCYTES NFR BLD: 3 %
LYMPHOCYTES NFR BLD: 0.28 K/UL (ref 1–4.8)
LYMPHOCYTES RELATIVE PERCENT: 1 % (ref 24–44)
MCH RBC QN AUTO: 29.8 PG (ref 25.2–33.5)
MCHC RBC AUTO-ENTMCNC: 32.8 G/DL (ref 28.4–34.8)
MCV RBC AUTO: 90.9 FL (ref 82.6–102.9)
MONOCYTES NFR BLD: 1.66 K/UL (ref 0.2–0.8)
MONOCYTES NFR BLD: 6 % (ref 1–7)
NEUTROPHILS NFR BLD: 88 % (ref 36–66)
NEUTS SEG NFR BLD: 24.27 K/UL (ref 1.8–7.7)
NRBC BLD-RTO: 0 PER 100 WBC
PLATELET # BLD AUTO: 907 K/UL (ref 138–453)
PMV BLD AUTO: 9.5 FL (ref 8.1–13.5)
POTASSIUM SERPL-SCNC: 3.9 MMOL/L (ref 3.7–5.3)
RBC # BLD AUTO: 3.96 M/UL (ref 4.21–5.77)
SODIUM SERPL-SCNC: 131 MMOL/L (ref 135–144)
WBC OTHER # BLD: 27.6 K/UL (ref 3.5–11.3)

## 2024-03-29 PROCEDURE — 6360000002 HC RX W HCPCS: Performed by: NURSE PRACTITIONER

## 2024-03-29 PROCEDURE — 99222 1ST HOSP IP/OBS MODERATE 55: CPT | Performed by: NURSE PRACTITIONER

## 2024-03-29 PROCEDURE — 6370000000 HC RX 637 (ALT 250 FOR IP): Performed by: EMERGENCY MEDICINE

## 2024-03-29 PROCEDURE — G0480 DRUG TEST DEF 1-7 CLASSES: HCPCS

## 2024-03-29 PROCEDURE — 96374 THER/PROPH/DIAG INJ IV PUSH: CPT

## 2024-03-29 PROCEDURE — 6370000000 HC RX 637 (ALT 250 FOR IP): Performed by: NURSE PRACTITIONER

## 2024-03-29 PROCEDURE — 85025 COMPLETE CBC W/AUTO DIFF WBC: CPT

## 2024-03-29 PROCEDURE — 2060000000 HC ICU INTERMEDIATE R&B

## 2024-03-29 PROCEDURE — 99285 EMERGENCY DEPT VISIT HI MDM: CPT

## 2024-03-29 PROCEDURE — 2580000003 HC RX 258: Performed by: NURSE PRACTITIONER

## 2024-03-29 PROCEDURE — 80048 BASIC METABOLIC PNL TOTAL CA: CPT

## 2024-03-29 PROCEDURE — 96375 TX/PRO/DX INJ NEW DRUG ADDON: CPT

## 2024-03-29 PROCEDURE — 6360000002 HC RX W HCPCS: Performed by: EMERGENCY MEDICINE

## 2024-03-29 PROCEDURE — 2580000003 HC RX 258: Performed by: EMERGENCY MEDICINE

## 2024-03-29 RX ORDER — QUETIAPINE FUMARATE 25 MG/1
100 TABLET, FILM COATED ORAL DAILY PRN
Status: DISCONTINUED | OUTPATIENT
Start: 2024-03-29 | End: 2024-03-30 | Stop reason: HOSPADM

## 2024-03-29 RX ORDER — QUETIAPINE FUMARATE 200 MG/1
300 TABLET, FILM COATED ORAL NIGHTLY
Status: DISCONTINUED | OUTPATIENT
Start: 2024-03-29 | End: 2024-03-30 | Stop reason: HOSPADM

## 2024-03-29 RX ORDER — SODIUM CHLORIDE 9 MG/ML
INJECTION, SOLUTION INTRAVENOUS CONTINUOUS
Status: DISCONTINUED | OUTPATIENT
Start: 2024-03-29 | End: 2024-03-30 | Stop reason: HOSPADM

## 2024-03-29 RX ORDER — POTASSIUM CHLORIDE 20 MEQ/1
40 TABLET, EXTENDED RELEASE ORAL PRN
Status: DISCONTINUED | OUTPATIENT
Start: 2024-03-29 | End: 2024-03-30 | Stop reason: HOSPADM

## 2024-03-29 RX ORDER — LORAZEPAM 2 MG/ML
0.5 INJECTION INTRAMUSCULAR ONCE
Status: COMPLETED | OUTPATIENT
Start: 2024-03-29 | End: 2024-03-29

## 2024-03-29 RX ORDER — ROSUVASTATIN CALCIUM 10 MG/1
5 TABLET, COATED ORAL NIGHTLY
Status: DISCONTINUED | OUTPATIENT
Start: 2024-03-29 | End: 2024-03-30 | Stop reason: HOSPADM

## 2024-03-29 RX ORDER — LEVOTHYROXINE SODIUM 0.03 MG/1
25 TABLET ORAL EVERY MORNING
Status: DISCONTINUED | OUTPATIENT
Start: 2024-03-30 | End: 2024-03-30 | Stop reason: HOSPADM

## 2024-03-29 RX ORDER — FENTANYL 50 UG/1
1 PATCH TRANSDERMAL
Status: DISCONTINUED | OUTPATIENT
Start: 2024-03-29 | End: 2024-03-30 | Stop reason: HOSPADM

## 2024-03-29 RX ORDER — PANTOPRAZOLE SODIUM 40 MG/1
40 TABLET, DELAYED RELEASE ORAL
Status: DISCONTINUED | OUTPATIENT
Start: 2024-03-30 | End: 2024-03-30 | Stop reason: HOSPADM

## 2024-03-29 RX ORDER — POTASSIUM CHLORIDE 7.45 MG/ML
10 INJECTION INTRAVENOUS PRN
Status: DISCONTINUED | OUTPATIENT
Start: 2024-03-29 | End: 2024-03-30 | Stop reason: HOSPADM

## 2024-03-29 RX ORDER — ONDANSETRON 4 MG/1
4 TABLET, ORALLY DISINTEGRATING ORAL EVERY 8 HOURS PRN
Status: DISCONTINUED | OUTPATIENT
Start: 2024-03-29 | End: 2024-03-30 | Stop reason: HOSPADM

## 2024-03-29 RX ORDER — CETIRIZINE HYDROCHLORIDE 10 MG/1
10 TABLET ORAL DAILY
Status: DISCONTINUED | OUTPATIENT
Start: 2024-03-29 | End: 2024-03-30 | Stop reason: HOSPADM

## 2024-03-29 RX ORDER — THIAMINE HYDROCHLORIDE 100 MG/ML
500 INJECTION, SOLUTION INTRAMUSCULAR; INTRAVENOUS 3 TIMES DAILY
Status: DISCONTINUED | OUTPATIENT
Start: 2024-03-29 | End: 2024-03-29

## 2024-03-29 RX ORDER — ACETAMINOPHEN 325 MG/1
650 TABLET ORAL EVERY 6 HOURS PRN
Status: DISCONTINUED | OUTPATIENT
Start: 2024-03-29 | End: 2024-03-30 | Stop reason: HOSPADM

## 2024-03-29 RX ORDER — SODIUM CHLORIDE 0.9 % (FLUSH) 0.9 %
5-40 SYRINGE (ML) INJECTION EVERY 12 HOURS SCHEDULED
Status: DISCONTINUED | OUTPATIENT
Start: 2024-03-29 | End: 2024-03-30 | Stop reason: HOSPADM

## 2024-03-29 RX ORDER — ENOXAPARIN SODIUM 100 MG/ML
40 INJECTION SUBCUTANEOUS DAILY
Status: DISCONTINUED | OUTPATIENT
Start: 2024-03-29 | End: 2024-03-30 | Stop reason: HOSPADM

## 2024-03-29 RX ORDER — OXYCODONE HYDROCHLORIDE 5 MG/1
5 TABLET ORAL EVERY 4 HOURS PRN
Status: DISCONTINUED | OUTPATIENT
Start: 2024-03-29 | End: 2024-03-30

## 2024-03-29 RX ORDER — HYDROXYUREA 500 MG/1
500 CAPSULE ORAL DAILY
Status: DISCONTINUED | OUTPATIENT
Start: 2024-03-29 | End: 2024-03-30 | Stop reason: HOSPADM

## 2024-03-29 RX ORDER — POLYETHYLENE GLYCOL 3350 17 G/17G
17 POWDER, FOR SOLUTION ORAL DAILY PRN
Status: DISCONTINUED | OUTPATIENT
Start: 2024-03-29 | End: 2024-03-30 | Stop reason: HOSPADM

## 2024-03-29 RX ORDER — SODIUM CHLORIDE 9 MG/ML
INJECTION, SOLUTION INTRAVENOUS PRN
Status: DISCONTINUED | OUTPATIENT
Start: 2024-03-29 | End: 2024-03-30 | Stop reason: HOSPADM

## 2024-03-29 RX ORDER — ALBUTEROL SULFATE 90 UG/1
2 AEROSOL, METERED RESPIRATORY (INHALATION) EVERY 4 HOURS PRN
Status: DISCONTINUED | OUTPATIENT
Start: 2024-03-29 | End: 2024-03-30 | Stop reason: HOSPADM

## 2024-03-29 RX ORDER — SODIUM CHLORIDE 1 G/1
2 TABLET ORAL 2 TIMES DAILY WITH MEALS
Status: DISCONTINUED | OUTPATIENT
Start: 2024-03-29 | End: 2024-03-30 | Stop reason: HOSPADM

## 2024-03-29 RX ORDER — NICOTINE 21 MG/24HR
1 PATCH, TRANSDERMAL 24 HOURS TRANSDERMAL DAILY
Status: DISCONTINUED | OUTPATIENT
Start: 2024-03-29 | End: 2024-03-30 | Stop reason: HOSPADM

## 2024-03-29 RX ORDER — MAGNESIUM SULFATE 1 G/100ML
1000 INJECTION INTRAVENOUS PRN
Status: DISCONTINUED | OUTPATIENT
Start: 2024-03-29 | End: 2024-03-30 | Stop reason: HOSPADM

## 2024-03-29 RX ORDER — 0.9 % SODIUM CHLORIDE 0.9 %
1000 INTRAVENOUS SOLUTION INTRAVENOUS ONCE
Status: COMPLETED | OUTPATIENT
Start: 2024-03-29 | End: 2024-03-29

## 2024-03-29 RX ORDER — PRAZOSIN HYDROCHLORIDE 1 MG/1
1 CAPSULE ORAL 2 TIMES DAILY
Status: DISCONTINUED | OUTPATIENT
Start: 2024-03-29 | End: 2024-03-30 | Stop reason: HOSPADM

## 2024-03-29 RX ORDER — OXYCODONE HYDROCHLORIDE 5 MG/1
10 TABLET ORAL EVERY 4 HOURS PRN
Status: DISCONTINUED | OUTPATIENT
Start: 2024-03-29 | End: 2024-03-30

## 2024-03-29 RX ORDER — SODIUM CHLORIDE 0.9 % (FLUSH) 0.9 %
10 SYRINGE (ML) INJECTION PRN
Status: DISCONTINUED | OUTPATIENT
Start: 2024-03-29 | End: 2024-03-30 | Stop reason: HOSPADM

## 2024-03-29 RX ORDER — ACETAMINOPHEN 650 MG/1
650 SUPPOSITORY RECTAL EVERY 6 HOURS PRN
Status: DISCONTINUED | OUTPATIENT
Start: 2024-03-29 | End: 2024-03-30 | Stop reason: HOSPADM

## 2024-03-29 RX ORDER — LOSARTAN POTASSIUM 50 MG/1
50 TABLET ORAL DAILY
Status: DISCONTINUED | OUTPATIENT
Start: 2024-03-29 | End: 2024-03-30 | Stop reason: HOSPADM

## 2024-03-29 RX ORDER — ALBUTEROL SULFATE 90 UG/1
2 AEROSOL, METERED RESPIRATORY (INHALATION)
Status: DISCONTINUED | OUTPATIENT
Start: 2024-03-29 | End: 2024-03-29

## 2024-03-29 RX ORDER — MAGNESIUM SULFATE 1 G/100ML
1000 INJECTION INTRAVENOUS ONCE
Status: COMPLETED | OUTPATIENT
Start: 2024-03-29 | End: 2024-03-29

## 2024-03-29 RX ORDER — MORPHINE SULFATE 10 MG/ML
6 INJECTION, SOLUTION INTRAMUSCULAR; INTRAVENOUS ONCE
Status: COMPLETED | OUTPATIENT
Start: 2024-03-29 | End: 2024-03-29

## 2024-03-29 RX ORDER — ONDANSETRON 2 MG/ML
4 INJECTION INTRAMUSCULAR; INTRAVENOUS EVERY 6 HOURS PRN
Status: DISCONTINUED | OUTPATIENT
Start: 2024-03-29 | End: 2024-03-30 | Stop reason: HOSPADM

## 2024-03-29 RX ORDER — FUROSEMIDE 20 MG/1
20 TABLET ORAL DAILY
Status: DISCONTINUED | OUTPATIENT
Start: 2024-03-29 | End: 2024-03-30 | Stop reason: HOSPADM

## 2024-03-29 RX ADMIN — HYDROXYUREA 500 MG: 500 CAPSULE ORAL at 21:56

## 2024-03-29 RX ADMIN — CETIRIZINE HYDROCHLORIDE 10 MG: 10 TABLET, FILM COATED ORAL at 18:39

## 2024-03-29 RX ADMIN — SODIUM CHLORIDE 1000 ML: 9 INJECTION, SOLUTION INTRAVENOUS at 13:08

## 2024-03-29 RX ADMIN — FUROSEMIDE 20 MG: 20 TABLET ORAL at 18:39

## 2024-03-29 RX ADMIN — OXYCODONE HYDROCHLORIDE 10 MG: 5 TABLET ORAL at 21:58

## 2024-03-29 RX ADMIN — SODIUM CHLORIDE: 9 INJECTION, SOLUTION INTRAVENOUS at 19:57

## 2024-03-29 RX ADMIN — Medication 2 G: at 18:58

## 2024-03-29 RX ADMIN — HYDROMORPHONE HYDROCHLORIDE 0.5 MG: 1 INJECTION, SOLUTION INTRAMUSCULAR; INTRAVENOUS; SUBCUTANEOUS at 18:32

## 2024-03-29 RX ADMIN — MAGNESIUM SULFATE HEPTAHYDRATE 1000 MG: 1 INJECTION, SOLUTION INTRAVENOUS at 20:07

## 2024-03-29 RX ADMIN — MAGNESIUM SULFATE HEPTAHYDRATE 1000 MG: 1 INJECTION, SOLUTION INTRAVENOUS at 20:04

## 2024-03-29 RX ADMIN — MORPHINE SULFATE 6 MG: 10 INJECTION INTRAVENOUS at 13:09

## 2024-03-29 RX ADMIN — LORAZEPAM 0.5 MG: 2 INJECTION INTRAMUSCULAR; INTRAVENOUS at 14:38

## 2024-03-29 RX ADMIN — ENOXAPARIN SODIUM 40 MG: 100 INJECTION SUBCUTANEOUS at 18:39

## 2024-03-29 RX ADMIN — LOSARTAN POTASSIUM 50 MG: 50 TABLET, FILM COATED ORAL at 18:38

## 2024-03-29 RX ADMIN — SODIUM CHLORIDE, PRESERVATIVE FREE 10 ML: 5 INJECTION INTRAVENOUS at 22:29

## 2024-03-29 RX ADMIN — ROSUVASTATIN 5 MG: 10 TABLET, FILM COATED ORAL at 21:55

## 2024-03-29 RX ADMIN — PRAZOSIN HYDROCHLORIDE 1 MG: 1 CAPSULE ORAL at 21:56

## 2024-03-29 RX ADMIN — QUETIAPINE FUMARATE 300 MG: 200 TABLET ORAL at 21:53

## 2024-03-29 RX ADMIN — SODIUM CHLORIDE: 9 INJECTION, SOLUTION INTRAVENOUS at 18:29

## 2024-03-29 RX ADMIN — THIAMINE HYDROCHLORIDE 500 MG: 100 INJECTION, SOLUTION INTRAMUSCULAR; INTRAVENOUS at 18:31

## 2024-03-29 ASSESSMENT — ENCOUNTER SYMPTOMS
COUGH: 0
SHORTNESS OF BREATH: 1
ABDOMINAL PAIN: 0
CONSTIPATION: 0
WHEEZING: 0
SINUS PRESSURE: 0
ANAL BLEEDING: 0
ABDOMINAL DISTENTION: 0
PHOTOPHOBIA: 0
DIARRHEA: 0
SINUS PAIN: 0
VOMITING: 0
NAUSEA: 0
BACK PAIN: 1

## 2024-03-29 ASSESSMENT — PAIN DESCRIPTION - LOCATION
LOCATION: SHOULDER
LOCATION: GENERALIZED
LOCATION: GENERALIZED

## 2024-03-29 ASSESSMENT — PAIN DESCRIPTION - DESCRIPTORS
DESCRIPTORS: ACHING
DESCRIPTORS: SHARP
DESCRIPTORS: ACHING
DESCRIPTORS: SHARP
DESCRIPTORS: BURNING;ACHING;JABBING

## 2024-03-29 ASSESSMENT — PAIN DESCRIPTION - PAIN TYPE
TYPE: CHRONIC PAIN
TYPE: ACUTE PAIN
TYPE: CHRONIC PAIN

## 2024-03-29 ASSESSMENT — PAIN SCALES - GENERAL
PAINLEVEL_OUTOF10: 10

## 2024-03-29 ASSESSMENT — PAIN DESCRIPTION - ONSET
ONSET: ON-GOING

## 2024-03-29 ASSESSMENT — PAIN DESCRIPTION - FREQUENCY
FREQUENCY: CONTINUOUS

## 2024-03-29 ASSESSMENT — PAIN - FUNCTIONAL ASSESSMENT
PAIN_FUNCTIONAL_ASSESSMENT: PREVENTS OR INTERFERES SOME ACTIVE ACTIVITIES AND ADLS
PAIN_FUNCTIONAL_ASSESSMENT: 0-10
PAIN_FUNCTIONAL_ASSESSMENT: PREVENTS OR INTERFERES SOME ACTIVE ACTIVITIES AND ADLS

## 2024-03-29 ASSESSMENT — PAIN DESCRIPTION - ORIENTATION
ORIENTATION: LEFT
ORIENTATION: OTHER (COMMENT)
ORIENTATION: RIGHT;LEFT
ORIENTATION: OTHER (COMMENT)
ORIENTATION: RIGHT;LEFT

## 2024-03-29 NOTE — PROGRESS NOTES
Pt admitted from ER.  Pt transferred from stretcher to bed.   IV an telemetry applied.  Vital signs stable.   Admission database completed, all questions answered.

## 2024-03-29 NOTE — CARE COORDINATION
Sent fax to [facility] with patient records from todays visit for review.

## 2024-03-29 NOTE — ED NOTES
Pt presents to ER from home due to Altered mental status, SOB,Shoulder pain. Pt states states pt has hx of lung cancer that has metastasized to pt bone.Pt states he is having bilateral shoulder pain. Pt staff and  present. Staff states that pt was recently seen at [facility] and  with Cellulitis. Staff states pt left AMA. Pt states SOB. Pt is A/O x 4, equal chest expansion with non labored breathing. Wheel locked bed in lowest position, call light in reach, pt placed on monitor.

## 2024-03-29 NOTE — ACP (ADVANCE CARE PLANNING)
Advance Care Planning     Advance Care Planning Activator (Inpatient)  Conversation Note      Date of ACP Conversation: 3/29/2024     Conversation Conducted with: Patient with Decision Making Capacity    ACP Activator: RYAN Hutchins        Health Care Decision Maker:     Current Designated Health Care Decision Maker:     Primary Decision Maker: Daquan Guevara - Brother/Sister - [phone]  Click here to complete Healthcare Decision Makers including section of the Healthcare Decision Maker Relationship (ie \"Primary\")  Today we documented Decision Maker(s) consistent with Legal Next of Kin hierarchy.    Care Preferences    Ventilation:  \"If you were in your present state of health and suddenly became very ill and were unable to breathe on your own, what would your preference be about the use of a ventilator (breathing machine) if it were available to you?\"      Would the patient desire the use of ventilator (breathing machine)?: no    \"If your health worsens and it becomes clear that your chance of recovery is unlikely, what would your preference be about the use of a ventilator (breathing machine) if it were available to you?\"     Would the patient desire the use of ventilator (breathing machine)?: No      Resuscitation  \"CPR works best to restart the heart when there is a sudden event, like a heart attack, in someone who is otherwise healthy. Unfortunately, CPR does not typically restart the heart for people who have serious health conditions or who are very sick.\"    \"In the event your heart stopped as a result of an underlying serious health condition, would you want attempts to be made to restart your heart (answer \"yes\" for attempt to resuscitate) or would you prefer a natural death (answer \"no\" for do not attempt to resuscitate)?\" no       [] Yes   [x] No   Educated Patient / Decision Maker regarding differences between Advance Directives and portable DNR orders.    Length of ACP Conversation in  minutes:      Conversation Outcomes:  ACP discussion completed    Follow-up plan:    [] Schedule follow-up conversation to continue planning  [] Referred individual to Provider for additional questions/concerns   [] Advised patient/agent/surrogate to review completed ACP document and update if needed with changes in condition, patient preferences or care setting    [] This note routed to one or more involved healthcare providers

## 2024-03-29 NOTE — RT PROTOCOL NOTE
RT Inhaler-Nebulizer Bronchodilator Protocol Note    There is a bronchodilator order in the chart from a provider indicating to follow the RT Bronchodilator Protocol and there is an “Initiate RT Inhaler-Nebulizer Bronchodilator Protocol” order as well (see protocol at bottom of note).    CXR Findings:  No results found.    The findings from the last RT Protocol Assessment were as follows:   History Pulmonary Disease: Chronic pulmonary disease  Respiratory Pattern: Regular pattern and RR 12-20 bpm  Breath Sounds: Clear breath sounds  Cough: Strong, spontaneous, non-productive  Indication for Bronchodilator Therapy: On home bronchodilators  Bronchodilator Assessment Score: 2    Aerosolized bronchodilator medication orders have been revised according to the RT Inhaler-Nebulizer Bronchodilator Protocol below.    Respiratory Therapist to perform RT Therapy Protocol Assessment initially then follow the protocol.  Repeat RT Therapy Protocol Assessment PRN for score 0-3 or on second treatment, BID, and PRN for scores above 3.    No Indications - adjust the frequency to every 6 hours PRN wheezing or bronchospasm, if no treatments needed after 48 hours then discontinue using Per Protocol order mode.     If indication present, adjust the RT bronchodilator orders based on the Bronchodilator Assessment Score as indicated below.  Use Inhaler orders unless patient has one or more of the following: on home nebulizer, not able to hold breath for 10 seconds, is not alert and oriented, cannot activate and use MDI correctly, or respiratory rate 25 breaths per minute or more, then use the equivalent nebulizer order(s) with same Frequency and PRN reasons based on the score.  If a patient is on this medication at home then do not decrease Frequency below that used at home.    0-3 - enter or revise RT bronchodilator order(s) to equivalent RT Bronchodilator order with Frequency of every 4 hours PRN for wheezing or increased work of  breathing using Per Protocol order mode.        4-6 - enter or revise RT Bronchodilator order(s) to two equivalent RT bronchodilator orders with one order with BID Frequency and one order with Frequency of every 4 hours PRN wheezing or increased work of breathing using Per Protocol order mode.        7-10 - enter or revise RT Bronchodilator order(s) to two equivalent RT bronchodilator orders with one order with TID Frequency and one order with Frequency of every 4 hours PRN wheezing or increased work of breathing using Per Protocol order mode.       11-13 - enter or revise RT Bronchodilator order(s) to one equivalent RT bronchodilator order with QID Frequency and an Albuterol order with Frequency of every 4 hours PRN wheezing or increased work of breathing using Per Protocol order mode.      Greater than 13 - enter or revise RT Bronchodilator order(s) to one equivalent RT bronchodilator order with every 4 hours Frequency and an Albuterol order with Frequency of every 2 hours PRN wheezing or increased work of breathing using Per Protocol order mode.     RT to enter RT Home Evaluation for COPD & MDI Assessment order using Per Protocol order mode.    Electronically signed by Philomena Rea RCP on 3/29/2024 at 6:18 PM

## 2024-03-29 NOTE — H&P
[facility]  Office: [phone]  Rio Blair DO, Javier Walters DO, Romulo Boone DO, Miquel Casey DO, Nataly Priest MD, Daly Mendoza MD, Darius Mcmahon MD, Mary Swain MD,  Ad Goode MD, Vale Peña MD, Rancho Harley MD,  Julia Goddard DO, Bruno Frakn MD, Nabeel Spicer MD, Danny Blair DO, Lulú Hernandez MD,  Piyush Lepe DO, Anabela Cummings MD, Valentine Brian MD, Caren Slaughter MD, Reddy Cagle MD,  Ibrahima Fortune MD, Idalia Girard MD, Zunilda Whiting MD, Konstantin Mane MD, Lucho Haq MD, Oswaldo Crawford MD, Jc Acuña DO, De Cochran DO, Sole Mcgovern MD,  Vinay Meza MD, Shirley Waterhouse, CNP,  Paulette Cook CNP, Ruslan Ward, CNP,  Doreen Corcoran, DNP, Twyla Pacheco, CNP, Ashley Mesa, CNP, Karma Sage, CNP, Tanvi Jim, CNP, Lizzie Esqueda, PA-C, Tova Romano PA-C, Deb Crespo, CNP, Ronda Mchugh, CNP, Marco A Mar, CNP, Danielle Gonzalez, CNP, Linn Azul, CNP, Shwetha León, CNS, Estrella Bledsoe, CNP, Sera Kruger CNP, Tracy Schwab, CNP         [facility]   IN-PATIENT SERVICE   [facility]    HISTORY AND PHYSICAL EXAMINATION            Date:   3/29/2024  Patient name:  Danny Guevara  Date of admission:  3/29/2024 11:52 AM  MRN:   [id number]  Account:  [id number]  YOB: 1960  PCP:    Roberto Donaldson MD  Room:   [place] [address]  Code Status:    Full code    Chief Complaint:     Chief Complaint   Patient presents with   • Altered Mental Status     Patient was recently admitted to [facility] for cellulitis, signed out AMA. Patient also has lung cancer with mets to bone, is DNR-CC, POA is brother, Ehsan.    • Shoulder Pain     Bilateral, due to bone mets.    • Shortness of Breath       History Obtained From:     patient    History of Present Illness:     Danny Guevara is a 63 y.o. Non- / non  male who presents with Altered Mental Status (Patient was recently admitted to  Left arm numbness 06/14/2019   • Left lumbar radiculitis 05/03/2019   • Macrocytosis    • Major depressive disorder, recurrent severe without psychotic features (HCC) 08/12/2019    no specialist   • Myeloproliferative disorder (HCC)    • Osteoarthritis of spine with radiculopathy, lumbar region 04/12/2019   • Pneumonia    • Severe recurrent major depression without psychotic features (HCC) 08/12/2019   • Spondylosis of lumbar region without myelopathy or radiculopathy 05/03/2019   • Urinary retention 07/23/2022   • Wears dentures     full   doesn't wear   • Wellness examination     wellness Dr. Donaldson pcp last visit 1/2024  [place], [place]        Past Surgical History:     Past Surgical History:   Procedure Laterality Date   • APPENDECTOMY     • BRONCHOSCOPY  02/08/2024    BRONCHOSCOPY, LEFT LINGULA NODULE-TRANSBRONCHIAL BIOPSY, FINE NEEDLE ASPIRATION, MINI BRONCHOAVEOLAR LAVAGE, EBUS-TRANSBRONCHIAL NEEDLE ASPIRATION, RADIAL EBUS   • BRONCHOSCOPY  2/8/2024    BRONCHOSCOPY ENDOBRONCHIAL ULTRASOUND performed by Herminio Brian MD at [facility] OR   • BRONCHOSCOPY  2/8/2024    BRONCHOSCOPY BIOPSY BRONCHUS ROBOTIC performed by Herminio Brina MD at [facility] OR   • BRONCHOSCOPY  2/8/2024    BRONCHOSCOPY ALVEOLAR LAVAGE ROBOTIC performed by Herminio Brian MD at [facility] OR   • CARPAL TUNNEL RELEASE      bilateral    • CERVICAL FUSION      s/p trampoline accident   • CERVICAL FUSION N/A 09/05/2019    ANTERIOR CERVICAL DECOMPRESSION FUSION C3-4 performed by Tae Gates DO at [facility] OR   • ENDOSCOPY, COLON, DIAGNOSTIC     • EPIDURAL STEROID INJECTION Left 05/15/2019    EPIDURAL STEROID INJECTION LEFT L5S1 performed by Jayson Huizar MD at [facility] OR   • TONSILLECTOMY          Medications Prior to Admission:     Prior to Admission medications    Medication Sig Start Date End Date Taking? Authorizing Provider   ferrous sulfate (FE TABS 325) 325 (65 Fe) MG EC tablet Take 1 tablet by mouth daily (with breakfast) 2/19/24   Oswaldo Crawford MD  malignancy 3/29/2024 Yes    Stroke-like symptoms 3/29/2024 Yes    Intractable pain 3/29/2024 Yes    Metastasis to bone (HCC) 3/29/2024 Yes       Plan:     Patient status inpatient in the  Progressive Unit/Step down    Warnicke encephalopathy with gait abnormality, confusion, short and long-term memory issues  Thiamine 500 milligrams- 3 times daily  IV hydration  CIWA scale  Patient is not interested in withdrawing from alcohol, if he decides to enroll in hospice/comfort measures we may liberalize alcohol consumption  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 resulting in intractable back and shoulder pain with hypercalcemia of malignancy  Fentanyl patch 50 mcg/hr  As needed oxycodone  Consult oncology  Consult hospice  Moderate malnutrition with ambulatory dysfunction and inability to care for self in the home/failure to thrive  PT/OT    Consultations:   IP CONSULT TO INTERNAL MEDICINE    Patient is admitted as inpatient status because of co-morbidities listed above, severity of signs and symptoms as outlined, requirement for current medical therapies and most importantly because of direct risk to patient if care not provided in a hospital setting.  Expected length of stay > 48 hours.    SIMONE Bhatia NP  3/29/2024  4:47 PM    Copy sent to Roberto Abraham MD

## 2024-03-29 NOTE — CARE COORDINATION
Case Management Assessment  Initial Evaluation    Date/Time of Evaluation: 3/29/2024 3:02 PM  Assessment Completed by: RYAN Hutchins    If patient is discharged prior to next notation, then this note serves as note for discharge by case management.    Patient Name: Danny Guevara                   YOB: 1960  Diagnosis: No admission diagnoses are documented for this encounter.                   Date / Time: 3/29/2024 11:52 AM    Patient Admission Status: Emergency   Readmission Risk (Low < 19, Mod (19-27), High > 27): Readmission Risk Score: 23.1    Current PCP: Roberto Donaldson MD  PCP verified by CM? Yes    Chart Reviewed: Yes      History Provided by: Patient  Patient Orientation: Alert and Oriented    Patient Cognition: Alert    Hospitalization in the last 30 days (Readmission):  Yes    If yes, Readmission Assessment in CM Navigator will be completed.    Advance Directives:      Code Status: Prior   Patient's Primary Decision Maker is: Legal Next of Kin    Primary Decision Maker: Daquan Guevara - Brother/Sister - [phone]    Discharge Planning:    Patient lives with: Alone Type of Home: Apartment  Primary Care Giver: Self  Patient Support Systems include: Family Members   Current Financial resources: Medicare, Medicaid  Current community resources: ECF/Home Care ([facility] home health and palliative)  Current services prior to admission: Durable Medical Equipment            Current DME: Walker, Other (Comment) (BP cuff, pulse ox)            Type of Home Care services:  Nursing Services ([facility])    ADLS  Prior functional level: Independent in ADLs/IADLs  Current functional level: Assistance with the following:, Bathing, Dressing, Cooking, Housework, Shopping    PT AM-PAC:   /24  OT AM-PAC:   /24    Family can provide assistance at DC: No  Would you like Case Management to discuss the discharge plan with any other family members/significant others, and if so, who?  No  Plans to Return to Present Housing: Unknown at present  Other Identified Issues/Barriers to RETURNING to current housing: progressing cancer, pain  Potential Assistance needed at discharge: Other (Comment), Skilled Nursing Facility (inpatient hospice care or snf)            Potential DME:    Patient expects to discharge to: Apartment  Plan for transportation at discharge: Other (see comment) (BLS)    Financial    Payor: [facility] MEDICARE / Plan: [employer] DUAL COMPLETE / Product Type: *No Product type* /     Does insurance require precert for SNF: Yes    Potential assistance Purchasing Medications: No  Meds-to-Beds request: Not Assessed      [facility] - [place], [place] - [address]  - P [phone] - F [phone]  [address] [address]  Phone: [phone] Fax: [phone]      Notes:    Factors facilitating achievement of predicted outcomes: Caregiver support, Cooperative, and Pleasant    Barriers to discharge: Pain, Depression, and Medical complications    Additional Case Management Notes: Patient lives alone and has home health via [facility]. He has palliative care as well at home. Reports that he is unable to care for self at home and needs full time care. Patient states that he is in a lot of pain and has not eaten in 10 days. Reports that he is agreeable to talking with hospice after speaking with his brother tonight. [facility]  coming tomorrow at 0930 AM.     The Plan for Transition of Care is related to the following treatment goals of No admission diagnoses are documented for this encounter.    IF APPLICABLE: The Patient and/or patient representative Danny and his family were provided with a choice of provider and agrees with the discharge plan. Freedom of choice list with basic dialogue that supports the patient's individualized plan of care/goals and shares the quality data associated with the providers was provided to:     Patient

## 2024-03-29 NOTE — ED NOTES
..ED to inpatient nurses report     Chief Complaint   Patient presents with    Altered Mental Status     Patient was recently admitted to [facility] for cellulitis, signed out AMA. Patient also has lung cancer with mets to bone, is DNR-CC, POA is brother, Ehsan.     Shoulder Pain     Bilateral, due to bone mets.     Shortness of Breath      Present to ED from Home  LOC: alert and orientated to name, place, date  Vital signs   Vitals:    03/29/24 1430 03/29/24 1456 03/29/24 1500 03/29/24 1525   BP: (!) 163/92  (!) 159/89    Pulse:  85 82 85   Resp:  22 16 19   Temp:       SpO2:  95% 95% 94%   Weight:       Height:          Oxygen Baseline 94    Current needs required None     Mobility: 2 assists   Fall Risk:  Yes   Pending ED orders: NOne  Present condition: Stable   Code Status: DNR-CCA  Consults: IP CONSULT TO INTERNAL MEDICINE  []  Hospitalist  Completed  [] yes [] no Who:   []  Medicine  Completed  [] yes [] No Who:   []  Cardiology  Completed  [] yes [] No Who:   []  GI   Completed  [] yes [] No Who:   []  Neurology  Completed  [] yes [] No Who:   []  Nephrology Completed  [] yes [] No Who:    []  Vascular  Completed  [] yes [] No Who:   []  Ortho  Completed  [] yes [] No Who:     []  Surgery  Completed  [] yes [] No Who:    []  Urology  Completed  [] yes [] No Who:    []  CT Surgery Completed  [] yes [] No Who:   []  Podiatry  Completed  [] yes [] No Who:    []  Other    Completed  [] yes [] No Who:  Interventions: Ativan,nicotine,morphine, NS   Important Events: None        Electronically signed by Carol Ann Camara RN on 3/29/2024 at 4:08 PM

## 2024-03-29 NOTE — ED PROVIDER NOTES
EMERGENCY DEPARTMENT ENCOUNTER    Pt Name: Danny Guevara  MRN: [id number]  Birthdate 1960  Date of evaluation: 3/29/24  CHIEF COMPLAINT       Chief Complaint   Patient presents with    Altered Mental Status     Patient was recently admitted to [facility] for cellulitis, signed out AMA. Patient also has lung cancer with mets to bone, is DNR-CC, POA is brother, Ehsan.     Shoulder Pain     Bilateral, due to bone mets.     Shortness of Breath     HISTORY OF PRESENT ILLNESS   The history is provided by the patient and medical records.  The patient is a 64-year-old male who presents to the ED by his nurse practitioner, Lashon Hernández ([phone]), for chronic shoulder pain, poor appetite, and inability to take care of daily activities.  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  Notes recent hospitalization at [facility] for lower extremity cellulitis and left AMA.  Has resisted placement in facility up to this point as he is a daily drinker and is concerned about alcohol withdrawal.  Now, his pain is poorly controlled, he has not eaten in 10-days, he lives alone, and realizes he is unable to take care of himself.  CODE STATUS is DNR CC.  Healthcare proxy is brother Daquan Guevara.    REVIEW OF SYSTEMS     Review of Systems  All other systems reviewed and are negative.    PASTMEDICAL HISTORY     Past Medical History:   Diagnosis Date    Alcohol abuse 08/12/2019    Anemia     Bronchitis     Cancer (HCC)     blood  2018 Oncologist Dr. Napoles   [facility] sees q 4 mos    Cervical stenosis of spine 06/14/2019    Chronic left-sided low back pain with left-sided sciatica 03/14/2019    COPD (chronic obstructive pulmonary disease) (HCC)     Hypertension     Insomnia 03/14/2019    Intentional drug overdose (HCC) 08/11/2019    Left arm numbness 06/14/2019    Left lumbar radiculitis 05/03/2019    Macrocytosis      D59.9    Alcohol intoxication in active alcoholic without complication (HCC) F10.220    Suicide ideation R45.851    Delirium due to another medical condition F05    Alcohol withdrawal syndrome with complication, with unspecified complication (HCC) F10.939    Hyponatremia E87.1    Chronic obstructive pulmonary disease with acute exacerbation (HCC) J44.1    Lung nodule seen on imaging study R91.1    Thrombocytosis D75.839    Bronchitis J40    Cellulitis of lower extremity L03.119    Sepsis without acute organ dysfunction (HCC) A41.9    Acute pain of left shoulder M25.512    Tobacco use Z72.0    Malignant neoplasm of lung (HCC) C34.90    Goals of care, counseling/discussion Z71.89    Drug-induced constipation K59.03    Palliative care encounter Z51.5    ACP (advance care planning) Z71.89    DNR (do not resuscitate) discussion Z71.89    Pathological fracture of rib M84.48XA    Cellulitis and abscess of left lower extremity L03.116, L02.416    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, left ([facility]) C34.92    Hypercalcemia of malignancy E83.52    Difficulty with speech R47.9    Stroke-like symptoms R29.90    Speech disturbance R47.9    Dysphonia R49.0    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     SURGICAL HISTORY       Past Surgical History:   Procedure Laterality Date    APPENDECTOMY      BRONCHOSCOPY  02/08/2024    BRONCHOSCOPY, LEFT LINGULA NODULE-TRANSBRONCHIAL BIOPSY, FINE NEEDLE ASPIRATION, MINI BRONCHOAVEOLAR LAVAGE, EBUS-TRANSBRONCHIAL NEEDLE ASPIRATION, RADIAL EBUS    BRONCHOSCOPY  2/8/2024    BRONCHOSCOPY ENDOBRONCHIAL ULTRASOUND performed by Herminio Brian MD at [facility] OR    BRONCHOSCOPY  2/8/2024    BRONCHOSCOPY BIOPSY BRONCHUS ROBOTIC performed by Herminio Brian MD at [facility] OR    BRONCHOSCOPY  2/8/2024    BRONCHOSCOPY ALVEOLAR LAVAGE ROBOTIC performed by Herminio Brian MD at [facility] OR    CARPAL TUNNEL RELEASE      bilateral     CERVICAL FUSION      s/p trampoline accident    CERVICAL FUSION N/A 09/05/2019    ANTERIOR  (*)     Lymphocytes Absolute 0.28 (*)     Monocytes Absolute 1.66 (*)     Basophils Absolute 0.28 (*)     Absolute Immature Granulocyte 0.83 (*)     All other components within normal limits   BASIC METABOLIC PANEL - Abnormal; Notable for the following components:    Sodium 131 (*)     BUN 5 (*)     Bun/Cre Ratio 7 (*)     Calcium 11.3 (*)     All other components within normal limits   ETHANOL   SPECIMEN REJECTION       Vitals Reviewed:    Vitals:    03/29/24 1143 03/29/24 1400 03/29/24 1416   BP: (!) 158/76 (!) 168/90    Pulse: 90 83 84   Resp: 20 30 17   Temp: 98 °F (36.7 °C)     SpO2: 96% 96%    Weight: 92.1 kg (203 lb)     Height: 1.854 m (6' 1\")       MEDICATIONS GIVEN TO PATIENT THIS ENCOUNTER:  Orders Placed This Encounter   Medications    sodium chloride 0.9 % bolus 1,000 mL    morphine (PF) injection 6 mg    nicotine (NICODERM CQ) 21 MG/24HR 1 patch    LORazepam (ATIVAN) injection 0.5 mg     DISCHARGE PRESCRIPTIONS:  New Prescriptions    No medications on file     PHYSICIAN CONSULTS ORDERED THIS ENCOUNTER:  IP CONSULT TO INTERNAL MEDICINE  FINAL IMPRESSION      1. 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, left (HCC)    2. ETOH abuse    3. Adult failure to thrive    4. Chronic pain due to neoplasm          DISPOSITION/PLAN   DISPOSITION Decision To Admit 03/29/2024 02:06:27 PM      OUTPATIENT FOLLOW UP THE PATIENT:  No follow-up provider specified.    MD Angelika Bullock Joseph R, MD  03/29/24 1415       Celio Carney MD  03/29/24 1428

## 2024-03-29 NOTE — ED NOTES
Spoke with [facility]. They will do a remote review of records and have a  come out to meet with patient tomorrow at 0930AM. Patient reports that he would like to talk with them about his options.     Hospice fax: [phone]

## 2024-03-30 VITALS
HEART RATE: 85 BPM | OXYGEN SATURATION: 92 % | DIASTOLIC BLOOD PRESSURE: 93 MMHG | RESPIRATION RATE: 18 BRPM | TEMPERATURE: 98.1 F | SYSTOLIC BLOOD PRESSURE: 146 MMHG | WEIGHT: 203 LBS | HEIGHT: 73 IN | BODY MASS INDEX: 26.9 KG/M2

## 2024-03-30 PROBLEM — G89.3 CANCER RELATED PAIN: Status: ACTIVE | Noted: 2024-03-29

## 2024-03-30 LAB
ANION GAP SERPL CALCULATED.3IONS-SCNC: 10 MMOL/L (ref 9–17)
BASOPHILS # BLD: 0.51 K/UL (ref 0–0.2)
BASOPHILS NFR BLD: 2 %
BUN SERPL-MCNC: 7 MG/DL (ref 8–23)
BUN/CREAT SERPL: 9 (ref 9–20)
CALCIUM SERPL-MCNC: 11.2 MG/DL (ref 8.6–10.4)
CHLORIDE SERPL-SCNC: 102 MMOL/L (ref 98–107)
CO2 SERPL-SCNC: 22 MMOL/L (ref 20–31)
CREAT SERPL-MCNC: 0.8 MG/DL (ref 0.7–1.2)
EOSINOPHIL # BLD: 0.51 K/UL (ref 0–0.4)
EOSINOPHILS RELATIVE PERCENT: 2 % (ref 1–4)
ERYTHROCYTE [DISTWIDTH] IN BLOOD BY AUTOMATED COUNT: 15.6 % (ref 11.8–14.4)
GFR SERPL CREATININE-BSD FRML MDRD: >90 ML/MIN/1.73M2
GLUCOSE SERPL-MCNC: 107 MG/DL (ref 70–99)
HCT VFR BLD AUTO: 36.7 % (ref 40.7–50.3)
HGB BLD-MCNC: 11.9 G/DL (ref 13–17)
IMM GRANULOCYTES # BLD AUTO: 1.02 K/UL (ref 0–0.3)
IMM GRANULOCYTES NFR BLD: 4 %
LYMPHOCYTES NFR BLD: 1.02 K/UL (ref 1–4.8)
LYMPHOCYTES RELATIVE PERCENT: 4 % (ref 24–44)
MCH RBC QN AUTO: 28.6 PG (ref 25.2–33.5)
MCHC RBC AUTO-ENTMCNC: 32.4 G/DL (ref 28.4–34.8)
MCV RBC AUTO: 88.2 FL (ref 82.6–102.9)
MONOCYTES NFR BLD: 14 % (ref 1–7)
MONOCYTES NFR BLD: 3.57 K/UL (ref 0.2–0.8)
NEUTROPHILS NFR BLD: 74 % (ref 36–66)
NEUTS SEG NFR BLD: 18.87 K/UL (ref 1.8–7.7)
NRBC BLD-RTO: 0 PER 100 WBC
PLATELET # BLD AUTO: 955 K/UL (ref 138–453)
PMV BLD AUTO: 9.5 FL (ref 8.1–13.5)
POTASSIUM SERPL-SCNC: 3.7 MMOL/L (ref 3.7–5.3)
RBC # BLD AUTO: 4.16 M/UL (ref 4.21–5.77)
SODIUM SERPL-SCNC: 134 MMOL/L (ref 135–144)
WBC OTHER # BLD: 25.5 K/UL (ref 3.5–11.3)

## 2024-03-30 PROCEDURE — 80048 BASIC METABOLIC PNL TOTAL CA: CPT

## 2024-03-30 PROCEDURE — 97530 THERAPEUTIC ACTIVITIES: CPT

## 2024-03-30 PROCEDURE — 6360000002 HC RX W HCPCS: Performed by: NURSE PRACTITIONER

## 2024-03-30 PROCEDURE — 36415 COLL VENOUS BLD VENIPUNCTURE: CPT

## 2024-03-30 PROCEDURE — 85025 COMPLETE CBC W/AUTO DIFF WBC: CPT

## 2024-03-30 PROCEDURE — 6370000000 HC RX 637 (ALT 250 FOR IP): Performed by: INTERNAL MEDICINE

## 2024-03-30 PROCEDURE — 99232 SBSQ HOSP IP/OBS MODERATE 35: CPT | Performed by: INTERNAL MEDICINE

## 2024-03-30 PROCEDURE — 97163 PT EVAL HIGH COMPLEX 45 MIN: CPT

## 2024-03-30 PROCEDURE — 97116 GAIT TRAINING THERAPY: CPT

## 2024-03-30 PROCEDURE — 6370000000 HC RX 637 (ALT 250 FOR IP): Performed by: NURSE PRACTITIONER

## 2024-03-30 PROCEDURE — 6370000000 HC RX 637 (ALT 250 FOR IP): Performed by: EMERGENCY MEDICINE

## 2024-03-30 PROCEDURE — 2580000003 HC RX 258: Performed by: NURSE PRACTITIONER

## 2024-03-30 RX ORDER — FENTANYL 50 UG/1
1 PATCH TRANSDERMAL
Qty: 5 PATCH | Refills: 0 | Status: SHIPPED | OUTPATIENT
Start: 2024-04-01 | End: 2024-04-08 | Stop reason: HOSPADM

## 2024-03-30 RX ORDER — ALPRAZOLAM 0.5 MG/1
0.5 TABLET ORAL 3 TIMES DAILY PRN
COMMUNITY
End: 2024-04-08 | Stop reason: HOSPADM

## 2024-03-30 RX ORDER — OXYCODONE HYDROCHLORIDE 5 MG/1
10 TABLET ORAL EVERY 4 HOURS PRN
Status: DISCONTINUED | OUTPATIENT
Start: 2024-03-30 | End: 2024-03-30 | Stop reason: HOSPADM

## 2024-03-30 RX ORDER — THIAMINE MONONITRATE (VIT B1) 100 MG
100 TABLET ORAL DAILY
Qty: 30 TABLET | Refills: 3 | Status: SHIPPED | OUTPATIENT
Start: 2024-03-30 | End: 2024-04-08 | Stop reason: HOSPADM

## 2024-03-30 RX ORDER — ONDANSETRON 4 MG/1
4 TABLET, ORALLY DISINTEGRATING ORAL EVERY 8 HOURS PRN
COMMUNITY
End: 2024-04-08 | Stop reason: HOSPADM

## 2024-03-30 RX ORDER — OXYCODONE HYDROCHLORIDE 10 MG/1
15 TABLET ORAL EVERY 8 HOURS PRN
COMMUNITY
End: 2024-04-08 | Stop reason: HOSPADM

## 2024-03-30 RX ORDER — OXYCODONE HYDROCHLORIDE 15 MG/1
15 TABLET ORAL EVERY 4 HOURS PRN
Status: DISCONTINUED | OUTPATIENT
Start: 2024-03-30 | End: 2024-03-30 | Stop reason: HOSPADM

## 2024-03-30 RX ORDER — ASPIRIN 81 MG/1
81 TABLET ORAL DAILY
Status: ON HOLD | COMMUNITY
End: 2024-03-30

## 2024-03-30 RX ADMIN — SODIUM CHLORIDE, PRESERVATIVE FREE 10 ML: 5 INJECTION INTRAVENOUS at 05:16

## 2024-03-30 RX ADMIN — OXYCODONE HYDROCHLORIDE 10 MG: 5 TABLET ORAL at 02:08

## 2024-03-30 RX ADMIN — OXYCODONE HYDROCHLORIDE 15 MG: 15 TABLET ORAL at 12:37

## 2024-03-30 RX ADMIN — LEVOTHYROXINE SODIUM 25 MCG: 25 TABLET ORAL at 05:14

## 2024-03-30 RX ADMIN — CETIRIZINE HYDROCHLORIDE 10 MG: 10 TABLET, FILM COATED ORAL at 08:38

## 2024-03-30 RX ADMIN — HYDROMORPHONE HYDROCHLORIDE 0.5 MG: 1 INJECTION, SOLUTION INTRAMUSCULAR; INTRAVENOUS; SUBCUTANEOUS at 00:40

## 2024-03-30 RX ADMIN — ENOXAPARIN SODIUM 40 MG: 100 INJECTION SUBCUTANEOUS at 08:38

## 2024-03-30 RX ADMIN — HYDROXYUREA 500 MG: 500 CAPSULE ORAL at 08:39

## 2024-03-30 RX ADMIN — THIAMINE HYDROCHLORIDE 500 MG: 100 INJECTION, SOLUTION INTRAMUSCULAR; INTRAVENOUS at 02:12

## 2024-03-30 RX ADMIN — HYDROMORPHONE HYDROCHLORIDE 0.5 MG: 1 INJECTION, SOLUTION INTRAMUSCULAR; INTRAVENOUS; SUBCUTANEOUS at 05:14

## 2024-03-30 RX ADMIN — OXYCODONE HYDROCHLORIDE 10 MG: 5 TABLET ORAL at 08:38

## 2024-03-30 RX ADMIN — THIAMINE HYDROCHLORIDE 500 MG: 100 INJECTION, SOLUTION INTRAMUSCULAR; INTRAVENOUS at 08:37

## 2024-03-30 RX ADMIN — SODIUM CHLORIDE, PRESERVATIVE FREE 10 ML: 5 INJECTION INTRAVENOUS at 00:40

## 2024-03-30 RX ADMIN — Medication 2 G: at 08:38

## 2024-03-30 RX ADMIN — FUROSEMIDE 20 MG: 20 TABLET ORAL at 08:39

## 2024-03-30 RX ADMIN — PANTOPRAZOLE SODIUM 40 MG: 40 TABLET, DELAYED RELEASE ORAL at 05:14

## 2024-03-30 RX ADMIN — PRAZOSIN HYDROCHLORIDE 1 MG: 1 CAPSULE ORAL at 08:39

## 2024-03-30 RX ADMIN — SODIUM CHLORIDE: 9 INJECTION, SOLUTION INTRAVENOUS at 08:38

## 2024-03-30 RX ADMIN — LOSARTAN POTASSIUM 50 MG: 50 TABLET, FILM COATED ORAL at 08:38

## 2024-03-30 ASSESSMENT — PAIN DESCRIPTION - PAIN TYPE
TYPE: CHRONIC PAIN

## 2024-03-30 ASSESSMENT — PAIN DESCRIPTION - ONSET
ONSET: ON-GOING

## 2024-03-30 ASSESSMENT — PAIN - FUNCTIONAL ASSESSMENT
PAIN_FUNCTIONAL_ASSESSMENT: PREVENTS OR INTERFERES SOME ACTIVE ACTIVITIES AND ADLS
PAIN_FUNCTIONAL_ASSESSMENT: ACTIVITIES ARE NOT PREVENTED
PAIN_FUNCTIONAL_ASSESSMENT: PREVENTS OR INTERFERES SOME ACTIVE ACTIVITIES AND ADLS
PAIN_FUNCTIONAL_ASSESSMENT: ACTIVITIES ARE NOT PREVENTED
PAIN_FUNCTIONAL_ASSESSMENT: PREVENTS OR INTERFERES SOME ACTIVE ACTIVITIES AND ADLS

## 2024-03-30 ASSESSMENT — PAIN DESCRIPTION - DESCRIPTORS
DESCRIPTORS: ACHING;SORE
DESCRIPTORS: ACHING;SHARP;OTHER (COMMENT)
DESCRIPTORS: ACHING
DESCRIPTORS: ACHING;SHARP;THROBBING
DESCRIPTORS: ACHING;SHARP
DESCRIPTORS: ACHING;SHARP
DESCRIPTORS: SORE;ACHING

## 2024-03-30 ASSESSMENT — PAIN SCALES - GENERAL
PAINLEVEL_OUTOF10: 9
PAINLEVEL_OUTOF10: 5
PAINLEVEL_OUTOF10: 10
PAINLEVEL_OUTOF10: 9
PAINLEVEL_OUTOF10: 5
PAINLEVEL_OUTOF10: 10
PAINLEVEL_OUTOF10: 10

## 2024-03-30 ASSESSMENT — PAIN DESCRIPTION - ORIENTATION
ORIENTATION: RIGHT;LEFT
ORIENTATION: RIGHT;LEFT
ORIENTATION: LEFT;RIGHT
ORIENTATION: RIGHT;LEFT
ORIENTATION: RIGHT;LEFT
ORIENTATION: LEFT;RIGHT
ORIENTATION: RIGHT;LEFT

## 2024-03-30 ASSESSMENT — PAIN DESCRIPTION - FREQUENCY
FREQUENCY: CONTINUOUS

## 2024-03-30 ASSESSMENT — PAIN DESCRIPTION - LOCATION
LOCATION: ARM;SHOULDER
LOCATION: GENERALIZED;ARM
LOCATION: ARM;SHOULDER
LOCATION: GENERALIZED;ARM
LOCATION: GENERALIZED;ARM
LOCATION: ARM;GENERALIZED

## 2024-03-30 NOTE — CARE COORDINATION
Social work called Lashon Hernández from [facility] to see if they need migdalia to continue services to pt who is discharged today. Will fax to her if needed. Left message to her to return the call [phone]. Doreen sage

## 2024-03-30 NOTE — CARE COORDINATION
DC Planning        Spoke with PT-PT recs snf however pt declines. Prefers home with established HHC agency pt does not have the agency card to identify the right name. Will need CHAVEZ signed for hh-may have to fax AVS/CHAVEZ after agency is confirmed with pt.

## 2024-03-30 NOTE — PROGRESS NOTES
[facility]  Office: [phone]  Rio Blair DO, Javier Walters DO, Romulo Boone DO, Miquel Casey DO, Nataly Priest MD, Daly Mendoza MD, Darius Mcmahon MD, Mary Swain MD,  Ad Goode MD, Vale Peña MD, Rancho Harley MD,  Julia Goddard DO, Bruno Frank MD, Nabeel Spicer MD, Danny Blair DO, Lulú Hernandez MD,  Piyush Lepe DO, Anabela Cummings MD, Valentine Brian MD, Caren Slaughter MD, Reddy Cagle MD,  Ibrahima Fortune MD, Idalia Girard MD, Zunilda Whiting MD, Konstantin Mane MD, Lucho Haq MD, Oswadlo Crawford MD, Jc Acuña DO, De Cochran DO, Sole Mcgovern MD,  Vinay Meza MD, Shirley Waterhouse, CNP,  Paulette Cook CNP, Ruslan Ward, CNP,  Doreen Corcoran, DNP, Twyla Pacheco, CNP, Ashley Mesa, CNP, Karma Sage, CNP, Tanvi Jim, CNP, Lizzie Esqueda, PA-C, Tova Romano PA-C, Deb Crespo, CNP, Ronda Mchugh, CNP, Marco A Mar, CNP, Danielle Gonzalez, CNP, Linn Azul, CNP, Shwetha León, CNS, Estrella Bledsoe, CNP, Sera Kruger CNP, Tracy Schwab, CNP         [facility]   IN-PATIENT SERVICE   [facility]    Progress Note    3/30/2024    12:09 PM    Name:   Danny Guevara  MRN:     [id number]     Acct:      [id number]   Room:   1011/1011-02   Day:  1  Admit Date:  3/29/2024 11:52 AM    PCP:   Roberto Donaldson MD  Code Status:  DNR-CCA    Subjective:     C/C:   Chief Complaint   Patient presents with    Altered Mental Status     Patient was recently admitted to [facility] for cellulitis, signed out AMA. Patient also has lung cancer with mets to bone, is DNR-CC, POA is brother, Ehsan.     Shoulder Pain     Bilateral, due to bone mets.     Shortness of Breath     Interval History Status: not changed.     Patient with continued pain, primarily shoulders and across the chest and back.  Denies nausea or vomiting, fevers or chills, abdominal pain.  Intermittent shortness of breath.    Brief History:     This is a  Chronic left-sided low back pain with left-sided sciatica, COPD (chronic obstructive pulmonary disease) (HCC), Hypertension, Insomnia, Intentional drug overdose (HCC), Left arm numbness, Left lumbar radiculitis, Macrocytosis, Major depressive disorder, recurrent severe without psychotic features (HCC), Myeloproliferative disorder (HCC), Osteoarthritis of spine with radiculopathy, lumbar region, Pneumonia, Severe recurrent major depression without psychotic features (HCC), Spondylosis of lumbar region without myelopathy or radiculopathy, Urinary retention, Wears dentures, and Wellness examination.    Social History:   reports that he has been smoking cigarettes. He started smoking about 45 years ago. He has a 69.3 pack-year smoking history. He has never used smokeless tobacco. He reports current alcohol use of about 14.0 standard drinks of alcohol per week. He reports that he does not currently use drugs after having used the following drugs: Marijuana (Weed).     Family History:   Family History   Problem Relation Age of Onset    Breast Cancer Mother         s/p surgical complications    Diabetes Mother     Heart Failure Father     Other Brother         \"bad back\"       Vitals:  /86   Pulse 91   Temp 98.6 °F (37 °C) (Oral)   Resp 16   Ht 1.854 m (6' 1\")   Wt 92.1 kg (203 lb)   SpO2 92%   BMI 26.78 kg/m²   Temp (24hrs), Av.2 °F (36.8 °C), Min:97.7 °F (36.5 °C), Max:98.6 °F (37 °C)    No results for input(s): \"POCGLU\" in the last 72 hours.    I/O (24Hr):    Intake/Output Summary (Last 24 hours) at 3/30/2024 1209  Last data filed at 3/30/2024 0919  Gross per 24 hour   Intake 999.1 ml   Output 2500 ml   Net -1500.9 ml       Labs:  Hematology:  Recent Labs     24  1343 24  0449   WBC 27.6* 25.5*   RBC 3.96* 4.16*   HGB 11.8* 11.9*   HCT 36.0* 36.7*   MCV 90.9 88.2   MCH 29.8 28.6   MCHC 32.8 32.4   RDW 15.9* 15.6*   * 955*   MPV 9.5 9.5     Chemistry:  Recent Labs     24  1342

## 2024-03-30 NOTE — PROGRESS NOTES
Patient eager to go home today, he worked with therapy to walk around the room with minimal assistance. His vitals are stable on room air  He was 94% resting on room air   And 91% with exercise on room air   He was discharged home with black and white cab, all belongings packed up, all questions answered

## 2024-03-30 NOTE — CARE COORDINATION
ANTONINO Sanchez    Spoke with pt at bedside, pt had meeting with [facility]osp[facility] he declined services. He has not been out of bed yet, he is from [facility] alone. Relays he has a palliative nurse and has a nurse and aide 1x week. Discussed possible rehab given readmission, dx, and situation. However, pt declines.     Called [facility] spoke with on call they do not have in system will call back-appears a LSW did meet w pt at home per [facility] chart notes.     Caitlin called from [facility] they did meet with pt at home. Caitlin did visit w pt-but he was still deciding if he wanted to sign on for palliative or hospice. This is not his hhc agency.     Called [facility] but not sure if correct agency for hhc. PT to work w pt.       Pt will also need home 02 eval.

## 2024-03-30 NOTE — PROGRESS NOTES
[x] There are one or more home medications that need clarification before Medication Reconciliation can be completed. The Med List Status has been marked as In Progress.     To assist with Home Medication Reconciliation the following actions have been taken:    [x] Pharmacy medication reconciliation service requested. (Note: This can be done by sending a Perfect Serve message to [facility] Rec Pharmacist or by phoning [phone].)  [] Family requested to bring medications into the hospital  [] Family requested to call hospital with medication list  [] Message left with physician office  [] Request for medical records made to   [] Other

## 2024-03-30 NOTE — PROGRESS NOTES
Physical Therapy  Facility/Department: 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 Diagnoses of ETOH abuse, Adult failure to thrive, and Chronic pain due to neoplasm were also pertinent to this visit.    HPI copied from chart:Danny Guevara is a 63 y.o. Non- / non  male who presents with Altered Mental Status (Patient was recently admitted to [facility] for cellulitis, signed out AMA. Patient also has lung cancer with mets to bone, is DNR-CC, POA is brother, Ehsan. ), Shoulder Pain (Bilateral, due to bone mets. ), and Shortness of Breath   and is admitted to the hospital for the management of Wernicke encephalopathy.     Patient is a heavy alcoholic with longstanding history of withdrawal and relapse.  Patient has been worked up in the outpatient setting for shortness of breath and back and shoulder pain.  Patient is found to have squamous cell carcinoma and PET staging reveals this is stage IV with metastatic process to the bone.  Patient has been seen and treated multiple times for intractable pain however he frequently leaves the hospital once his pain is controlled so that he can \"go home and get something to drink\".  Today patient is brought into the hospital by  who has been working to determine appropriate care for him in the outpatient setting.  They advised patient's last drink was this morning.  He also advised he is in debilitating pain despite taking his 15 mg of oxycodone 4 times daily.     At the time my exam patient is confused and slurring his words.  He seems to have trouble with both short-term and medium term

## 2024-03-30 NOTE — PROGRESS NOTES
Discharge planner unable to get a hold patients current home care agency. Discharge planner gone for the day but pt ended up finding one of his caregiver name/phone #  Lashon [phone]

## 2024-03-30 NOTE — FLOWSHEET NOTE
03/29/24 1946   Treatment Team Notification   Reason for Communication Review case  (Patient admitted.  Betito Ward admission note states patient is a DNR-CC and he has POA.  Code status needs to be changed from Full to DNR-CC in EPIC.  Thank you.)   Name of Team Member Notified [place]   Treatment Team Role Advanced Practice Nurse   Method of Communication Secure Message   Response Other (Comment)  (Writer spoke with POA and he stated that patient made decision on being DNR-CCA on 3/24.  Code status changed to DNR-CCA.)   Notification Time 1946

## 2024-03-30 NOTE — PLAN OF CARE
Pt free of acute events throughout shift.   Pt admitted from the ER, vital signs stable, telemetry in place.   Pt received IV Thiamine.   Pt received IV Dilaudid for pain management.   Safety measures in place, call light within reach, all questions answered.   Problem: Safety - Adult  Goal: Free from fall injury  3/29/2024 2010 by Samir Chacon RN  Outcome: Progressing     Problem: Discharge Planning  Goal: Discharge to home or other facility with appropriate resources  3/29/2024 2010 by Samir Chacon RN  Outcome: Progressing     Problem: Pain  Goal: Verbalizes/displays adequate comfort level or baseline comfort level  3/29/2024 2010 by Samir Chacon RN  Outcome: Progressing     Problem: ABCDS Injury Assessment  Goal: Absence of physical injury  3/29/2024 2010 by Samir Chacon RN  Outcome: Progressing     Problem: Skin/Tissue Integrity  Goal: Absence of new skin breakdown  Description: 1.  Monitor for areas of redness and/or skin breakdown  2.  Assess vascular access sites hourly  3.  Every 4-6 hours minimum:  Change oxygen saturation probe site  4.  Every 4-6 hours:  If on nasal continuous positive airway pressure, respiratory therapy assess nares and determine need for appliance change or resting period.  3/29/2024 2010 by Samir Chacon, RN  Outcome: Progressing     Problem: Risk for Elopement  Goal: Patient will not exit the unit/facility without proper excort  3/29/2024 2010 by Samir Chacon, RN  Outcome: Progressing

## 2024-03-30 NOTE — DISCHARGE SUMMARY
[facility]  Office: [phone]  Rio Blair DO, Javier Walters DO, Romulo Boone DO, Miquel Casey DO, Nataly Priest MD, Daly Mendoza MD, Darius Mcmahon MD, Mary Swain MD,  Ad Goode MD, Vale Peña MD, Rancho Harley MD,  Julia Goddard DO, Bruno Frank MD, Nabeel Spicer MD, Danny Blair DO, Lulú Hernandez MD,  Piyush Lepe DO, Anabela Cummings MD, Valentine Brian MD, Caren Slaughter MD, Reddy Cagle MD,  Ibrahima Fortune MD, Idalia Girard MD, Zunilda Whiting MD, Konstantin Mane MD, Lucho Haq MD, Oswaldo Crawford MD, Jc Acuña DO, De Cochran DO, Sole Mcgovern MD,  Vinay Meza MD, Shirley Waterhouse, CNP,  Paulette Cook CNP, Ruslan Ward, CNP,  Doreen Corcoran, DNP, Twyla Pacheco, CNP, Ashley Mesa, CNP, Karma Sage CNP, Tanvi Jim, CNP, Lizzie Esqueda, PA-C, Tova Romano PA-C, Deb Crespo, CNP, Ronda Mchugh, CNP, Marco A Mar, CNP, Danielle Gonzalez, CNP, Linn Azul, CNP, Shwetha León, CNS, Estrella Bledsoe, CNP, Sera Kruger CNP, Tracy Schwab, CNP         [facility]   IN-PATIENT SERVICE   [facility]    Discharge Summary     Patient ID: Danny Guevara  :  1960   MRN: [id number]     ACCOUNT:  [id number]   Patient's PCP: Roberto Donaldson MD  Admit Date: 3/29/2024   Discharge Date: 3/30/2024     Length of Stay: 1  Code Status:  DNR-CCA  Admitting Physician: Romulo Boone DO  Discharge Physician: Romulo Boone DO     Active Discharge Diagnoses:     Hospital Problem Lists:  Principal Problem:    Wernicke encephalopathy  Active Problems:    Myeloproliferative disease (HCC)    ETOH abuse    Substance abuse (HCC)    Hypertension    Severe protein-calorie malnutrition (HCC)    Acute pain of left shoulder    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, left (HCC)    Hypercalcemia of malignancy    Stroke-like symptoms    Cancer related pain    Metastasis to bone (HCC)  Resolved Problems:    * No  mouth 3 times daily as needed for Pain     levothyroxine 25 MCG tablet  Commonly known as: SYNTHROID     loratadine 10 MG tablet  Commonly known as: CLARITIN     losartan 50 MG tablet  Commonly known as: COZAAR     multivitamin Tabs tablet  Take 1 tablet by mouth daily     omeprazole 40 MG delayed release capsule  Commonly known as: PRILOSEC     ondansetron 4 MG disintegrating tablet  Commonly known as: ZOFRAN-ODT     oxyCODONE HCl 10 MG immediate release tablet  Commonly known as: OXY-IR     prazosin 1 MG capsule  Commonly known as: MINIPRESS     * QUEtiapine 100 MG tablet  Commonly known as: SEROQUEL     * QUEtiapine 300 MG tablet  Commonly known as: SEROQUEL  Take 1 tablet by mouth nightly     rosuvastatin 5 MG tablet  Commonly known as: CRESTOR     sodium chloride 1 g tablet  Take 2 tablets by mouth 2 times daily (with meals)           * This list has 2 medication(s) that are the same as other medications prescribed for you. Read the directions carefully, and ask your doctor or other care provider to review them with you.                   Where to Get Your Medications        These medications were sent to [address] [phone] - F [phone]  [address] [place] [id number]      Phone: [phone]   fentaNYL 50 MCG/HR  vitamin B-1 100 MG tablet         No discharge procedures on file.    Time Spent on discharge is 27 minutes in patient examination, evaluation, counseling as well as medication reconciliation, prescriptions for required medications, discharge plan and follow up.    Electronically signed by   Romulo Boone DO  3/30/2024  3:15 PM      Thank you Roberto Abraham MD for the opportunity to be involved in this patient's care.

## 2024-03-30 NOTE — DISCHARGE INSTR - COC
Continuity of Care Form    Patient Name: Danny Guevara   :  1960  MRN:  [id number]    Admit date:  3/29/2024  Discharge date:  3/30/24    Code Status Order: DNR-CCA   Advance Directives:     Admitting Physician:  Romulo Boone DO  PCP: Roberto Donaldson MD    Discharging Nurse: ebony [facility] Unit/Room#: 1011/1011-02  Discharging Unit Phone Number: [phone]    Emergency Contact:   Extended Emergency Contact Information  Primary Emergency Contact: IsmaelDaquan  Address: N/A  Home Phone: [phone]  Work Phone: [phone]  Mobile Phone: [phone]  Relation: Brother/Sister  Preferred language: English   needed? No  Secondary Emergency Contact: TylerMarni  Home Phone: [phone]  Work Phone: [phone]  Mobile Phone: [phone]  Relation: Child    Past Surgical History:  Past Surgical History:   Procedure Laterality Date    APPENDECTOMY      BRONCHOSCOPY  2024    BRONCHOSCOPY, LEFT LINGULA NODULE-TRANSBRONCHIAL BIOPSY, FINE NEEDLE ASPIRATION, MINI BRONCHOAVEOLAR LAVAGE, EBUS-TRANSBRONCHIAL NEEDLE ASPIRATION, RADIAL EBUS    BRONCHOSCOPY  2024    BRONCHOSCOPY ENDOBRONCHIAL ULTRASOUND performed by Herminio Brian MD at [facility] OR    BRONCHOSCOPY  2024    BRONCHOSCOPY BIOPSY BRONCHUS ROBOTIC performed by Herminio Brian MD at [facility] OR    BRONCHOSCOPY  2024    BRONCHOSCOPY ALVEOLAR LAVAGE ROBOTIC performed by Herminio Brian MD at [facility] OR    CARPAL TUNNEL RELEASE      bilateral     CERVICAL FUSION      s/p trampoline accident    CERVICAL FUSION N/A 2019    ANTERIOR CERVICAL DECOMPRESSION FUSION C3-4 performed by Tae Gates DO at [facility] OR    ENDOSCOPY, COLON, DIAGNOSTIC      EPIDURAL STEROID INJECTION Left 05/15/2019    EPIDURAL STEROID INJECTION LEFT L5S1 performed by Jayson Huizar MD at [facility] OR    TONSILLECTOMY         Immunization History:   Immunization History   Administered Date(s) Administered    COVID-19, MODERNA BLUE border, Primary     Alcohol withdrawal syndrome with complication, with unspecified complication ([facility]) F10.939    Hyponatremia E87.1    Chronic obstructive pulmonary disease with acute exacerbation (HCC) J44.1    Lung nodule seen on imaging study R91.1    Thrombocytosis D75.839    Bronchitis J40    Cellulitis of lower extremity L03.119    Sepsis without acute organ dysfunction (HCC) A41.9    Acute pain of left shoulder M25.512    Tobacco use Z72.0    Malignant neoplasm of lung ([facility]) C34.90    Goals of care, counseling/discussion Z71.89    Drug-induced constipation K59.03    Palliative care encounter Z51.5    ACP (advance care planning) Z71.89    DNR (do not resuscitate) discussion Z71.89    Pathological fracture of rib M84.48XA    Cellulitis and abscess of left lower extremity L03.116, L02.416    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, left ([facility]) C34.92    Hypercalcemia of malignancy E83.52    Difficulty with speech R47.9    Stroke-like symptoms R29.90    Speech disturbance R47.9    Dysphonia R49.0    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    Wernicke encephalopathy E51.2    Cancer related pain G89.3    Metastasis to bone ([facility]) C79.51       Isolation/Infection:   Isolation            No Isolation          Patient Infection Status       Infection Onset Added Last Indicated Last Indicated By Review Planned Expiration Resolved Resolved By    None active    Resolved    Mycoplasma pneumonia 22 Mycoplasma Ab,IgM   22 Infection                        Nurse Assessment:  Last Vital Signs: BP (!) 146/93   Pulse 85   Temp 98.1 °F (36.7 °C) (Oral)   Resp 18   Ht 1.854 m (6' 1\")   Wt 92.1 kg (203 lb)   SpO2 93%   BMI 26.78 kg/m²     Last documented pain score (0-10 scale): Pain Level: 5  Last Weight:   Wt Readings from Last 1 Encounters:   24 92.1 kg (203 lb)     Mental Status:  oriented and alert    IV Access:  - None    Nursing Mobility/ADLs:  Walking   Assisted  Transfer  Assisted  Bathing   transferring to Rehab): Fair    Recommended Labs or Other Treatments After Discharge: PT and OT evaluation    Physician Certification: I certify the above information and transfer of Danny Guevara  is necessary for the continuing treatment of the diagnosis listed and that he requires Home Care for greater 30 days.     Update Admission H&P: No change in H&P    PHYSICIAN SIGNATURE:  Electronically signed by Romulo Boone DO on 3/30/24 at 3:26 PM EDT

## 2024-03-30 NOTE — PLAN OF CARE
VSS.  Alert x 4.  DNR-CCA.  POA is brother Ehsan \"Bill\".  External catheter in place.  Fentanyl patch on rt shoulder.  Patient has Izabel Q4 and Dilaudid Q4 PRN alternating for pain level of 10 in bilat arms and generalized pain from mets cancer.  Admitted for Wernick encephalopathy and AMS.  Cellulitis to rt lower leg.  Mepilex on coccyx for redness.  Foam dressings on bilat heels and outside of left foot.  IV Thiamine.  NS 75 ml/hr.  Contact isolation for bed bugs, however none have been seen this shift or on day shift.  Plan is for meeting with [facility] at 0930 today.  Safety maintained.  Call device and bedside table within reach.  WCM.        Problem: Safety - Adult  Goal: Free from fall injury  3/29/2024 2325 by Yue Monique RN  Outcome: Progressing     Problem: Discharge Planning  Goal: Discharge to home or other facility with appropriate resources  3/29/2024 2325 by Yue Monique RN  Outcome: Progressing     Problem: Pain  Goal: Verbalizes/displays adequate comfort level or baseline comfort level  3/29/2024 2325 by Yue Monique, RN  Outcome: Progressing     Problem: ABCDS Injury Assessment  Goal: Absence of physical injury  3/29/2024 2325 by Yue Monique RN  Outcome: Progressing     Problem: Skin/Tissue Integrity  Goal: Absence of new skin breakdown  Description: 1.  Monitor for areas of redness and/or skin breakdown  2.  Assess vascular access sites hourly  3.  Every 4-6 hours minimum:  Change oxygen saturation probe site  4.  Every 4-6 hours:  If on nasal continuous positive airway pressure, respiratory therapy assess nares and determine need for appliance change or resting period.  3/29/2024 2325 by Yue Monique, RN  Outcome: Progressing     Problem: Risk for Elopement  Goal: Patient will not exit the unit/facility without proper excort  3/29/2024 2325 by Yue Monique, RN  Outcome: Progressing

## 2024-03-30 NOTE — PROGRESS NOTES
Patient had Hospice meeting this morning to go over his options. He decided not to enroll in hospice care at this time and just wants to go home.  He is currently on 2L NC which is new for him this morning

## 2024-03-30 NOTE — PROGRESS NOTES
Transitions of Care Pharmacy Service   Medication Review    The patient's list of current home medications has been reviewed.     Source(s) of information: Patient, akiko ([facility] pharmacy)    Based on information provided by the above source(s), I have updated the patient's home med list.     Other Notes Patient reports he was taking aspirin 81mg daily but upon further review, it was marked to stop taking at discharge 2/1/8/24 by Dr. Crawford  He may be overdue filling sodium chloride tabs       Please feel free to call me with any questions about this encounter. Thank you.    Linda Mccloud [facility]   Transitions of Care Pharmacy Service  Phone:  [phone]  Fax: [phone]      Electronically signed by Linda Mccloud [facility] on 3/30/2024 at 2:33 PM       Medications Prior to Admission: ALPRAZolam (XANAX) 0.5 MG tablet, Take 1 tablet by mouth 3 times daily as needed for Anxiety. Max Daily Amount: 1.5 mg  ondansetron (ZOFRAN-ODT) 4 MG disintegrating tablet, Take 1 tablet by mouth every 8 hours as needed for Nausea or Vomiting  oxyCODONE HCl (OXY-IR) 10 MG immediate release tablet, Take 1.5 tablets by mouth every 8 hours as needed for Pain. Max Daily Amount: 45 mg  ferrous sulfate (FE TABS 325) 325 (65 Fe) MG EC tablet, Take 1 tablet by mouth daily (with breakfast)  QUEtiapine (SEROQUEL) 100 MG tablet, Take 1 tablet by mouth as needed (anxiety) Indications: in addition to 300mg daily dose  ibuprofen (ADVIL;MOTRIN) 800 MG tablet, Take 1 tablet by mouth 3 times daily as needed for Pain  hydroxyurea (HYDREA) 500 MG chemo capsule, TAKE 1 CAPSULE BY MOUTH DAILY  prazosin (MINIPRESS) 1 MG capsule, Take 1 capsule by mouth in the morning and at bedtime  omeprazole (PRILOSEC) 40 MG delayed release capsule, Take 1 capsule by mouth daily  sodium chloride 1 g tablet, Take 2 tablets by mouth 2 times daily (with meals)  Multiple Vitamin (MULTIVITAMIN) TABS tablet, Take 1 tablet by mouth daily  levothyroxine (SYNTHROID) 25 MCG

## 2024-04-01 ENCOUNTER — TELEPHONE (OUTPATIENT)
Dept: ONCOLOGY | Age: 64
End: 2024-04-01

## 2024-04-01 NOTE — TELEPHONE ENCOUNTER
Caitlin with [facility] stopped in. She states after patient was discharged from [facility] over the weekend he signed on with them for Hospice. She states that patient told them he would be contacting his navigator to update her.

## 2024-04-02 ENCOUNTER — SOCIAL WORK (OUTPATIENT)
Dept: ONCOLOGY | Age: 64
End: 2024-04-02

## 2024-04-02 ENCOUNTER — TELEPHONE (OUTPATIENT)
Dept: ONCOLOGY | Age: 64
End: 2024-04-02

## 2024-04-02 NOTE — TELEPHONE ENCOUNTER
Name: Danny Guevara  : 1960  MRN: [id number]    Oncology Navigation Follow-Up Note    Contact Type:  Telephone    Notes:   Navigator received call from Reva - and Reva informed pt. Did sign on with [facility] per Caitlin faria. Director of [facility]. Writer spoke with pts. Care team and informed her that there is infestation of bedbugs in the complex.[facility] has only met with pt. In the lobby of the complex, but appreciates call. [facility] May need to look at inpt. Option for pt.?   Patricia SW is there anything from [place] standpoint that we need to do about conditions?      Electronically signed by Lucille Holguin RN on 2024 at 1:06 PM

## 2024-04-02 NOTE — PROGRESS NOTES
Received routed note from [facility] nurse navigator about pt having bed bugs in his complex. Informed her that pt needs to speak with his apartment complex and they will then contact an .

## 2024-04-03 ENCOUNTER — APPOINTMENT (OUTPATIENT)
Dept: GENERAL RADIOLOGY | Age: 64
DRG: 871 | End: 2024-04-03
Payer: MEDICARE

## 2024-04-03 ENCOUNTER — TELEPHONE (OUTPATIENT)
Dept: ONCOLOGY | Age: 64
End: 2024-04-03

## 2024-04-03 ENCOUNTER — HOSPITAL ENCOUNTER (INPATIENT)
Age: 64
LOS: 4 days | DRG: 871 | End: 2024-04-07
Attending: EMERGENCY MEDICINE | Admitting: STUDENT IN AN ORGANIZED HEALTH CARE EDUCATION/TRAINING PROGRAM
Payer: MEDICARE

## 2024-04-03 DIAGNOSIS — E83.52 HYPERCALCEMIA: ICD-10-CM

## 2024-04-03 DIAGNOSIS — R53.1 GENERAL WEAKNESS: Primary | ICD-10-CM

## 2024-04-03 PROBLEM — E78.5 HYPERLIPEMIA: Status: ACTIVE | Noted: 2024-04-03

## 2024-04-03 LAB
25(OH)D3 SERPL-MCNC: 14.5 NG/ML (ref 30–100)
AMMONIA PLAS-SCNC: 26 UMOL/L (ref 16–60)
ANION GAP SERPL CALCULATED.3IONS-SCNC: 12 MMOL/L (ref 9–16)
ANION GAP SERPL CALCULATED.3IONS-SCNC: 14 MMOL/L (ref 9–16)
BASOPHILS # BLD: 0.28 K/UL (ref 0–0.2)
BASOPHILS NFR BLD: 1 % (ref 0–2)
BODY TEMPERATURE: 37
BUN SERPL-MCNC: 13 MG/DL (ref 8–23)
BUN SERPL-MCNC: 14 MG/DL (ref 8–23)
CA-I BLD-SCNC: 1.73 MMOL/L (ref 1.13–1.33)
CA-I BLD-SCNC: 1.8 MMOL/L (ref 1.13–1.33)
CA-I BLD-SCNC: 1.9 MMOL/L (ref 1.13–1.33)
CALCIUM SERPL-MCNC: 13.6 MG/DL (ref 8.6–10.4)
CALCIUM SERPL-MCNC: 14.7 MG/DL (ref 8.6–10.4)
CHLORIDE SERPL-SCNC: 102 MMOL/L (ref 98–107)
CHLORIDE SERPL-SCNC: 98 MMOL/L (ref 98–107)
CO2 SERPL-SCNC: 21 MMOL/L (ref 20–31)
CO2 SERPL-SCNC: 22 MMOL/L (ref 20–31)
COHGB MFR BLD: 1.4 % (ref 0–5)
COHGB MFR BLD: 2.5 % (ref 0–5)
CREAT SERPL-MCNC: 1.1 MG/DL (ref 0.7–1.2)
CREAT SERPL-MCNC: 1.2 MG/DL (ref 0.7–1.2)
EKG ATRIAL RATE: 89 BPM
EKG P-R INTERVAL: 162 MS
EKG Q-T INTERVAL: 382 MS
EKG QRS DURATION: 82 MS
EKG QTC CALCULATION (BAZETT): 464 MS
EKG R AXIS: 131 DEGREES
EKG T AXIS: 136 DEGREES
EKG VENTRICULAR RATE: 89 BPM
EOSINOPHIL # BLD: 0 K/UL (ref 0–0.4)
EOSINOPHILS RELATIVE PERCENT: 0 % (ref 1–4)
ERYTHROCYTE [DISTWIDTH] IN BLOOD BY AUTOMATED COUNT: 15.2 % (ref 11.8–14.4)
FIO2 ON VENT: ABNORMAL %
GFR SERPL CREATININE-BSD FRML MDRD: 66 ML/MIN/1.73M2
GFR SERPL CREATININE-BSD FRML MDRD: 73 ML/MIN/1.73M2
GLUCOSE BLD-MCNC: 106 MG/DL (ref 75–110)
GLUCOSE BLD-MCNC: 86 MG/DL (ref 75–110)
GLUCOSE BLD-MCNC: 91 MG/DL (ref 75–110)
GLUCOSE BLD-MCNC: 95 MG/DL (ref 75–110)
GLUCOSE SERPL-MCNC: 102 MG/DL (ref 74–99)
GLUCOSE SERPL-MCNC: 106 MG/DL (ref 74–99)
HCO3 VENOUS: 24 MMOL/L (ref 24–30)
HCT VFR BLD AUTO: 38.8 % (ref 40.7–50.3)
HGB BLD-MCNC: 13.1 G/DL (ref 13–17)
IMM GRANULOCYTES # BLD AUTO: 1.1 K/UL (ref 0–0.3)
IMM GRANULOCYTES NFR BLD: 4 %
LYMPHOCYTES NFR BLD: 1.1 K/UL (ref 1–4.8)
LYMPHOCYTES RELATIVE PERCENT: 4 % (ref 24–44)
MAGNESIUM SERPL-MCNC: 1.7 MG/DL (ref 1.6–2.4)
MCH RBC QN AUTO: 29.2 PG (ref 25.2–33.5)
MCHC RBC AUTO-ENTMCNC: 33.8 G/DL (ref 28.4–34.8)
MCV RBC AUTO: 86.6 FL (ref 82.6–102.9)
MONOCYTES NFR BLD: 10 % (ref 1–7)
MONOCYTES NFR BLD: 2.76 K/UL (ref 0.1–0.8)
MORPHOLOGY: ABNORMAL
NEUTROPHILS NFR BLD: 81 % (ref 36–66)
NEUTS SEG NFR BLD: 22.36 K/UL (ref 1.8–7.7)
NRBC BLD-RTO: 0 PER 100 WBC
O2 SAT, VEN: 94.5 % (ref 60–85)
PCO2, VEN: 35.2 MM HG (ref 39–55)
PH VENOUS: 7.45 (ref 7.32–7.42)
PLATELET # BLD AUTO: 774 K/UL (ref 138–453)
PMV BLD AUTO: 9.5 FL (ref 8.1–13.5)
PO2, VEN: 70.8 MM HG (ref 30–50)
POSITIVE BASE EXCESS, VEN: 0.8 MMOL/L (ref 0–2)
POTASSIUM SERPL-SCNC: 3.4 MMOL/L (ref 3.7–5.3)
POTASSIUM SERPL-SCNC: 3.5 MMOL/L (ref 3.7–5.3)
POTASSIUM SERPL-SCNC: 3.8 MMOL/L (ref 3.7–5.3)
PTH-INTACT SERPL-MCNC: 8 PG/ML (ref 15–65)
RBC # BLD AUTO: 4.48 M/UL (ref 4.21–5.77)
SODIUM SERPL-SCNC: 134 MMOL/L (ref 136–145)
SODIUM SERPL-SCNC: 135 MMOL/L (ref 136–145)
TROPONIN I SERPL HS-MCNC: 35 NG/L (ref 0–22)
TROPONIN I SERPL HS-MCNC: 36 NG/L (ref 0–22)
TROPONIN I SERPL HS-MCNC: 36 NG/L (ref 0–22)
TROPONIN I SERPL HS-MCNC: 39 NG/L (ref 0–22)
TROPONIN I SERPL HS-MCNC: 52 NG/L (ref 0–22)
WBC OTHER # BLD: 27.6 K/UL (ref 3.5–11.3)

## 2024-04-03 PROCEDURE — 83735 ASSAY OF MAGNESIUM: CPT

## 2024-04-03 PROCEDURE — 92611 MOTION FLUOROSCOPY/SWALLOW: CPT

## 2024-04-03 PROCEDURE — 6370000000 HC RX 637 (ALT 250 FOR IP): Performed by: NURSE PRACTITIONER

## 2024-04-03 PROCEDURE — 99285 EMERGENCY DEPT VISIT HI MDM: CPT

## 2024-04-03 PROCEDURE — 2580000003 HC RX 258: Performed by: NURSE PRACTITIONER

## 2024-04-03 PROCEDURE — 84484 ASSAY OF TROPONIN QUANT: CPT

## 2024-04-03 PROCEDURE — 82570 ASSAY OF URINE CREATININE: CPT

## 2024-04-03 PROCEDURE — 2580000003 HC RX 258: Performed by: STUDENT IN AN ORGANIZED HEALTH CARE EDUCATION/TRAINING PROGRAM

## 2024-04-03 PROCEDURE — 93010 ELECTROCARDIOGRAM REPORT: CPT | Performed by: INTERNAL MEDICINE

## 2024-04-03 PROCEDURE — 36415 COLL VENOUS BLD VENIPUNCTURE: CPT

## 2024-04-03 PROCEDURE — 6360000002 HC RX W HCPCS: Performed by: PHYSICIAN ASSISTANT

## 2024-04-03 PROCEDURE — 1200000000 HC SEMI PRIVATE

## 2024-04-03 PROCEDURE — 82330 ASSAY OF CALCIUM: CPT

## 2024-04-03 PROCEDURE — 82375 ASSAY CARBOXYHB QUANT: CPT

## 2024-04-03 PROCEDURE — 82805 BLOOD GASES W/O2 SATURATION: CPT

## 2024-04-03 PROCEDURE — 6360000002 HC RX W HCPCS: Performed by: STUDENT IN AN ORGANIZED HEALTH CARE EDUCATION/TRAINING PROGRAM

## 2024-04-03 PROCEDURE — 74230 X-RAY XM SWLNG FUNCJ C+: CPT

## 2024-04-03 PROCEDURE — 2580000003 HC RX 258: Performed by: EMERGENCY MEDICINE

## 2024-04-03 PROCEDURE — 82947 ASSAY GLUCOSE BLOOD QUANT: CPT

## 2024-04-03 PROCEDURE — 71045 X-RAY EXAM CHEST 1 VIEW: CPT

## 2024-04-03 PROCEDURE — 80048 BASIC METABOLIC PNL TOTAL CA: CPT

## 2024-04-03 PROCEDURE — 82306 VITAMIN D 25 HYDROXY: CPT

## 2024-04-03 PROCEDURE — 85025 COMPLETE CBC W/AUTO DIFF WBC: CPT

## 2024-04-03 PROCEDURE — 93005 ELECTROCARDIOGRAM TRACING: CPT | Performed by: INTERNAL MEDICINE

## 2024-04-03 PROCEDURE — 82340 ASSAY OF CALCIUM IN URINE: CPT

## 2024-04-03 PROCEDURE — 83970 ASSAY OF PARATHORMONE: CPT

## 2024-04-03 PROCEDURE — 99223 1ST HOSP IP/OBS HIGH 75: CPT | Performed by: PHYSICIAN ASSISTANT

## 2024-04-03 PROCEDURE — 93005 ELECTROCARDIOGRAM TRACING: CPT

## 2024-04-03 PROCEDURE — 84132 ASSAY OF SERUM POTASSIUM: CPT

## 2024-04-03 PROCEDURE — 82140 ASSAY OF AMMONIA: CPT

## 2024-04-03 PROCEDURE — 93005 ELECTROCARDIOGRAM TRACING: CPT | Performed by: NURSE PRACTITIONER

## 2024-04-03 RX ORDER — MAGNESIUM SULFATE 1 G/100ML
1000 INJECTION INTRAVENOUS PRN
Status: DISCONTINUED | OUTPATIENT
Start: 2024-04-03 | End: 2024-04-07

## 2024-04-03 RX ORDER — ACETAMINOPHEN 650 MG/1
650 SUPPOSITORY RECTAL EVERY 6 HOURS PRN
Status: DISCONTINUED | OUTPATIENT
Start: 2024-04-03 | End: 2024-04-08 | Stop reason: HOSPADM

## 2024-04-03 RX ORDER — ACETAMINOPHEN 325 MG/1
650 TABLET ORAL EVERY 6 HOURS PRN
Status: DISCONTINUED | OUTPATIENT
Start: 2024-04-03 | End: 2024-04-08 | Stop reason: HOSPADM

## 2024-04-03 RX ORDER — LEVOTHYROXINE SODIUM 0.05 MG/1
25 TABLET ORAL
Status: DISCONTINUED | OUTPATIENT
Start: 2024-04-03 | End: 2024-04-08 | Stop reason: HOSPADM

## 2024-04-03 RX ORDER — LOSARTAN POTASSIUM 50 MG/1
50 TABLET ORAL DAILY
Status: DISCONTINUED | OUTPATIENT
Start: 2024-04-03 | End: 2024-04-07

## 2024-04-03 RX ORDER — CETIRIZINE HYDROCHLORIDE 10 MG/1
10 TABLET ORAL DAILY
Status: DISCONTINUED | OUTPATIENT
Start: 2024-04-03 | End: 2024-04-08 | Stop reason: HOSPADM

## 2024-04-03 RX ORDER — OXYCODONE HYDROCHLORIDE 5 MG/1
15 TABLET ORAL EVERY 8 HOURS PRN
Status: DISCONTINUED | OUTPATIENT
Start: 2024-04-03 | End: 2024-04-08 | Stop reason: HOSPADM

## 2024-04-03 RX ORDER — MULTIVITAMIN WITH IRON
1 TABLET ORAL DAILY
Status: DISCONTINUED | OUTPATIENT
Start: 2024-04-03 | End: 2024-04-08 | Stop reason: HOSPADM

## 2024-04-03 RX ORDER — IBUPROFEN 400 MG/1
800 TABLET ORAL 3 TIMES DAILY PRN
Status: DISCONTINUED | OUTPATIENT
Start: 2024-04-03 | End: 2024-04-08 | Stop reason: HOSPADM

## 2024-04-03 RX ORDER — LANOLIN ALCOHOL/MO/W.PET/CERES
325 CREAM (GRAM) TOPICAL
Status: DISCONTINUED | OUTPATIENT
Start: 2024-04-03 | End: 2024-04-07

## 2024-04-03 RX ORDER — PRAZOSIN HYDROCHLORIDE 1 MG/1
1 CAPSULE ORAL 2 TIMES DAILY
Status: DISCONTINUED | OUTPATIENT
Start: 2024-04-03 | End: 2024-04-07

## 2024-04-03 RX ORDER — HYDROXYUREA 500 MG/1
500 CAPSULE ORAL DAILY
Status: DISCONTINUED | OUTPATIENT
Start: 2024-04-03 | End: 2024-04-07

## 2024-04-03 RX ORDER — ONDANSETRON 4 MG/1
4 TABLET, ORALLY DISINTEGRATING ORAL EVERY 8 HOURS PRN
Status: DISCONTINUED | OUTPATIENT
Start: 2024-04-03 | End: 2024-04-08 | Stop reason: HOSPADM

## 2024-04-03 RX ORDER — QUETIAPINE FUMARATE 100 MG/1
100 TABLET, FILM COATED ORAL PRN
Status: DISCONTINUED | OUTPATIENT
Start: 2024-04-03 | End: 2024-04-03

## 2024-04-03 RX ORDER — POTASSIUM CHLORIDE 7.45 MG/ML
10 INJECTION INTRAVENOUS PRN
Status: DISCONTINUED | OUTPATIENT
Start: 2024-04-03 | End: 2024-04-07

## 2024-04-03 RX ORDER — SODIUM CHLORIDE 9 MG/ML
INJECTION, SOLUTION INTRAVENOUS PRN
Status: DISCONTINUED | OUTPATIENT
Start: 2024-04-03 | End: 2024-04-08 | Stop reason: HOSPADM

## 2024-04-03 RX ORDER — ALPRAZOLAM 0.5 MG/1
0.5 TABLET ORAL 3 TIMES DAILY PRN
Status: DISCONTINUED | OUTPATIENT
Start: 2024-04-03 | End: 2024-04-08 | Stop reason: HOSPADM

## 2024-04-03 RX ORDER — LANOLIN ALCOHOL/MO/W.PET/CERES
100 CREAM (GRAM) TOPICAL DAILY
Status: DISCONTINUED | OUTPATIENT
Start: 2024-04-03 | End: 2024-04-08 | Stop reason: HOSPADM

## 2024-04-03 RX ORDER — ALBUTEROL SULFATE 90 UG/1
1 AEROSOL, METERED RESPIRATORY (INHALATION) EVERY 4 HOURS PRN
Status: DISCONTINUED | OUTPATIENT
Start: 2024-04-03 | End: 2024-04-08 | Stop reason: HOSPADM

## 2024-04-03 RX ORDER — SODIUM CHLORIDE 9 MG/ML
INJECTION, SOLUTION INTRAVENOUS CONTINUOUS
Status: ACTIVE | OUTPATIENT
Start: 2024-04-03 | End: 2024-04-05

## 2024-04-03 RX ORDER — QUETIAPINE FUMARATE 300 MG/1
300 TABLET, FILM COATED ORAL NIGHTLY
Status: DISCONTINUED | OUTPATIENT
Start: 2024-04-03 | End: 2024-04-08 | Stop reason: HOSPADM

## 2024-04-03 RX ORDER — FUROSEMIDE 20 MG/1
20 TABLET ORAL DAILY
Status: DISCONTINUED | OUTPATIENT
Start: 2024-04-03 | End: 2024-04-08 | Stop reason: HOSPADM

## 2024-04-03 RX ORDER — SODIUM CHLORIDE 1 G/1
2 TABLET ORAL 2 TIMES DAILY WITH MEALS
Status: DISCONTINUED | OUTPATIENT
Start: 2024-04-03 | End: 2024-04-08 | Stop reason: HOSPADM

## 2024-04-03 RX ORDER — FENTANYL 50 UG/1
1 PATCH TRANSDERMAL
Status: DISCONTINUED | OUTPATIENT
Start: 2024-04-03 | End: 2024-04-08 | Stop reason: HOSPADM

## 2024-04-03 RX ORDER — SODIUM CHLORIDE 0.9 % (FLUSH) 0.9 %
10 SYRINGE (ML) INJECTION PRN
Status: DISCONTINUED | OUTPATIENT
Start: 2024-04-03 | End: 2024-04-08 | Stop reason: HOSPADM

## 2024-04-03 RX ORDER — POTASSIUM CHLORIDE 20 MEQ/1
40 TABLET, EXTENDED RELEASE ORAL PRN
Status: DISCONTINUED | OUTPATIENT
Start: 2024-04-03 | End: 2024-04-07

## 2024-04-03 RX ORDER — ROSUVASTATIN CALCIUM 10 MG/1
5 TABLET, COATED ORAL NIGHTLY
Status: DISCONTINUED | OUTPATIENT
Start: 2024-04-03 | End: 2024-04-07

## 2024-04-03 RX ORDER — POLYETHYLENE GLYCOL 3350 17 G/17G
17 POWDER, FOR SOLUTION ORAL DAILY PRN
Status: DISCONTINUED | OUTPATIENT
Start: 2024-04-03 | End: 2024-04-08 | Stop reason: HOSPADM

## 2024-04-03 RX ORDER — 0.9 % SODIUM CHLORIDE 0.9 %
1000 INTRAVENOUS SOLUTION INTRAVENOUS ONCE
Status: COMPLETED | OUTPATIENT
Start: 2024-04-03 | End: 2024-04-03

## 2024-04-03 RX ORDER — MORPHINE SULFATE 4 MG/ML
2 INJECTION INTRAVENOUS EVERY 4 HOURS PRN
Status: DISCONTINUED | OUTPATIENT
Start: 2024-04-03 | End: 2024-04-04

## 2024-04-03 RX ORDER — PANTOPRAZOLE SODIUM 40 MG/1
40 TABLET, DELAYED RELEASE ORAL
Status: DISCONTINUED | OUTPATIENT
Start: 2024-04-03 | End: 2024-04-07

## 2024-04-03 RX ORDER — SODIUM CHLORIDE 0.9 % (FLUSH) 0.9 %
5-40 SYRINGE (ML) INJECTION EVERY 12 HOURS SCHEDULED
Status: DISCONTINUED | OUTPATIENT
Start: 2024-04-03 | End: 2024-04-08 | Stop reason: HOSPADM

## 2024-04-03 RX ORDER — ENOXAPARIN SODIUM 100 MG/ML
40 INJECTION SUBCUTANEOUS DAILY
Status: DISCONTINUED | OUTPATIENT
Start: 2024-04-03 | End: 2024-04-08 | Stop reason: HOSPADM

## 2024-04-03 RX ORDER — CALCITONIN SALMON 200 [USP'U]/ML
4 INJECTION, SOLUTION INTRAMUSCULAR; SUBCUTANEOUS ONCE
Status: COMPLETED | OUTPATIENT
Start: 2024-04-03 | End: 2024-04-04

## 2024-04-03 RX ORDER — LABETALOL HYDROCHLORIDE 5 MG/ML
10 INJECTION, SOLUTION INTRAVENOUS EVERY 4 HOURS PRN
Status: DISCONTINUED | OUTPATIENT
Start: 2024-04-03 | End: 2024-04-03

## 2024-04-03 RX ORDER — LABETALOL HYDROCHLORIDE 5 MG/ML
20 INJECTION, SOLUTION INTRAVENOUS EVERY 4 HOURS PRN
Status: DISCONTINUED | OUTPATIENT
Start: 2024-04-03 | End: 2024-04-07

## 2024-04-03 RX ADMIN — SODIUM CHLORIDE 1000 ML: 9 INJECTION, SOLUTION INTRAVENOUS at 06:15

## 2024-04-03 RX ADMIN — CETIRIZINE HYDROCHLORIDE 10 MG: 10 TABLET ORAL at 21:50

## 2024-04-03 RX ADMIN — SODIUM CHLORIDE: 9 INJECTION, SOLUTION INTRAVENOUS at 19:01

## 2024-04-03 RX ADMIN — SODIUM CHLORIDE: 9 INJECTION, SOLUTION INTRAVENOUS at 10:02

## 2024-04-03 RX ADMIN — QUETIAPINE FUMARATE 300 MG: 300 TABLET ORAL at 21:50

## 2024-04-03 RX ADMIN — ROSUVASTATIN CALCIUM 5 MG: 10 TABLET, COATED ORAL at 23:45

## 2024-04-03 RX ADMIN — SODIUM CHLORIDE 3000 MG: 900 INJECTION INTRAVENOUS at 21:53

## 2024-04-03 RX ADMIN — SODIUM CHLORIDE TAB 1 GM 2 G: 1 TAB at 21:50

## 2024-04-03 RX ADMIN — SODIUM CHLORIDE 3000 MG: 900 INJECTION INTRAVENOUS at 15:57

## 2024-04-03 RX ADMIN — SODIUM CHLORIDE, PRESERVATIVE FREE 10 ML: 5 INJECTION INTRAVENOUS at 10:15

## 2024-04-03 RX ADMIN — ALPRAZOLAM 0.5 MG: 0.5 TABLET ORAL at 21:50

## 2024-04-03 RX ADMIN — PRAZOSIN HYDROCHLORIDE 1 MG: 1 CAPSULE ORAL at 21:50

## 2024-04-03 RX ADMIN — Medication 10 MG: at 13:33

## 2024-04-03 NOTE — ED NOTES
Notified Dr. Lepe that patient is a daily drinker, asked if they wanted CIWA scales. At this time, Dr. Lepe states we may hold off on CIWA'S.

## 2024-04-03 NOTE — PROCEDURES
INSTRUMENTAL SWALLOW REPORT  MODIFIED BARIUM SWALLOW    NAME: Danny Guevara   : 1960  MRN: [id number]       Date of Eval: 4/3/2024              Referring Diagnosis(es):      Past Medical History:  has a past medical history of Alcohol abuse, Anemia, Bronchitis, Cancer (HCC), Cervical stenosis of spine, Chronic left-sided low back pain with left-sided sciatica, COPD (chronic obstructive pulmonary disease) (HCC), Hypertension, Insomnia, Intentional drug overdose (HCC), Left arm numbness, Left lumbar radiculitis, Macrocytosis, Major depressive disorder, recurrent severe without psychotic features (HCC), Myeloproliferative disorder (HCC), Osteoarthritis of spine with radiculopathy, lumbar region, Pneumonia, Severe recurrent major depression without psychotic features (HCC), Spondylosis of lumbar region without myelopathy or radiculopathy, Urinary retention, Wears dentures, and Wellness examination.  Past Surgical History:  has a past surgical history that includes cervical fusion; Appendectomy; Tonsillectomy; Carpal tunnel release; epidural steroid injection (Left, 05/15/2019); Endoscopy, colon, diagnostic; cervical fusion (N/A, 2019); bronchoscopy (2024); bronchoscopy (2024); bronchoscopy (2024); and bronchoscopy (2024).      Type of Study: Repeat MBS      Patient Complaints/Reason for Referral:  Danny Guevara was referred for a MBS to assess the efficiency of his/her swallow function, assess for aspiration, and to make recommendations regarding safe dietary consistencies, effective compensatory strategies, and safe eating environment.       Onset of problem:      Danny Guevara is a 63 y.o. Non- / non  male who presents with Fatigue  and is admitted to the hospital for the management of Hypercalcemia.  Patient has history significant for stage IV metastatic lung carcinoma, COPD, hypertension, and hyperlipidemia.       Patient was recently admitted to [facility]

## 2024-04-03 NOTE — ED NOTES
ED to inpatient nurses report      Chief Complaint:  Chief Complaint   Patient presents with    Fatigue     Present to ED from: home    MOA:     LOC: alert and orientated to name, place, date  Mobility: Requires assistance * 2  Oxygen Baseline: RA    Current needs required: none  Pending ED orders: none  Present condition: stable    Why did the patient come to the ED? Pt presents to the ER via EMS. Pt called 911 for generalized weakness. Pt is DNRCC. Pt has multiple forms of cancer and is hospice pt. Pt last time he was here didn't want placed at nursing home. Pt presents to the ER infested with bed bugs. Pt wheeled to shower, pt washed. All belongings and linens bagged per bed bug policy. Pt states he wishes for his clothes to be thrown away. Pt does not wish for labs to be drawn. Pt otherwise is A&O x4, VSS. Pt placed on bedside cardiac monitoring, call light within reach.    What is the plan? Admission, facility placement? Fluids for hypercalcemia.   Any procedures or intervention occur? X-ray  Any safety concerns?? DNRCC, pureed diet    Mental Status: A&O x4       Psych Assessment:   Psychosocial  Psychosocial (WDL): Within Defined Limits  Vital signs   Vitals:    04/03/24 1625 04/03/24 1640 04/03/24 1655 04/03/24 1745   BP: (!) 170/85 (!) 166/93  (!) 173/93   Pulse: 85 84 83 82   Resp: 21 26 24 24   Temp:       TempSrc:       SpO2: 95% 96% 96% 96%        Vitals:  Patient Vitals for the past 24 hrs:   BP Temp Temp src Pulse Resp SpO2   04/03/24 1745 (!) 173/93 -- -- 82 24 96 %   04/03/24 1655 -- -- -- 83 24 96 %   04/03/24 1640 (!) 166/93 -- -- 84 26 96 %   04/03/24 1625 (!) 170/85 -- -- 85 21 95 %   04/03/24 1610 (!) 168/110 -- -- 82 24 96 %   04/03/24 1555 (!) 169/96 -- -- 83 23 96 %   04/03/24 1349 (!) 172/94 -- -- 75 25 95 %   04/03/24 1344 -- 98.4 °F (36.9 °C) Oral -- -- --   04/03/24 1334 -- -- -- 88 17 95 %   04/03/24 1330 (!) 177/94 -- -- 89 24 96 %   04/03/24 1245 (!) 171/90 -- -- 89 20 96 %   04/03/24  (ROXICODONE) immediate release tablet 15 mg    fentaNYL (DURAGESIC) 50 MCG/HR 1 patch    thiamine tablet 100 mg    pantoprazole (PROTONIX) tablet 40 mg    cetirizine (ZYRTEC) tablet 10 mg    sodium chloride flush 0.9 % injection 5-40 mL    sodium chloride flush 0.9 % injection 10 mL    0.9 % sodium chloride infusion    OR Linked Order Group     potassium chloride (KLOR-CON M) extended release tablet 40 mEq     potassium bicarb-citric acid (EFFER-K) effervescent tablet 40 mEq     potassium chloride 10 mEq/100 mL IVPB (Peripheral Line)    magnesium sulfate 1000 mg in dextrose 5% 100 mL IVPB    enoxaparin (LOVENOX) injection 40 mg     Order Specific Question:   Indication of Use     Answer:   Prophylaxis-DVT/PE    polyethylene glycol (GLYCOLAX) packet 17 g    OR Linked Order Group     acetaminophen (TYLENOL) tablet 650 mg     acetaminophen (TYLENOL) suppository 650 mg    0.9 % sodium chloride infusion    DISCONTD: labetalol (NORMODYNE;TRANDATE) injection 10 mg    ampicillin-sulbactam (UNASYN) 3,000 mg in sodium chloride 0.9 % 100 mL IVPB (mini-bag)     Order Specific Question:   Antimicrobial Indications     Answer:   Aspiration Pneumonia    morphine injection 2 mg    labetalol (NORMODYNE;TRANDATE) injection 20 mg       SURGICAL HISTORY       Past Surgical History:   Procedure Laterality Date    APPENDECTOMY      BRONCHOSCOPY  02/08/2024    BRONCHOSCOPY, LEFT LINGULA NODULE-TRANSBRONCHIAL BIOPSY, FINE NEEDLE ASPIRATION, MINI BRONCHOAVEOLAR LAVAGE, EBUS-TRANSBRONCHIAL NEEDLE ASPIRATION, RADIAL EBUS    BRONCHOSCOPY  2/8/2024    BRONCHOSCOPY ENDOBRONCHIAL ULTRASOUND performed by Herminio Brian MD at [facility] OR    BRONCHOSCOPY  2/8/2024    BRONCHOSCOPY BIOPSY BRONCHUS ROBOTIC performed by Herminio Brian MD at [facility] OR    BRONCHOSCOPY  2/8/2024    BRONCHOSCOPY ALVEOLAR LAVAGE ROBOTIC performed by Herminio Brian MD at [facility] OR    CARPAL TUNNEL RELEASE      bilateral     CERVICAL FUSION      s/p trampoline accident    CERVICAL

## 2024-04-03 NOTE — ED PROVIDER NOTES
[facility]     Emergency Department     Faculty Attestation    I performed a history and physical examination of the patient and discussed management with the resident. I have reviewed and agree with the resident’s findings including all diagnostic interpretations, and treatment plans as written. Any areas of disagreement are noted on the chart. I was personally present for the key portions of any procedures. I have documented in the chart those procedures where I was not present during the key portions. I have reviewed the emergency nurses triage note. I agree with the chief complaint, past medical history, past surgical history, allergies, medications, social and family history as documented unless otherwise noted below. Documentation of the HPI, Physical Exam and Medical Decision Making performed by asteribjameel is based on my personal performance of the HPI, PE and MDM. For Physician Assistant/ Nurse Practitioner cases/documentation I have personally evaluated this patient and have completed at least one if not all key elements of the E/M (history, physical exam, and MDM). Additional findings are as noted.    Note Started: 4:16 AM EDT     62 yo M c/o weakness, no fever, no cp, pt denies fall,   PE vss gcs 15 balbina,  no cervical tenderness, crepitus, or deformity, mild diffuse chest tenderness, abdomen nontender, no distention, no rigidity, no guarding  -Frail elderly appearing male    -I spoke with Tanvi RODRIGUEZ / discussed case,   >> admit for hypercalcemia    EKG Interpretation    Interpreted by me  Normal sinus, heart rate 89, no ischemia, normal axis, QT corrected 467    CRITICAL CARE: There was a high probability of clinically significant/life threatening deterioration in this patient's condition which required my urgent intervention.  Total critical care time was 5 minutes.  This excludes any time for separately reportable procedures.       Omkar Singh
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
## 2024-04-03 NOTE — H&P
[facility]  Office: [phone]  Rio Blair DO, Javier Walters DO, Romulo Boone DO, Miquel Casey DO, Nataly Priets MD, Daly Mendoza MD, Darius Mcmahon MD, Mary Swain MD,  Ad Goode MD, Vale Peña MD, Rancho Harley MD,  Julia Goddard DO, Bruno Frank MD, Nabeel Spicer MD, Danny Blair DO, Lulú Hernandez MD,  Piyush Lepe DO, Anabela Cummings MD, Valentine Brian MD, Caren Slaughter MD, Reddy Cagle MD,  Ibrahima Fortune MD, Idalia Girard MD, Zunilda Whiting MD, Konstantin Mane MD, Lucho Haq MD, Oswaldo Crawford MD, Jc Acuña DO, De Cochran DO, Sole Mcgovern MD,  Vinay Meza MD, Shirley Waterhouse, CNP,  Paulette Cook, CNP, Ruslan Ward, CNP,  Doreen Corcoran, DNP, Twyla Pacheco, CNP, Ashley Mesa, CNP, Linn Azul CNP, Karma Sage, CNP, Tanvi Jim, CNP, Lizzie Esqueda, PA-C, Tova Romano PA-C, Danielle Gonzalez, CNP, Shwetha León, CNS, Estrella Bledsoe, CNP, Sera Kruger, CNP, Tracy Schwab, CNP         [facility]   IN-PATIENT SERVICE   [facility]    HISTORY AND PHYSICAL EXAMINATION            Date:   4/3/2024  Patient name:  Danny Guevara  Date of admission:  4/3/2024  3:34 AM  MRN:   [id number]  Account:  [id number]  YOB: 1960  PCP:    Roberto Donaldson MD  Room:   04/04  Code Status:    Full Code    Chief Complaint:     Chief Complaint   Patient presents with   • Fatigue       History Obtained From:     patient, electronic medical record    History of Present Illness:     Danny Guevara is a 63 y.o. Non- / non  male who presents with Fatigue  and is admitted to the hospital for the management of Hypercalcemia.  Patient has history significant for stage IV metastatic lung carcinoma, COPD, hypertension, and hyperlipidemia.      Patient was recently admitted to [facility] 3/29/2024 for altered mental status.  There was concern for Wernicke's encephalopathy due to  Tenderness: There is no abdominal tenderness.   Skin:     General: Skin is warm and dry.   Neurological:      General: No focal deficit present.      Mental Status: He is alert and oriented to person, place, and time.   Psychiatric:         Mood and Affect: Mood normal.         Behavior: Behavior normal.         Investigations:      Laboratory Testing:  Recent Results (from the past 24 hour(s))   Troponin    Collection Time: 04/03/24  4:56 AM   Result Value Ref Range    Troponin, High Sensitivity 39 (H) 0 - 22 ng/L   CBC with Auto Differential    Collection Time: 04/03/24  4:56 AM   Result Value Ref Range    WBC 27.6 (H) 3.5 - 11.3 k/uL    RBC 4.48 4.21 - 5.77 m/uL    Hemoglobin 13.1 13.0 - 17.0 g/dL    Hematocrit 38.8 (L) 40.7 - 50.3 %    MCV 86.6 82.6 - 102.9 fL    MCH 29.2 25.2 - 33.5 pg    MCHC 33.8 28.4 - 34.8 g/dL    RDW 15.2 (H) 11.8 - 14.4 %    Platelets 774 (H) 138 - 453 k/uL    MPV 9.5 8.1 - 13.5 fL    NRBC Automated 0.0 0.0 per 100 WBC    Immature Granulocytes 4 (H) 0 %    Neutrophils % 81 (H) 36 - 66 %    Lymphocytes % 4 (L) 24 - 44 %    Monocytes % 10 (H) 1 - 7 %    Eosinophils % 0 (L) 1 - 4 %    Basophils % 1 0 - 2 %    Absolute Immature Granulocyte 1.10 (H) 0.00 - 0.30 k/uL    Neutrophils Absolute 22.36 (H) 1.8 - 7.7 k/uL    Lymphocytes Absolute 1.10 1.0 - 4.8 k/uL    Monocytes Absolute 2.76 (H) 0.1 - 0.8 k/uL    Eosinophils Absolute 0.00 0.0 - 0.4 k/uL    Basophils Absolute 0.28 (H) 0.0 - 0.2 k/uL    Morphology ANISOCYTOSIS PRESENT    Basic Metabolic Panel    Collection Time: 04/03/24  4:56 AM   Result Value Ref Range    Sodium 134 (L) 136 - 145 mmol/L    Potassium 3.8 3.7 - 5.3 mmol/L    Chloride 98 98 - 107 mmol/L    CO2 22 20 - 31 mmol/L    Anion Gap 14 9 - 16 mmol/L    Glucose 106 (H) 74 - 99 mg/dL    BUN 14 8 - 23 mg/dL    Creatinine 1.2 0.70 - 1.20 mg/dL    Est, Glom Filt Rate 66 >60 mL/min/1.73m2    Calcium 14.7 (HH) 8.6 - 10.4 mg/dL   Troponin    Collection Time: 04/03/24  6:09 AM   Result

## 2024-04-03 NOTE — CARE COORDINATION
patient's daughter is requesting a competency eval and for patient's brother Daquan (POJEY) [phone] be called and updated PS GABY Bernardo to request the above

## 2024-04-03 NOTE — ED NOTES
Asked Dr. Lepe if he would like an ammonia to be ordered since family states patient is different from his baseline, patient remains alert and oriented x4.

## 2024-04-03 NOTE — ED NOTES
Pt presents to the ER via EMS. Pt called 911 for generalized weakness. Pt is DNRCC. Pt has multiple forms of cancer and is hospice pt. Pt last time he was here didn't want placed at nursing home. Pt presents to the ER infested with bed bugs. Pt wheeled to shower, pt washed. All belongings and linens bagged per bed bug policy. Pt states he wishes for his clothes to be thrown away. Pt does not wish for labs to be drawn. Pt otherwise is A&O x4, VSS. Pt placed on bedside cardiac monitoring, call light within reach.

## 2024-04-03 NOTE — ED NOTES
Dr. Lepe notified patient stated he had chest pain a little earlier, repeat EKG obtained, and HUC sending to Dr. Lepe as well as initial EKG.

## 2024-04-03 NOTE — CARE COORDINATION
04/03/24 1536   Readmission Assessment   Number of Days since last admission? 1-7 days   Previous Disposition Home Alone   Who is being Interviewed Patient   What was the patient's/caregiver's perception as to why they think they needed to return back to the hospital? Other (Comment)  (symptoms)   Did you visit your Primary Care Physician after you left the hospital, before you returned this time? No   Why weren't you able to visit your PCP? Did not have an appointment   Did you see a specialist, such as Cardiac, Pulmonary, Orthopedic Physician, etc. after you left the hospital? No   Who advised the patient to return to the hospital? Self-referral   Does the patient report anything that got in the way of taking their medications? No   In our efforts to provide the best possible care to you and others like you, can you think of anything that we could have done to help you after you left the hospital the first time, so that you might not have needed to return so soon? Arrange for more help when leaving the hospital

## 2024-04-03 NOTE — TELEPHONE ENCOUNTER
Received phone call from nurse navigator, Lucille. She expressed safety concerns in regards to pt. He has been increasingly confused, wandering the neighborhood, and forgetful about things such as turning the stove off. Pt apartment is also invested with bed bugs. Pt is currently in [facility] emergency room and the plan is to be admitted. Pt recently signed on to hospice. Spoke with SW at [facility] and updated her on above information. She is going to update  when she sees them. Staff will discuss possible discharge options with pt and see if he is agreeable. Possibly SNF or inpatient hospice. SW will continue to follow as needed.

## 2024-04-03 NOTE — TELEPHONE ENCOUNTER
Name: Danny Guevara  : 1960  MRN: [id number]    Oncology Navigation Follow-Up Note    Contact Type:  Telephone    Notes:   Navigator received call from [facility]  and discussed pts. Case at length and pt. Is a danger to himself and others living in the same building. The conditions of pts. Living arrangements are very poor to say the least and pts. Best option would be inpt. Hospice, but recently pt. Has refused. Pt. Active case with [facility] and they have spoken with emile as well as Shauna with [facility] Case Management. Writer requesting living conditions be reported to Adult Protective Services. Shauna considering guardian/nursing home care, however this process takes much too long.   Writer spoke with onsite Monica and informed her. Patricia will alert Hospital/ED SW.  2:28 Writer noting message from LUIGI in EPIC with updates.       Electronically signed by Lucille Holguin RN on 4/3/2024 at 2:23 PM

## 2024-04-03 NOTE — ED NOTES
Notified Dr. Lepe that patient failed swallow screening, patient choked on water when given a sip. Asked if ok to hold meds.

## 2024-04-03 NOTE — CARE COORDINATION
Case Management Assessment  Initial Evaluation    Date/Time of Evaluation: 4/3/2024 3:34 PM  Assessment Completed by: SHELLIE PEREZ RN    If patient is discharged prior to next notation, then this note serves as note for discharge by case management.    Patient Name: Danny Guevara                   YOB: 1960  Diagnosis: Hypercalcemia [E83.52]                   Date / Time: 4/3/2024  3:34 AM    Patient Admission Status: Inpatient   Readmission Risk (Low < 19, Mod (19-27), High > 27): Readmission Risk Score: 25.9    Current PCP: Roberto Donaldson MD  PCP verified by CM?      Chart Reviewed: Yes      History Provided by: Patient  Patient Orientation: Alert and Oriented    Patient Cognition: Alert    Hospitalization in the last 30 days (Readmission):  Yes    If yes, Readmission Assessment in CM Navigator will be completed.    Advance Directives:      Code Status: DNR-CCA   Patient's Primary Decision Maker is:      Primary Decision Maker: Daquan Guevara - Brother/Sister - [phone]    Discharge Planning:    Patient lives with: Alone Type of Home: Apartment  Primary Care Giver: Self  Patient Support Systems include: Family Members   Current Financial resources:    Current community resources:    Current services prior to admission: Durable Medical Equipment            Current DME:              Type of Home Care services:       ADLS  Prior functional level:    Current functional level:      PT AM-PAC:   /24  OT AM-PAC:   /24    Family can provide assistance at DC:    Would you like Case Management to discuss the discharge plan with any other family members/significant others, and if so, who?    Plans to Return to Present Housing: No  Other Identified Issues/Barriers to RETURNING to current housing: safety  Potential Assistance needed at discharge: Transitional Care            Potential DME:    Patient expects to discharge to:    Plan for transportation at discharge:      Financial    Payor: [facility]  MEDICARE / Plan: [employer] DUAL COMPLETE / Product Type: *No Product type* /     Does insurance require precert for SNF: Yes    Potential assistance Purchasing Medications: No  Meds-to-Beds request:        [facility] - [place], [place] - [address] - P [phone] - F [phone]  [address] [place] [address]  Phone: [phone] Fax: [phone]      Notes:    Factors facilitating achievement of predicted outcomes: Friend support    Barriers to discharge: Medical complications    Additional Case Management Notes: SNF vs IP Hospice    The Plan for Transition of Care is related to the following treatment goals of Hypercalcemia [E83.52]    IF APPLICABLE: The Patient and/or patient representative Danny and his family were provided with a choice of provider and agrees with the discharge plan. Freedom of choice list with basic dialogue that supports the patient's individualized plan of care/goals and shares the quality data associated with the providers was provided to:     Patient Representative Name:       The Patient and/or Patient Representative Agree with the Discharge Plan?      SHELLIE PEREZ RN  Case Management Department  Ph: [phone] Fax:                          Post Acute Facility/Agency List     Provided patient with the following list, the list includes the overall star ratings obtained from CMS per the Medicare Web site (www.Medicare.gov):     [] Long Term Acute Care Facilities  [] Acute Inpatient Rehabilitation Facilities  [x] Skilled Nursing Facilities  [] Home Care    Provided verbal instructions on how to utilize the QR Code to obtain additional detailed star ratings from www.Medicare.gov     offered to print and provide the detailed list:    []Accepted   [x]Declined    The following printed detailed lists were provided:

## 2024-04-03 NOTE — ED PROVIDER NOTES
[facility] ED  Emergency Department Encounter  Emergency Medicine Resident     Pt Name:Danny Guevara  MRN: [id number]  Birthdate 1960  Date of evaluation: 4/3/24  PCP:  Roberto Donaldson MD  Note Started: 3:36 AM EDT      CHIEF COMPLAINT       Chief Complaint   Patient presents with    Fatigue       HISTORY OF PRESENT ILLNESS  (Location/Symptom, Timing/Onset, Context/Setting, Quality, Duration, Modifying Factors, Severity.)      Danny Guevara is a 63 y.o. male who presents with generalized weakness.  Patient states that he is a DNR CC on hospice.  States he lives at home and there is cockroaches and bedbugs in his house.  Denies any specific pain.  States he is undecided whether he would go to a nursing home or stay at his house.  Is okay with some labs.    PAST MEDICAL / SURGICAL / SOCIAL / FAMILY HISTORY      has a past medical history of Alcohol abuse, Anemia, Bronchitis, Cancer (HCC), Cervical stenosis of spine, Chronic left-sided low back pain with left-sided sciatica, COPD (chronic obstructive pulmonary disease) (HCC), Hypertension, Insomnia, Intentional drug overdose (HCC), Left arm numbness, Left lumbar radiculitis, Macrocytosis, Major depressive disorder, recurrent severe without psychotic features (HCC), Myeloproliferative disorder (HCC), Osteoarthritis of spine with radiculopathy, lumbar region, Pneumonia, Severe recurrent major depression without psychotic features (HCC), Spondylosis of lumbar region without myelopathy or radiculopathy, Urinary retention, Wears dentures, and Wellness examination.       has a past surgical history that includes cervical fusion; Appendectomy; Tonsillectomy; Carpal tunnel release; epidural steroid injection (Left, 05/15/2019); Endoscopy, colon, diagnostic; cervical fusion (N/A, 09/05/2019); bronchoscopy (02/08/2024); bronchoscopy (2/8/2024); bronchoscopy (2/8/2024); and bronchoscopy (2/8/2024).      Social History     Socioeconomic History

## 2024-04-04 ENCOUNTER — APPOINTMENT (OUTPATIENT)
Dept: GENERAL RADIOLOGY | Age: 64
DRG: 871 | End: 2024-04-04
Payer: MEDICARE

## 2024-04-04 PROBLEM — R53.1 GENERAL WEAKNESS: Status: ACTIVE | Noted: 2024-04-04

## 2024-04-04 PROBLEM — R41.9 NEUROCOGNITIVE DISORDER: Status: ACTIVE | Noted: 2024-04-04

## 2024-04-04 PROBLEM — J18.9 PNEUMONIA DUE TO INFECTIOUS ORGANISM: Status: ACTIVE | Noted: 2024-04-04

## 2024-04-04 LAB
ANION GAP SERPL CALCULATED.3IONS-SCNC: 13 MMOL/L (ref 9–16)
BODY TEMPERATURE: 37
BUN SERPL-MCNC: 12 MG/DL (ref 8–23)
CA-I BLD-SCNC: 1.43 MMOL/L (ref 1.13–1.33)
CA-I BLD-SCNC: 1.58 MMOL/L (ref 1.13–1.33)
CA-I BLD-SCNC: 1.83 MMOL/L (ref 1.13–1.33)
CALCIUM SERPL-MCNC: 13.1 MG/DL (ref 8.6–10.4)
CALCIUM UR-MCNC: 17.9 MG/DL
CHLORIDE SERPL-SCNC: 103 MMOL/L (ref 98–107)
CO2 SERPL-SCNC: 19 MMOL/L (ref 20–31)
COHGB MFR BLD: 2.4 % (ref 0–5)
CREAT SERPL-MCNC: 1.1 MG/DL (ref 0.7–1.2)
CREAT UR-MCNC: 30.2 MG/DL (ref 39–259)
EKG ATRIAL RATE: 86 BPM
EKG ATRIAL RATE: 89 BPM
EKG P AXIS: 41 DEGREES
EKG P AXIS: 81 DEGREES
EKG P-R INTERVAL: 154 MS
EKG P-R INTERVAL: 190 MS
EKG Q-T INTERVAL: 364 MS
EKG Q-T INTERVAL: 384 MS
EKG QRS DURATION: 86 MS
EKG QRS DURATION: 90 MS
EKG QTC CALCULATION (BAZETT): 435 MS
EKG QTC CALCULATION (BAZETT): 467 MS
EKG R AXIS: 37 DEGREES
EKG R AXIS: 61 DEGREES
EKG T AXIS: 51 DEGREES
EKG T AXIS: 62 DEGREES
EKG VENTRICULAR RATE: 86 BPM
EKG VENTRICULAR RATE: 89 BPM
ERYTHROCYTE [DISTWIDTH] IN BLOOD BY AUTOMATED COUNT: 15.2 % (ref 11.8–14.4)
FIO2 ON VENT: ABNORMAL %
GLUCOSE BLD-MCNC: 104 MG/DL (ref 75–110)
GLUCOSE SERPL-MCNC: 107 MG/DL (ref 74–99)
HCO3 VENOUS: 23.9 MMOL/L (ref 24–30)
HCT VFR BLD AUTO: 37.7 % (ref 40.7–50.3)
HGB BLD-MCNC: 12.4 G/DL (ref 13–17)
INR PPP: 1.7
MCH RBC QN AUTO: 29 PG (ref 25.2–33.5)
MCHC RBC AUTO-ENTMCNC: 32.9 G/DL (ref 28.4–34.8)
MCV RBC AUTO: 88.3 FL (ref 82.6–102.9)
MRSA, DNA, NASAL: NEGATIVE
NRBC BLD-RTO: 0 PER 100 WBC
O2 SAT, VEN: 93.8 % (ref 60–85)
PCO2, VEN: 34.8 MM HG (ref 39–55)
PH VENOUS: 7.45 (ref 7.32–7.42)
PLATELET # BLD AUTO: 664 K/UL (ref 138–453)
PMV BLD AUTO: 9.5 FL (ref 8.1–13.5)
PO2, VEN: 70.4 MM HG (ref 30–50)
POSITIVE BASE EXCESS, VEN: 0.8 MMOL/L (ref 0–2)
POTASSIUM SERPL-SCNC: 3.2 MMOL/L (ref 3.7–5.3)
PROCALCITONIN SERPL-MCNC: 0.15 NG/ML (ref 0–0.09)
PROTHROMBIN TIME: 19.7 SEC (ref 11.7–14.9)
RBC # BLD AUTO: 4.27 M/UL (ref 4.21–5.77)
SODIUM SERPL-SCNC: 135 MMOL/L (ref 136–145)
SPECIMEN DESCRIPTION: NORMAL
TROPONIN I SERPL HS-MCNC: 27 NG/L (ref 0–22)
TROPONIN I SERPL HS-MCNC: 37 NG/L (ref 0–22)
WBC OTHER # BLD: 28 K/UL (ref 3.5–11.3)

## 2024-04-04 PROCEDURE — 93010 ELECTROCARDIOGRAM REPORT: CPT | Performed by: INTERNAL MEDICINE

## 2024-04-04 PROCEDURE — 80048 BASIC METABOLIC PNL TOTAL CA: CPT

## 2024-04-04 PROCEDURE — 99231 SBSQ HOSP IP/OBS SF/LOW 25: CPT | Performed by: INTERNAL MEDICINE

## 2024-04-04 PROCEDURE — 94640 AIRWAY INHALATION TREATMENT: CPT

## 2024-04-04 PROCEDURE — 6360000002 HC RX W HCPCS: Performed by: NURSE PRACTITIONER

## 2024-04-04 PROCEDURE — 6360000002 HC RX W HCPCS: Performed by: INTERNAL MEDICINE

## 2024-04-04 PROCEDURE — 6370000000 HC RX 637 (ALT 250 FOR IP): Performed by: NURSE PRACTITIONER

## 2024-04-04 PROCEDURE — 84145 PROCALCITONIN (PCT): CPT

## 2024-04-04 PROCEDURE — 99223 1ST HOSP IP/OBS HIGH 75: CPT | Performed by: INTERNAL MEDICINE

## 2024-04-04 PROCEDURE — 99222 1ST HOSP IP/OBS MODERATE 55: CPT | Performed by: PSYCHIATRY & NEUROLOGY

## 2024-04-04 PROCEDURE — 97162 PT EVAL MOD COMPLEX 30 MIN: CPT

## 2024-04-04 PROCEDURE — 2580000003 HC RX 258: Performed by: INTERNAL MEDICINE

## 2024-04-04 PROCEDURE — 82947 ASSAY GLUCOSE BLOOD QUANT: CPT

## 2024-04-04 PROCEDURE — 94761 N-INVAS EAR/PLS OXIMETRY MLT: CPT

## 2024-04-04 PROCEDURE — 97535 SELF CARE MNGMENT TRAINING: CPT

## 2024-04-04 PROCEDURE — 2580000003 HC RX 258: Performed by: NURSE PRACTITIONER

## 2024-04-04 PROCEDURE — 6370000000 HC RX 637 (ALT 250 FOR IP): Performed by: INTERNAL MEDICINE

## 2024-04-04 PROCEDURE — 85027 COMPLETE CBC AUTOMATED: CPT

## 2024-04-04 PROCEDURE — 97530 THERAPEUTIC ACTIVITIES: CPT

## 2024-04-04 PROCEDURE — 36415 COLL VENOUS BLD VENIPUNCTURE: CPT

## 2024-04-04 PROCEDURE — 87040 BLOOD CULTURE FOR BACTERIA: CPT

## 2024-04-04 PROCEDURE — 82330 ASSAY OF CALCIUM: CPT

## 2024-04-04 PROCEDURE — 87641 MR-STAPH DNA AMP PROBE: CPT

## 2024-04-04 PROCEDURE — 82805 BLOOD GASES W/O2 SATURATION: CPT

## 2024-04-04 PROCEDURE — 84484 ASSAY OF TROPONIN QUANT: CPT

## 2024-04-04 PROCEDURE — 2060000000 HC ICU INTERMEDIATE R&B

## 2024-04-04 PROCEDURE — 2700000000 HC OXYGEN THERAPY PER DAY

## 2024-04-04 PROCEDURE — 71045 X-RAY EXAM CHEST 1 VIEW: CPT

## 2024-04-04 PROCEDURE — 6360000002 HC RX W HCPCS: Performed by: PHYSICIAN ASSISTANT

## 2024-04-04 PROCEDURE — 97167 OT EVAL HIGH COMPLEX 60 MIN: CPT

## 2024-04-04 PROCEDURE — 85610 PROTHROMBIN TIME: CPT

## 2024-04-04 RX ORDER — LORAZEPAM 2 MG/ML
1 INJECTION INTRAMUSCULAR
Status: DISCONTINUED | OUTPATIENT
Start: 2024-04-04 | End: 2024-04-07

## 2024-04-04 RX ORDER — LORAZEPAM 2 MG/ML
3 INJECTION INTRAMUSCULAR
Status: DISCONTINUED | OUTPATIENT
Start: 2024-04-04 | End: 2024-04-07

## 2024-04-04 RX ORDER — LORAZEPAM 2 MG/1
4 TABLET ORAL
Status: DISCONTINUED | OUTPATIENT
Start: 2024-04-04 | End: 2024-04-07

## 2024-04-04 RX ORDER — LORAZEPAM 2 MG/ML
4 INJECTION INTRAMUSCULAR
Status: DISCONTINUED | OUTPATIENT
Start: 2024-04-04 | End: 2024-04-07

## 2024-04-04 RX ORDER — SODIUM CHLORIDE 0.9 % (FLUSH) 0.9 %
5-40 SYRINGE (ML) INJECTION EVERY 12 HOURS SCHEDULED
Status: DISCONTINUED | OUTPATIENT
Start: 2024-04-04 | End: 2024-04-08 | Stop reason: HOSPADM

## 2024-04-04 RX ORDER — MORPHINE SULFATE 2 MG/ML
2 INJECTION, SOLUTION INTRAMUSCULAR; INTRAVENOUS
Status: DISCONTINUED | OUTPATIENT
Start: 2024-04-04 | End: 2024-04-08 | Stop reason: HOSPADM

## 2024-04-04 RX ORDER — LANOLIN ALCOHOL/MO/W.PET/CERES
100 CREAM (GRAM) TOPICAL DAILY
Status: DISCONTINUED | OUTPATIENT
Start: 2024-04-04 | End: 2024-04-04 | Stop reason: SDUPTHER

## 2024-04-04 RX ORDER — IPRATROPIUM BROMIDE AND ALBUTEROL SULFATE 2.5; .5 MG/3ML; MG/3ML
1 SOLUTION RESPIRATORY (INHALATION) EVERY 4 HOURS PRN
Status: DISCONTINUED | OUTPATIENT
Start: 2024-04-04 | End: 2024-04-08 | Stop reason: HOSPADM

## 2024-04-04 RX ORDER — LORAZEPAM 2 MG/ML
2 INJECTION INTRAMUSCULAR
Status: DISCONTINUED | OUTPATIENT
Start: 2024-04-04 | End: 2024-04-07

## 2024-04-04 RX ORDER — CALCITONIN SALMON 200 [USP'U]/ML
4 INJECTION, SOLUTION INTRAMUSCULAR; SUBCUTANEOUS 2 TIMES DAILY
Status: COMPLETED | OUTPATIENT
Start: 2024-04-04 | End: 2024-04-04

## 2024-04-04 RX ORDER — MORPHINE SULFATE 4 MG/ML
4 INJECTION, SOLUTION INTRAMUSCULAR; INTRAVENOUS
Status: DISCONTINUED | OUTPATIENT
Start: 2024-04-04 | End: 2024-04-08 | Stop reason: HOSPADM

## 2024-04-04 RX ORDER — LORAZEPAM 2 MG/1
2 TABLET ORAL
Status: DISCONTINUED | OUTPATIENT
Start: 2024-04-04 | End: 2024-04-07

## 2024-04-04 RX ORDER — SODIUM CHLORIDE 9 MG/ML
INJECTION, SOLUTION INTRAVENOUS CONTINUOUS
Status: DISCONTINUED | OUTPATIENT
Start: 2024-04-04 | End: 2024-04-08 | Stop reason: HOSPADM

## 2024-04-04 RX ORDER — SODIUM CHLORIDE 0.9 % (FLUSH) 0.9 %
5-40 SYRINGE (ML) INJECTION PRN
Status: DISCONTINUED | OUTPATIENT
Start: 2024-04-04 | End: 2024-04-08 | Stop reason: HOSPADM

## 2024-04-04 RX ORDER — FUROSEMIDE 10 MG/ML
20 INJECTION INTRAMUSCULAR; INTRAVENOUS 2 TIMES DAILY
Status: DISCONTINUED | OUTPATIENT
Start: 2024-04-04 | End: 2024-04-08 | Stop reason: HOSPADM

## 2024-04-04 RX ORDER — IPRATROPIUM BROMIDE AND ALBUTEROL SULFATE 2.5; .5 MG/3ML; MG/3ML
1 SOLUTION RESPIRATORY (INHALATION)
Status: DISCONTINUED | OUTPATIENT
Start: 2024-04-04 | End: 2024-04-05

## 2024-04-04 RX ORDER — LORAZEPAM 1 MG/1
1 TABLET ORAL
Status: DISCONTINUED | OUTPATIENT
Start: 2024-04-04 | End: 2024-04-07

## 2024-04-04 RX ORDER — SODIUM CHLORIDE 9 MG/ML
INJECTION, SOLUTION INTRAVENOUS PRN
Status: DISCONTINUED | OUTPATIENT
Start: 2024-04-04 | End: 2024-04-08 | Stop reason: HOSPADM

## 2024-04-04 RX ADMIN — LORAZEPAM 4 MG: 2 INJECTION INTRAMUSCULAR; INTRAVENOUS at 23:04

## 2024-04-04 RX ADMIN — OXYCODONE 15 MG: 5 TABLET ORAL at 12:51

## 2024-04-04 RX ADMIN — FERROUS SULFATE TAB EC 325 MG (65 MG FE EQUIVALENT) 325 MG: 325 (65 FE) TABLET DELAYED RESPONSE at 10:24

## 2024-04-04 RX ADMIN — IPRATROPIUM BROMIDE AND ALBUTEROL SULFATE 1 DOSE: .5; 3 SOLUTION RESPIRATORY (INHALATION) at 11:16

## 2024-04-04 RX ADMIN — ENOXAPARIN SODIUM 40 MG: 100 INJECTION SUBCUTANEOUS at 10:25

## 2024-04-04 RX ADMIN — SODIUM CHLORIDE TAB 1 GM 2 G: 1 TAB at 10:25

## 2024-04-04 RX ADMIN — OXYCODONE 15 MG: 5 TABLET ORAL at 01:32

## 2024-04-04 RX ADMIN — MORPHINE SULFATE 4 MG: 4 INJECTION INTRAVENOUS at 05:28

## 2024-04-04 RX ADMIN — PRAZOSIN HYDROCHLORIDE 1 MG: 1 CAPSULE ORAL at 10:25

## 2024-04-04 RX ADMIN — Medication 2 MG: at 05:29

## 2024-04-04 RX ADMIN — MORPHINE SULFATE 4 MG: 4 INJECTION INTRAVENOUS at 06:58

## 2024-04-04 RX ADMIN — FUROSEMIDE 20 MG: 20 TABLET ORAL at 10:25

## 2024-04-04 RX ADMIN — LOSARTAN POTASSIUM 50 MG: 50 TABLET, FILM COATED ORAL at 10:25

## 2024-04-04 RX ADMIN — CALCITONIN SALMON 338 UNITS: 200 INJECTION, SOLUTION INTRAMUSCULAR; SUBCUTANEOUS at 01:40

## 2024-04-04 RX ADMIN — ALPRAZOLAM 0.5 MG: 0.5 TABLET ORAL at 20:41

## 2024-04-04 RX ADMIN — LEVOTHYROXINE SODIUM 25 MCG: 50 TABLET ORAL at 06:19

## 2024-04-04 RX ADMIN — IPRATROPIUM BROMIDE AND ALBUTEROL SULFATE 1 DOSE: .5; 3 SOLUTION RESPIRATORY (INHALATION) at 02:41

## 2024-04-04 RX ADMIN — MORPHINE SULFATE 4 MG: 4 INJECTION INTRAVENOUS at 19:52

## 2024-04-04 RX ADMIN — FUROSEMIDE 20 MG: 10 INJECTION, SOLUTION INTRAMUSCULAR; INTRAVENOUS at 17:29

## 2024-04-04 RX ADMIN — FUROSEMIDE 20 MG: 10 INJECTION, SOLUTION INTRAMUSCULAR; INTRAVENOUS at 11:27

## 2024-04-04 RX ADMIN — IPRATROPIUM BROMIDE AND ALBUTEROL SULFATE 1 DOSE: .5; 3 SOLUTION RESPIRATORY (INHALATION) at 07:38

## 2024-04-04 RX ADMIN — Medication 100 MG: at 10:24

## 2024-04-04 RX ADMIN — MORPHINE SULFATE 2 MG: 4 INJECTION INTRAVENOUS at 02:50

## 2024-04-04 RX ADMIN — HYDROXYUREA 500 MG: 500 CAPSULE ORAL at 10:25

## 2024-04-04 RX ADMIN — ALPRAZOLAM 0.5 MG: 0.5 TABLET ORAL at 12:51

## 2024-04-04 RX ADMIN — CETIRIZINE HYDROCHLORIDE 10 MG: 10 TABLET ORAL at 10:25

## 2024-04-04 RX ADMIN — QUETIAPINE FUMARATE 300 MG: 300 TABLET ORAL at 20:42

## 2024-04-04 RX ADMIN — CALCITONIN SALMON 338 UNITS: 200 INJECTION, SOLUTION INTRAMUSCULAR; SUBCUTANEOUS at 15:37

## 2024-04-04 RX ADMIN — OXYCODONE 15 MG: 5 TABLET ORAL at 20:41

## 2024-04-04 RX ADMIN — ZOLEDRONIC ACID 4 MG: 4 INJECTION, SOLUTION, CONCENTRATE INTRAVENOUS at 15:37

## 2024-04-04 RX ADMIN — POTASSIUM CHLORIDE 10 MEQ: 7.46 INJECTION, SOLUTION INTRAVENOUS at 20:58

## 2024-04-04 RX ADMIN — SODIUM CHLORIDE, PRESERVATIVE FREE 10 ML: 5 INJECTION INTRAVENOUS at 20:41

## 2024-04-04 RX ADMIN — ALCOHOL 1 TABLET: 70.47 GEL TOPICAL at 10:25

## 2024-04-04 RX ADMIN — ROSUVASTATIN CALCIUM 5 MG: 10 TABLET, COATED ORAL at 20:41

## 2024-04-04 RX ADMIN — SODIUM CHLORIDE: 9 INJECTION, SOLUTION INTRAVENOUS at 11:27

## 2024-04-04 RX ADMIN — PRAZOSIN HYDROCHLORIDE 1 MG: 1 CAPSULE ORAL at 20:41

## 2024-04-04 RX ADMIN — CEFEPIME 2000 MG: 2 INJECTION, POWDER, FOR SOLUTION INTRAVENOUS at 02:17

## 2024-04-04 RX ADMIN — Medication 2 MG: at 22:06

## 2024-04-04 RX ADMIN — CALCITONIN SALMON 338 UNITS: 200 INJECTION, SOLUTION INTRAMUSCULAR; SUBCUTANEOUS at 20:54

## 2024-04-04 RX ADMIN — CEFEPIME 2000 MG: 2 INJECTION, POWDER, FOR SOLUTION INTRAVENOUS at 16:01

## 2024-04-04 ASSESSMENT — PAIN DESCRIPTION - ORIENTATION
ORIENTATION: MID
ORIENTATION: OTHER (COMMENT)
ORIENTATION: MID
ORIENTATION: MID
ORIENTATION: RIGHT;LEFT;MID;UPPER
ORIENTATION: MID

## 2024-04-04 ASSESSMENT — PAIN - FUNCTIONAL ASSESSMENT
PAIN_FUNCTIONAL_ASSESSMENT: PREVENTS OR INTERFERES WITH ALL ACTIVE AND SOME PASSIVE ACTIVITIES
PAIN_FUNCTIONAL_ASSESSMENT: PREVENTS OR INTERFERES SOME ACTIVE ACTIVITIES AND ADLS

## 2024-04-04 ASSESSMENT — PAIN DESCRIPTION - LOCATION
LOCATION: BACK;GENERALIZED
LOCATION: GENERALIZED;BACK
LOCATION: GENERALIZED
LOCATION: GENERALIZED;BACK
LOCATION: BACK;ABDOMEN
LOCATION: GENERALIZED;BACK
LOCATION: GENERALIZED

## 2024-04-04 ASSESSMENT — PAIN DESCRIPTION - FREQUENCY
FREQUENCY: CONTINUOUS

## 2024-04-04 ASSESSMENT — PAIN DESCRIPTION - DESCRIPTORS
DESCRIPTORS: ACHING
DESCRIPTORS: ACHING;DISCOMFORT
DESCRIPTORS: ACHING
DESCRIPTORS: ACHING;DISCOMFORT;DULL

## 2024-04-04 ASSESSMENT — PAIN DESCRIPTION - ONSET
ONSET: ON-GOING

## 2024-04-04 ASSESSMENT — PAIN SCALES - GENERAL
PAINLEVEL_OUTOF10: 0
PAINLEVEL_OUTOF10: 7
PAINLEVEL_OUTOF10: 0
PAINLEVEL_OUTOF10: 8
PAINLEVEL_OUTOF10: 8
PAINLEVEL_OUTOF10: 9
PAINLEVEL_OUTOF10: 7
PAINLEVEL_OUTOF10: 8
PAINLEVEL_OUTOF10: 0
PAINLEVEL_OUTOF10: 0
PAINLEVEL_OUTOF10: 8
PAINLEVEL_OUTOF10: 8
PAINLEVEL_OUTOF10: 4

## 2024-04-04 ASSESSMENT — PAIN DESCRIPTION - PAIN TYPE
TYPE: ACUTE PAIN;CHRONIC PAIN
TYPE: CHRONIC PAIN

## 2024-04-04 ASSESSMENT — PAIN SCALES - WONG BAKER
WONGBAKER_NUMERICALRESPONSE: HURTS WHOLE LOT
WONGBAKER_NUMERICALRESPONSE: HURTS WHOLE LOT

## 2024-04-04 NOTE — PLAN OF CARE
Problem: Respiratory - Adult  Goal: Achieves optimal ventilation and oxygenation  4/4/2024 0742 by Darnell Mccloud RCP  Outcome: Progressing   PROVIDE ADEQUATE OXYGENATION WITH ACCEPTABLE SP02/ABG'S    [x]  IDENTIFY APPROPRIATE OXYGEN THERAPY  [x]   MONITOR SP02/ABG'S AS NEEDED   [x]   PATIENT EDUCATION AS NEEDED  BRONCHOSPASM/BRONCHOCONSTRICTION     [x]         IMPROVE AERATION/BREATH SOUNDS  [x]   ADMINISTER BRONCHODILATOR THERAPY AS APPROPRIATE  [x]   ASSESS BREATH SOUNDS  [x]   IMPLEMENT AEROSOL/MDI PROTOCOL  [x]   PATIENT EDUCATION AS NEEDED

## 2024-04-04 NOTE — CARE COORDINATION
Consult received for consideration of rehab.  Pt knows to SW from previous admissions. Met with pt who had hard time staying awake. Pt stated he still lives alone at [facility]. Ins thru [facility].  Pt reports he still drinks alcohol daily, 5-6  24oz beers. He denies all drug use. Pt has been thru tx in the past at [facility]. He reports no sobriety after either tx. No prior AA involvement. Pt stated he would like to quit drinking. Provided pt with tx resources. However, noted there is a consult for IP Hospice.

## 2024-04-04 NOTE — PROGRESS NOTES
Occupational Therapy  Facility/Department: [address]  Occupational Therapy Initial Assessment    Name: Danny Guevara  : 1960  MRN: [id number]  Date of Service: 2024    Discharge Recommendations: Further therapy recommended at discharge.    Patient Diagnosis(es): The primary encounter diagnosis was General weakness. A diagnosis of Hypercalcemia was also pertinent to this visit.  Past Medical History:  has a past medical history of Alcohol abuse, Anemia, Bronchitis, Cancer (HCC), Cervical stenosis of spine, Chronic left-sided low back pain with left-sided sciatica, COPD (chronic obstructive pulmonary disease) (HCC), Hypertension, Insomnia, Intentional drug overdose (HCC), Left arm numbness, Left lumbar radiculitis, Macrocytosis, Major depressive disorder, recurrent severe without psychotic features (HCC), Myeloproliferative disorder (HCC), Osteoarthritis of spine with radiculopathy, lumbar region, Pneumonia, Severe recurrent major depression without psychotic features (HCC), Spondylosis of lumbar region without myelopathy or radiculopathy, Urinary retention, Wears dentures, and Wellness examination.  Past Surgical History:  has a past surgical history that includes cervical fusion; Appendectomy; Tonsillectomy; Carpal tunnel release; epidural steroid injection (Left, 05/15/2019); Endoscopy, colon, diagnostic; cervical fusion (N/A, 2019); bronchoscopy (2024); bronchoscopy (2024); bronchoscopy (2024); and bronchoscopy (2024).           Assessment   Performance deficits / Impairments: Decreased functional mobility ;Decreased endurance;Decreased balance;Decreased ADL status;Decreased high-level IADLs;Decreased ROM;Decreased strength;Decreased cognition;Decreased safe awareness  Assessment: Pt dem above deficits impacting safe participation/independence in ADLs. Pt would benefit from continued acute OT as well as upon discharge to increase engagement in ADLs and increase  Retired  Type of Occupation:   Additional Comments: Pt questionable historian this date and unable to answer social function questions d/t cognition.       Objective   Safety Devices  Type of Devices: Left in bed;Gait belt;Call light within reach;Bed alarm in place;Patient at risk for falls  Restraints  Restraints Initially in Place: No  Bed Mobility Training  Bed Mobility Training: Yes (modAx2 for B LE and trunk progression this date.)  Overall Level of Assistance: Moderate assistance;Assist X2  Supine to Sit: Moderate assistance;Assist X2  Sit to Supine: Assist X2;Moderate assistance  Balance  Sitting: With support (~15 min. static sitting at EOB w/ Black for R lateral lean, progressing to SBA as time progressed. Pt exhibited increased SOB during sitting.)  Standing: With support (~1 min. static standing at EOB w/ RW and modA. Pt exhibited increased SOB during standing. Pt RR increased 36, SpO2 dropped 87% during standing.)  Transfer Training  Transfer Training: Yes (1x at EOB w/ modA to initiate stand/sit and use of RW.)  Overall Level of Assistance: Moderate assistance  Sit to Stand: Moderate assistance  Stand to Sit: Moderate assistance  Gait  Gait Training: No (DINA d/t fatigue and decreased balance)     AROM: Grossly decreased, non-functional (~15 degrees of active elbow movement observed. Pt did not dem any other active ROM to B UE.)  PROM: Generally decreased, functional (R UE ~90 degrees AAROM, L UE ~25 degrees AAROM; Pt exhibited facial grimace w/ movement of L UE.)  Strength: Grossly decreased, non-functional (unable to formally assess d/t cognition/pain. When assessing  strength, pt made a fist w/ L  3/5; R  3+/5)  Coordination:  (Pt did not initiate the use of B UEs. Unable to grasp washcloth during ADL completion.)  Tone: Normal  Sensation:  (DINA d/t cognition)  ADL  Feeding: Maximum assistance;Setup  Grooming: Maximum assistance  UE Bathing: Maximum assistance  LE Bathing:

## 2024-04-04 NOTE — CONSULTS
Today's Date: 4/4/2024  Patient Name: Danny Guevara  Date of admission: 4/3/2024  3:34 AM  Patient's age: 63 y.o., 1960  Admission Dx: Hypercalcemia [E83.52]  General weakness [R53.1]    Reason for Consult: management recommendations  Requesting Physician: Nabeel Spicer MD    CHIEF COMPLAINT: Hypercalcemia.    History Obtained From:  patient, electronic medical record, Quality of history:  poor historian    HISTORY OF PRESENT ILLNESS:      The patient is a 63 y.o.  male who is admitted to the hospital chief complaint of generalized weakness.  Patient was also recently admitted at [facility] due to concerns regarding Warnicke's encephalopathy which improved with thiamine infusion.  Patient was subsequently discharged home.  Upon arrival in the ER the patient was noted to be afebrile and hemodynamically stable.  Patient lab workup showed calcium level of 14.7 and calcium 1.73.  Patient was treated with fluids.  WBC count 27,000.  Chest x-ray suggestive of pneumonia.  Patient started on antibiotics.  Oncology team consulted due to hypercalcemia.  Patient has received 1 dose of calcitonin.  Patient's creatinine is 1.1.  PTH checked on 4/3 was 8.0.  Patient was also vitamin D deficient      Patient is well-known to our practice.  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  Follows up with Dr. Landa.  Patient has history of JAK2 positive MPN.  Patient was recently discharged from hospital on 3/30.  Patient was last seen in oncology office back in March recently patient's goals of care and we discussed.  At that point patient had refused hospice.    Past Medical History:   has a past medical history of Alcohol abuse, Anemia, Bronchitis, Cancer (HCC), Cervical stenosis of spine, Chronic left-sided low back pain with left-sided sciatica, COPD (chronic obstructive pulmonary disease) (HCC), Hypertension, Insomnia, Intentional drug overdose (HCC),  tissues.Vascular calcifications are seen compatible with atherosclerotic disease.     Mild central and cortical cerebral atrophy. Mild chronic deep white matter ischemic changes No acute intracranial abnormalities are noted.     MRI BRAIN W WO CONTRAST    Result Date: 3/21/2024  EXAMINATION: MRI OF THE BRAIN WITHOUT AND WITH CONTRAST  3/21/2024 10:57 am TECHNIQUE: Multiplanar multisequence MRI of the head/brain was performed without and with the administration of intravenous contrast. COMPARISON: MRI brain performed 08/29/2023. HISTORY: ORDERING SYSTEM PROVIDED HISTORY: Malignant neoplasm of hilus of lung, unspecified laterality (HCC) TECHNOLOGIST PROVIDED HISTORY: STAT Creatinine as needed:->No Initial staging Workup Reason for Exam: Initial staging Workup, Malignant neoplasm of hilus of lung, unspecified laterality (HCC) FINDINGS: INTRACRANIAL STRUCTURES/VENTRICLES:  The sellar and suprasellar structures, optic chiasm, corpus callosum, pineal gland, tectum, and midline brainstem structures are unremarkable.  The craniocervical junction is unremarkable. There is no acute hemorrhage, mass effect, or midline shift.  There is satisfactory overall gray-white matter differentiation.  There is chronic microvascular disease.  The ventricular structures are symmetric and unremarkable.  The infratentorial structures including the cerebellopontine angles and internal auditory canals are unremarkable.  There is no abnormal restricted diffusion.  There is no abnormal blooming artifact on susceptibility weighted imaging.  No abnormal postcontrast enhancement. ORBITS: The visualized portion of the orbits demonstrate no acute abnormality. SINUSES: There is chronic sinusitis scattered throughout the paranasal sinuses.  The mastoid air cells are normally aerated. BONES/SOFT TISSUES: The bone marrow signal intensity appears normal. The soft tissues demonstrate no acute abnormality.     Chronic microvascular disease without acute

## 2024-04-04 NOTE — PROGRESS NOTES
PROVIDED 04/04/2024 12:19 AM     Lab Results   Component Value Date/Time    SPECIAL L HAND 3ML 04/04/2024 02:49 AM     Lab Results   Component Value Date/Time    CULTURE NO GROWTH <24 HRS 04/04/2024 02:49 AM       Radiology:  XR CHEST PORTABLE    Result Date: 4/4/2024  1. Small right pleural effusion with partial atelectasis or pneumonia at the right lung base. 2. Stable left upper lobe perihilar malignant spiculated mass. 3. Progressive metastatic lytic lesion in the left humeral head. 4. Lytic lesion in the right posterior 6th rib.     FL MODIFIED BARIUM SWALLOW W VIDEO    Result Date: 4/3/2024  1. No penetration or aspiration with the above administered substances. 2. Please note patient expectorated the soft solid substance after extended mastication. Please see separate speech pathology report for full discussion of findings and recommendations.     XR CHEST PORTABLE    Result Date: 4/3/2024  New hazy right lung base airspace disease that may represent pneumonia. Recommend follow-up to resolution. Clearing of previously seen hazy left airspace disease.       Physical Examination:        General appearance: Drowsy but arousable, cooperative and no distress  Mental Status:  oriented to person, place and time and normal affect  Lungs: Diminished with coarse breath bilaterally, normal effort  Heart:  regular rate and rhythm, no murmur  Abdomen:  soft, nontender, nondistended, normal bowel sounds, no masses, hepatomegaly, splenomegaly  Extremities:  no edema, redness, tenderness in the calves  Skin:  no gross lesions, rashes, induration    Assessment:        Hospital Problems             Last Modified POA    * (Principal) Hypercalcemia 4/3/2024 Yes    ETOH abuse 4/3/2024 Yes    Major depressive disorder, recurrent severe without psychotic features (HCC) (Chronic) 4/3/2024 Yes    Hypothyroidism (Chronic) 4/3/2024 Yes    Hypertension 4/3/2024 Yes    Hyponatremia 4/3/2024 Yes    Malignant neoplasm of lung (HCC)  4/3/2024 Yes    Hyperlipemia 4/3/2024 Yes       Plan:        Oncology evaluation, assist with management of hypercalcemia and malignancy  IV hydration pending improvement in calcium  IV Lasix to avoid volume overload  Monitor and control blood pressure  Trend labs, correct electrolytes as needed  Monitor for signs and symptoms of withdrawal  PT and OT  GI and DVT prophylaxis  Trend lab    Romulo Boone DO  4/4/2024  7:29 AM

## 2024-04-04 NOTE — RT PROTOCOL NOTE
RT Inhaler-Nebulizer Bronchodilator Protocol Note    There is a bronchodilator order in the chart from a provider indicating to follow the RT Bronchodilator Protocol and there is an “Initiate RT Inhaler-Nebulizer Bronchodilator Protocol” order as well (see protocol at bottom of note).    CXR Findings:  XR CHEST PORTABLE    Result Date: 4/4/2024  1. Small right pleural effusion with partial atelectasis or pneumonia at the right lung base. 2. Stable left upper lobe perihilar malignant spiculated mass. 3. Progressive metastatic lytic lesion in the left humeral head. 4. Lytic lesion in the right posterior 6th rib.     XR CHEST PORTABLE    Result Date: 4/3/2024  New hazy right lung base airspace disease that may represent pneumonia. Recommend follow-up to resolution. Clearing of previously seen hazy left airspace disease.       The findings from the last RT Protocol Assessment were as follows:   History Pulmonary Disease: Chronic pulmonary disease  Respiratory Pattern: Dyspnea on exertion or RR 21-25 bpm  Breath Sounds: Clear breath sounds  Cough: Strong, spontaneous, non-productive  Indication for Bronchodilator Therapy: None  Bronchodilator Assessment Score: 4    Aerosolized bronchodilator medication orders have been revised according to the RT Inhaler-Nebulizer Bronchodilator Protocol below.    Respiratory Therapist to perform RT Therapy Protocol Assessment initially then follow the protocol.  Repeat RT Therapy Protocol Assessment PRN for score 0-3 or on second treatment, BID, and PRN for scores above 3.    No Indications - adjust the frequency to every 6 hours PRN wheezing or bronchospasm, if no treatments needed after 48 hours then discontinue using Per Protocol order mode.     If indication present, adjust the RT bronchodilator orders based on the Bronchodilator Assessment Score as indicated below.  Use Inhaler orders unless patient has one or more of the following: on home nebulizer, not able to hold breath for 10

## 2024-04-04 NOTE — CARE COORDINATION
Transitional planning    Writer to room  to discuss d/c plan, patient very lethargic, unable to accurately get info, call to Brother Daquan, plan TBD based on clinical status, per  Daquan would like referrral to [facility] and has been talking to them .      1345 call from Yelena with [facility], cannot accept, will obtain more choices from family.      1600 met with LUIGI from [employer] (company contracted through [facility]) and discussed plan, she works with patients directly in [facility] world and assists with placements, she will discuss SNF choices again with family, provided list and list of facilities that accept ETOH abuse.

## 2024-04-04 NOTE — PLAN OF CARE
Problem: Discharge Planning  Goal: Discharge to home or other facility with appropriate resources  4/4/2024 0055 by Ashley Wilson RN  Outcome: Progressing  4/4/2024 0054 by Ashley Wilson RN  Outcome: Progressing     Problem: Skin/Tissue Integrity  Goal: Absence of new skin breakdown  Description: 1.  Monitor for areas of redness and/or skin breakdown  2.  Assess vascular access sites hourly  3.  Every 4-6 hours minimum:  Change oxygen saturation probe site  4.  Every 4-6 hours:  If on nasal continuous positive airway pressure, respiratory therapy assess nares and determine need for appliance change or resting period.  4/4/2024 0055 by Ashley Wilson RN  Outcome: Progressing  4/4/2024 0054 by Ashley Wilson RN  Outcome: Progressing     Problem: Safety - Adult  Goal: Free from fall injury  4/4/2024 0055 by Ashley Wilson RN  Outcome: Progressing  4/4/2024 0054 by Ashley Wilson RN  Outcome: Progressing     Problem: Neurosensory - Adult  Goal: Achieves stable or improved neurological status  Outcome: Progressing     Problem: Respiratory - Adult  Goal: Achieves optimal ventilation and oxygenation  Outcome: Progressing

## 2024-04-04 NOTE — PROGRESS NOTES
Received patient from  via bed.    Roomed by: Stacey WHITEHEAD    LCV: NPT    HPI: 22 year old female presents for mole on back. Present for many years. Patient noted mole has changed in texture and around the borders.     Does this patient have a family history of skin cancer?: no   Does this patient use tobacco products?: no    No other symptoms, aggravating factors, or treatments noted.  ROS: besides positives in HPI all other systems negative.  The allergy and medication lists were reviewed in the electronic medical record today.  Patient reports that she has never smoked. She has never used smokeless tobacco. She reports that she does not drink alcohol or use drugs.  Patient family history includes Anxiety disorder in her mother and sister; Cancer in her paternal aunt and paternal grandfather; Depression in her mother; Hypertension in her mother; Osteoarthritis in her father.    PE: General: alert, WDWN, no respiratory distress, normal affect  Eyes: no injection  Focused skin exam significant for: approximately 8mm dark brown, asymmetric patch located on the lower back  Face(including eyelids and lips)/Neck/Hands: no additional lesions that appear malignant    A/P:  1. Neoplasm of uncertain nature of skin  - previously undiagnosed new problem to provider with uncertain prognosis and additional work-up planned (see below)  - performed removal today (see procedure note below)    PROCEDURE NOTE: Removal with shave technique of a neoplasm of uncertain nature of skin  DIFFERENTIAL DIAGNOSIS: dysplastic nevus vs other  LOCATION: back  DIAMETER (along the greatest dimension): 8mm    After discussing the risks, the procedure was consented to.  The surgical area was cleaned with alcohol, and anesthetized with 0.5cc of 1% Xylocaine with epinephrine.  The lesion was completely removed down to the level of the dermis with the use of a blade.  The specimen was placed in transport medium, visualized in container, and sent to pathology.  Hemostasis was obtained.  The  surgical site was bandaged.  Aftercare was discussed.  Patient tolerated procedure well.    RTC: TBD based on pathology results    Electronically Signed by: Avila Agrawal MD, 8/3/2020

## 2024-04-04 NOTE — PROGRESS NOTES
Notified by nsg patient with worsening respiratory symptoms, shortness of breath.  Initial cxr with suspected pna.  Started abx w/ leukocytosis with breathing treatments and flutter valve therapy.     Calcium remains elevated w/ known metastatic lung ca. Pending nephrology input, on ivfs.

## 2024-04-04 NOTE — PROGRESS NOTES
Physical Therapy  Facility/Department: [address] [facility]  Physical Therapy Initial Assessment    Name: Danny Guevara  : 1960  MRN: [id number]  Date of Service: 2024    Discharge Recommendations:  Patient would benefit from continued therapy after discharge          Patient Diagnosis(es): The primary encounter diagnosis was General weakness. A diagnosis of Hypercalcemia was also pertinent to this visit.  Past Medical History:  has a past medical history of Alcohol abuse, Anemia, Bronchitis, Cancer (HCC), Cervical stenosis of spine, Chronic left-sided low back pain with left-sided sciatica, COPD (chronic obstructive pulmonary disease) (HCC), Hypertension, Insomnia, Intentional drug overdose (HCC), Left arm numbness, Left lumbar radiculitis, Macrocytosis, Major depressive disorder, recurrent severe without psychotic features (HCC), Myeloproliferative disorder (HCC), Osteoarthritis of spine with radiculopathy, lumbar region, Pneumonia, Severe recurrent major depression without psychotic features (HCC), Spondylosis of lumbar region without myelopathy or radiculopathy, Urinary retention, Wears dentures, and Wellness examination.  Past Surgical History:  has a past surgical history that includes cervical fusion; Appendectomy; Tonsillectomy; Carpal tunnel release; epidural steroid injection (Left, 05/15/2019); Endoscopy, colon, diagnostic; cervical fusion (N/A, 2019); bronchoscopy (2024); bronchoscopy (2024); bronchoscopy (2024); and bronchoscopy (2024).    Assessment   Body Structures, Functions, Activity Limitations Requiring Skilled Therapeutic Intervention: Decreased functional mobility ;Decreased strength;Decreased endurance;Decreased balance;Decreased cognition  Assessment: The pt transferred sit to stand with min assist. He had difficulty advancing due to increased lethargy and an inability to stay focused. He wsa alert to name only. He could benefit from a continuation of PT for  pain related to his cancer (L arm pain)))  Homemaking Assistance: Independent  Homemaking Responsibilities: Yes  Ambulation Assistance: Independent (w/o DME)  Transfer Assistance: Independent  Active : No  Patient's  Info: bus, [employer] provides transportation as needed, friends  Occupation: Retired  Type of Occupation:   Additional Comments: Pt questionable historian this date and unable to answer social function questions d/t cognition.  Vision/Hearing  Vision  Vision:  (DINA d/t cognition)  Hearing  Hearing:  (DINA d/t cognition)    Cognition   Orientation  Overall Orientation Status: Impaired  Orientation Level: Oriented to person;Disoriented to situation;Disoriented to time;Disoriented to place  Cognition  Overall Cognitive Status: Exceptions  Arousal/Alertness: Inconsistent responses to stimuli  Following Commands: Follows one step commands with increased time;Follows one step commands with repetition  Attention Span: Difficulty attending to directions  Memory: Decreased recall of biographical Information;Decreased recall of recent events  Safety Judgement: Decreased awareness of need for assistance;Decreased awareness of need for safety  Problem Solving: Decreased awareness of errors  Insights: Decreased awareness of deficits  Initiation: Requires cues for all  Sequencing: Requires cues for all  Cognition Comment: Pt required max verbal cues for attention to task this date w/ poor return. Pt required max verbal cues for initiation/sequencing this date requiring max tactile cues to initiate and continue movement. Pt extremely lethargic t/o session requiring max verbal cues to maintain alertness.     Objective   Pulse: 88  Heart Rate Source: Monitor  BP: (!) 160/77  BP Location: Right upper arm  BP Method: Automatic  Patient Position: Semi fowlers  MAP (Calculated): 105  Respirations: 22  SpO2: 90 %  O2 Device: Nasal cannula  Temp: 99.1 °F (37.3 °C)              AROM RLE (degrees)  RLE

## 2024-04-04 NOTE — PROGRESS NOTES
Physician Progress Note      PATIENT:               FIDEL ABRAMS  [facility] #:                  [id number]  :                       1960  ADMIT DATE:       4/3/2024 3:34 AM  DISCH DATE:  RESPONDING  PROVIDER #:        Romulo Boone DO          QUERY TEXT:    Patient admitted on 4/3  with Hypercalcemia with known stage 4 Metastatic lung   carcinoma, noted to have / progress note of worsening respiratory symptoms   with suspected pneumonia and iv antibiotics are initiated. CXR on  showed   small rt pleural effusion with partial atelectasis or pneumonia. Per lab WBC   27.6>28, procalcitonin .15, Temperature 35.8 on admission with heart rate   90-95. resp rate from 20-25 . If possible, please document in progress notes   and discharge summary if you are evaluating and/or treating any of the   following:    The medical record reflects the following:  Risk Factors: Lung cancer, COPD , ETOH abuse , myeloproliferative disorder  Clinical Indicators:admitted on 4/3  with Hypercalcemia with known stage 4   Metastatic lung carcinoma, noted to have 4/ progress note of worsening   respiratory symptoms with suspected pneumonia and iv antibiotics are   initiated. CXR on  showed small rt pleural effusion with partial   atelectasis or pneumonia. Per lab WBC 27.6>28, procalcitonin .15, Temperature   35.8 on admission with heart rate 90-95. resp rate from 20-25  Treatment: IV Cefepime, CXR, Barium swallow, lab monitoring of CBC,   procalcitonin , Oxygen, Aerosols    Thank You Jonel KABA BSN CCDS  Email [email or web address]  Cell [phone]  office hours M-F 6am to 2:30p  Options provided:  -- Pneumonia with Sepsis  -- Pneumonia without Sepsis  -- Pneumonia ruled out after study  -- Other - I will add my own diagnosis  -- Disagree - Not applicable / Not valid  -- Disagree - Clinically unable to determine / Unknown  -- Refer to Clinical Documentation Reviewer    PROVIDER RESPONSE TEXT:    this patient has Pneumonia
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
## 2024-04-05 LAB
ANION GAP SERPL CALCULATED.3IONS-SCNC: 14 MMOL/L (ref 9–16)
BUN SERPL-MCNC: 17 MG/DL (ref 8–23)
CA-I BLD-SCNC: 1.58 MMOL/L (ref 1.13–1.33)
CA-I BLD-SCNC: 1.59 MMOL/L (ref 1.13–1.33)
CA-I BLD-SCNC: 1.6 MMOL/L (ref 1.13–1.33)
CA-I BLD-SCNC: 1.62 MMOL/L (ref 1.13–1.33)
CALCIUM SERPL-MCNC: 12.9 MG/DL (ref 8.6–10.4)
CHLORIDE SERPL-SCNC: 104 MMOL/L (ref 98–107)
CO2 SERPL-SCNC: 24 MMOL/L (ref 20–31)
CREAT SERPL-MCNC: 1.2 MG/DL (ref 0.7–1.2)
ERYTHROCYTE [DISTWIDTH] IN BLOOD BY AUTOMATED COUNT: 15.1 % (ref 11.8–14.4)
GFR SERPL CREATININE-BSD FRML MDRD: 66 ML/MIN/1.73M2
GLUCOSE BLD-MCNC: 134 MG/DL (ref 75–110)
GLUCOSE SERPL-MCNC: 116 MG/DL (ref 74–99)
HCT VFR BLD AUTO: 40.8 % (ref 40.7–50.3)
HGB BLD-MCNC: 13.1 G/DL (ref 13–17)
MCH RBC QN AUTO: 28.9 PG (ref 25.2–33.5)
MCHC RBC AUTO-ENTMCNC: 32.1 G/DL (ref 28.4–34.8)
MCV RBC AUTO: 90.1 FL (ref 82.6–102.9)
NRBC BLD-RTO: 0 PER 100 WBC
PLATELET # BLD AUTO: 657 K/UL (ref 138–453)
PMV BLD AUTO: 9.6 FL (ref 8.1–13.5)
POTASSIUM SERPL-SCNC: 3.1 MMOL/L (ref 3.7–5.3)
PROCALCITONIN SERPL-MCNC: 0.21 NG/ML (ref 0–0.09)
RBC # BLD AUTO: 4.53 M/UL (ref 4.21–5.77)
SODIUM SERPL-SCNC: 142 MMOL/L (ref 136–145)
WBC OTHER # BLD: 25.9 K/UL (ref 3.5–11.3)

## 2024-04-05 PROCEDURE — 84145 PROCALCITONIN (PCT): CPT

## 2024-04-05 PROCEDURE — 6360000002 HC RX W HCPCS: Performed by: NURSE PRACTITIONER

## 2024-04-05 PROCEDURE — 6360000002 HC RX W HCPCS: Performed by: INTERNAL MEDICINE

## 2024-04-05 PROCEDURE — 2700000000 HC OXYGEN THERAPY PER DAY

## 2024-04-05 PROCEDURE — 6370000000 HC RX 637 (ALT 250 FOR IP): Performed by: INTERNAL MEDICINE

## 2024-04-05 PROCEDURE — 94640 AIRWAY INHALATION TREATMENT: CPT

## 2024-04-05 PROCEDURE — 99232 SBSQ HOSP IP/OBS MODERATE 35: CPT | Performed by: INTERNAL MEDICINE

## 2024-04-05 PROCEDURE — 6370000000 HC RX 637 (ALT 250 FOR IP): Performed by: NURSE PRACTITIONER

## 2024-04-05 PROCEDURE — 2580000003 HC RX 258: Performed by: INTERNAL MEDICINE

## 2024-04-05 PROCEDURE — 85027 COMPLETE CBC AUTOMATED: CPT

## 2024-04-05 PROCEDURE — 82330 ASSAY OF CALCIUM: CPT

## 2024-04-05 PROCEDURE — 2580000003 HC RX 258: Performed by: NURSE PRACTITIONER

## 2024-04-05 PROCEDURE — 94761 N-INVAS EAR/PLS OXIMETRY MLT: CPT

## 2024-04-05 PROCEDURE — 97530 THERAPEUTIC ACTIVITIES: CPT

## 2024-04-05 PROCEDURE — 82947 ASSAY GLUCOSE BLOOD QUANT: CPT

## 2024-04-05 PROCEDURE — 2060000000 HC ICU INTERMEDIATE R&B

## 2024-04-05 PROCEDURE — 36415 COLL VENOUS BLD VENIPUNCTURE: CPT

## 2024-04-05 PROCEDURE — 80048 BASIC METABOLIC PNL TOTAL CA: CPT

## 2024-04-05 RX ORDER — POTASSIUM CHLORIDE 7.45 MG/ML
10 INJECTION INTRAVENOUS
Status: COMPLETED | OUTPATIENT
Start: 2024-04-05 | End: 2024-04-05

## 2024-04-05 RX ORDER — IPRATROPIUM BROMIDE AND ALBUTEROL SULFATE 2.5; .5 MG/3ML; MG/3ML
1 SOLUTION RESPIRATORY (INHALATION) 3 TIMES DAILY
Status: DISCONTINUED | OUTPATIENT
Start: 2024-04-05 | End: 2024-04-08 | Stop reason: HOSPADM

## 2024-04-05 RX ORDER — POTASSIUM CHLORIDE 7.45 MG/ML
10 INJECTION INTRAVENOUS
Status: DISCONTINUED | OUTPATIENT
Start: 2024-04-05 | End: 2024-04-05

## 2024-04-05 RX ORDER — THIAMINE HYDROCHLORIDE 100 MG/ML
100 INJECTION, SOLUTION INTRAMUSCULAR; INTRAVENOUS 3 TIMES DAILY
Status: DISCONTINUED | OUTPATIENT
Start: 2024-04-05 | End: 2024-04-08 | Stop reason: HOSPADM

## 2024-04-05 RX ADMIN — Medication 2 MG: at 18:34

## 2024-04-05 RX ADMIN — MORPHINE SULFATE 4 MG: 4 INJECTION INTRAVENOUS at 13:57

## 2024-04-05 RX ADMIN — FUROSEMIDE 20 MG: 10 INJECTION, SOLUTION INTRAMUSCULAR; INTRAVENOUS at 08:17

## 2024-04-05 RX ADMIN — SODIUM CHLORIDE, PRESERVATIVE FREE 10 ML: 5 INJECTION INTRAVENOUS at 21:47

## 2024-04-05 RX ADMIN — FUROSEMIDE 20 MG: 10 INJECTION, SOLUTION INTRAMUSCULAR; INTRAVENOUS at 17:51

## 2024-04-05 RX ADMIN — CETIRIZINE HYDROCHLORIDE 10 MG: 10 TABLET ORAL at 08:16

## 2024-04-05 RX ADMIN — PRAZOSIN HYDROCHLORIDE 1 MG: 1 CAPSULE ORAL at 08:16

## 2024-04-05 RX ADMIN — Medication 2 MG: at 01:32

## 2024-04-05 RX ADMIN — OXYCODONE 15 MG: 5 TABLET ORAL at 03:58

## 2024-04-05 RX ADMIN — Medication 2 MG: at 11:56

## 2024-04-05 RX ADMIN — SODIUM CHLORIDE, PRESERVATIVE FREE 10 ML: 5 INJECTION INTRAVENOUS at 11:57

## 2024-04-05 RX ADMIN — SODIUM CHLORIDE TAB 1 GM 2 G: 1 TAB at 08:17

## 2024-04-05 RX ADMIN — Medication 100 MG: at 08:17

## 2024-04-05 RX ADMIN — POTASSIUM CHLORIDE 10 MEQ: 7.46 INJECTION, SOLUTION INTRAVENOUS at 22:11

## 2024-04-05 RX ADMIN — THIAMINE HYDROCHLORIDE 100 MG: 100 INJECTION, SOLUTION INTRAMUSCULAR; INTRAVENOUS at 22:29

## 2024-04-05 RX ADMIN — POTASSIUM CHLORIDE 10 MEQ: 7.46 INJECTION, SOLUTION INTRAVENOUS at 21:08

## 2024-04-05 RX ADMIN — IPRATROPIUM BROMIDE AND ALBUTEROL SULFATE 1 DOSE: .5; 3 SOLUTION RESPIRATORY (INHALATION) at 08:16

## 2024-04-05 RX ADMIN — MORPHINE SULFATE 4 MG: 4 INJECTION INTRAVENOUS at 21:31

## 2024-04-05 RX ADMIN — LORAZEPAM 3 MG: 2 INJECTION INTRAMUSCULAR; INTRAVENOUS at 22:14

## 2024-04-05 RX ADMIN — IPRATROPIUM BROMIDE AND ALBUTEROL SULFATE 1 DOSE: 2.5; .5 SOLUTION RESPIRATORY (INHALATION) at 19:39

## 2024-04-05 RX ADMIN — ALCOHOL 1 TABLET: 70.47 GEL TOPICAL at 08:16

## 2024-04-05 RX ADMIN — POTASSIUM CHLORIDE 10 MEQ: 7.46 INJECTION, SOLUTION INTRAVENOUS at 18:37

## 2024-04-05 RX ADMIN — CEFEPIME 2000 MG: 2 INJECTION, POWDER, FOR SOLUTION INTRAVENOUS at 14:27

## 2024-04-05 RX ADMIN — Medication 2 MG: at 05:03

## 2024-04-05 RX ADMIN — LEVOTHYROXINE SODIUM 25 MCG: 50 TABLET ORAL at 06:13

## 2024-04-05 RX ADMIN — MORPHINE SULFATE 4 MG: 4 INJECTION INTRAVENOUS at 17:51

## 2024-04-05 RX ADMIN — LOSARTAN POTASSIUM 50 MG: 50 TABLET, FILM COATED ORAL at 08:16

## 2024-04-05 RX ADMIN — MORPHINE SULFATE 4 MG: 4 INJECTION INTRAVENOUS at 00:16

## 2024-04-05 RX ADMIN — SODIUM CHLORIDE, PRESERVATIVE FREE 10 ML: 5 INJECTION INTRAVENOUS at 08:18

## 2024-04-05 RX ADMIN — MORPHINE SULFATE 4 MG: 4 INJECTION INTRAVENOUS at 03:04

## 2024-04-05 RX ADMIN — POTASSIUM CHLORIDE 10 MEQ: 7.46 INJECTION, SOLUTION INTRAVENOUS at 19:57

## 2024-04-05 RX ADMIN — ENOXAPARIN SODIUM 40 MG: 100 INJECTION SUBCUTANEOUS at 08:16

## 2024-04-05 RX ADMIN — IPRATROPIUM BROMIDE AND ALBUTEROL SULFATE 1 DOSE: .5; 3 SOLUTION RESPIRATORY (INHALATION) at 01:50

## 2024-04-05 RX ADMIN — ACETAMINOPHEN 650 MG: 325 TABLET ORAL at 00:17

## 2024-04-05 RX ADMIN — FERROUS SULFATE TAB EC 325 MG (65 MG FE EQUIVALENT) 325 MG: 325 (65 FE) TABLET DELAYED RESPONSE at 08:16

## 2024-04-05 RX ADMIN — CEFEPIME 2000 MG: 2 INJECTION, POWDER, FOR SOLUTION INTRAVENOUS at 01:35

## 2024-04-05 RX ADMIN — THIAMINE HYDROCHLORIDE 100 MG: 100 INJECTION, SOLUTION INTRAMUSCULAR; INTRAVENOUS at 17:51

## 2024-04-05 ASSESSMENT — PAIN DESCRIPTION - LOCATION
LOCATION: GENERALIZED

## 2024-04-05 ASSESSMENT — PAIN DESCRIPTION - ONSET
ONSET: ON-GOING

## 2024-04-05 ASSESSMENT — PAIN DESCRIPTION - ORIENTATION: ORIENTATION: MID

## 2024-04-05 ASSESSMENT — PAIN SCALES - WONG BAKER
WONGBAKER_NUMERICALRESPONSE: HURTS WHOLE LOT

## 2024-04-05 ASSESSMENT — PAIN DESCRIPTION - DESCRIPTORS
DESCRIPTORS: ACHING;DISCOMFORT
DESCRIPTORS: ACHING;DISCOMFORT
DESCRIPTORS: DISCOMFORT
DESCRIPTORS: DISCOMFORT

## 2024-04-05 ASSESSMENT — PAIN DESCRIPTION - PAIN TYPE
TYPE: CHRONIC PAIN

## 2024-04-05 ASSESSMENT — PAIN - FUNCTIONAL ASSESSMENT
PAIN_FUNCTIONAL_ASSESSMENT: PREVENTS OR INTERFERES SOME ACTIVE ACTIVITIES AND ADLS

## 2024-04-05 ASSESSMENT — PAIN DESCRIPTION - FREQUENCY
FREQUENCY: CONTINUOUS

## 2024-04-05 ASSESSMENT — PAIN SCALES - GENERAL
PAINLEVEL_OUTOF10: 8
PAINLEVEL_OUTOF10: 7

## 2024-04-05 NOTE — CARE COORDINATION
Transitional planning      Call to son Daquan and they are interested in [facility] Hosp[facility], will return call with facility choice.      1300 writer to room, met with daughter Candie to discuss hospice, provided list for choice, chooses [facility], spoke with Leonela, referral sent.

## 2024-04-05 NOTE — PROGRESS NOTES
Spoke with patients daughter Candie. Updated her on patients status. Notified her that patient has moderate termor's and not eating. She states that is how the patient gets when he goes into withdrawal. Candie states patient drink more then 15 beers a day.

## 2024-04-05 NOTE — PLAN OF CARE
Problem: Discharge Planning  Goal: Discharge to home or other facility with appropriate resources  Outcome: Not Progressing  Flowsheets (Taken 4/5/2024 0349)  Discharge to home or other facility with appropriate resources:   Identify barriers to discharge with patient and caregiver   Arrange for needed discharge resources and transportation as appropriate     Problem: Respiratory - Adult  Goal: Achieves optimal ventilation and oxygenation  4/5/2024 0349 by Dawn Staples RN  Outcome: Not Progressing  Flowsheets (Taken 4/5/2024 0349)  Achieves optimal ventilation and oxygenation:   Assess for changes in mentation and behavior   Assess for changes in respiratory status  Note: Patient continues to need oxygen  4/4/2024 1959 by Meagan Buckley RCP  Outcome: Progressing     Problem: Skin/Tissue Integrity  Goal: Absence of new skin breakdown  Description: 1.  Monitor for areas of redness and/or skin breakdown  2.  Assess vascular access sites hourly  3.  Every 4-6 hours minimum:  Change oxygen saturation probe site  4.  Every 4-6 hours:  If on nasal continuous positive airway pressure, respiratory therapy assess nares and determine need for appliance change or resting period.  Outcome: Progressing     Problem: Safety - Adult  Goal: Free from fall injury  Outcome: Progressing  Flowsheets (Taken 4/5/2024 0349)  Free From Fall Injury: Instruct family/caregiver on patient safety     Problem: Neurosensory - Adult  Goal: Achieves stable or improved neurological status  Outcome: Progressing  Flowsheets (Taken 4/5/2024 0349)  Achieves stable or improved neurological status:   Assess for and report changes in neurological status   Maintain blood pressure and fluid volume within ordered parameters to optimize cerebral perfusion and minimize risk of hemorrhage     Problem: Confusion  Goal: Confusion, delirium, dementia, or psychosis is improved or at baseline  Description: INTERVENTIONS:  1. Assess for possible contributors

## 2024-04-05 NOTE — CARE COORDINATION
Transitional Planning:   Rec'd call from Daquan Guevara pt brother/POA very upset. States he is getting horrible care, numerous complaints. Stating we are treating him poorly because he is an alcoholic. Yelling and talking over CM, threatening . Attempted numerous times to reassure and calm to discuss discharge plan. He keeps stating he wants inpatient hospice but upset that [facility]  did not have beds. Then upset because [facility] preference given by daughter is too far away. CM asked for additional choices numerous times and Daquan kept going back and fourth and then stated pt was [facility] to admission, then states he is unsure if he wants to continue. CM was able to calm POA. Wants referrals to [facility] and [facility] inpt ve[place] and  locations.   1500- Spoke with daughter at bedside and notified of above. Daughter states it has been difficult since uncle /POA is not here but ultimately does not matter where he goes and realizes it is POA decision. States pt care was rough at first but has improved and denies any concerns for care at this time. Goal is for discharge with hospice care.   1540-Spoke with Alee at [facility] to f/u on referral sent . Report given on pt. States pt was seen in March and previously refused. Pt has POA  brother Daquan's number confirmed. She will contact POA  for approval and then can schedule on site visit to evaluate to see if pt appropriate for inpt hospice . RN updated on above

## 2024-04-05 NOTE — PLAN OF CARE
Problem: Respiratory - Adult  Goal: Achieves optimal ventilation and oxygenation  Outcome: Progressing

## 2024-04-05 NOTE — RT PROTOCOL NOTE
RT Inhaler-Nebulizer Bronchodilator Protocol Note    There is a bronchodilator order in the chart from a provider indicating to follow the RT Bronchodilator Protocol and there is an “Initiate RT Inhaler-Nebulizer Bronchodilator Protocol” order as well (see protocol at bottom of note).    CXR Findings:  XR CHEST PORTABLE    Result Date: 4/4/2024  1. Small right pleural effusion with partial atelectasis or pneumonia at the right lung base. 2. Stable left upper lobe perihilar malignant spiculated mass. 3. Progressive metastatic lytic lesion in the left humeral head. 4. Lytic lesion in the right posterior 6th rib.       The findings from the last RT Protocol Assessment were as follows:   History Pulmonary Disease: Chronic pulmonary disease  Respiratory Pattern: Dyspnea on exertion or RR 21-25 bpm  Breath Sounds: Slightly diminished and/or crackles  Cough: Weak, productive  Indication for Bronchodilator Therapy: None  Bronchodilator Assessment Score: 8    Aerosolized bronchodilator medication orders have been revised according to the RT Inhaler-Nebulizer Bronchodilator Protocol below.    Respiratory Therapist to perform RT Therapy Protocol Assessment initially then follow the protocol.  Repeat RT Therapy Protocol Assessment PRN for score 0-3 or on second treatment, BID, and PRN for scores above 3.    No Indications - adjust the frequency to every 6 hours PRN wheezing or bronchospasm, if no treatments needed after 48 hours then discontinue using Per Protocol order mode.     If indication present, adjust the RT bronchodilator orders based on the Bronchodilator Assessment Score as indicated below.  Use Inhaler orders unless patient has one or more of the following: on home nebulizer, not able to hold breath for 10 seconds, is not alert and oriented, cannot activate and use MDI correctly, or respiratory rate 25 breaths per minute or more, then use the equivalent nebulizer order(s) with same Frequency and PRN reasons based on

## 2024-04-05 NOTE — PROGRESS NOTES
[facility]  Office: [phone]  Rio Blair DO, Javier Walters, DO, Romulo Boone DO, Miquel Casey, DO, Nataly Priest MD, Daly Mendoza MD, Darius Mcmahon MD, Mary Swain MD,  Ad Goode MD, Vale Peña MD, Rancho Harley MD,  Julia Goddard DO, Bruno Frank MD, Nabeel Spicer MD, Danny Blair DO, Lulú Hernandez MD,  Piyush Lepe DO, Anabela Cummings MD, Valentine Brian MD, Caren Slaughter MD, Reddy Cagle MD,  Ibrahima Fortune MD, Idalia Girard MD, Zunilda Whiting MD, Konstantin Mane MD, Lucho Haq MD, Oswaldo Crawford MD, Jc Acuña DO, De Cochran DO, Sole Mcgovern MD,  Vinay Meza MD, Shirley Waterhouse, CNP,  Paulette Cook, CNP, Ruslan Ward, CNP,  Doreen Corcoran, DNP, Twyla Pacheco, CNP, Ashley Mesa, CNP, Karma Sage, CNP, Tanvi Jim, CNP, Lizzie Esqueda, PA-C, Tova Romano, PA-C, Deb Crespo, CNP, Ronda Mchugh, CNP, Marco A Mar, CNP, Danielle Gonzalez, CNP, Linn Azul, CNP, Shwetha León, CNS, Estrella Bledsoe, CNP, Sera Kruger, CNP, Tracy Schwab, CNP         [facility]   IN-PATIENT SERVICE   [facility]    Progress Note    4/5/2024    9:29 AM    Name:   Danny Guevara  MRN:     [id number]     Acct:      [id number]   Room:   0406/0406-01   Day:  2  Admit Date:  4/3/2024  3:34 AM    PCP:   Roberto Donaldson MD  Code Status:  DNR-CCA    Subjective:     C/C:   Chief Complaint   Patient presents with    Fatigue     Interval History Status: not changed.     Patient remains encephalopathic, eye-opening but does not follow commands.    Brief History:     Per H&P  \"Danny Guevara is a 63 y.o. Non- / non  male who presents with Fatigue  and is admitted to the hospital for the management of Hypercalcemia.  Patient has history significant for stage IV metastatic lung carcinoma, COPD, hypertension, and hyperlipidemia.       Patient was recently admitted to [facility] 3/29/2024 for  altered mental status.  There was concern for Wernicke's encephalopathy due to patient's history of alcoholism.  Patient was given IV thiamine with improved mentation.  Patient was discharged 3/30/2024.     Patient presented to the ED this morning for generalized weakness.  Patient was slightly hypertensive at 159/78.  Labs significant for troponin 39 > 52, hypercalcemia 14.7 with ionized calcium 1.7.  All other labs are at baseline.  Patient was admitted for hypercalcemia and concern for unsafe living conditions at home.     Patient seen and examined at bedside.  Patient is AO x 3 but does have some bouts of confusion.  He has difficulty answering some questions but does have an understanding of the situation.  Patient is DNR-CCA would like to pursue inpatient hospice.\"    Review of Systems:     Cannot be obtained due to encephalopathy    Medications:     Allergies:  No Known Allergies    Current Meds:   Scheduled Meds:    ipratropium 0.5 mg-albuterol 2.5 mg  1 Dose Inhalation TID    cefepime  2,000 mg IntraVENous Q12H    sodium chloride flush  5-40 mL IntraVENous 2 times per day    furosemide  20 mg IntraVENous BID    [Held by provider] furosemide  20 mg Oral Daily    QUEtiapine  300 mg Oral Nightly    levothyroxine  25 mcg Oral QAM AC    losartan  50 mg Oral Daily    rosuvastatin  5 mg Oral Nightly    sodium chloride  2 g Oral BID WC    multivitamin  1 tablet Oral Daily    prazosin  1 mg Oral BID    hydroxyurea  500 mg Oral Daily    ferrous sulfate  325 mg Oral Daily with breakfast    fentaNYL  1 patch TransDERmal Q72H    thiamine  100 mg Oral Daily    pantoprazole  40 mg Oral QAM AC    cetirizine  10 mg Oral Daily    sodium chloride flush  5-40 mL IntraVENous 2 times per day    enoxaparin  40 mg SubCUTAneous Daily     Continuous Infusions:    sodium chloride      sodium chloride 75 mL/hr at 04/04/24 1127    sodium chloride       PRN Meds: ipratropium 0.5 mg-albuterol 2.5 mg, morphine **OR** morphine, sodium

## 2024-04-05 NOTE — DISCHARGE INSTR - COC
Continuity of Care Form    Patient Name: Danny Guevara   :  1960  MRN:  [id number]    Admit date:  4/3/2024  Discharge date:  ***    Code Status Order: DNR-CCA   Advance Directives:     Admitting Physician:  Nabeel Spicer MD  PCP: Roberto Donaldson MD    Discharging Nurse: ***  Discharging Hospital Unit/Room#: 0406/0406-01  Discharging Unit Phone Number: ***    Emergency Contact:   Extended Emergency Contact Information  Primary Emergency Contact: Daquan Guevara  Address: N/A  Home Phone: [phone]  Work Phone: [phone]  Mobile Phone: [phone]  Relation: Brother/Sister  Preferred language: English   needed? No  Secondary Emergency Contact: Marni Scott  Home Phone: [phone]  Work Phone: [phone]  Mobile Phone: [phone]  Relation: Child    Past Surgical History:  Past Surgical History:   Procedure Laterality Date    APPENDECTOMY      BRONCHOSCOPY  2024    BRONCHOSCOPY, LEFT LINGULA NODULE-TRANSBRONCHIAL BIOPSY, FINE NEEDLE ASPIRATION, MINI BRONCHOAVEOLAR LAVAGE, EBUS-TRANSBRONCHIAL NEEDLE ASPIRATION, RADIAL EBUS    BRONCHOSCOPY  2024    BRONCHOSCOPY ENDOBRONCHIAL ULTRASOUND performed by Herminio Brian MD at [facility] OR    BRONCHOSCOPY  2024    BRONCHOSCOPY BIOPSY BRONCHUS ROBOTIC performed by Herminio Brain MD at [facility] OR    BRONCHOSCOPY  2024    BRONCHOSCOPY ALVEOLAR LAVAGE ROBOTIC performed by Herminio Brian MD at [facility] OR    CARPAL TUNNEL RELEASE      bilateral     CERVICAL FUSION      s/p trampoline accident    CERVICAL FUSION N/A 2019    ANTERIOR CERVICAL DECOMPRESSION FUSION C3-4 performed by Tae Gates DO at [facility] OR    ENDOSCOPY, COLON, DIAGNOSTIC      EPIDURAL STEROID INJECTION Left 05/15/2019    EPIDURAL STEROID INJECTION LEFT L5S1 performed by Jayson Huizar MD at [facility] OR    TONSILLECTOMY         Immunization History:   Immunization History   Administered Date(s) Administered    COVID-19, MODERNA BLUE border, Primary or  Alcohol withdrawal syndrome with complication, with unspecified complication ([facility]) F10.939    Hyponatremia E87.1    Chronic obstructive pulmonary disease with acute exacerbation (HCC) J44.1    Lung nodule seen on imaging study R91.1    Thrombocytosis D75.839    Bronchitis J40    Cellulitis of lower extremity L03.119    Sepsis without acute organ dysfunction (HCC) A41.9    Acute pain of left shoulder M25.512    Tobacco use Z72.0    Malignant neoplasm of lung ([facility]) C34.90    Goals of care, counseling/discussion Z71.89    Drug-induced constipation K59.03    Palliative care encounter Z51.5    ACP (advance care planning) Z71.89    DNR (do not resuscitate) discussion Z71.89    Pathological fracture of rib M84.48XA    Cellulitis and abscess of left lower extremity L03.116, L02.416    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, left ([facility]) C34.92    Hypercalcemia of malignancy E83.52    Difficulty with speech R47.9    Stroke-like symptoms R29.90    Speech disturbance R47.9    Dysphonia R49.0    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    Wernicke encephalopathy E51.2    Cancer related pain G89.3    Metastasis to bone ([facility]) C79.51    Hypercalcemia E83.52    Hyperlipemia E78.5    Pneumonia due to infectious organism J18.9    Neurocognitive disorder R41.9    General weakness R53.1       Isolation/Infection:   Isolation            No Isolation          Patient Infection Status       Infection Onset Added Last Indicated Last Indicated By Review Planned Expiration Resolved Resolved By    None active    Resolved    Mycoplasma pneumonia 22 Mycoplasma Ab,IgM   22 Infection                        Nurse Assessment:  Last Vital Signs: /69   Pulse 95   Temp 98.7 °F (37.1 °C) (Axillary)   Resp 27   Ht 1.854 m (6' 1\")   Wt 84.4 kg (186 lb)   SpO2 93%   BMI 24.54 kg/m²     Last documented pain score (0-10 scale): Pain Level: 7  Last Weight:   Wt Readings from Last 1 Encounters:   24 84.4 kg

## 2024-04-05 NOTE — PLAN OF CARE
Problem: Discharge Planning  Goal: Discharge to home or other facility with appropriate resources  4/5/2024 0828 by Jody Raman RN  Outcome: Progressing     Problem: Skin/Tissue Integrity  Goal: Absence of new skin breakdown  Description: 1.  Monitor for areas of redness and/or skin breakdown  2.  Assess vascular access sites hourly  3.  Every 4-6 hours minimum:  Change oxygen saturation probe site  4.  Every 4-6 hours:  If on nasal continuous positive airway pressure, respiratory therapy assess nares and determine need for appliance change or resting period.  4/5/2024 0828 by Jody Raman RN  Outcome: Progressing     Problem: Safety - Adult  Goal: Free from fall injury  4/5/2024 0828 by Jody Raman RN  Outcome: Progressing     Problem: Confusion  Goal: Confusion, delirium, dementia, or psychosis is improved or at baseline  Description: INTERVENTIONS:  1. Assess for possible contributors to thought disturbance, including medications, impaired vision or hearing, underlying metabolic abnormalities, dehydration, psychiatric diagnoses, and notify attending LIP  2. [place] high risk fall precautions, as indicated  3. Provide frequent short contacts to provide reality reorientation, refocusing and direction  4. Decrease environmental stimuli, including noise as appropriate  5. Monitor and intervene to maintain adequate nutrition, hydration, elimination, sleep and activity  6. If unable to ensure safety without constant attention obtain sitter and review sitter guidelines with assigned personnel  7. Initiate Psychosocial CNS and Spiritual Care consult, as indicated  4/5/2024 0828 by Jody Raman RN  Outcome: Progressing     Problem: Discharge Planning  Goal: Discharge to home or other facility with appropriate resources  4/5/2024 0828 by Jody Raman RN  Outcome: Progressing  4/5/2024 0828 by Jody Raman RN  Outcome: Progressing  4/5/2024 0349 by Dawn Staples RN  Outcome: Not

## 2024-04-05 NOTE — PROGRESS NOTES
Progress Note               Date:                           4/5/2024  Patient name:           Danny Guevara  Date of admission:  4/3/2024  3:34 AM  MRN:   [id number]  YOB: 1960  PCP:                           Roberto Donaldson MD    Reason for Consult: management recommendations  Requesting Physician: Nabeel Spicer MD     CHIEF COMPLAINT: Hypercalcemia.    Subjective:       Patient was seen and examined chart reviewed.  Afebrile, hemodynamically stable.  Patient resting in bed this morning, sleeping, Unable to answer any orientation questions.  Patient's family planning for inpatient hospice.       HISTORY OF PRESENT ILLNESS:       The patient is a 63 y.o.  male who is admitted to the hospital chief complaint of generalized weakness.  Patient was also recently admitted at [facility] due to concerns regarding Warnicke's encephalopathy which improved with thiamine infusion.  Patient was subsequently discharged home.  Upon arrival in the ER the patient was noted to be afebrile and hemodynamically stable.  Patient lab workup showed calcium level of 14.7 and calcium 1.73.  Patient was treated with fluids.  WBC count 27,000.  Chest x-ray suggestive of pneumonia.  Patient started on antibiotics.  Oncology team consulted due to hypercalcemia.  Patient has received 1 dose of calcitonin.  Patient's creatinine is 1.1.  PTH checked on 4/3 was 8.0.  Patient was also vitamin D deficient        Patient is well-known to our practice.  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  Follows up with Dr. Landa.  Patient has history of JAK2 positive MPN.  Patient was recently discharged from hospital on 3/30.  Patient was last seen in oncology office back in March recently patient's goals of care and we discussed.  At that point patient had refused hospice.    Problem Lists:   Primary Problem:  Hypercalcemia   Current Problems:  Active Hospital Problems  clicks or gallops   Abdomen - soft, nontender, nondistended, no masses or organomegaly   Neurological - lethargic, unable to answer orientation questions  Musculoskeletal - no joint tenderness, deformity or swelling   Extremities - peripheral pulses normal, no pedal edema, no clubbing or cyanosis   Skin - normal coloration and turgor, no rashes, no suspicious skin lesions noted ,     Data:    No intake/output data recorded.  In: -   Out: 3900 [Urine:3900]    CBC:   Recent Labs     04/03/24  0456 04/04/24 0249 04/05/24  0737   WBC 27.6* 28.0* 25.9*   HGB 13.1 12.4* 13.1   * 664* 657*     BMP:    Recent Labs     04/03/24  1458 04/03/24  1942 04/04/24 0249 04/05/24  0737   *  --  135* 142   K 3.4* 3.5* 3.2* 3.1*     --  103 104   CO2 21  --  19* 24   BUN 13  --  12 17   CREATININE 1.1  --  1.1 1.2   GLUCOSE 102*  --  107* 116*     Hepatic: No results for input(s): \"AST\", \"ALT\", \"ALB\", \"BILITOT\", \"ALKPHOS\" in the last 72 hours.  INR:   Recent Labs     04/04/24  0249   INR 1.7     PTT:No results for input(s): \"PTT\" in the last 72 hours.  Labs:  General Labs:  CBC with Differential:    Lab Results   Component Value Date/Time    WBC 25.9 04/05/2024 07:37 AM    RBC 4.53 04/05/2024 07:37 AM    RBC 0-2 01/03/2022 08:23 PM    HGB 13.1 04/05/2024 07:37 AM    HCT 40.8 04/05/2024 07:37 AM     04/05/2024 07:37 AM    MCV 90.1 04/05/2024 07:37 AM    MCH 28.9 04/05/2024 07:37 AM    MCHC 32.1 04/05/2024 07:37 AM    RDW 15.1 04/05/2024 07:37 AM    NRBC 1 12/02/2021 07:04 AM    LYMPHOPCT 4 04/03/2024 04:56 AM    MONOPCT 10 04/03/2024 04:56 AM    BASOPCT 1 04/03/2024 04:56 AM    MONOSABS 2.76 04/03/2024 04:56 AM    LYMPHSABS 1.10 04/03/2024 04:56 AM    EOSABS 0.00 04/03/2024 04:56 AM    BASOSABS 0.28 04/03/2024 04:56 AM    DIFFTYPE NOT REPORTED 12/05/2021 07:40 AM     BMP:    Lab Results   Component Value Date/Time     04/05/2024 07:37 AM    K 3.1 04/05/2024 07:37 AM     04/05/2024 07:37 AM    CO2

## 2024-04-05 NOTE — PROGRESS NOTES
Physical Therapy  Facility/Department: [address] STEPDOWN  Physical Therapy Daily Treatment Note  Name: Danny Guevara  : 1960  MRN: [id number]  Date of Service: 2024    Discharge Recommendations:  Patient would benefit from continued therapy after discharge   PT Equipment Recommendations  Equipment Needed: No (continue to assess)      Patient Diagnosis(es): The primary encounter diagnosis was General weakness. A diagnosis of Hypercalcemia was also pertinent to this visit.  Past Medical History:  has a past medical history of Alcohol abuse, Anemia, Bronchitis, Cancer (HCC), Cervical stenosis of spine, Chronic left-sided low back pain with left-sided sciatica, COPD (chronic obstructive pulmonary disease) (HCC), Hypertension, Insomnia, Intentional drug overdose (HCC), Left arm numbness, Left lumbar radiculitis, Macrocytosis, Major depressive disorder, recurrent severe without psychotic features (HCC), Myeloproliferative disorder (HCC), Osteoarthritis of spine with radiculopathy, lumbar region, Pneumonia, Severe recurrent major depression without psychotic features (HCC), Spondylosis of lumbar region without myelopathy or radiculopathy, Urinary retention, Wears dentures, and Wellness examination.  Past Surgical History:  has a past surgical history that includes cervical fusion; Appendectomy; Tonsillectomy; Carpal tunnel release; epidural steroid injection (Left, 05/15/2019); Endoscopy, colon, diagnostic; cervical fusion (N/A, 2019); bronchoscopy (2024); bronchoscopy (2024); bronchoscopy (2024); and bronchoscopy (2024).    Assessment   Body Structures, Functions, Activity Limitations Requiring Skilled Therapeutic Intervention: Decreased functional mobility ;Decreased strength;Decreased endurance;Decreased balance;Decreased cognition  Assessment: Pt was dependentx2 with all bed mobility, pt sat EOB dependentx1. Pt unable to follow any commands d/t impaired cognition. He could benefit

## 2024-04-05 NOTE — CARE COORDINATION
Received a call from [facility]  Hernán Duong.  APS had received a referral for pt from an outside source and currently has an open case.  Informed her that pt is admitted to hospital with potential plan for inpatient hospice.  She is unable to visit pt at hospital today but will plan on seeing him Tuesday.  Will notify [facility] of discharge plan once pt is accepted and discharge date is known.

## 2024-04-05 NOTE — PLAN OF CARE
Problem: Respiratory - Adult  Goal: Achieves optimal ventilation and oxygenation  4/4/2024 1959 by Meagan Buckley RCP  Outcome: Progressing

## 2024-04-05 NOTE — PROGRESS NOTES
Occupational Therapy  Facility/Department: [address] STE[address]OWN  Occupational Therapy Daily Treatment Note    Name: Danny Guevara  : 1960  MRN: [id number]  Date of Service: 2024    Discharge Recommendations:  Patient would benefit from continued therapy after discharge  OT Equipment Recommendations  Equipment Needed: Yes  Other: CTA    Patient Diagnosis(es): The primary encounter diagnosis was General weakness. A diagnosis of Hypercalcemia was also pertinent to this visit.  Past Medical History:  has a past medical history of Alcohol abuse, Anemia, Bronchitis, Cancer (HCC), Cervical stenosis of spine, Chronic left-sided low back pain with left-sided sciatica, COPD (chronic obstructive pulmonary disease) (HCC), Hypertension, Insomnia, Intentional drug overdose (HCC), Left arm numbness, Left lumbar radiculitis, Macrocytosis, Major depressive disorder, recurrent severe without psychotic features (HCC), Myeloproliferative disorder (HCC), Osteoarthritis of spine with radiculopathy, lumbar region, Pneumonia, Severe recurrent major depression without psychotic features (HCC), Spondylosis of lumbar region without myelopathy or radiculopathy, Urinary retention, Wears dentures, and Wellness examination.  Past Surgical History:  has a past surgical history that includes cervical fusion; Appendectomy; Tonsillectomy; Carpal tunnel release; epidural steroid injection (Left, 05/15/2019); Endoscopy, colon, diagnostic; cervical fusion (N/A, 2019); bronchoscopy (2024); bronchoscopy (2024); bronchoscopy (2024); and bronchoscopy (2024).    Assessment   Performance deficits / Impairments: Decreased functional mobility ;Decreased endurance;Decreased balance;Decreased ADL status;Decreased high-level IADLs;Decreased ROM;Decreased strength;Decreased cognition;Decreased safe awareness  Assessment: Pt would benefit from continued skilled OT to address above deficits to increase independence with ADL/IADLs and  catheter    Functional Mobility Skilled Clinical Factors: DINA: pt not safe for standing d/t cognitive status.    Additional Comments: Upon arrival of therapist pt supine in bed, following minimal commands and had difficulty keeeping eyes open. Pt DEP for supine<>sit and able to maintain sitting balance with MAX A d/t posterior and R side lean. Pt intermittently attempting forward flexion while seated EOB, able to hold for 1-2 seconds and then leaned back onto writer. Pt demos O2 dropping to 89% while seated EOB, nursing present and aware and approved to continue session. Pt unable to participate in ADL care d/t minimally responsive, session focused on bed mobility and sitting balance. Pt required 2 assist for EOB tasks d/t poor sitting balance with writer focusing on sitting balance/grooming while PT staff focused on individualized tasks.     Bed mobility  Rolling to Left: 2 Person assistance;Maximum assistance  Rolling to Right: Maximum assistance;2 Person assistance  Supine to Sit: Dependent/Total;2 Person assistance  Sit to Supine: Dependent/Total;2 Person assistance  Scooting: Dependent/Total;2 Person assistance  Bed Mobility Comments: During rolling L/R for linen adjustment pt demos reaching for bed rails and grasping bed rail with hand over hand assist. Supine<>sit DEP d/t cognitive status.    Cognition  Overall Cognitive Status: Exceptions  Arousal/Alertness: Inconsistent responses to stimuli;Unresponsive to stimuli  Following Commands: Inconsistently follows commands;Does not follow commands  Attention Span: Difficulty attending to directions  Safety Judgement: Decreased awareness of need for assistance;Decreased awareness of need for safety  Problem Solving: Decreased awareness of errors  Insights: Not aware of deficits  Initiation: Requires cues for all  Sequencing: Requires cues for all  Cognition Comment: Pt able to follow ~10% of commands, daughter present and reported that this is a decline and that pt

## 2024-04-05 NOTE — PROGRESS NOTES
Physician Progress Note      PATIENT:               FIDEL ABRAMS  CSN #:                  [id number]  :                       1960  ADMIT DATE:       3/29/2024 11:52 AM  DISCH DATE:        3/30/2024 4:32 PM  RESPONDING  PROVIDER #:        Romulo Boone DO          QUERY TEXT:    Patient admitted with Wernicke?s encephalopathy.  Noted documentation of   Severe malnutrition in the active problem list of DS by Romulo Boone DO   and Moderate malnutrition in the H&P by Ruslan Ward APRN - NP. If   possible, please document in progress notes and discharge summary if you are   evaluating and /or treating any of the following:    The medical record reflects the following:  Risk Factors: Squamous cell carcinoma with bone mets, poor appetite, FTT,   alcoholism    Clinical Indicators: H&P 3/29 ?Moderate malnutrition with ambulatory   dysfunction and inability to care for self in the home/failure to thrive:   PT/OT.? ?Ht 1.854 m (6' 1\")      ED note 3/29 ?The patient is a 64-year-old male who presents to the ED by his   nurse practitioner, for chronic shoulder pain, poor appetite, and inability to   take care of daily activities.?    Severe protein-calorie malnutrition documented in the active problem list of   H&P and DS.  No Albumin values. No RD/Dietician evaluation.    Treatment: Normal regular diet, [facility]  Options provided:  -- Severe malnutrition confirmed and Moderate malnutrition ruled out  -- Moderate malnutrition confirmed and Severe malnutrition ruled out  -- Other - I will add my own diagnosis  -- Disagree - Not applicable / Not valid  -- Disagree - Clinically unable to determine / Unknown  -- Refer to Clinical Documentation Reviewer    PROVIDER RESPONSE TEXT:    After study, Severe malnutrition confirmed and Moderate malnutrition ruled   out.    Query created by: Braulio Burr on 2024 7:44 AM      Electronically signed by:  Romulo Boone DO 2024  6:17 AM

## 2024-04-06 LAB
ANION GAP SERPL CALCULATED.3IONS-SCNC: 15 MMOL/L (ref 9–16)
BUN SERPL-MCNC: 22 MG/DL (ref 8–23)
CALCIUM SERPL-MCNC: 12.5 MG/DL (ref 8.6–10.4)
CHLORIDE SERPL-SCNC: 105 MMOL/L (ref 98–107)
CO2 SERPL-SCNC: 24 MMOL/L (ref 20–31)
CREAT SERPL-MCNC: 1.5 MG/DL (ref 0.7–1.2)
ERYTHROCYTE [DISTWIDTH] IN BLOOD BY AUTOMATED COUNT: 15.1 % (ref 11.8–14.4)
GFR SERPL CREATININE-BSD FRML MDRD: 51 ML/MIN/1.73M2
GLUCOSE SERPL-MCNC: 111 MG/DL (ref 74–99)
HCT VFR BLD AUTO: 41.7 % (ref 40.7–50.3)
HGB BLD-MCNC: 13.4 G/DL (ref 13–17)
MCH RBC QN AUTO: 28.8 PG (ref 25.2–33.5)
MCHC RBC AUTO-ENTMCNC: 32.1 G/DL (ref 28.4–34.8)
MCV RBC AUTO: 89.5 FL (ref 82.6–102.9)
NRBC BLD-RTO: 0 PER 100 WBC
PLATELET # BLD AUTO: 838 K/UL (ref 138–453)
PMV BLD AUTO: 9.7 FL (ref 8.1–13.5)
POTASSIUM SERPL-SCNC: 3.3 MMOL/L (ref 3.7–5.3)
RBC # BLD AUTO: 4.66 M/UL (ref 4.21–5.77)
SODIUM SERPL-SCNC: 144 MMOL/L (ref 136–145)
WBC OTHER # BLD: 30.2 K/UL (ref 3.5–11.3)

## 2024-04-06 PROCEDURE — 99232 SBSQ HOSP IP/OBS MODERATE 35: CPT | Performed by: INTERNAL MEDICINE

## 2024-04-06 PROCEDURE — 6360000002 HC RX W HCPCS: Performed by: NURSE PRACTITIONER

## 2024-04-06 PROCEDURE — 2580000003 HC RX 258: Performed by: INTERNAL MEDICINE

## 2024-04-06 PROCEDURE — 2580000003 HC RX 258: Performed by: NURSE PRACTITIONER

## 2024-04-06 PROCEDURE — 80048 BASIC METABOLIC PNL TOTAL CA: CPT

## 2024-04-06 PROCEDURE — 85027 COMPLETE CBC AUTOMATED: CPT

## 2024-04-06 PROCEDURE — 6360000002 HC RX W HCPCS: Performed by: INTERNAL MEDICINE

## 2024-04-06 PROCEDURE — 6370000000 HC RX 637 (ALT 250 FOR IP): Performed by: NURSE PRACTITIONER

## 2024-04-06 PROCEDURE — 94640 AIRWAY INHALATION TREATMENT: CPT

## 2024-04-06 PROCEDURE — 2700000000 HC OXYGEN THERAPY PER DAY

## 2024-04-06 PROCEDURE — 36415 COLL VENOUS BLD VENIPUNCTURE: CPT

## 2024-04-06 PROCEDURE — 94761 N-INVAS EAR/PLS OXIMETRY MLT: CPT

## 2024-04-06 PROCEDURE — 99231 SBSQ HOSP IP/OBS SF/LOW 25: CPT | Performed by: INTERNAL MEDICINE

## 2024-04-06 PROCEDURE — 2060000000 HC ICU INTERMEDIATE R&B

## 2024-04-06 PROCEDURE — 6370000000 HC RX 637 (ALT 250 FOR IP): Performed by: INTERNAL MEDICINE

## 2024-04-06 RX ORDER — 0.9 % SODIUM CHLORIDE 0.9 %
500 INTRAVENOUS SOLUTION INTRAVENOUS ONCE
Status: COMPLETED | OUTPATIENT
Start: 2024-04-06 | End: 2024-04-06

## 2024-04-06 RX ADMIN — MORPHINE SULFATE 4 MG: 4 INJECTION INTRAVENOUS at 11:39

## 2024-04-06 RX ADMIN — MORPHINE SULFATE 4 MG: 4 INJECTION INTRAVENOUS at 08:50

## 2024-04-06 RX ADMIN — MORPHINE SULFATE 4 MG: 4 INJECTION INTRAVENOUS at 04:14

## 2024-04-06 RX ADMIN — ENOXAPARIN SODIUM 40 MG: 100 INJECTION SUBCUTANEOUS at 07:48

## 2024-04-06 RX ADMIN — CEFEPIME 2000 MG: 2 INJECTION, POWDER, FOR SOLUTION INTRAVENOUS at 14:40

## 2024-04-06 RX ADMIN — CEFEPIME 2000 MG: 2 INJECTION, POWDER, FOR SOLUTION INTRAVENOUS at 02:41

## 2024-04-06 RX ADMIN — THIAMINE HYDROCHLORIDE 100 MG: 100 INJECTION, SOLUTION INTRAMUSCULAR; INTRAVENOUS at 20:01

## 2024-04-06 RX ADMIN — Medication 2 MG: at 07:47

## 2024-04-06 RX ADMIN — FUROSEMIDE 20 MG: 10 INJECTION, SOLUTION INTRAMUSCULAR; INTRAVENOUS at 07:48

## 2024-04-06 RX ADMIN — Medication 2 MG: at 02:58

## 2024-04-06 RX ADMIN — SODIUM CHLORIDE, PRESERVATIVE FREE 10 ML: 5 INJECTION INTRAVENOUS at 07:48

## 2024-04-06 RX ADMIN — THIAMINE HYDROCHLORIDE 100 MG: 100 INJECTION, SOLUTION INTRAMUSCULAR; INTRAVENOUS at 07:48

## 2024-04-06 RX ADMIN — Medication 2 MG: at 01:05

## 2024-04-06 RX ADMIN — SODIUM CHLORIDE 500 ML: 9 INJECTION, SOLUTION INTRAVENOUS at 12:29

## 2024-04-06 RX ADMIN — THIAMINE HYDROCHLORIDE 100 MG: 100 INJECTION, SOLUTION INTRAMUSCULAR; INTRAVENOUS at 14:37

## 2024-04-06 RX ADMIN — FUROSEMIDE 20 MG: 10 INJECTION, SOLUTION INTRAMUSCULAR; INTRAVENOUS at 17:22

## 2024-04-06 RX ADMIN — SODIUM CHLORIDE, PRESERVATIVE FREE 10 ML: 5 INJECTION INTRAVENOUS at 07:49

## 2024-04-06 RX ADMIN — IPRATROPIUM BROMIDE AND ALBUTEROL SULFATE 1 DOSE: 2.5; .5 SOLUTION RESPIRATORY (INHALATION) at 08:59

## 2024-04-06 RX ADMIN — IPRATROPIUM BROMIDE AND ALBUTEROL SULFATE 1 DOSE: 2.5; .5 SOLUTION RESPIRATORY (INHALATION) at 21:23

## 2024-04-06 RX ADMIN — Medication 2 MG: at 22:05

## 2024-04-06 RX ADMIN — MORPHINE SULFATE 4 MG: 4 INJECTION INTRAVENOUS at 19:58

## 2024-04-06 ASSESSMENT — PAIN DESCRIPTION - DESCRIPTORS: DESCRIPTORS: ACHING;DISCOMFORT
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
## 2024-04-06 NOTE — PLAN OF CARE
Discussed with POA, Daquan Guevara, over the phone to clarify care plan.  Voiced understanding of diagnosis of metastatic cancer and terminal prognosis.  Wishes to proceed with hospice care.  Goal would be to have Maicol comfortable but alert enough to interact with family and drink beer.  However, goal is comfort even if it means patient is sedated.  Discussed code status and changed to DNR-CC after reviewing difference between CC and arrest.

## 2024-04-06 NOTE — PLAN OF CARE
Problem: Discharge Planning  Goal: Discharge to home or other facility with appropriate resources  4/6/2024 1223 by Jody Raman, RN  Outcome: Progressing     Problem: Skin/Tissue Integrity  Goal: Absence of new skin breakdown  Description: 1.  Monitor for areas of redness and/or skin breakdown  2.  Assess vascular access sites hourly  3.  Every 4-6 hours minimum:  Change oxygen saturation probe site  4.  Every 4-6 hours:  If on nasal continuous positive airway pressure, respiratory therapy assess nares and determine need for appliance change or resting period.  4/6/2024 1223 by Jody Raman, RN  Outcome: Progressing     Problem: Safety - Adult  Goal: Free from fall injury  4/6/2024 1223 by Jody Raman, RN  Outcome: Progressing

## 2024-04-06 NOTE — PROGRESS NOTES
[facility]  Office: [phone]  Rio Blair DO, Javier Walters, DO, Romulo Boone DO, Miquel Casey, DO, Nataly Priest MD, Daly Mendoza MD, Darius Mcmahon MD, Mary Swain MD,  Ad Goode MD, Vale Peña MD, Rancho Harley MD,  Julia Goddard DO, Bruno Frank MD, Nabeel Spicer MD, Danny Blair DO, Lulú Hernandez MD,  Piyush Lepe DO, Anabela Cummings MD, Valentine Brian MD, Caren Slaughter MD, Reddy Cagle MD,  Ibrahima Fortune MD, Idalia Girard MD, Zunilda Whiting MD, Konstantin Mane MD, Lucho Haq MD, Oswaldo Crawford MD, Jc Acuña DO, De Cochran DO, Sole Mcgovern MD,  Vinay Meza MD, Shirley Waterhouse, CNP,  Paulette Cook CNP, Ruslan Ward, CNP,  Doreen Corcoran, DNP, Twyla Pacheco, CNP, Ashley Mesa, CNP, Karma Sage, CNP, Tanvi Jim, CNP, Lizzie Esqueda, PA-C, Tova Romano, PA-C, Deb Crespo, CNP, Ronda Mchugh, CNP, Marco A Mar, CNP, Danielle Gonzalez, CNP, Linn Azul, CNP, Shwetha León, CNS, Estrella Bledsoe, CNP, Sera Kruger CNP, Tracy Schwab, CNP         [facility]   IN-PATIENT SERVICE   [facility]    Progress Note    4/6/2024    8:10 AM    Name:   Danny Guevara  MRN:     [id number]     Acct:      [id number]   Room:   0406/0406-01   Day:  3  Admit Date:  4/3/2024  3:34 AM    PCP:   Roberto Donaldson MD  Code Status:  DNR-CCA    Subjective:     C/C:   Chief Complaint   Patient presents with    Fatigue     Interval History Status: not changed.     Patient laying in bed, spontaneous eye opening, not following commands.  Does not verbalize response to any questions.  Awaiting family decision regarding hospice    Brief History:     Per H&P  \"Danny Guevara is a 63 y.o. Non- / non  male who presents with Fatigue  and is admitted to the hospital for the management of Hypercalcemia.  Patient has history significant for stage IV metastatic lung carcinoma, COPD, hypertension, and   Pulse 97   Temp 98.1 °F (36.7 °C) (Temporal)   Resp 30   Ht 1.854 m (6' 1\")   Wt 80.5 kg (177 lb 7.5 oz)   SpO2 91%   BMI 23.41 kg/m²   Temp (24hrs), Av.3 °F (36.8 °C), Min:98 °F (36.7 °C), Max:98.7 °F (37.1 °C)    Recent Labs     24  1902 24  1958 24  0010 24  0011   POCGLU 95 106 104 134*       I/O (24Hr):    Intake/Output Summary (Last 24 hours) at 2024 0810  Last data filed at 2024 0507  Gross per 24 hour   Intake 1481.73 ml   Output 1500 ml   Net -18.27 ml       Labs:  Hematology:  Recent Labs     24  0249 24  0737 24  0244   WBC 28.0* 25.9* 30.2*   RBC 4.27 4.53 4.66   HGB 12.4* 13.1 13.4   HCT 37.7* 40.8 41.7   MCV 88.3 90.1 89.5   MCH 29.0 28.9 28.8   MCHC 32.9 32.1 32.1   RDW 15.2* 15.1* 15.1*   * 657* 838*   MPV 9.5 9.6 9.7   INR 1.7  --   --      Chemistry:  Recent Labs     24  1458 24  1818 24  1942 24  0019 24  0249 24  0942 24  1541 24  0737 24  1621 24  2339 24  0244   *  --   --   --  135*  --   --  142  --   --  144   K 3.4*  --  3.5*  --  3.2*  --   --  3.1*  --   --  3.3*     --   --   --  103  --   --  104  --   --  105   CO2 21  --   --   --  19*  --   --  24  --   --  24   GLUCOSE 102*  --   --   --  107*  --   --  116*  --   --  111*   BUN 13  --   --   --  12  --   --  17  --   --  22   CREATININE 1.1  --   --   --  1.1  --   --  1.2  --   --  1.5*   MG  --   --  1.7  --   --   --   --   --   --   --   --    ANIONGAP 12  --   --   --  13  --   --  14  --   --  15   LABGLOM 73  --   --   --   --   --   --  66  --   --  51*   CALCIUM 13.6*  --   --   --  13.1*  --   --  12.9*  --   --  12.5*   CAION 1.80*   < >  --  1.83*  --  1.58*   < > 1.59* 1.60* 1.58*  --    TROPHS 35*  --  36* 37*  --  27*  --   --   --   --   --     < > = values in this interval not displayed.     Recent Labs     24  1407 24  1811 24  1821 24  1902

## 2024-04-06 NOTE — PROGRESS NOTES
Progress Note               Date:                           4/6/2024  Patient name:           Danny Guevara  Date of admission:  4/3/2024  3:34 AM  MRN:   [id number]  YOB: 1960  PCP:                           Roberto Donaldson MD    Reason for Consult: management recommendations  Requesting Physician: Nabeel Spicer MD     CHIEF COMPLAINT: Hypercalcemia.    Subjective:       Patient was seen at bedside and chart reviewed.   Afebrile and vitals stable, on  3 L NC oxygen.   Patient remains lethargic, sleeping in bed.   Patient's family planning for inpatient hospice.       HISTORY OF PRESENT ILLNESS:       The patient is a 63 y.o.  male who is admitted to the hospital chief complaint of generalized weakness.  Patient was also recently admitted at [facility] due to concerns regarding Warnicke's encephalopathy which improved with thiamine infusion.  Patient was subsequently discharged home.  Upon arrival in the ER the patient was noted to be afebrile and hemodynamically stable.  Patient lab workup showed calcium level of 14.7 and calcium 1.73.  Patient was treated with fluids.  WBC count 27,000.  Chest x-ray suggestive of pneumonia.  Patient started on antibiotics.  Oncology team consulted due to hypercalcemia.  Patient has received 1 dose of calcitonin.  Patient's creatinine is 1.1.  PTH checked on 4/3 was 8.0.  Patient was also vitamin D deficient        Patient is well-known to our practice.  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  Follows up with Dr. Landa.  Patient has history of JAK2 positive MPN.  Patient was recently discharged from hospital on 3/30.  Patient was last seen in oncology office back in March recently patient's goals of care and we discussed.  At that point patient had refused hospice.    Problem Lists:   Primary Problem:  Hypercalcemia   Current Problems:  Active Hospital Problems    Diagnosis Date Noted     24 04/06/2024 02:44 AM    BUN 22 04/06/2024 02:44 AM    LABALBU 4.1 03/21/2024 01:02 PM    CREATININE 1.5 04/06/2024 02:44 AM    CALCIUM 12.5 04/06/2024 02:44 AM    GFRAA >60 07/25/2022 05:25 AM    LABGLOM 51 04/06/2024 02:44 AM    GLUCOSE 111 04/06/2024 02:44 AM     No results for input(s): \"TIBC\", \"FERRITIN\", \"TXFNWPRJ73\", \"FOLATE\", \"OCCULTBLD\" in the last 72 hours.    Invalid input(s): \"LABIRON\"    IMAGING DATA:      Assessment      Hypercalcemia    Active Hospital Problems    Diagnosis Date Noted    Pneumonia due to infectious organism [J18.9] 04/04/2024    Neurocognitive disorder [R41.9] 04/04/2024    General weakness [R53.1] 04/04/2024    Hypercalcemia [E83.52] 04/03/2024    Hyperlipemia [E78.5] 04/03/2024    Malignant neoplasm of lung (HCC) [C34.90] 02/16/2024    Hyponatremia [E87.1] 01/24/2024    Hypertension [I10] 07/16/2021    Hypothyroidism [E03.9] 07/15/2021    Major depressive disorder, recurrent severe without psychotic features (HCC) [F33.2] 08/12/2019    ETOH abuse [F10.10] 08/12/2019     Hypercalcemia of malignancy  MPN, JAK2 positive  Metastatic squamous cell carcinoma of the lung  Leukocytosis secondary to MPN likely      Plan     I reviewed the labs/imaging available to me,outside records and discussed with the patient.I explained to the patient the nature of this problem. I explained the significance of these abnormalities and possible etiology and management options  Discussed with internal medicine team attending- Dr. Boone. Prognosis is poor.   Continue calcitonin  S/p Zometa on 4/4.   Calcium 12.5 today.   Continue IV hydration and monitor for fluid overload  Patient's family planning for inpatient hospice. Awaiting hospice evaluation and confirmation with POA regarding goals of care. Current CODE STATUS is DNR-CCA.   Overall prognosis guarded.  Leukocytosis elevated likely due to underlying MPN however infection cannot be ruled out either.  Symptomatic supportive care  Will follow.

## 2024-04-06 NOTE — CARE COORDINATION
Transitional Planning:  Returned vm from yesterday to [facility] [phone] of [address] [place] [address].  Provided update that referral was made to [facility] but not confirmed yet with POA.  Caitlin states patient is not safe to return home.  Informed her referral has been made to SNF.   She will place follow up call on Monday.

## 2024-04-06 NOTE — PLAN OF CARE
Problem: Discharge Planning  Goal: Discharge to home or other facility with appropriate resources  Outcome: Progressing  Flowsheets (Taken 4/5/2024 1920)  Discharge to home or other facility with appropriate resources:   Identify barriers to discharge with patient and caregiver   Arrange for needed discharge resources and transportation as appropriate     Problem: Skin/Tissue Integrity  Goal: Absence of new skin breakdown  Description: 1.  Monitor for areas of redness and/or skin breakdown  2.  Assess vascular access sites hourly  3.  Every 4-6 hours minimum:  Change oxygen saturation probe site  4.  Every 4-6 hours:  If on nasal continuous positive airway pressure, respiratory therapy assess nares and determine need for appliance change or resting period.  Outcome: Progressing     Problem: Safety - Adult  Goal: Free from fall injury  Outcome: Progressing     Problem: Neurosensory - Adult  Goal: Achieves stable or improved neurological status  Outcome: Progressing  Flowsheets (Taken 4/5/2024 1920)  Achieves stable or improved neurological status: Assess for and report changes in neurological status     Problem: Respiratory - Adult  Goal: Achieves optimal ventilation and oxygenation  4/6/2024 0121 by Lucinda Haq RN  Outcome: Progressing     Problem: Confusion  Goal: Confusion, delirium, dementia, or psychosis is improved or at baseline  Description: INTERVENTIONS:  1. Assess for possible contributors to thought disturbance, including medications, impaired vision or hearing, underlying metabolic abnormalities, dehydration, psychiatric diagnoses, and notify attending LIP  2. [place] high risk fall precautions, as indicated  3. Provide frequent short contacts to provide reality reorientation, refocusing and direction  4. Decrease environmental stimuli, including noise as appropriate  5. Monitor and intervene to maintain adequate nutrition, hydration, elimination, sleep and activity  6. If unable to ensure

## 2024-04-07 VITALS
OXYGEN SATURATION: 88 % | HEIGHT: 73 IN | HEART RATE: 101 BPM | BODY MASS INDEX: 23.02 KG/M2 | RESPIRATION RATE: 32 BRPM | WEIGHT: 173.72 LBS | TEMPERATURE: 100 F | SYSTOLIC BLOOD PRESSURE: 122 MMHG | DIASTOLIC BLOOD PRESSURE: 74 MMHG

## 2024-04-07 LAB
ANION GAP SERPL CALCULATED.3IONS-SCNC: 14 MMOL/L (ref 9–16)
BUN SERPL-MCNC: 33 MG/DL (ref 8–23)
CALCIUM SERPL-MCNC: 12 MG/DL (ref 8.6–10.4)
CHLORIDE SERPL-SCNC: 110 MMOL/L (ref 98–107)
CO2 SERPL-SCNC: 22 MMOL/L (ref 20–31)
CREAT SERPL-MCNC: 1.7 MG/DL (ref 0.7–1.2)
ERYTHROCYTE [DISTWIDTH] IN BLOOD BY AUTOMATED COUNT: 15 % (ref 11.8–14.4)
GFR SERPL CREATININE-BSD FRML MDRD: 47 ML/MIN/1.73M2
GLUCOSE SERPL-MCNC: 115 MG/DL (ref 74–99)
HCT VFR BLD AUTO: 45.1 % (ref 40.7–50.3)
HGB BLD-MCNC: 13.7 G/DL (ref 13–17)
MCH RBC QN AUTO: 28.5 PG (ref 25.2–33.5)
MCHC RBC AUTO-ENTMCNC: 30.4 G/DL (ref 28.4–34.8)
MCV RBC AUTO: 94 FL (ref 82.6–102.9)
NRBC BLD-RTO: 0 PER 100 WBC
PLATELET # BLD AUTO: 855 K/UL (ref 138–453)
PMV BLD AUTO: 9.7 FL (ref 8.1–13.5)
POTASSIUM SERPL-SCNC: 3.2 MMOL/L (ref 3.7–5.3)
RBC # BLD AUTO: 4.8 M/UL (ref 4.21–5.77)
SODIUM SERPL-SCNC: 146 MMOL/L (ref 136–145)
WBC OTHER # BLD: 35.9 K/UL (ref 3.5–11.3)

## 2024-04-07 PROCEDURE — 2580000003 HC RX 258: Performed by: INTERNAL MEDICINE

## 2024-04-07 PROCEDURE — 6360000002 HC RX W HCPCS: Performed by: NURSE PRACTITIONER

## 2024-04-07 PROCEDURE — 85027 COMPLETE CBC AUTOMATED: CPT

## 2024-04-07 PROCEDURE — 6360000002 HC RX W HCPCS: Performed by: INTERNAL MEDICINE

## 2024-04-07 PROCEDURE — 99232 SBSQ HOSP IP/OBS MODERATE 35: CPT | Performed by: INTERNAL MEDICINE

## 2024-04-07 PROCEDURE — 80048 BASIC METABOLIC PNL TOTAL CA: CPT

## 2024-04-07 PROCEDURE — 36415 COLL VENOUS BLD VENIPUNCTURE: CPT

## 2024-04-07 RX ORDER — LORAZEPAM 2 MG/ML
0.5 INJECTION INTRAMUSCULAR EVERY 4 HOURS PRN
Status: DISCONTINUED | OUTPATIENT
Start: 2024-04-07 | End: 2024-04-08 | Stop reason: HOSPADM

## 2024-04-07 RX ORDER — GLYCOPYRROLATE 0.2 MG/ML
0.1 INJECTION INTRAMUSCULAR; INTRAVENOUS EVERY 8 HOURS PRN
Status: DISCONTINUED | OUTPATIENT
Start: 2024-04-07 | End: 2024-04-08 | Stop reason: HOSPADM

## 2024-04-07 RX ADMIN — MORPHINE SULFATE 4 MG: 4 INJECTION INTRAVENOUS at 12:39

## 2024-04-07 RX ADMIN — Medication 2 MG: at 06:16

## 2024-04-07 RX ADMIN — LORAZEPAM 0.5 MG: 2 INJECTION INTRAMUSCULAR; INTRAVENOUS at 16:37

## 2024-04-07 RX ADMIN — MORPHINE SULFATE 4 MG: 4 INJECTION INTRAVENOUS at 16:37

## 2024-04-07 RX ADMIN — LORAZEPAM 0.5 MG: 2 INJECTION INTRAMUSCULAR; INTRAVENOUS at 08:29

## 2024-04-07 RX ADMIN — LORAZEPAM 1 MG: 2 INJECTION INTRAMUSCULAR; INTRAVENOUS at 02:06

## 2024-04-07 RX ADMIN — MORPHINE SULFATE 4 MG: 4 INJECTION INTRAVENOUS at 10:39

## 2024-04-07 RX ADMIN — CEFEPIME 2000 MG: 2 INJECTION, POWDER, FOR SOLUTION INTRAVENOUS at 01:47

## 2024-04-07 RX ADMIN — MORPHINE SULFATE 4 MG: 4 INJECTION INTRAVENOUS at 04:00

## 2024-04-07 RX ADMIN — MORPHINE SULFATE 4 MG: 4 INJECTION INTRAVENOUS at 08:35

## 2024-04-07 RX ADMIN — GLYCOPYRROLATE 0.1 MG: 0.2 INJECTION INTRAMUSCULAR; INTRAVENOUS at 09:16

## 2024-04-07 RX ADMIN — MORPHINE SULFATE 4 MG: 4 INJECTION INTRAVENOUS at 00:08

## 2024-04-07 RX ADMIN — MORPHINE SULFATE 4 MG: 4 INJECTION INTRAVENOUS at 14:39

## 2024-04-07 RX ADMIN — MORPHINE SULFATE 4 MG: 4 INJECTION INTRAVENOUS at 05:58

## 2024-04-07 RX ADMIN — LORAZEPAM 0.5 MG: 2 INJECTION INTRAMUSCULAR; INTRAVENOUS at 12:40

## 2024-04-07 ASSESSMENT — PAIN DESCRIPTION - PAIN TYPE
TYPE: CHRONIC PAIN

## 2024-04-07 ASSESSMENT — PAIN DESCRIPTION - ONSET
ONSET: UNABLE TO COMMUNICATE

## 2024-04-07 ASSESSMENT — PAIN - FUNCTIONAL ASSESSMENT: PAIN_FUNCTIONAL_ASSESSMENT: PREVENTS OR INTERFERES WITH ALL ACTIVE AND SOME PASSIVE ACTIVITIES

## 2024-04-07 ASSESSMENT — PAIN DESCRIPTION - LOCATION
LOCATION: GENERALIZED

## 2024-04-07 ASSESSMENT — PAIN DESCRIPTION - FREQUENCY: FREQUENCY: CONTINUOUS

## 2024-04-07 ASSESSMENT — PAIN DESCRIPTION - DESCRIPTORS
DESCRIPTORS: PATIENT UNABLE TO DESCRIBE

## 2024-04-07 ASSESSMENT — PAIN SCALES - WONG BAKER: WONGBAKER_NUMERICALRESPONSE: HURTS WHOLE LOT

## 2024-04-07 NOTE — PLAN OF CARE
Problem: Discharge Planning  Goal: Discharge to home or other facility with appropriate resources  4/7/2024 0108 by Margaret Rosas, RN  Outcome: Progressing  Flowsheets (Taken 4/6/2024 2000)  Discharge to home or other facility with appropriate resources:   Identify barriers to discharge with patient and caregiver   Arrange for needed discharge resources and transportation as appropriate   Refer to discharge planning if patient needs post-hospital services based on physician order or complex needs related to functional status, cognitive ability or social support system     Problem: Safety - Adult  Goal: Free from fall injury  4/7/2024 0108 by Margaret Rosas, RN  Outcome: Progressing  Flowsheets (Taken 4/6/2024 2000)  Free From Fall Injury: Instruct family/caregiver on patient safety     Problem: Pain  Goal: Verbalizes/displays adequate comfort level or baseline comfort level  Outcome: Progressing     Problem: Neurosensory - Adult  Goal: Achieves stable or improved neurological status  Outcome: Not Progressing  Flowsheets (Taken 4/6/2024 2000)  Achieves stable or improved neurological status: Assess for and report changes in neurological status     Problem: Confusion  Goal: Confusion, delirium, dementia, or psychosis is improved or at baseline  Description: INTERVENTIONS:  1. Assess for possible contributors to thought disturbance, including medications, impaired vision or hearing, underlying metabolic abnormalities, dehydration, psychiatric diagnoses, and notify attending LIP  2. [place] high risk fall precautions, as indicated  3. Provide frequent short contacts to provide reality reorientation, refocusing and direction  4. Decrease environmental stimuli, including noise as appropriate  5. Monitor and intervene to maintain adequate nutrition, hydration, elimination, sleep and activity  6. If unable to ensure safety without constant attention obtain sitter and review sitter guidelines with assigned

## 2024-04-07 NOTE — PLAN OF CARE
Problem: Respiratory - Adult  Goal: Achieves optimal ventilation and oxygenation  Flowsheets (Taken 4/6/2024 2134)  Achieves optimal ventilation and oxygenation:   Assess and instruct to report shortness of breath or any respiratory difficulty   Encourage broncho-pulmonary hygiene including cough, deep breathe, incentive spirometry   Oxygen supplementation based on oxygen saturation or arterial blood gases   Assess for changes in mentation and behavior   Assess for changes in respiratory status   Respiratory therapy support as indicated

## 2024-04-07 NOTE — PLAN OF CARE
Patient's CODE STATUS is changed to DNR-CC and family is coordinating inpatient Hospice evaluation.   Heme/Onc will sign off.   Please call us with any questions/concerns.       Mt Cerrato MD, MPH, PGY-3  Internal Medicine Resident  [facility],   [place], [place]

## 2024-04-07 NOTE — PLAN OF CARE
Problem: Discharge Planning  Goal: Discharge to home or other facility with appropriate resources  4/7/2024 0939 by Inge Go RN  Outcome: Progressing  4/7/2024 0108 by Margaret Rosas RN  Outcome: Progressing  Flowsheets (Taken 4/6/2024 2000)  Discharge to home or other facility with appropriate resources:   Identify barriers to discharge with patient and caregiver   Arrange for needed discharge resources and transportation as appropriate   Refer to discharge planning if patient needs post-hospital services based on physician order or complex needs related to functional status, cognitive ability or social support system     Problem: Skin/Tissue Integrity  Goal: Absence of new skin breakdown  Description: 1.  Monitor for areas of redness and/or skin breakdown  2.  Assess vascular access sites hourly  3.  Every 4-6 hours minimum:  Change oxygen saturation probe site  4.  Every 4-6 hours:  If on nasal continuous positive airway pressure, respiratory therapy assess nares and determine need for appliance change or resting period.  Outcome: Progressing     Problem: Safety - Adult  Goal: Free from fall injury  4/7/2024 0939 by Inge Go RN  Outcome: Progressing  4/7/2024 0108 by Margaret Rosas RN  Outcome: Progressing  Flowsheets (Taken 4/6/2024 2000)  Free From Fall Injury: Instruct family/caregiver on patient safety     Problem: Neurosensory - Adult  Goal: Achieves stable or improved neurological status  4/7/2024 0939 by Inge Go RN  Outcome: Progressing  4/7/2024 0108 by Margaret Rosas RN  Outcome: Not Progressing  Flowsheets (Taken 4/6/2024 2000)  Achieves stable or improved neurological status: Assess for and report changes in neurological status     Problem: Respiratory - Adult  Goal: Achieves optimal ventilation and oxygenation  4/7/2024 0939 by Inge Go RN  Outcome: Progressing  4/7/2024 0108 by Margaret Rosas RN  Outcome: Progressing  4/6/2024 2134 by Laurence Stokes

## 2024-04-07 NOTE — PROGRESS NOTES
[facility] [place]  DEATH   Shift date: 2024    Shift day:   Shift #: 2                 Room # 0406/0406-01   Name: Danny Guevara            Age: 63 y.o.  Gender: male          Scientology: Caodaism      Place of Alevism: n/a  Admit Date & Time: 4/3/2024  3:34 AM     Referral: Nurse   Actual date of death: 2024   TOD: 1800       SITUATION AT DEATH:  Family (daughter) expressed that she noticed that  patient's breathing getting shaky while she was on the phone with her child.    IS THIS A 'S CASE?  NO    SPIRITUAL/EMOTIONAL INTERVENTION:   provided active listening active listening;discussed meaning/purpose;Grief Care;Sustaining Presence/Ministry of presence. Daughter expressed concern about patient's eyes being open when her other sister arrives.    Family Received Grief Packet?  Yes       NAME AND PHONE NUMBER OF DOCTOR SIGNING DEATH CERTIFICATE:  (full name) Romulo Boone     (phone)  [phone]       will contacted [facility] at 9: 15 pm.                                                                                      Copy of COMPLETED Release of Body Form Received?  Yes    Patient's belongings: n/a     HOME:  Name: [facility]  City: [place]  Phone Number: [phone]    NEXT OF KIN:  Name:Ehsan \"Daquan\" Ismael  Relationship: son  Street Address: [address] [place] [address]  City: [place]  State: [place]  Zip code: [address]  Phone Number: [phone]    ANY FOLLOW-UP NEEDED?  Yes    IF SO, WHAT?  Release of body form completed  [employer]    Electronically signed by RAUDEL WEBER, Intern, on 2024 at 6:50 PM.  [facility]  [phone]    24 1845   Encounter Summary   Encounter Overview/Reason  Crisis   Service Provided For: Family   Referral/Consult From: Nurse   Support System Family members;Other (Comment)  ()   Crisis   Type Emotional

## 2024-04-07 NOTE — CARE COORDINATION
Transitional Planning;  Spoke with Alee at [facility].  She states they have open case with patient but they need to have this looked at on Monday by leadership because patient was admitted under care with [address] [place] [address]. Tomorrow, [facility] will need 3 way call with [facility] and [address] [place] [address].  Then they will have to send electronic consents to [facility].  They will need signed electronic consents for transfer of care to [facility]  before they can see the patient.

## 2024-04-07 NOTE — PROGRESS NOTES
Absence of vitals noted at 1800. Second RN Mita Curry RN confirmed. Dr. Boone notified. Family at bedside grieving appropriately. POA notified. Spiritual care contacted & at bedside. [facility] notified. DPOA reports no specific  home or cremation at this time. Will discuss when he arrives. Spiritual care, Siomara, updated.

## 2024-04-07 NOTE — PROGRESS NOTES
[facility]  Office: [phone]  Rio Blair DO, Javier Walters, DO, Romulo Boone DO, Miquel Casey, DO, Nataly Priest MD, Daly Mendoza MD, Darius Mcmahon MD, Mary Swain MD,  Ad Goode MD, Vale Peña MD, Rancho Harley MD,  Julia Goddard DO, Bruno Frank MD, Nabeel Spicer MD, Danny Blair DO, Lulú Hernandez MD,  Piyush Lepe DO, Anabela Cummings MD, Valentine Brian MD, Caren Slaughter MD, Reddy Cagle MD,  Ibrahima Fortune MD, Idalia Girard MD, Zunilda Whiting MD, Konstantin Mane MD, Lucho Haq MD, Oswaldo Crawford MD, Jc Acuña DO, De Cochran DO, Sole Mcgovern MD,  Vinay Meza MD, Shirley Waterhouse, CNP,  Paulette Cook, CNP, Ruslan Ward, CNP,  Doreen Corcoran, DNP, Twyla Pacheco, CNP, Ashley Mesa, CNP, Karma Sage, CNP, Tanvi Jim, CNP, Lizzie Esqueda, PA-C, Tova Romano, PA-C, Deb Crespo, CNP, Ronda Mchugh, CNP, Marco A Mar, CNP, Danielle Gonzalez, CNP, Linn Azul, CNP, Shwetha León, CNS, Estrella Bledsoe, CNP, Sera Kruger, CNP, Tracy Schwab, CNP         [facility]   IN-PATIENT SERVICE   [facility]    Progress Note    4/7/2024    7:55 AM    Name:   Danny Guevara  MRN:     [id number]     Acct:      [id number]   Room:   0406/0406-01   Day:  4  Admit Date:  4/3/2024  3:34 AM    PCP:   Roberto Donaldson MD  Code Status:  DNR-CC    Subjective:     C/C:   Chief Complaint   Patient presents with    Fatigue     Interval History Status: Worsened.     Patient with spontaneous eye opening, tachypneic, patient is slowly declined overnight.    Brief History:     Per H&P  \"Danny Guevara is a 63 y.o. Non- / non  male who presents with Fatigue  and is admitted to the hospital for the management of Hypercalcemia.  Patient has history significant for stage IV metastatic lung carcinoma, COPD, hypertension, and hyperlipidemia.       Patient was recently admitted to [facility] 3/29/2024

## 2024-04-08 NOTE — DISCHARGE SUMMARY
[facility]  Office: [phone]  Rio Blair DO, Javier Walters DO, Romulo Boone DO, Miquel Casey DO, Nataly Priest MD, Daly Mendoza MD, Darius Mcmahon MD, Mary Swain MD,  Ad Goode MD, Vale Peña MD, Rancho Harley MD,  Julia Goddard DO, Bruno Frank MD, Nabeel Spicer MD, Danny Blair DO, Lulú Hernandez MD,  Piyush Lepe DO, Anabela Cummings MD, Valentine Brian MD, Caren Slaughter MD, Reddy Cagle MD,  Ibrahima Fortune MD, Idalia Girard MD, Zunilda Whiting MD, Konstantin Mane MD, Lucho Haq MD, Oswaldo Crawford MD, Jc Acuña DO, De Cochran DO, Sole Mcgovern MD,  Vinay Meza MD, Shirley Waterhouse, CNP,  Paulette Cook CNP, Ruslan Ward, CNP,  Doreen Corcoran, DNP, Twyla Pacheco, CNP, Ashley Mesa, CNP, Karma Sage, CNP, Tanvi Jim, CNP, Lizzie Esqueda, PA-C, Tova Romano PA-C, Deb Crespo, CNP, Ronda Mchugh, CNP, Marco A Mar, CNP, Danielle Gonzalez, CNP, Linn Azul, CNP, Shwetha León, CNS, Estrella Bledsoe, CNP, Sera Kruger CNP, Tracy Schwab, CNP         [facility]   IN-PATIENT SERVICE   [facility]    Discharge Summary     Patient ID: Danny Guevara  :  1960   MRN: [id number]     ACCOUNT:  [id number]   Patient's PCP: Roberto Donaldson MD  Admit Date: 4/3/2024   Discharge Date: 2024     Length of Stay: 4  Code Status:  Prior  Admitting Physician: Nabeel Spicer MD  Discharge Physician: Romulo J Orlop, DO     Active Discharge Diagnoses:     Hospital Problem Lists:  Principal Problem:    Hypercalcemia  Active Problems:    ETOH abuse    Major depressive disorder, recurrent severe without psychotic features (HCC)    Hypothyroidism    Hypertension    Hyponatremia    Malignant neoplasm of lung (HCC)    Hyperlipemia    Pneumonia due to infectious organism    Neurocognitive disorder    General weakness  Resolved Problems:    * No resolved hospital problems. *      Admission Condition:   patient's care.

## 2024-04-17 NOTE — PROGRESS NOTES
Physician Progress Note      PATIENT:               FIDEL ABRAMS  CSN #:                  [id number]  :                       1960  ADMIT DATE:       4/3/2024 3:34 AM  DISCH DATE:        2024 11:06 PM  RESPONDING  PROVIDER #:        Romulo Boone DO          QUERY TEXT:    Pt admitted on 4/3  with Sepsis and Pneumonia. Pt noted to have AMS.    Medicine progress  note documents spontaneous eye opening , declined   overnight. with review of systems cannot be obtained due to encephalopathy and   There was concern for Wernicke encephalopathy , presented with AMS and was   given IV thiamine with improved mentation. currently noted alert oriented   times 3 with bouts of confusion. If possible, please document in the progress   notes and discharge summary if you are evaluating and / or treating any of the   following:    The medical record reflects the following:  Risk Factors: Stage 4 lung Cancer, COPD, Alcohol abuse  Clinical Indicators: admitted on 4/3  with Sepsis and Pneumonia. Pt noted to   have AMS.  Medicine progress   note documents spontaneous eye opening ,   declined overnight. with review of systems cannot be obtained due to   encephalopathy and There was concern for Wernicke encephalopathy , presented   with AMS and was given IV thiamine with improved mentation. currently noted   alert oriented times 3 with bouts of confusion  Treatment: Hospice consult, IV Thiamine, IV Cefepime    Thank You Jonel KABA BSN CCDS  Email [email or web address]  Cell [phone]  office hours M-F 6am to 2:30p  Options provided:  -- Alcoholic encephalopathy  -- Metabolic encephalopathy  -- Septic encephalopathy  -- Wernicke?s encephalopathy  -- Delirium due to, Please specify cause  -- Delirium  -- Other - I will add my own diagnosis  -- Disagree - Not applicable / Not valid  -- Disagree - Clinically unable to determine / Unknown  -- Refer to Clinical Documentation Reviewer    PROVIDER RESPONSE

## 2024-05-09 NOTE — PROGRESS NOTES
"VSS on ra. Pt oriented to self and knows she's in the hospital. Pt communicating clearly and appropriately, although delayed. She ate her jinny food cake. Took medications without difficulty. Turned and repositioned. LS dim. Infrequent good cough. Teke SR> LPIV. Reg diet.     Goal Outcome Evaluation:      Plan of Care Reviewed With: patient    Overall Patient Progress: improvingOverall Patient Progress: improving    Outcome Evaluation: Pt alert. Talkative. Ate cake. Took meds.        Problem: Adult Inpatient Plan of Care  Goal: Plan of Care Review  Description: The Plan of Care Review/Shift note should be completed every shift.  The Outcome Evaluation is a brief statement about your assessment that the patient is improving, declining, or no change.  This information will be displayed automatically on your shift  note.  5/9/2024 0131 by Maribell Streeter RN  Outcome: Progressing  Flowsheets (Taken 5/9/2024 0131)  Outcome Evaluation: Pt alert. Talkative. Ate cake. Took meds.  Plan of Care Reviewed With: patient  5/9/2024 0130 by Maribell Streeter RN  Outcome: Progressing  Flowsheets (Taken 5/9/2024 0130)  Overall Patient Progress: improving  5/9/2024 0129 by Maribell Streeter RN  Outcome: Progressing  Flowsheets (Taken 5/9/2024 0129)  Outcome Evaluation: Pt alert. Comminitive. Ate some angle food cake. Took meds.  Plan of Care Reviewed With: patient  Overall Patient Progress: improving  Goal: Patient-Specific Goal (Individualized)  Description: You can add care plan individualizations to a care plan. Examples of Individualization might be:  \"Parent requests to be called daily at 9am for status\", \"I have a hard time hearing out of my right ear\", or \"Do not touch me to wake me up as it startles  me\".  5/9/2024 0131 by Maribell Streeter RN  Outcome: Progressing  5/9/2024 0130 by Maribell Streeter RN  Outcome: Progressing  5/9/2024 0129 by Maribell Streeter RN  Outcome: " Nephrology Progress Note      SUBJECTIVE      - Pt was seen and examined. No acute issues overnight.   - Stable hemodynamics. Pt complaining left sided pain.    - Pt complained about nausea with taking sodium.  - Serum Sodium 24 is 130.  - No edema noted on lower legs.     Lab and chart review:     Brief history  Danny Guevara; 63 y.o. male with past medical history of alcohol abuse w/ h/o withdrawals, COPD, left lung nodule, malignant, does have humeral lesion as well which appears to be mucostasis.  He does have myeloproliferative disorder, and chronic low back pain presented to the hospital with the chief complaint of chest pain that radiates to back and left arm, associated with SOB and cough productive of yellowish sputum x1 week. He was noted to have bed bugs and he was placed in paper gown and his clothes were bagged appropriately.  .    Nephrology was consulted for hyponatremia.  Sodium was 122 on 2024 which is corrected appropriately at 130 now, urine osmolality 139 urine sodium less than 20 consistent with polydipsia, doubt SIADH based on urine studies.        OBJECTIVE      CURRENT TEMPERATURE:  Temp: 97.8 °F (36.6 °C)  MAXIMUM TEMPERATURE OVER 24HRS:  Temp (24hrs), Av °F (36.7 °C), Min:97.5 °F (36.4 °C), Max:99.1 °F (37.3 °C)    CURRENT RESPIRATORY RATE:  Respirations: 16  CURRENT PULSE:  Pulse: 77  CURRENT BLOOD PRESSURE:  BP: 116/82  24HR BLOOD PRESSURE RANGE:  Systolic (24hrs), Av , Min:100 , Max:128   ; Diastolic (24hrs), Av, Min:75, Max:86    24HR INTAKE/OUTPUT:    Intake/Output Summary (Last 24 hours) at 2024 1023  Last data filed at 2024 0631  Gross per 24 hour   Intake 920 ml   Output 600 ml   Net 320 ml       WEIGHT :Patient Vitals for the past 96 hrs (Last 3 readings):   Weight   24 0631 88 kg (194 lb 0.1 oz)   24 0600 87.3 kg (192 lb 7.4 oz)   24 1815 90.7 kg (200 lb)       PHYSICAL EXAM      GENERAL APPEARANCE:Awake, alert, in no acute  Progressing  Goal: Absence of Hospital-Acquired Illness or Injury  5/9/2024 0131 by Maribell Streeter RN  Outcome: Progressing  5/9/2024 0130 by Maribell Streeter RN  Outcome: Progressing  5/9/2024 0129 by Maribell Streeter RN  Outcome: Progressing  Goal: Optimal Comfort and Wellbeing  5/9/2024 0131 by Maribell Streeter RN  Outcome: Progressing  5/9/2024 0130 by Maribell Streeter RN  Outcome: Progressing  5/9/2024 0129 by Maribell Streeter RN  Outcome: Progressing  Goal: Readiness for Transition of Care  5/9/2024 0131 by Maribell Streeter RN  Outcome: Progressing  5/9/2024 0130 by Maribell Streeter RN  Outcome: Progressing  5/9/2024 0129 by Maribell Streeter RN  Outcome: Progressing     Problem: Fall Injury Risk  Goal: Absence of Fall and Fall-Related Injury  5/9/2024 0131 by Maribell Streeter RN  Outcome: Progressing  5/9/2024 0130 by Maribell Streeter RN  Outcome: Progressing  5/9/2024 0129 by Maribell Streeter RN  Outcome: Progressing     Problem: Oral Intake Inadequate  Goal: Improved Oral Intake  5/9/2024 0131 by Maribell Streeter RN  Outcome: Progressing  5/9/2024 0130 by Maribell Streeter RN  Outcome: Progressing  5/9/2024 0129 by Maribell Streeter RN  Outcome: Progressing     Problem: Comorbidity Management  Goal: Maintenance of COPD Symptom Control  5/9/2024 0131 by Maribell Streeter RN  Outcome: Progressing  5/9/2024 0130 by Maribell Streeter RN  Outcome: Progressing  5/9/2024 0129 by Maribell Streeter RN  Outcome: Progressing  Goal: Maintenance of Seizure Control  5/9/2024 0131 by Maribell Streeter RN  Outcome: Progressing  5/9/2024 0130 by Maribell Streeter RN  Outcome: Progressing  5/9/2024 0129 by Maribell Streeter RN  Outcome: Progressing

## 2024-10-03 NOTE — PROGRESS NOTES
Patient SPO2 89% this morning.  Patient put on 2L NC and increased to 3L NC to bring SPO2 up to 91%.  Patient is sleeping and is breathing through his mouth and needs to be reminded to breath through nasal canula.   No

## (undated) DEVICE — MARKER,SKIN,WI/RULER AND LABELS: Brand: MEDLINE

## (undated) DEVICE — ANESTHESIA CIRCUIT-LF: Brand: MEDLINE INDUSTRIES, INC.

## (undated) DEVICE — STRAP,POSITIONING,KNEE/BODY,FOAM,4X60": Brand: MEDLINE

## (undated) DEVICE — CONTROL SYRINGE LUER-LOCK TIP: Brand: MONOJECT

## (undated) DEVICE — PATIENT RETURN ELECTRODE, SINGLE-USE, CONTACT QUALITY MONITORING, ADULT, WITH 9FT CORD, FOR PATIENTS WEIGING OVER 33LBS. (15KG): Brand: MEGADYNE

## (undated) DEVICE — CODMAN® SURGICAL PATTIES 1" X 1" (2.54CM X 2.54CM): Brand: CODMAN®

## (undated) DEVICE — GLOVE SURG SZ 75 L12IN FNGR THK87MIL WHT LTX FREE

## (undated) DEVICE — SWIVEL CONNECTOR

## (undated) DEVICE — DRAPE,REIN 53X77,STERILE: Brand: MEDLINE

## (undated) DEVICE — 3M™ IOBAN™ 2 ANTIMICROBIAL INCISE DRAPE 6650EZ: Brand: IOBAN™ 2

## (undated) DEVICE — GLOVE SURG SZ 8 L12IN FNGR THK79MIL GRN LTX FREE

## (undated) DEVICE — DRAPE THYROID

## (undated) DEVICE — GOWN,AURORA,NONREINFORCED,LARGE: Brand: MEDLINE

## (undated) DEVICE — TOWEL,OR,DSP,ST,NATURAL,DLX,4/PK,20PK/CS: Brand: MEDLINE

## (undated) DEVICE — NEEDLE ASPIR 21GA L700MM US GUID TREAT DST END FOR EFFICIENT

## (undated) DEVICE — Device: Brand: ION

## (undated) DEVICE — PROTECTOR ULN NRV PUR FOAM HK LOOP STRP ANATOMICALLY

## (undated) DEVICE — CODMAN® SURGICAL PATTIES 1/2" X 3" (1.27CM X 7.62CM): Brand: CODMAN®

## (undated) DEVICE — ABS MED DISTRACTION PIN , 12MM POUCH
Type: IMPLANTABLE DEVICE | Site: SPINE CERVICAL | Status: NON-FUNCTIONAL
Brand: ABS MED DISTRACTION PIN
Removed: 2019-09-05

## (undated) DEVICE — PAD,NON-ADHERENT,3X8,STERILE,LF,1/PK: Brand: MEDLINE

## (undated) DEVICE — PACK PROCEDURE SURG LUMBAR SPINE SVMMC

## (undated) DEVICE — KIT DRN FLAT W/ 100CC EVAC 7MM FULL PERF

## (undated) DEVICE — NEEDLE SPNL L4.5IN OD22GA QNCKE TYP SPINOCAN

## (undated) DEVICE — SINGLE DOSE EPI TY

## (undated) DEVICE — TOWEL,OR,DSP,ST,BLUE,STD,4/PK,20PK/CS: Brand: MEDLINE

## (undated) DEVICE — Z DISCONTINUED APPLICATOR SURG PREP 0.35OZ 2% CHG 70% ISO ALC W/ HI LT

## (undated) DEVICE — 3.0MM PRECISION NEURO (MATCH HEAD)

## (undated) DEVICE — SPONGE,PEANUT,XRAY,ST,SM,3/8",5/CARD: Brand: MEDLINE INDUSTRIES, INC.

## (undated) DEVICE — DRAPE C ARM UNIV W41XL74IN CLR PLAS XR VELC CLSR POLY STRP

## (undated) DEVICE — 1010 S-DRAPE TOWEL DRAPE 10/BX: Brand: STERI-DRAPE™

## (undated) DEVICE — GAUZE,SPONGE,FLUFF,6"X6.75",STRL,5/TRAY: Brand: MEDLINE

## (undated) DEVICE — GARMENT,MEDLINE,DVT,INT,CALF,MED, GEN2: Brand: MEDLINE

## (undated) DEVICE — EMG TUBE 8229707 NIM TRIVANTAGE 7.0MM ID: Brand: NIM TRIVANTAGE™

## (undated) DEVICE — CONNECTOR TBNG WHT PLAS SUCT STR 5IN1 LTWT W/ M CONN

## (undated) DEVICE — BIOPSY NEEDLE, 21G: Brand: FLEXISION

## (undated) DEVICE — ADHESIVE SKIN CLSR 0.7ML TOP DERMBND ADV

## (undated) DEVICE — Device

## (undated) DEVICE — TUBE LUKENS 20CC 6 1/4": Brand: ALLEGIANCE

## (undated) DEVICE — STANDARD HYPODERMIC NEEDLE,POLYPROPYLENE HUB: Brand: MONOJECT

## (undated) DEVICE — DRESSING BORDERED ADH GZ UNIV GEN USE 5IN 4IN AND 2 1/2IN

## (undated) DEVICE — GLOVE ORANGE PI 7   MSG9070

## (undated) DEVICE — COLLAR CERV UNIV AD L13-19IN TRACH OPN TWO PC RIG POLYETH

## (undated) DEVICE — GLOVE SURG SZ 65 THK91MIL LTX FREE SYN POLYISOPRENE

## (undated) DEVICE — ELECTRODE PT RET AD L9FT HI MOIST COND ADH HYDRGEL CORDED

## (undated) DEVICE — SYRINGE IRRIG 60ML SFT PLIABLE BLB EZ TO GRP 1 HND USE W/

## (undated) DEVICE — GLOVE ORANGE PI 8 1/2   MSG9085

## (undated) DEVICE — NEEDLE ASPIR 11GA L152MM TAPR DST RAZ SHRP BVL TIP FOR BNE

## (undated) DEVICE — VISION PROBE ADAPTER AND SUCTION ADAPTER

## (undated) DEVICE — BANDAGE ADH W1XL3IN UNIV PLAS NAT GEN USE STRP

## (undated) DEVICE — DRAPE MICSCP W117XL305CM DIA65MM LENS W VARI LENS2 FOR LEICA

## (undated) DEVICE — COVER,MAYO STAND,STERILE: Brand: MEDLINE

## (undated) DEVICE — E-Z CLEAN, NON-STICK, PTFE COATED, ELECTROSURGICAL BLADE ELECTRODE, MODIFIED EXTENDED INSULATION, 2.5 INCH (6.35 CM): Brand: MEGADYNE

## (undated) DEVICE — SINGLE SHOT EPIDURAL TRAY: Brand: MEDLINE INDUSTRIES, INC.

## (undated) DEVICE — E-Z CLEAN, NON-STICK, PTFE COATED, ELECTROSURGICAL BLADE ELECTRODE, MODIFIED EXTENDED INSULATION, 4 INCH (10.2 CM): Brand: MEGADYNE

## (undated) DEVICE — NEEDLE SPNL 18GA L3.5IN W/ QNCKE SHARPER BVL DURA CLICK

## (undated) DEVICE — SINGLE-USE BIOPSY FORCEPS: Brand: RADIAL JAW 4

## (undated) DEVICE — CODMAN® SURGICAL PATTIES 1/2" X 1/2" (1.27CM X 1.27CM): Brand: CODMAN®

## (undated) DEVICE — SUTURE MCRYL SZ 4-0 L18IN ABSRB UD L16MM PC-3 3/8 CIR PRIM Y845G

## (undated) DEVICE — BIOPSY NEEDLE, 23G: Brand: FLEXISION